# Patient Record
Sex: MALE | Race: WHITE | NOT HISPANIC OR LATINO | Employment: FULL TIME | URBAN - METROPOLITAN AREA
[De-identification: names, ages, dates, MRNs, and addresses within clinical notes are randomized per-mention and may not be internally consistent; named-entity substitution may affect disease eponyms.]

---

## 2022-01-04 ENCOUNTER — APPOINTMENT (EMERGENCY)
Dept: CT IMAGING | Facility: HOSPITAL | Age: 62
End: 2022-01-04

## 2022-01-04 ENCOUNTER — HOSPITAL ENCOUNTER (EMERGENCY)
Facility: HOSPITAL | Age: 62
Discharge: HOME | End: 2022-01-04
Attending: INTERNAL MEDICINE | Admitting: INTERNAL MEDICINE

## 2022-01-04 ENCOUNTER — APPOINTMENT (EMERGENCY)
Dept: RADIOLOGY | Facility: HOSPITAL | Age: 62
End: 2022-01-04

## 2022-01-04 VITALS
OXYGEN SATURATION: 99 % | DIASTOLIC BLOOD PRESSURE: 85 MMHG | TEMPERATURE: 97.5 F | RESPIRATION RATE: 16 BRPM | HEART RATE: 97 BPM | SYSTOLIC BLOOD PRESSURE: 146 MMHG

## 2022-01-04 DIAGNOSIS — R41.82 ALTERED MENTAL STATUS, UNSPECIFIED ALTERED MENTAL STATUS TYPE: Primary | ICD-10-CM

## 2022-01-04 LAB
ALBUMIN SERPL BCP-MCNC: 4.2 G/DL (ref 3.4–4.8)
ALP SERPL-CCNC: 118.8 U/L (ref 10–129)
ALT SERPL W P-5'-P-CCNC: 33 U/L (ref 5–63)
ANION GAP SERPL CALCULATED.3IONS-SCNC: 13 MMOL/L (ref 4–13)
AST SERPL W P-5'-P-CCNC: 34 U/L (ref 15–41)
BASOPHILS # BLD AUTO: 0.1 THOUSANDS/ΜL (ref 0–0.1)
BASOPHILS NFR BLD AUTO: 1 % (ref 0–1)
BILIRUB SERPL-MCNC: 0.62 MG/DL (ref 0.3–1.2)
BUN SERPL-MCNC: 7 MG/DL (ref 6–20)
CALCIUM SERPL-MCNC: 9.4 MG/DL (ref 8.4–10.2)
CARDIAC TROPONIN I PNL SERPL HS: 8 NG/L (ref 8–18)
CHLORIDE SERPL-SCNC: 82 MMOL/L (ref 96–108)
CO2 SERPL-SCNC: 23 MMOL/L (ref 22–33)
CREAT SERPL-MCNC: 0.53 MG/DL (ref 0.5–1.2)
EOSINOPHIL # BLD AUTO: 0.01 THOUSAND/ΜL (ref 0–0.61)
EOSINOPHIL NFR BLD AUTO: 0 % (ref 0–6)
ERYTHROCYTE [DISTWIDTH] IN BLOOD BY AUTOMATED COUNT: 13.2 % (ref 11.6–15.1)
GFR SERPL CREATININE-BSD FRML MDRD: 115 ML/MIN/1.73SQ M
GLUCOSE SERPL-MCNC: 112 MG/DL (ref 65–140)
GLUCOSE SERPL-MCNC: 121 MG/DL (ref 65–140)
HCT VFR BLD AUTO: 43.3 % (ref 36.5–49.3)
HGB BLD-MCNC: 15.4 G/DL (ref 12–17)
IMM GRANULOCYTES # BLD AUTO: 0.05 THOUSAND/UL (ref 0–0.2)
IMM GRANULOCYTES NFR BLD AUTO: 1 % (ref 0–2)
LYMPHOCYTES # BLD AUTO: 0.54 THOUSANDS/ΜL (ref 0.6–4.47)
LYMPHOCYTES NFR BLD AUTO: 5 % (ref 14–44)
MCH RBC QN AUTO: 30.4 PG (ref 26.8–34.3)
MCHC RBC AUTO-ENTMCNC: 35.6 G/DL (ref 31.4–37.4)
MCV RBC AUTO: 86 FL (ref 82–98)
MONOCYTES # BLD AUTO: 1.15 THOUSAND/ΜL (ref 0.17–1.22)
MONOCYTES NFR BLD AUTO: 12 % (ref 4–12)
NEUTROPHILS # BLD AUTO: 8.08 THOUSANDS/ΜL (ref 1.85–7.62)
NEUTS SEG NFR BLD AUTO: 81 % (ref 43–75)
NRBC BLD AUTO-RTO: 0 /100 WBCS
PLATELET # BLD AUTO: 250 THOUSANDS/UL (ref 149–390)
PMV BLD AUTO: 8.4 FL (ref 8.9–12.7)
POTASSIUM SERPL-SCNC: 3.8 MMOL/L (ref 3.5–5)
PROT SERPL-MCNC: 7.9 G/DL (ref 6.4–8.3)
RBC # BLD AUTO: 5.06 MILLION/UL (ref 3.88–5.62)
SODIUM SERPL-SCNC: 118 MMOL/L (ref 133–145)
WBC # BLD AUTO: 9.93 THOUSAND/UL (ref 4.31–10.16)

## 2022-01-04 PROCEDURE — 71045 X-RAY EXAM CHEST 1 VIEW: CPT

## 2022-01-04 PROCEDURE — 93005 ELECTROCARDIOGRAM TRACING: CPT

## 2022-01-04 PROCEDURE — 85025 COMPLETE CBC W/AUTO DIFF WBC: CPT | Performed by: INTERNAL MEDICINE

## 2022-01-04 PROCEDURE — 99285 EMERGENCY DEPT VISIT HI MDM: CPT | Performed by: INTERNAL MEDICINE

## 2022-01-04 PROCEDURE — 84484 ASSAY OF TROPONIN QUANT: CPT | Performed by: INTERNAL MEDICINE

## 2022-01-04 PROCEDURE — G1004 CDSM NDSC: HCPCS

## 2022-01-04 PROCEDURE — 70450 CT HEAD/BRAIN W/O DYE: CPT

## 2022-01-04 PROCEDURE — 36415 COLL VENOUS BLD VENIPUNCTURE: CPT | Performed by: INTERNAL MEDICINE

## 2022-01-04 PROCEDURE — 99285 EMERGENCY DEPT VISIT HI MDM: CPT

## 2022-01-04 PROCEDURE — 82948 REAGENT STRIP/BLOOD GLUCOSE: CPT

## 2022-01-04 PROCEDURE — 80053 COMPREHEN METABOLIC PANEL: CPT | Performed by: INTERNAL MEDICINE

## 2022-01-04 NOTE — ED PROVIDER NOTES
History  Chief Complaint   Patient presents with    Altered Mental Status     Per EMS, pt was at work when he was seen by coworkers standing with a blank stare  Pt was somewhat unresponsive during episode  Sat in chair and felt okay after a few minutes  Pt reports no medical hx or medications   in field  A 64Y old came from work as pt felt weak, and for 2 minutes he was Tralhaji Tang, so staff at work called 911  As per EMS when they arrived read patient was awake alert oriented and has no complain  Brought patient to the ER patient is awake alert oriented x3 respond told questions  Patient stated that he does know why he is here and has no symptoms  Patient denies history of seizure disorder, chest pain, SOB, abdominal pain, nausea vomiting diarrhea  Patient takes no medication and has no medical history  Patient denies history of stroke  Patient is a smoker  None       History reviewed  No pertinent past medical history  History reviewed  No pertinent surgical history  History reviewed  No pertinent family history  I have reviewed and agree with the history as documented  E-Cigarette/Vaping    E-Cigarette Use Never User      E-Cigarette/Vaping Substances     Social History     Tobacco Use    Smoking status: Current Every Day Smoker     Packs/day: 1 00     Types: Cigarettes    Smokeless tobacco: Never Used   Vaping Use    Vaping Use: Never used   Substance Use Topics    Alcohol use: Yes     Comment: occasionally    Drug use: Never       Review of Systems   Constitutional: Negative for diaphoresis, fatigue and fever  HENT: Negative for facial swelling, sinus pain, sneezing and trouble swallowing  Respiratory: Negative for cough, chest tightness and shortness of breath  Cardiovascular: Negative for chest pain, palpitations and leg swelling  Gastrointestinal: Negative for abdominal pain, diarrhea, nausea and vomiting     Endocrine: Negative for polydipsia, polyphagia and polyuria  Genitourinary: Negative for difficulty urinating, dysuria, flank pain and hematuria  Musculoskeletal: Negative for arthralgias, back pain, gait problem, neck pain and neck stiffness  Skin: Negative for color change, pallor and rash  Neurological: Negative for dizziness, seizures, syncope, facial asymmetry, speech difficulty, light-headedness, numbness and headaches  Hematological: Negative for adenopathy  Does not bruise/bleed easily  Psychiatric/Behavioral: Negative for agitation and behavioral problems  Physical Exam  Physical Exam  Vitals and nursing note reviewed  Constitutional:       General: He is not in acute distress  Appearance: He is well-developed  He is not ill-appearing, toxic-appearing or diaphoretic  HENT:      Head: Normocephalic and atraumatic  Mouth/Throat:      Mouth: Mucous membranes are moist       Pharynx: No oropharyngeal exudate  Eyes:      General: No visual field deficit or scleral icterus  Extraocular Movements: Extraocular movements intact  Right eye: Normal extraocular motion and no nystagmus  Pupils: Pupils are equal, round, and reactive to light  Pupils are equal    Neck:      Meningeal: Brudzinski's sign and Kernig's sign absent  Cardiovascular:      Rate and Rhythm: Normal rate and regular rhythm  Heart sounds: Normal heart sounds  No murmur heard  No friction rub  Pulmonary:      Effort: Pulmonary effort is normal  No respiratory distress  Breath sounds: Normal breath sounds  No wheezing  Chest:      Chest wall: No tenderness  Abdominal:      General: Bowel sounds are normal  There is no distension  Palpations: Abdomen is soft  There is no mass  Tenderness: There is no abdominal tenderness  There is no guarding  Musculoskeletal:         General: No tenderness or deformity  Normal range of motion  Cervical back: Normal range of motion and neck supple  No rigidity     Lymphadenopathy: Cervical: No cervical adenopathy  Skin:     General: Skin is warm and dry  Capillary Refill: Capillary refill takes less than 2 seconds  Neurological:      Mental Status: He is alert and oriented to person, place, and time  GCS: GCS eye subscore is 4  GCS verbal subscore is 5  GCS motor subscore is 6  Cranial Nerves: No cranial nerve deficit, dysarthria or facial asymmetry     Psychiatric:         Mood and Affect: Mood normal          Behavior: Behavior normal          Vital Signs  ED Triage Vitals [01/04/22 0956]   Temperature Pulse Respirations Blood Pressure SpO2   97 5 °F (36 4 °C) 97 16 146/85 99 %      Temp Source Heart Rate Source Patient Position - Orthostatic VS BP Location FiO2 (%)   Oral Monitor Sitting Left arm --      Pain Score       --           Vitals:    01/04/22 0956   BP: 146/85   Pulse: 97   Patient Position - Orthostatic VS: Sitting         Visual Acuity  Visual Acuity      Most Recent Value   L Pupil Size (mm) 3   R Pupil Size (mm) 3          ED Medications  Medications - No data to display    Diagnostic Studies  Results Reviewed     Procedure Component Value Units Date/Time    High Sensitivity Troponin I Random [[id number]]  (Normal) Collected: 01/04/22 1029    Lab Status: Final result Specimen: Blood from Arm, Left Updated: 01/04/22 1056     HS TnI random 8 ng/L     Comprehensive metabolic panel [[id number]]  (Abnormal) Collected: 01/04/22 1029    Lab Status: Final result Specimen: Blood from Arm, Left Updated: 01/04/22 1051     Sodium 118 mmol/L      Potassium 3 8 mmol/L      Chloride 82 mmol/L      CO2 23 mmol/L      ANION GAP 13 mmol/L      BUN 7 mg/dL      Creatinine 0 53 mg/dL      Glucose 112 mg/dL      Calcium 9 4 mg/dL      AST 34 U/L      ALT 33 U/L      Alkaline Phosphatase 118 8 U/L      Total Protein 7 9 g/dL      Albumin 4 2 g/dL      Total Bilirubin 0 62 mg/dL      eGFR 115 ml/min/1 73sq m     Narrative:      [place] guidelines for Chronic Kidney Disease (CKD):     Stage 1 with normal or high GFR (GFR > 90 mL/min/1 73 square meters)    Stage 2 Mild CKD (GFR = 60-89 mL/min/1 73 square meters)    Stage 3A Moderate CKD (GFR = 45-59 mL/min/1 73 square meters)    Stage 3B Moderate CKD (GFR = 30-44 mL/min/1 73 square meters)    Stage 4 Severe CKD (GFR = 15-29 mL/min/1 73 square meters)    Stage 5 End Stage CKD (GFR <15 mL/min/1 73 square meters)  Note: GFR calculation is accurate only with a steady state creatinine    CBC and differential [[id number]]  (Abnormal) Collected: 01/04/22 1029    Lab Status: Final result Specimen: Blood from Arm, Left Updated: 01/04/22 1033     WBC 9 93 Thousand/uL      RBC 5 06 Million/uL      Hemoglobin 15 4 g/dL      Hematocrit 43 3 %      MCV 86 fL      MCH 30 4 pg      MCHC 35 6 g/dL      RDW 13 2 %      MPV 8 4 fL      Platelets 478 Thousands/uL      nRBC 0 /100 WBCs      Neutrophils Relative 81 %      Immat GRANS % 1 %      Lymphocytes Relative 5 %      Monocytes Relative 12 %      Eosinophils Relative 0 %      Basophils Relative 1 %      Neutrophils Absolute 8 08 Thousands/µL      Immature Grans Absolute 0 05 Thousand/uL      Lymphocytes Absolute 0 54 Thousands/µL      Monocytes Absolute 1 15 Thousand/µL      Eosinophils Absolute 0 01 Thousand/µL      Basophils Absolute 0 10 Thousands/µL     Fingerstick Glucose (POCT) [[id number]]  (Normal) Collected: 01/04/22 0957    Lab Status: Final result Updated: 01/04/22 0957     POC Glucose 121 mg/dl                  CT head wo contrast   Final Result by Franco Oswald MD (01/04 1124)      No acute intracranial abnormality  Workstation performed: [id number]         XR chest portable   Final Result by Miri Nguyen MD (01/04 1410)      No acute cardiopulmonary disease                    Workstation performed: [id number]                    Procedures  Procedures         ED Course  ED Course as of 01/05/22 0832   Tue Jan 04, 2022   1130 EKG; SINUS RHYTHM RATE 97/min, incomplete RBBB, , Qtc 522   1135 CXR; No infiltrate, no cardiomegely                                             MDM  Number of Diagnoses or Management Options  Altered mental status, unspecified altered mental status type  Diagnosis management comments: This is a 63years old came from old as patient was unresponsive for 2 minutes according to the staff at his work  Patient was packing boxes and all sudden he became unresponsive  Patient has no history of seizure  Patient takes no medication and no medical history  On the arrival of EMS patient was awake alert oriented x3 responding to all questions  Patient arrived to the ER and he is not sure why he is here  Patient has no symptoms  His blood sugar was normal   At the ER blood test was done all came back normal   CXR normal   CT head normal   Patient has history of smoking  At this point patient is going to be discharged home and he stated that he took the [employer] vaccination  And follow-up with his sent to [facility]  Amount and/or Complexity of Data Reviewed  Clinical lab tests: ordered and reviewed  Tests in the radiology section of CPT®: ordered and reviewed    Risk of Complications, Morbidity, and/or Mortality  Presenting problems: moderate  Diagnostic procedures: moderate  Management options: low        Disposition  Final diagnoses: Altered mental status, unspecified altered mental status type     Time reflects when diagnosis was documented in both MDM as applicable and the Disposition within this note     Time User Action Codes Description Comment    1/4/2022 11:36 AM Osmin Schulz Add [R41 82] Altered mental status, unspecified altered mental status type       ED Disposition     ED Disposition Condition Date/Time Comment    Discharge Stable Tue Jan 4, 2022 11:36 AM Tamir Licea III discharge to home/self care              Follow-up Information     Follow up With Specialties Details Why Contact Info Additional Information    [facility]'s [address] [facility] In 1 week  [address] [place] [address]  [facility] [address] [place], [place], [address], [phone]          There are no discharge medications for this patient  No discharge procedures on file      PDMP Review     None          ED Provider  Electronically Signed by           Rebecca Lewis MD  01/05/22 4832

## 2022-01-04 NOTE — DISCHARGE INSTRUCTIONS
Follow up with [facility]  Labs Reviewed   CBC AND DIFFERENTIAL - Abnormal       Result Value Ref Range Status    WBC 9 93  4 31 - 10 16 Thousand/uL Final    RBC 5 06  3 88 - 5 62 Million/uL Final    Hemoglobin 15 4  12 0 - 17 0 g/dL Final    Hematocrit 43 3  36 5 - 49 3 % Final    MCV 86  82 - 98 fL Final    MCH 30 4  26 8 - 34 3 pg Final    MCHC 35 6  31 4 - 37 4 g/dL Final    RDW 13 2  11 6 - 15 1 % Final    MPV 8 4 (*) 8 9 - 12 7 fL Final    Platelets 307  070 - 390 Thousands/uL Final    nRBC 0  /100 WBCs Final    Neutrophils Relative 81 (*) 43 - 75 % Final    Immat GRANS % 1  0 - 2 % Final    Lymphocytes Relative 5 (*) 14 - 44 % Final    Monocytes Relative 12  4 - 12 % Final    Eosinophils Relative 0  0 - 6 % Final    Basophils Relative 1  0 - 1 % Final    Neutrophils Absolute 8 08 (*) 1 85 - 7 62 Thousands/µL Final    Immature Grans Absolute 0 05  0 00 - 0 20 Thousand/uL Final    Lymphocytes Absolute 0 54 (*) 0 60 - 4 47 Thousands/µL Final    Monocytes Absolute 1 15  0 17 - 1 22 Thousand/µL Final    Eosinophils Absolute 0 01  0 00 - 0 61 Thousand/µL Final    Basophils Absolute 0 10  0 00 - 0 10 Thousands/µL Final   COMPREHENSIVE METABOLIC PANEL - Abnormal    Sodium 118 (*) 133 - 145 mmol/L Final    Potassium 3 8  3 5 - 5 0 mmol/L Final    Comment: R-Specimen slightly hemolyzed  Interpret results with caution  Chloride 82 (*) 96 - 108 mmol/L Final    CO2 23  22 - 33 mmol/L Final    ANION GAP 13  4 - 13 mmol/L Final    BUN 7  6 - 20 mg/dL Final    Creatinine 0 53  0 50 - 1 20 mg/dL Final    Comment: Standardized to IDMS reference method    Glucose 112  65 - 140 mg/dL Final    Comment: If the patient is fasting, the ADA then defines impaired fasting glucose as > 100 mg/dL and diabetes as > or equal to 123 mg/dL  Specimen collection should occur prior to Sulfasalazine administration due to the potential for falsely depressed results   Specimen collection should occur prior to Sulfapyridine administration due to the potential for falsely elevated results  Calcium 9 4  8 4 - 10 2 mg/dL Final    AST 34  15 - 41 U/L Final    Comment: Specimen collection should occur prior to Sulfasalazine administration due to the potential for falsely depressed results  ALT 33  5 - 63 U/L Final    Comment: Specimen collection should occur prior to Sulfasalazine administration due to the potential for falsely depressed results  Alkaline Phosphatase 118 8  10 - 129 U/L Final    Total Protein 7 9  6 4 - 8 3 g/dL Final    Albumin 4 2  3 4 - 4 8 g/dL Final    Total Bilirubin 0 62  0 30 - 1 20 mg/dL Final    eGFR 115  ml/min/1 73sq m Final    Narrative:     [place] guidelines for Chronic Kidney Disease (CKD):     Stage 1 with normal or high GFR (GFR > 90 mL/min/1 73 square meters)    Stage 2 Mild CKD (GFR = 60-89 mL/min/1 73 square meters)    Stage 3A Moderate CKD (GFR = 45-59 mL/min/1 73 square meters)    Stage 3B Moderate CKD (GFR = 30-44 mL/min/1 73 square meters)    Stage 4 Severe CKD (GFR = 15-29 mL/min/1 73 square meters)    Stage 5 End Stage CKD (GFR <15 mL/min/1 73 square meters)  Note: GFR calculation is accurate only with a steady state creatinine   HIGH SENSITIVITY TROPONIN I RANDOM - Normal    HS TnI random 8  8 - 18 ng/L Final    Comment:                                              Initial (time 0) result  If >=50 ng/L, Myocardial injury suggested ;  Type of myocardial injury and treatment strategy  to be determined  If 5-49 ng/L, a delta result at 2 hours and or 4 hours will be needed to further evaluate  If <4 ng/L, and chest pain has been >3 hours since onset, patient may qualify for discharge based on the HEART score in the ED  If <5 ng/L and <3hours since onset of chest pain, a delta result at 2 hours will be needed to further evaluate      Second Troponin (time 2 hours)  If calculated delta >= 20 ng/L,  Myocardial injury suggested ; Type of myocardial injury and treatment strategy to be determined  If 5-49 ng/L and the calculated delta is 5-19 ng/L, consult medical service for evaluation  Continue evaluation for ischemia on ecg and other possible etiology and repeat hs troponin at 4 hours  If delta is <5 ng/L at 2 hours, consider discharge based on risk stratification via the HEART score (if in ED), or PETER risk score in IP/Observation  POCT GLUCOSE - Normal    POC Glucose 121  65 - 140 mg/dl Final     CT head wo contrast   Final Result      No acute intracranial abnormality                    Workstation performed: [id number]         XR chest portable    (Results Pending)

## 2022-01-06 LAB
ATRIAL RATE: 97 BPM
P AXIS: 71 DEGREES
PR INTERVAL: 170 MS
QRS AXIS: 65 DEGREES
QRSD INTERVAL: 112 MS
QT INTERVAL: 411 MS
QTC INTERVAL: 522 MS
T WAVE AXIS: 78 DEGREES
VENTRICULAR RATE: 97 BPM

## 2022-01-06 PROCEDURE — 93010 ELECTROCARDIOGRAM REPORT: CPT | Performed by: INTERNAL MEDICINE

## 2022-10-09 ENCOUNTER — APPOINTMENT (OUTPATIENT)
Dept: CT IMAGING | Facility: HOSPITAL | Age: 62
DRG: 871 | End: 2022-10-09
Payer: COMMERCIAL

## 2022-10-09 ENCOUNTER — APPOINTMENT (OUTPATIENT)
Dept: MRI IMAGING | Facility: HOSPITAL | Age: 62
DRG: 871 | End: 2022-10-09
Payer: COMMERCIAL

## 2022-10-09 ENCOUNTER — APPOINTMENT (OUTPATIENT)
Dept: RADIOLOGY | Facility: HOSPITAL | Age: 62
DRG: 871 | End: 2022-10-09
Payer: COMMERCIAL

## 2022-10-09 ENCOUNTER — HOSPITAL ENCOUNTER (INPATIENT)
Facility: HOSPITAL | Age: 62
LOS: 12 days | Discharge: NURSING, REHAB OR LONG-TERM CARE | DRG: 871 | End: 2022-10-21
Attending: EMERGENCY MEDICINE | Admitting: INTERNAL MEDICINE
Payer: COMMERCIAL

## 2022-10-09 DIAGNOSIS — R13.12 OROPHARYNGEAL DYSPHAGIA: ICD-10-CM

## 2022-10-09 DIAGNOSIS — R53.1 ACUTE LEFT-SIDED WEAKNESS: ICD-10-CM

## 2022-10-09 DIAGNOSIS — Z93.1 GASTROSTOMY TUBE IN PLACE (HCC): ICD-10-CM

## 2022-10-09 DIAGNOSIS — J44.9 COPD (CHRONIC OBSTRUCTIVE PULMONARY DISEASE) (HCC): ICD-10-CM

## 2022-10-09 DIAGNOSIS — N17.9 AKI (ACUTE KIDNEY INJURY) (HCC): ICD-10-CM

## 2022-10-09 DIAGNOSIS — E44.0 MODERATE PROTEIN-CALORIE MALNUTRITION (HCC): ICD-10-CM

## 2022-10-09 DIAGNOSIS — F17.200 SMOKING: ICD-10-CM

## 2022-10-09 DIAGNOSIS — T17.908A ASPIRATION INTO RESPIRATORY TRACT: ICD-10-CM

## 2022-10-09 DIAGNOSIS — E87.1 HYPONATREMIA: ICD-10-CM

## 2022-10-09 DIAGNOSIS — I63.9 ACUTE CVA (CEREBROVASCULAR ACCIDENT) (HCC): Primary | ICD-10-CM

## 2022-10-09 DIAGNOSIS — I63.9 CVA (CEREBRAL VASCULAR ACCIDENT) (HCC): ICD-10-CM

## 2022-10-09 DIAGNOSIS — I10 HYPERTENSION: ICD-10-CM

## 2022-10-09 DIAGNOSIS — E44.1 MILD PROTEIN-CALORIE MALNUTRITION (HCC): ICD-10-CM

## 2022-10-09 PROBLEM — I63.232 STENOSIS OF LEFT INTERNAL CAROTID ARTERY WITH CEREBRAL INFARCTION (HCC): Status: ACTIVE | Noted: 2022-10-09

## 2022-10-09 PROBLEM — A41.9 SEVERE SEPSIS (HCC): Status: RESOLVED | Noted: 2022-10-09 | Resolved: 2022-10-09

## 2022-10-09 PROBLEM — R65.10 SIRS (SYSTEMIC INFLAMMATORY RESPONSE SYNDROME) (HCC): Status: ACTIVE | Noted: 2022-10-09

## 2022-10-09 PROBLEM — R65.10 SIRS (SYSTEMIC INFLAMMATORY RESPONSE SYNDROME) (HCC): Status: RESOLVED | Noted: 2022-10-09 | Resolved: 2022-10-09

## 2022-10-09 PROBLEM — R79.89 ELEVATED SERUM CREATININE: Status: ACTIVE | Noted: 2022-10-09

## 2022-10-09 PROBLEM — A41.9 SEVERE SEPSIS (HCC): Status: ACTIVE | Noted: 2022-10-09

## 2022-10-09 PROBLEM — R65.20 SEVERE SEPSIS (HCC): Status: RESOLVED | Noted: 2022-10-09 | Resolved: 2022-10-09

## 2022-10-09 PROBLEM — I65.22 MORE THAN 50 PERCENT STENOSIS OF LEFT INTERNAL CAROTID ARTERY: Status: ACTIVE | Noted: 2022-10-09

## 2022-10-09 PROBLEM — R65.20 SEVERE SEPSIS (HCC): Status: ACTIVE | Noted: 2022-10-09

## 2022-10-09 LAB
2HR DELTA HS TROPONIN: 1 NG/L
2HR DELTA HS TROPONIN: 1 NG/L
4HR DELTA HS TROPONIN: 2 NG/L
4HR DELTA HS TROPONIN: 2 NG/L
ANION GAP SERPL CALCULATED.3IONS-SCNC: 12 MMOL/L (ref 4–13)
ANION GAP SERPL CALCULATED.3IONS-SCNC: 12 MMOL/L (ref 4–13)
ANION GAP SERPL CALCULATED.3IONS-SCNC: 14 MMOL/L (ref 4–13)
ANION GAP SERPL CALCULATED.3IONS-SCNC: 14 MMOL/L (ref 4–13)
APTT PPP: 27 SECONDS (ref 23–37)
APTT PPP: 27 SECONDS (ref 23–37)
BACTERIA UR QL AUTO: NORMAL /HPF
BACTERIA UR QL AUTO: NORMAL /HPF
BILIRUB UR QL STRIP: NEGATIVE
BILIRUB UR QL STRIP: NEGATIVE
BUN SERPL-MCNC: 20 MG/DL (ref 5–25)
BUN SERPL-MCNC: 20 MG/DL (ref 5–25)
BUN SERPL-MCNC: 23 MG/DL (ref 5–25)
BUN SERPL-MCNC: 23 MG/DL (ref 5–25)
CALCIUM SERPL-MCNC: 8.3 MG/DL (ref 8.4–10.2)
CALCIUM SERPL-MCNC: 8.3 MG/DL (ref 8.4–10.2)
CALCIUM SERPL-MCNC: 8.4 MG/DL (ref 8.4–10.2)
CALCIUM SERPL-MCNC: 8.4 MG/DL (ref 8.4–10.2)
CARDIAC TROPONIN I PNL SERPL HS: 10 NG/L
CARDIAC TROPONIN I PNL SERPL HS: 10 NG/L
CARDIAC TROPONIN I PNL SERPL HS: 11 NG/L
CARDIAC TROPONIN I PNL SERPL HS: 11 NG/L
CARDIAC TROPONIN I PNL SERPL HS: 12 NG/L
CARDIAC TROPONIN I PNL SERPL HS: 12 NG/L
CHLORIDE SERPL-SCNC: 86 MMOL/L (ref 96–108)
CHLORIDE SERPL-SCNC: 86 MMOL/L (ref 96–108)
CHLORIDE SERPL-SCNC: 87 MMOL/L (ref 96–108)
CHLORIDE SERPL-SCNC: 87 MMOL/L (ref 96–108)
CHLORIDE UR-SCNC: <15 MMOL/L
CHLORIDE UR-SCNC: <15 MMOL/L
CLARITY UR: CLEAR
CLARITY UR: CLEAR
CO2 SERPL-SCNC: 19 MMOL/L (ref 21–32)
CO2 SERPL-SCNC: 19 MMOL/L (ref 21–32)
CO2 SERPL-SCNC: 21 MMOL/L (ref 21–32)
CO2 SERPL-SCNC: 21 MMOL/L (ref 21–32)
COLOR UR: YELLOW
COLOR UR: YELLOW
CREAT SERPL-MCNC: 1.9 MG/DL (ref 0.6–1.3)
CREAT SERPL-MCNC: 1.9 MG/DL (ref 0.6–1.3)
CREAT SERPL-MCNC: 1.93 MG/DL (ref 0.6–1.3)
CREAT SERPL-MCNC: 1.93 MG/DL (ref 0.6–1.3)
ERYTHROCYTE [DISTWIDTH] IN BLOOD BY AUTOMATED COUNT: 14.1 % (ref 11.6–15.1)
ERYTHROCYTE [DISTWIDTH] IN BLOOD BY AUTOMATED COUNT: 14.1 % (ref 11.6–15.1)
GFR SERPL CREATININE-BSD FRML MDRD: 37 ML/MIN/1.73SQ M
GLUCOSE SERPL-MCNC: 104 MG/DL (ref 65–140)
GLUCOSE SERPL-MCNC: 104 MG/DL (ref 65–140)
GLUCOSE SERPL-MCNC: 122 MG/DL (ref 65–140)
GLUCOSE SERPL-MCNC: 122 MG/DL (ref 65–140)
GLUCOSE UR STRIP-MCNC: NEGATIVE MG/DL
GLUCOSE UR STRIP-MCNC: NEGATIVE MG/DL
HCT VFR BLD AUTO: 35.1 % (ref 36.5–49.3)
HCT VFR BLD AUTO: 35.1 % (ref 36.5–49.3)
HGB BLD-MCNC: 12.1 G/DL (ref 12–17)
HGB BLD-MCNC: 12.1 G/DL (ref 12–17)
HGB UR QL STRIP.AUTO: NEGATIVE
HGB UR QL STRIP.AUTO: NEGATIVE
INR PPP: 1.08 (ref 0.84–1.19)
INR PPP: 1.08 (ref 0.84–1.19)
KETONES UR STRIP-MCNC: NEGATIVE MG/DL
KETONES UR STRIP-MCNC: NEGATIVE MG/DL
LACTATE SERPL-SCNC: 1.8 MMOL/L (ref 0.5–2)
LACTATE SERPL-SCNC: 1.8 MMOL/L (ref 0.5–2)
LACTATE SERPL-SCNC: 2.8 MMOL/L (ref 0.5–2)
LACTATE SERPL-SCNC: 2.8 MMOL/L (ref 0.5–2)
LEUKOCYTE ESTERASE UR QL STRIP: NEGATIVE
LEUKOCYTE ESTERASE UR QL STRIP: NEGATIVE
MCH RBC QN AUTO: 29.8 PG (ref 26.8–34.3)
MCH RBC QN AUTO: 29.8 PG (ref 26.8–34.3)
MCHC RBC AUTO-ENTMCNC: 34.5 G/DL (ref 31.4–37.4)
MCHC RBC AUTO-ENTMCNC: 34.5 G/DL (ref 31.4–37.4)
MCV RBC AUTO: 87 FL (ref 82–98)
MCV RBC AUTO: 87 FL (ref 82–98)
NITRITE UR QL STRIP: NEGATIVE
NITRITE UR QL STRIP: NEGATIVE
NON-SQ EPI CELLS URNS QL MICRO: NORMAL /HPF
NON-SQ EPI CELLS URNS QL MICRO: NORMAL /HPF
PH UR STRIP.AUTO: 5.5 [PH]
PH UR STRIP.AUTO: 5.5 [PH]
PLATELET # BLD AUTO: 114 THOUSANDS/UL (ref 149–390)
PLATELET # BLD AUTO: 114 THOUSANDS/UL (ref 149–390)
PMV BLD AUTO: 9.3 FL (ref 8.9–12.7)
PMV BLD AUTO: 9.3 FL (ref 8.9–12.7)
POTASSIUM SERPL-SCNC: 4.3 MMOL/L (ref 3.5–5.3)
POTASSIUM SERPL-SCNC: 4.3 MMOL/L (ref 3.5–5.3)
POTASSIUM SERPL-SCNC: 4.5 MMOL/L (ref 3.5–5.3)
POTASSIUM SERPL-SCNC: 4.5 MMOL/L (ref 3.5–5.3)
PROCALCITONIN SERPL-MCNC: 0.88 NG/ML
PROCALCITONIN SERPL-MCNC: 0.88 NG/ML
PROT UR STRIP-MCNC: ABNORMAL MG/DL
PROT UR STRIP-MCNC: ABNORMAL MG/DL
PROTHROMBIN TIME: 14.2 SECONDS (ref 11.6–14.5)
PROTHROMBIN TIME: 14.2 SECONDS (ref 11.6–14.5)
RBC # BLD AUTO: 4.06 MILLION/UL (ref 3.88–5.62)
RBC # BLD AUTO: 4.06 MILLION/UL (ref 3.88–5.62)
RBC #/AREA URNS AUTO: NORMAL /HPF
RBC #/AREA URNS AUTO: NORMAL /HPF
SODIUM 24H UR-SCNC: <10 MOL/L
SODIUM 24H UR-SCNC: <10 MOL/L
SODIUM SERPL-SCNC: 119 MMOL/L (ref 135–147)
SODIUM SERPL-SCNC: 119 MMOL/L (ref 135–147)
SODIUM SERPL-SCNC: 120 MMOL/L (ref 135–147)
SODIUM SERPL-SCNC: 120 MMOL/L (ref 135–147)
SP GR UR STRIP.AUTO: 1.03 (ref 1–1.03)
SP GR UR STRIP.AUTO: 1.03 (ref 1–1.03)
URATE SERPL-MCNC: 8 MG/DL (ref 3.5–8.5)
URATE SERPL-MCNC: 8 MG/DL (ref 3.5–8.5)
UROBILINOGEN UR STRIP-ACNC: <2 MG/DL
UROBILINOGEN UR STRIP-ACNC: <2 MG/DL
WBC # BLD AUTO: 18.2 THOUSAND/UL (ref 4.31–10.16)
WBC # BLD AUTO: 18.2 THOUSAND/UL (ref 4.31–10.16)
WBC #/AREA URNS AUTO: NORMAL /HPF
WBC #/AREA URNS AUTO: NORMAL /HPF

## 2022-10-09 PROCEDURE — G1004 CDSM NDSC: HCPCS

## 2022-10-09 PROCEDURE — 85610 PROTHROMBIN TIME: CPT | Performed by: EMERGENCY MEDICINE

## 2022-10-09 PROCEDURE — 93005 ELECTROCARDIOGRAM TRACING: CPT

## 2022-10-09 PROCEDURE — 83605 ASSAY OF LACTIC ACID: CPT

## 2022-10-09 PROCEDURE — 70496 CT ANGIOGRAPHY HEAD: CPT

## 2022-10-09 PROCEDURE — 70551 MRI BRAIN STEM W/O DYE: CPT

## 2022-10-09 PROCEDURE — 85027 COMPLETE CBC AUTOMATED: CPT | Performed by: EMERGENCY MEDICINE

## 2022-10-09 PROCEDURE — 71045 X-RAY EXAM CHEST 1 VIEW: CPT

## 2022-10-09 PROCEDURE — 83935 ASSAY OF URINE OSMOLALITY: CPT | Performed by: INTERNAL MEDICINE

## 2022-10-09 PROCEDURE — 82436 ASSAY OF URINE CHLORIDE: CPT | Performed by: INTERNAL MEDICINE

## 2022-10-09 PROCEDURE — 84484 ASSAY OF TROPONIN QUANT: CPT | Performed by: EMERGENCY MEDICINE

## 2022-10-09 PROCEDURE — 99291 CRITICAL CARE FIRST HOUR: CPT | Performed by: PHYSICIAN ASSISTANT

## 2022-10-09 PROCEDURE — 84300 ASSAY OF URINE SODIUM: CPT | Performed by: INTERNAL MEDICINE

## 2022-10-09 PROCEDURE — 99223 1ST HOSP IP/OBS HIGH 75: CPT | Performed by: HOSPITALIST

## 2022-10-09 PROCEDURE — 84550 ASSAY OF BLOOD/URIC ACID: CPT

## 2022-10-09 PROCEDURE — 81001 URINALYSIS AUTO W/SCOPE: CPT

## 2022-10-09 PROCEDURE — 85730 THROMBOPLASTIN TIME PARTIAL: CPT | Performed by: EMERGENCY MEDICINE

## 2022-10-09 PROCEDURE — 99285 EMERGENCY DEPT VISIT HI MDM: CPT | Performed by: EMERGENCY MEDICINE

## 2022-10-09 PROCEDURE — 83735 ASSAY OF MAGNESIUM: CPT

## 2022-10-09 PROCEDURE — 36415 COLL VENOUS BLD VENIPUNCTURE: CPT | Performed by: EMERGENCY MEDICINE

## 2022-10-09 PROCEDURE — 84100 ASSAY OF PHOSPHORUS: CPT

## 2022-10-09 PROCEDURE — 84145 PROCALCITONIN (PCT): CPT

## 2022-10-09 PROCEDURE — 70498 CT ANGIOGRAPHY NECK: CPT

## 2022-10-09 PROCEDURE — 80048 BASIC METABOLIC PNL TOTAL CA: CPT

## 2022-10-09 PROCEDURE — 80048 BASIC METABOLIC PNL TOTAL CA: CPT | Performed by: EMERGENCY MEDICINE

## 2022-10-09 PROCEDURE — 99285 EMERGENCY DEPT VISIT HI MDM: CPT

## 2022-10-09 PROCEDURE — 87040 BLOOD CULTURE FOR BACTERIA: CPT

## 2022-10-09 RX ORDER — ATORVASTATIN CALCIUM 40 MG/1
40 TABLET, FILM COATED ORAL EVERY EVENING
Status: CANCELLED | OUTPATIENT
Start: 2022-10-09

## 2022-10-09 RX ORDER — ASPIRIN 325 MG
325 TABLET ORAL ONCE
Status: DISCONTINUED | OUTPATIENT
Start: 2022-10-09 | End: 2022-10-12

## 2022-10-09 RX ORDER — CEFEPIME HYDROCHLORIDE 1 G/50ML
1000 INJECTION, SOLUTION INTRAVENOUS ONCE
Status: COMPLETED | OUTPATIENT
Start: 2022-10-09 | End: 2022-10-09

## 2022-10-09 RX ORDER — CLOPIDOGREL BISULFATE 75 MG/1
300 TABLET ORAL ONCE
Status: DISCONTINUED | OUTPATIENT
Start: 2022-10-09 | End: 2022-10-12

## 2022-10-09 RX ORDER — ASPIRIN 81 MG/1
81 TABLET ORAL DAILY
Status: DISCONTINUED | OUTPATIENT
Start: 2022-10-10 | End: 2022-10-14

## 2022-10-09 RX ORDER — ATORVASTATIN CALCIUM 40 MG/1
80 TABLET, FILM COATED ORAL
Status: DISCONTINUED | OUTPATIENT
Start: 2022-10-09 | End: 2022-10-10

## 2022-10-09 RX ORDER — CEFTRIAXONE 1 G/50ML
1000 INJECTION, SOLUTION INTRAVENOUS EVERY 24 HOURS
Status: DISCONTINUED | OUTPATIENT
Start: 2022-10-10 | End: 2022-10-20

## 2022-10-09 RX ORDER — CLOPIDOGREL BISULFATE 75 MG/1
200 TABLET ORAL ONCE
Status: DISCONTINUED | OUTPATIENT
Start: 2022-10-09 | End: 2022-10-09

## 2022-10-09 RX ORDER — CLOPIDOGREL BISULFATE 75 MG/1
75 TABLET ORAL DAILY
Status: DISCONTINUED | OUTPATIENT
Start: 2022-10-10 | End: 2022-10-13

## 2022-10-09 RX ORDER — HEPARIN SODIUM 5000 [USP'U]/ML
5000 INJECTION, SOLUTION INTRAVENOUS; SUBCUTANEOUS EVERY 8 HOURS SCHEDULED
Status: DISCONTINUED | OUTPATIENT
Start: 2022-10-09 | End: 2022-10-14

## 2022-10-09 RX ADMIN — HEPARIN SODIUM 5000 UNITS: 5000 INJECTION INTRAVENOUS; SUBCUTANEOUS at 23:39

## 2022-10-09 RX ADMIN — SODIUM CHLORIDE 1000 ML: 0.9 INJECTION, SOLUTION INTRAVENOUS at 19:00

## 2022-10-09 RX ADMIN — IOHEXOL 85 ML: 350 INJECTION, SOLUTION INTRAVENOUS at 16:16

## 2022-10-09 RX ADMIN — CEFEPIME HYDROCHLORIDE 1000 MG: 1 INJECTION, SOLUTION INTRAVENOUS at 19:00

## 2022-10-09 NOTE — ASSESSMENT & PLAN NOTE
Assessment:  Pt had an elevated creatinine of 1 9 on admission  Recent Labs     10/09/22  1637   CREATININE 1 90*   EGFR 37     Estimated Creatinine Clearance: 34 2 mL/min (A) (by C-G formula based on SCr of 1 9 mg/dL (H))  Unsure of pt's baseline as prior medical records could not be obtained      Plan:  • IVF  • Consult Nephrology  • Obtain am BMP

## 2022-10-09 NOTE — ASSESSMENT & PLAN NOTE
Recent Labs     10/09/22  1637   SODIUM 119*     No results found for: DANO Arredondo    Plan:  · Obtain STAT BMP  · Consult Nephrology  · Obtain urine sodium, urine chloride, urine osmolarity, and serum uric acid  · Obtain am cortisol  · Obtain BMP q8h according to hyponatremia protocol, until Na+ normalizes  · Continue IVF 0 9 NaCL gentle hydration to raise Na+ by 6-8 mEq per 24 hr  · Avoid over-correction to prevent osmotic demyelination syndrome  · Obtain renal US

## 2022-10-09 NOTE — QUICK NOTE
[facility] Attending Physician Attestation Note - Admission    I have seen and examined Shawanda Sow III personally and have reviewed the medical record independently  I have reviewed the case with the resident physician including all assessments and the plan of care for each  I agree with the resident physician and offer the following addendum to the below statements by the resident physician:     Date Evaluated: 10/9/2022    Subjective / HPI:   53-year-old male with no known significant past medical history who presented with left upper left lower extremity weakness and left-sided facial droop  Patient reportedly in his normal state of health last night when he went to bed  No other current complaints  Patient came to the ED when symptoms did not resolve  He denies shortness of breath or cough  Denies any recent poor oral intake  Does drink alcohol but not on a daily basis  Exam:   Vitals:    10/09/22 1900   BP: (!) 184/109   Pulse: 97   Resp: 22   Temp:    SpO2: 97%   General Appearance: Alert, no acute distress, chronically ill-appearing  HEENT: MMM, sclera anicteric  Skin: Pink, warm, dry and intact  No rashes/lesions noted  Lungs: clear to ascultation, no wheezes rales or rhonchi appreciated  Cardiac: normal S1/S2, regular rate and rhythm  No murmur/rubs/gallops  Abdomen: soft, nontender, nondistended, active bowel sounds  Extremities:   No edema  No cyanosis  Neuro: A&O x3  +dysarthria; left upper extremity deficit 0/5 and left lower 2/5 when compared to right  Assessment and Plan:  · 53-year-old male with no known past medical history presented with acute left upper left lower extremity weakness left-sided facial droop concerning for acute CVA confirmed on CT imaging  Patient's presentation also notable for severe hyponatremia of unclear origin as well as severe sepsis likely due to an aspiration pneumonia also seen on imaging  · CVA:  Stroke pathway ordered    Continue dual anti-platelet therapy, permissive hypertension, MRI brain and echocardiogram pending  Appreciate Neurology input  · Carotid atherosclerosis:  Vascular surgery consult  · Severe hyponatremia:  Patient received 1 L NS for severe sepsis  Get stat BMP now  Further fluid orders to be determined based on current sodium level  Check urine studies  Nephrology consult  · Elevated creatinine:  Unclear baseline  May be chronic  Check UA check renal ultrasound  Follow BMP daily  · Severe sepsis due to aspiration pneumonia:  Patient tachycardic with leukocytosis and elevated lactate on admission  CTA showed left upper lobe aspiration  Patient's procalcitonin also elevated supporting diagnosis  Would continue ceftriaxone, trend procalcitonin  Supportive care    Education and Discussions with Family / Patient: patient     Time Spent for Care: 70 minutes  More than 50% of total time spent on counseling and coordination of care as described above  Current Patient Status: Inpatient   Anticipated Length of Stay:  Patient will be admitted on an Inpatient basis with an anticipated length of stay of  > 2 midnights  Justification for Hospital Stay:  Acute CVA; severe sepsis    Epic / Care Everywhere Records Reviewed Independently: Yes     For detailed history, assessment, and plan of care, please review the statements below by Dr Fahad Keller

## 2022-10-09 NOTE — ED PROVIDER NOTES
History  Chief Complaint   Patient presents with   • STROKE Alert     Left sided weakness of upper and lower extremities with left sided facial paralysis  Last known well 180       53-year-old male with unremarkable PMHx (pt is not regularly evaluated by a provider), presents for evaluation via EMS for acute L UE and LE weakness with slurred speech since waking this AM  Pt reportedly "fell after slipping down while sitting in a chair" which prompted call to EMS  Pt is unable to report what time he woke in the morning, however he does believe he did not experience any focal weakness last night before sleeping  He denies any HA, vision changes, numbness/tingling, N/V/D, CP, abdominal pain, recent fever/chills, cough/cold Sx, or any other Sx  He denies any other recent falls, trauma or injuries  Not on AC or antiplatelets  No other concerns  None       History reviewed  No pertinent past medical history  History reviewed  No pertinent surgical history  History reviewed  No pertinent family history  I have reviewed and agree with the history as documented  E-Cigarette/Vaping     E-Cigarette/Vaping Substances     Social History     Tobacco Use   • Smoking status: Current Every Day Smoker     Types: Cigarettes   • Smokeless tobacco: Never Used   Substance Use Topics   • Alcohol use: Not Currently        Review of Systems   Constitutional: Negative for chills and fever  HENT: Negative for ear pain and sore throat  Eyes: Negative for pain and visual disturbance  Respiratory: Negative for cough and shortness of breath  Cardiovascular: Negative for chest pain and palpitations  Gastrointestinal: Negative for abdominal pain, diarrhea and vomiting  Genitourinary: Negative for dysuria and hematuria  Musculoskeletal: Negative for arthralgias and back pain  Skin: Negative for color change and rash  Neurological: Positive for speech difficulty and weakness   Negative for dizziness, seizures, syncope, light-headedness and numbness  All other systems reviewed and are negative  Physical Exam  ED Triage Vitals   Temperature Pulse Respirations Blood Pressure SpO2   10/09/22 1644 10/09/22 1600 10/09/22 1600 10/09/22 1600 10/09/22 1614   98 °F (36 7 °C) 63 20 (!) 161/109 98 %      Temp Source Heart Rate Source Patient Position - Orthostatic VS BP Location FiO2 (%)   10/09/22 1644 10/09/22 1600 10/09/22 1600 10/09/22 1630 --   Axillary Monitor Lying Right arm       Pain Score       --                    Orthostatic Vital Signs  Vitals:    10/09/22 1730 10/09/22 1800 10/09/22 1830 10/09/22 1900   BP: 169/74 154/94 (!) 179/107 (!) 184/109   Pulse: 104 97 100 97   Patient Position - Orthostatic VS: Lying Lying Lying Lying       Physical Exam  Vitals and nursing note reviewed  Constitutional:       Appearance: Normal appearance  He is well-developed  Comments: Disheveled, poor hygiene, smells of urine   HENT:      Head: Normocephalic and atraumatic  Right Ear: External ear normal       Left Ear: External ear normal       Nose: Nose normal       Mouth/Throat:      Mouth: Mucous membranes are moist    Eyes:      Extraocular Movements: Extraocular movements intact  Conjunctiva/sclera: Conjunctivae normal    Cardiovascular:      Rate and Rhythm: Normal rate and regular rhythm  Pulses: Normal pulses  Heart sounds: Normal heart sounds  No murmur heard  Pulmonary:      Effort: Pulmonary effort is normal  No respiratory distress  Breath sounds: Normal breath sounds  Abdominal:      General: Abdomen is flat  Palpations: Abdomen is soft  Tenderness: There is no abdominal tenderness  There is no guarding or rebound  Musculoskeletal:      Cervical back: Normal range of motion and neck supple  Right lower leg: No edema  Left lower leg: No edema  Skin:     General: Skin is warm and dry  Capillary Refill: Capillary refill takes less than 2 seconds  Neurological:      Mental Status: He is alert and oriented to person, place, and time  Cranial Nerves: Cranial nerve deficit present  Sensory: No sensory deficit  Motor: Weakness present  Coordination: Coordination abnormal       Comments: Motor strength 1/5 on L UE and L LE, L lower facial droop  Sensations to light touch intact throughout      GCS 15   Psychiatric:         Mood and Affect: Mood normal          Behavior: Behavior normal          ED Medications  Medications   aspirin tablet 325 mg (325 mg Oral Not Given 10/9/22 1655)   clopidogrel (PLAVIX) tablet 300 mg (300 mg Oral Not Given 10/9/22 1655)   aspirin (ECOTRIN LOW STRENGTH) EC tablet 81 mg (has no administration in time range)   clopidogrel (PLAVIX) tablet 75 mg (has no administration in time range)   atorvastatin (LIPITOR) tablet 80 mg (80 mg Oral Not Given 10/9/22 1706)   cefTRIAXone (ROCEPHIN) IVPB (premix in dextrose) 1,000 mg 50 mL (has no administration in time range)   heparin (porcine) subcutaneous injection 5,000 Units (has no administration in time range)   iohexol (OMNIPAQUE) 350 MG/ML injection (SINGLE-DOSE) 85 mL (85 mL Intravenous Given 10/9/22 1616)   cefepime (MAXIPIME) IVPB (premix in dextrose) 1,000 mg 50 mL (0 mg Intravenous Stopped 10/9/22 1928)       Diagnostic Studies  Results Reviewed     Procedure Component Value Units Date/Time    Lactic acid, plasma [[id number]]     Lab Status: No result Specimen: Blood     Lactic acid, plasma [[id number]]     Lab Status: No result Specimen: Blood     BMP Q8 hours X 3 (Hyponatremia monitoring) [[id number]]     Lab Status: No result Specimen: Blood     Sodium, urine, random [[id number]]     Lab Status: No result Specimen: Urine     Osmolality, urine [[id number]]     Lab Status: No result Specimen: Urine     Chloride, urine, random [[id number]]     Lab Status: No result Specimen: Urine     Uric acid [[id number]]     Lab Status: No result Specimen: Blood     HS Troponin I 2hr [[id number]]  (Normal) Collected: 10/09/22 1858    Lab Status: Final result Specimen: Blood from Arm, Left Updated: 10/09/22 1935     hs TnI 2hr 11 ng/L      Delta 2hr hsTnI 1 ng/L     Basic metabolic panel [[id number]]     Lab Status: No result Specimen: Blood     HS Troponin I 4hr [[id number]]     Lab Status: No result Specimen: Blood     Lactic acid [[id number]]  (Abnormal) Collected: 10/09/22 1740    Lab Status: Final result Specimen: Blood from Arm, Left Updated: 10/09/22 1820     LACTIC ACID 2 8 mmol/L     Narrative:      Result may be elevated if tourniquet was used during collection  Lactic acid 2 Hours [[id number]]     Lab Status: No result Specimen: Blood     Procalcitonin [[id number]]  (Abnormal) Collected: 10/09/22 1637    Lab Status: Final result Specimen: Blood from Arm, Left Updated: 10/09/22 1812     Procalcitonin 0 88 ng/ml     Blood culture #1 [[id number]] Collected: 10/09/22 1753    Lab Status: In process Specimen: Blood from Line, Venous Updated: 10/09/22 1757    Blood culture #2 [[id number]] Collected: 10/09/22 1752    Lab Status:  In process Specimen: Blood from Arm, Left Updated: 10/09/22 1757    Sputum culture and Gram stain [[id number]]     Lab Status: No result Specimen: Expectorated Sputum     UA w Reflex to Microscopic w Reflex to Culture [[id number]]     Lab Status: No result Specimen: Urine     HS Troponin 0hr (reflex protocol) [[id number]]  (Normal) Collected: 10/09/22 1637    Lab Status: Final result Specimen: Blood from Arm, Left Updated: 10/09/22 1708     hs TnI 0hr 10 ng/L     Basic metabolic panel [[id number]]  (Abnormal) Collected: 10/09/22 1637    Lab Status: Final result Specimen: Blood from Arm, Left Updated: 10/09/22 1659     Sodium 119 mmol/L      Potassium 4 5 mmol/L      Chloride 86 mmol/L      CO2 21 mmol/L      ANION GAP 12 mmol/L      BUN 20 mg/dL      Creatinine 1 90 mg/dL      Glucose 122 mg/dL      Calcium 8 3 mg/dL      eGFR 37 ml/min/1 73sq m     Narrative:      Consolidated [employer] guidelines for Chronic Kidney Disease (CKD):   •  Stage 1 with normal or high GFR (GFR > 90 mL/min/1 73 square meters)  •  Stage 2 Mild CKD (GFR = 60-89 mL/min/1 73 square meters)  •  Stage 3A Moderate CKD (GFR = 45-59 mL/min/1 73 square meters)  •  Stage 3B Moderate CKD (GFR = 30-44 mL/min/1 73 square meters)  •  Stage 4 Severe CKD (GFR = 15-29 mL/min/1 73 square meters)  •  Stage 5 End Stage CKD (GFR <15 mL/min/1 73 square meters)  Note: GFR calculation is accurate only with a steady state creatinine    Protime-INR [[id number]]  (Normal) Collected: 10/09/22 1637    Lab Status: Final result Specimen: Blood from Arm, Left Updated: 10/09/22 1656     Protime 14 2 seconds      INR 1 08    APTT [[id number]]  (Normal) Collected: 10/09/22 1637    Lab Status: Final result Specimen: Blood from Arm, Left Updated: 10/09/22 1656     PTT 27 seconds     CBC and Platelet [[id number]]  (Abnormal) Collected: 10/09/22 1637    Lab Status: Final result Specimen: Blood from Arm, Left Updated: 10/09/22 1643     WBC 18 20 Thousand/uL      RBC 4 06 Million/uL      Hemoglobin 12 1 g/dL      Hematocrit 35 1 %      MCV 87 fL      MCH 29 8 pg      MCHC 34 5 g/dL      RDW 14 1 %      Platelets 517 Thousands/uL      MPV 9 3 fL                  MRI brain wo contrast   Final Result by Julius Braxton MD (10/09 1936)         1  Stable acute to early subacute infarct in the right periventricular white matter, corona radiata and lentiform nuclei  No hemorrhagic conversion or mass effect  2   Acute lacunar infarct in the left thalamus  Workstation performed: [id number]         XR chest 1 view portable   ED Interpretation by Brittni Whitt MD (10/09 2046)   Possible opacities in upper lobes BILAT, otherwise nonacute      CTA stroke alert (head/neck)   Final Result by Julius Braxton MD (10/09 1644)      1  Lung segment critical (greater than 90%) stenosis in the proximal left cervical ICA     2   Atretic right posterior cerebral artery  No evidence of acute thrombus or large vessel occlusion of the major vessels of the [place]  3   Evidence of aspiration or pneumonia in the partially visualized upper lungs, most prominent in the left upper lobe  Findings were directly discussed with Eddi Stacy at 4:39 PM                         Workstation performed: [id number]         CT stroke alert brain   Final Result by Bharat Casas MD (10/09 1644)      Acute subcortical infarct in the right periventricular white matter, corona radiata and lentiform nuclei  No hemorrhagic conversion or mass effect        Findings were directly discussed with Eddi Stacy at 4:39 PM       Workstation performed: [facility] kidney and bladder with pvr    (Results Pending)         Procedures  ECG 12 Lead Documentation Only    Date/Time: 10/9/2022 4:48 PM  Performed by: Jamel Capone MD  Authorized by: Jamel Capone MD     Indications / Diagnosis:  Concern for CVA  ECG reviewed by me, the ED Provider: yes    Patient location:  ED  Previous ECG:     Previous ECG:  Unavailable    Comparison to cardiac monitor: Yes    Interpretation:     Interpretation: non-specific    Quality:     Tracing quality:  Limited by artifact  Rate:     ECG rate:  107    ECG rate assessment: tachycardic    Rhythm:     Rhythm: sinus rhythm    Ectopy:     Ectopy: none    QRS:     QRS axis:  Normal    QRS intervals:  Normal  Conduction:     Conduction: normal    ST segments:     ST segments:  Normal  T waves:     T waves: normal            ED Course                  Stroke Assessment     Row Name 10/09/22 1625             NIH Stroke Scale    Interval Baseline      Level of Consciousness (1a ) 0      LOC Questions (1b ) 0      LOC Commands (1c ) 0      Best Gaze (2 ) 0      Visual (3 ) 0      Facial Palsy (4 ) 2      Motor Arm, Left (5a ) 4      Motor Arm, Right (5b ) 0      Motor Leg, Left (6a ) 3      Motor Leg, Right (6b ) 0      Limb Ataxia (7 ) 1      Sensory (8 ) 0      Best Language (9 ) 0      Dysarthria (10 ) 2      Extinction and Inattention (11 ) (Formerly Neglect) 0      Total 12              Flowsheet Row Most Recent Value   Thrombolytic Decision Options    Thrombolytic Decision Patient not a candidate  Patient is not a candidate options Unclear time of onset outside appropriate time window  Initial Sepsis Screening     Row Name 10/09/22 2587                Is the patient's history suggestive of a new or worsening infection? Yes (Proceed)  -MS        Suspected source of infection suspect infection, source unknown  -MS        Are two or more of the following signs & symptoms of infection both present and new to the patient? Yes (Proceed)  -MS        Indicate SIRS criteria Tachycardia > 90 bpm;Leukocytosis (WBC > 20629 IJL)  -MS        If the answer is yes to both questions, suspicion of sepsis is present --        If severe sepsis is present AND tissue hypoperfusion perists in the hour after fluid resuscitation or lactate > 4, the patient meets criteria for SEPTIC SHOCK --        Are any of the following organ dysfunction criteria present within 6 hours of suspected infection and SIRS criteria that are NOT considered to be chronic conditions? Yes  -MS        Organ dysfunction --        Date of presentation of severe sepsis --        Time of presentation of severe sepsis --        Tissue hypoperfusion persists in the hour after crystalloid fluid administration, evidenced, by either: --        Was hypotension present within one hour of the conclusion of crystalloid fluid administration?  No  -MS        Date of presentation of septic shock --        Time of presentation of septic shock --              User Key  (r) = Recorded By, (t) = Taken By, (c) = Cosigned By    [address] Name Provider Type    MS Erika Stone MD Resident                SBIRT 20yo+    Flowsheet Row Most Recent Value   SBIRT (25 yo +)    In order to provide better care to our patients, we are screening all of our patients for alcohol and drug use  Would it be okay to ask you these screening questions? Yes Filed at: 10/09/2022 1606   Initial Alcohol Screen: US AUDIT-C     1  How often do you have a drink containing alcohol? 0 Filed at: 10/09/2022 1606   2  How many drinks containing alcohol do you have on a typical day you are drinking? 0 Filed at: 10/09/2022 1606   3a  Male UNDER 65: How often do you have five or more drinks on one occasion? 0 Filed at: 10/09/2022 1606   3b  FEMALE Any Age, or MALE 65+: How often do you have 4 or more drinks on one occassion? 0 Filed at: 10/09/2022 1606   Audit-C Score 0 Filed at: 10/09/2022 1606   JOAQUÍN: How many times in the past year have you    Used an illegal drug or used a prescription medication for non-medical reasons? Never Filed at: 10/09/2022 1606                MDM  Number of Diagnoses or Management Options  Acute left-sided weakness  CVA (cerebral vascular accident) ([facility] [address] [address] )  Hyponatremia  Diagnosis management comments: Patient remained stable throughout ED course  Found to have NIH stroke scale of 12 due to significant weakness in the left upper extremity, lower extremity, left lower face facial droop with moderate dysarthria  Patient was sent to CT scan immediately upon arrival per pre-hospital stroke alert pathway  On CT head, found to have "an acute subcortical infarct in the right periventricular white matter, corona radiata and lentiform nuclei  without hemorrhagic conversion or mass effect  With >90% stenosis in the proximal L cervical ICA with an atretic R posterior cerebral artery- no other large vessel occlusion in the [place] " Pt was found to be hyponatremic at 119, this is most probably chronic in nature, however he was given an initial 1 L NS IVF for slow correction as well as for volume resuscitation as pt was found to be tachycardic in the 90s with dry mucous membranes  Lactate 2 4, procalcitonin 0 88  Pt was also noted to have BILAT consolidations on CT head/neck, possible aspiration pneumonia  CXR largely nonacute  EKG showing sinus tachycardia, otherwise difficult to assess 2/2 artifact  Pt was given Cefepime and Vancomycin for empiric treatment  Pt was loaded with 300 mg Plavix and 325 ASA for acute CVA  Admitted to [facility] for further management  Pt understands and agreed with plan  All questions answered  No other concerns           Amount and/or Complexity of Data Reviewed  Clinical lab tests: reviewed and ordered  Tests in the radiology section of CPT®: ordered and reviewed  Tests in the medicine section of CPT®: ordered and reviewed  Discuss the patient with other providers: yes    Risk of Complications, Morbidity, and/or Mortality  Presenting problems: high  Diagnostic procedures: high  Management options: high    Patient Progress  Patient progress: stable      Disposition  Final diagnoses:   Acute left-sided weakness   CVA (cerebral vascular accident) ([facility] )   Hyponatremia     Time reflects when diagnosis was documented in both MDM as applicable and the Disposition within this note     Time User Action Codes Description Comment    10/9/2022  3:50 PM Romeo MICHAUD Add [R53 1] Acute left-sided weakness     10/9/2022  5:32 PM Juan Carlos-Cierra Yanely Add [I63 9] CVA (cerebral vascular accident) ([facility] )     10/9/2022  5:32 PM Son-Cierra, Yanely Add [E87 1] Hyponatremia     10/9/2022  7:51 PM JobAnnalee caicedo Rickie A Add [N17 9] LUISANA (acute kidney injury) ([facility] )     10/9/2022  7:53 PM Deepti Vaughn Add [I63 9] Acute CVA (cerebrovascular accident) ([place] [address] )     10/9/2022  8:25 PM JobAnnalee caicedo Rickie A Modify [R53 1] Acute left-sided weakness     10/9/2022  8:25 PM JobLucas caicedondtaylor Rickie A Modify [I63 9] CVA (cerebral vascular accident) ([facility] )     10/9/2022  8:25 PM JobiLucasndtaylor Rickie A Modify [E87 1] Hyponatremia     10/9/2022  8:25 PM JobiLucasndtaylor Rickie A Modify [N17 9] LUISANA (acute kidney injury) ([facility] )     10/9/2022  8:25 PM Annalee Mccollum Modify [I63 9] Acute CVA (cerebrovascular accident) [facility])       ED Disposition     ED Disposition   Admit    Condition   Stable    Date/Time   Sun Oct 9, 2022  5:32 PM    Comment   Case was discussed with Dr Alyssa Gonsalez and the patient's admission status was agreed to be Admission Status: inpatient status to the service of Dr Alyssa Gonsalez   Follow-up Information    None         Patient's Medications    No medications on file     No discharge procedures on file  PDMP Review     None           ED Provider  Attending physically available and evaluated [facility]  I managed the patient along with the ED Attending      Electronically Signed by         Monty Berman MD  10/09/22 2048

## 2022-10-09 NOTE — CONSULTS
Consultation - Stroke   Quynh Rolon III 63 y o  male MRN: [id number]  Unit/Bed#: ED-42 Encounter: [id number]      Assessment/Plan   63year old male with history of tobacco abuse, presenting as stroke alert on 10/09 with L sided weakness, L facial droop, and dysarthria; NIHSS of 8  Initial neuroimaging with early signs of subcortical/basal ganglia R hemisphere ischemia; Not a candidate for TNK/thrombolytics; no LVO on CTA   To undergo cerebrovascular workup into stroke etiology (likely small vessel but will await other workup)  Thrombolytic Decision: Patient not a candidate  Unclear time of onset outside appropriate time window  Plan:  -initiate stroke pathway:   -CT head during alert with early R hemisphere infarct signs  -CTA head/neck with critical L ICA stenosis (>90%), otherwise no significant critical stenosis, no LVO   -Obtain vascular input in regards to L ICA stenosis (asymptomatic at this time)  -obtain MRI brain  -obtain 2D echocardiogram  -permissive hypertension treat SBP >200  -load with DAPT; continue DAPT starting tomorrow  -initiate high-dose Lipitor  -check hemoglobin A1C, lipid panel, TSH, B12  -neuro checks  -telemetry monitoring  -provide stroke education  -therapy evaluations (PT/OT/Speech)     Discussed plan of care with attending neurologist, present during stroke alert  Recommendations for outpatient neurological follow up have yet to be determined  History of Present Illness     Reason for Consult / Principal Problem: Stroke alert, L sided weakness/L facial droop/dysarthria  Patient last known well: 8:00 AM  Stroke alert called: 3:51 PM  Neurology time of arrival: 3:53 PM    HPI: Galo Lopez III is a 63 y o  male who neurology is asked to evaluate as stroke alert this afternoon for the above  Per discussion with ED team; patient was reportedly at a rooming house earlier today, he was found by an occupant later in the day on the floor of his room    Patient stated he had a fall out of bed  And was experiencing significant left-sided weakness and slurred speech  Given this EMS was called and patient was brought to the ED where stroke alert was activated pre-hospital      Was notably hypertensive on presentation initially, NIHSS of 8 as mentioned below, neuro imaging as mentioned above  He was not a candidate for acute intervention ( thrombolytics nor endovascular intervention )  Patient notes smoking history, pack per day, denies any other history of traditional vascular risk factors, no daily medications per patient  Per patient, he works in [employer], lives in [place]  Consult to Neurology  Consult performed by: Janina Fonseca PA-C  Consult ordered by: Homer Mccoy DO          Review of Systems   Unable to perform ROS: Acuity of condition       Historical Information   History reviewed  No pertinent past medical history  History reviewed  No pertinent surgical history  Social History   Social History     Substance and Sexual Activity   Alcohol Use Not Currently     Social History     Substance and Sexual Activity   Drug Use Not on file     E-Cigarette/Vaping     E-Cigarette/Vaping Substances     Social History     Tobacco Use   Smoking Status Current Every Day Smoker   • Types: Cigarettes   Smokeless Tobacco Never Used     Family History: History reviewed  No pertinent family history  Review of previous medical records was completed  Meds/Allergies   current meds:   No current facility-administered medications for this encounter  and PTA meds:   None       No Known Allergies    Objective   Vitals:Blood pressure (!) 170/108, pulse 66, resp  rate 18, SpO2 98 %  ,There is no height or weight on file to calculate BMI  No intake or output data in the 24 hours ending 10/09/22 1618    Invasive Devices:    Invasive Devices  Report    Peripheral Intravenous Line  Duration           Peripheral IV 10/09/22 Left Antecubital <1 day                Physical Exam  Constitutional:       Comments: Frail/cachectic,  Disheveled appearance  HENT:      Head: Normocephalic and atraumatic  Eyes:      Extraocular Movements: EOM normal       Pupils: Pupils are equal, round, and reactive to light  Cardiovascular:      Rate and Rhythm: Normal rate  Pulmonary:      Effort: Pulmonary effort is normal    Abdominal:      General: There is no distension  Skin:     Comments: Dusky/ashy skin   Neurological:      Coordination: Heel-to-shin test: R ataxia  Deep Tendon Reflexes:      Reflex Scores:       Bicep reflexes are 1+ on the right side and 1+ on the left side  Brachioradialis reflexes are 1+ on the right side and 1+ on the left side  Patellar reflexes are 3+ on the right side and 3+ on the left side  Neurologic Exam     Mental Status   Awake, alert, oriented, mild to moderate dysarthria, no aphasia with conversation, following commands  Cranial Nerves     CN II   Visual fields full to confrontation  CN III, IV, VI   Pupils are equal, round, and reactive to light  Extraocular motions are normal      CN V   Facial sensation intact  CN VII   Left facial weakness: L NL fold decrease/mild L facial asymmetry  CN VIII   CN VIII normal      CN IX, X   CN IX normal    CN X normal      CN XI   CN XI normal      CN XII   CN XII normal      Motor Exam   Muscle bulk: normal  Overall muscle tone: normal  Right arm pronator drift: absent  Left arm pronator drift: present  Full appearing strength throughout R UE and LE with exception of 4+ R hip flexion and quad; L UE near plegic with no observed proximal nor distal extremity movements (cannot hold up arm/immediately drops to bed, no finger wiggling)    With L LE; can minimally raise antigravity, but not sustain (2/5?) 3/5 L LE strength distally  Sensory Exam   Light touch normal      No obvious zoila-neglect/extinction with testing during NIHSS        Gait, Coordination, and Reflexes Coordination   Heel-to-shin test: R ataxia  Tremor   Resting tremor: absent  Intention tremor: absent    Reflexes   Right brachioradialis: 1+  Left brachioradialis: 1+  Right biceps: 1+  Left biceps: 1+  Right patellar: 3+  Left patellar: 3+  Right plantar: normal  Left plantar: upgoing  Right Hernandez: absent  Left Hernandez: absent  Gait deferred given severe L LE weakness  NIHSS:  1a Level of Consciousness: 0 = Alert   1b  LOC Questions: 0 = Answers both correctly   1c  LOC Commands: 0 = Obeys both correctly   2  Best Gaze: 0 = Normal   3  Visual: 0 = No visual field loss   4  Facial Palsy: 1=Minor paralysis (flattened nasolabial fold, asymmetric on smiling)   5a  Motor Right Arm: 0=No drift, limb holds 90 (or 45) degrees for full 10 seconds   5b  Motor Left Arm: 3=No effort against gravity, limb falls   6a  Motor Right Le=No drift, limb holds 90 (or 45) degrees for full 10 seconds   6b  Motor Left Leg: 3=No effort against gravity, limb falls   7  Limb Ataxia:  0=Absent   8  Sensory: 0=Normal; no sensory loss   9  Best Language:  0=No aphasia, normal   10  Dysarthria: 1=Mild to moderate, patient slurs at least some words and at worst, can be understood with some difficulty   11  Extinction and Inattention (formerly Neglect): 0=No abnormality   Total Score: 8     Time NIHSS was completed: 4:20 PM    Modified Navarro Score:  Unable to determine currently, will gather additional data    Lab Results: CBC:   , BMP/CMP:       Invalid input(s): ALBUMIN, Vitamin B12:   , HgBA1C:   , TSH:   , Coagulation:   , Lipid Profile:     Imaging Studies: I have personally reviewed pertinent films in PACS   CTA stroke alert (head/neck)   Final Result by Bharat Casas MD (10/09 9157)      1  Lung segment critical (greater than 90%) stenosis in the proximal left cervical ICA  2   Atretic right posterior cerebral artery     No evidence of acute thrombus or large vessel occlusion of the major vessels of the [place]  3   Evidence of aspiration or pneumonia in the partially visualized upper lungs, most prominent in the left upper lobe  Findings were directly discussed with Shoshana Loredo at 4:39 PM                         Workstation performed: [id number]         CT stroke alert brain   Final Result by Natalia Liang MD (10/09 1644)      Acute subcortical infarct in the right periventricular white matter, corona radiata and lentiform nuclei  No hemorrhagic conversion or mass effect  Findings were directly discussed with Shoshana Loredo at 4:39 PM       Workstation performed: [id number]         XR chest 1 view portable    (Results Pending)       EKG, Pathology, and Other Studies: I have personally reviewed pertinent reports  VTE Prophylaxis: None, in ED    Code Status: No Order    Total Critical Care time spent 45 minutes   Discussed plan of care with patient and ED team: reviewed initial neuroimaging, concern for R hemisphere stroke, stroke pathway, neuro checks, therapies,

## 2022-10-09 NOTE — ASSESSMENT & PLAN NOTE
Pt met severe sepsis criteria on admission w/ acute infection and HR of 108, RR of 22 and WBC: 18 20  Suspected source: currently undetermined    Recent Labs     10/09/22  1637   WBC 18 20*     Recent Labs     10/09/22  1740   LACTICACID 2 8*       BP (!) 184/109   Pulse 97   Temp (!) 97 1 °F (36 2 °C) (Rectal)   Resp 22   Ht 5' 11" (1 803 m)   Wt 57 7 kg (127 lb 3 3 oz)   SpO2 97%   BMI 17 74 kg/m²     CT stroke alert brain    Result Date: 10/9/2022  Impression: Acute subcortical infarct in the right periventricular white matter, corona radiata and lentiform nuclei  No hemorrhagic conversion or mass effect  Findings were directly discussed with Charlycayden Rosenthal at 4:39 PM  Workstation performed: [id number]     CTA stroke alert (head/neck)    Result Date: 10/9/2022  Impression: 1  Lung segment critical (greater than 90%) stenosis in the proximal left cervical ICA  2   Atretic right posterior cerebral artery  No evidence of acute thrombus or large vessel occlusion of the major vessels of the [place]  3   Evidence of aspiration or pneumonia in the partially visualized upper lungs, most prominent in the left upper lobe  Findings were directly discussed with Charly Rosenthal at 4:39 PM  Workstation performed: [id number]     No Chest XR results available for this patient       Procalcitonin <=0 25 ng/ml 0 88 High         Initially given a dose of Cefepime 1 g IV, for possible bacteremia and Vancomycin 1,250 mg IV, for possible bactermia    Blood cultures: pending    Plan:  -  Ceftriaxone 1 g q24h, starting 10/10  -  IVF: NaCL at 50 mL/hour  - Trend lactic acid q2h, until it normalizes

## 2022-10-09 NOTE — SEPSIS NOTE
Sepsis Note   Fariha Rolon III 63 y o  male MRN: [id number]  Unit/Bed#: ED-42 Encounter: [id number]       qSOFA     Row Name 10/09/22 17:20:23 10/09/22 1700 10/09/22 1645 10/09/22 1630 10/09/22 1615    Altered mental status GCS < 15 -- 0 0 0 0    Respiratory Rate > / =22 -- 1 0 0 0    Systolic BP < / =187 -- 0 -- 0 0    Q Sofa Score -- 1 0 0 0    Row Name 10/09/22 16:14:27 10/09/22 1600             Altered mental status GCS < 15 -- 0       Respiratory Rate > / =38 0 0       Systolic BP < / =430 0 0       Q Sofa Score 0 0                  Initial Sepsis Screening     Row Name 10/09/22 8786                Is the patient's history suggestive of a new or worsening infection? Yes (Proceed)  -MS        Suspected source of infection suspect infection, source unknown  -MS        Are two or more of the following signs & symptoms of infection both present and new to the patient? Yes (Proceed)  -MS        Indicate SIRS criteria Tachycardia > 90 bpm;Leukocytosis (WBC > 47848 IJL)  -MS        If the answer is yes to both questions, suspicion of sepsis is present --        If severe sepsis is present AND tissue hypoperfusion perists in the hour after fluid resuscitation or lactate > 4, the patient meets criteria for SEPTIC SHOCK --        Are any of the following organ dysfunction criteria present within 6 hours of suspected infection and SIRS criteria that are NOT considered to be chronic conditions? Yes  -MS        Organ dysfunction --        Date of presentation of severe sepsis --        Time of presentation of severe sepsis --        Tissue hypoperfusion persists in the hour after crystalloid fluid administration, evidenced, by either: --        Was hypotension present within one hour of the conclusion of crystalloid fluid administration?  No  -MS        Date of presentation of septic shock --        Time of presentation of septic shock --              User Key  (r) = Recorded By, (t) = Taken By, (c) = Cosigned By [address]  Name Provider Type    MS aPdmini Loyola MD Resident

## 2022-10-09 NOTE — ASSESSMENT & PLAN NOTE
Assessment:  -->Episode of acute left-sided upper extremity and lower extremity weakness along with left-sided lower facial droop and dysarthria with onset this a m  (10/9)  Patient says he does not remember having these symptoms prior to falling asleep last night  In the ED, /109, now Blood Pressure: (!) 170/104   EKG on presentation to the ER showed: Sinus tachycardia  Right atrial enlargement  Nonspecific ST abnormality  CT stroke alert brain    Result Date: 10/9/2022  Impression: Acute subcortical infarct in the right periventricular white matter, corona radiata and lentiform nuclei  No hemorrhagic conversion or mass effect  Findings were directly discussed with Eliecer Tracy at 4:39 PM  Workstation performed: [id number]     CTA stroke alert (head/neck)    Result Date: 10/9/2022  Impression: 1  Lung segment critical (greater than 90%) stenosis in the proximal left cervical ICA  2   Atretic right posterior cerebral artery  No evidence of acute thrombus or large vessel occlusion of the major vessels of the [place]  3  Evidence of aspiration or pneumonia in the partially visualized upper lungs, most prominent in the left upper lobe  Findings were directly discussed with Eliecer Tracy at 4:39 PM  Workstation performed: [id number]     No Chest XR results available for this patient  · NIHSS score: 8  Neurology consulted: says pt not a candidate for tPA as pt is outside of time window      Plan - Stroke pathway:  - obtain MRI brain & 2D echocardiogram  - Allow for permissive hypertension with -200 for 48 hours, tx only if SBP > 200  - load with DAPT; continue DAPT starting tomorrow  - initiate high-dose Lipitor  - check hemoglobin A1C, lipid panel, TSH, B12  - neuro checks  - telemetry monitoring  - provide stroke education  - therapy evaluations (PT/OT/Speech)   - Obtain vascular input in regards to L ICA stenosis (asymptomatic at this time)

## 2022-10-09 NOTE — ED ATTENDING ATTESTATION
10/9/2022  IMaxime DO, saw and evaluated the patient  I have discussed the patient with the resident/non-physician practitioner and agree with the resident's/non-physician practitioner's findings, Plan of Care, and MDM as documented in the resident's/non-physician practitioner's note, except where noted  All available labs and Radiology studies were reviewed  I was present for key portions of any procedure(s) performed by the resident/non-physician practitioner and I was immediately available to provide assistance  At this point I agree with the current assessment done in the Emergency Department  I have conducted an independent evaluation of this patient a history and physical is as follows:    53-year-old male, presented with left-sided weakness, woke up with symptoms this morning  Did not have him last night prior to going to sleep  , symptoms include left-sided weakness left-sided facial droop  Found by friends today who called [phone] and patient was brought here  Patient has not seen a doctor in many years  Stroke alert was called pre-hospital , patient found to have left-sided weakness  , noncontrast CT scan showing evidence of right-sided recent stroke  I have reviewed the patients presentation and based on my evaluation of the patient; TPA is not indicated due to lack of acute stroke symptoms or the timing of symptom onset            Admitted to Internal Medicine Service                      ED Course  ED Course as of 10/09/22 1620   Sun Oct 09, 2022   1619 Delaying CT scan/CTA secondary to patient requiring ultrasound-guided IV placement due to very poor peripheral IV access         Critical Care Time  Procedures

## 2022-10-10 ENCOUNTER — APPOINTMENT (OUTPATIENT)
Dept: ULTRASOUND IMAGING | Facility: HOSPITAL | Age: 62
DRG: 871 | End: 2022-10-10
Payer: COMMERCIAL

## 2022-10-10 ENCOUNTER — APPOINTMENT (INPATIENT)
Dept: NON INVASIVE DIAGNOSTICS | Facility: HOSPITAL | Age: 62
DRG: 871 | End: 2022-10-10
Payer: COMMERCIAL

## 2022-10-10 ENCOUNTER — HOSPITAL ENCOUNTER (INPATIENT)
Dept: VASCULAR ULTRASOUND | Facility: HOSPITAL | Age: 62
Discharge: HOME | DRG: 871 | End: 2022-10-10
Payer: COMMERCIAL

## 2022-10-10 PROBLEM — F10.10 ALCOHOL USE DISORDER, MILD, ABUSE: Status: ACTIVE | Noted: 2022-10-10

## 2022-10-10 PROBLEM — T17.908A ASPIRATION INTO RESPIRATORY TRACT: Status: ACTIVE | Noted: 2022-10-10

## 2022-10-10 LAB
ANION GAP SERPL CALCULATED.3IONS-SCNC: 12 MMOL/L (ref 4–13)
ANION GAP SERPL CALCULATED.3IONS-SCNC: 12 MMOL/L (ref 4–13)
ANION GAP SERPL CALCULATED.3IONS-SCNC: 13 MMOL/L (ref 4–13)
ANION GAP SERPL CALCULATED.3IONS-SCNC: 14 MMOL/L (ref 4–13)
ANION GAP SERPL CALCULATED.3IONS-SCNC: 14 MMOL/L (ref 4–13)
AORTIC ROOT: 3.3 CM
AORTIC ROOT: 3.3 CM
ATRIAL RATE: 107 BPM
ATRIAL RATE: 107 BPM
AV LVOT PEAK GRADIENT: 2 MMHG
AV LVOT PEAK GRADIENT: 2 MMHG
AV PEAK GRADIENT: 4 MMHG
AV PEAK GRADIENT: 4 MMHG
BUN SERPL-MCNC: 23 MG/DL (ref 5–25)
BUN SERPL-MCNC: 23 MG/DL (ref 5–25)
BUN SERPL-MCNC: 25 MG/DL (ref 5–25)
BUN SERPL-MCNC: 25 MG/DL (ref 5–25)
BUN SERPL-MCNC: 26 MG/DL (ref 5–25)
BUN SERPL-MCNC: 26 MG/DL (ref 5–25)
BUN SERPL-MCNC: 28 MG/DL (ref 5–25)
CALCIUM SERPL-MCNC: 8.2 MG/DL (ref 8.4–10.2)
CALCIUM SERPL-MCNC: 8.2 MG/DL (ref 8.4–10.2)
CALCIUM SERPL-MCNC: 8.3 MG/DL (ref 8.4–10.2)
CALCIUM SERPL-MCNC: 8.3 MG/DL (ref 8.4–10.2)
CALCIUM SERPL-MCNC: 8.4 MG/DL (ref 8.4–10.2)
CALCIUM SERPL-MCNC: 8.5 MG/DL (ref 8.4–10.2)
CALCIUM SERPL-MCNC: 8.5 MG/DL (ref 8.4–10.2)
CHLORIDE SERPL-SCNC: 87 MMOL/L (ref 96–108)
CHLORIDE SERPL-SCNC: 87 MMOL/L (ref 96–108)
CHLORIDE SERPL-SCNC: 90 MMOL/L (ref 96–108)
CHLORIDE SERPL-SCNC: 90 MMOL/L (ref 96–108)
CHLORIDE SERPL-SCNC: 91 MMOL/L (ref 96–108)
CHLORIDE SERPL-SCNC: 92 MMOL/L (ref 96–108)
CHLORIDE SERPL-SCNC: 92 MMOL/L (ref 96–108)
CHOLEST SERPL-MCNC: 97 MG/DL
CHOLEST SERPL-MCNC: 97 MG/DL
CO2 SERPL-SCNC: 18 MMOL/L (ref 21–32)
CO2 SERPL-SCNC: 18 MMOL/L (ref 21–32)
CO2 SERPL-SCNC: 19 MMOL/L (ref 21–32)
CO2 SERPL-SCNC: 19 MMOL/L (ref 21–32)
CO2 SERPL-SCNC: 20 MMOL/L (ref 21–32)
CO2 SERPL-SCNC: 20 MMOL/L (ref 21–32)
CO2 SERPL-SCNC: 21 MMOL/L (ref 21–32)
CO2 SERPL-SCNC: 21 MMOL/L (ref 21–32)
CO2 SERPL-SCNC: 22 MMOL/L (ref 21–32)
CO2 SERPL-SCNC: 22 MMOL/L (ref 21–32)
CORTIS AM PEAK SERPL-MCNC: 41 UG/DL (ref 4.2–22.4)
CORTIS AM PEAK SERPL-MCNC: 41 UG/DL (ref 4.2–22.4)
CREAT SERPL-MCNC: 1.6 MG/DL (ref 0.6–1.3)
CREAT SERPL-MCNC: 1.6 MG/DL (ref 0.6–1.3)
CREAT SERPL-MCNC: 1.75 MG/DL (ref 0.6–1.3)
CREAT SERPL-MCNC: 1.75 MG/DL (ref 0.6–1.3)
CREAT SERPL-MCNC: 1.81 MG/DL (ref 0.6–1.3)
CREAT SERPL-MCNC: 1.81 MG/DL (ref 0.6–1.3)
CREAT SERPL-MCNC: 1.86 MG/DL (ref 0.6–1.3)
CREAT SERPL-MCNC: 1.86 MG/DL (ref 0.6–1.3)
CREAT SERPL-MCNC: 1.95 MG/DL (ref 0.6–1.3)
CREAT SERPL-MCNC: 1.95 MG/DL (ref 0.6–1.3)
E WAVE DECELERATION TIME: 157 MS
E WAVE DECELERATION TIME: 157 MS
ERYTHROCYTE [DISTWIDTH] IN BLOOD BY AUTOMATED COUNT: 14.6 % (ref 11.6–15.1)
ERYTHROCYTE [DISTWIDTH] IN BLOOD BY AUTOMATED COUNT: 14.6 % (ref 11.6–15.1)
EST. AVERAGE GLUCOSE BLD GHB EST-MCNC: 97 MG/DL
EST. AVERAGE GLUCOSE BLD GHB EST-MCNC: 97 MG/DL
ETHANOL SERPL-MCNC: <10 MG/DL
ETHANOL SERPL-MCNC: <10 MG/DL
FLUAV RNA RESP QL NAA+PROBE: NEGATIVE
FLUAV RNA RESP QL NAA+PROBE: NEGATIVE
FLUBV RNA RESP QL NAA+PROBE: NEGATIVE
FLUBV RNA RESP QL NAA+PROBE: NEGATIVE
FRACTIONAL SHORTENING: 32 % (ref 28–44)
FRACTIONAL SHORTENING: 32 % (ref 28–44)
GFR SERPL CREATININE-BSD FRML MDRD: 36 ML/MIN/1.73SQ M
GFR SERPL CREATININE-BSD FRML MDRD: 36 ML/MIN/1.73SQ M
GFR SERPL CREATININE-BSD FRML MDRD: 38 ML/MIN/1.73SQ M
GFR SERPL CREATININE-BSD FRML MDRD: 38 ML/MIN/1.73SQ M
GFR SERPL CREATININE-BSD FRML MDRD: 40 ML/MIN/1.73SQ M
GFR SERPL CREATININE-BSD FRML MDRD: 40 ML/MIN/1.73SQ M
GFR SERPL CREATININE-BSD FRML MDRD: 41 ML/MIN/1.73SQ M
GFR SERPL CREATININE-BSD FRML MDRD: 41 ML/MIN/1.73SQ M
GFR SERPL CREATININE-BSD FRML MDRD: 46 ML/MIN/1.73SQ M
GFR SERPL CREATININE-BSD FRML MDRD: 46 ML/MIN/1.73SQ M
GLUCOSE SERPL-MCNC: 102 MG/DL (ref 65–140)
GLUCOSE SERPL-MCNC: 102 MG/DL (ref 65–140)
GLUCOSE SERPL-MCNC: 116 MG/DL (ref 65–140)
GLUCOSE SERPL-MCNC: 116 MG/DL (ref 65–140)
GLUCOSE SERPL-MCNC: 84 MG/DL (ref 65–140)
GLUCOSE SERPL-MCNC: 84 MG/DL (ref 65–140)
GLUCOSE SERPL-MCNC: 91 MG/DL (ref 65–140)
GLUCOSE SERPL-MCNC: 91 MG/DL (ref 65–140)
GLUCOSE SERPL-MCNC: 96 MG/DL (ref 65–140)
GLUCOSE SERPL-MCNC: 96 MG/DL (ref 65–140)
HBA1C MFR BLD: 5 %
HBA1C MFR BLD: 5 %
HCT VFR BLD AUTO: 36.3 % (ref 36.5–49.3)
HCT VFR BLD AUTO: 36.3 % (ref 36.5–49.3)
HDLC SERPL-MCNC: 38 MG/DL
HDLC SERPL-MCNC: 38 MG/DL
HGB BLD-MCNC: 12.6 G/DL (ref 12–17)
HGB BLD-MCNC: 12.6 G/DL (ref 12–17)
INTERVENTRICULAR SEPTUM IN DIASTOLE (PARASTERNAL SHORT AXIS VIEW): 1.4 CM
INTERVENTRICULAR SEPTUM IN DIASTOLE (PARASTERNAL SHORT AXIS VIEW): 1.4 CM
INTERVENTRICULAR SEPTUM: 1.4 CM (ref 0.6–1.1)
INTERVENTRICULAR SEPTUM: 1.4 CM (ref 0.6–1.1)
LACTATE SERPL-SCNC: 1.8 MMOL/L (ref 0.5–2)
LACTATE SERPL-SCNC: 1.8 MMOL/L (ref 0.5–2)
LDLC SERPL CALC-MCNC: 40 MG/DL (ref 0–100)
LDLC SERPL CALC-MCNC: 40 MG/DL (ref 0–100)
LEFT ATRIUM SIZE: 2.5 CM
LEFT ATRIUM SIZE: 2.5 CM
LEFT INTERNAL DIMENSION IN SYSTOLE: 2.5 CM (ref 2.1–4)
LEFT INTERNAL DIMENSION IN SYSTOLE: 2.5 CM (ref 2.1–4)
LEFT VENTRICULAR INTERNAL DIMENSION IN DIASTOLE: 3.7 CM (ref 3.5–6)
LEFT VENTRICULAR INTERNAL DIMENSION IN DIASTOLE: 3.7 CM (ref 3.5–6)
LEFT VENTRICULAR POSTERIOR WALL IN END DIASTOLE: 1.4 CM
LEFT VENTRICULAR POSTERIOR WALL IN END DIASTOLE: 1.4 CM
LEFT VENTRICULAR STROKE VOLUME: 35 ML
LEFT VENTRICULAR STROKE VOLUME: 35 ML
LVSV (TEICH): 35 ML
LVSV (TEICH): 35 ML
MAGNESIUM SERPL-MCNC: 1.4 MG/DL (ref 1.9–2.7)
MAGNESIUM SERPL-MCNC: 1.4 MG/DL (ref 1.9–2.7)
MCH RBC QN AUTO: 30.1 PG (ref 26.8–34.3)
MCH RBC QN AUTO: 30.1 PG (ref 26.8–34.3)
MCHC RBC AUTO-ENTMCNC: 34.7 G/DL (ref 31.4–37.4)
MCHC RBC AUTO-ENTMCNC: 34.7 G/DL (ref 31.4–37.4)
MCV RBC AUTO: 87 FL (ref 82–98)
MCV RBC AUTO: 87 FL (ref 82–98)
MV PEAK A VEL: 0.46 M/S
MV PEAK A VEL: 0.46 M/S
MV PEAK E VEL: 42 CM/S
MV PEAK E VEL: 42 CM/S
MV STENOSIS PRESSURE HALF TIME: 46 MS
MV STENOSIS PRESSURE HALF TIME: 46 MS
MV VALVE AREA P 1/2 METHOD: 4.78 CM2
MV VALVE AREA P 1/2 METHOD: 4.78 CM2
OSMOLALITY UR/SERPL-RTO: 265 MMOL/KG (ref 282–298)
OSMOLALITY UR/SERPL-RTO: 265 MMOL/KG (ref 282–298)
OSMOLALITY UR: 369 MMOL/KG
OSMOLALITY UR: 369 MMOL/KG
P AXIS: 83 DEGREES
P AXIS: 83 DEGREES
PA SYSTOLIC PRESSURE: 37 MMHG
PA SYSTOLIC PRESSURE: 37 MMHG
PHOSPHATE SERPL-MCNC: 5.1 MG/DL (ref 2.7–4.5)
PHOSPHATE SERPL-MCNC: 5.1 MG/DL (ref 2.7–4.5)
PLATELET # BLD AUTO: 122 THOUSANDS/UL (ref 149–390)
PLATELET # BLD AUTO: 122 THOUSANDS/UL (ref 149–390)
PMV BLD AUTO: 9.5 FL (ref 8.9–12.7)
PMV BLD AUTO: 9.5 FL (ref 8.9–12.7)
POTASSIUM SERPL-SCNC: 3.9 MMOL/L (ref 3.5–5.3)
POTASSIUM SERPL-SCNC: 4 MMOL/L (ref 3.5–5.3)
POTASSIUM SERPL-SCNC: 4 MMOL/L (ref 3.5–5.3)
POTASSIUM SERPL-SCNC: 4.1 MMOL/L (ref 3.5–5.3)
POTASSIUM SERPL-SCNC: 4.1 MMOL/L (ref 3.5–5.3)
POTASSIUM SERPL-SCNC: 4.2 MMOL/L (ref 3.5–5.3)
POTASSIUM SERPL-SCNC: 4.2 MMOL/L (ref 3.5–5.3)
PR INTERVAL: 122 MS
PR INTERVAL: 122 MS
QRS AXIS: 78 DEGREES
QRS AXIS: 78 DEGREES
QRSD INTERVAL: 94 MS
QRSD INTERVAL: 94 MS
QT INTERVAL: 356 MS
QT INTERVAL: 356 MS
QTC INTERVAL: 475 MS
QTC INTERVAL: 475 MS
RA PRESSURE ESTIMATED: 3 MMHG
RA PRESSURE ESTIMATED: 3 MMHG
RBC # BLD AUTO: 4.18 MILLION/UL (ref 3.88–5.62)
RBC # BLD AUTO: 4.18 MILLION/UL (ref 3.88–5.62)
RSV RNA RESP QL NAA+PROBE: NEGATIVE
RSV RNA RESP QL NAA+PROBE: NEGATIVE
RV PSP: 40 MMHG
RV PSP: 40 MMHG
SARS-COV-2 RNA RESP QL NAA+PROBE: NEGATIVE
SARS-COV-2 RNA RESP QL NAA+PROBE: NEGATIVE
SL CV LV EF: 55
SL CV LV EF: 55
SL CV PED ECHO LEFT VENTRICLE DIASTOLIC VOLUME (MOD BIPLANE) 2D: 59 ML
SL CV PED ECHO LEFT VENTRICLE DIASTOLIC VOLUME (MOD BIPLANE) 2D: 59 ML
SL CV PED ECHO LEFT VENTRICLE SYSTOLIC VOLUME (MOD BIPLANE) 2D: 23 ML
SL CV PED ECHO LEFT VENTRICLE SYSTOLIC VOLUME (MOD BIPLANE) 2D: 23 ML
SODIUM SERPL-SCNC: 121 MMOL/L (ref 135–147)
SODIUM SERPL-SCNC: 121 MMOL/L (ref 135–147)
SODIUM SERPL-SCNC: 122 MMOL/L (ref 135–147)
SODIUM SERPL-SCNC: 122 MMOL/L (ref 135–147)
SODIUM SERPL-SCNC: 123 MMOL/L (ref 135–147)
SODIUM SERPL-SCNC: 123 MMOL/L (ref 135–147)
SODIUM SERPL-SCNC: 124 MMOL/L (ref 135–147)
SODIUM SERPL-SCNC: 124 MMOL/L (ref 135–147)
SODIUM SERPL-SCNC: 126 MMOL/L (ref 135–147)
SODIUM SERPL-SCNC: 126 MMOL/L (ref 135–147)
T WAVE AXIS: 71 DEGREES
T WAVE AXIS: 71 DEGREES
TR MAX PG: 37 MMHG
TR MAX PG: 37 MMHG
TR PEAK VELOCITY: 3 M/S
TR PEAK VELOCITY: 3 M/S
TRICUSPID VALVE PEAK REGURGITATION VELOCITY: 3.03 M/S
TRICUSPID VALVE PEAK REGURGITATION VELOCITY: 3.03 M/S
TRIGL SERPL-MCNC: 96 MG/DL
TRIGL SERPL-MCNC: 96 MG/DL
TSH SERPL DL<=0.05 MIU/L-ACNC: 4.36 UIU/ML (ref 0.45–4.5)
TSH SERPL DL<=0.05 MIU/L-ACNC: 4.36 UIU/ML (ref 0.45–4.5)
VENTRICULAR RATE: 107 BPM
VENTRICULAR RATE: 107 BPM
WBC # BLD AUTO: 12.71 THOUSAND/UL (ref 4.31–10.16)
WBC # BLD AUTO: 12.71 THOUSAND/UL (ref 4.31–10.16)

## 2022-10-10 PROCEDURE — 76775 US EXAM ABDO BACK WALL LIM: CPT

## 2022-10-10 PROCEDURE — 93880 EXTRACRANIAL BILAT STUDY: CPT

## 2022-10-10 PROCEDURE — 84443 ASSAY THYROID STIM HORMONE: CPT | Performed by: NURSE PRACTITIONER

## 2022-10-10 PROCEDURE — 94640 AIRWAY INHALATION TREATMENT: CPT

## 2022-10-10 PROCEDURE — 92610 EVALUATE SWALLOWING FUNCTION: CPT

## 2022-10-10 PROCEDURE — 99254 IP/OBS CNSLTJ NEW/EST MOD 60: CPT | Performed by: PHYSICIAN ASSISTANT

## 2022-10-10 PROCEDURE — 80048 BASIC METABOLIC PNL TOTAL CA: CPT | Performed by: INTERNAL MEDICINE

## 2022-10-10 PROCEDURE — 83930 ASSAY OF BLOOD OSMOLALITY: CPT | Performed by: NURSE PRACTITIONER

## 2022-10-10 PROCEDURE — 93880 EXTRACRANIAL BILAT STUDY: CPT | Performed by: SURGERY

## 2022-10-10 PROCEDURE — 80061 LIPID PANEL: CPT

## 2022-10-10 PROCEDURE — 93306 TTE W/DOPPLER COMPLETE: CPT | Performed by: INTERNAL MEDICINE

## 2022-10-10 PROCEDURE — 99232 SBSQ HOSP IP/OBS MODERATE 35: CPT | Performed by: INTERNAL MEDICINE

## 2022-10-10 PROCEDURE — 0241U HB NFCT DS VIR RESP RNA 4 TRGT: CPT | Performed by: PSYCHIATRY & NEUROLOGY

## 2022-10-10 PROCEDURE — 80048 BASIC METABOLIC PNL TOTAL CA: CPT | Performed by: NURSE PRACTITIONER

## 2022-10-10 PROCEDURE — 99232 SBSQ HOSP IP/OBS MODERATE 35: CPT | Performed by: STUDENT IN AN ORGANIZED HEALTH CARE EDUCATION/TRAINING PROGRAM

## 2022-10-10 PROCEDURE — 80048 BASIC METABOLIC PNL TOTAL CA: CPT

## 2022-10-10 PROCEDURE — 99223 1ST HOSP IP/OBS HIGH 75: CPT | Performed by: INTERNAL MEDICINE

## 2022-10-10 PROCEDURE — 94760 N-INVAS EAR/PLS OXIMETRY 1: CPT

## 2022-10-10 PROCEDURE — 82533 TOTAL CORTISOL: CPT

## 2022-10-10 PROCEDURE — 83036 HEMOGLOBIN GLYCOSYLATED A1C: CPT

## 2022-10-10 PROCEDURE — 94762 N-INVAS EAR/PLS OXIMTRY CONT: CPT

## 2022-10-10 PROCEDURE — 85027 COMPLETE CBC AUTOMATED: CPT

## 2022-10-10 PROCEDURE — 93306 TTE W/DOPPLER COMPLETE: CPT

## 2022-10-10 PROCEDURE — 93010 ELECTROCARDIOGRAM REPORT: CPT | Performed by: INTERNAL MEDICINE

## 2022-10-10 PROCEDURE — 82077 ASSAY SPEC XCP UR&BREATH IA: CPT

## 2022-10-10 RX ORDER — SODIUM CHLORIDE 9 MG/ML
50 INJECTION, SOLUTION INTRAVENOUS CONTINUOUS
Status: DISPENSED | OUTPATIENT
Start: 2022-10-10 | End: 2022-10-10

## 2022-10-10 RX ORDER — SODIUM CHLORIDE 9 MG/ML
50 INJECTION, SOLUTION INTRAVENOUS CONTINUOUS
Status: DISCONTINUED | OUTPATIENT
Start: 2022-10-10 | End: 2022-10-10

## 2022-10-10 RX ORDER — SODIUM CHLORIDE 9 MG/ML
50 INJECTION, SOLUTION INTRAVENOUS CONTINUOUS
Status: DISPENSED | OUTPATIENT
Start: 2022-10-10 | End: 2022-10-11

## 2022-10-10 RX ORDER — LEVALBUTEROL 1.25 MG/.5ML
1.25 SOLUTION, CONCENTRATE RESPIRATORY (INHALATION)
Status: DISCONTINUED | OUTPATIENT
Start: 2022-10-10 | End: 2022-10-11

## 2022-10-10 RX ORDER — ATORVASTATIN CALCIUM 40 MG/1
40 TABLET, FILM COATED ORAL
Status: DISCONTINUED | OUTPATIENT
Start: 2022-10-10 | End: 2022-10-21 | Stop reason: HOSPADM

## 2022-10-10 RX ORDER — IPRATROPIUM BROMIDE AND ALBUTEROL SULFATE 2.5; .5 MG/3ML; MG/3ML
3 SOLUTION RESPIRATORY (INHALATION)
Status: DISCONTINUED | OUTPATIENT
Start: 2022-10-10 | End: 2022-10-10

## 2022-10-10 RX ADMIN — IPRATROPIUM BROMIDE 0.5 MG: 0.5 SOLUTION RESPIRATORY (INHALATION) at 19:58

## 2022-10-10 RX ADMIN — CLOPIDOGREL BISULFATE 75 MG: 75 TABLET ORAL at 11:55

## 2022-10-10 RX ADMIN — LEVALBUTEROL HYDROCHLORIDE 1.25 MG: 1.25 SOLUTION, CONCENTRATE RESPIRATORY (INHALATION) at 19:58

## 2022-10-10 RX ADMIN — HEPARIN SODIUM 5000 UNITS: 5000 INJECTION INTRAVENOUS; SUBCUTANEOUS at 05:02

## 2022-10-10 RX ADMIN — HEPARIN SODIUM 5000 UNITS: 5000 INJECTION INTRAVENOUS; SUBCUTANEOUS at 22:45

## 2022-10-10 RX ADMIN — SODIUM CHLORIDE 50 ML/HR: 0.9 INJECTION, SOLUTION INTRAVENOUS at 20:28

## 2022-10-10 RX ADMIN — HEPARIN SODIUM 5000 UNITS: 5000 INJECTION INTRAVENOUS; SUBCUTANEOUS at 15:00

## 2022-10-10 RX ADMIN — DESMOPRESSIN ACETATE 40 MG: 0.2 TABLET ORAL at 18:51

## 2022-10-10 RX ADMIN — SODIUM CHLORIDE 50 ML/HR: 0.9 INJECTION, SOLUTION INTRAVENOUS at 01:17

## 2022-10-10 RX ADMIN — CEFTRIAXONE 1000 MG: 1 INJECTION, SOLUTION INTRAVENOUS at 01:16

## 2022-10-10 RX ADMIN — ASPIRIN 81 MG: 81 TABLET, COATED ORAL at 11:55

## 2022-10-10 NOTE — CASE MANAGEMENT
Case Management Assessment & Discharge Planning Note    Patient name Ashley OSEGUERA /S -92 MRN [id number]  : 1960 Date 10/10/2022       Current Admission Date: 10/9/2022  Current Admission Diagnosis:Acute CVA (cerebrovascular accident) [facility])   Patient Active Problem List    Diagnosis Date Noted   • Hyponatremia 10/09/2022   • Elevated serum creatinine 10/09/2022   • Acute CVA (cerebrovascular accident) ([address] ) 10/09/2022   • More than 50 percent stenosis of left internal carotid artery 10/09/2022      LOS (days): 1  Geometric Mean LOS (GMLOS) (days):   Days to GMLOS:     OBJECTIVE:    Risk of Unplanned Readmission Score: 9 04         Current admission status: Inpatient  Referral Reason: Stroke    Preferred Pharmacy: No Pharmacies Listed  Primary Care Provider: No primary care provider on file  Primary Insurance: BLUE CROSS  Secondary Insurance:     ASSESSMENT:  Active Health Care Proxies    There are no active Health Care Proxies on file  Readmission Root Cause  30 Day Readmission: No    Patient Information  Admitted from[de-identified] Home  Mental Status: Alert  During Assessment patient was accompanied by: Not accompanied during assessment  Assessment information provided by[de-identified] Patient  Primary Caregiver: Self  Support Systems: [address] [facility] of Residence: [address] [facility] [address] [facility] do you live in?: [facility] access options   Select all that apply : Stairs  Type of Current Residence: Other (Comment) (rents a room in a home)  In the last 12 months, was there a time when you were not able to pay the mortgage or rent on time?: No  In the last 12 months, how many places have you lived?: 1  In the last 12 months, was there a time when you did not have a steady place to sleep or slept in a shelter (including now)?: No  Homeless/housing insecurity resource given?: N/A  Living Arrangements: Other (Comment) (lives with roomates)    Activities of Daily Living Prior to Admission  Functional Status: Independent  Completes ADLs independently?: Yes  Ambulates independently?: Yes  Does patient use assisted devices?: No  Does patient currently own DME?: No  Does patient have a history of Outpatient Therapy (PT/OT)?: No  Does the patient have a history of Short-Term Rehab?: No  Does patient have a history of HHC?: No  Does patient currently have [address]?: No         Patient Information Continued  Within the past 12 months, you worried that your food would run out before you got the money to buy more : Never true  Within the past 12 months, the food you bought just didn't last and you didn't have money to get more : Never true  Food insecurity resource given?: N/A         Means of Transportation  Means of Transport to [facility][de-identified] [employer]  In the past 12 months, has lack of transportation kept you from medical appointments or from getting medications?: No  In the past 12 months, has lack of transportation kept you from meetings, work, or from getting things needed for daily living?: No  Was application for public transport provided?: N/A        DISCHARGE DETAILS:    Discharge planning discussed with[de-identified] patient at bedside     DME Referral Provided  Referral made for DME?: No         Would you like to participate in our [facility] [address] service program?  : No - Declined        Additional Comments: CM met with patient at bedside to complete assessment  Patient lives in a home with roommates  Patient is indecent with ADLs and ambulation  Patient owns and uses no DME  Patient uses public transportation  No history of SNF, IRF or HHC  PT/OT eval pending- CM to follow up

## 2022-10-10 NOTE — ASSESSMENT & PLAN NOTE
Pt met severe sepsis criteria on admission w/ acute infection and HR of 108, RR of 22 and WBC: 18 20  Suspected source: currently undetermined    Recent Labs     10/09/22  1637   WBC 18 20*     Recent Labs     10/09/22  1740   LACTICACID 2 8*       BP (!) 184/109   Pulse 97   Temp (!) 97 1 °F (36 2 °C) (Rectal)   Resp 22   Ht 5' 11" (1 803 m)   Wt 57 7 kg (127 lb 3 3 oz)   SpO2 97%   BMI 17 74 kg/m²     CT stroke alert brain    Result Date: 10/9/2022  Impression: Acute subcortical infarct in the right periventricular white matter, corona radiata and lentiform nuclei  No hemorrhagic conversion or mass effect  Findings were directly discussed with Jerry Espinosa at 4:39 PM  Workstation performed: [id number]     CTA stroke alert (head/neck)    Result Date: 10/9/2022  Impression: 1  Lung segment critical (greater than 90%) stenosis in the proximal left cervical ICA  2   Atretic right posterior cerebral artery  No evidence of acute thrombus or large vessel occlusion of the major vessels of the [place] of [place]  3   Evidence of aspiration or pneumonia in the partially visualized upper lungs, most prominent in the left upper lobe  Findings were directly discussed with Jerry Espinosa at 4:39 PM  Workstation performed: [id number]     No Chest XR results available for this patient       Procalcitonin <=0 25 ng/ml 0 88 High         Initially given a dose of Cefepime 1 g IV, for possible bacteremia and Vancomycin 1,250 mg IV, for possible bactermia    Blood cultures: pending    Plan:  -  Ceftriaxone 1 g q24h, starting 10/10  -  IVF: NaCL at 50 mL/hour  -  Trend lactic acid q2h until it normalizes  -  Obtain am procalcitonin  -  Obtain sputum culture

## 2022-10-10 NOTE — ASSESSMENT & PLAN NOTE
Assessment:    CTA of head/neck    Result Date: 10/9/2022  Impression: 1  Lung segment critical (greater than 90%) stenosis in the proximal left cervical ICA  2   Atretic right posterior cerebral artery  No evidence of acute thrombus or large vessel occlusion of the major vessels of the [place]  3  Evidence of aspiration or pneumonia in the partially visualized upper lungs, most prominent in the left upper lobe       Plan    - Obtain vascular input in regards to L ICA stenosis (asymptomatic at this time)

## 2022-10-10 NOTE — ASSESSMENT & PLAN NOTE
Recent Labs     10/09/22  1637   SODIUM 119*     No results found for: OSMOUA, NAUR, OSMOLALITSER    AM cortisol increased at 41 0     122 his latest sodium level is at noon on 10/10    Plan:  · BMP q6  · Urine sodium low indicating volume depletion  ? Continue isotonic saline 50 mL/hour and monitor closely  ? Avoid over-correction, recommend 4-6 point increase over 24 hours  ?  Currently NPO  · Avoid over-correction to prevent osmotic demyelination syndrome  · F/u on renal US

## 2022-10-10 NOTE — ASSESSMENT & PLAN NOTE
Episode of acute left-sided upper extremity and lower extremity weakness along with left-sided lower facial droop and dysarthria with onset this a m  (10/9)  Patient says he does not remember having these symptoms prior to falling asleep last night  BP on presentation 161/109, max HR and RR in  and 24 respectively  EKG showed Sinus tachycardia  Right atrial enlargement  Nonspecific ST abnormality  Patient did not get tPa since he was outside the window  CT stroke alert brain    Result Date: 10/9/2022  Impression: Acute subcortical infarct in the right periventricular white matter, corona radiata and lentiform nuclei  No hemorrhagic conversion or mass effect  Findings were directly discussed with Joy Russell at 4:39 PM  Workstation performed: [id number]     CTA stroke alert (head/neck)    Result Date: 10/9/2022  Impression: 1  Lung segment critical (greater than 90%) stenosis in the proximal left cervical ICA  2   Atretic right posterior cerebral artery  No evidence of acute thrombus or large vessel occlusion of the major vessels of the [place]  3  Evidence of aspiration or pneumonia in the partially visualized upper lungs, most prominent in the left upper lobe  Findings were directly discussed with Joy Russell at 4:39 PM  Workstation performed: [id number]     MRI brain:   1  Stable acute to early subacute infarct in the right periventricular white matter, corona radiata and lentiform nuclei  No hemorrhagic conversion or mass effect  2   Acute lacunar infarct in the left thalamus        Chest XR  · Ill-defined opacity in the left upper lung which corresponds with bronchiectasis and tree-in-bud nodularity on the CTA neck, which could be due to aspiration versus bronchiolitis      · NIHSS score: 8    Plan   - obtain 2D echocardiogram  - Allow for permissive hypertension with -200 for 48 hours (until 10/11 at 16:00), tx only if SBP > 200  - continue DAPT (aspirin and clopidogrel)   - continue atorvastatin 40mg  - telemetry monitoring   - f/u on carotid duplex per vascular surgery    - neuro checks  - provide stroke education  - therapy evaluations (PT/OT/Speech)

## 2022-10-10 NOTE — PROGRESS NOTES
[facility]  Progress Note - 2003 [facility] 1960, 63 y o  male MRN: [id number]  Unit/Bed#: S -01 Encounter: [id number]  Primary Care Provider: No primary care provider on file  Date and time admitted to hospital: 10/9/2022  3:59 PM    * Acute CVA (cerebrovascular accident) [facility])  Assessment & Plan  Episode of acute left-sided upper extremity and lower extremity weakness along with left-sided lower facial droop and dysarthria with onset this a m  (10/9)  Patient says he does not remember having these symptoms prior to falling asleep last night  BP on presentation 161/109, max HR and RR in  and 24 respectively  EKG showed Sinus tachycardia  Right atrial enlargement  Nonspecific ST abnormality  Patient did not get tPa since he was outside the window  CT stroke alert brain    Result Date: 10/9/2022  Impression: Acute subcortical infarct in the right periventricular white matter, corona radiata and lentiform nuclei  No hemorrhagic conversion or mass effect  Findings were directly discussed with Elke Nino at 4:39 PM  Workstation performed: [id number]     CTA stroke alert (head/neck)    Result Date: 10/9/2022  Impression: 1  Lung segment critical (greater than 90%) stenosis in the proximal left cervical ICA  2   Atretic right posterior cerebral artery  No evidence of acute thrombus or large vessel occlusion of the major vessels of the [place] of [place]  3  Evidence of aspiration or pneumonia in the partially visualized upper lungs, most prominent in the left upper lobe  Findings were directly discussed with Elke Nino at 4:39 PM  Workstation performed: [id number]     MRI brain:   1  Stable acute to early subacute infarct in the right periventricular white matter, corona radiata and lentiform nuclei  No hemorrhagic conversion or mass effect    2   Acute lacunar infarct in the left thalamus        Chest XR  · Ill-defined opacity in the left upper lung which corresponds with bronchiectasis and tree-in-bud nodularity on the CTA neck, which could be due to aspiration versus bronchiolitis  · NIHSS score: 8    Plan   - obtain 2D echocardiogram  - Allow for permissive hypertension with -200 for 48 hours (until 10/11 at 16:00), tx only if SBP > 200  - continue DAPT (aspirin and clopidogrel)   - continue atorvastatin 40mg  - telemetry monitoring   - f/u on carotid duplex per vascular surgery    - neuro checks  - provide stroke education  - therapy evaluations (PT/OT/Speech)      Aspiration into respiratory tract  Assessment & Plan  CTA showed aspiration pneumonia in the upper lung fields L more than R  Leukocytosis to 18 20 and procalcitonin elevated to 0 88 but afebrile since admission and no subjective fever      -continue ceftriaxone 1g q24   - nebulizer treatments   - PFTs outpatient for suspected chronic lung disease     Hyponatremia  Assessment & Plan  Recent Labs     10/09/22  1637   SODIUM 119*     No results found for: OSMOUA, NAUR, OSMOLALITSER    AM cortisol increased at 41 0     122 his latest sodium level is at noon on 10/10    Plan:  · BMP q6  · Urine sodium low indicating volume depletion  ? Continue isotonic saline 50 mL/hour and monitor closely  ? Avoid over-correction, recommend 4-6 point increase over 24 hours  ? Currently NPO  · Avoid over-correction to prevent osmotic demyelination syndrome  · F/u on renal US    More than 50 percent stenosis of left internal carotid artery  Assessment & Plan  CTA of head/neck    Result Date: 10/9/2022  Impression: 1  Lung segment critical (greater than 90%) stenosis in the proximal left cervical ICA  2   Atretic right posterior cerebral artery  No evidence of acute thrombus or large vessel occlusion of the major vessels of the [place] of [place]      Plan   - F/u on carotid duplex per neurology   - outpatient f/u for vascular symptoms     Alcohol use disorder, mild, abuse  Assessment & Plan  Patient endorses 4-6 drinks per day consisting of 5% alcohol  -[facility] protocol     Elevated serum creatinine  Assessment & Plan  Assessment:  Pt had an elevated creatinine of 1 9 on admission  Recent Labs     10/09/22  1637   CREATININE 1 90*   EGFR 37     Estimated Creatinine Clearance: 34 2 mL/min (A) (by C-G formula based on SCr of 1 9 mg/dL (H))  Unsure of pt's baseline as prior medical records could not be obtained  Plan:  • IVF  • Consult Nephrology  • Obtain am BMP        VTE Pharmacologic Prophylaxis: VTE Score: 7 High Risk (Score >/= 5) - Pharmacological DVT Prophylaxis Ordered: heparin  Sequential Compression Devices Ordered  Patient Centered Rounds: I performed bedside rounds with nursing staff today  Discussions with Specialists or Other Care Team Provider: nephrology, neurology, vascular and speech    Education and Discussions with Family / Patient: Talked with patient at bedside however denied any   Current Length of Stay: 1 day(s)  Current Patient Status: Inpatient   Discharge Plan: Anticipate discharge in 48-72 hrs to rehab facility  Code Status: Level 1 - Full Code    Subjective:   Patient reports he slept well overnight  Patient states the rash on his forearm improved  Pt denies N/V, diarrhea, fever and chills  Patient states he does not have a primary doctor and has not had one for a long time  Upon questioning pt states that he remembers coming into the ED in January but does not remember what the doctors told him at that time  Patient admits to drinking a 6 pack of twisted teas a day    Objective:     Vitals:   Temp (24hrs), Av 8 °F (36 6 °C), Min:97 1 °F (36 2 °C), Max:98 3 °F (36 8 °C)    Temp:  [97 1 °F (36 2 °C)-98 3 °F (36 8 °C)] 97 9 °F (36 6 °C)  HR:  [] 100  Resp:  [16-26] 18  BP: (120-184)/() 148/92  SpO2:  [91 %-100 %] 98 %  Body mass index is 17 71 kg/m²  Input and Output Summary (last 24 hours):      Intake/Output Summary (Last 24 hours) at 10/10/2022 1446  Last data filed at 10/9/2022 1928  Gross per 24 hour   Intake 125 ml   Output --   Net 125 ml       Physical Exam:   Physical Exam  Constitutional:       General: He is awake  Appearance: He is underweight  Comments: Awake and alert but disheveled appearance  Minimal elaboration on questioning and had difficulty following commands  Eyes:      General: Lids are normal  Gaze aligned appropriately  Cardiovascular:      Rate and Rhythm: Normal rate and regular rhythm  Heart sounds: S1 normal and S2 normal  Heart sounds are distant (difficult to appreciate heart sounds due to significant rhonchi)  Pulmonary:      Breath sounds: Examination of the right-upper field reveals rhonchi  Examination of the left-upper field reveals rhonchi  Examination of the right-middle field reveals rhonchi  Examination of the left-middle field reveals rhonchi  Examination of the right-lower field reveals rhonchi  Examination of the left-lower field reveals rhonchi  Rhonchi present  Comments: Belly breathing was noted  When asked if he has any difficulty breathing he said no though it visibly took effort to breathe  Abdominal:      General: Abdomen is flat  Bowel sounds are normal  There is no distension  Palpations: Abdomen is soft  Tenderness: There is no abdominal tenderness  Musculoskeletal:      Right lower leg: No edema  Left lower leg: No edema  Feet:      Right foot:      Skin integrity: Dry skin present  Toenail Condition: Right toenails are abnormally thick  Fungal disease present  Left foot:      Skin integrity: Dry skin present  Toenail Condition: Left toenails are abnormally thick  Fungal disease present  Comments: Onychomycosis of bilateral toenails    Skin:     Comments: Ecchymosis bilateral forearms     Neurological:      Mental Status: He is alert  Cranial Nerves: Dysarthria and facial asymmetry (R lower face paralysis ) present  Motor: Weakness (0/5 L sided upper and lower extremity strength  R sided strength 5/5 upper and lower extremity  ) present  Comments: Could not move L sided fingers or toes  Unable to follow some commands  Left-sided facial droop  Left upper extremity drift  left lower extremity drift       Psychiatric:      Comments: Does not appear bothered by the L sided motor deficit  Appears mildly confused  Can remember events from 5 months ago but cannot elaborate on them             Additional Data:     Labs:  Results from last 7 days   Lab Units 10/10/22  0810   WBC Thousand/uL 12 71*   HEMOGLOBIN g/dL 12 6   HEMATOCRIT % 36 3*   PLATELETS Thousands/uL 122*     Results from last 7 days   Lab Units 10/10/22  1247   SODIUM mmol/L 122*   POTASSIUM mmol/L 3 9   CHLORIDE mmol/L 91*   CO2 mmol/L 18*   BUN mg/dL 26*   CREATININE mg/dL 1 81*   ANION GAP mmol/L 13   CALCIUM mg/dL 8 2*   GLUCOSE RANDOM mg/dL 116     Results from last 7 days   Lab Units 10/09/22  1637   INR  1 08         Results from last 7 days   Lab Units 10/10/22  0810   HEMOGLOBIN A1C % 5 0     Results from last 7 days   Lab Units 10/09/22  2358 10/09/22  2251 10/09/22  1740 10/09/22  1637   LACTIC ACID mmol/L 1 8 1 8 2 8*  --    PROCALCITONIN ng/ml  --   --   --  0 88*       Lines/Drains:  Invasive Devices  Report    Peripheral Intravenous Line  Duration           Peripheral IV 10/09/22 Left Antecubital <1 day    Peripheral IV 10/09/22 Right Arm <1 day                  Telemetry:  Telemetry Orders (From admission, onward)             48 Hour Telemetry Monitoring  Continuous x 48 hours        References:    Telemetry Guidelines   Question:  Reason for 48 Hour Telemetry  Answer:  Acute CVA (<24 hrs old, hemispheric strokes, selected brainstem strokes, cardiac arrhythmias)                 Telemetry Reviewed: Sinus Tachycardia  107  Indication for Continued Telemetry Use: Acute CVA             Imaging: Reviewed radiology reports from this admission including: chest xray, CT head, MRI brain and CTA     Recent Cultures (last 7 days):   Results from last 7 days   Lab Units 10/09/22  1753 10/09/22  1752   BLOOD CULTURE  Received in Microbiology Lab  Culture in Progress  Received in Microbiology Lab  Culture in Progress  Last 24 Hours Medication List:   Current Facility-Administered Medications   Medication Dose Route Frequency Provider Last Rate   • aspirin  81 mg Oral Daily Alejandro Davis PA-C     • aspirin  325 mg Oral Once Selene Norman MD     • atorvastatin  40 mg Oral Daily With Spenser Walker DO     • cefTRIAXone  1,000 mg Intravenous Q24H Wojciech Barillas MD 1,000 mg (10/10/22 0116)   • clopidogrel  300 mg Oral Once Alejandro Davis PA-C     • clopidogrel  75 mg Oral Daily Alejandro Davis PA-C     • heparin (porcine)  5,000 Units Subcutaneous Q8H [address] Wojciech Barillas MD     • ipratropium-albuterol  3 mL Nebulization Q6H Cody Mahmood DO          Today, Patient Was Seen By: Vale Chandra    **Please Note: This note may have been constructed using a voice recognition system  **

## 2022-10-10 NOTE — ASSESSMENT & PLAN NOTE
Assessment:  -->Episode of acute left-sided upper extremity and lower extremity weakness along with left-sided lower facial droop and dysarthria with onset this a m  (10/9)  Patient says he does not remember having these symptoms prior to falling asleep last night  In the ED, /109, now Blood Pressure: (!) 170/104   EKG on presentation to the ER showed: Sinus tachycardia  Right atrial enlargement  Nonspecific ST abnormality  CT stroke alert brain    Result Date: 10/9/2022  Impression: Acute subcortical infarct in the right periventricular white matter, corona radiata and lentiform nuclei  No hemorrhagic conversion or mass effect  Findings were directly discussed with Winsome Cooley at 4:39 PM  Workstation performed: [id number]     CTA stroke alert (head/neck)    Result Date: 10/9/2022  Impression: 1  Lung segment critical (greater than 90%) stenosis in the proximal left cervical ICA  2   Atretic right posterior cerebral artery  No evidence of acute thrombus or large vessel occlusion of the major vessels of the [place] of [place]  3  Evidence of aspiration or pneumonia in the partially visualized upper lungs, most prominent in the left upper lobe  Findings were directly discussed with Winsome Cooley at 4:39 PM  Workstation performed: [id number]     No Chest XR results available for this patient  · NIHSS score: 8  Neurology consulted: says pt not a candidate for tPA as pt is outside of time window      Plan - Stroke pathway:  - obtain MRI brain & 2D echocardiogram  - Allow for permissive hypertension with -200 for 48 hours, tx only if SBP > 200  - load with DAPT; continue DAPT starting tomorrow  - initiate high-dose Lipitor  - check hemoglobin A1C, lipid panel, TSH, B12  - neuro checks  - telemetry monitoring  - provide stroke education  - therapy evaluations (PT/OT/Speech)   - Obtain vascular input in regards to L ICA stenosis (asymptomatic at this time)

## 2022-10-10 NOTE — H&P
Suha Kirk 47 III 1960, 63 y o  male MRN: [id number]  Unit/Bed#: ED-42 Encounter: [id number]  Primary Care Provider: No primary care provider on file  Date and time admitted to hospital: 10/9/2022  3:59 PM    * Acute CVA (cerebrovascular accident) [facility])  Assessment & Plan  Assessment:  -->Episode of acute left-sided upper extremity and lower extremity weakness along with left-sided lower facial droop and dysarthria with onset this a m  (10/9)  Patient says he does not remember having these symptoms prior to falling asleep last night  In the ED, /109, now Blood Pressure: (!) 170/104   EKG on presentation to the ER showed: Sinus tachycardia  Right atrial enlargement  Nonspecific ST abnormality  CT stroke alert brain    Result Date: 10/9/2022  Impression: Acute subcortical infarct in the right periventricular white matter, corona radiata and lentiform nuclei  No hemorrhagic conversion or mass effect  Findings were directly discussed with Rohit Piper at 4:39 PM  Workstation performed: [id number]     CTA stroke alert (head/neck)    Result Date: 10/9/2022  Impression: 1  Lung segment critical (greater than 90%) stenosis in the proximal left cervical ICA  2   Atretic right posterior cerebral artery  No evidence of acute thrombus or large vessel occlusion of the major vessels of the [place]  3  Evidence of aspiration or pneumonia in the partially visualized upper lungs, most prominent in the left upper lobe  Findings were directly discussed with Rohit Piper at 4:39 PM  Workstation performed: [id number]     No Chest XR results available for this patient  · NIHSS score: 8  Neurology consulted: says pt not a candidate for tPA as pt is outside of time window      Plan - Stroke pathway:  - obtain MRI brain & 2D echocardiogram  - Allow for permissive hypertension with -200 for 48 hours, tx only if SBP > 200  - load with DAPT; continue DAPT starting tomorrow  - initiate high-dose Lipitor  - check hemoglobin A1C, lipid panel, TSH, B12  - neuro checks  - telemetry monitoring  - provide stroke education  - therapy evaluations (PT/OT/Speech)   - Obtain vascular input in regards to L ICA stenosis (asymptomatic at this time)      Severe sepsis (HCC)-resolved as of 10/9/2022  Assessment & Plan  Pt met severe sepsis criteria on admission w/ acute infection and HR of 108, RR of 22 and WBC: 18 20  Suspected source: currently undetermined    Recent Labs     10/09/22  1637   WBC 18 20*     Recent Labs     10/09/22  1740   LACTICACID 2 8*       BP (!) 184/109   Pulse 97   Temp (!) 97 1 °F (36 2 °C) (Rectal)   Resp 22   Ht 5' 11" (1 803 m)   Wt 57 7 kg (127 lb 3 3 oz)   SpO2 97%   BMI 17 74 kg/m²     CT stroke alert brain    Result Date: 10/9/2022  Impression: Acute subcortical infarct in the right periventricular white matter, corona radiata and lentiform nuclei  No hemorrhagic conversion or mass effect  Findings were directly discussed with Elke Nino at 4:39 PM  Workstation performed: [id number]     CTA stroke alert (head/neck)    Result Date: 10/9/2022  Impression: 1  Lung segment critical (greater than 90%) stenosis in the proximal left cervical ICA  2   Atretic right posterior cerebral artery  No evidence of acute thrombus or large vessel occlusion of the major vessels of the [place]  3   Evidence of aspiration or pneumonia in the partially visualized upper lungs, most prominent in the left upper lobe  Findings were directly discussed with Elke Nino at 4:39 PM  Workstation performed: [id number]     No Chest XR results available for this patient       Procalcitonin <=0 25 ng/ml 0 88 High         Initially given a dose of Cefepime 1 g IV, for possible bacteremia and Vancomycin 1,250 mg IV, for possible bactermia    Blood cultures: pending    Plan:  -  Ceftriaxone 1 g q24h, starting 10/10  -  IVF: NaCL at 50 mL/hour  -  Trend lactic acid q2h until it normalizes  -  Obtain am procalcitonin  -  Obtain sputum culture    Hyponatremia  Assessment & Plan  Recent Labs     10/09/22  1637   SODIUM 119*     No results found for: Stephan Face, OSMOLALITSER    Plan:  · Obtain STAT BMP  · Consult Nephrology  · Obtain urine sodium, urine chloride, urine osmolarity, and serum uric acid  · Obtain am cortisol  · Obtain BMP q8h according to hyponatremia protocol, until Na+ normalizes  · Continue IVF 0 9 NaCL gentle hydration to raise Na+ by 6-8 mEq per 24 hr  · Avoid over-correction to prevent osmotic demyelination syndrome  · Obtain renal US    Elevated serum creatinine  Assessment & Plan  Assessment:  Pt had an elevated creatinine of 1 9 on admission  Recent Labs     10/09/22  1637   CREATININE 1 90*   EGFR 37     Estimated Creatinine Clearance: 34 2 mL/min (A) (by C-G formula based on SCr of 1 9 mg/dL (H))  Unsure of pt's baseline as prior medical records could not be obtained  Plan:  • IVF  • Consult Nephrology  • Obtain am BMP      More than 50 percent stenosis of left internal carotid artery  Assessment & Plan  Assessment:    CTA of head/neck    Result Date: 10/9/2022  Impression: 1  Lung segment critical (greater than 90%) stenosis in the proximal left cervical ICA  2   Atretic right posterior cerebral artery  No evidence of acute thrombus or large vessel occlusion of the major vessels of the [place]  3  Evidence of aspiration or pneumonia in the partially visualized upper lungs, most prominent in the left upper lobe  Plan    - Obtain vascular input in regards to L ICA stenosis (asymptomatic at this time)    VTE Pharmacologic Prophylaxis: VTE Score: 7 High Risk (Score >/= 5) - Pharmacological DVT Prophylaxis Ordered: heparin  Sequential Compression Devices Ordered    Code Status: Level 1 - Full Code   Discussion with family: Patient declined call to        Anticipated Length of Stay: Patient will be admitted on an inpatient basis with an anticipated length of stay of greater than 2 midnights secondary to Acute CVA and hyponatremia      Chief Complaint: Acute CVA     History of Present Illness:  Elvia Escobar III is a 63 y o  male with no known PMH who presents with acute CVA  Patient reported left-sided upper and lower extremity weakness along with left lower facial drooping and dysarthria that began this morning (10/9)  Patient denied experiencing any of these symptoms prior to going to sleep last night  Patient's friends called 911 this morning, stroke pathway was initiated and patient was brought to the ED      Patient denies any recent chest pain, shortness of breath, headache, vision changes, numbness/tingling, nausea, vomiting, abdominal pain, lightheadedness, dizziness, fatigue, fever or chills  Patient does report darkened, erythematous macules on bilateral forearms which she says began 1 month ago and is unsure what caused them      Once in the ED, pt received a CT brain which showed acute subcortical infarct w/o hemorrhage in the right periventricular white matter  CT of the Head & Neck showed >90% stenosis of the proximal left cervical ICA and evidence of aspiration or pneumonia  Pt's procalcitonin was elevated and pt was determined to have severe sepsis based on infection w/ elevated WBC, HR and RR  Blood cultures were ordered and pt was initially given Cefepime 1 g IV and Vancomycin 1250 mg for possible bacteremia before being transitioned to Ceftriaxone 1 g IV q24h       Pt was also determined to be hyponatremic w/ Na+ of 119 on admission along with an elevated creatinine of 1 9 (no baseline Cr could be determined as pt has very scant medical hx based on records)   Nephrology was consulted concerning the hyponatremia and elevated creatinine and STAT BMP along w/ urine osmolarity, urine sodium, urine chloride and serum uric acid were obtained         Review of Systems:  Review of Systems   Constitutional: Negative for chills, fatigue and fever  HENT: Negative for ear pain and sore throat  Eyes: Negative for pain and visual disturbance  Respiratory: Negative for cough and shortness of breath  Cardiovascular: Negative for chest pain and palpitations  Gastrointestinal: Negative for abdominal pain and vomiting  Genitourinary: Negative for dysuria and hematuria  Musculoskeletal: Negative for arthralgias and back pain  Skin: Positive for rash  Negative for color change  Neurological: Positive for facial asymmetry, speech difficulty and weakness  Negative for dizziness, seizures, syncope, light-headedness and headaches  All other systems reviewed and are negative         Past Medical and Surgical History:   Medical History   History reviewed  No pertinent past medical history         Surgical History   History reviewed  No pertinent surgical history         Meds/Allergies:  Prior to Admission medications    Not on File      I have reviewed home medications with patient personally      Allergies: No Known Allergies     Social History:  Marital Status:  Unknown     Occupation:  Unknown  Patient Pre-hospital Living Situation: Home  Patient Pre-hospital Level of Mobility: walks  Patient Pre-hospital Diet Restrictions:  Unknown     Substance Use History:   Social History          Substance and Sexual Activity   Alcohol Use Not Currently      Social History           Tobacco Use   Smoking Status Current Every Day Smoker   • Types: Cigarettes   Smokeless Tobacco Never Used      Social History          Substance and Sexual Activity   Drug Use Not on file         Family History:  Family History   History reviewed   No pertinent family history         Physical Exam:      Vitals:   Blood Pressure: (!) 184/109 (10/09/22 1900)  Pulse: 97 (10/09/22 1900)  Temperature: (!) 97 1 °F (36 2 °C) (10/09/22 1656)  Temp Source: Rectal (10/09/22 1656)  Respirations: 22 (10/09/22 1900)  Height: 5' 11" (180 3 cm) (10/09/22 1644)  Weight - Scale: 57 7 kg (127 lb 3 3 oz) (10/09/22 1600)  SpO2: 97 % (10/09/22 1900)     Physical Exam  Vitals and nursing note reviewed  Constitutional:       Appearance: He is well-developed and normal weight  HENT:      Head: Normocephalic and atraumatic  Eyes:      Conjunctiva/sclera: Conjunctivae normal    Cardiovascular:      Rate and Rhythm: Normal rate and regular rhythm  Heart sounds: Normal heart sounds, S1 normal and S2 normal  No murmur heard  Pulmonary:      Effort: Pulmonary effort is normal  No respiratory distress  Breath sounds: Normal breath sounds and air entry  Abdominal:      Palpations: Abdomen is soft  Tenderness: There is no abdominal tenderness  Musculoskeletal:      Cervical back: Neck supple  Skin:     General: Skin is dry  Findings: Erythema and rash present  Rash is papular  Pustular rash: Drak  Comments: Dark, erythematous patches on bilateral forearms  Neurological:      Mental Status: He is alert  Motor: Weakness present        Comments: 1/5 muscle strength in L upper and lower extremity  5/5 muscle strength in R upper extremity  3/5 muscle strength in R lower extremity  L lower facial drooping  Symmetrical raise of upper 1/3 of face bilaterally            Additional Data:      Lab Results:       Results from last 7 days   Lab Units 10/09/22  1637   WBC Thousand/uL 18 20*   HEMOGLOBIN g/dL 12 1   HEMATOCRIT % 35 1*   PLATELETS Thousands/uL 114*           Results from last 7 days   Lab Units 10/09/22  1637   SODIUM mmol/L 119*   POTASSIUM mmol/L 4 5   CHLORIDE mmol/L 86*   CO2 mmol/L 21   BUN mg/dL 20   CREATININE mg/dL 1 90*   ANION GAP mmol/L 12   CALCIUM mg/dL 8 3*   GLUCOSE RANDOM mg/dL 122           Results from last 7 days   Lab Units 10/09/22  1637   INR   1 08              Results from last 7 days   Lab Units 10/09/22  1740 10/09/22  1637   LACTIC ACID mmol/L 2 8*  --    PROCALCITONIN ng/ml  --  0 88*         Imaging: Reviewed radiology reports from this admission including: abdominal/pelvic CT and MRI brain  MRI brain wo contrast   Final Result by Joseluis Vega MD (10/09 1936)           1  Stable acute to early subacute infarct in the right periventricular white matter, corona radiata and lentiform nuclei  No hemorrhagic conversion or mass effect  2   Acute lacunar infarct in the left thalamus        Workstation performed: [id number]           XR chest 1 view portable   ED Interpretation by Padmini Loyola MD (10/09 2046)   Possible opacities in upper lobes BILAT, otherwise nonacute       CTA stroke alert (head/neck)   Final Result by Joseluis Vega MD (10/09 1644)       1   Lung segment critical (greater than 90%) stenosis in the proximal left cervical ICA  2   Atretic right posterior cerebral artery  No evidence of acute thrombus or large vessel occlusion of the major vessels of the [place] of [place]  3   Evidence of aspiration or pneumonia in the partially visualized upper lungs, most prominent in the left upper lobe              Findings were directly discussed with Lucia Smith at 4:39 PM                                Workstation performed: [id number]           CT stroke alert brain   Final Result by Joseluis Vega MD (10/09 1644)       Acute subcortical infarct in the right periventricular white matter, corona radiata and lentiform nuclei  No hemorrhagic conversion or mass effect        Findings were directly discussed with Lucia Smith at 4:39 PM        Workstation performed: [id number]           US kidney and bladder with pvr    (Results Pending)         EKG and Other Studies Reviewed on Admission:   · EKG: Sinus Tachycardia        ** Please Note: This note has been constructed using a voice recognition system

## 2022-10-10 NOTE — UTILIZATION REVIEW
Initial Clinical Review    Admission: Date/Time/Statement:   Admission Orders (From admission, onward)     Ordered        10/09/22 [address] [place] [address]  Once                      Orders Placed This Encounter   Procedures   • INPATIENT ADMISSION     Standing Status:   Standing     Number of Occurrences:   1     Order Specific Question:   Level of Care     Answer:   Med Surg [16]     Order Specific Question:   Estimated length of stay     Answer:   Not Applicable     ED Arrival Information     Expected   -    Arrival   10/9/2022 15:59    Acuity   Immediate            Means of arrival   Ambulance    Escorted by   [facility]    Service   Hospitalist    Admission type   Emergency            Arrival complaint   -           Chief Complaint   Patient presents with   • STROKE Alert     Left sided weakness of upper and lower extremities with left sided facial paralysis  Last known well 0800  Initial Presentation: 63 y o  male with no known Lucinda Fong presents with acute CVA  Patient reported left-sided upper and lower extremity weakness along with left lower facial drooping and dysarthria that began this morning (10/9)   Patient denied experiencing any of these symptoms prior to going to sleep last night  Patient's friends called 911 this morning  Plan: Inpatient admission for evaluation and treatment of acute CVA, sepsis, hyponatremia, elevated creatinine: stroke pathway, MRI brain, Echo, allow permissive HTN, load with DAPT, initiate Lipitor, neuro checks, telemetry, follow blood culture, IV ceftriaxone, IV fluids, obtain sputum culture, consult Nephrology, obtain urine sodium, urine chloride, urine osmolarity and serum uric acid, am cortisol, BMP q 8 hrs, renal ultrasound, consult Vascular  Neurology consult: stroke pathway, MRI brain, Echo, load with DAPT, Lipitor, neuro checks, telemetry       Date: 10/10   Day 2:     Nephrology consult: continue IV fluids, BMP q 6 hrs, NPO, renal ultrasound, speech eval  Vascular surgery consult: L ICA stenosis likely not cause of current CVA  Will further assess carotid stenosis with carotid duplex  Continue ASA/statin/Plavix  Internal medicine: Echo, allow permissive HTN for 48 hrs, continue DAPT, telemetry, follow up carotid duplex, neuro checks, PT/OT/speech evals, continue IV ceftriaxone  Na level at 1200 was 122  Continue BMP q 6 hrs, IV fluids, follow up renal ultrasound  Neurology: continue stroke pathway, DAPT, atorvastatin, follow TTE, telemetry, neuro checks, PT/OT/speech       ED Triage Vitals   Temperature Pulse Respirations Blood Pressure SpO2   10/09/22 1644 10/09/22 1600 10/09/22 1600 10/09/22 1600 10/09/22 1614   98 °F (36 7 °C) 63 20 (!) 161/109 98 %      Temp Source Heart Rate Source Patient Position - Orthostatic VS BP Location FiO2 (%)   10/09/22 1644 10/09/22 1600 10/09/22 1600 10/09/22 1630 --   Axillary Monitor Lying Right arm       Pain Score       10/09/22 2341       No Pain          Wt Readings from Last 1 Encounters:   10/09/22 57 7 kg (127 lb 3 3 oz)     Additional Vital Signs:     Date/Time Temp Pulse Resp BP MAP (mmHg) SpO2 Calculated FIO2 (%) - Nasal Cannula Nasal Cannula O2 Flow Rate (L/min) O2 Device   10/10/22 1000 -- 100 -- 148/92 111 98 % -- -- --   10/10/22 0800 97 9 °F (36 6 °C) 97 18 120/87 98 95 % -- -- None (Room air)   10/10/22 05:01:07 -- 99 -- 143/94 110 99 % -- -- --   10/10/22 04:08:38 -- 90 16 141/96 111 99 % -- -- --   10/10/22 02:17:16 -- 101 -- 150/85 107 97 % -- -- --   10/10/22 00:40:36 -- 87 -- 147/92 110 100 % -- -- --   10/10/22 0000 98 3 °F (36 8 °C) 87 20 155/93 -- 100 % -- -- None (Room air)   10/09/22 23:42:28 -- 102 -- 155/93 114 99 % -- -- --   10/09/22 2300 -- 94 24 Abnormal  161/89 -- 98 % -- -- None (Room air)   10/09/22 2200 -- 88 26 Abnormal  174/97 Abnormal  -- 98 % -- -- Nasal cannula   10/09/22 2100 -- 94 26 Abnormal  170/99 -- 97 % -- -- Nasal cannula   10/09/22 2000 -- 91 24 Abnormal  166/104 Abnormal  -- 99 % -- -- Nasal cannula   10/09/22 1900 -- 97 22 184/109 Abnormal  -- 97 % 28 2 L/min Nasal cannula   10/09/22 1830 -- 100 24 Abnormal  179/107 Abnormal  -- 100 % 28 2 L/min Nasal cannula   10/09/22 1800 -- 97 24 Abnormal  154/94 -- 97 % 28 2 L/min Nasal cannula   10/09/22 1730 -- 104 24 Abnormal  169/74 -- 95 % 28 2 L/min Nasal cannula   10/09/22 1700 -- 106 Abnormal  22 170/104 Abnormal  -- 98 % 32 3 L/min Nasal cannula   10/09/22 1656 97 1 °F (36 2 °C) Abnormal  -- -- -- -- -- -- -- --   10/09/22 1645 -- 108 Abnormal  18 -- -- 94 % -- -- Nasal cannula   10/09/22 1644 98 °F (36 7 °C) -- -- -- -- -- -- -- --   10/09/22 1630 -- 108 Abnormal  18 158/90 118 91 % 32 3 L/min Nasal cannula   10/09/22 1615 -- 83 18 183/95 Abnormal  -- 98 % -- -- Nasal cannula   10/09/22 16:14:27 -- 66 18 170/108 Abnormal  -- 98 % -- -- --       Pertinent Labs/Diagnostic Test Results:   MRI brain wo contrast   Final Result by Korey Clifton MD (10/09 1936)         1  Stable acute to early subacute infarct in the right periventricular white matter, corona radiata and lentiform nuclei  No hemorrhagic conversion or mass effect  2   Acute lacunar infarct in the left thalamus  Workstation performed: [id number]         XR chest 1 view portable   ED Interpretation by Cassandra Guardado MD (10/09 2046)   Possible opacities in upper lobes BILAT, otherwise nonacute      Final Result by Kait Lee MD (10/10 8277)      Ill-defined opacity in the left upper lung which corresponds with bronchiectasis and tree-in-bud nodularity on the CTA neck, which could be due to aspiration versus bronchiolitis  Workstation performed: [id number]         CTA stroke alert (head/neck)   Final Result by Korey Clifton MD (10/09 1644)      1  Lung segment critical (greater than 90%) stenosis in the proximal left cervical ICA  2   Atretic right posterior cerebral artery     No evidence of acute thrombus or large vessel occlusion of the major vessels of the [place]  3   Evidence of aspiration or pneumonia in the partially visualized upper lungs, most prominent in the left upper lobe  Findings were directly discussed with Kevin Cheek at 4:39 PM                         Workstation performed: [id number]         CT stroke alert brain   Final Result by Lynnette Herrera MD (10/09 1644)      Acute subcortical infarct in the right periventricular white matter, corona radiata and lentiform nuclei  No hemorrhagic conversion or mass effect  Findings were directly discussed with Kevin Cheek at 4:39 PM       Workstation performed: [id number]         VAS carotid complete study    (Results Pending)   US kidney and bladder    (Results Pending)   FL barium swallow video w speech    (Results Pending)     10/10 Echo: Interpretation Summary       •  Left Ventricle: Left ventricular cavity size is normal  Wall thickness is moderately increased  There is moderate concentric hypertrophy  The left ventricular ejection fraction is 55%  Systolic function is normal  Wall motion is normal  Diastolic function is mildly abnormal, consistent with grade I (abnormal) relaxation  •  Right Ventricle: Right ventricular cavity size is normal  Systolic function is normal   •  Tricuspid Valve: There is mild regurgitation  •  Pericardium: There is a trivial pericardial effusion  The fluid exhibits a fibrinous appearance  •  Pulmonary Artery: The estimated pulmonary artery systolic pressure is 35 0 mmHg  The pulmonary artery systolic pressure is mildly increased      Results from last 7 days   Lab Units 10/10/22  1158   SARS-COV-2  Negative     Results from last 7 days   Lab Units 10/10/22  0810 10/09/22  1637   WBC Thousand/uL 12 71* 18 20*   HEMOGLOBIN g/dL 12 6 12 1   HEMATOCRIT % 36 3* 35 1*   PLATELETS Thousands/uL 122* 114*         Results from last 7 days   Lab Units 10/10/22  1247 10/10/22  0810 10/09/22  1708 10/09/22  2233 10/09/22  1637   SODIUM mmol/L 122* 123* 121* 120* 119*   POTASSIUM mmol/L 3 9 4 0 4 2 4 3 4 5   CHLORIDE mmol/L 91* 90* 87* 87* 86*   CO2 mmol/L 18* 19* 22 19* 21   ANION GAP mmol/L 13 14* 12 14* 12   BUN mg/dL 26* 25 23 23 20   CREATININE mg/dL 1 81* 1 86* 1 95* 1 93* 1 90*   EGFR ml/min/1 73sq m 40 38 36 37 37   CALCIUM mg/dL 8 2* 8 3* 8 5 8 4 8 3*   MAGNESIUM mg/dL  --   --  1 4*  --   --    PHOSPHORUS mg/dL  --   --  5 1*  --   --              Results from last 7 days   Lab Units 10/10/22  1247 10/10/22  0810 10/09/22  2358 10/09/22  2233 10/09/22  1637   GLUCOSE RANDOM mg/dL 116 84 96 104 122         Results from last 7 days   Lab Units 10/10/22  0810   HEMOGLOBIN A1C % 5 0   EAG mg/dl 97           Results from last 7 days   Lab Units 10/09/22  2251 10/09/22  1858 10/09/22  1637   HS TNI 0HR ng/L  --   --  10   HS TNI 2HR ng/L  --  11  --    HSTNI D2 ng/L  --  1  --    HS TNI 4HR ng/L 12  --   --    HSTNI D4 ng/L 2  --   --          Results from last 7 days   Lab Units 10/09/22  1637   PROTIME seconds 14 2   INR  1 08   PTT seconds 27     Results from last 7 days   Lab Units 10/10/22  0817   TSH 3RD GENERATON uIU/mL 4 360     Results from last 7 days   Lab Units 10/09/22  1637   PROCALCITONIN ng/ml 0 88*     Results from last 7 days   Lab Units 10/09/22  2358 10/09/22  2251 10/09/22  1740   LACTIC ACID mmol/L 1 8 1 8 2 8*           Results from last 7 days   Lab Units 10/09/22  2136   OSMO UR mmol/     Results from last 7 days   Lab Units 10/09/22  2136   CLARITY UA  Clear   COLOR UA  Yellow   SPEC GRAV UA  1 028   PH UA  5 5   GLUCOSE UA mg/dl Negative   KETONES UA mg/dl Negative   BLOOD UA  Negative   PROTEIN UA mg/dl Trace*   NITRITE UA  Negative   BILIRUBIN UA  Negative   UROBILINOGEN UA (BE) mg/dl <2 0   LEUKOCYTES UA  Negative   WBC UA /hpf 1-2   RBC UA /hpf 1-2   BACTERIA UA /hpf None Seen   EPITHELIAL CELLS WET PREP /hpf None Seen   SODIUM UR  <10     Results from last 7 days   Lab Units 10/10/22  1158   INFLUENZA A PCR  Negative   INFLUENZA B PCR  Negative   RSV PCR  Negative             Results from last 7 days   Lab Units 10/10/22  1247   ETHANOL LVL mg/dL <10                 Results from last 7 days   Lab Units 10/09/22  1753 10/09/22  1752   BLOOD CULTURE  Received in Microbiology Lab  Culture in Progress  Received in Microbiology Lab  Culture in Progress  ED Treatment:   Medication Administration from 10/09/2022 1549 to 10/09/2022 2329       Date/Time Order Dose Route Action     10/09/2022 1616 iohexol (OMNIPAQUE) 350 MG/ML injection (SINGLE-DOSE) 85 mL 85 mL Intravenous Given     10/09/2022 1900 sodium chloride 0 9 % bolus 1,000 mL 1,000 mL Intravenous New Bag     10/09/2022 1900 cefepime (MAXIPIME) IVPB (premix in dextrose) 1,000 mg 50 mL 1,000 mg Intravenous New Bag        History reviewed  No pertinent past medical history    Present on Admission:  • Hyponatremia  • Elevated serum creatinine  • Acute CVA (cerebrovascular accident) ([facility] [address] )  • More than 50 percent stenosis of left internal carotid artery      Admitting Diagnosis: Hyponatremia [E87 1]  CVA (cerebral vascular accident) ([facility] [address] ) [I63 9]  Acute left-sided weakness [R53 1]  LUISANA (acute kidney injury) ([facility] [address] ) [N17 9]  Stroke-like symptom [R29 90]  [facility] [I63 9]  Age/Sex: 63 y o  male  Admission Orders:  Scheduled Medications:  aspirin, 81 mg, Oral, Daily  aspirin, 325 mg, Oral, Once  atorvastatin, 80 mg, Oral, Daily With Dinner  cefTRIAXone, 1,000 mg, Intravenous, Q24H  clopidogrel, 300 mg, Oral, Once  clopidogrel, 75 mg, Oral, Daily  heparin (porcine), 5,000 Units, Subcutaneous, Q8H [address]  ipratropium-albuterol, 3 mL, Nebulization, Q6H      Continuous IV Infusions:     PRN Meds:       IP CONSULT TO NEUROLOGY  IP CONSULT TO NEPHROLOGY  IP CONSULT TO VASCULAR SURGERY  IP CONSULT TO CASE MANAGEMENT  IP CONSULT TO NUTRITION SERVICES    Network Utilization Review Department  ATTENTION: Please call with any questions or concerns to [phone] and carefully listen to the prompts so that you are directed to the right person  All voicemails are confidential   Arnaldo Mcconnell all requests for admission clinical reviews, approved or denied determinations and any other requests to dedicated fax number below belonging to the campus where the patient is receiving treatment   List of dedicated fax numbers for the Facilities:  [address] DENIALS (Administrative/Medical Necessity) [phone]   [address] (Maternity/NICU/Pediatrics) [phone]   916 Lizzy Alicia [phone]   [facility] [phone]   1306 [facility] [address] [address] Usha Joseph 28 [phone]   1554 [facility] [place] [address] [facility] 134 815 [facility] [phone]

## 2022-10-10 NOTE — PROGRESS NOTES
NEUROLOGY RESIDENCY PROGRESS NOTE     Name: Salvador Frankel III   Age & Sex: 63 y o  male   MRN: [id number]  Unit/Bed#: S -01   Encounter: [id number]    ASSESSMENT & PLAN     * Acute CVA (cerebrovascular accident) [facility])  [address] III is a 63 y o  male w/ PMH tobacco abuse, L ICA stenosis who p/w L-sided weakness, facial droop and dysarthria concerning for acute ischemic stroke  Found to have evolving early signs of R BG/subctx ischemia on CTH  Most likely small vessel stroke but would continue workup as below  • Presenting sx:  L-sided weakness, L facial droop and dysarthria ; NIHSS: 8; TNK not given 2/2 signs of evolving stroke t f  outside temporal window  • Workup:  o CTH/CTA: negative for acute blood, signs of ischemia, LVO or critical stenosis; atretic R PCA, aspiration or other PNA  o Labs: Hgb A1c 5 0, LDL 40  o MRI: acute left thalamic stroke and stable acute to early subacute right periventricular stroke including corona radiata and lentiform nucleus  o Echo: pending    Plan:  • Continue stroke pathway  • AP/AC and high intensity statin  o DAPT w/ A81/P75 (s/p load A325/P300)  o Atorvastatin 40 mg   • Permissive HTN x 24 hrs (goal BP < 200/110), labetalol prn  • F/u TTE  • Continue monitoring on telemetry, frequent neuro checks, stat CTH for acute change  • Replete lytes, goal euglycemia (gluc < 180) and normothermia  • PT/OT/PMR/ST w/ swallow eval  • Stroke education  •   Radha Rolon III will need f/u in about 6 weeks with neurovascular attending/resident/AP  He will not need outpatient testing  Pending for d/c: MRI, echo, workup    SUBJECTIVE     Patient was seen and examined  No critical events overnight  Denies N/V/F/C, abdominal pain, dizziness, HA, visual changes, new weakness or sensory changes, bowel or bladder dysfunction  ROS negative unless otherwise noted    OBJECTIVE     Patient ID: Sharmin Hoffman is a 63 y o  male      Vitals: 10/10/22 0040 10/10/22 0217 10/10/22 0408 10/10/22 0501   BP: 147/92 150/85 141/96 143/94   BP Location:       Pulse: 87 101 90 99   Resp:   16    Temp:       TempSrc:       SpO2: 100% 97% 99% 99%   Weight:       Height:          Temperature: Temp (24hrs), Av 8 °F (36 6 °C), Min:97 1 °F (36 2 °C), Max:98 3 °F (36 8 °C)   Temperature: 98 3 °F (36 8 °C)    Physical Exam Vitals reviewed  Constitutional:    Not in acute distress  Frail and cachectic  Not ill-appearing, toxic-appearing or diaphoretic  HENT:   Normocephalic and atraumatic  External ear normal b/l  Nose normal  No congestion or rhinorrhea  Mucous membranes are moist   Oropharynx is clear  No oropharyngeal exudate or posterior oropharyngeal erythema  Eyes:    No scleral icterus  No discharge b/l  Conjunctivae normal    Pulmonary:  Pulmonary effort is normal  No respiratory distress  GI: abdomen not distended  Musculoskeletal: no gross deformities  Skin:   Skin is not pale  Dusky/ashy   Psychiatric:       Mood normal  Affect congruent    Neurologic Exam   Mental Status   Alert and oriented to person, but not place, and time  Attention is normal  Speech is fluent w/ moderate dysarthria     Cranial Nerves   CN II Visual fields full to confrontation  CN III, IV, VI PERRL, brisk reactivity, intact consensual response b/l, EOMI, no CN III palsy, no CN VI palsy  no nystagmus   CN V   Facial sensation symmetric     CN VII  Facial expression weak left w/ flattened NL fold and lower weakness, right facial weakness also observed  CN VIII Hearing roughly symmetric  CN IX, X Palate symmetric  CN XI trapezius strength symmetric  CN XII moderate dysarthria    Motor Exam   Muscle bulk and tone grossly normal; Pronator drift absent b/l  RUE 5/5, RLE 4+/5 IP; LUE/LLE 0/5    Sensory Exam   Light touch intact and symmetric BUE/BLE    Gait, Coordination, and Reflexes   FTN normal b/l; no significant tremor observed  Reflexes: Brachioradialis: 1+ b/l    Biceps: 1+ b/l    Patellar: 3+ b/l     Plantar response downgoing/up-going     Negative Hernandez's     LABORATORY DATA     Labs: I have personally reviewed pertinent reports  Results from last 7 days   Lab Units 10/09/22  1637   WBC Thousand/uL 18 20*   HEMOGLOBIN g/dL 12 1   HEMATOCRIT % 35 1*   PLATELETS Thousands/uL 114*      Results from last 7 days   Lab Units 10/09/22  2358 10/09/22  2233 10/09/22  1637   POTASSIUM mmol/L 4 2 4 3 4 5   CHLORIDE mmol/L 87* 87* 86*   CO2 mmol/L 22 19* 21   BUN mg/dL 23 23 20   CREATININE mg/dL 1 95* 1 93* 1 90*   CALCIUM mg/dL 8 5 8 4 8 3*              Results from last 7 days   Lab Units 10/09/22  1637   INR  1 08   PTT seconds 27     No results found for: HGBA1C, LDLCALC, LDLDIRECT  Results from last 7 days   Lab Units 10/09/22  2358   LACTIC ACID mmol/L 1 8     IMAGING & DIAGNOSTIC TESTING     Radiology Results: I have personally reviewed pertinent reports  and I have personally reviewed pertinent films in PACS  MRI brain wo contrast   Final Result by Salma Garcia MD (10/09 1936)         1  Stable acute to early subacute infarct in the right periventricular white matter, corona radiata and lentiform nuclei  No hemorrhagic conversion or mass effect  2   Acute lacunar infarct in the left thalamus  Workstation performed: [id number]         XR chest 1 view portable   ED Interpretation by Belinda Stoner MD (10/09 2046)   Possible opacities in upper lobes BILAT, otherwise nonacute      CTA stroke alert (head/neck)   Final Result by Salma Garcia MD (10/09 1644)      1  Lung segment critical (greater than 90%) stenosis in the proximal left cervical ICA  2   Atretic right posterior cerebral artery  No evidence of acute thrombus or large vessel occlusion of the major vessels of the [place]  3   Evidence of aspiration or pneumonia in the partially visualized upper lungs, most prominent in the left upper lobe              Findings were directly discussed with Dino Livingston at 4:39 PM                         Workstation performed: [id number]         CT stroke alert brain   Final Result by Deborah Soares MD (10/09 1644)      Acute subcortical infarct in the right periventricular white matter, corona radiata and lentiform nuclei  No hemorrhagic conversion or mass effect  Findings were directly discussed with Dino Livingston at 4:39 PM       Workstation performed: [id number] kidney and bladder with pvr    (Results Pending)       Other Diagnostic Testing: I have personally reviewed pertinent reports        ACTIVE MEDICATIONS     Current Facility-Administered Medications   Medication Dose Route Frequency   • aspirin (ECOTRIN LOW STRENGTH) EC tablet 81 mg  81 mg Oral Daily   • aspirin tablet 325 mg  325 mg Oral Once   • atorvastatin (LIPITOR) tablet 80 mg  80 mg Oral Daily With Dinner   • cefTRIAXone (ROCEPHIN) IVPB (premix in dextrose) 1,000 mg 50 mL  1,000 mg Intravenous Q24H   • clopidogrel (PLAVIX) tablet 300 mg  300 mg Oral Once   • clopidogrel (PLAVIX) tablet 75 mg  75 mg Oral Daily   • heparin (porcine) subcutaneous injection 5,000 Units  5,000 Units Subcutaneous Q8H [address]   • sodium chloride 0 9 % infusion  50 mL/hr Intravenous Continuous     VTE Pharmacologic Prophylaxis: Heparin  VTE Mechanical Prophylaxis: sequential compression device    ==  Macie Guy MD  Resident, Neurology, [address] [place] [address]

## 2022-10-10 NOTE — CONSULTS
Tomas Moulton III 63 y o  male MRN: [id number]  Unit/Bed#: S -01 Encounter: [id number]    ASSESSMENT and PLAN:  Hyponatremia:  · Sodium 119 on admission 10/9 at approx 1600  · Baseline Sodium:  118 when seen in the emergency room on 01/04/2022  No follow-up  · Given 1 L of saline followed by isotonic saline at 50 mL/hour   · Sodium level appropriately improving, up 4 points in 16 hours  · Current sodium level 123 10/10 at 0800  · Patient reports drinking a 6 pack of hard tea, "twisted tea" daily  Smoking approximately 30 years  · Workup:  · Urine osmolality 369  Serum osmolality pending  · Urine sodium less than 10, urine chloride less than 15  · TSH pending  · Cortisol pending  · Uric acid normal, 8 0  · Chest x-ray bronchiectasis versus aspiration  · No evidence of malignancy on current/available imaging  · Etiology:  · Low urine sodium indicating volume depletion  History of alcohol abuse, cachectic suspect poor solute intake  · Plan:  · Continue isotonic saline 50 mL/hour and monitor closely  · Avoid over-correction, recommend 4-6 point increase over 24 hours  · Increase BMP to every 6 hours  · Await results of workup  · Currently NPO    Acute kidney injury:  · 10/10: Creatinine 1 9 on admission  · Baseline:  1 prior creatinine 0 53 on 1/4/2022  · Urinalysis:  Trace protein, 1-2 RBCs, 1-2 WBCs, no bacteria, negative blood  · Etiology:    · Likely pre renal azotemia, improving with volume administration  · Contrast 10/9 which may delay renal recovery  · Plan:  · Continue gentle IV fluids, NPO pending swallow eval  · Renal ultrasound pending  · Avoid nephrotoxic agents, hypotension    Acid-base disturbance:  · Bicarb 19 on admission with elevated lactic acid 2 8  · Improved with volume, bicarbonate up to 22 now back down to 19  · Repeat lactic acid normal  · Await results of repeat BMP  May need to start sodium bicarbonate tablets      Acute CVA:  · Stroke alert on admission  Left-sided weakness, left facial droop  · CT:  Acute subcortical infarct right  No hemorrhage or mass effect noted  · Carotid studies pending  · Swallow eval pending  Concern for aspiration  Leukocytosis noted  Antibiotics per primary service  · CT head and neck:   1  Lung segment critical (greater than 90%) stenosis in the proximal left cervical ICA  2   Atretic right posterior cerebral artery  No evidence of acute thrombus or large vessel occlusion of the major vessels of the [place]  3   Evidence of aspiration or pneumonia in the partially visualized upper lungs, most prominent in the left upper lobe  Hyperphosphatemia:  · Phosphorus 5 1, likely related to decreased clearance  Monitor  Alcohol abuse:  · Ethanol level pending  · Management per primary service    Tobacco dependence:  · Management per primary service      HISTORY OF PRESENT ILLNESS:  Requesting Physician: Alessandra Dai MD  Reason for Consult: LUISANA, hyponatremia    Romeo Alejandro is a 63 y o  male who was admitted to [facility] after presenting with left-sided weakness, left-sided facial droop  He was found down  Stroke alert on admission  Imaging positive for acute subcortical right infarct  Acute kidney injury, hyponatremia on admission prompting nephrology consult  Patient currently lives in a boarding house  He was last seen in the hospital January 2022  Sodium level 118 at that time without follow-up  Creatinine 0 5  On current admission creatinine up to 1 9, sodium 119  Patient reports daily use of alcohol, tobacco dependence  Reports that he eats normally although he appears cachectic, dishevelled  He has no edema  Chest congested, concern for aspiration,     PAST MEDICAL HISTORY:  History reviewed  No pertinent past medical history  Reviewed with patient    PAST SURGICAL HISTORY:  History reviewed  No pertinent surgical history    Reviewed with patient    ALLERGIES:  No Known Allergies    SOCIAL HISTORY:  Social History     Substance and Sexual Activity   Alcohol Use Not Currently     Social History     Substance and Sexual Activity   Drug Use Not on file     Social History     Tobacco Use   Smoking Status Current Every Day Smoker   • Types: Cigarettes   Smokeless Tobacco Never Used       FAMILY HISTORY:  History reviewed  No pertinent family history  Reviewed with patient    MEDICATIONS:    Current Facility-Administered Medications:   •  aspirin (ECOTRIN LOW STRENGTH) EC tablet 81 mg, 81 mg, Oral, Daily, Billie Davis PA-C  •  aspirin tablet 325 mg, 325 mg, Oral, Once, Ethel Mistry MD  •  atorvastatin (LIPITOR) tablet 80 mg, 80 mg, Oral, Daily With Dinner, Billie Davis PA-C  •  cefTRIAXone (ROCEPHIN) IVPB (premix in dextrose) 1,000 mg 50 mL, 1,000 mg, Intravenous, Q24H, Leonie Vail MD, Last Rate: 100 mL/hr at 10/10/22 0116, 1,000 mg at 10/10/22 0116  •  clopidogrel (PLAVIX) tablet 300 mg, 300 mg, Oral, Once, Billie Davis PA-C  •  clopidogrel (PLAVIX) tablet 75 mg, 75 mg, Oral, Daily, Billie Davis PA-C  •  heparin (porcine) subcutaneous injection 5,000 Units, 5,000 Units, Subcutaneous, Q8H [address], Leonie Vail MD, 5,000 Units at 10/10/22 0502  •  sodium chloride 0 9 % infusion, 50 mL/hr, Intravenous, Continuous, Logan Sanford MD, Last Rate: 50 mL/hr at 10/10/22 0117, 50 mL/hr at 10/10/22 0117    REVIEW OF SYSTEMS:  Constitutional:  Denies fatigue  Denies fevers or chills  HENT: Negative for postnasal drip  Eyes: Negative for visual disturbance  Respiratory:  Positive for cough, wheezing  Cardiovascular: Negative for chest pain, palpitations and leg swelling  Gastrointestinal: Negative for abdominal pain, constipation, diarrhea, nausea and vomiting  Genitourinary:  Denies dysuria, hematuria  Musculoskeletal:  Negative for unusual arthralgia or myalgia  Skin: Negative for rash     Neurological:  Left-sided weakness, facial droop, appears to have a visual neglect  Hematological:  No bleeding  Psychiatric/Behavioral:  Denies confusion  All the systems were reviewed and were negative except as documented on the HPI  PHYSICAL EXAM:  Current Weight: Weight - Scale: 57 7 kg (127 lb 3 3 oz)  First Weight: Weight - Scale: 57 7 kg (127 lb 3 3 oz)  Vitals:    10/10/22 0040 10/10/22 0217 10/10/22 0408 10/10/22 0501   BP: 147/92 150/85 141/96 143/94   BP Location:       Pulse: 87 101 90 99   Resp:   16    Temp:       TempSrc:       SpO2: 100% 97% 99% 99%   Weight:       Height:           Intake/Output Summary (Last 24 hours) at 10/10/2022 0801  Last data filed at 10/9/2022 1928  Gross per 24 hour   Intake 125 ml   Output --   Net 125 ml     Physical Exam  Constitutional:       Appearance: He is well-developed  He is cachectic  He is ill-appearing  HENT:      Head: Normocephalic and atraumatic  Nose: Nose normal  No congestion  Mouth/Throat:      Mouth: Mucous membranes are moist    Eyes:      General: No scleral icterus  Right eye: No discharge  Left eye: No discharge  Conjunctiva/sclera: Conjunctivae normal       Comments: Appears to have a visual neglect   Neck:      Thyroid: No thyromegaly  Vascular: No carotid bruit or JVD  Trachea: No tracheal deviation  Cardiovascular:      Rate and Rhythm: Regular rhythm  Tachycardia present  Heart sounds: No murmur heard  No friction rub  No gallop  Comments: Difficulty assessing heart tones due to adventitious lung sounds  Pulmonary:      Effort: Pulmonary effort is normal  Tachypnea present  Breath sounds: Wheezing and rhonchi present  Abdominal:      General: Bowel sounds are normal  There is no distension  Palpations: Abdomen is soft  There is no mass  Tenderness: There is no abdominal tenderness  There is no guarding or rebound  Musculoskeletal:         General: No tenderness or deformity  Normal range of motion        Cervical back: Neck supple  No rigidity or tenderness  Right lower leg: No edema  Left lower leg: No edema  Skin:     General: Skin is warm and dry  Coloration: Skin is not jaundiced or pale  Findings: Bruising present  No erythema or rash  Neurological:      Mental Status: He is alert  Motor: Weakness present  Comments: Left-sided weakness, speech is slurred  At time seems to have difficulty expressing himself  Concern for aspiration  Psychiatric:         Mood and Affect: Mood normal          Behavior: Behavior is cooperative             Invasive Devices:      Lab Results:   Results from last 7 days   Lab Units 10/09/22  2358 10/09/22  2233 10/09/22  1637   WBC Thousand/uL  --   --  18 20*   HEMOGLOBIN g/dL  --   --  12 1   HEMATOCRIT %  --   --  35 1*   PLATELETS Thousands/uL  --   --  114*   POTASSIUM mmol/L 4 2 4 3 4 5   CHLORIDE mmol/L 87* 87* 86*   CO2 mmol/L 22 19* 21   BUN mg/dL 23 23 20   CREATININE mg/dL 1 95* 1 93* 1 90*   CALCIUM mg/dL 8 5 8 4 8 3*     Other Studies:

## 2022-10-10 NOTE — ASSESSMENT & PLAN NOTE
CTA of head/neck    Result Date: 10/9/2022  Impression: 1  Lung segment critical (greater than 90%) stenosis in the proximal left cervical ICA  2   Atretic right posterior cerebral artery  No evidence of acute thrombus or large vessel occlusion of the major vessels of the [place] of [place]      Plan   - F/u on carotid duplex per neurology   - outpatient f/u for vascular symptoms

## 2022-10-10 NOTE — ASSESSMENT & PLAN NOTE
Assessment:  -->Episode of acute left-sided upper extremity and lower extremity weakness along with left-sided lower facial droop and dysarthria with onset this a m  (10/9)  Patient says he does not remember having these symptoms prior to falling asleep last night  In the ED, /109, now Blood Pressure: (!) 170/104   EKG on presentation to the ER showed: Sinus tachycardia  Right atrial enlargement  Nonspecific ST abnormality  CT stroke alert brain    Result Date: 10/9/2022  Impression: Acute subcortical infarct in the right periventricular white matter, corona radiata and lentiform nuclei  No hemorrhagic conversion or mass effect  Findings were directly discussed with Arya Sutton at 4:39 PM  Workstation performed: [id number]     CTA stroke alert (head/neck)    Result Date: 10/9/2022  Impression: 1  Lung segment critical (greater than 90%) stenosis in the proximal left cervical ICA  2   Atretic right posterior cerebral artery  No evidence of acute thrombus or large vessel occlusion of the major vessels of the [place]  3  Evidence of aspiration or pneumonia in the partially visualized upper lungs, most prominent in the left upper lobe  Findings were directly discussed with Arya Sutton at 4:39 PM  Workstation performed: [id number]     No Chest XR results available for this patient  · NIHSS score: 8  Neurology consulted: says pt not a candidate for tPA as pt is outside of time window      Plan - Stroke pathway:  - obtain MRI brain & 2D echocardiogram  - Allow for permissive hypertension with -200 for 48 hours, tx only if SBP > 200  - load with DAPT; continue DAPT starting tomorrow  - initiate high-dose Lipitor  - check hemoglobin A1C, lipid panel, TSH, B12  - neuro checks  - telemetry monitoring  - provide stroke education  - therapy evaluations (PT/OT/Speech)   - Obtain vascular input in regards to L ICA stenosis (asymptomatic at this time)

## 2022-10-10 NOTE — CONSULTS
Consultation -Vascular Surgery  Mitchel Eddie Shannonannika III 63 y o  male MRN: [id number]  Unit/Bed#: S -01 Encounter: [id number]        ASSESSMENT:  62 yo M with acute left sided weakness, L facial droop  L ICA stenosis, likely asx     MRI brain shows R sided ischemia, L thalamic ischemia  CTA reveals >90% L ICA stenosis      Plan:  - stroke w/u and treatment per primary/Neuro  - L ICA stenosis likely not cause of current CVA  Will further assess carotid stenosis with carotid duplex  - no indication for urgent vascular intervention at this time  - can f/u as outpt with vascular sx  - asa/statin/plavix  - endorses 4 drinks/day  Would rec CIWA protocol    Rest of care per primary  Please f/u attestation for final vascular surgery recs      Reason for Consult / Principal Problem:    HPI: Devante Peguero is a 63y o  year old male with no documented PMH who presents with Acute CVA (cerebrovascular accident) ([address] )  Pt was found yesterday afternoon on the floor of his room with left sided weakness and slurred speech  Pt states he has never had a stroke in the past  He states he believes his weakness is improving  Denies any personal or family hx of anything  Smokes 3/4 PPD x>30 years  Unable to tell me exact length of smoking hx  Also endorses ETOH use of 4 twisted teas/day  Denies any daily medication use  Denies any visual sx- including, blurred vision, diplopia  Review of Systems   Constitutional: Positive for activity change  Negative for chills and fever  Eyes: Negative for visual disturbance  Respiratory: Negative for shortness of breath  Cardiovascular: Negative for chest pain  Musculoskeletal: Positive for gait problem  Skin: Negative  Neurological: Positive for facial asymmetry, speech difficulty and weakness  Negative for numbness and headaches  All other systems reviewed and are negative  Historical Information   History reviewed  No pertinent past medical history    History reviewed  No pertinent surgical history  Social History   Social History     Substance and Sexual Activity   Alcohol Use Not Currently     Social History     Substance and Sexual Activity   Drug Use Not on file     Social History     Tobacco Use   Smoking Status Current Every Day Smoker   • Types: Cigarettes   Smokeless Tobacco Never Used     History reviewed  No pertinent family history  Meds/Allergies     No medications prior to admission  Current Facility-Administered Medications   Medication Dose Route Frequency   • aspirin (ECOTRIN LOW STRENGTH) EC tablet 81 mg  81 mg Oral Daily   • aspirin tablet 325 mg  325 mg Oral Once   • atorvastatin (LIPITOR) tablet 80 mg  80 mg Oral Daily With Dinner   • cefTRIAXone (ROCEPHIN) IVPB (premix in dextrose) 1,000 mg 50 mL  1,000 mg Intravenous Q24H   • clopidogrel (PLAVIX) tablet 300 mg  300 mg Oral Once   • clopidogrel (PLAVIX) tablet 75 mg  75 mg Oral Daily   • heparin (porcine) subcutaneous injection 5,000 Units  5,000 Units Subcutaneous Q8H [facility]   • sodium chloride 0 9 % infusion  50 mL/hr Intravenous Continuous       No Known Allergies    Objective     Blood pressure 120/87, pulse 97, temperature 97 9 °F (36 6 °C), temperature source Oral, resp  rate 18, height 5' 11" (1 803 m), weight 57 7 kg (127 lb 3 3 oz), SpO2 95 %  Intake/Output Summary (Last 24 hours) at 10/10/2022 0906  Last data filed at 10/9/2022 1928  Gross per 24 hour   Intake 125 ml   Output --   Net 125 ml       PHYSICAL EXAM  Physical Exam  Vitals and nursing note reviewed  Constitutional:       General: He is not in acute distress  Appearance: Normal appearance  He is normal weight  He is not ill-appearing, toxic-appearing or diaphoretic  Comments: Unkempt appearing   HENT:      Head: Normocephalic and atraumatic  Eyes:      Extraocular Movements: Extraocular movements intact  Cardiovascular:      Rate and Rhythm: Normal rate     Pulmonary:      Effort: Pulmonary effort is normal  Comments: Audible wheezing appreciated  Musculoskeletal:         General: No swelling or tenderness  Skin:     General: Skin is warm  Capillary Refill: Capillary refill takes less than 2 seconds  Neurological:      Mental Status: He is alert and oriented to person, place, and time  Comments: Unable to move entire left side, both left upper and lower extremities  Sensation intact b/l  Facial asymmetry appreciated  Psychiatric:         Mood and Affect: Mood normal          Behavior: Behavior normal            Lab Results:   I have personally reviewed pertinent lab results  , CBC:   Lab Results   Component Value Date    WBC 12 71 (H) 10/10/2022    HGB 12 6 10/10/2022    HCT 36 3 (L) 10/10/2022    MCV 87 10/10/2022     (L) 10/10/2022    MCH 30 1 10/10/2022    MCHC 34 7 10/10/2022    RDW 14 6 10/10/2022    MPV 9 5 10/10/2022   , CMP:   Lab Results   Component Value Date    SODIUM 123 (L) 10/10/2022    K 4 0 10/10/2022    CL 90 (L) 10/10/2022    CO2 19 (L) 10/10/2022    BUN 25 10/10/2022    CREATININE 1 86 (H) 10/10/2022    CALCIUM 8 3 (L) 10/10/2022    EGFR 38 10/10/2022   , Coagulation:   Lab Results   Component Value Date    INR 1 08 10/09/2022   , Urinalysis:   Lab Results   Component Value Date    COLORU Yellow 10/09/2022    CLARITYU Clear 10/09/2022    SPECGRAV 1 028 10/09/2022    PHUR 5 5 10/09/2022    LEUKOCYTESUR Negative 10/09/2022    NITRITE Negative 10/09/2022    GLUCOSEU Negative 10/09/2022    KETONESU Negative 10/09/2022    BILIRUBINUR Negative 10/09/2022    BLOODU Negative 10/09/2022     Imaging: I have personally reviewed pertinent reports  EKG, Pathology, and Other Studies: I have personally reviewed pertinent reports  Counseling / Coordination of Care  Total time spent today  30 minutes  Greater than 50% of total time was spent with the patient and / or family counseling and / or coordination of care           Calderon Vizcarra  10/10/2022 9:06 AM

## 2022-10-10 NOTE — ASSESSMENT & PLAN NOTE
CTA showed aspiration pneumonia in the upper lung fields L more than R  Leukocytosis to 18 20 and procalcitonin elevated to 0 88 but afebrile since admission and no subjective fever      -continue ceftriaxone 1g q24   - nebulizer treatments   - PFTs outpatient for suspected chronic lung disease

## 2022-10-10 NOTE — SPEECH THERAPY NOTE
Speech-Language Pathology Bedside Swallow Evaluation        Patient Name: Tonya Colon Date: 10/10/2022     Problem List  Principal Problem:    Acute CVA (cerebrovascular accident) [facility])  Active Problems:    Hyponatremia    Elevated serum creatinine    More than 50 percent stenosis of left internal carotid artery         Past Medical History  History reviewed  No pertinent past medical history  Past Surgical History  History reviewed  No pertinent surgical history  Summary    Pt presents with moderate-severe oropharyngeal dysphagia, pt appears at risk for aspiration with honey thick and nectar thick liquids by cup, decreased risk w/ HTL by tsp  Recommendations:   Diet: puree/level 1 diet and honey thick liquids by tsp  Meds: crushed with puree   Frequent Oral care: 4x/day  Aspiration precautions   Other Recommendations/ considerations: rec VBS to assess aspiration risk,   Will cont to follow for diet tolerance and modify diet as needed  Current Medical Status  Pt is a 63 y o  male who presented to [address] [place] [address]  with acute CVA  Pt presented with left upper left lower extremity weakness and left-sided facial droop   Patient reportedly in his normal state of health last night when he went to bed   No other current complaints   Patient came to the ED when symptoms did not resolve   He denies shortness of breath or cough   Denies any recent poor oral intake   Does drink alcohol but not on a daily basis  Past medical history:   Please see H&P for details    Special Studies:  CT-head: 10/9/22  Acute subcortical infarct in the right periventricular white matter, corona radiata and lentiform nuclei  No hemorrhagic conversion or mass effect  MRI: 10/9/22 1  Stable acute to early subacute infarct in the right periventricular white matter, corona radiata and lentiform nuclei  No hemorrhagic conversion or mass effect  2   Acute lacunar infarct in the left thalamus    CTA head/neck: 10/9/22    Evidence of aspiration or pneumonia in the partially visualized upper lungs, most prominent in the left upper lobe  Social/Education/Vocational Hx:  Pt lives roommate     Swallow Information   Current Risks for Dysphagia & Aspiration: CVA  Current Symptoms/Concerns: CVA  Current Diet: NPO   Baseline Diet: regular diet and thin liquids  Takes pills- whole w/ water     Baseline Assessment   Behavior/Cognition: alert  Speech/Language Status: able to participate in conversation and able to follow commands; moderately dysarthric  Patient Positioning: upright in bed     Swallow Mechanism Exam   Facial: symmetrical  Labial: decreased ROM left side-mildly  Lingual: WFL  Velum: unable to visualize  Mandible: adequate ROM  Dentition: "6 teeth"  Vocal quality:clear/adequate   Volitional Cough: weak; wheezy   Respiratory: RA    Consistencies Assessed and Performance   Consistencies Administered: nectar thick, honey thick and puree    Oral Stage: pt took large sips by cup, able to suck NTL by straw, adequate retrieval from spoon  Pt slow bolus transfer w/ limited consistencies  Pharyngeal Stage: delayed swallow initiation; laryngeal excursion was fair  Delayed cough noted w/ HTL by cup and more immed w/ NTL by straw  Suspect aspiration risk         Esophageal Concerns: none reported      Results Reviewed with: patient, RN and MD   Dysphagia Goals: pt will tolerate puree with honey thick liquids by tsp  without s/s of aspiration x48 hours  and pt will participate in VBS      April Waleska Garcia, [address] [place] [address] [facility]SLP  Speech Pathologist  [place] license # [id number]  [place] license # [id number]  Available via [employer]

## 2022-10-10 NOTE — ED NOTES
RN and additional staff attempted blood draws  Attempts unsuccessful  Provider made aware        Veronica Madrid RN  10/09/22 1159

## 2022-10-10 NOTE — ASSESSMENT & PLAN NOTE
Nita Rehman is a 63 y o  male w/ PMH tobacco abuse, L ICA stenosis who p/w L-sided weakness, facial droop and dysarthria concerning for acute ischemic stroke  Found to have evolving early signs of R BG/subctx ischemia on CTH   Most likely small vessel stroke but would continue workup as below  • Presenting sx:  L-sided weakness, L facial droop and dysarthria ; NIHSS: 8; TNK not given 2/2 signs of evolving stroke t f  outside temporal window  • Workup:  o CTH/CTA: negative for acute blood, signs of ischemia, LVO or critical stenosis   o Labs: Hgb A1c pending, LDL pending  o MRI: pending  o Echo: pending    Plan:  • Continue stroke pathway  • AP/AC and high intensity statin  o DAPT w/ A81/P75 (s/p load A325/P300)  o Atorvastatin 40 mg   • Permissive HTN x 24 hrs (goal BP < 200/110), labetalol prn  • F/u labs: A1c, FLP, CMP, CBCD  • F/u MRI, TTE  • Continue monitoring on telemetry, frequent neuro checks, stat CTH for acute change  • + BLE VAS and carotid duplex, + thrombophilia workup  • Replete lytes, goal euglycemia (gluc < 180) and normothermia  • PT/OT/PMR/ST w/ swallow eval  • Stroke education  • + loop recorder, + YAW pending workup

## 2022-10-11 ENCOUNTER — APPOINTMENT (OUTPATIENT)
Dept: RADIOLOGY | Facility: HOSPITAL | Age: 62
DRG: 871 | End: 2022-10-11
Payer: COMMERCIAL

## 2022-10-11 ENCOUNTER — PREP FOR PROCEDURE (OUTPATIENT)
Dept: OTHER | Facility: HOSPITAL | Age: 62
End: 2022-10-11

## 2022-10-11 DIAGNOSIS — I63.9 STROKE (HCC): Primary | ICD-10-CM

## 2022-10-11 LAB
ALBUMIN SERPL BCP-MCNC: 2.7 G/DL (ref 3.5–5)
ALBUMIN SERPL BCP-MCNC: 2.7 G/DL (ref 3.5–5)
ALP SERPL-CCNC: 91 U/L (ref 34–104)
ALP SERPL-CCNC: 91 U/L (ref 34–104)
ALT SERPL W P-5'-P-CCNC: 4 U/L (ref 7–52)
ALT SERPL W P-5'-P-CCNC: 4 U/L (ref 7–52)
ANION GAP SERPL CALCULATED.3IONS-SCNC: 11 MMOL/L (ref 4–13)
ANION GAP SERPL CALCULATED.3IONS-SCNC: 11 MMOL/L (ref 4–13)
ANION GAP SERPL CALCULATED.3IONS-SCNC: 12 MMOL/L (ref 4–13)
ANION GAP SERPL CALCULATED.3IONS-SCNC: 12 MMOL/L (ref 4–13)
ANION GAP SERPL CALCULATED.3IONS-SCNC: 13 MMOL/L (ref 4–13)
ANION GAP SERPL CALCULATED.3IONS-SCNC: 13 MMOL/L (ref 4–13)
AST SERPL W P-5'-P-CCNC: 12 U/L (ref 13–39)
AST SERPL W P-5'-P-CCNC: 12 U/L (ref 13–39)
BASOPHILS # BLD AUTO: 0.02 THOUSANDS/ΜL (ref 0–0.1)
BASOPHILS # BLD AUTO: 0.02 THOUSANDS/ΜL (ref 0–0.1)
BASOPHILS NFR BLD AUTO: 0 % (ref 0–1)
BASOPHILS NFR BLD AUTO: 0 % (ref 0–1)
BILIRUB DIRECT SERPL-MCNC: 0.1 MG/DL (ref 0–0.2)
BILIRUB DIRECT SERPL-MCNC: 0.1 MG/DL (ref 0–0.2)
BILIRUB SERPL-MCNC: 0.58 MG/DL (ref 0.2–1)
BILIRUB SERPL-MCNC: 0.58 MG/DL (ref 0.2–1)
BUN SERPL-MCNC: 22 MG/DL (ref 5–25)
BUN SERPL-MCNC: 22 MG/DL (ref 5–25)
BUN SERPL-MCNC: 24 MG/DL (ref 5–25)
BUN SERPL-MCNC: 24 MG/DL (ref 5–25)
BUN SERPL-MCNC: 25 MG/DL (ref 5–25)
BUN SERPL-MCNC: 25 MG/DL (ref 5–25)
CALCIUM SERPL-MCNC: 8.2 MG/DL (ref 8.4–10.2)
CALCIUM SERPL-MCNC: 8.3 MG/DL (ref 8.4–10.2)
CALCIUM SERPL-MCNC: 8.3 MG/DL (ref 8.4–10.2)
CHLORIDE SERPL-SCNC: 94 MMOL/L (ref 96–108)
CHLORIDE SERPL-SCNC: 94 MMOL/L (ref 96–108)
CHLORIDE SERPL-SCNC: 96 MMOL/L (ref 96–108)
CO2 SERPL-SCNC: 18 MMOL/L (ref 21–32)
CO2 SERPL-SCNC: 18 MMOL/L (ref 21–32)
CO2 SERPL-SCNC: 20 MMOL/L (ref 21–32)
CREAT SERPL-MCNC: 1.02 MG/DL (ref 0.6–1.3)
CREAT SERPL-MCNC: 1.02 MG/DL (ref 0.6–1.3)
CREAT SERPL-MCNC: 1.22 MG/DL (ref 0.6–1.3)
CREAT SERPL-MCNC: 1.22 MG/DL (ref 0.6–1.3)
CREAT SERPL-MCNC: 1.26 MG/DL (ref 0.6–1.3)
CREAT SERPL-MCNC: 1.26 MG/DL (ref 0.6–1.3)
EOSINOPHIL # BLD AUTO: 0 THOUSAND/ΜL (ref 0–0.61)
EOSINOPHIL # BLD AUTO: 0 THOUSAND/ΜL (ref 0–0.61)
EOSINOPHIL NFR BLD AUTO: 0 % (ref 0–6)
EOSINOPHIL NFR BLD AUTO: 0 % (ref 0–6)
ERYTHROCYTE [DISTWIDTH] IN BLOOD BY AUTOMATED COUNT: 14.6 % (ref 11.6–15.1)
ERYTHROCYTE [DISTWIDTH] IN BLOOD BY AUTOMATED COUNT: 14.6 % (ref 11.6–15.1)
GFR SERPL CREATININE-BSD FRML MDRD: 62 ML/MIN/1.73SQ M
GFR SERPL CREATININE-BSD FRML MDRD: 62 ML/MIN/1.73SQ M
GFR SERPL CREATININE-BSD FRML MDRD: 64 ML/MIN/1.73SQ M
GFR SERPL CREATININE-BSD FRML MDRD: 64 ML/MIN/1.73SQ M
GFR SERPL CREATININE-BSD FRML MDRD: 80 ML/MIN/1.73SQ M
GFR SERPL CREATININE-BSD FRML MDRD: 80 ML/MIN/1.73SQ M
GLUCOSE SERPL-MCNC: 81 MG/DL (ref 65–140)
GLUCOSE SERPL-MCNC: 81 MG/DL (ref 65–140)
GLUCOSE SERPL-MCNC: 87 MG/DL (ref 65–140)
GLUCOSE SERPL-MCNC: 87 MG/DL (ref 65–140)
GLUCOSE SERPL-MCNC: 93 MG/DL (ref 65–140)
GLUCOSE SERPL-MCNC: 93 MG/DL (ref 65–140)
GLUCOSE SERPL-MCNC: 98 MG/DL (ref 65–140)
GLUCOSE SERPL-MCNC: 98 MG/DL (ref 65–140)
HCT VFR BLD AUTO: 35.8 % (ref 36.5–49.3)
HCT VFR BLD AUTO: 35.8 % (ref 36.5–49.3)
HGB BLD-MCNC: 12.3 G/DL (ref 12–17)
HGB BLD-MCNC: 12.3 G/DL (ref 12–17)
IMM GRANULOCYTES # BLD AUTO: 0.13 THOUSAND/UL (ref 0–0.2)
IMM GRANULOCYTES # BLD AUTO: 0.13 THOUSAND/UL (ref 0–0.2)
IMM GRANULOCYTES NFR BLD AUTO: 1 % (ref 0–2)
IMM GRANULOCYTES NFR BLD AUTO: 1 % (ref 0–2)
LYMPHOCYTES # BLD AUTO: 0.79 THOUSANDS/ΜL (ref 0.6–4.47)
LYMPHOCYTES # BLD AUTO: 0.79 THOUSANDS/ΜL (ref 0.6–4.47)
LYMPHOCYTES NFR BLD AUTO: 8 % (ref 14–44)
LYMPHOCYTES NFR BLD AUTO: 8 % (ref 14–44)
MAGNESIUM SERPL-MCNC: 1.7 MG/DL (ref 1.9–2.7)
MAGNESIUM SERPL-MCNC: 1.7 MG/DL (ref 1.9–2.7)
MCH RBC QN AUTO: 30.9 PG (ref 26.8–34.3)
MCH RBC QN AUTO: 30.9 PG (ref 26.8–34.3)
MCHC RBC AUTO-ENTMCNC: 34.4 G/DL (ref 31.4–37.4)
MCHC RBC AUTO-ENTMCNC: 34.4 G/DL (ref 31.4–37.4)
MCV RBC AUTO: 90 FL (ref 82–98)
MCV RBC AUTO: 90 FL (ref 82–98)
MONOCYTES # BLD AUTO: 0.67 THOUSAND/ΜL (ref 0.17–1.22)
MONOCYTES # BLD AUTO: 0.67 THOUSAND/ΜL (ref 0.17–1.22)
MONOCYTES NFR BLD AUTO: 7 % (ref 4–12)
MONOCYTES NFR BLD AUTO: 7 % (ref 4–12)
NEUTROPHILS # BLD AUTO: 8.03 THOUSANDS/ΜL (ref 1.85–7.62)
NEUTROPHILS # BLD AUTO: 8.03 THOUSANDS/ΜL (ref 1.85–7.62)
NEUTS SEG NFR BLD AUTO: 84 % (ref 43–75)
NEUTS SEG NFR BLD AUTO: 84 % (ref 43–75)
NRBC BLD AUTO-RTO: 0 /100 WBCS
NRBC BLD AUTO-RTO: 0 /100 WBCS
PHOSPHATE SERPL-MCNC: 3.8 MG/DL (ref 2.7–4.5)
PHOSPHATE SERPL-MCNC: 3.8 MG/DL (ref 2.7–4.5)
PLATELET # BLD AUTO: 106 THOUSANDS/UL (ref 149–390)
PLATELET # BLD AUTO: 106 THOUSANDS/UL (ref 149–390)
PMV BLD AUTO: 9.6 FL (ref 8.9–12.7)
PMV BLD AUTO: 9.6 FL (ref 8.9–12.7)
POTASSIUM SERPL-SCNC: 3.6 MMOL/L (ref 3.5–5.3)
POTASSIUM SERPL-SCNC: 3.6 MMOL/L (ref 3.5–5.3)
POTASSIUM SERPL-SCNC: 3.9 MMOL/L (ref 3.5–5.3)
POTASSIUM SERPL-SCNC: 3.9 MMOL/L (ref 3.5–5.3)
POTASSIUM SERPL-SCNC: 4 MMOL/L (ref 3.5–5.3)
POTASSIUM SERPL-SCNC: 4 MMOL/L (ref 3.5–5.3)
PROT SERPL-MCNC: 5.5 G/DL (ref 6.4–8.4)
PROT SERPL-MCNC: 5.5 G/DL (ref 6.4–8.4)
RBC # BLD AUTO: 3.98 MILLION/UL (ref 3.88–5.62)
RBC # BLD AUTO: 3.98 MILLION/UL (ref 3.88–5.62)
SODIUM SERPL-SCNC: 125 MMOL/L (ref 135–147)
SODIUM SERPL-SCNC: 125 MMOL/L (ref 135–147)
SODIUM SERPL-SCNC: 127 MMOL/L (ref 135–147)
SODIUM SERPL-SCNC: 127 MMOL/L (ref 135–147)
SODIUM SERPL-SCNC: 128 MMOL/L (ref 135–147)
SODIUM SERPL-SCNC: 128 MMOL/L (ref 135–147)
WBC # BLD AUTO: 9.64 THOUSAND/UL (ref 4.31–10.16)
WBC # BLD AUTO: 9.64 THOUSAND/UL (ref 4.31–10.16)

## 2022-10-11 PROCEDURE — 97167 OT EVAL HIGH COMPLEX 60 MIN: CPT

## 2022-10-11 PROCEDURE — 94640 AIRWAY INHALATION TREATMENT: CPT

## 2022-10-11 PROCEDURE — 97530 THERAPEUTIC ACTIVITIES: CPT

## 2022-10-11 PROCEDURE — 94760 N-INVAS EAR/PLS OXIMETRY 1: CPT

## 2022-10-11 PROCEDURE — 74230 X-RAY XM SWLNG FUNCJ C+: CPT

## 2022-10-11 PROCEDURE — 80048 BASIC METABOLIC PNL TOTAL CA: CPT | Performed by: INTERNAL MEDICINE

## 2022-10-11 PROCEDURE — 84100 ASSAY OF PHOSPHORUS: CPT

## 2022-10-11 PROCEDURE — 99232 SBSQ HOSP IP/OBS MODERATE 35: CPT | Performed by: INTERNAL MEDICINE

## 2022-10-11 PROCEDURE — 83735 ASSAY OF MAGNESIUM: CPT

## 2022-10-11 PROCEDURE — 92611 MOTION FLUOROSCOPY/SWALLOW: CPT

## 2022-10-11 PROCEDURE — 80076 HEPATIC FUNCTION PANEL: CPT | Performed by: INTERNAL MEDICINE

## 2022-10-11 PROCEDURE — 99232 SBSQ HOSP IP/OBS MODERATE 35: CPT | Performed by: STUDENT IN AN ORGANIZED HEALTH CARE EDUCATION/TRAINING PROGRAM

## 2022-10-11 PROCEDURE — 99233 SBSQ HOSP IP/OBS HIGH 50: CPT | Performed by: INTERNAL MEDICINE

## 2022-10-11 PROCEDURE — 85025 COMPLETE CBC W/AUTO DIFF WBC: CPT

## 2022-10-11 PROCEDURE — 97163 PT EVAL HIGH COMPLEX 45 MIN: CPT

## 2022-10-11 PROCEDURE — 82948 REAGENT STRIP/BLOOD GLUCOSE: CPT

## 2022-10-11 RX ORDER — MAGNESIUM SULFATE HEPTAHYDRATE 40 MG/ML
2 INJECTION, SOLUTION INTRAVENOUS ONCE
Status: COMPLETED | OUTPATIENT
Start: 2022-10-11 | End: 2022-10-11

## 2022-10-11 RX ORDER — SODIUM CHLORIDE 9 MG/ML
50 INJECTION, SOLUTION INTRAVENOUS CONTINUOUS
Status: DISPENSED | OUTPATIENT
Start: 2022-10-11 | End: 2022-10-12

## 2022-10-11 RX ORDER — LEVALBUTEROL 1.25 MG/.5ML
1.25 SOLUTION, CONCENTRATE RESPIRATORY (INHALATION) EVERY 6 HOURS PRN
Status: DISCONTINUED | OUTPATIENT
Start: 2022-10-11 | End: 2022-10-20

## 2022-10-11 RX ADMIN — CLOPIDOGREL BISULFATE 75 MG: 75 TABLET ORAL at 09:09

## 2022-10-11 RX ADMIN — DESMOPRESSIN ACETATE 40 MG: 0.2 TABLET ORAL at 18:26

## 2022-10-11 RX ADMIN — SODIUM CHLORIDE 50 ML/HR: 0.9 INJECTION, SOLUTION INTRAVENOUS at 15:35

## 2022-10-11 RX ADMIN — ASPIRIN 81 MG: 81 TABLET, COATED ORAL at 09:10

## 2022-10-11 RX ADMIN — HEPARIN SODIUM 5000 UNITS: 5000 INJECTION INTRAVENOUS; SUBCUTANEOUS at 21:40

## 2022-10-11 RX ADMIN — LEVALBUTEROL HYDROCHLORIDE 1.25 MG: 1.25 SOLUTION, CONCENTRATE RESPIRATORY (INHALATION) at 19:50

## 2022-10-11 RX ADMIN — LEVALBUTEROL HYDROCHLORIDE 1.25 MG: 1.25 SOLUTION, CONCENTRATE RESPIRATORY (INHALATION) at 15:17

## 2022-10-11 RX ADMIN — IPRATROPIUM BROMIDE 0.5 MG: 0.5 SOLUTION RESPIRATORY (INHALATION) at 19:50

## 2022-10-11 RX ADMIN — IPRATROPIUM BROMIDE 0.5 MG: 0.5 SOLUTION RESPIRATORY (INHALATION) at 15:17

## 2022-10-11 RX ADMIN — IPRATROPIUM BROMIDE 0.5 MG: 0.5 SOLUTION RESPIRATORY (INHALATION) at 07:45

## 2022-10-11 RX ADMIN — HEPARIN SODIUM 5000 UNITS: 5000 INJECTION INTRAVENOUS; SUBCUTANEOUS at 06:05

## 2022-10-11 RX ADMIN — HEPARIN SODIUM 5000 UNITS: 5000 INJECTION INTRAVENOUS; SUBCUTANEOUS at 15:34

## 2022-10-11 RX ADMIN — LEVALBUTEROL HYDROCHLORIDE 1.25 MG: 1.25 SOLUTION, CONCENTRATE RESPIRATORY (INHALATION) at 07:45

## 2022-10-11 RX ADMIN — CEFTRIAXONE 1000 MG: 1 INJECTION, SOLUTION INTRAVENOUS at 01:17

## 2022-10-11 RX ADMIN — MAGNESIUM SULFATE HEPTAHYDRATE 2 G: 40 INJECTION, SOLUTION INTRAVENOUS at 15:35

## 2022-10-11 NOTE — MALNUTRITION/BMI
This medical record reflects one or more clinical indicators suggestive of malnutrition and/or morbid obesity  Malnutrition Findings:   Adult Malnutrition type: Social/enviromental  Adult Degree of Malnutrition: Malnutrition of mild degree  Malnutrition Characteristics: Muscle loss, Other (comment), Inadequate energy (BMI < 18 5)              360 Statement: Mild protein calorie malnutrition r/t inadequate intake as evidenced by inability to eat sufficient energy/protein to maintain a healthy weight, muscle loss present to clavicle, and BMI <18 5; currently being treated with nutritional supplements  BMI Findings:  Adult BMI Classifications: Underweight < 18 5        Body mass index is 17 71 kg/m²  See Nutrition note dated 10/11/2022 for additional details  Completed nutrition assessment is viewable in the nutrition documentation

## 2022-10-11 NOTE — ASSESSMENT & PLAN NOTE
Recent Labs     10/09/22  1637   SODIUM 119*     No results found for: VICKIA, CRESCENCIO, OSMOLALITSER    AM cortisol increased at 41 0     124 his latest sodium level is at noon on 10/11    Plan:  · BMP q6  · Urine sodium low indicating volume depletion  ? Continue isotonic saline 50 mL/hour and monitor closely  ? Avoid over-correction, recommend 4-6 point increase over 24 hours  ?  Don't increase sodium to >131 per nephrology  · Avoid over-correction to prevent osmotic demyelination syndrome  · F/u on renal US

## 2022-10-11 NOTE — UTILIZATION REVIEW
Continued Stay Review    Date:10/11                          Current Patient Class:  IP   Current Level of Care: MS     HPI:61 y o  male initially admitted on 10/09  For management of  Acute CVA (suspecte small vessel)  w/left sided weakness AND  Hyponatremia + LUISANA  Suspected 2/2 volume depletion  (repleted w/IV NS)         10/10  NEPHROLOGY:  Consulted for hyponatremia and LUISANA in setting of alcohol use  (drinks 6 pack daily )      10/10   VASCULAR SURGERY:   significant neuro deficits on left side due to right hemispheric stroke  He would be a candidate for non urgent left carotid intervention once his medical issues improve  Follow up as outpatient  10/11    Assessment/Plan: Continue DAPT w/ ASA,  Statin, telemetry, neuro cks  Cont electrolytes repletion, goal euglycemia (gluc < 180), normotension and normothermia  Patient will need outpatient repeat carotid dopplers per report  Renal function stable - crt at baseline     Nephrology managing hyponatremia:  Continue NS at 50 cc/hr,  Cont BMP every 6 hours  (Goal Na by 6pm tonight is not more than 125)       Vital Signs:   10/11/22 08:31:06 -- -- 20 127/89 102 map  95 % --   10/11/22 0745 -- -- -- -- -- 99 % None (Room air)   10/11/22 0256 -- 104 18 131/91 104 98 % None (Room air)   10/10/22 2341 -- 106 Abnormal  18 140/98 112 96 % None (Room air)   10/10/22 2100 -- -- -- -- -- 100 % None (Room air)   10/10/22 1958 -- -- -- -- -- 97 % None (Room air)   10/10/22 1800 -- 105 -- -- -- -- --   10/10/22 15:22:20 97 7 °F (36 5 °C) 102 20 149/95 113 95 % --         Pertinent Labs/Diagnostic Results:   CTH/CTA: negative for acute blood, signs of ischemia, LVO or critical stenosis; atretic R PCA, aspiration or other PNA    Carotid U/S: R ICA < 50% stenosis by heterogeneous and irregular plaque; L ICA > 70% stenosis by heterogeneous and irregular plaque    MRI: acute left thalamic stroke and stable acute to early subacute right periventricular stroke including corona radiata and lentiform nucleus      Echo: LVEF 56%, normal systolic fx, A3VQ, "trivial" pericardial effusion with fibrinous fluid appearance; mildly elevated PASP 37 mmHg      Results from last 7 days   Lab Units 10/10/22  1158   SARS-COV-2  Negative     Results from last 7 days   Lab Units 10/11/22  0610 10/10/22  0810 10/09/22  1637   WBC Thousand/uL 9 64 12 71* 18 20*   HEMOGLOBIN g/dL 12 3 12 6 12 1   HEMATOCRIT % 35 8* 36 3* 35 1*   PLATELETS Thousands/uL 106* 122* 114*   NEUTROS ABS Thousands/µL 8 03*  --   --          Results from last 7 days   Lab Units 10/11/22  0602 10/10/22  2259 10/10/22  1730 10/10/22  1247 10/10/22  0810 10/09/22  2358   SODIUM mmol/L 125* 126* 124* 122* 123* 121*   POTASSIUM mmol/L 3 9 3 9 4 1 3 9 4 0 4 2   CHLORIDE mmol/L 94* 92* 91* 91* 90* 87*   CO2 mmol/L 20* 21 20* 18* 19* 22   ANION GAP mmol/L 11 13 13 13 14* 12   BUN mg/dL 25 28* 28* 26* 25 23   CREATININE mg/dL 1 26 1 60* 1 75* 1 81* 1 86* 1 95*   EGFR ml/min/1 73sq m 62 46 41 40 38 36   CALCIUM mg/dL 8 2* 8 4 8 4 8 2* 8 3* 8 5   MAGNESIUM mg/dL 1 7*  --   --   --   --  1 4*   PHOSPHORUS mg/dL 3 8  --   --   --   --  5 1*         Results from last 7 days   Lab Units 10/11/22  0637   POC GLUCOSE mg/dl 93     Results from last 7 days   Lab Units 10/11/22  0602 10/10/22  2259 10/10/22  1730 10/10/22  1247 10/10/22  0810 10/09/22  2358 10/09/22  2233 10/09/22  1637   GLUCOSE RANDOM mg/dL 87 91 102 116 84 96 104 122     Results from last 7 days   Lab Units 10/10/22  0810   OSMOLALITY, SERUM mmol/*     Results from last 7 days   Lab Units 10/10/22  0810   HEMOGLOBIN A1C % 5 0   EAG mg/dl 97       Results from last 7 days   Lab Units 10/10/22  0817   TSH 3RD GENERATON uIU/mL 4 360     Results from last 7 days   Lab Units 10/09/22  1637   PROCALCITONIN ng/ml 0 88*       Results from last 7 days   Lab Units 10/10/22  0810 10/09/22  2136   OSMOLALITY, SERUM mmol/*  --    OSMO UR mmol/KG  --  369     Results from last 7 days Lab Units 10/09/22  2136   CLARITY UA  Clear   COLOR UA  Yellow   SPEC GRAV UA  1 028   PH UA  5 5   GLUCOSE UA mg/dl Negative   KETONES UA mg/dl Negative   BLOOD UA  Negative   PROTEIN UA mg/dl Trace*   NITRITE UA  Negative   BILIRUBIN UA  Negative   UROBILINOGEN UA (BE) mg/dl <2 0   LEUKOCYTES UA  Negative   WBC UA /hpf 1-2   RBC UA /hpf 1-2   BACTERIA UA /hpf None Seen   EPITHELIAL CELLS WET PREP /hpf None Seen   SODIUM UR  <10     Results from last 7 days   Lab Units 10/10/22  1158   INFLUENZA A PCR  Negative   INFLUENZA B PCR  Negative   RSV PCR  Negative     Results from last 7 days   Lab Units 10/10/22  1247   ETHANOL LVL mg/dL <10     Results from last 7 days   Lab Units 10/09/22  1753 10/09/22  1752   BLOOD CULTURE  No Growth at 24 hrs  No Growth at 24 hrs  Medications:   Scheduled Medications:  aspirin, 81 mg, Oral, Daily  aspirin, 325 mg, Oral, Once  atorvastatin, 40 mg, Oral, Daily With Dinner  cefTRIAXone, 1,000 mg, Intravenous, Q24H  clopidogrel, 300 mg, Oral, Once  clopidogrel, 75 mg, Oral, Daily  heparin (porcine), 5,000 Units, Subcutaneous, Q8H PRINCE  ipratropium, 0 5 mg, Nebulization, TID  levalbuterol, 1 25 mg, Nebulization, TID      Continuous IV Infusions:     PRN Meds:       Discharge Plan: tbd    Network Utilization Review Department  ATTENTION: Please call with any questions or concerns to [phone] and carefully listen to the prompts so that you are directed to the right person  All voicemails are confidential   [facility] [place] all requests for admission clinical reviews, approved or denied determinations and any other requests to dedicated fax number below belonging to the campus where the patient is receiving treatment   List of dedicated fax numbers for the Facilities:  FACILITY NAME UR FAX NUMBER   ADMISSION DENIALS (Administrative/Medical Necessity) [phone]   [address] (Maternity/NICU/Pediatrics) Suha Reynoso 172 [phone]   [facility] 77 [phone]   [address] [facility] [place] [address] [address] Usha Joseph 28 [phone]   [address] [facility] [place] [address] [phone]

## 2022-10-11 NOTE — PLAN OF CARE
Problem: OCCUPATIONAL THERAPY ADULT  Goal: Performs self-care activities at highest level of function for planned discharge setting  See evaluation for individualized goals  Description: Treatment Interventions: ADL retraining, Functional transfer training, Endurance training, Cognitive reorientation, Patient/family training, UE strengthening/ROM, Equipment evaluation/education, Compensatory technique education, Neuromuscular reeducation, Energy conservation, Activityengagement, Fine motor coordination activities          See flowsheet documentation for full assessment, interventions and recommendations  Note: Limitation: Decreased ADL status, Decreased Safe judgement during ADL, Decreased UE strength, Decreased cognition, Decreased endurance, Decreased high-level ADLs, Decreased self-care trans, Non-func L UE, Decreased sensation, Decreased fine motor control, Decreased UE ROM  Prognosis: Fair, Good  Assessment: Pt is a 63 y o  male seen for OT evaluation s/p admit to [facility] on 10/9/2022 w/ Acute CVA (cerebrovascular accident) ([facility] [address] ), left UE and left LE weakness and facial droop  Comorbidities affecting pt's functional performance at time of assessment include: sepsis, hyponatremia, stenosis left internal carotid artery, h/o alcohol abuse, dysphagia, aspiration pneumonia  MRI brain: Stable acute to early subacute infarct in the right periventricular white matter, corona radiata and lentiform nuclei  No hemorrhagic conversion or mass effect  Personal factors affecting pt at time of IE include:steps to enter environment, limited home support, difficulty performing ADLS, difficulty performing IADLS , limited insight into deficits, flat affect and decreased initiation and engagement   Prior to admission, pt was per pt independent w/ ADLs, independent w/ functional transfers and mobility w/ no AD, independent w/ IADLs, working   Upon evaluation: Pt requires MAX assist x1 supine>sit bed mobility w/ increased time to complete, MOD assist x2 sit<>stand w/ knees blocked left side lean, MAX assist x2 SPT w/ hand held assist cues for sequencing and L lean noted, MAX assist LB ADLs, MOD assist UB ADLs, MIN assist grooming 2* the following deficits impacting occupational performance: flat affect, decreased strength and endurance, impaired balance w/ left lean noted while seated and instance w/ cues for upright posture, L UE and L LE hemiplegia, impaired visual tracking, dysarthria noted, increased processing time,fall risk, impaired safety awareness, impaired coordination L UE, impaired cognition, cues to attend to left side  Educated on self-assisted ROM for UEs and positioning of L UE on pillow w/ hand extended, pt receptive  Pt to benefit from continued skilled OT tx while in the hospital to address deficits as defined above and maximize level of functional independence w ADL's and functional mobility  Occupational Performance areas to address include: grooming, bathing/shower, toilet hygiene, dressing, health maintenance, functional mobility, clothing management, cleaning and meal prep  From OT standpoint, recommendation at time of d/c would be short term rehab  The patient's raw score on the -PAC Daily Activity inpatient short form is 13, standardized score is 32 03, less than 39 4  Patients at this level are likely to benefit from discharge to post-acute rehabilitation services  Please refer to the recommendation of the Occupational Therapist for safe discharge planning    Recommendation: Physiatry Consult  OT Discharge Recommendation: Post acute rehabilitation services

## 2022-10-11 NOTE — PLAN OF CARE
Problem: Potential for Falls  Goal: Patient will remain free of falls  Description: INTERVENTIONS:  - Educate patient/family on patient safety including physical limitations  - Instruct patient to call for assistance with activity   - Consult OT/PT to assist with strengthening/mobility   - Keep Call bell within reach  - Keep bed low and locked with side rails adjusted as appropriate  - Keep care items and personal belongings within reach  - Initiate and maintain comfort rounds  - Make Fall Risk Sign visible to staff  - Offer Toileting every 2 Hours, in advance of need  - Initiate/Maintain bed alarm  - Apply yellow socks and bracelet for high fall risk patients  - Consider moving patient to room near nurses station  Outcome: Progressing     Problem: MOBILITY - ADULT  Goal: Maintain or return to baseline ADL function  Description: INTERVENTIONS:  -  Assess patient's ability to carry out ADLs; assess patient's baseline for ADL function and identify physical deficits which impact ability to perform ADLs (bathing, care of mouth/teeth, toileting, grooming, dressing, etc )  - Assess/evaluate cause of self-care deficits   - Assess range of motion  - Assess patient's mobility; develop plan if impaired  - Assess patient's need for assistive devices and provide as appropriate  - Encourage maximum independence but intervene and supervise when necessary  - Involve family in performance of ADLs  - Assess for home care needs following discharge   - Consider OT consult to assist with ADL evaluation and planning for discharge  - Provide patient education as appropriate  Outcome: Progressing  Goal: Maintains/Returns to pre admission functional level  Description: INTERVENTIONS:  - Perform BMAT or MOVE assessment daily    - Set and communicate daily mobility goal to care team and patient/family/caregiver  - Collaborate with rehabilitation services on mobility goals if consulted  - Perform Range of Motion 3 times a day    - Reposition patient every 2 hours  - Dangle patient 3 times a day  - Stand patient 3 times a day  - Ambulate patient 3 times a day  - Out of bed to chair 3 times a day   - Out of bed for meals 3 times a day  - Out of bed for toileting  - Record patient progress and toleration of activity level   Outcome: Progressing     Problem: Prexisting or High Potential for Compromised Skin Integrity  Goal: Skin integrity is maintained or improved  Description: INTERVENTIONS:  - Identify patients at risk for skin breakdown  - Assess and monitor skin integrity  - Assess and monitor nutrition and hydration status  - Monitor labs   - Assess for incontinence   - Turn and reposition patient  - Assist with mobility/ambulation  - Relieve pressure over bony prominences  - Avoid friction and shearing  - Provide appropriate hygiene as needed including keeping skin clean and dry  - Evaluate need for skin moisturizer/barrier cream  - Collaborate with interdisciplinary team   - Patient/family teaching  - Consider wound care consult   Outcome: Progressing     Problem: Nutrition/Hydration-ADULT  Goal: Nutrient/Hydration intake appropriate for improving, restoring or maintaining nutritional needs  Description: Monitor and assess patient's nutrition/hydration status for malnutrition  Collaborate with interdisciplinary team and initiate plan and interventions as ordered  Monitor patient's weight and dietary intake as ordered or per policy  Utilize nutrition screening tool and intervene as necessary  Determine patient's food preferences and provide high-protein, high-caloric foods as appropriate       INTERVENTIONS:  - Monitor oral intake, urinary output, labs, and treatment plans  - Assess nutrition and hydration status and recommend course of action  - Evaluate amount of meals eaten  - Assist patient with eating if necessary   - Allow adequate time for meals  - Recommend/ encourage appropriate diets, oral nutritional supplements, and vitamin/mineral supplements  - Order, calculate, and assess calorie counts as needed  - Recommend, monitor, and adjust tube feedings and TPN/PPN based on assessed needs  - Assess need for intravenous fluids  - Provide specific nutrition/hydration education as appropriate  - Include patient/family/caregiver in decisions related to nutrition  Outcome: Progressing

## 2022-10-11 NOTE — PROGRESS NOTES
NEUROLOGY RESIDENCY PROGRESS NOTE     Name: Daysi Abdul III   Age & Sex: 63 y o  male   MRN: [id number]  Unit/Bed#: S -01   Encounter: [id number]    ASSESSMENT & PLAN     * Acute CVA (cerebrovascular accident) [facility])  [address] III is a 63 y o  male w/ PMH tobacco abuse, L ICA stenosis who p/w L-sided weakness, facial droop and dysarthria concerning for acute ischemic stroke  Found to have evolving early signs of R BG/subctx ischemia on CTH  Though statistically small vessel by location, bilateral thalamic involvement not noted so lower Artery of [place] suspicion  Patient also has b/l L > R carotid disease so atheroembolic possible but bilateral would be atypical t f  cannot rule out central embolic without prolonged cardiac monitoring  • Presenting sx:  L-sided weakness, L facial droop and dysarthria ; NIHSS: 8; TNK not given 2/2 signs of evolving stroke t f  outside temporal window  • Workup:  o CTH/CTA: negative for acute blood, signs of ischemia, LVO or critical stenosis; atretic R PCA, aspiration or other PNA  o Carotid U/S: R ICA < 50% stenosis by heterogeneous and irregular plaque; L ICA > 70% stenosis by heterogeneous and irregular plaque  o Labs: Hgb A1c 5 0, LDL 40  o MRI: acute left thalamic stroke and stable acute to early subacute right periventricular stroke including corona radiata and lentiform nucleus     o Echo: LVEF 49%, normal systolic fx, U5AC, "trivial" pericardial effusion with fibrinous fluid appearance; mildly elevated PASP 37 mmHg    Plan:  • Continue DAPT w/ A81/P75 for 21 days and then can discontinue plavix and continue on aspirin alone indefinitely  • Atorvastatin 40 mg  • Continue monitoring on telemetry, frequent neuro checks, stat CTH for acute change  • Replete lytes, goal euglycemia (gluc < 180), normotension and normothermia  • PT/OT/PMR/ST w/ swallow eval  • Stroke education  • Nephrology managing hyponatremia, appreciate recs  • Given the fibrinous appearance of pericardial effusion, reasonable to touch base with cardiology to request input on degree of concern  • Patient will need outpatient repeat carotid dopplers per report  • Placed orders for outpatient loops recorder today  • Recommend continued work-up for possible nutritional deficiencies based on physical exam    Karel Zhou III will need f/u in about 6 weeks with neurovascular attending/resident/AP  He will not need outpatient testing  Pending for d/c: No further inpatient neurology recommendations but please don't hesitate to call/TT for questions     SUBJECTIVE     Patient was seen and examined  No critical events overnight  Denies N/V/F/C, abdominal pain, dizziness, HA, visual changes, new weakness or sensory changes, bowel or bladder dysfunction  ROS negative unless otherwise noted    OBJECTIVE     Patient ID: Tracie Zaman is a 63 y o  male  Vitals:    10/10/22 2100 10/10/22 2341 10/11/22 0256 10/11/22 0745   BP:  140/98 131/91    BP Location:  Right arm Right arm    Pulse:  (!) 106 104    Resp:  18 18    Temp:       TempSrc:       SpO2: 100% 96% 98% 99%   Weight:       Height:          Temperature: Temp (24hrs), Av 8 °F (36 6 °C), Min:97 7 °F (36 5 °C), Max:97 9 °F (36 6 °C)   Temperature: 97 7 °F (36 5 °C)    Physical Exam Vitals reviewed  Constitutional:    Not in acute distress  Frail and cachectic  Not ill-appearing, toxic-appearing or diaphoretic  HENT:   Normocephalic and atraumatic  External ear normal b/l  Nose normal  No congestion or rhinorrhea  Mucous membranes are moist   Oropharynx is clear  No oropharyngeal exudate or posterior oropharyngeal erythema  Eyes:    No scleral icterus  No discharge b/l  Conjunctivae normal    Pulmonary:  Pulmonary effort is normal  No respiratory distress  GI: abdomen not distended  Musculoskeletal: no gross deformities but loss of muscle bulk BUE/BLE (stable from admission)  Skin:   Skin is not pale  Dusky/ashy   Psychiatric:       Mood normal  Affect congruent    Neurologic Exam   Mental Status   Alert and oriented to person, but not place, and time  Attention is normal  Speech is fluent w/ moderate dysarthria     Cranial Nerves   CN II Visual fields full to confrontation  CN III, IV, VI PERRL, brisk reactivity, intact consensual response b/l, EOMI, no CN III palsy, no CN VI palsy  no nystagmus   CN V   Facial sensation symmetric  CN VII  Facial expression weak left w/ flattened NL fold and lower weakness, right facial weakness also observed but much more subtle  CN VIII Hearing roughly symmetric  CN IX, X Palate symmetric  CN XI trapezius strength symmetric  CN XII moderate dysarthria    Motor Exam   Muscle tone grossly normal; Pronator drift absent b/l  RUE 5/5, RLE 4+/5 IP; LUE/LLE 0/5    Sensory Exam   Light touch intact and symmetric BUE/BLE    Gait, Coordination, and Reflexes   FTN normal b/l; no significant tremor observed  Reflexes: Brachioradialis: 1+ b/l    Biceps: 1+ b/l    Patellar: 3+ b/l     Plantar response downgoing/up-going     Negative Hernandez's     LABORATORY DATA     Labs: I have personally reviewed pertinent reports        Results from last 7 days   Lab Units 10/11/22  0610 10/10/22  0810 10/09/22  1637   WBC Thousand/uL 9 64 12 71* 18 20*   HEMOGLOBIN g/dL 12 3 12 6 12 1   HEMATOCRIT % 35 8* 36 3* 35 1*   PLATELETS Thousands/uL 106* 122* 114*   NEUTROS PCT % 84*  --   --    MONOS PCT % 7  --   --       Results from last 7 days   Lab Units 10/11/22  0602 10/10/22  2259 10/10/22  1730   POTASSIUM mmol/L 3 9 3 9 4 1   CHLORIDE mmol/L 94* 92* 91*   CO2 mmol/L 20* 21 20*   BUN mg/dL 25 28* 28*   CREATININE mg/dL 1 26 1 60* 1 75*   CALCIUM mg/dL 8 2* 8 4 8 4     Results from last 7 days   Lab Units 10/09/22  2358   MAGNESIUM mg/dL 1 4*     Results from last 7 days   Lab Units 10/09/22  2358   PHOSPHORUS mg/dL 5 1*      Results from last 7 days   Lab Units 10/09/22  1637   INR  1 08   PTT seconds 27     Lab Results   Component Value Date    HGBA1C 5 0 10/10/2022    LDLCALC 40 10/10/2022     Results from last 7 days   Lab Units 10/09/22  2358   LACTIC ACID mmol/L 1 8     IMAGING & DIAGNOSTIC TESTING     Radiology Results: I have personally reviewed pertinent reports  and I have personally reviewed pertinent films in PACS  VAS carotid complete study   Final Result by Simon Tony MD (10/10 2145)      MRI brain wo contrast   Final Result by Bella Romero MD (10/09 1936)         1  Stable acute to early subacute infarct in the right periventricular white matter, corona radiata and lentiform nuclei  No hemorrhagic conversion or mass effect  2   Acute lacunar infarct in the left thalamus  Workstation performed: [id number]         XR chest 1 view portable   ED Interpretation by Brent Swanson MD (10/09 2046)   Possible opacities in upper lobes BILAT, otherwise nonacute      Final Result by Yandy Blackmon MD (10/10 6828)      Ill-defined opacity in the left upper lung which corresponds with bronchiectasis and tree-in-bud nodularity on the CTA neck, which could be due to aspiration versus bronchiolitis  Workstation performed: [id number]         CTA stroke alert (head/neck)   Final Result by Bella Romero MD (10/09 1644)      1  Lung segment critical (greater than 90%) stenosis in the proximal left cervical ICA  2   Atretic right posterior cerebral artery  No evidence of acute thrombus or large vessel occlusion of the major vessels of the [place]  3   Evidence of aspiration or pneumonia in the partially visualized upper lungs, most prominent in the left upper lobe              Findings were directly discussed with Kandace Portillo at 4:39 PM                         Workstation performed: [id number]         CT stroke alert brain   Final Result by Bella Romero MD (10/09 1644)      Acute subcortical infarct in the right periventricular white matter, corona radiata and lentiform nuclei  No hemorrhagic conversion or mass effect  Findings were directly discussed with Jerry Espinosa at 4:39 PM       Workstation performed: [id number]         US kidney and bladder    (Results Pending)   FL barium swallow video w speech    (Results Pending)       Other Diagnostic Testing: I have personally reviewed pertinent reports        ACTIVE MEDICATIONS     Current Facility-Administered Medications   Medication Dose Route Frequency   • aspirin (ECOTRIN LOW STRENGTH) EC tablet 81 mg  81 mg Oral Daily   • aspirin tablet 325 mg  325 mg Oral Once   • atorvastatin (LIPITOR) tablet 40 mg  40 mg Oral Daily With Dinner   • cefTRIAXone (ROCEPHIN) IVPB (premix in dextrose) 1,000 mg 50 mL  1,000 mg Intravenous Q24H   • clopidogrel (PLAVIX) tablet 300 mg  300 mg Oral Once   • clopidogrel (PLAVIX) tablet 75 mg  75 mg Oral Daily   • heparin (porcine) subcutaneous injection 5,000 Units  5,000 Units Subcutaneous Q8H [address]   • ipratropium (ATROVENT) 0 02 % inhalation solution 0 5 mg  0 5 mg Nebulization TID   • levalbuterol (XOPENEX) inhalation solution 1 25 mg  1 25 mg Nebulization TID   • sodium chloride 0 9 % infusion  50 mL/hr Intravenous Continuous     VTE Pharmacologic Prophylaxis: Heparin  VTE Mechanical Prophylaxis: sequential compression device    ==  Dina Hernandez MD  Resident, Neurology, [address] [place] [address]

## 2022-10-11 NOTE — ASSESSMENT & PLAN NOTE
CTA showed aspiration pneumonia in the upper lung fields L more than R  Leukocytosis to 18 20 and procalcitonin elevated to 0 88 but afebrile since admission and no subjective fever   Rhonchi have improved over the course of admission      -continue ceftriaxone 1g q24   - nebulizer treatments   - PFTs outpatient for suspected chronic lung disease

## 2022-10-11 NOTE — ASSESSMENT & PLAN NOTE
Episode of acute left-sided upper extremity and lower extremity weakness along with left-sided lower facial droop and dysarthria with onset this a m  (10/9)  Patient says he does not remember having these symptoms prior to falling asleep last night  BP on presentation 161/109, max HR and RR in  and 24 respectively  EKG showed Sinus tachycardia  Right atrial enlargement  Nonspecific ST abnormality  Patient did not get tPa since he was outside the window  CT stroke alert brain    Result Date: 10/9/2022  Impression: Acute subcortical infarct in the right periventricular white matter, corona radiata and lentiform nuclei  No hemorrhagic conversion or mass effect  Findings were directly discussed with Levi Stephens at 4:39 PM  Workstation performed: [id number]     CTA stroke alert (head/neck)    Result Date: 10/9/2022  Impression: 1  Lung segment critical (greater than 90%) stenosis in the proximal left cervical ICA  2   Atretic right posterior cerebral artery  No evidence of acute thrombus or large vessel occlusion of the major vessels of the [place]  3  Evidence of aspiration or pneumonia in the partially visualized upper lungs, most prominent in the left upper lobe  Findings were directly discussed with Levi Stephens at 4:39 PM  Workstation performed: [id number]     MRI brain:   1  Stable acute to early subacute infarct in the right periventricular white matter, corona radiata and lentiform nuclei  No hemorrhagic conversion or mass effect  2   Acute lacunar infarct in the left thalamus        Chest XR  · Ill-defined opacity in the left upper lung which corresponds with bronchiectasis and tree-in-bud nodularity on the CTA neck, which could be due to aspiration versus bronchiolitis  ECHO   · Moderate concentric hypertrophy  EF 55%  Mild tricuspid regurgitation  Trivial fibrinous pericardial effusion  Pulmonary artery pressure mildly elevated to 37 0 mmHg       · NIHSS score: 8    Plan   - Allow for permissive hypertension with -200 for 48 hours (until 10/11 at 16:00), tx only if SBP > 200  - dysphagia diet per speech  - continue DAPT (aspirin and clopidogrel) for 21 days then just aspirin  - continue atorvastatin 40mg   - f/u on carotid duplex outpatient per vascular surgery    - neuro checks  - provide stroke education  - Will follow up with neurovascular in 4-6 weeks  - acute rehab treatment after discharge

## 2022-10-11 NOTE — PHYSICAL THERAPY NOTE
PHYSICAL THERAPY EVALUATION NOTE    Patient Name: Allie Foster Date: 10/11/2022    AGE:   63 y o   Mrn:   [id number]  ADMIT DX:  Hyponatremia [E87 1]  CVA (cerebral vascular accident) ([facility] ) [I63 9]  Acute left-sided weakness [R53 1]  LUISANA (acute kidney injury) ([facility] ) [N17 9]  Stroke-like symptom [R29 90]  [facility] [I63 9]    History reviewed  No pertinent past medical history  Length Of Stay: 2  PHYSICAL THERAPY EVALUATION :   Patient's identity confirmed via 2 patient identifiers (full name and ) at start of session       10/11/22 0916   PT Last Visit   PT Visit Date 10/11/22   Note Type   Note type Evaluation   Pain Assessment   Pain Assessment Tool 0-10   Pain Score No Pain   Restrictions/Precautions   Weight Bearing Precautions Per Order No   Other Precautions Cognitive; Chair Alarm; Bed Alarm; Fall Risk;Telemetry  (CIWA protocol)   Home Living   Type of [address] [place] [address] Two level; Able to live on main level with bedroom/bathroom;Stairs to enter with rails;Bed/bath upstairs  ([address])   Bathroom Shower/Tub Walk-in shower   Additional Comments Pt reports renting a room, was I prior, worked full time   Prior Function   Level of [place] Independent with ADLs   Lives With   (roommates)   Receives Help From [place])   IADLs   (- drives, gets a ride to work)   Falls in the last 6 months 0   Vocational Full time employment   General   Additional Pertinent History MRI of brain: 1  Stable acute to early subacute infarct in the right periventricular white matter, corona radiata and lentiform nuclei  No hemorrhagic conversion or mass effect  2   Acute lacunar infarct in the left thalamus     Family/Caregiver Present No   Cognition   Overall Cognitive Status Impaired   Arousal/Participation Alert   Attention Attends with cues to redirect   Orientation Level Oriented to person;Oriented to place;Oriented to time;Disoriented to situation   Memory Decreased recall of precautions;Decreased recall of recent events;Decreased short term memory   Following Commands Follows one step commands with increased time or repetition   Comments Pt w/ flat affect, notable dysarthria  Able to communicate wants/needs w/ increased time   LUE Assessment   LUE Assessment X   LUE Overall AROM   L Shoulder Flexion 0   LUE Overall PROM   L Shoulder Flexion WFL   L Mass Grasp pt unable to grasp   LUE Strength   LUE Overall Strength Deficits   L Shoulder Flexion 0/5   L Shoulder ABduction 0/5   L Elbow Flexion 1/5   L Elbow Extension 1/5   LUE Tone   LUE Tone Hypotonic  (flaccid)   RLE Assessment   RLE Assessment WFL   Strength RLE   RLE Overall Strength 4-/5   LLE Assessment   LLE Assessment X   Strength LLE   LLE Overall Strength 0/5   Tone LLE   LLE Tone Flaccid   Vision-Basic Assessment   Current Vision No visual deficits   Bed Mobility   Supine to Sit 2  Maximal assistance   Additional items Assist x 1; Increased time required;Verbal cues   Additional Comments Pt initially w/ L sided lean and mild pushing upon sitting EOB  With extensive cues and corrections, pt is able to achieve midline w/ min A x 1  Pt is able to sit EOB x 10   Transfers   Sit to Stand 3  Moderate assistance   Additional items Assist x 2; Increased time required;Verbal cues   Stand to Sit 3  Moderate assistance   Additional items Assist x 2; Increased time required;Verbal cues   Additional Comments Pt is able to perform STS w/ mod A x 2  Initially w/ mild L sided lean, but then improves   Pt is able to accept weight throught LLE with only 1 episode of mild to moderate buckling   Balance   Static Sitting Poor +   Static Standing Zero  (Ax2)   Activity Tolerance   Activity Tolerance Patient limited by fatigue   Medical Staff Kathy coordination w/ OT Ting   Nurse Made Aware Spoke to RN [facility]   Assessment   Problem List Decreased strength;Decreased range of motion; Impaired balance;Decreased endurance;Decreased mobility; Decreased cognition;Decreased safety awareness   Assessment Tiffany Rolon III is a 63 y o  Male who presents to [address] [place] [address] on 10/9/2022 from home w/ c/o L sided weakness and L facial droop and diagnosis of acute CVA, aspiration pneumonia  Orders for PT eval and treat received  Pt presents w/ comorbidities of ETOH abuse  At baseline, pt mobilizes independently w/ no AD, and reports 0 falls in the last 6 months  Upon evaluation, pt presents w/ the following deficits: weakness, decreased ROM, impaired coordination, impaired balance and decreased endurance  Upon eval, pt requires max A x 1 for bed mobility, mod A x 2 for transfers  Pt's clinical presentation is unstable/unpredictable due to need for assist w/ all phases of mobility when usually mobilizing independently, need for input for task focus and mobility technique and recent drastic decline in mobility compared to baseline  Patient is at an increased risk of falls due to physical and cognitive deficits  Given the above findings, discharge recommendation is for Post-acute inpatient rehabilitation  During this admission, pt would benefit from continued skilled inpatient PT in the acute care setting in order to address the abovementioned deficits to maximize function and mobility before DC from acute care  Goals   Patient Goals to get better   [facility] Expiration Date 10/21/22   Short Term Goal #1 Patient will:  Increase bilateral LE strength 1/2 grade to facilitate independent mobility, Perform all bed mobility tasks w/ min A x1 to decrease fall risk factors, Perform all transfers w/ mod A x1 to improve independence, Ambulate at least 20 ft  RW vs unilateral AD w/ mod A x1 w/o LOB, Increase all balance 1/2 grade to decrease risk for falls, Complete exercise program independently, Tolerate 3 hr OOB to faciliate upright tolerance and Tolerate seated at EOB at least 30 minutes w/ supervision while maintaining midline to facilitate functional task performance   Plan   Treatment/Interventions Functional transfer training;LE strengthening/ROM; Therapeutic exercise; Endurance training;Cognitive reorientation;Patient/family training;Equipment eval/education; Bed mobility;Gait training   PT Frequency 4-6x/wk   Recommendation   PT Discharge Recommendation Post acute rehabilitation services   Equipment Recommended   (will continue to assess)   AM-PAC Basic Mobility Inpatient   Turning in Bed Without Bedrails 2   Lying on Back to Sitting on Edge of Flat Bed 2   Moving Bed to Chair 1   Standing Up From Chair 1   Walk in Room 1   Climb 3-5 Stairs 1   Basic Mobility Inpatient Raw Score 8   Turning Head Towards Sound 3   Follow Simple Instructions 3   Low Function Basic Mobility Raw Score 14   Low Function Basic Mobility Standardized Score 22 01   Highest Level Of Mobility   -HL Goal 3: Sit at edge of bed   -HL Achieved 4: Move to chair/commode   Additional Treatment Session   Start Time 0927   End Time 0935   Treatment Assessment Pt seated EOB w/ min A x 1  Pt is agreeable to participate in PT intervention  Pt is mod A x 2 to perform STS from EOB again  Pt is able to stand w/ mod A x 2  Pt is then able to perform SPT to recliner chair (to pt's R) w/ max A x 2  Once OOB in recliner chair, provided education to pt re: having sling on when transferring to decrease risk for subuxation due to flaccidity   Additional Treatment Day 1   End of Consult   Patient Position at End of Consult Bedside chair;Bed/Chair alarm activated; All needs within reach     The patient's AM-PAC Basic Mobility Inpatient Short Form Raw Score is 8, Standardized Score is    A standardized score less than 38 32 (raw score of 16) suggests the patient may benefit from discharge to post-acute rehabilitation services which may not coincide with above PT recommendations   However please refer to therapist recommendation for discharge planning given other factors that may influence destination      Pt would benefit from skilled inpatient PT during this admission in order to facilitate progress towards goals to maximize functional independence    Estefanía Ordaz, PT, DPT

## 2022-10-11 NOTE — ASSESSMENT & PLAN NOTE
Assessment:  Pt had an elevated creatinine of 1 9 on admission  Recent Labs     10/09/22  1637   CREATININE 1 90*   EGFR 37     Estimated Creatinine Clearance: 34 2 mL/min (A) (by C-G formula based on SCr of 1 9 mg/dL (H))      Unsure of pt's baseline but creatinine upon presentation to ED in January 2022 was 0 53      Plan:  • IVF  • Follow on BMP  • F/u on Nephrology recommendations

## 2022-10-11 NOTE — ASSESSMENT & PLAN NOTE
CTA of head/neck    Result Date: 10/9/2022  Impression: 1  Lung segment critical (greater than 90%) stenosis in the proximal left cervical ICA  2   Atretic right posterior cerebral artery  No evidence of acute thrombus or large vessel occlusion of the major vessels of the [place]  Carotid Complex Study  Impression: R ICA <50% heterogeneous and irregular occlusion  L ICA >70% heterogeneous and irregular occlusion       Plan   - F/u outpatient on carotid duplex findings

## 2022-10-11 NOTE — PLAN OF CARE
Problem: PHYSICAL THERAPY ADULT  Goal: Performs mobility at highest level of function for planned discharge setting  See evaluation for individualized goals  Description: Treatment/Interventions: Functional transfer training, LE strengthening/ROM, Therapeutic exercise, Endurance training, Cognitive reorientation, Patient/family training, Equipment eval/education, Bed mobility, Gait training  Equipment Recommended:  (will continue to assess)       See flowsheet documentation for full assessment, interventions and recommendations  10/11/2022 1534 by Thelma Mchugh PT  Note:    Problem List: Decreased strength, Decreased range of motion, Impaired balance, Decreased endurance, Decreased mobility, Decreased cognition, Decreased safety awareness  Assessment: Eusebio Spain III is a 63 y o  Male who [facility] on 10/9/2022 from home w/ c/o L sided weakness and L facial droop and diagnosis of acute CVA, aspiration pneumonia  Orders for PT eval and treat received  Pt presents w/ comorbidities of ETOH abuse  At baseline, pt mobilizes independently w/ no AD, and reports 0 falls in the last 6 months  Upon evaluation, pt presents w/ the following deficits: weakness, decreased ROM, impaired coordination, impaired balance and decreased endurance  Upon eval, pt requires max A x 1 for bed mobility, mod A x 2 for transfers  Pt's clinical presentation is unstable/unpredictable due to need for assist w/ all phases of mobility when usually mobilizing independently, need for input for task focus and mobility technique and recent drastic decline in mobility compared to baseline  Patient is at an increased risk of falls due to physical and cognitive deficits  Given the above findings, discharge recommendation is for Post-acute inpatient rehabilitation   During this admission, pt would benefit from continued skilled inpatient PT in the acute care setting in order to address the abovementioned deficits to maximize function and mobility before DC from acute care  PT Discharge Recommendation: Post acute rehabilitation services    See flowsheet documentation for full assessment

## 2022-10-11 NOTE — OCCUPATIONAL THERAPY NOTE
Occupational Therapy Evaluation     Patient Name: Eusebio Spain III  Today's Date: 10/11/2022  Problem List  Principal Problem:    Acute CVA (cerebrovascular accident) [facility])  Active Problems:    Hyponatremia    Elevated serum creatinine    More than 50 percent stenosis of left internal carotid artery    Alcohol use disorder, mild, abuse    Aspiration into respiratory tract    Past Medical History  History reviewed  No pertinent past medical history  Past Surgical History  History reviewed  No pertinent surgical history  10/11/22 0933   OT Last Visit   OT Visit Date 10/11/22   Note Type   Note type Evaluation   Pain Assessment   Pain Assessment Tool 0-10   Pain Score No Pain   Restrictions/Precautions   Weight Bearing Precautions Per Order No   Other Precautions Cognitive; Chair Alarm; Bed Alarm; Fall Risk;Multiple lines;Telemetry  (left hemiplegia)   Home Living   Type of Home House  (rents room and bathroom)   Home Layout Two level;Performs ADLs on one level; Able to live on main level with bedroom/bathroom  ([address], flight to bed/bath)   Bathroom Shower/Tub Walk-in shower   Bathroom Toilet Standard   Bathroom Equipment   (no DME)   [address]   (no DME)   Additional Comments pt rents a room and has roommates, independent prior   Prior Function   Level of [place] Independent with ADLs; Independent with functional mobility; Independent with IADLS   Lives With Other (Comment)  (roommates)   Receives Help From Friend(s); Family   IADLs Independent with meal prep  (friend provides transport to work)   Falls in the last 6 months 0   Vocational Full time employment   Comments pt reports friends provide transport to work and Schedulicity, independent w/ no AD   Lifestyle   Autonomy per pt independent w/ ADLs, independent w/ functional transfers and mobility w/ no AD, independent w/ IADLs, working   Reciprocal Relationships friends and family   Service to Others works in plastics   Intrinsic Gratification watching tv   General   Additional Pertinent History pt is R hand dominant   Subjective   Subjective "I am okay"   ADL   Where Assessed Chair   Eating Assistance 4  Minimal Assistance   Grooming Assistance 4  Minimal Assistance   UB Bathing Assistance 3  Moderate Assistance   LB Bathing Assistance 2  Maximal Assistance   UB Dressing Assistance 3  Moderate Assistance   LB Dressing Assistance 2  Maximal [address]  2  Maximal Assistance   Functional Assistance 2  Maximal Assistance   Additional Comments cues to attend and assist L UE tasks   Bed Mobility   Supine to Sit 2  Maximal assistance   Additional items Assist x 1; Increased time required;Verbal cues;LE management;HOB elevated; Bedrails   Additional Comments increased time to complete, pt w/ lean to left side while seated initially and required min-mod assist to maintain and cues for upright posture and R UE support on bedrail   Transfers   Sit to Stand 3  Moderate assistance   Additional items Assist x 2; Increased time required;Verbal cues;Armrests; Bedrails   Stand to Sit 3  Moderate assistance   Additional items Assist x 2; Increased time required;Verbal cues;Armrests   Stand pivot 2  Maximal assistance   Additional items Assist x 2; Increased time required;Verbal cues;Armrests; Bedrails   Additional Comments cues for positioning and safety, knee buckling noted an significant Left side lean noted; provided w/ shoulder sling for transfers L UE and RN aware   Functional Mobility   Functional Mobility 2  Maximal assistance   Additional Comments assist x2 w/ hand held assist w/ left weakness and lean   Additional items Hand hold assistance   Balance   Static Sitting Poor +   Dynamic Sitting Poor   Static Standing Poor -   Activity Tolerance   Activity Tolerance Patient limited by fatigue;Treatment limited secondary to medical complications (Comment)   Medical Staff Made Aware PTSabra: Pt seen for co-session with skilled Physical therapist 2* clinically unstable presentation, medical complexity, new precautions, performance deficits/functional limitations, impaired cognition/safety awareness, limited activity tolerance and present impairments which are a regression from patient patient's baseline and impacting overall occupational performance   Nurse Made Aware Appropriate to see per RN, Chelo Lawton   RUE Assessment   RUE Assessment WFL  (4-/5)   LUE Assessment   LUE Assessment X  (no shoulder shrug; risk for subluxation, provided sling for transfers)   LUE Overall AROM   L Shoulder Flexion 0   L Mass Grasp 0   LUE Overall PROM   L Shoulder Flexion WFL   L Shoulder ABduction WFL   L Shoulder ADduction WFL   L Elbow Flexion WFL   L Elbow Extension WFL   L Elbow Supination WFL   L Elbow Pronation WFL   L Wrist Flexion WFL   LUE Strength   LUE Overall Strength Deficits   L Shoulder Flexion 0/5   L Shoulder ABduction 0/5   L Elbow Flexion 1/5   L Elbow Extension 1/5   LUE Tone   LUE Tone Hypotonic  (flaccid)   Hand Function   Gross Motor Coordination   (impaired L UE, R UE WFL)   Fine Motor Coordination   (impaired L UE)   Hand Function Comments positioned hand extended on pillow end of session   Sensation   Additional Comments able to detect light touch sensation L UE   Proprioception   Proprioception Partial deficits in the LUE   Vision-Basic Assessment   Current Vision No visual deficits   Vision - Complex Assessment   Ocular Range of Motion Intact   Tracking   (head movements w/ tracking and decreased ability to concentrate)   Acuity Able to read clock/calendar on wall without difficulty   Perception   Inattention/Neglect Cues to attend to left side of body   Cognition   Overall Cognitive Status Impaired   Arousal/Participation Responsive; Cooperative   Attention Attends with cues to redirect   Orientation Level Oriented to person;Oriented to place;Oriented to time  (not specifc date, initially stated nov for month then oct quickly after)   Memory Decreased recall of precautions;Decreased recall of recent events;Decreased short term memory   Following Commands Follows one step commands with increased time or repetition   Comments pt pleasant and cooperative, decreased safety awareness, flat affect, dysarthria noted   Assessment   Limitation Decreased ADL status; Decreased Safe judgement during ADL;Decreased UE strength;Decreased cognition;Decreased endurance;Decreased high-level ADLs; Decreased self-care trans; Non-func L UE;Decreased sensation;Decreased fine motor control;Decreased UE ROM   Prognosis Fair;Good   Assessment Pt is a 63 y o  male seen for OT evaluation s/p admit to [facility] on 10/9/2022 w/ Acute CVA (cerebrovascular accident) ([facility] [address] ), left UE and left LE weakness and facial droop  Comorbidities affecting pt's functional performance at time of assessment include: sepsis, hyponatremia, stenosis left internal carotid artery, h/o alcohol abuse, dysphagia, aspiration pneumonia  MRI brain: Stable acute to early subacute infarct in the right periventricular white matter, corona radiata and lentiform nuclei  No hemorrhagic conversion or mass effect  Personal factors affecting pt at time of IE include:steps to enter environment, limited home support, difficulty performing ADLS, difficulty performing IADLS , limited insight into deficits, flat affect and decreased initiation and engagement   Prior to admission, pt was per pt independent w/ ADLs, independent w/ functional transfers and mobility w/ no AD, independent w/ IADLs, working   Upon evaluation: Pt requires MAX assist x1 supine>sit bed mobility w/ increased time to complete, MOD assist x2 sit<>stand w/ knees blocked left side lean, MAX assist x2 SPT w/ hand held assist cues for sequencing and L lean noted, MAX assist LB ADLs, MOD assist UB ADLs, MIN assist grooming 2* the following deficits impacting occupational performance: flat affect, decreased strength and endurance, impaired balance w/ left lean noted while seated and instance w/ cues for upright posture, L UE and L LE hemiplegia, impaired visual tracking, dysarthria noted, increased processing time,fall risk, impaired safety awareness, impaired coordination L UE, impaired cognition, cues to attend to left side  Educated on self-assisted ROM for UEs and positioning of L UE on pillow w/ hand extended, pt receptive  Pt to benefit from continued skilled OT tx while in the hospital to address deficits as defined above and maximize level of functional independence w ADL's and functional mobility  Occupational Performance areas to address include: grooming, bathing/shower, toilet hygiene, dressing, health maintenance, functional mobility, clothing management, cleaning and meal prep  From OT standpoint, recommendation at time of d/c would be short term rehab  The patient's raw score on the AM-PAC Daily Activity inpatient short form is 13, standardized score is 32 03, less than 39 4  Patients at this level are likely to benefit from discharge to post-acute rehabilitation services  Please refer to the recommendation of the Occupational Therapist for safe discharge planning  Goals   Patient Goals "to get better"   LT Time Frame 10-14   Long Term Goal please see below goals   Plan   Treatment Interventions ADL retraining;Functional transfer training; Endurance training;Cognitive reorientation;Patient/family training;UE strengthening/ROM; Equipment evaluation/education; Compensatory technique education; Neuromuscular reeducation; Energy conservation; Activityengagement; Fine motor coordination activities   Goal Expiration Date 10/25/22   OT Frequency 3-5x/wk   Recommendation   Recommendation Physiatry Consult   OT Discharge Recommendation Post acute rehabilitation services   AM-[facility] Daily Activity Inpatient   Lower Body Dressing 2   Bathing 2   Toileting 1   Upper Body Dressing 2   Grooming 3   Eating 3   Daily Activity Raw Score 13   Daily Activity Standardized Score (Calc for Raw Score >=11) 32 03   AM-PAC Applied Cognition Inpatient   Following a Speech/Presentation 3   Understanding Ordinary Conversation 4   Taking Medications 3   Remembering Where Things Are Placed or Put Away 3   Remembering List of 4-5 Errands 3   Taking Care of Complicated Tasks 3   Applied Cognition Raw Score 19   Applied Cognition Standardized Score 39 77   Modified [place] Scale   Modified [place] Scale 4   End of Consult   Patient Position at End of Consult Bed/Chair alarm activated; Bedside chair   Nurse Communication Nurse aware of consult  (aware of sling L UE for transfers)     Occupational Therapy Goals to be met in 10-14 days:  1) Pt will improve activity tolerance to G for 30 min txment sessions to enhance ADLs  2) Pt will complete ADLs/self care w/ supervision w/ one handed dressing techniques  3) Pt will complete toileting w/ min  w/ G hygiene/thoroughness using DME PRN  4) Pt will improve functional transfers on/off all surfaces using DME PRN w/ G balance/safety including toileting w/ min assist  5) Pt will improve fx'l mobility during I/ADl/leisure tasks using DME PRN w/ g balance/safety w/ min assist  6) Pt will engage in ongoing cognitive assessment w/ G participation to A w/ safe d/c planning/recommendations  7) Pt will demonstrate G carryover of pt/caregiver education and training as appropriate w/ mod I  w/ G tolerance  8) Pt will engage in depression screen/leisure interest checklist w/ G participation to monitor s/s depression and ID 3 positive coping strategies to A w/ emotional regulation and management  9) Pt will demonstrate 100% carryover of E C  techniques w/ mod I t/o fx'l I/ADL/leisure tasks w/o cues s/p skilled education  10) Pt will tolerate bed mobility and EOB seated tasks w/ min A for 30 mins to engage in fx'l I/ADL/leisure tasks w/ min A w/ min cues  11) Pt will engage in activity configuration activity w/ G participation and mod I to increase time management skills and improve participation in a structured routine to improve overall quality of life  12) Pt will demonstrate improved standing tolerance to 3-5 minutes during functional tasks w/ Fair balance to enhance ADL performance  13) Pt will demonstrate improved L UE strength by 1 MMT grade to enhance ADLS and functional transfers  14) Pt will engage in ongoing  assessments, screens, and activities t/o functional tasks w/ good participation to assist w/ adaptation and accommodations or rule out visual perceptual impairments    Documentation completed by: Marie Sapp, MS, OTR/L

## 2022-10-11 NOTE — PROGRESS NOTES
[facility]  Progress Note - 2005 [facility] 1960, 63 y o  male MRN: [id number]  Unit/Bed#: S -01 Encounter: [id number]  Primary Care Provider: No primary care provider on file  Date and time admitted to hospital: 10/9/2022  3:59 PM    * Acute CVA (cerebrovascular accident) [facility])  Assessment & Plan  Episode of acute left-sided upper extremity and lower extremity weakness along with left-sided lower facial droop and dysarthria with onset this a m  (10/9)  Patient says he does not remember having these symptoms prior to falling asleep last night  BP on presentation 161/109, max HR and RR in  and 24 respectively  EKG showed Sinus tachycardia  Right atrial enlargement  Nonspecific ST abnormality  Patient did not get tPa since he was outside the window  CT stroke alert brain    Result Date: 10/9/2022  Impression: Acute subcortical infarct in the right periventricular white matter, corona radiata and lentiform nuclei  No hemorrhagic conversion or mass effect  Findings were directly discussed with Marquez Betts at 4:39 PM  Workstation performed: [id number]     CTA stroke alert (head/neck)    Result Date: 10/9/2022  Impression: 1  Lung segment critical (greater than 90%) stenosis in the proximal left cervical ICA  2   Atretic right posterior cerebral artery  No evidence of acute thrombus or large vessel occlusion of the major vessels of the [place]  3  Evidence of aspiration or pneumonia in the partially visualized upper lungs, most prominent in the left upper lobe  Findings were directly discussed with Marquez Betts at 4:39 PM  Workstation performed: [id number]     MRI brain:   1  Stable acute to early subacute infarct in the right periventricular white matter, corona radiata and lentiform nuclei  No hemorrhagic conversion or mass effect    2   Acute lacunar infarct in the left thalamus        Chest XR  · Ill-defined opacity in the left upper lung which corresponds with bronchiectasis and tree-in-bud nodularity on the CTA neck, which could be due to aspiration versus bronchiolitis  ECHO   · Moderate concentric hypertrophy  EF 55%  Mild tricuspid regurgitation  Trivial fibrinous pericardial effusion  Pulmonary artery pressure mildly elevated to 37 0 mmHg  · NIHSS score: 8    Plan   - Allow for permissive hypertension with -200 for 48 hours (until 10/11 at 16:00), tx only if SBP > 200  - dysphagia diet per speech  - continue DAPT (aspirin and clopidogrel) for 21 days then just aspirin  - continue atorvastatin 40mg   - f/u on carotid duplex outpatient per vascular surgery    - neuro checks  - provide stroke education  - Will follow up with neurovascular in 4-6 weeks  - acute rehab treatment after discharge       Aspiration into respiratory tract  Assessment & Plan  CTA showed aspiration pneumonia in the upper lung fields L more than R  Leukocytosis to 18 20 and procalcitonin elevated to 0 88 but afebrile since admission and no subjective fever  Rhonchi have improved over the course of admission      -continue ceftriaxone 1g q24   - nebulizer treatments   - PFTs outpatient for suspected chronic lung disease     Hyponatremia  Assessment & Plan  Recent Labs     10/09/22  1637   SODIUM 119*     No results found for: OSMOUA, NAUR, OSMOLALITSER    AM cortisol increased at 41 0     124 his latest sodium level is at noon on 10/11    Plan:  · BMP q6  · Urine sodium low indicating volume depletion  ? Continue isotonic saline 50 mL/hour and monitor closely  ? Avoid over-correction, recommend 4-6 point increase over 24 hours  ? Don't increase sodium to >131 per nephrology  · Avoid over-correction to prevent osmotic demyelination syndrome  · F/u on renal US    More than 50 percent stenosis of left internal carotid artery  Assessment & Plan  CTA of head/neck    Result Date: 10/9/2022  Impression: 1    Lung segment critical (greater than 90%) stenosis in the proximal left cervical ICA  2   Atretic right posterior cerebral artery  No evidence of acute thrombus or large vessel occlusion of the major vessels of the [place]  Carotid Complex Study  Impression: R ICA <50% heterogeneous and irregular occlusion  L ICA >70% heterogeneous and irregular occlusion  Plan   - F/u outpatient on carotid duplex findings    Alcohol use disorder, mild, abuse  Assessment & Plan  Patient endorses 4-6 drinks per day consisting of 5% alcohol  -CIWA protocol     Elevated serum creatinine  Assessment & Plan  Assessment:  Pt had an elevated creatinine of 1 9 on admission  Recent Labs     10/09/22  1637   CREATININE 1 90*   EGFR 37     Estimated Creatinine Clearance: 34 2 mL/min (A) (by C-G formula based on SCr of 1 9 mg/dL (H))  Unsure of pt's baseline but creatinine upon presentation to ED in January 2022 was 0 53      Plan:  • IVF  • Follow on BMP  • F/u on Nephrology recommendations        VTE Pharmacologic Prophylaxis: VTE Score: 7 High Risk (Score >/= 5) - Pharmacological DVT Prophylaxis Ordered: heparin  Sequential Compression Devices Ordered  Patient Centered Rounds: I performed bedside rounds with nursing staff today  Discussions with Specialists or Other Care Team Provider: Occupational Therapy, Case management, neurology, vascular    Education and Discussions with Family / Patient: Patient declined call to   Current Length of Stay: 2 day(s)  Current Patient Status: Inpatient   Discharge Plan: Anticipate discharge in 48-72 hrs to rehab facility  Code Status: Level 1 - Full Code    Subjective:   Patient says he slept well overnight without complaints  Per nursing he got agitated in the evening and wanted to leave but then calmed down  Patient denies fever, SOB, chest pain, diarrhea, constipation, N/V  When asked who he would trust to make his medical conditions, he replied his friend Ed  He does not have children or speak to his family   His neighbor was visiting before rounding  Objective:     Vitals:   Temp (24hrs), Av 7 °F (36 5 °C), Min:97 7 °F (36 5 °C), Max:97 7 °F (36 5 °C)    Temp:  [97 7 °F (36 5 °C)] 97 7 °F (36 5 °C)  HR:  [102-106] 104  Resp:  [18-20] 20  BP: (127-149)/(89-98) 127/89  SpO2:  [95 %-100 %] 95 %  Body mass index is 17 71 kg/m²  Input and Output Summary (last 24 hours): Intake/Output Summary (Last 24 hours) at 10/11/2022 1420  Last data filed at 10/11/2022 1100  Gross per 24 hour   Intake 180 ml   Output 330 ml   Net -150 ml       Physical Exam:   Physical Exam  Constitutional:       General: He is awake  He is not in acute distress  Comments: disheveled    HENT:      Mouth/Throat:      Lips: Pink  No lesions  Mouth: Mucous membranes are dry  Dentition: Abnormal dentition  Eyes:      General: Lids are normal  Gaze aligned appropriately  Cardiovascular:      Rate and Rhythm: Normal rate and regular rhythm  Pulses:           Radial pulses are 2+ on the right side and 2+ on the left side  Dorsalis pedis pulses are 2+ on the right side and 2+ on the left side  Heart sounds: S1 normal and S2 normal  Heart sounds are distant  Comments: Heart sounds difficult to appreciate due to rhonchi and wheezing   Pulmonary:      Breath sounds: Examination of the right-upper field reveals wheezing and rhonchi  Examination of the left-upper field reveals wheezing and rhonchi  Examination of the right-middle field reveals wheezing and rhonchi  Examination of the left-middle field reveals wheezing and rhonchi  Examination of the right-lower field reveals wheezing and rhonchi  Examination of the left-lower field reveals wheezing and rhonchi  Wheezing and rhonchi present  Comments: Belly breathing was observed, patient strained especially on expiration  Rhonchi improved from yesterday, abnormal breath sounds now heard primarily on expiration    Abdominal:      General: Abdomen is flat  Bowel sounds are normal       Palpations: Abdomen is soft  Tenderness: There is no abdominal tenderness  Musculoskeletal:      Cervical back: Full passive range of motion without pain and normal range of motion  Right lower leg: No edema  Left lower leg: No edema  Feet:      Right foot:      Skin integrity: Dry skin present  Toenail Condition: Fungal disease present  Left foot:      Skin integrity: Dry skin present  Toenail Condition: Fungal disease present  Skin:            Comments: Bilateral forearm ecchymoses    Neurological:      Mental Status: He is alert  Cranial Nerves: Cranial nerve deficit, dysarthria and facial asymmetry present  Sensory: Sensation is intact  Motor: Weakness present  Comments: 0/5 strength on the left side   5/5 strength on the right side   L sided facial droop at the level of the mouth   Upper face movement normal bilaterally  More oriented than yesterday but still only speaks in short sentences with dysarthric speech  Ability to follow commands improved from yesterday    Psychiatric:         Behavior: Behavior is cooperative            Additional Data:     Labs:  Results from last 7 days   Lab Units 10/11/22  0610   WBC Thousand/uL 9 64   HEMOGLOBIN g/dL 12 3   HEMATOCRIT % 35 8*   PLATELETS Thousands/uL 106*   NEUTROS PCT % 84*   LYMPHS PCT % 8*   MONOS PCT % 7   EOS PCT % 0     Results from last 7 days   Lab Units 10/11/22  0602   SODIUM mmol/L 125*   POTASSIUM mmol/L 3 9   CHLORIDE mmol/L 94*   CO2 mmol/L 20*   BUN mg/dL 25   CREATININE mg/dL 1 26   ANION GAP mmol/L 11   CALCIUM mg/dL 8 2*   ALBUMIN g/dL 2 7*   TOTAL BILIRUBIN mg/dL 0 58   ALK PHOS U/L 91   ALT U/L 4*   AST U/L 12*   GLUCOSE RANDOM mg/dL 87     Results from last 7 days   Lab Units 10/09/22  1637   INR  1 08     Results from last 7 days   Lab Units 10/11/22  0637   POC GLUCOSE mg/dl 93     Results from last 7 days   Lab Units 10/10/22  0810   HEMOGLOBIN A1C % 5 0 Results from last 7 days   Lab Units 10/09/22  2358 10/09/22  2251 10/09/22  1740 10/09/22  1637   LACTIC ACID mmol/L 1 8 1 8 2 8*  --    PROCALCITONIN ng/ml  --   --   --  0 88*       Lines/Drains:  Invasive Devices  Report    Peripheral Intravenous Line  Duration           Peripheral IV 10/09/22 Right Arm 1 day                  Telemetry:  Telemetry Orders (From admission, onward)             48 Hour Telemetry Monitoring  Continuous x 48 hours        Expiring   References:    Telemetry Guidelines   Question:  Reason for 48 Hour Telemetry  Answer:  Acute CVA (<24 hrs old, hemispheric strokes, selected brainstem strokes, cardiac arrhythmias)                 Telemetry Reviewed: Normal Sinus Rhythm  Indication for Continued Telemetry Use: Acute CVA             Imaging: Reviewed radiology reports from this admission including: chest xray, MRI brain, ECHO and CTA    Recent Cultures (last 7 days):   Results from last 7 days   Lab Units 10/09/22  1753 10/09/22  1752   BLOOD CULTURE  No Growth at 24 hrs  No Growth at 24 hrs         Last 24 Hours Medication List:   Current Facility-Administered Medications   Medication Dose Route Frequency Provider Last Rate   • aspirin  81 mg Oral Daily Fallon Davis PA-C     • aspirin  325 mg Oral Once Chico Howard MD     • atorvastatin  40 mg Oral Daily With [facility] Gurinder Walker DO     • cefTRIAXone  1,000 mg Intravenous Q24H Jodee Salas MD 1,000 mg (10/11/22 0117)   • clopidogrel  300 mg Oral Once Fallon Davis PA-C     • clopidogrel  75 mg Oral Daily Fallon Davis PA-C     • heparin (porcine)  5,000 Units Subcutaneous Q8H [facility] Jodee Salas MD     • ipratropium  0 5 mg Nebulization TID Khushboo Zuñiga MD     • levalbuterol  1 25 mg Nebulization TID Khushboo Zuñiga MD     • magnesium sulfate  2 g Intravenous Once David Gonzalez DO     • sodium chloride  50 mL/hr Intravenous Continuous David Gonzalez DO          Today, Patient Was Seen By: Zach Guerra, Dr Pedro Malik, Dr Edith Whitehead    **Please Note: This note may have been constructed using a voice recognition system  **

## 2022-10-11 NOTE — PROGRESS NOTES
[address] 63 y o  male MRN: [id number]  Unit/Bed#: S -01 Encounter: [id number]  Reason for Consult: Hyponatremia, LUISANA    ASSESSMENT and PLAN:  1  Hyponatremia  · Admission Na of 119 on 10/9/22  4:30 pm    · Work up: SOsm 265, UOsm 369, Nikolas <10, uric acid 8 0, cortisol 41 0, TSH 4 36    · Etiology is suspected to be volume depletion  · Treated with NS bolus on admission x 1 L and placed on NS at 50 cc/hr  · Na has risen slowly and appropriately - up to 126 last night and 125 this AM    · Continue NS at 50 cc/hr  · Continue BMP q6H - due 12 noon today  · Goal for Na tomorrow at 6 am is not > 131       2  Acute kidney injury (POA)  · Baseline creatinine is normal    · SCr on admission 1 90 on 10/9/22  · Etiology is felt to be volume depletion - however, at risk for contrast nephropathy due to dye exposure  · Fortunately, renal function better  · SCr down to 1 26    · UA bland  Renal US pending  3  Low bicarbonate:  · Stable CO2  · Monitor with IVF for now  4  Acute CVA: per SLIM  5  Alcohol use  6  Carotid artery stenosis    DISPOSITION:  · Continue NS at 50 cc/hr  · Continue BMP q6H    · Goal Na by 6 am tomorrow is not >131  SUBJECTIVE / 24H INTERVAL HISTORY:  Denies any CP or SOB  Na levels overnight were within goal    Currently on NS at 50 cc/hr   cc       OBJECTIVE:  Current Weight: Weight - Scale: 57 6 kg (127 lb)  Vitals:    10/10/22 2341 10/11/22 0256 10/11/22 0745 10/11/22 0831   BP: 140/98 131/91  127/89   BP Location: Right arm Right arm     Pulse: (!) 106 104     Resp: 18 18  20   Temp:       TempSrc:       SpO2: 96% 98% 99% 95%   Weight:       Height:           Intake/Output Summary (Last 24 hours) at 10/11/2022 1147  Last data filed at 10/10/2022 1501  Gross per 24 hour   Intake --   Output 330 ml   Net -330 ml     General: conscious, cooperative, no distress  Skin: dry  Eyes: pink conjunctivae  ENT: dry mucous membranes  Respiratory: equal chest expansion, bilateral ronchi  Cardiovascular: distinct heart sounds, normal rate, regular rhythm, no rub  Abdomen: soft, non tender, non distended, normal bowel sounds  Extremities: no edema  Genitourinary: no sanchez catheter  Neuro: awake, alert     Psych: flat    Medications:    Current Facility-Administered Medications:   •  aspirin (ECOTRIN LOW STRENGTH) EC tablet 81 mg, 81 mg, Oral, Daily, Ke Davis PA-C, 81 mg at 10/11/22 6278  •  aspirin tablet 325 mg, 325 mg, Oral, Once, Holger Serrano MD  •  atorvastatin (LIPITOR) tablet 40 mg, 40 mg, Oral, Daily With Dinner, TXU Nelly Coalson, DO, 40 mg at 10/10/22 1851  •  cefTRIAXone (ROCEPHIN) IVPB (premix in dextrose) 1,000 mg 50 mL, 1,000 mg, Intravenous, Q24H, Renea Gould MD, Last Rate: 100 mL/hr at 10/11/22 0117, 1,000 mg at 10/11/22 0117  •  clopidogrel (PLAVIX) tablet 300 mg, 300 mg, Oral, Once, Ke Davis PA-C  •  clopidogrel (PLAVIX) tablet 75 mg, 75 mg, Oral, Daily, Ke Davis PA-C, 75 mg at 10/11/22 0909  •  heparin (porcine) subcutaneous injection 5,000 Units, 5,000 Units, Subcutaneous, Q8H [facility], Reena Gould MD, 5,000 Units at 10/11/22 0605  •  ipratropium (ATROVENT) 0 02 % inhalation solution 0 5 mg, 0 5 mg, Nebulization, TID, Selam Henao MD, 0 5 mg at 10/11/22 0745  •  levalbuterol (XOPENEX) inhalation solution 1 25 mg, 1 25 mg, Nebulization, TID, Selam Henao MD, 1 25 mg at 10/11/22 0745    Laboratory Results:  Results from last 7 days   Lab Units 10/11/22  0610 10/11/22  0602 10/10/22  2259 10/10/22  1730 10/10/22  1247 10/10/22  0810 10/09/22  2358 10/09/22  2233 10/09/22  1637   WBC Thousand/uL 9 64  --   --   --   --  12 71*  --   --  18 20*   HEMOGLOBIN g/dL 12 3  --   --   --   --  12 6  --   --  12 1   HEMATOCRIT % 35 8*  --   --   --   --  36 3*  --   --  35 1*   PLATELETS Thousands/uL 106*  --   --   --   --  122*  --   --  114*   POTASSIUM mmol/L  --  3 9 3 9 4 1 3 9 4 0 4 2 4 3 4 5   CHLORIDE mmol/L  --  94* 92* 91* 91* 90* 87* 87* 86*   CO2 mmol/L  --  20* 21 20* 18* 19* 22 19* 21   BUN mg/dL  --  25 28* 28* 26* 25 23 23 20   CREATININE mg/dL  --  1 26 1 60* 1 75* 1 81* 1 86* 1 95* 1 93* 1 90*   CALCIUM mg/dL  --  8 2* 8 4 8 4 8 2* 8 3* 8 5 8 4 8 3*   MAGNESIUM mg/dL  --  1 7*  --   --   --   --  1 4*  --   --    PHOSPHORUS mg/dL  --  3 8  --   --   --   --  5 1*  --   --

## 2022-10-11 NOTE — PROCEDURES
Video Swallow Study      Patient Name: Peggy Timmons  RNIID'K Date: 10/11/2022        Past Medical History  History reviewed  No pertinent past medical history  Past Surgical History  History reviewed  No pertinent surgical history  General Information:    64 yo gentleman referred to [facility] [place]-  for a VBS by Dr Mahmood for dysphagia w/  c/o acute CVA  See bedside swallow eval completed 10/10/22 for further info  Cognition:  Alert, follows commands, dysarthric speech  Speech/Swallow Mech: [facility] ; Dentition was  Few natural teeth; Cough was strong  Respiratory Status: RA;   Current diet: puree diet w/ honey thick liquids by tsp  Prior VBS none  Pt was seen in radiology for a Video Barium Swallow Study, seated in the upright position and viewed laterally with the following consistencies: puree, honey thick liquids  Results are as follows:     **Images are available for review on PACS          Oral Stage:   pt w/ adequate bolus retrieval via spoon  Slow bolus manipulation and transfer with puree and honey thick liquids  Oral residue noted  Pharyngeal Stage:   swallow initiation was timely w/ complete laryngeal excursion, epiglottic inversion and pharyngeal constriction  However, puree was aspirated  Before and during the swallow inconsistently  Head turn left strategy appeared to eliminate the aspiration for 5/5 trials of puree  Penetration to VC noted w/ HTL, aspiration noted w/ HTL by tsp w/ head turn to L x 2/2  Esophageal Stage:   briefly assessed, suspect dysmotility due to slow bolus passage through the esophagus  Assessment Summary:   pt presents with  Mild oral/moderate-severe pharyngeal dysphagia with limited consistencies  Aspiration noted inconsistently w/ puree, eliminated with head turn to Left    Penetration to VC noted with honey thick liquids by tsp and aspiration w/  head turn to Left                 Recommendations:   Dysphagia 1 puree w/ pudding thick liquids  Head turn to Left when swallowing  meds crushed in puree  Aspiration precautions  Speech to cont to follow    April Pavel Ring MA CCC-SLP  Speech Pathologist  [place] license # [id number] [facility] [id number]  [place] license # [id number]  Available via [employer]

## 2022-10-12 PROBLEM — E44.1 MILD PROTEIN-CALORIE MALNUTRITION (HCC): Status: ACTIVE | Noted: 2022-10-12

## 2022-10-12 LAB
ANION GAP SERPL CALCULATED.3IONS-SCNC: 6 MMOL/L (ref 4–13)
BASE EX.OXY STD BLDV CALC-SCNC: 82.3 % (ref 60–80)
BASE EX.OXY STD BLDV CALC-SCNC: 82.3 % (ref 60–80)
BASE EXCESS BLDV CALC-SCNC: 1.4 MMOL/L
BASE EXCESS BLDV CALC-SCNC: 1.4 MMOL/L
BODY TEMPERATURE: 98.4 DEGREES FEHRENHEIT
BODY TEMPERATURE: 98.4 DEGREES FEHRENHEIT
BUN SERPL-MCNC: 20 MG/DL (ref 5–25)
BUN SERPL-MCNC: 20 MG/DL (ref 5–25)
BUN SERPL-MCNC: 22 MG/DL (ref 5–25)
BUN SERPL-MCNC: 22 MG/DL (ref 5–25)
CALCIUM SERPL-MCNC: 8 MG/DL (ref 8.4–10.2)
CALCIUM SERPL-MCNC: 8 MG/DL (ref 8.4–10.2)
CALCIUM SERPL-MCNC: 8.2 MG/DL (ref 8.4–10.2)
CALCIUM SERPL-MCNC: 8.2 MG/DL (ref 8.4–10.2)
CHLORIDE SERPL-SCNC: 100 MMOL/L (ref 96–108)
CHLORIDE SERPL-SCNC: 100 MMOL/L (ref 96–108)
CHLORIDE SERPL-SCNC: 99 MMOL/L (ref 96–108)
CHLORIDE SERPL-SCNC: 99 MMOL/L (ref 96–108)
CO2 SERPL-SCNC: 23 MMOL/L (ref 21–32)
CO2 SERPL-SCNC: 23 MMOL/L (ref 21–32)
CO2 SERPL-SCNC: 27 MMOL/L (ref 21–32)
CO2 SERPL-SCNC: 27 MMOL/L (ref 21–32)
CREAT SERPL-MCNC: 0.9 MG/DL (ref 0.6–1.3)
CREAT SERPL-MCNC: 0.9 MG/DL (ref 0.6–1.3)
CREAT SERPL-MCNC: 1 MG/DL (ref 0.6–1.3)
CREAT SERPL-MCNC: 1 MG/DL (ref 0.6–1.3)
ERYTHROCYTE [DISTWIDTH] IN BLOOD BY AUTOMATED COUNT: 14.8 % (ref 11.6–15.1)
ERYTHROCYTE [DISTWIDTH] IN BLOOD BY AUTOMATED COUNT: 14.8 % (ref 11.6–15.1)
GFR SERPL CREATININE-BSD FRML MDRD: 82 ML/MIN/1.73SQ M
GFR SERPL CREATININE-BSD FRML MDRD: 82 ML/MIN/1.73SQ M
GFR SERPL CREATININE-BSD FRML MDRD: 93 ML/MIN/1.73SQ M
GFR SERPL CREATININE-BSD FRML MDRD: 93 ML/MIN/1.73SQ M
GLUCOSE SERPL-MCNC: 136 MG/DL (ref 65–140)
GLUCOSE SERPL-MCNC: 136 MG/DL (ref 65–140)
GLUCOSE SERPL-MCNC: 138 MG/DL (ref 65–140)
GLUCOSE SERPL-MCNC: 138 MG/DL (ref 65–140)
HCO3 BLDV-SCNC: 25.7 MMOL/L (ref 24–30)
HCO3 BLDV-SCNC: 25.7 MMOL/L (ref 24–30)
HCT VFR BLD AUTO: 33 % (ref 36.5–49.3)
HCT VFR BLD AUTO: 33 % (ref 36.5–49.3)
HGB BLD-MCNC: 10.6 G/DL (ref 12–17)
HGB BLD-MCNC: 10.6 G/DL (ref 12–17)
MAGNESIUM SERPL-MCNC: 1.9 MG/DL (ref 1.9–2.7)
MAGNESIUM SERPL-MCNC: 1.9 MG/DL (ref 1.9–2.7)
MCH RBC QN AUTO: 29.7 PG (ref 26.8–34.3)
MCH RBC QN AUTO: 29.7 PG (ref 26.8–34.3)
MCHC RBC AUTO-ENTMCNC: 32.1 G/DL (ref 31.4–37.4)
MCHC RBC AUTO-ENTMCNC: 32.1 G/DL (ref 31.4–37.4)
MCV RBC AUTO: 92 FL (ref 82–98)
MCV RBC AUTO: 92 FL (ref 82–98)
NON VENT ROOM AIR: ABNORMAL %
NON VENT ROOM AIR: ABNORMAL %
O2 CT BLDV-SCNC: 12.5 ML/DL
O2 CT BLDV-SCNC: 12.5 ML/DL
PCO2 BLDV: 38.9 MM HG (ref 42–50)
PCO2 BLDV: 38.9 MM HG (ref 42–50)
PH BLDV: 7.44 [PH] (ref 7.3–7.4)
PH BLDV: 7.44 [PH] (ref 7.3–7.4)
PLATELET # BLD AUTO: 90 THOUSANDS/UL (ref 149–390)
PLATELET # BLD AUTO: 90 THOUSANDS/UL (ref 149–390)
PMV BLD AUTO: 9.3 FL (ref 8.9–12.7)
PMV BLD AUTO: 9.3 FL (ref 8.9–12.7)
PO2 BLDV: 50.9 MM HG (ref 35–45)
PO2 BLDV: 50.9 MM HG (ref 35–45)
POTASSIUM SERPL-SCNC: 3.6 MMOL/L (ref 3.5–5.3)
POTASSIUM SERPL-SCNC: 3.6 MMOL/L (ref 3.5–5.3)
POTASSIUM SERPL-SCNC: 3.8 MMOL/L (ref 3.5–5.3)
POTASSIUM SERPL-SCNC: 3.8 MMOL/L (ref 3.5–5.3)
RBC # BLD AUTO: 3.57 MILLION/UL (ref 3.88–5.62)
RBC # BLD AUTO: 3.57 MILLION/UL (ref 3.88–5.62)
SODIUM SERPL-SCNC: 128 MMOL/L (ref 135–147)
SODIUM SERPL-SCNC: 128 MMOL/L (ref 135–147)
SODIUM SERPL-SCNC: 133 MMOL/L (ref 135–147)
SODIUM SERPL-SCNC: 133 MMOL/L (ref 135–147)
WBC # BLD AUTO: 8.07 THOUSAND/UL (ref 4.31–10.16)
WBC # BLD AUTO: 8.07 THOUSAND/UL (ref 4.31–10.16)

## 2022-10-12 PROCEDURE — 99233 SBSQ HOSP IP/OBS HIGH 50: CPT | Performed by: INTERNAL MEDICINE

## 2022-10-12 PROCEDURE — 99232 SBSQ HOSP IP/OBS MODERATE 35: CPT | Performed by: INTERNAL MEDICINE

## 2022-10-12 PROCEDURE — 99253 IP/OBS CNSLTJ NEW/EST LOW 45: CPT | Performed by: NURSE PRACTITIONER

## 2022-10-12 PROCEDURE — 92526 ORAL FUNCTION THERAPY: CPT

## 2022-10-12 PROCEDURE — 80048 BASIC METABOLIC PNL TOTAL CA: CPT | Performed by: INTERNAL MEDICINE

## 2022-10-12 PROCEDURE — 82805 BLOOD GASES W/O2 SATURATION: CPT | Performed by: INTERNAL MEDICINE

## 2022-10-12 PROCEDURE — 83735 ASSAY OF MAGNESIUM: CPT | Performed by: INTERNAL MEDICINE

## 2022-10-12 PROCEDURE — 94760 N-INVAS EAR/PLS OXIMETRY 1: CPT

## 2022-10-12 PROCEDURE — 85027 COMPLETE CBC AUTOMATED: CPT | Performed by: INTERNAL MEDICINE

## 2022-10-12 RX ORDER — AMLODIPINE BESYLATE 2.5 MG/1
2.5 TABLET ORAL DAILY
Status: DISCONTINUED | OUTPATIENT
Start: 2022-10-12 | End: 2022-10-14

## 2022-10-12 RX ORDER — SODIUM CHLORIDE 9 MG/ML
50 INJECTION, SOLUTION INTRAVENOUS CONTINUOUS
Status: DISCONTINUED | OUTPATIENT
Start: 2022-10-12 | End: 2022-10-12

## 2022-10-12 RX ADMIN — HEPARIN SODIUM 5000 UNITS: 5000 INJECTION INTRAVENOUS; SUBCUTANEOUS at 15:00

## 2022-10-12 RX ADMIN — CEFTRIAXONE 1000 MG: 1 INJECTION, SOLUTION INTRAVENOUS at 00:10

## 2022-10-12 RX ADMIN — CLOPIDOGREL BISULFATE 75 MG: 75 TABLET ORAL at 09:22

## 2022-10-12 RX ADMIN — SODIUM CHLORIDE 50 ML/HR: 0.9 INJECTION, SOLUTION INTRAVENOUS at 09:41

## 2022-10-12 RX ADMIN — DESMOPRESSIN ACETATE 40 MG: 0.2 TABLET ORAL at 17:16

## 2022-10-12 RX ADMIN — HEPARIN SODIUM 5000 UNITS: 5000 INJECTION INTRAVENOUS; SUBCUTANEOUS at 21:03

## 2022-10-12 RX ADMIN — AMLODIPINE BESYLATE 2.5 MG: 2.5 TABLET ORAL at 11:57

## 2022-10-12 RX ADMIN — CEFTRIAXONE 1000 MG: 1 INJECTION, SOLUTION INTRAVENOUS at 23:35

## 2022-10-12 RX ADMIN — ASPIRIN 81 MG: 81 TABLET, COATED ORAL at 09:21

## 2022-10-12 RX ADMIN — HEPARIN SODIUM 5000 UNITS: 5000 INJECTION INTRAVENOUS; SUBCUTANEOUS at 05:28

## 2022-10-12 NOTE — ASSESSMENT & PLAN NOTE
Malnutrition Findings:   Adult Malnutrition type: Social/enviromental  Adult Degree of Malnutrition: Malnutrition of mild degree  Malnutrition Characteristics: Muscle loss, Other (comment), Inadequate energy (BMI < 18 5)               360 Statement: Mild protein calorie malnutrition r/t inadequate intake as evidenced by inability to eat sufficient energy/protein to maintain a healthy weight, muscle loss present to clavicle, and BMI <18 5; currently being treated with nutritional supplements  BMI Findings:  Adult BMI Classifications: Underweight < 18 5        Body mass index is 17 71 kg/m²       · Nutritional supplement pudding ordered for each meal, feed with assistance and turn head to the left when swallowing  · To feeds via Keofed until PEG tube can be placed  · IR consult for PEG tube placement

## 2022-10-12 NOTE — PROGRESS NOTES
[facility]  Progress Note - 2005 [facility] 1960, 63 y o  male MRN: [id number]  Unit/Bed#: S -01 Encounter: [id number]  Primary Care Provider: No primary care provider on file  Date and time admitted to hospital: 10/9/2022  3:59 PM    * Acute CVA (cerebrovascular accident) [facility])  Assessment & Plan  Episode of acute left-sided upper extremity and lower extremity weakness along with left-sided lower facial droop and dysarthria with onset this a m  (10/9)  Patient says he does not remember having these symptoms prior to falling asleep last night  BP on presentation 161/109, max HR and RR in  and 24 respectively  EKG showed Sinus tachycardia  Right atrial enlargement  Nonspecific ST abnormality  Patient did not get tPa since he was outside the window  CT stroke alert brain  Impression: Acute subcortical infarct in the right periventricular white matter, corona radiata and lentiform nuclei  No hemorrhagic conversion or mass effect  Findings were directly discussed with Leigh Mejia at 4:39 PM     CTA stroke alert (head/neck)  Impression: 1  Lung segment critical (greater than 90%) stenosis in the proximal left cervical ICA  2   Atretic right posterior cerebral artery  No evidence of acute thrombus or large vessel occlusion of the major vessels of the [place]  3  Evidence of aspiration or pneumonia in the partially visualized upper lungs, most prominent in the left upper lobe  Findings were directly discussed with Leigh Mejia at 4:39 PM  Workstation performed:    MRI brain:   1  Stable acute to early subacute infarct in the right periventricular white matter, corona radiata and lentiform nuclei  No hemorrhagic conversion or mass effect  2   Acute lacunar infarct in the left thalamus  ECHO   · Moderate concentric hypertrophy  EF 55%  Mild tricuspid regurgitation  Trivial fibrinous pericardial effusion   Pulmonary artery pressure mildly elevated to 37 0 mmHg  · NIHSS score: 8    Plan   - amlodipine 2 5mg   - dysphagia diet per speech  -PMR consulted  - continue DAPT (aspirin and clopidogrel) for 21 days then just aspirin  - continue atorvastatin 40mg    - neuro checks  - provide stroke education  - Will follow up with neurovascular regarding carotid duplex findings in 4-6 weeks  - acute rehab treatment after discharge       More than 50 percent stenosis of left internal carotid artery  Assessment & Plan  CTA of head/neck  Impression: 1  Lung segment critical (greater than 90%) stenosis in the proximal left cervical ICA  2   Atretic right posterior cerebral artery  No evidence of acute thrombus or large vessel occlusion of the major vessels of the [place]  Carotid Complex Study  Impression: R ICA <50% heterogeneous and irregular occlusion  L ICA >70% heterogeneous and irregular occlusion  Plan   - F/u with neurovascular in 4-6 weeks on carotid duplex findings    Aspiration into respiratory tract  Assessment & Plan  CTA showed aspiration pneumonia in the upper lung fields L more than R  Chest XR showed Ill-defined opacity in the left upper lung which corresponds with bronchiectasis and tree-in-bud nodularity on the CTA neck, which could be due to aspiration versus bronchiolitis  Leukocytosis to 18 20 and procalcitonin elevated to 0 88 but afebrile since admission and no subjective fever  Rhonchi have improved over the course of admission      -continue ceftriaxone day 4/7   - dysphagia diet   - nebulizer treatments   - PFTs outpatient for suspected chronic lung disease     Hyponatremia  Assessment & Plan  Recent Labs     10/09/22  1637   SODIUM 119*   Recent sodium 128    AM cortisol increased at 41 0     Renal US normal    128 his latest sodium level     Plan:  · BMP q6  · Urine sodium low indicating volume depletion  ? Continue isotonic saline 50 mL/hour and monitor closely  ?  Avoid over-correction, recommend 4-6 point increase over 24 hours  ? Don't increase sodium to >131 per nephrology  · Avoid over-correction to prevent osmotic demyelination syndrome    Mild protein-calorie malnutrition (HCC)  Assessment & Plan  Malnutrition Findings:   Adult Malnutrition type: Social/enviromental  Adult Degree of Malnutrition: Malnutrition of mild degree  Malnutrition Characteristics: Muscle loss, Other (comment), Inadequate energy (BMI < 18 5)               360 Statement: Mild protein calorie malnutrition r/t inadequate intake as evidenced by inability to eat sufficient energy/protein to maintain a healthy weight, muscle loss present to clavicle, and BMI <18 5; currently being treated with nutritional supplements  BMI Findings:  Adult BMI Classifications: Underweight < 18 5        Body mass index is 17 71 kg/m²  · Magnesium sulfate supplementation  · Nutritional supplement pudding ordered for each meal, feed with assistance and turn head to the left when swallowing  · Dysphagia diet: purees and nectar     Alcohol use disorder, mild, abuse  Assessment & Plan  Patient endorses 4-6 drinks per day consisting of 5% alcohol  -CIWA protocol     Elevated serum creatinine  Assessment & Plan  Assessment:  Pt had an elevated creatinine of 1 9 on admission  Recent Labs     10/09/22  1637   CREATININE 1 90*   EGFR 37     Renal US unremarkable     Estimated Creatinine Clearance: 34 2 mL/min (A) (by C-G formula based on SCr of 1 9 mg/dL (H))  Unsure of pt's baseline but creatinine upon presentation to ED in January 2022 was 0 53  Creatinine 10/12 is 1 0       Plan:  • IVF  • Follow on BMP        VTE Pharmacologic Prophylaxis: VTE Score: 7 High Risk (Score >/= 5) - Pharmacological DVT Prophylaxis Ordered: heparin  Sequential Compression Devices Ordered  Patient Centered Rounds: I performed bedside rounds with nursing staff today    Discussions with Specialists or Other Care Team Provider: Occupational Therapy, Case management, neurology, vascular    Education and Discussions with Family / Patient: Updated  (sister) via phone  Current Length of Stay: 3 day(s)  Current Patient Status: Inpatient   Discharge Plan: Anticipate discharge in 48-72 hrs to rehab facility  Code Status: Level 1 - Full Code    Subjective:   Patient slept well overnight  Patient denies fever, SOB, chest pain, diarrhea, constipation, N/V  Notes some pain in L arm, which he was laying on and dangling over the bed prior to the interview  Objective:     Vitals:   Temp (24hrs), Av 7 °F (36 5 °C), Min:97 5 °F (36 4 °C), Max:97 8 °F (36 6 °C)    Temp:  [97 5 °F (36 4 °C)-97 8 °F (36 6 °C)] 97 8 °F (36 6 °C)  HR:  [85-90] 90  Resp:  [20] 20  BP: (131-153)/(73-95) 148/81  SpO2:  [94 %-98 %] 94 %  Body mass index is 17 71 kg/m²  Input and Output Summary (last 24 hours): Intake/Output Summary (Last 24 hours) at 10/12/2022 1008  Last data filed at 10/12/2022 0400  Gross per 24 hour   Intake 180 ml   Output 250 ml   Net -70 ml       Physical Exam:   Physical Exam  Constitutional:       General: He is awake  He is not in acute distress  Comments: disheveled    HENT:      Mouth/Throat:      Lips: Pink  No lesions  Mouth: Mucous membranes are dry  Dentition: Abnormal dentition  Eyes:      General: Lids are normal  Gaze aligned appropriately  Cardiovascular:      Rate and Rhythm: Normal rate and regular rhythm  Pulses:           Radial pulses are 2+ on the right side and 2+ on the left side  Dorsalis pedis pulses are 2+ on the right side and 2+ on the left side  Heart sounds: S1 normal and S2 normal  Heart sounds are distant  Pulmonary:      Breath sounds: Examination of the right-upper field reveals rhonchi  Examination of the left-upper field reveals rhonchi  Examination of the right-middle field reveals rhonchi  Examination of the left-middle field reveals rhonchi  Examination of the right-lower field reveals rhonchi  Examination of the left-lower field reveals rhonchi  Rhonchi present  Comments: Breath sounds markedly improved from yesterday  Abdominal:      General: Abdomen is flat  Bowel sounds are normal       Palpations: Abdomen is soft  Tenderness: There is no abdominal tenderness  Musculoskeletal:      Cervical back: Full passive range of motion without pain and normal range of motion  Right lower leg: No edema  Left lower leg: No edema  Feet:      Right foot:      Skin integrity: Dry skin present  Toenail Condition: Fungal disease present  Left foot:      Skin integrity: Dry skin present  Toenail Condition: Fungal disease present  Skin:            Comments: Bilateral forearm ecchymoses    Neurological:      Mental Status: He is alert  Cranial Nerves: Cranial nerve deficit, dysarthria and facial asymmetry present  Sensory: Sensation is intact  Motor: Weakness present  Comments: 0/5 strength on the left side   5/5 strength on the right side   L sided facial droop at the level of the mouth   Upper face movement normal bilaterally  More oriented than yesterday but still only speaks in short sentences with dysarthric speech  Ability to follow commands improved from yesterday    Psychiatric:         Behavior: Behavior is cooperative            Additional Data:     Labs:  Results from last 7 days   Lab Units 10/12/22  0400 10/11/22  0610   WBC Thousand/uL 8 07 9 64   HEMOGLOBIN g/dL 10 6* 12 3   HEMATOCRIT % 33 0* 35 8*   PLATELETS Thousands/uL 90* 106*   NEUTROS PCT %  --  84*   LYMPHS PCT %  --  8*   MONOS PCT %  --  7   EOS PCT %  --  0     Results from last 7 days   Lab Units 10/12/22  0400 10/11/22  1340 10/11/22  0602   SODIUM mmol/L 128*   < > 125*   POTASSIUM mmol/L 3 6   < > 3 9   CHLORIDE mmol/L 99   < > 94*   CO2 mmol/L 23   < > 20*   BUN mg/dL 22   < > 25   CREATININE mg/dL 1 00   < > 1 26   ANION GAP mmol/L 6   < > 11   CALCIUM mg/dL 8 0*   < > 8 2*   ALBUMIN g/dL  --   --  2 7*   TOTAL BILIRUBIN mg/dL  --   --  0 58   ALK PHOS U/L  --   --  91   ALT U/L  --   --  4*   AST U/L  --   --  12*   GLUCOSE RANDOM mg/dL 138   < > 87    < > = values in this interval not displayed  Results from last 7 days   Lab Units 10/09/22  1637   INR  1 08     Results from last 7 days   Lab Units 10/11/22  0637   POC GLUCOSE mg/dl 93     Results from last 7 days   Lab Units 10/10/22  0810   HEMOGLOBIN A1C % 5 0     Results from last 7 days   Lab Units 10/09/22  2358 10/09/22  2251 10/09/22  1740 10/09/22  1637   LACTIC ACID mmol/L 1 8 1 8 2 8*  --    PROCALCITONIN ng/ml  --   --   --  0 88*       Lines/Drains:  Invasive Devices  Report    Peripheral Intravenous Line  Duration           Peripheral IV 10/09/22 Right Arm 2 days                  Telemetry:  Telemetry Orders (From admission, onward)             48 Hour Telemetry Monitoring  Continuous x 48 hours        Expiring   References:    Telemetry Guidelines   Question:  Reason for 48 Hour Telemetry  Answer:  Acute CVA (<24 hrs old, hemispheric strokes, selected brainstem strokes, cardiac arrhythmias)                 Telemetry Reviewed: Normal Sinus Rhythm  Indication for Continued Telemetry Use: Acute CVA             Imaging: Reviewed radiology reports from this admission including: chest xray, MRI brain, ECHO and CTA    Recent Cultures (last 7 days):   Results from last 7 days   Lab Units 10/09/22  1753 10/09/22  1752   BLOOD CULTURE  No Growth at 48 hrs  No Growth at 48 hrs         Last 24 Hours Medication List:   Current Facility-Administered Medications   Medication Dose Route Frequency Provider Last Rate   • amLODIPine  2 5 mg Oral Daily Cody Mahmood, DO     • aspirin  81 mg Oral Daily Fallon Davis PA-C     • atorvastatin  40 mg Oral Daily [facility]     • cefTRIAXone  1,000 mg Intravenous Q24H Jodee Salas MD 1,000 mg (10/12/22 0010)   • clopidogrel  75 mg Oral Daily Cesilia Lopez PA-C • heparin (porcine)  5,000 Units Subcutaneous [facility] Ghassan Welch MD     • ipratropium  0 5 mg Nebulization Q6H PRN Cr Dong MD     • levalbuterol  1 25 mg Nebulization Q6H PRN Cr Dong MD     • sodium chloride  50 mL/hr Intravenous Continuous Ke Lala Mahmood DO 50 mL/hr (10/12/22 0941)        Today, Patient Was Seen By: Dr Samuel Mims, Dr Opal Feliz    **Please Note: This note may have been constructed using a voice recognition system  **

## 2022-10-12 NOTE — PLAN OF CARE
Cont puree diet w/ pudding thick liquids by tsp  Meds crushed   Head turn to L when swallowing   Aspiration precautions

## 2022-10-12 NOTE — SPEECH THERAPY NOTE
Speech Language/Pathology    Speech/Language Pathology Progress Note    Patient Name: Shyla Be III  Today's Date: 10/12/2022         Subjective:  Pt seen for dysphagia tx at breakfast; pt on dysphagia puree w/ pudding thick liquids  Awake, alert  Cooperative  Objective:  Pt fed tsps of puree and pudding and needed reminders for head turn to L, also instructed to keep head turned to L while swallowing  Swallows were complete  Coughing noted x5 during meal for 3 servings of puree/pudding thick  MD in to see pt and dysphagia was discussed       Assessment:  Pt cont w/ aspiration risk w/ puree and pudding thick liquids     Plan/Recommendations:  Cont puree diet w/ pudding thick liquids by tsp  Head turn to Left w/ swallowing   Aspiration precautions  meds crushed  Full feed  Will cont to follow      Radha Gibbs CCC-SLP  Speech Pathologist  Available via  tiger text

## 2022-10-12 NOTE — ASSESSMENT & PLAN NOTE
Episode of acute left-sided upper extremity and lower extremity weakness along with left-sided lower facial droop and dysarthria with onset morning of 10/9  Patient says he does not remember having these symptoms prior to falling asleep last night  BP on presentation 161/109, max HR and RR in  and 24 respectively  Patient did not get tPa since he was outside the window  EKG showed Sinus tachycardia  Right atrial enlargement  Nonspecific ST abnormality  CT stroke alert brain  Impression: Acute subcortical infarct in the right periventricular white matter, corona radiata and lentiform nuclei  No hemorrhagic conversion or mass effect  Findings were directly discussed with Jerry Espinosa at 4:39 PM     CTA stroke alert (head/neck)  Impression: Lung segment critical (greater than 90%) stenosis in the proximal left cervical ICA  Atretic right posterior cerebral artery  No evidence of acute thrombus or large vessel occlusion of the major vessels of the [place]  MRI brain:   Stable acute to early subacute infarct in the right periventricular white matter, corona radiata and lentiform nuclei  No hemorrhagic conversion or mass effect  Acute lacunar infarct in the left thalamus  ECHO   · Moderate concentric hypertrophy  EF 55%  Mild tricuspid regurgitation  Trivial fibrinous pericardial effusion  Pulmonary artery pressure mildly elevated to 37 0 mmHg       · NIHSS score: 8    Plan   - Amlodipine 5mg   - NG tube placed yesterday however patient pulled it out due to discomfort  - Keofed tube placed today  - plan for IR PEG tube placement on Tuesday, will contact IR on Monday to confirm  - PMR suggests rehab  - holding aspirin and Plavix for PEG tube placement in 5 days  - continue atorvastatin 40mg    - neuro checks  - provide stroke education  - Will follow up with neurovascular regarding carotid duplex findings in 4-6 weeks  - acute rehab treatment after discharge

## 2022-10-12 NOTE — PLAN OF CARE
Problem: Potential for Falls  Goal: Patient will remain free of falls  Description: INTERVENTIONS:  - Educate patient/family on patient safety including physical limitations  - Instruct patient to call for assistance with activity   - Consult OT/PT to assist with strengthening/mobility   - Keep Call bell within reach  - Keep bed low and locked with side rails adjusted as appropriate  - Keep care items and personal belongings within reach  - Initiate and maintain comfort rounds  - Make Fall Risk Sign visible to staff  - Offer Toileting every  Hours, in advance of need  - Initiate/Maintain alarm  - Obtain necessary fall risk management equipment:   - Apply yellow socks and bracelet for high fall risk patients  - Consider moving patient to room near nurses station  Outcome: Progressing     Problem: MOBILITY - ADULT  Goal: Maintain or return to baseline ADL function  Description: INTERVENTIONS:  -  Assess patient's ability to carry out ADLs; assess patient's baseline for ADL function and identify physical deficits which impact ability to perform ADLs (bathing, care of mouth/teeth, toileting, grooming, dressing, etc )  - Assess/evaluate cause of self-care deficits   - Assess range of motion  - Assess patient's mobility; develop plan if impaired  - Assess patient's need for assistive devices and provide as appropriate  - Encourage maximum independence but intervene and supervise when necessary  - Involve family in performance of ADLs  - Assess for home care needs following discharge   - Consider OT consult to assist with ADL evaluation and planning for discharge  - Provide patient education as appropriate  Outcome: Progressing     Problem: Prexisting or High Potential for Compromised Skin Integrity  Goal: Skin integrity is maintained or improved  Description: INTERVENTIONS:  - Identify patients at risk for skin breakdown  - Assess and monitor skin integrity  - Assess and monitor nutrition and hydration status  - Monitor labs   - Assess for incontinence   - Turn and reposition patient  - Assist with mobility/ambulation  - Relieve pressure over bony prominences  - Avoid friction and shearing  - Provide appropriate hygiene as needed including keeping skin clean and dry  - Evaluate need for skin moisturizer/barrier cream  - Collaborate with interdisciplinary team   - Patient/family teaching  - Consider wound care consult   Outcome: Progressing     Problem: Nutrition/Hydration-ADULT  Goal: Nutrient/Hydration intake appropriate for improving, restoring or maintaining nutritional needs  Description: Monitor and assess patient's nutrition/hydration status for malnutrition  Collaborate with interdisciplinary team and initiate plan and interventions as ordered  Monitor patient's weight and dietary intake as ordered or per policy  Utilize nutrition screening tool and intervene as necessary  Determine patient's food preferences and provide high-protein, high-caloric foods as appropriate       INTERVENTIONS:  - Monitor oral intake, urinary output, labs, and treatment plans  - Assess nutrition and hydration status and recommend course of action  - Evaluate amount of meals eaten  - Assist patient with eating if necessary   - Allow adequate time for meals  - Recommend/ encourage appropriate diets, oral nutritional supplements, and vitamin/mineral supplements  - Order, calculate, and assess calorie counts as needed  - Recommend, monitor, and adjust tube feedings and TPN/PPN based on assessed needs  - Assess need for intravenous fluids  - Provide specific nutrition/hydration education as appropriate  - Include patient/family/caregiver in decisions related to nutrition  Outcome: Progressing

## 2022-10-12 NOTE — ASSESSMENT & PLAN NOTE
CTA showed aspiration pneumonia in the upper lung fields L more than R  Chest XR showed Ill-defined opacity in the left upper lung which corresponds with bronchiectasis and tree-in-bud nodularity on the CTA neck, which could be due to aspiration versus bronchiolitis  At admission Leukocytosis to 18 20 and procalcitonin elevated to 0 88 but afebrile over hospital stay  Rhonchi have improved over the course of admission       -ceftriaxone day 7/7   - Alayna Bey in place until PEG tube placement  - nebulizer treatments   - PFTs outpatient for suspected chronic lung disease

## 2022-10-12 NOTE — CASE MANAGEMENT
Case Management Discharge Planning Note    Patient name Odessa OSEGUERA /S -34 MRN [id number]  : 1960 Date 10/12/2022       Current Admission Date: 10/9/2022  Current Admission Diagnosis:Acute CVA (cerebrovascular accident) [facility])   Patient Active Problem List    Diagnosis Date Noted   • Mild protein-calorie malnutrition ([facility] [address] ) 10/12/2022   • Alcohol use disorder, mild, abuse 10/10/2022   • Aspiration into respiratory tract 10/10/2022   • Hyponatremia 10/09/2022   • Elevated serum creatinine 10/09/2022   • Acute CVA (cerebrovascular accident) ([facility] [address] ) 10/09/2022   • More than 50 percent stenosis of left internal carotid artery 10/09/2022      LOS (days): 3  Geometric Mean LOS (GMLOS) (days):   Days to GMLOS:     OBJECTIVE:  Risk of Unplanned Readmission Score: 12 42         Current admission status: Inpatient   Preferred Pharmacy: No Pharmacies Listed  Primary Care Provider: No primary care provider on file  Primary Insurance: BLUE CROSS  Secondary Insurance:     DISCHARGE DETAILS:    Discharge planning discussed with[de-identified] Patient  Freedom of Choice: Yes  Comments - Freedom of Choice: FOC discussed regarding therapy recs for IP rehab  Pt would like referrals sent to both Acute and STR, no current preference in facility at this time     Were Treatment Team discharge recommendations reviewed with patient/caregiver?: Yes  Did patient/caregiver verbalize understanding of patient care needs?: N/A- going to facility  Were patient/caregiver advised of the risks associated with not following Treatment Team discharge recommendations?: Yes    Contacts  Patient Contacts: Patient  Contact Method:  In Person  Reason/Outcome: Continuity of Care, Referral, Discharge Planning    Other Referral/Resources/Interventions Provided:  Interventions: Acute Rehab, Short Term Rehab  Referral Comments: CM placed blanket referrals to both Acute and STR, pt will need Lorenzo Zamudio    Treatment Team Recommendation: Acute Rehab, Short Term Rehab

## 2022-10-12 NOTE — ASSESSMENT & PLAN NOTE
Recent Labs     10/09/22  1637   CREATININE 1 90*   EGFR 37     Renal US unremarkable     POA Cr 1 9  Baseline creatinine 0 6  Resolved

## 2022-10-12 NOTE — PROGRESS NOTES
[address] III 63 y o  male MRN: [id number]  Unit/Bed#: S -01 Encounter: [id number]  Reason for Consult: Hyponatremia, LUISANA    ASSESSMENT and PLAN:  1  Hyponatremia  · Admission Na of 119 on 10/9/22  · Work up: SOsm 265, UOsm 369, Nikolas <10, uric acid 8 0, cortisol 41 0, TSH 4 36    · Etiology is suspected to be volume depletion  · Treated with NS bolus on admission x 1 L and placed on NS at 50 cc/hr  · Rise in Na has been on target  · Na up to 128 today  · BMP q6H has been stopped  · Recheck BMP at 2 PM    · Continue NS at 50 cc/hr  · Goal Na for tomorrow can be up to 136       2  Acute kidney injury (POA)  · Baseline creatinine is normal    · SCr on admission 1 90 on 10/9/22  · Etiology is felt to be volume depletion  · LUISANA is resolved  SCr 1 00 today  · UA bland  Renal US no hydronephrosis  3  Low bicarbonate:  · Resolved  4  HTN:  · Amlodipine 2 5 mg daily started  5  Acute CVA: per SLIM  6  Alcohol use  7  Carotid artery stenosis    DISPOSITION:  · Continue NS at 50 cc/hr  · Check BMP at 2 pm    · Goal Na for tomorrow can be up to 136  SUBJECTIVE / 24H INTERVAL HISTORY:  Denies any CP or SOB  No acute complaints  OBJECTIVE:  Current Weight: Weight - Scale: 57 6 kg (127 lb)  Vitals:    10/11/22 1951 10/11/22 2101 10/12/22 0743 10/12/22 1157   BP:  131/73 148/81 142/87   BP Location:  Left arm     Pulse:  90     Resp:  20 20    Temp:  97 7 °F (36 5 °C) 97 8 °F (36 6 °C)    TempSrc:  Oral     SpO2: 97% 98% 94%    Weight:       Height:           Intake/Output Summary (Last 24 hours) at 10/12/2022 1251  Last data filed at 10/12/2022 0400  Gross per 24 hour   Intake 0 ml   Output 250 ml   Net -250 ml     General: conscious, cooperative, no distress  Skin: dry  Eyes: pink conjunctivae  ENT: dry mucous membranes  Respiratory: equal chest expansion, bilateral wheezing     Cardiovascular: distinct heart sounds, normal rate, regular rhythm, no rub  Abdomen: soft, non tender, non distended, normal bowel sounds  Extremities: no edema  Genitourinary: no sanchez catheter  Neuro: awake, alert     Psych: flat    Medications:    Current Facility-Administered Medications:   •  amLODIPine (NORVASC) tablet 2 5 mg, 2 5 mg, Oral, Daily, Justina Maclachlan, DO, 2 5 mg at 10/12/22 1157  •  aspirin (ECOTRIN LOW STRENGTH) EC tablet 81 mg, 81 mg, Oral, Daily, [facility] PA[facility], 81 mg at 10/12/22 8115  •  atorvastatin (LIPITOR) tablet 40 mg, 40 mg, Oral, Daily With Dinner, TXU Nelly Coalson, DO, 40 mg at 10/11/22 1826  •  cefTRIAXone (ROCEPHIN) IVPB (premix in dextrose) 1,000 mg 50 mL, 1,000 mg, Intravenous, Q24H, Leonie Vail MD, Last Rate: 100 mL/hr at 10/12/22 0010, 1,000 mg at 10/12/22 0010  •  clopidogrel (PLAVIX) tablet 75 mg, 75 mg, Oral, Daily, [facility] Deputy Davis PA-C, 75 mg at 10/12/22 0937  •  heparin (porcine) subcutaneous injection 5,000 Units, 5,000 Units, Subcutaneous, Q8H [address], Leonie Vail MD, 5,000 Units at 10/12/22 0528  •  ipratropium (ATROVENT) 0 02 % inhalation solution 0 5 mg, 0 5 mg, Nebulization, Q6H PRN, Lu Sargent MD  •  levalbuterol (Jamilah Bold) inhalation solution 1 25 mg, 1 25 mg, Nebulization, Q6H PRN, Lu Sargent MD  •  sodium chloride 0 9 % infusion, 50 mL/hr, Intravenous, Continuous, Cody Mahmood DO, Last Rate: 50 mL/hr at 10/12/22 0941, 50 mL/hr at 10/12/22 0941    Laboratory Results:  Results from last 7 days   Lab Units 10/12/22  0400 10/11/22  1826 10/11/22  1340 10/11/22  0610 10/11/22  0602 10/10/22  2259 10/10/22  1730 10/10/22  1247 10/10/22  0810 10/09/22  2358 10/09/22  2233 10/09/22  1637   WBC Thousand/uL 8 07  --   --  9 64  --   --   --   --  12 71*  --   --  18 20*   HEMOGLOBIN g/dL 10 6*  --   --  12 3  --   --   --   --  12 6  --   --  12 1   HEMATOCRIT % 33 0*  --   --  35 8*  --   --   --   --  36 3*  --   --  35 1*   PLATELETS Thousands/uL 90*  --   --  106*  --   --   --   -- 122*  --   --  114*   POTASSIUM mmol/L 3 6 3 6 4 0  --  3 9 3 9 4 1 3 9 4 0 4 2   < > 4 5   CHLORIDE mmol/L 99 96 96  --  94* 92* 91* 91* 90* 87*   < > 86*   CO2 mmol/L 23 20* 18*  --  20* 21 20* 18* 19* 22   < > 21   BUN mg/dL 22 22 24  --  25 28* 28* 26* 25 23   < > 20   CREATININE mg/dL 1 00 1 02 1 22  --  1 26 1 60* 1 75* 1 81* 1 86* 1 95*   < > 1 90*   CALCIUM mg/dL 8 0* 8 2* 8 3*  --  8 2* 8 4 8 4 8 2* 8 3* 8 5   < > 8 3*   MAGNESIUM mg/dL 1 9  --   --   --  1 7*  --   --   --   --  1 4*  --   --    PHOSPHORUS mg/dL  --   --   --   --  3 8  --   --   --   --  5 1*  --   --     < > = values in this interval not displayed

## 2022-10-12 NOTE — ASSESSMENT & PLAN NOTE
Recent Labs     10/09/22  1637   SODIUM 119*     AM cortisol increased at 41 0   Renal US normal    Plan:  · Appreciate nephrology recommendations: Stop IVF, Stop free water flushes in the TF    · Daily BMPs to monitor

## 2022-10-12 NOTE — CONSULTS
PHYSICAL MEDICINE AND REHABILITATION CONSULT NOTE  Elvia Escobar III 63 y o  male MRN: [id number]  Unit/Bed#: S -01 Encounter: [id number]    Requested by (Physician/Service): Katya Finney MD  Reason for Consultation:  Assessment of rehabilitation needs    Assessment:  Rehabilitation Diagnosis:   • Right periventricular white matter subcortical infarct  • Left hemiplegia  • Left sided neglect   • Dysphagia   • Dysarthria   • Impaired mobility and self care  • Impaired cognition     Recommendations:  Rehabilitation Plan:  • Continue PT/OT (SLP) while on acute care  • At this time would recommend sub-acute inpatient rehabilitation as he will likely need a longer course of rehabilitation  • Covid-19 Testing: [facility] inpatient rehabilitation units require testing within 48 hours of all potential admissions at this time  *Re-testing is NOT required for patients recovering from COVID-19 infection if isolation has been discontinued per CDC criteria  Medical Co-morbidities Plan:  · Aspiration pneumonia   · Left ICA stenosis   · Hyponatremia   · ETOH use  · LUISANA  · DVT ppx: heparin and SCD       Thank you for this consultation  Do not hesitate to contact service with further questions  Kenneth Martinez  PM&R    History of Present Illness:  Elvia Escobar III is a 63 y o  male with a PMH of ETOH abuse who presented with acute onset of left upper and lower extremity weakness, left facial droop and dysarthria  He also reported darkened, erythematous macules on bilateral forearms that began about one month ago  CT of the head showed acute subcortical infarct w/o hemorrhage in the right periventricular white matter  CTA showed < 90% stenosis of the proximal left cervical ICA and evidence of aspiration or pneumonia  He was found to have elevated WBC, significantly hyponatremic with NA of 119, tachycardic and tachypnic  He given cefepime and vancomycin after blood cultures were drawn   Nephrology was consulted for hyponatremia and LUISANA which was felt to likely be due to volume depletion  He was given NS bolus and placed on continuous NS with BMP q 6 hours  MRI of the brain on 10/9/22 showed stable acute to early subacute infarct in the right periventricular white matter, corona radiata and lentiform nuclei  No hemorrhagic conversion or mass effect  Acute lacunar infarction in the left thalamus  He was placed on DAPT for stroke prevention for 21 days then transition to just ASA  He continues on ceftriaxone for aspiration pneumonia  He is to follow up with vascular for carotid stenosis as an outpatient  PM&R are consulted for rehabilitation recommendations  The patient was seen in his room  He states he has no movement in his left arm/leg  He denies numbness on the left  He does not appear to like the pudding thick liquids  He did have a VBS today and has a moist sounding cough  Review of Systems: 10 point ROS negative except for what is noted in HPI    Function:  Prior level of function and living situation: The patient lives in a two level home with 18 KATHLEEN  He lives in a rented room with roommates  He was independent and friend provide transport  He reports a neighbor may be able to help him at times  Current level of function:  Physical Therapy: Maximal assist for bed mobility, moderate assist x 2 for transfers   Occupational Therapy: Minimal assist for eating, grooming, moderate assist for UB bathing/dressing, maximal assist for LB bathing/dressing and toileting   Speech Therapy: Puree with pudding thick liquids       Physical Exam:  /81   Pulse 90   Temp 97 8 °F (36 6 °C)   Resp 20   Ht 5' 11" (1 803 m)   Wt 57 6 kg (127 lb)   SpO2 94%   BMI 17 71 kg/m²        Intake/Output Summary (Last 24 hours) at 10/12/2022 1033  Last data filed at 10/12/2022 0400  Gross per 24 hour   Intake 180 ml   Output 250 ml   Net -70 ml       Body mass index is 17 71 kg/m²        Physical Exam  Constitutional:       General: He is not in acute distress  Appearance: He is not toxic-appearing  HENT:      Head: Atraumatic  Comments: Left facial droop      Right Ear: External ear normal       Left Ear: External ear normal       Nose: Nose normal    Pulmonary:      Effort: No respiratory distress  Comments: Moist sounding cough   Abdominal:      General: There is no distension  Musculoskeletal:      Comments: LUE: 0/5 throughout   LLE: 0/5 throughout   RUE/RLE: 5/5 throughout    Skin:     Findings: Bruising present  Comments: Ecchymosis bilateral arms    Neurological:      Mental Status: He is alert  Comments: Oriented to person/place, left hemiplegia, dysphagia, significant dysarthria and left sided neglect           Social History:    Social History     Socioeconomic History   • Marital status: Single     Spouse name: None   • Number of children: None   • Years of education: None   • Highest education level: None   Occupational History   • None   Tobacco Use   • Smoking status: Current Every Day Smoker     Types: Cigarettes   • Smokeless tobacco: Never Used   Substance and Sexual Activity   • Alcohol use: Not Currently   • Drug use: None   • Sexual activity: None   Other Topics Concern   • None   Social History Narrative   • None     Social Determinants of Health     Financial Resource Strain: Not on file   Food Insecurity: No Food Insecurity   • Worried About Running Out of Food in the Last Year: Never true   • Ran Out of Food in the Last Year: Never true   Transportation Needs: No Transportation Needs   • Lack of Transportation (Medical): No   • Lack of Transportation (Non-Medical):  No   Physical Activity: Not on file   Stress: Not on file   Social Connections: Not on file   Intimate Partner Violence: Not on file   Housing Stability: Low Risk    • Unable to Pay for Housing in the Last Year: No   • Number of Places Lived in the Last Year: 1   • Unstable Housing in the Last Year: No        Family History:    History reviewed  No pertinent family history  Medications:     Current Facility-Administered Medications:   •  amLODIPine (NORVASC) tablet 2 5 mg, 2 5 mg, Oral, Daily, Sandeep Romano DO  •  aspirin (ECOTRIN LOW STRENGTH) EC tablet 81 mg, 81 mg, Oral, Daily, Lorna Davis PA-C, 81 mg at 10/12/22 3242  •  atorvastatin (LIPITOR) tablet 40 mg, 40 mg, Oral, Daily With Dinner, TAYLER Walker DO, 40 mg at 10/11/22 1826  •  cefTRIAXone (ROCEPHIN) IVPB (premix in dextrose) 1,000 mg 50 mL, 1,000 mg, Intravenous, Q24H, Brain Cruz MD, Last Rate: 100 mL/hr at 10/12/22 0010, 1,000 mg at 10/12/22 0010  •  clopidogrel (PLAVIX) tablet 75 mg, 75 mg, Oral, Daily, Lorna Davis PA-C, 75 mg at 10/12/22 8211  •  heparin (porcine) subcutaneous injection 5,000 Units, 5,000 Units, Subcutaneous, Q8H [facility], Brain Cruz MD, 5,000 Units at 10/12/22 0528  •  ipratropium (ATROVENT) 0 02 % inhalation solution 0 5 mg, 0 5 mg, Nebulization, Q6H PRN, Amina Pierre MD  •  levalbuterol (Yaritza Velma) inhalation solution 1 25 mg, 1 25 mg, Nebulization, Q6H PRN, Amina Pierre MD  •  sodium chloride 0 9 % infusion, 50 mL/hr, Intravenous, Continuous, Cody Walker DO, Last Rate: 50 mL/hr at 10/12/22 0941, 50 mL/hr at 10/12/22 0941    Past Medical History:     History reviewed  No pertinent past medical history  Past Surgical History:     History reviewed  No pertinent surgical history        Allergies:     No Known Allergies        LABORATORY RESULTS:      Lab Results   Component Value Date    HGB 10 6 (L) 10/12/2022    HCT 33 0 (L) 10/12/2022    WBC 8 07 10/12/2022     Lab Results   Component Value Date    BUN 22 10/12/2022    K 3 6 10/12/2022    CL 99 10/12/2022    CREATININE 1 00 10/12/2022     Lab Results   Component Value Date    PROTIME 14 2 10/09/2022    INR 1 08 10/09/2022        DIAGNOSTIC STUDIES: Reviewed  US kidney and bladder    Result Date: 10/11/2022  Impression: Normal  Workstation performed: [id number]

## 2022-10-12 NOTE — ASSESSMENT & PLAN NOTE
CTA of head/neck  Impression: 1  Lung segment critical (greater than 90%) stenosis in the proximal left cervical ICA  2   Atretic right posterior cerebral artery  No evidence of acute thrombus or large vessel occlusion of the major vessels of the [place]  Carotid Complex Study  Impression: R ICA <50% heterogeneous and irregular occlusion  L ICA >70% heterogeneous and irregular occlusion       Plan   - F/u with neurovascular in 4-6 weeks on carotid duplex findings

## 2022-10-13 LAB
ANION GAP SERPL CALCULATED.3IONS-SCNC: 5 MMOL/L (ref 4–13)
BUN SERPL-MCNC: 15 MG/DL (ref 5–25)
BUN SERPL-MCNC: 15 MG/DL (ref 5–25)
BUN SERPL-MCNC: 16 MG/DL (ref 5–25)
BUN SERPL-MCNC: 16 MG/DL (ref 5–25)
CALCIUM SERPL-MCNC: 8.1 MG/DL (ref 8.4–10.2)
CALCIUM SERPL-MCNC: 8.1 MG/DL (ref 8.4–10.2)
CALCIUM SERPL-MCNC: 8.2 MG/DL (ref 8.4–10.2)
CALCIUM SERPL-MCNC: 8.2 MG/DL (ref 8.4–10.2)
CHLORIDE SERPL-SCNC: 101 MMOL/L (ref 96–108)
CHLORIDE SERPL-SCNC: 101 MMOL/L (ref 96–108)
CHLORIDE SERPL-SCNC: 102 MMOL/L (ref 96–108)
CHLORIDE SERPL-SCNC: 102 MMOL/L (ref 96–108)
CO2 SERPL-SCNC: 26 MMOL/L (ref 21–32)
CO2 SERPL-SCNC: 26 MMOL/L (ref 21–32)
CO2 SERPL-SCNC: 27 MMOL/L (ref 21–32)
CO2 SERPL-SCNC: 27 MMOL/L (ref 21–32)
CREAT SERPL-MCNC: 0.68 MG/DL (ref 0.6–1.3)
CREAT SERPL-MCNC: 0.68 MG/DL (ref 0.6–1.3)
CREAT SERPL-MCNC: 0.71 MG/DL (ref 0.6–1.3)
CREAT SERPL-MCNC: 0.71 MG/DL (ref 0.6–1.3)
GFR SERPL CREATININE-BSD FRML MDRD: 102 ML/MIN/1.73SQ M
GFR SERPL CREATININE-BSD FRML MDRD: 102 ML/MIN/1.73SQ M
GFR SERPL CREATININE-BSD FRML MDRD: 104 ML/MIN/1.73SQ M
GFR SERPL CREATININE-BSD FRML MDRD: 104 ML/MIN/1.73SQ M
GLUCOSE SERPL-MCNC: 93 MG/DL (ref 65–140)
GLUCOSE SERPL-MCNC: 93 MG/DL (ref 65–140)
GLUCOSE SERPL-MCNC: 96 MG/DL (ref 65–140)
GLUCOSE SERPL-MCNC: 96 MG/DL (ref 65–140)
POTASSIUM SERPL-SCNC: 3.5 MMOL/L (ref 3.5–5.3)
POTASSIUM SERPL-SCNC: 3.5 MMOL/L (ref 3.5–5.3)
POTASSIUM SERPL-SCNC: 3.6 MMOL/L (ref 3.5–5.3)
POTASSIUM SERPL-SCNC: 3.6 MMOL/L (ref 3.5–5.3)
SODIUM SERPL-SCNC: 133 MMOL/L (ref 135–147)

## 2022-10-13 PROCEDURE — 97112 NEUROMUSCULAR REEDUCATION: CPT

## 2022-10-13 PROCEDURE — 97535 SELF CARE MNGMENT TRAINING: CPT

## 2022-10-13 PROCEDURE — 99232 SBSQ HOSP IP/OBS MODERATE 35: CPT | Performed by: INTERNAL MEDICINE

## 2022-10-13 PROCEDURE — 92526 ORAL FUNCTION THERAPY: CPT

## 2022-10-13 PROCEDURE — 80048 BASIC METABOLIC PNL TOTAL CA: CPT

## 2022-10-13 PROCEDURE — 80048 BASIC METABOLIC PNL TOTAL CA: CPT | Performed by: INTERNAL MEDICINE

## 2022-10-13 PROCEDURE — 99233 SBSQ HOSP IP/OBS HIGH 50: CPT | Performed by: INTERNAL MEDICINE

## 2022-10-13 RX ORDER — SODIUM CHLORIDE 9 MG/ML
75 INJECTION, SOLUTION INTRAVENOUS CONTINUOUS
Status: DISCONTINUED | OUTPATIENT
Start: 2022-10-13 | End: 2022-10-14

## 2022-10-13 RX ADMIN — SODIUM CHLORIDE 50 ML/HR: 0.9 INJECTION, SOLUTION INTRAVENOUS at 09:40

## 2022-10-13 RX ADMIN — CLOPIDOGREL BISULFATE 75 MG: 75 TABLET ORAL at 09:29

## 2022-10-13 RX ADMIN — HEPARIN SODIUM 5000 UNITS: 5000 INJECTION INTRAVENOUS; SUBCUTANEOUS at 20:45

## 2022-10-13 RX ADMIN — AMLODIPINE BESYLATE 2.5 MG: 2.5 TABLET ORAL at 09:29

## 2022-10-13 RX ADMIN — HEPARIN SODIUM 5000 UNITS: 5000 INJECTION INTRAVENOUS; SUBCUTANEOUS at 13:10

## 2022-10-13 RX ADMIN — CEFTRIAXONE 1000 MG: 1 INJECTION, SOLUTION INTRAVENOUS at 23:46

## 2022-10-13 RX ADMIN — SODIUM CHLORIDE 75 ML/HR: 0.9 INJECTION, SOLUTION INTRAVENOUS at 23:46

## 2022-10-13 RX ADMIN — HEPARIN SODIUM 5000 UNITS: 5000 INJECTION INTRAVENOUS; SUBCUTANEOUS at 05:11

## 2022-10-13 RX ADMIN — ASPIRIN 81 MG: 81 TABLET, COATED ORAL at 09:29

## 2022-10-13 NOTE — CASE MANAGEMENT
Case Management Discharge Planning Note    Patient name Jocelynn Verdugo  Location S /S -52 MRN [id number]  : 1960 Date 10/13/2022       Current Admission Date: 10/9/2022  Current Admission Diagnosis:Acute CVA (cerebrovascular accident) [facility])   Patient Active Problem List    Diagnosis Date Noted   • Mild protein-calorie malnutrition ([facility] [address] ) 10/12/2022   • Alcohol use disorder, mild, abuse 10/10/2022   • Aspiration into respiratory tract 10/10/2022   • Hyponatremia 10/09/2022   • Elevated serum creatinine 10/09/2022   • Acute CVA (cerebrovascular accident) ([facility] [address] ) 10/09/2022   • More than 50 percent stenosis of left internal carotid artery 10/09/2022      LOS (days): 4  Geometric Mean LOS (GMLOS) (days):   Days to GMLOS:     OBJECTIVE:  Risk of Unplanned Readmission Score: 12 44         Current admission status: Inpatient   Preferred Pharmacy: No Pharmacies Listed  Primary Care Provider: No primary care provider on file  Primary Insurance: BLUE CROSS  Secondary Insurance:     DISCHARGE DETAILS:    [facility] denied pt, recommending slower paced therapies at this time  CM received a call from [facility] (p: [phone]) accepting pt, able to offer a bed today pending auth and Covid swab  [address] reviewing patient per Remy Young  Contacts  Patient Contacts: Jim Viveros [facility])  Relationship to Patient[de-identified] Treatment Provider  Contact Method: Phone  Phone Number: [phone]  Reason/Outcome: Referral, Discharge Planning, Continuity of Care    Other Referral/Resources/Interventions Provided:  Interventions: Acute Rehab  Referral Comments: CM left  for Jim Viveros w/[facility] regarding referral placed    PT-RJ aware of request for updated notes, CM requested updated note sfrom OT-Gretta via TT     CM reached out to [facility] for an updated note today per request of Lisa Rogel via Remy Young

## 2022-10-13 NOTE — QUICK NOTE
[facility]  Neurology Quick Note - Shaggy Hernandez III 1960, 63 y o  male   MRN: [id number] Unit/Bed#: S -01 Encounter: [id number]    Neurology is asked to comment today on this 81-year-old gentleman who has recently had a right greater than left basal ganglia infarct seen earlier in the week by our service, who now has to have a PEG tube inserted as he clearly has dysphagia  Review of his MRI from October 9th demonstrates in small-vessel disease at baseline on hi flares  His diffusion images show bilateral basal ganglia infarcts the left is small and appears as subacute  His right is larger and clearly appears acute  I saw no hemorrhagic conversion me in on those films from the 9th  The patient since then has received 4 days of dual anti-platelet therapy  However because interventional radiology notes an increased risk of bleeding if his to be done tomorrow will hold his placement of his tube until Tuesday the 18th  We agree with Radiology to hold his Plavix the next several days, the 14th through the 18th  His cbc indicates that he has a somewhat low platelet count at 90 today compared with 106 yesterday  We will continue his low-dose baby aspirin, 81 mg daily unless they also feel that the aspirin should empirically be held  He is due for a platelet count tomorrow

## 2022-10-13 NOTE — PHYSICAL THERAPY NOTE
PHYSICAL THERAPY TREATMENT NOTE    Patient Name: Daniel Engel Date: 10/13/2022     10/13/22 1022   PT Last Visit   PT Visit Date 10/13/22   Pain Assessment   Pain Assessment Tool 0-10   Pain Score No Pain   Restrictions/Precautions   Braces or Orthoses Sling  (was applied to L UE in seated position on edge of bed)   Other Precautions Cognitive; Chair Alarm; Bed Alarm;Multiple lines; Fall Risk;Aspiration  (continuous pulse ox)   General   Chart Reviewed Yes   Additional Pertinent History room air resting pulse ox 98% and 73 BPM, active 94% and 81 BPM    Family/Caregiver Present No   Cognition   Arousal/Participation Cooperative   Attention Attends with cues to redirect   Orientation Level Oriented to person; Other (Comment)  (pt was identified w/ full name, birth date)   Following Commands Follows one step commands inconsistently   Subjective   Subjective pt seen supine in bed  agreed to participate in PT session after motiviation was provided  pt's speech was garbled and occasionally difficult to understand  input was needed for task focus and safety  Bed Mobility   Supine to Sit 2  Maximal assistance  (w/ 2nd person for safety)   Additional items Assist x 1;HOB elevated; Increased time required;Verbal cues;LE management  (for bedrail use, trunk/LE positioning, awareness of left side)   Additional Comments level of assist varied sitting edge of bed between supervision and modx1  pt was noted to have losses of balance to right  on commode pt needed consistent mod assist to maintain seated position as pt was leaning to left  Transfers   Sit to Stand 3  Moderate assistance   Additional items Assist x 2; Increased time required;Verbal cues  (for LE positioning, hand placement)   Stand to Sit 3  Moderate assistance   Additional items Increased time required  (for body positioning)   Stand pivot 3  Moderate assistance  (for edge of bed to bedside commode bilateral forward assist  bedside commode to chair completed front/back assistance  all  transfers completed to left side )   Additional items Assist x 2; Increased time required;Verbal cues  (for LE positioning, safety)   Ambulation/Elevation   Gait pattern Not appropriate  (pt was unable to advance LEs in standing position)   Assistive Device Other (Comment)  (hand hold assist  pt was unable to appropriately utilize assistive device )   Balance   Static Sitting Fair -  (to poor)   Static Standing Zero  (assist x2)   Ambulatory Zero   Activity Tolerance   Activity Tolerance Patient limited by fatigue   Nurse Made Aware spoke to Kaylen Barone OTRosa    Assessment   Problem List Decreased strength;Decreased range of motion; Impaired balance;Decreased endurance;Decreased mobility; Decreased cognition;Decreased safety awareness   Assessment pt shows improvement in mobility status w/ increased activity tolerance and decreased level of assistance needed to complete stand pivot transfer  pt continues to require assist x 1 to 2 for all phases of mobility and frequent cuing for mobility technique/safety (w/ poor carryover of education received)  continued inpatient PT is needed to improve strength and endurance  discharge recommendation is for inpatient rehab to maximize level of functional independence  Goals   Patient Goals go fishing  get better  [facility] Expiration Date 10/21/22   Short Term Goal #1 Patient will:  Increase bilateral LE strength 1/2 grade to facilitate independent mobility, Perform all bed mobility tasks w/ min A x1 to decrease fall risk factors, Perform all transfers w/ mod A x1 to improve independence, Ambulate at least 20 ft  RW vs unilateral AD w/ mod A x1 w/o LOB, Increase all balance 1/2 grade to decrease risk for falls, Complete exercise program independently, Tolerate 3 hr OOB to faciliate upright tolerance and Tolerate seated at EOB at least 30 minutes w/ supervision while maintaining midline to facilitate functional task performance   PT Treatment Day 2   Plan   Treatment/Interventions Functional transfer training;LE strengthening/ROM; Therapeutic exercise; Endurance training;Cognitive reorientation;Patient/family training;Equipment eval/education; Bed mobility;Gait training   Progress Progressing toward goals   PT Frequency 4-6x/wk   Recommendation   PT Discharge Recommendation Post acute rehabilitation services   AM-PAC Basic Mobility Inpatient   Turning in Bed Without Bedrails 2   Lying on Back to Sitting on Edge of Flat Bed 2   Moving Bed to Chair 1   Standing Up From Chair 1   Walk in Room 1   Climb 3-5 Stairs 1   Basic Mobility Inpatient Raw Score 8   Turning Head Towards Sound 3   Follow Simple Instructions 3   Low Function Basic Mobility Raw Score 14   Low Function Basic Mobility Standardized Score 22 01   Highest Level Of Mobility   -[facility] Goal 3: Sit at edge of bed   -[facility] Achieved 4: Move to chair/commode   End of Consult   Patient Position at End of Consult Bedside chair;Bed/Chair alarm activated; All needs within reach  (pillow placed under L UE to prevent shoulder subluxation)     The patient's AM-PAC Basic Mobility Inpatient Short Form Raw Score is 8  A Raw score of less than or equal to 16 suggests the patient may benefit from discharge to post-acute rehabilitation services  Please also refer to the recommendation of the Physical Therapist for safe discharge planning  Skilled inpatient PT recommended while in hospital to progress pt toward treatment goals  Pt requires PT/OT co-treat due to signficant assistance with mobility and cognitive-behavioral impairments      Althia Bernheim PT

## 2022-10-13 NOTE — PROGRESS NOTES
[address] 63 y o  male MRN: [id number]  Unit/Bed#: S -01 Encounter: [id number]  Reason for Consult: Hyponatremia, LUISANA    ASSESSMENT and PLAN:  1  Hyponatremia (hypoosmolar)  · Admission Na of 119 on 10/9/22  · Work up: SOsm 265, UOsm 369, Nikolas <10, uric acid 8 0, cortisol 41 0, TSH 4 36    · Etiology is suspected to be volume depletion  · Treated with NS bolus on admission x 1 L and placed on NS at 50 cc/hr - Na improved at an appropriate rate with NS at 50 cc/hr  · Na up to 133 yesterday afternoon and still at 133 this AM    · Will increase NS to 75 cc/hr       2  Acute kidney injury (POA)  · Baseline creatinine is normal    · SCr on admission 1 90 on 10/9/22  · Etiology is felt to be volume depletion  · LUISANA is resolved  SCr 0 68 today  · UA bland  Renal US no hydronephrosis  3  Hypertension  · Continue Amlodipine 2 5 mg daily  · BP is acceptable  4  Acute CVA: per SLIM  5  Alcohol use  6  Carotid artery stenosis    DISPOSITION:  · Increase NS to 75 cc/hr  · Check BMP in AM    · May be discharged from renal standpoint in the next 24 hours if Na continues to improve  SUBJECTIVE / 24H INTERVAL HISTORY:  No new acute issues  No CP or SOB  OBJECTIVE:  Current Weight: Weight - Scale: 57 6 kg (127 lb)  Vitals:    10/12/22 1527 10/12/22 2057 10/12/22 2146 10/13/22 0733   BP: 131/79  122/79 165/99   BP Location: Right arm      Pulse: 81  60    Resp: 17   18   Temp: 98 °F (36 7 °C)  98 5 °F (36 9 °C) 97 5 °F (36 4 °C)   TempSrc: Oral      SpO2: 98% 99% 100%    Weight:       Height:           Intake/Output Summary (Last 24 hours) at 10/13/2022 1239  Last data filed at 10/13/2022 0501  Gross per 24 hour   Intake 118 ml   Output 800 ml   Net -682 ml     General: conscious, cooperative, no distress  Skin: dry  Eyes: pink conjunctivae  ENT: moist mucous membranes  Respiratory: equal chest expansion, clear breath sounds    Cardiovascular: distinct heart sounds, normal rate, regular rhythm, no rub  Abdomen: soft, non tender, non distended, normal bowel sounds  Extremities: no edema  Genitourinary: no sanchez catheter  Neuro: awake, alert     Psych: flat    Medications:    Current Facility-Administered Medications:   •  amLODIPine (NORVASC) tablet 2 5 mg, 2 5 mg, Oral, Daily, David Gonzalez DO, 2 5 mg at 10/13/22 8030  •  aspirin (ECOTRIN LOW STRENGTH) EC tablet 81 mg, 81 mg, Oral, Daily, Marita Davis PA-C, 81 mg at 10/13/22 5040  •  atorvastatin (LIPITOR) tablet 40 mg, 40 mg, Oral, Daily With Dinner, TAYLER Walker DO, 40 mg at 10/12/22 1716  •  cefTRIAXone (ROCEPHIN) IVPB (premix in dextrose) 1,000 mg 50 mL, 1,000 mg, Intravenous, Q24H, Hiwot Marquez MD, Last Rate: 100 mL/hr at 10/12/22 2335, 1,000 mg at 10/12/22 2335  •  clopidogrel (PLAVIX) tablet 75 mg, 75 mg, Oral, Daily, Marita Davis PA-C, 75 mg at 10/13/22 9645  •  heparin (porcine) subcutaneous injection 5,000 Units, 5,000 Units, Subcutaneous, Q8H [address], Hiwot Marquez MD, 5,000 Units at 10/13/22 0511  •  ipratropium (ATROVENT) 0 02 % inhalation solution 0 5 mg, 0 5 mg, Nebulization, Q6H PRN, Jasmeet Magaña MD  •  levalbuterol (Dondra ) inhalation solution 1 25 mg, 1 25 mg, Nebulization, Q6H PRN, Jasmeet Magaña MD  •  sodium chloride 0 9 % infusion, 50 mL/hr, Intravenous, Continuous, Cody Walker DO, Last Rate: 50 mL/hr at 10/13/22 0940, 50 mL/hr at 10/13/22 0940    Laboratory Results:  Results from last 7 days   Lab Units 10/13/22  0445 10/12/22  1517 10/12/22  0400 10/11/22  1826 10/11/22  1340 10/11/22  0610 10/11/22  0602 10/10/22  2259 10/10/22  1247 10/10/22  0810 10/09/22  2358 10/09/22  2233 10/09/22  1637   WBC Thousand/uL  --   --  8 07  --   --  9 64  --   --   --  12 71*  --   --  18 20*   HEMOGLOBIN g/dL  --   --  10 6*  --   --  12 3  --   --   --  12 6  --   --  12 1   HEMATOCRIT %  --   --  33 0*  --   --  35 8*  --   --   --  36 3*  --   --  35 1* PLATELETS Thousands/uL  --   --  90*  --   --  106*  --   --   --  122*  --   --  114*   POTASSIUM mmol/L 3 6 3 8 3 6 3 6 4 0  --  3 9 3 9   < > 4 0 4 2   < > 4 5   CHLORIDE mmol/L 101 100 99 96 96  --  94* 92*   < > 90* 87*   < > 86*   CO2 mmol/L 27 27 23 20* 18*  --  20* 21   < > 19* 22   < > 21   BUN mg/dL 15 20 22 22 24  --  25 28*   < > 25 23   < > 20   CREATININE mg/dL 0 68 0 90 1 00 1 02 1 22  --  1 26 1 60*   < > 1 86* 1 95*   < > 1 90*   CALCIUM mg/dL 8 2* 8 2* 8 0* 8 2* 8 3*  --  8 2* 8 4   < > 8 3* 8 5   < > 8 3*   MAGNESIUM mg/dL  --   --  1 9  --   --   --  1 7*  --   --   --  1 4*  --   --    PHOSPHORUS mg/dL  --   --   --   --   --   --  3 8  --   --   --  5 1*  --   --     < > = values in this interval not displayed

## 2022-10-13 NOTE — SPEECH THERAPY NOTE
Speech Language/Pathology    Speech/Language Pathology Progress Note    Patient Name: Elijah Chan Date: 10/13/2022       Subjective:  Pt seen for dysphagia tx at lunch  Pt sitting in chair, awake  Alert  Per RN, pt coughed & expectorated mod amt of ronnie pudding this am when given meds    Objective:  Pt able to self feed bites of puree foods and pudding thick liquids  Pt w/ min cues needed to turn head to left when swallowing  Slow manipulation and transfer  Mild pocketing noted, increased w/ subsequent boluses  Swallows appeared timely  After about 1 serving of puree, pt began coughing, w/ expectoration/labial leakage of puree residue on L  Oral/pharyngeal cavity suctioned w/ yankaur- at least mod amt  Coughing last several minutes  Pt then did not want to take anymore po, but agreeable to a few more bites  Pt independent w/ head turn, but had delayed cough x1 of 2 tsps  Assessment:  Pt does not appear to be tolerating puree diet w/ pudding thick liquids, at high risk for aspiration  Plan/Recommendations:  rec NPO, consider PEG tube for primary means of nutrition     Will cont to follow    Radha Gibbs CCC-SLP  Speech Pathologist  Available via  tiger text

## 2022-10-13 NOTE — PLAN OF CARE
Problem: Potential for Falls  Goal: Patient will remain free of falls  Description: INTERVENTIONS:  - Educate patient/family on patient safety including physical limitations  - Instruct patient to call for assistance with activity   - Consult OT/PT to assist with strengthening/mobility   - Keep Call bell within reach  - Keep bed low and locked with side rails adjusted as appropriate  - Keep care items and personal belongings within reach  - Initiate and maintain comfort rounds  - Make Fall Risk Sign visible to staff  - Offer Toileting every 2 Hours, in advance of need  - Initiate/Maintain bed alarm  - Obtain necessary fall risk management equipment: bed alarm  - Apply yellow socks and bracelet for high fall risk patients  - Consider moving patient to room near nurses station  Outcome: Progressing     Problem: MOBILITY - ADULT  Goal: Maintain or return to baseline ADL function  Description: INTERVENTIONS:  -  Assess patient's ability to carry out ADLs; assess patient's baseline for ADL function and identify physical deficits which impact ability to perform ADLs (bathing, care of mouth/teeth, toileting, grooming, dressing, etc )  - Assess/evaluate cause of self-care deficits   - Assess range of motion  - Assess patient's mobility; develop plan if impaired  - Assess patient's need for assistive devices and provide as appropriate  - Encourage maximum independence but intervene and supervise when necessary  - Involve family in performance of ADLs  - Assess for home care needs following discharge   - Consider OT consult to assist with ADL evaluation and planning for discharge  - Provide patient education as appropriate  Outcome: Progressing  Goal: Maintains/Returns to pre admission functional level  Description: INTERVENTIONS:  - Perform BMAT or MOVE assessment daily    - Set and communicate daily mobility goal to care team and patient/family/caregiver     - Collaborate with rehabilitation services on mobility goals if consulted  - Perform Range of Motion 2 times a day  - Reposition patient every 2 hours  - Dangle patient 2 times a day  - Stand patient 2 times a day  - Ambulate patient 3 times a day  - Out of bed to chair 3 times a day   - Out of bed for meals 3 times a day  - Out of bed for toileting  - Record patient progress and toleration of activity level   Outcome: Progressing     Problem: Prexisting or High Potential for Compromised Skin Integrity  Goal: Skin integrity is maintained or improved  Description: INTERVENTIONS:  - Identify patients at risk for skin breakdown  - Assess and monitor skin integrity  - Assess and monitor nutrition and hydration status  - Monitor labs   - Assess for incontinence   - Turn and reposition patient  - Assist with mobility/ambulation  - Relieve pressure over bony prominences  - Avoid friction and shearing  - Provide appropriate hygiene as needed including keeping skin clean and dry  - Evaluate need for skin moisturizer/barrier cream  - Collaborate with interdisciplinary team   - Patient/family teaching  - Consider wound care consult   Outcome: Progressing     Problem: Nutrition/Hydration-ADULT  Goal: Nutrient/Hydration intake appropriate for improving, restoring or maintaining nutritional needs  Description: Monitor and assess patient's nutrition/hydration status for malnutrition  Collaborate with interdisciplinary team and initiate plan and interventions as ordered  Monitor patient's weight and dietary intake as ordered or per policy  Utilize nutrition screening tool and intervene as necessary  Determine patient's food preferences and provide high-protein, high-caloric foods as appropriate       INTERVENTIONS:  - Monitor oral intake, urinary output, labs, and treatment plans  - Assess nutrition and hydration status and recommend course of action  - Evaluate amount of meals eaten  - Assist patient with eating if necessary   - Allow adequate time for meals  - Recommend/ encourage appropriate diets, oral nutritional supplements, and vitamin/mineral supplements  - Order, calculate, and assess calorie counts as needed  - Recommend, monitor, and adjust tube feedings and TPN/PPN based on assessed needs  - Assess need for intravenous fluids  - Provide specific nutrition/hydration education as appropriate  - Include patient/family/caregiver in decisions related to nutrition  Outcome: Progressing

## 2022-10-13 NOTE — CONSULTS
Interventional Radiology  Consultation 10/13/2022     Consults  Mitchel Rolon III   1960   [id number]      Assessment/Plan:  62 yo male s/p acute right lentiform/corpus striatum/corona radiata and left thalamus infarcts 10/9/2022 now with dysphagia and significant aspiration when taking po  Percutaneous G-tube for nutrition requested  Pt received Plavix today and is on 81 mg ASA, as well as SQ heparin  Patient will need to be off of his ASA and Plavix for 5 days before we can place a percutaneous G-tube  Assuming these can so tolerate, please stop these meds, and we can place it next Tuesday or Wednesday  Medical Problems             Problem List     * (Principal) Acute CVA (cerebrovascular accident) ([facility] )    Hyponatremia    Overview Deleted 10/9/2022  6:42 PM by Martinez Schaefer MD            Elevated serum creatinine    Overview Deleted 10/9/2022  9:07 PM by Martinez Schaefer MD            More than 50 percent stenosis of left internal carotid artery    Overview Deleted 10/9/2022  9:20 PM by Martinez Schaefer MD            Alcohol use disorder, mild, abuse    Aspiration into respiratory tract    Mild protein-calorie malnutrition ([facility] )    Malnutrition Findings:   Adult Malnutrition type: Social/enviromental  Adult Degree of Malnutrition: Malnutrition of mild degree  Malnutrition Characteristics: Muscle loss, Other (comment), Inadequate energy (BMI < 18 5)                  360 Statement: Mild protein calorie malnutrition r/t inadequate intake as evidenced by inability to eat sufficient energy/protein to maintain a healthy weight, muscle loss present to clavicle, and BMI <18 5; currently being treated with nutritional supplements  BMI Findings:  Adult BMI Classifications: Underweight < 18 5        Body mass index is 17 71 kg/m²  Subjective:     Patient ID: Devante Peguero is a 63 y o  male      History of Present Illness  62 yo male s/p acute right lentiform/corpus striatum/corona radiata and left thalamus infarcts 10/9/2022 now with dysphagia and significant aspiration when taking po  Percutaneous G-tube for nutrition requested  Review of Systems      History reviewed  No pertinent past medical history  History reviewed  No pertinent surgical history       Social History     Tobacco Use   Smoking Status Current Every Day Smoker   • Types: Cigarettes   Smokeless Tobacco Never Used        Social History     Substance and Sexual Activity   Alcohol Use Not Currently        Social History     Substance and Sexual Activity   Drug Use Not on file        No Known Allergies    Current Facility-Administered Medications   Medication Dose Route Frequency Provider Last Rate Last Admin   • amLODIPine (NORVASC) tablet 2 5 mg  2 5 mg Oral Daily Ryley Walker, DO   2 5 mg at 10/13/22 5630   • aspirin (ECOTRIN LOW STRENGTH) EC tablet 81 mg  81 mg Oral Daily Hulan Counter Mergel, PA-C   81 mg at 10/13/22 8497   • atorvastatin (LIPITOR) tablet 40 mg  40 mg Oral Daily With [employer], DO   40 mg at 10/12/22 1716   • cefTRIAXone (ROCEPHIN) IVPB (premix in dextrose) 1,000 mg 50 mL  1,000 mg Intravenous Q24H Nicolas De La Torre  mL/hr at 10/12/22 2335 1,000 mg at 10/12/22 2335   • heparin (porcine) subcutaneous injection 5,000 Units  5,000 Units Subcutaneous [facility] Nicolas De La Torre MD   5,000 Units at 10/13/22 1310   • ipratropium (ATROVENT) 0 02 % inhalation solution 0 5 mg  0 5 mg Nebulization Q6H PRN Yandel Carrillo MD       • levalbuterol (XOPENEX) inhalation solution 1 25 mg  1 25 mg Nebulization Q6H PRN Yandel Carrillo MD       • sodium chloride 0 9 % infusion  75 mL/hr Intravenous Continuous [facility]oc [place] MD 75 mL/hr at 10/13/22 1308 75 mL/hr at 10/13/22 1308          Objective:    Vitals:    10/12/22 2057 10/12/22 2146 10/13/22 0733 10/13/22 1500   BP:  122/79 165/99 (!) 176/93   BP Location:    Right arm   Pulse:  60  79   Resp:   18 18   Temp:  98 5 °F (36 9 °C) 97 5 °F (36 4 °C) 98 7 °F (37 1 °C)   TempSrc:    Oral   SpO2: 99% 100%     Weight:       Height:            Physical Exam      No results found for: BNP   Lab Results   Component Value Date    WBC 8 07 10/12/2022    HGB 10 6 (L) 10/12/2022    HCT 33 0 (L) 10/12/2022    MCV 92 10/12/2022    PLT 90 (L) 10/12/2022     Lab Results   Component Value Date    INR 1 08 10/09/2022    PROTIME 14 2 10/09/2022     Lab Results   Component Value Date    PTT 27 10/09/2022         I have personally reviewed pertinent imaging and laboratory results  Code Status: Level 1 - Full Code  Advance Directive and Living Will:      Power of :    POLST:      IR has been consulted to evaluate the patient, determine the appropriate procedure, and whether or not a procedure can and should be performed  Thank you for allowing me to participate in the care of [address] [place] [address]  Please don't hesitate to call, text, email, or TigerText with any questions  This text is generated with voice recognition software  There may be translation, syntax,  or grammatical errors  If you have any questions, please contact the dictating provider

## 2022-10-13 NOTE — PLAN OF CARE
Problem: PHYSICAL THERAPY ADULT  Goal: Performs mobility at highest level of function for planned discharge setting  See evaluation for individualized goals  Description: Treatment/Interventions: Functional transfer training, LE strengthening/ROM, Therapeutic exercise, Endurance training, Cognitive reorientation, Patient/family training, Equipment eval/education, Bed mobility, Gait training  Equipment Recommended:  (will continue to assess)       See flowsheet documentation for full assessment, interventions and recommendations  Outcome: Progressing  Note:    Problem List: Decreased strength, Decreased range of motion, Impaired balance, Decreased endurance, Decreased mobility, Decreased cognition, Decreased safety awareness  Assessment: pt shows improvement in mobility status w/ increased activity tolerance and decreased level of assistance needed to complete stand pivot transfer  pt continues to require assist x 1 to 2 for all phases of mobility and frequent cuing for mobility technique/safety (w/ poor carryover of education received)  continued inpatient PT is needed to improve strength and endurance  discharge recommendation is for inpatient rehab to maximize level of functional independence  PT Discharge Recommendation: Post acute rehabilitation services    See flowsheet documentation for full assessment

## 2022-10-13 NOTE — OCCUPATIONAL THERAPY NOTE
Occupational Therapy Progress Note     Patient Name: Dael Lynn III  Today's Date: 10/13/2022  Problem List  Principal Problem:    Acute CVA (cerebrovascular accident) [facility])  Active Problems:    Hyponatremia    Elevated serum creatinine    More than 50 percent stenosis of left internal carotid artery    Alcohol use disorder, mild, abuse    Aspiration into respiratory tract    Mild protein-calorie malnutrition ([address] )       10/13/22 1023   OT Last Visit   OT Visit Date 10/13/22  (Thursday)   Note Type   Note Type Treatment   Pain Assessment   Pain Assessment Tool 0-10   Pain Score No Pain   Restrictions/Precautions   Weight Bearing Precautions Per Order No   Braces or Orthoses   (L UE sling for support/stabilization during functional transfers  Total A to don)   Other Precautions Cognitive; Chair Alarm; Bed Alarm;Multiple lines;Telemetry; Fall Risk;Pain;Aspiration  (continuous pulse ox, Eric, dysphagia 1 (puree) w/ pudding thick liquids)   Lifestyle   Reciprocal Relationships Supportive sister  Pt added that she is going to cut his hair   Service to Others Pt reports working in e[employer] Pt reports enjoying [employer] and added that he is a [employer] fan   ADL   Where Assessed Edge of bed  (vs commode)   Eating Assistance 2  Maximal Assistance   Eating Deficit Setup;Verbal cueing;Supervision/safety;Scoop assist;Pureed diet; Thickened liquids; Other (Comment); Increased time to complete;Bringing food to mouth assist  (pudding thick liquids  Per ST, head turn to L)   Eating Comments able to grasp cup in R hand  Required + time and cues to turn head to L   Grooming Assistance 2  Maximal Assistance   Grooming Deficit Setup; Wash/dry face;Brushing hair   Grooming Comments seated at EOB; Able to wash mouth / face using washcloth in R UE w/ min <> mod A to maintain sitting balance  Able to comb front portion of hair w/ A to maintain balance   Max A to manage tangles / knots in back   [address] Unable to assess   UB Bathing Comments RN reports A pt w/ bathing prior to therapist arrival   [address] to assess   UB Dressing Assistance 2  Maximal Assistance   UB Dressing Deficit Thread LUE;Pull around back; Fasteners;Setup;Steadying; Increased time to complete;Supervision/safety;Verbal cueing   UB Dressing Comments seated at EOB to doff soiled gown and don gown  A and cues to thread L UE   LB Dressing Assistance 1  Total Assistance   LB Dressing Deficit Don/doff R sock; Don/doff L sock; Setup   Toileting Assistance  2  Maximal Assistance   Toileting Deficit Bedside commode;Setup   Toileting Comments attempted to void seated on commode  Required max <> mod A to maintain balance   Bed Mobility   Supine to Sit 2  Maximal assistance   Additional items Assist x 1; Increased time required;Verbal cues;LE management;HOB elevated; Bedrails  (to pt's R)   Sit to Supine Unable to assess   Additional Comments Pt seated OOB In chair at end of session w/ needs met, call bell in reach and chair alarm activated  Care coordinationw / [employer] due to decreased activity tolerance, physical assitance required and cues /prompts/ encouragement to actively participate   Transfers   Sit to Stand 3  Moderate assistance   Additional items Assist x 2; Increased time required;Verbal cues  (mod R HHA)   Stand to Sit 3  Moderate assistance   Additional items Assist x 2; Increased time required;Verbal cues  (mod R HHA, L knee block)   Stand pivot 3  Moderate assistance   Additional items Assist x 2; Increased time required;Verbal cues  (mod R HHA and L knee block)   Toilet transfer 3  Moderate assistance   Additional items Assist x 2; Increased time required;Commode;Verbal cues  (mod R HHA)   Additional Comments Pt performed sit <> stand 2X w/ mod R HHAX2 and benefits from L knee block  Tolerated sitting at EOB approx 10 minutes w/ min <> mod A to maintain balance  + LOB to R     Functional Mobility   Additional Comments NT   Toilet Transfers   Toilet Transfer From [employer] Transfer Type To and from   Toilet Transfer to Standard bedside commode   Toilet Transfer Technique Stand pivot   Toilet Transfers Moderate assistance;Verbal cues  (mod HHA x2)   Therapeutic Exercise - ROM   UE-ROM Yes   ROM - Left Upper Extremities    L Shoulder PROM; Flexion   L Elbow Elbow flexion;Elbow extension;PROM   L Wrist PROM; Wrist flexion;Wrist extension   L Hand Thumb; Index finger; Long finger;Ring finger;Little finger;PROM   L Position Seated;Against gravity   L Weight/Reps/Sets engaged in L UE PROM / AAROM  Pt required + time and cues to use R UE during UBD   LUE ROM Comment seated at EOB AG   Exercise Tools   Exercise Tools   (seated OOB In chair at end of session w/ L UE supported on pillow)   Neuromuscular Education   Weight Bearing Technique Yes   LUE Weight Bearing Forearm seated  (on tray table to assist w/ balance / positioning during toileting)   Response to Weight Bearing Technique tolerated, improved posture / balance on commode   Subjective   Subjective "I want to stay right here" (pointing to the bed)  Agreeable to OOB in chair w/ max encouragement   Cognition   Overall Cognitive Status Impaired   Arousal/Participation Responsive; Cooperative   Attention Attends with cues to redirect   Orientation Level Oriented to person;Oriented to place   Memory Decreased recall of recent events   Following Commands Follows one step commands with increased time or repetition   Comments Identified pt by full name and birthdate  Alert and agreeable to participate w/ encouragement  Flat affect and withdrawn  Garbled speech, soft spoken  Able to follow directions and communicate wants / needs w/ + time   Recommend ongoing eval of functional cognitive skills and Neuro QQL to assess anxiety/ depression to assist in DC Planning and improve engagement   Activity Tolerance   Activity Tolerance Patient limited by fatigue Medical Staff Made Aware per Gonzalez ENRIQUE appropriate to see pt  Sp[facility] and care coordination w/ PT, RJ   Assessment   Assessment Pt seen for skilled OT tx session day 1 from [id number] focusing on activity engagement and continued evaluation  Pt agreeable to participate in session w/ encouragement  Pt required max A to complete bed mobility  Pt tolerated sitting at EOB approx 10 minutes w/ min <> mod A to maintain balance  Pt required mod A x2 to complete sit <> stand and SPT to commode, recliner chair  Pt required total A to complete LBD and max A to complete UBD  Pt reports R hand dominance  Pt engaged in L UE PROM  Continue to recommend post acute rehab when medically stable for discharge from acute care  Will continue to follow   Plan   Treatment Interventions ADL retraining;Functional transfer training;UE strengthening/ROM; Endurance training;Patient/family training;Equipment evaluation/education; Compensatory technique education;Continued evaluation; Energy conservation; Activityengagement; Neuromuscular reeducation; Fine motor coordination activities   Goal Expiration Date 10/25/22   OT Treatment Day 1  (Thursday)   OT Frequency 3-5x/wk   Recommendation   OT Discharge Recommendation Post acute rehabilitation services   AM-PAC Daily Activity Inpatient   Lower Body Dressing 2   Bathing 2   Toileting 2   Upper Body Dressing 2   Grooming 2   Eating 2   Daily Activity Raw Score 12   Daily Activity Standardized Score (Calc for Raw Score >=11) 30 6   AM-PAC Applied Cognition Inpatient   Following a Speech/Presentation 3   Understanding Ordinary Conversation 3   Taking Medications 2   Remembering Where Things Are Placed or Put Away 3   Remembering List of 4-5 Errands 3   Taking Care of Complicated Tasks 3   Applied Cognition Raw Score 17   Applied Cognition Standardized Score 36 52   Barthel Index   Feeding 5   Bathing 0   Grooming Score 0   Dressing Score 5   Bladder Score 5   Bowels Score 5   Toilet Use Score 5   Transfers (Bed/Chair) Score 5   Mobility (Level Surface) Score 0   Stairs Score 0   Barthel Index Score 30   Modified Luis Miguel Scale   Modified Luis Miguel Scale 4   End of Consult   Patient Position at End of Consult Bed/Chair alarm activated   Nurse Communication Nurse aware of consult  (Teresita ENRIQUE aware)        The patient's raw score on the AM-PAC Daily Activity inpatient short form is 12, standardized score is 30 6, less than 39 4  Patients at this level are likely to benefit from discharge to post-acute rehabilitation services  Please refer to the recommendation of the Occupational Therapist for safe discharge planning      Marj Lanier, MATEUS/L

## 2022-10-13 NOTE — PROGRESS NOTES
[facility]  Progress Note - 2003 [facility] 1960, 63 y o  male MRN: [id number]  Unit/Bed#: S -01 Encounter: [id number]  Primary Care Provider: No primary care provider on file  Date and time admitted to hospital: 10/9/2022  3:59 PM    * Acute CVA (cerebrovascular accident) [facility])  Assessment & Plan  Episode of acute left-sided upper extremity and lower extremity weakness along with left-sided lower facial droop and dysarthria with onset morning of 10/9  Patient says he does not remember having these symptoms prior to falling asleep last night  BP on presentation 161/109, max HR and RR in  and 24 respectively  Patient did not get tPa since he was outside the window  EKG showed Sinus tachycardia  Right atrial enlargement  Nonspecific ST abnormality  CT stroke alert brain  Impression: Acute subcortical infarct in the right periventricular white matter, corona radiata and lentiform nuclei  No hemorrhagic conversion or mass effect  Findings were directly discussed with Sue Anthony at 4:39 PM     CTA stroke alert (head/neck)  Impression: Lung segment critical (greater than 90%) stenosis in the proximal left cervical ICA  Atretic right posterior cerebral artery  No evidence of acute thrombus or large vessel occlusion of the major vessels of the [place]  MRI brain:   Stable acute to early subacute infarct in the right periventricular white matter, corona radiata and lentiform nuclei  No hemorrhagic conversion or mass effect  Acute lacunar infarct in the left thalamus  ECHO   · Moderate concentric hypertrophy  EF 55%  Mild tricuspid regurgitation  Trivial fibrinous pericardial effusion  Pulmonary artery pressure mildly elevated to 37 0 mmHg       · NIHSS score: 8    Plan   - amlodipine 2 5mg   - dysphagia diet per speech  -PMR consulted  - continue Asprin until Sunday  - hold Plavix for PEG tube placement in 5 days  - continue atorvastatin 40mg    - neuro checks  - provide stroke education  - Will follow up with neurovascular regarding carotid duplex findings in 4-6 weeks  - acute rehab treatment after discharge       Aspiration into respiratory tract  Assessment & Plan  CTA showed aspiration pneumonia in the upper lung fields L more than R  Chest XR showed Ill-defined opacity in the left upper lung which corresponds with bronchiectasis and tree-in-bud nodularity on the CTA neck, which could be due to aspiration versus bronchiolitis  At admission Leukocytosis to 18 20 and procalcitonin elevated to 0 88 but afebrile over hospital stay  Rhonchi have improved over the course of admission      -continue ceftriaxone day 5/7   - dysphagia diet   - nebulizer treatments   - PFTs outpatient for suspected chronic lung disease     Hyponatremia  Assessment & Plan  Recent Labs     10/09/22  1637   SODIUM 119*   Recent sodium 133    AM cortisol increased at 41 0     Renal US normal    128 his latest sodium level     Plan:  · Daily BMPs  · Urine sodium low indicating volume depletion  ? Continue isotonic saline 75 mL/hour and monitor   ? Avoid over-correction, recommend 4-6 point increase over 24 hours      More than 50 percent stenosis of left internal carotid artery  Assessment & Plan  CTA of head/neck  Impression: 1  Lung segment critical (greater than 90%) stenosis in the proximal left cervical ICA  2   Atretic right posterior cerebral artery  No evidence of acute thrombus or large vessel occlusion of the major vessels of the [place]  Carotid Complex Study  Impression: R ICA <50% heterogeneous and irregular occlusion  L ICA >70% heterogeneous and irregular occlusion  Plan   - F/u with neurovascular in 4-6 weeks on carotid duplex findings    Alcohol use disorder, mild, abuse  Assessment & Plan  Patient endorses 4-6 drinks per day consisting of 5% alcohol        -CIWA protocol     Elevated serum creatinine  Assessment & Plan  Assessment:  Pt had an elevated creatinine of 1 9 on admission  Recent Labs     10/09/22  1637   CREATININE 1 90*   EGFR 37     Renal US unremarkable     Estimated Creatinine Clearance: 34 2 mL/min (A) (by C-G formula based on SCr of 1 9 mg/dL (H))  Unsure of pt's baseline but creatinine upon presentation to ED in January 2022 was 0 53  Patient's creatinine is now around his baseline (0 68 on 10/13)      Plan:  • IVF 75 mL/hr NS   • Follow on BMP      Mild protein-calorie malnutrition ([address] )  Assessment & Plan  Malnutrition Findings:   Adult Malnutrition type: Social/enviromental  Adult Degree of Malnutrition: Malnutrition of mild degree  Malnutrition Characteristics: Muscle loss, Other (comment), Inadequate energy (BMI < 18 5)               360 Statement: Mild protein calorie malnutrition r/t inadequate intake as evidenced by inability to eat sufficient energy/protein to maintain a healthy weight, muscle loss present to clavicle, and BMI <18 5; currently being treated with nutritional supplements  BMI Findings:  Adult BMI Classifications: Underweight < 18 5        Body mass index is 17 71 kg/m²  · Magnesium sulfate supplementation  · Nutritional supplement pudding ordered for each meal, feed with assistance and turn head to the left when swallowing  · Dysphagia diet: purees and nectar   · IR consult for PEG tube placement in 5 days      VTE Pharmacologic Prophylaxis: VTE Score: 7 High Risk (Score >/= 5) - Pharmacological DVT Prophylaxis Ordered: heparin  Sequential Compression Devices Ordered  Patient Centered Rounds: I performed bedside rounds with nursing staff today  Discussions with Specialists or Other Care Team Provider: Occupational Therapy, Case management, neurology, vascular    Education and Discussions with Family / Patient: Attempted to update  (sister) via phone  Left voicemail       Current Length of Stay: 4 day(s)  Current Patient Status: Inpatient   Discharge Plan: Anticipate discharge in >72 hrs to rehab facility  Code Status: Level 1 - Full Code    Subjective:   Patient slept well overnight  Patient denies fever, SOB, chest pain, diarrhea, constipation, N/V  His friend Ed was visiting him prior to the interview  Objective:     Vitals:   Temp (24hrs), Av 2 °F (36 8 °C), Min:97 5 °F (36 4 °C), Max:98 7 °F (37 1 °C)    Temp:  [97 5 °F (36 4 °C)-98 7 °F (37 1 °C)] 98 7 °F (37 1 °C)  HR:  [60-79] 79  Resp:  [18] 18  BP: (122-176)/(79-99) 176/93  SpO2:  [99 %-100 %] 100 %  Body mass index is 17 71 kg/m²  Input and Output Summary (last 24 hours): Intake/Output Summary (Last 24 hours) at 10/13/2022 1553  Last data filed at 10/13/2022 0501  Gross per 24 hour   Intake --   Output 800 ml   Net -800 ml       Physical Exam:   Physical Exam  Constitutional:       General: He is awake  He is not in acute distress  Comments: disheveled    HENT:      Mouth/Throat:      Lips: Pink  No lesions  Mouth: Mucous membranes are dry  Dentition: Abnormal dentition  Eyes:      General: Lids are normal  Gaze aligned appropriately  Cardiovascular:      Rate and Rhythm: Normal rate and regular rhythm  Heart sounds: S1 normal and S2 normal    Pulmonary:      Breath sounds: Examination of the right-upper field reveals rhonchi  Examination of the left-upper field reveals rhonchi  Examination of the right-middle field reveals rhonchi  Examination of the left-middle field reveals rhonchi  Examination of the right-lower field reveals rhonchi  Examination of the left-lower field reveals rhonchi  Rhonchi present  Abdominal:      General: Abdomen is flat  Palpations: Abdomen is soft  Tenderness: There is no abdominal tenderness  Musculoskeletal:      Cervical back: Full passive range of motion without pain and normal range of motion  Right lower leg: No edema  Left lower leg: No edema  Feet:      Right foot:      Skin integrity: Dry skin present        Toenail Condition: Fungal disease present  Left foot:      Skin integrity: Dry skin present  Toenail Condition: Fungal disease present  Skin:            Comments: Bilateral forearm ecchymoses    Neurological:      Mental Status: He is alert  Cranial Nerves: Cranial nerve deficit, dysarthria and facial asymmetry present  Sensory: Sensation is intact  Motor: Weakness present  Comments: 0/5 strength on the left side, could move his toes today  5/5 strength on the right side   L sided facial droop at the level of the mouth   Upper face movement normal bilaterally  More oriented than yesterday but still only speaks in short sentences with dysarthric speech  Ability to follow commands improved from yesterday    Psychiatric:         Behavior: Behavior is cooperative  Additional Data:     Labs:  Results from last 7 days   Lab Units 10/12/22  0400 10/11/22  0610   WBC Thousand/uL 8 07 9 64   HEMOGLOBIN g/dL 10 6* 12 3   HEMATOCRIT % 33 0* 35 8*   PLATELETS Thousands/uL 90* 106*   NEUTROS PCT %  --  84*   LYMPHS PCT %  --  8*   MONOS PCT %  --  7   EOS PCT %  --  0     Results from last 7 days   Lab Units 10/13/22  0445 10/11/22  1340 10/11/22  0602   SODIUM mmol/L 133*   < > 125*   POTASSIUM mmol/L 3 6   < > 3 9   CHLORIDE mmol/L 101   < > 94*   CO2 mmol/L 27   < > 20*   BUN mg/dL 15   < > 25   CREATININE mg/dL 0 68   < > 1 26   ANION GAP mmol/L 5   < > 11   CALCIUM mg/dL 8 2*   < > 8 2*   ALBUMIN g/dL  --   --  2 7*   TOTAL BILIRUBIN mg/dL  --   --  0 58   ALK PHOS U/L  --   --  91   ALT U/L  --   --  4*   AST U/L  --   --  12*   GLUCOSE RANDOM mg/dL 96   < > 87    < > = values in this interval not displayed       Results from last 7 days   Lab Units 10/09/22  1637   INR  1 08     Results from last 7 days   Lab Units 10/11/22  0637   POC GLUCOSE mg/dl 93     Results from last 7 days   Lab Units 10/10/22  0810   HEMOGLOBIN A1C % 5 0     Results from last 7 days   Lab Units 10/09/22  2218 10/09/22  5401 10/09/22  1740 10/09/22  1637   LACTIC ACID mmol/L 1 8 1 8 2 8*  --    PROCALCITONIN ng/ml  --   --   --  0 88*       Lines/Drains:  Invasive Devices  Report    Peripheral Intravenous Line  Duration           Peripheral IV 10/09/22 Right Arm 3 days                  Telemetry:  Telemetry Orders (From admission, onward)             48 Hour Telemetry Monitoring  Continuous x 48 hours        Expiring   References:    Telemetry Guidelines   Question:  Reason for 48 Hour Telemetry  Answer:  Acute CVA (<24 hrs old, hemispheric strokes, selected brainstem strokes, cardiac arrhythmias)                 Telemetry Reviewed: Normal Sinus Rhythm  Indication for Continued Telemetry Use: Acute CVA             Imaging: Reviewed radiology reports from this admission including: chest xray, MRI brain, ECHO and CTA    Recent Cultures (last 7 days):   Results from last 7 days   Lab Units 10/09/22  1753 10/09/22  1752   BLOOD CULTURE  No Growth at 72 hrs  No Growth at 72 hrs  Last 24 Hours Medication List:   Current Facility-Administered Medications   Medication Dose Route Frequency Provider Last Rate   • amLODIPine  2 5 mg Oral Daily Cody Mahmood DO     • aspirin  81 mg Oral Daily Donna Davis PA-C     • atorvastatin  40 mg Oral Daily With [facility],      • cefTRIAXone  1,000 mg Intravenous Q24H Troy Shelby MD 1,000 mg (10/12/22 5675)   • heparin (porcine)  5,000 Units Subcutaneous Q8H [address] Troy Shelby MD     • ipratropium  0 5 mg Nebulization Q6H PRN Jermaine Bello MD     • levalbuterol  1 25 mg Nebulization Q6H PRN Jermaine Bello MD     • sodium chloride  75 mL/hr Intravenous Continuous Rissa Bagley MD 75 mL/hr (10/13/22 1308)        Today, Patient Was Seen By: Dr Lottie Stark, Dr Darin Gardner    **Please Note: This note may have been constructed using a voice recognition system  **

## 2022-10-13 NOTE — PLAN OF CARE
Problem: OCCUPATIONAL THERAPY ADULT  Goal: Performs self-care activities at highest level of function for planned discharge setting  See evaluation for individualized goals  Description: Treatment Interventions: ADL retraining, Functional transfer training, Endurance training, Cognitive reorientation, Patient/family training, UE strengthening/ROM, Equipment evaluation/education, Compensatory technique education, Neuromuscular reeducation, Energy conservation, Activityengagement, Fine motor coordination activities          See flowsheet documentation for full assessment, interventions and recommendations  Outcome: Progressing  Note: Limitation: Decreased ADL status, Decreased Safe judgement during ADL, Decreased UE strength, Decreased cognition, Decreased endurance, Decreased high-level ADLs, Decreased self-care trans, Non-func L UE, Decreased sensation, Decreased fine motor control, Decreased UE ROM  Prognosis: Fair, Good  Assessment: Pt seen for skilled OT tx session day 1 from [id number] focusing on activity engagement and continued evaluation  Pt agreeable to participate in session w/ encouragement  Pt required max A to complete bed mobility  Pt tolerated sitting at EOB approx 10 minutes w/ min <> mod A to maintain balance  Pt required mod A x2 to complete sit <> stand and SPT to commode, recliner chair  Pt required total A to complete LBD and max A to complete UBD  Pt reports R hand dominance  Pt engaged in L UE PROM  Continue to recommend post acute rehab when medically stable for discharge from acute care   Will continue to follow  Recommendation: Physiatry Consult  OT Discharge Recommendation: Post acute rehabilitation services

## 2022-10-14 PROBLEM — E44.0 MODERATE PROTEIN-CALORIE MALNUTRITION (HCC): Status: ACTIVE | Noted: 2022-10-14

## 2022-10-14 LAB
ANION GAP SERPL CALCULATED.3IONS-SCNC: 10 MMOL/L (ref 4–13)
ANION GAP SERPL CALCULATED.3IONS-SCNC: 10 MMOL/L (ref 4–13)
ANION GAP SERPL CALCULATED.3IONS-SCNC: 6 MMOL/L (ref 4–13)
ANION GAP SERPL CALCULATED.3IONS-SCNC: 6 MMOL/L (ref 4–13)
BACTERIA BLD CULT: NORMAL
BASOPHILS # BLD AUTO: 0.02 THOUSANDS/ΜL (ref 0–0.1)
BASOPHILS # BLD AUTO: 0.02 THOUSANDS/ΜL (ref 0–0.1)
BASOPHILS NFR BLD AUTO: 0 % (ref 0–1)
BASOPHILS NFR BLD AUTO: 0 % (ref 0–1)
BUN SERPL-MCNC: 12 MG/DL (ref 5–25)
BUN SERPL-MCNC: 12 MG/DL (ref 5–25)
BUN SERPL-MCNC: 9 MG/DL (ref 5–25)
BUN SERPL-MCNC: 9 MG/DL (ref 5–25)
CALCIUM SERPL-MCNC: 8.1 MG/DL (ref 8.4–10.2)
CALCIUM SERPL-MCNC: 8.1 MG/DL (ref 8.4–10.2)
CALCIUM SERPL-MCNC: 8.2 MG/DL (ref 8.4–10.2)
CALCIUM SERPL-MCNC: 8.2 MG/DL (ref 8.4–10.2)
CHLORIDE SERPL-SCNC: 100 MMOL/L (ref 96–108)
CHLORIDE SERPL-SCNC: 100 MMOL/L (ref 96–108)
CHLORIDE SERPL-SCNC: 97 MMOL/L (ref 96–108)
CHLORIDE SERPL-SCNC: 97 MMOL/L (ref 96–108)
CO2 SERPL-SCNC: 22 MMOL/L (ref 21–32)
CO2 SERPL-SCNC: 22 MMOL/L (ref 21–32)
CO2 SERPL-SCNC: 27 MMOL/L (ref 21–32)
CO2 SERPL-SCNC: 27 MMOL/L (ref 21–32)
CREAT SERPL-MCNC: 0.58 MG/DL (ref 0.6–1.3)
CREAT SERPL-MCNC: 0.58 MG/DL (ref 0.6–1.3)
CREAT SERPL-MCNC: 0.64 MG/DL (ref 0.6–1.3)
CREAT SERPL-MCNC: 0.64 MG/DL (ref 0.6–1.3)
EOSINOPHIL # BLD AUTO: 0.13 THOUSAND/ΜL (ref 0–0.61)
EOSINOPHIL # BLD AUTO: 0.13 THOUSAND/ΜL (ref 0–0.61)
EOSINOPHIL NFR BLD AUTO: 2 % (ref 0–6)
EOSINOPHIL NFR BLD AUTO: 2 % (ref 0–6)
ERYTHROCYTE [DISTWIDTH] IN BLOOD BY AUTOMATED COUNT: 14.4 % (ref 11.6–15.1)
ERYTHROCYTE [DISTWIDTH] IN BLOOD BY AUTOMATED COUNT: 14.4 % (ref 11.6–15.1)
GFR SERPL CREATININE-BSD FRML MDRD: 107 ML/MIN/1.73SQ M
GFR SERPL CREATININE-BSD FRML MDRD: 107 ML/MIN/1.73SQ M
GFR SERPL CREATININE-BSD FRML MDRD: 111 ML/MIN/1.73SQ M
GFR SERPL CREATININE-BSD FRML MDRD: 111 ML/MIN/1.73SQ M
GLUCOSE SERPL-MCNC: 110 MG/DL (ref 65–140)
GLUCOSE SERPL-MCNC: 110 MG/DL (ref 65–140)
GLUCOSE SERPL-MCNC: 72 MG/DL (ref 65–140)
GLUCOSE SERPL-MCNC: 72 MG/DL (ref 65–140)
HCT VFR BLD AUTO: 37.3 % (ref 36.5–49.3)
HCT VFR BLD AUTO: 37.3 % (ref 36.5–49.3)
HGB BLD-MCNC: 11.6 G/DL (ref 12–17)
HGB BLD-MCNC: 11.6 G/DL (ref 12–17)
IMM GRANULOCYTES # BLD AUTO: 0.1 THOUSAND/UL (ref 0–0.2)
IMM GRANULOCYTES # BLD AUTO: 0.1 THOUSAND/UL (ref 0–0.2)
IMM GRANULOCYTES NFR BLD AUTO: 2 % (ref 0–2)
IMM GRANULOCYTES NFR BLD AUTO: 2 % (ref 0–2)
LYMPHOCYTES # BLD AUTO: 1.06 THOUSANDS/ΜL (ref 0.6–4.47)
LYMPHOCYTES # BLD AUTO: 1.06 THOUSANDS/ΜL (ref 0.6–4.47)
LYMPHOCYTES NFR BLD AUTO: 17 % (ref 14–44)
LYMPHOCYTES NFR BLD AUTO: 17 % (ref 14–44)
MCH RBC QN AUTO: 29.3 PG (ref 26.8–34.3)
MCH RBC QN AUTO: 29.3 PG (ref 26.8–34.3)
MCHC RBC AUTO-ENTMCNC: 31.1 G/DL (ref 31.4–37.4)
MCHC RBC AUTO-ENTMCNC: 31.1 G/DL (ref 31.4–37.4)
MCV RBC AUTO: 94 FL (ref 82–98)
MCV RBC AUTO: 94 FL (ref 82–98)
MONOCYTES # BLD AUTO: 0.71 THOUSAND/ΜL (ref 0.17–1.22)
MONOCYTES # BLD AUTO: 0.71 THOUSAND/ΜL (ref 0.17–1.22)
MONOCYTES NFR BLD AUTO: 12 % (ref 4–12)
MONOCYTES NFR BLD AUTO: 12 % (ref 4–12)
NEUTROPHILS # BLD AUTO: 4.12 THOUSANDS/ΜL (ref 1.85–7.62)
NEUTROPHILS # BLD AUTO: 4.12 THOUSANDS/ΜL (ref 1.85–7.62)
NEUTS SEG NFR BLD AUTO: 67 % (ref 43–75)
NEUTS SEG NFR BLD AUTO: 67 % (ref 43–75)
NRBC BLD AUTO-RTO: 0 /100 WBCS
NRBC BLD AUTO-RTO: 0 /100 WBCS
PLATELET # BLD AUTO: 97 THOUSANDS/UL (ref 149–390)
PLATELET # BLD AUTO: 97 THOUSANDS/UL (ref 149–390)
PMV BLD AUTO: 9.3 FL (ref 8.9–12.7)
PMV BLD AUTO: 9.3 FL (ref 8.9–12.7)
POTASSIUM SERPL-SCNC: 3.1 MMOL/L (ref 3.5–5.3)
POTASSIUM SERPL-SCNC: 3.1 MMOL/L (ref 3.5–5.3)
POTASSIUM SERPL-SCNC: 3.6 MMOL/L (ref 3.5–5.3)
POTASSIUM SERPL-SCNC: 3.6 MMOL/L (ref 3.5–5.3)
RBC # BLD AUTO: 3.96 MILLION/UL (ref 3.88–5.62)
RBC # BLD AUTO: 3.96 MILLION/UL (ref 3.88–5.62)
SODIUM SERPL-SCNC: 130 MMOL/L (ref 135–147)
SODIUM SERPL-SCNC: 130 MMOL/L (ref 135–147)
SODIUM SERPL-SCNC: 132 MMOL/L (ref 135–147)
SODIUM SERPL-SCNC: 132 MMOL/L (ref 135–147)
WBC # BLD AUTO: 6.14 THOUSAND/UL (ref 4.31–10.16)
WBC # BLD AUTO: 6.14 THOUSAND/UL (ref 4.31–10.16)

## 2022-10-14 PROCEDURE — 80048 BASIC METABOLIC PNL TOTAL CA: CPT

## 2022-10-14 PROCEDURE — 85025 COMPLETE CBC W/AUTO DIFF WBC: CPT | Performed by: NURSE PRACTITIONER

## 2022-10-14 PROCEDURE — 92526 ORAL FUNCTION THERAPY: CPT

## 2022-10-14 PROCEDURE — 99233 SBSQ HOSP IP/OBS HIGH 50: CPT | Performed by: INTERNAL MEDICINE

## 2022-10-14 PROCEDURE — 99232 SBSQ HOSP IP/OBS MODERATE 35: CPT | Performed by: INTERNAL MEDICINE

## 2022-10-14 RX ORDER — DEXTROSE AND SODIUM CHLORIDE 5; .9 G/100ML; G/100ML
75 INJECTION, SOLUTION INTRAVENOUS CONTINUOUS
Status: DISCONTINUED | OUTPATIENT
Start: 2022-10-14 | End: 2022-10-15

## 2022-10-14 RX ORDER — ENOXAPARIN SODIUM 100 MG/ML
40 INJECTION SUBCUTANEOUS DAILY
Status: DISCONTINUED | OUTPATIENT
Start: 2022-10-14 | End: 2022-10-18

## 2022-10-14 RX ORDER — AMLODIPINE BESYLATE 5 MG/1
5 TABLET ORAL DAILY
Status: DISCONTINUED | OUTPATIENT
Start: 2022-10-14 | End: 2022-10-20

## 2022-10-14 RX ADMIN — ENOXAPARIN SODIUM 40 MG: 40 INJECTION SUBCUTANEOUS at 15:47

## 2022-10-14 RX ADMIN — HEPARIN SODIUM 5000 UNITS: 5000 INJECTION INTRAVENOUS; SUBCUTANEOUS at 04:48

## 2022-10-14 NOTE — NUTRITION
10/14/22 1317   Recommendations/Interventions   Recommendations to Provider Recommend NGT placement as able to provide nutrition until G tube can be placed  Recommend Jevity 1 2 @ 10 ml/hr X initial 24 hrs  Water flushes of 30 ml q 4 hrs  Advance by 5 ml q 12 hrs to eventual initial goal rate of Jevity 1 2 @ 40 ml/hr  Pt at increased risk of refeeding syndrome, monitor K, phos and Mg and replete as needed  Consider Thiamine supplementation

## 2022-10-14 NOTE — PLAN OF CARE
Problem: Potential for Falls  Goal: Patient will remain free of falls  Description: INTERVENTIONS:  - Educate patient/family on patient safety including physical limitations  - Instruct patient to call for assistance with activity   - Consult OT/PT to assist with strengthening/mobility   - Keep Call bell within reach  - Keep bed low and locked with side rails adjusted as appropriate  - Keep care items and personal belongings within reach  - Initiate and maintain comfort rounds  - Make Fall Risk Sign visible to staff  - Offer Toileting every  Hours, in advance of need  - Initiate/Maintain alarm  - Obtain necessary fall risk management equipment:   - Apply yellow socks and bracelet for high fall risk patients  - Consider moving patient to room near nurses station  Outcome: Progressing     Problem: Prexisting or High Potential for Compromised Skin Integrity  Goal: Skin integrity is maintained or improved  Description: INTERVENTIONS:  - Identify patients at risk for skin breakdown  - Assess and monitor skin integrity  - Assess and monitor nutrition and hydration status  - Monitor labs   - Assess for incontinence   - Turn and reposition patient  - Assist with mobility/ambulation  - Relieve pressure over bony prominences  - Avoid friction and shearing  - Provide appropriate hygiene as needed including keeping skin clean and dry  - Evaluate need for skin moisturizer/barrier cream  - Collaborate with interdisciplinary team   - Patient/family teaching  - Consider wound care consult   Outcome: Progressing     Problem: Neurological Deficit  Goal: Neurological status is stable or improving  Description: Interventions:  - Monitor and assess patient's level of consciousness, motor function, sensory function, and level of assistance needed for ADLs  - Monitor and report changes from baseline  Collaborate with interdisciplinary team to initiate plan and implement interventions as ordered     - Provide and maintain a safe environment  - Consider seizure precautions  - Consider fall precautions  - Consider aspiration precautions  - Consider bleeding precautions  Outcome: Progressing     Problem: Activity Intolerance/Impaired Mobility  Goal: Mobility/activity is maintained at optimum level for patient  Description: Interventions:  - Assess and monitor patient  barriers to mobility and need for assistive/adaptive devices  - Assess patient's emotional response to limitations  - Collaborate with interdisciplinary team and initiate plans and interventions as ordered  - Encourage independent activity per ability   - Maintain proper body alignment  - Perform active/passive rom as tolerated/ordered  - Plan activities to conserve energy   - Turn patient as appropriate  Outcome: Progressing     Problem: Communication Impairment  Goal: Ability to express needs and understand communication  Description: Assess patient's communication skills and ability to understand information  Patient will demonstrate use of effective communication techniques, alternative methods of communication and understanding even if not able to speak  - Encourage communication and provide alternate methods of communication as needed  - Collaborate with case management/ for discharge needs  - Include patient/family/caregiver in decisions related to communication  Outcome: Progressing     Problem: Potential for Aspiration  Goal: Non-ventilated patient's risk of aspiration is minimized  Description: Assess and monitor vital signs, respiratory status, and labs (WBC)  Monitor for signs of aspiration (tachypnea, cough, rales, wheezing, cyanosis, fever)  - Assess and monitor patient's ability to swallow  - Place patient up in chair to eat if possible  - HOB up at 90 degrees to eat if unable to get patient up into chair   - Supervise patient during oral intake  - Instruct patient/ family to take small bites    - Instruct patient/ family to take small single sips when taking liquids  - Follow patient-specific strategies generated by speech pathologist   Outcome: Progressing  Goal: Ventilated patient's risk of aspiration is minimized  Description: Assess and monitor vital signs, respiratory status, airway cuff pressure, and labs (WBC)  Monitor for signs of aspiration (tachypnea, cough, rales, wheezing, cyanosis, fever)  - Elevate head of bed 30 degrees if patient has tube feeding   - Monitor tube feeding  Outcome: Progressing     Problem: MOBILITY - ADULT  Goal: Maintain or return to baseline ADL function  Description: INTERVENTIONS:  -  Assess patient's ability to carry out ADLs; assess patient's baseline for ADL function and identify physical deficits which impact ability to perform ADLs (bathing, care of mouth/teeth, toileting, grooming, dressing, etc )  - Assess/evaluate cause of self-care deficits   - Assess range of motion  - Assess patient's mobility; develop plan if impaired  - Assess patient's need for assistive devices and provide as appropriate  - Encourage maximum independence but intervene and supervise when necessary  - Involve family in performance of ADLs  - Assess for home care needs following discharge   - Consider OT consult to assist with ADL evaluation and planning for discharge  - Provide patient education as appropriate  Outcome: Not Progressing  Goal: Maintains/Returns to pre admission functional level  Description: INTERVENTIONS:  - Perform BMAT or MOVE assessment daily    - Set and communicate daily mobility goal to care team and patient/family/caregiver  - Collaborate with rehabilitation services on mobility goals if consulted  - Perform Range of Motion  times a day  - Reposition patient every  hours    - Dangle patient  times a day  - Stand patient  times a day  - Ambulate patient  times a day  - Out of bed to chair  times a day   - Out of bed for meals times a day  - Out of bed for toileting  - Record patient progress and toleration of activity level   Outcome: Not Progressing     Problem: Nutrition/Hydration-ADULT  Goal: Nutrient/Hydration intake appropriate for improving, restoring or maintaining nutritional needs  Description: Monitor and assess patient's nutrition/hydration status for malnutrition  Collaborate with interdisciplinary team and initiate plan and interventions as ordered  Monitor patient's weight and dietary intake as ordered or per policy  Utilize nutrition screening tool and intervene as necessary  Determine patient's food preferences and provide high-protein, high-caloric foods as appropriate  INTERVENTIONS:  - Monitor oral intake, urinary output, labs, and treatment plans  - Assess nutrition and hydration status and recommend course of action  - Evaluate amount of meals eaten  - Assist patient with eating if necessary   - Allow adequate time for meals  - Recommend/ encourage appropriate diets, oral nutritional supplements, and vitamin/mineral supplements  - Order, calculate, and assess calorie counts as needed  - Recommend, monitor, and adjust tube feedings and TPN/PPN based on assessed needs  - Assess need for intravenous fluids  - Provide specific nutrition/hydration education as appropriate  - Include patient/family/caregiver in decisions related to nutrition  Outcome: Not Progressing     Problem: Nutrition  Goal: Nutrition/Hydration status is improving  Description: Monitor and assess patient's nutrition/hydration status for malnutrition (ex- brittle hair, bruises, dry skin, pale skin and conjunctiva, muscle wasting, smooth red tongue, and disorientation)  Collaborate with interdisciplinary team and initiate plan and interventions as ordered  Monitor patient's weight and dietary intake as ordered or per policy  Utilize nutrition screening tool and intervene per policy  Determine patient's food preferences and provide high-protein, high-caloric foods as appropriate       - Assist patient with eating   - Allow adequate time for meals   - Encourage patient to take dietary supplement as ordered  - Collaborate with clinical nutritionist   - Include patient/family/caregiver in decisions related to nutrition    Outcome: Not Progressing

## 2022-10-14 NOTE — PROGRESS NOTES
[facility]  Progress Note - 2003 [facility] 1960, 63 y o  male MRN: [id number]  Unit/Bed#: S -01 Encounter: [id number]  Primary Care Provider: No primary care provider on file  Date and time admitted to hospital: 10/9/2022  3:59 PM    * Acute CVA (cerebrovascular accident) [facility])  Assessment & Plan  Episode of acute left-sided upper extremity and lower extremity weakness along with left-sided lower facial droop and dysarthria with onset morning of 10/9  Patient says he does not remember having these symptoms prior to falling asleep last night  BP on presentation 161/109, max HR and RR in  and 24 respectively  Patient did not get tPa since he was outside the window  EKG showed Sinus tachycardia  Right atrial enlargement  Nonspecific ST abnormality  CT stroke alert brain  Impression: Acute subcortical infarct in the right periventricular white matter, corona radiata and lentiform nuclei  No hemorrhagic conversion or mass effect  Findings were directly discussed with Winsome Cooley at 4:39 PM     CTA stroke alert (head/neck)  Impression: Lung segment critical (greater than 90%) stenosis in the proximal left cervical ICA  Atretic right posterior cerebral artery  No evidence of acute thrombus or large vessel occlusion of the major vessels of the [employer] [place]  MRI brain:   Stable acute to early subacute infarct in the right periventricular white matter, corona radiata and lentiform nuclei  No hemorrhagic conversion or mass effect  Acute lacunar infarct in the left thalamus  ECHO   · Moderate concentric hypertrophy  EF 55%  Mild tricuspid regurgitation  Trivial fibrinous pericardial effusion  Pulmonary artery pressure mildly elevated to 37 0 mmHg       · NIHSS score: 8    Plan   - amlodipine 2 5mg   - NG tube until PEG tube placement per speech  - PEG tube placement Tuesday or Wednesday  -PMR suggests rehab  - hold aspirin and Plavix for PEG tube placement in 5 days  - continue atorvastatin 40mg    - neuro checks  - provide stroke education  - Will follow up with neurovascular regarding carotid duplex findings in 4-6 weeks  - acute rehab treatment after discharge       Aspiration into respiratory tract  Assessment & Plan  CTA showed aspiration pneumonia in the upper lung fields L more than R  Chest XR showed Ill-defined opacity in the left upper lung which corresponds with bronchiectasis and tree-in-bud nodularity on the CTA neck, which could be due to aspiration versus bronchiolitis  At admission Leukocytosis to 18 20 and procalcitonin elevated to 0 88 but afebrile over hospital stay  Rhonchi have improved over the course of admission      -continue ceftriaxone day 6/7   - will get NG tube until PEG tube placement  - nebulizer treatments   - PFTs outpatient for suspected chronic lung disease     Hyponatremia  Assessment & Plan  Recent Labs     10/09/22  1637   SODIUM 119*   Recent sodium 133    AM cortisol increased at 41 0     Renal US normal    133 his latest sodium level     Plan:  · Daily BMPs  · Urine sodium low indicating volume depletion  ? Continue isotonic saline 75 mL/hour and monitor   ? Avoid over-correction, recommend 4-6 point increase over 24 hours      More than 50 percent stenosis of left internal carotid artery  Assessment & Plan  CTA of head/neck  Impression: 1  Lung segment critical (greater than 90%) stenosis in the proximal left cervical ICA  2   Atretic right posterior cerebral artery  No evidence of acute thrombus or large vessel occlusion of the major vessels of the [place]  Carotid Complex Study  Impression: R ICA <50% heterogeneous and irregular occlusion  L ICA >70% heterogeneous and irregular occlusion  Plan   - F/u with neurovascular in 4-6 weeks on carotid duplex findings    Alcohol use disorder, mild, abuse  Assessment & Plan  Patient endorses 4-6 drinks per day consisting of 5% alcohol        -[facility] protocol Elevated serum creatinine  Assessment & Plan  Assessment:  Pt had an elevated creatinine of 1 9 on admission  Recent Labs     10/09/22  1637   CREATININE 1 90*   EGFR 37     Renal US unremarkable     Estimated Creatinine Clearance: 34 2 mL/min (A) (by C-G formula based on SCr of 1 9 mg/dL (H))  Unsure of pt's baseline but creatinine upon presentation to ED in January 2022 was 0 53  Patient's creatinine is now around his baseline (0 68 on 10/13)      Plan:  • IVF 75 mL/hr NS   • Follow on BMP      Mild protein-calorie malnutrition ([address] )  Assessment & Plan  Malnutrition Findings:   Adult Malnutrition type: Social/enviromental  Adult Degree of Malnutrition: Malnutrition of mild degree  Malnutrition Characteristics: Muscle loss, Other (comment), Inadequate energy (BMI < 18 5)               360 Statement: Mild protein calorie malnutrition r/t inadequate intake as evidenced by inability to eat sufficient energy/protein to maintain a healthy weight, muscle loss present to clavicle, and BMI <18 5; currently being treated with nutritional supplements  BMI Findings:  Adult BMI Classifications: Underweight < 18 5        Body mass index is 17 71 kg/m²  · Magnesium sulfate supplementation  · Nutritional supplement pudding ordered for each meal, feed with assistance and turn head to the left when swallowing  · NG tube placement until PEG tube insertion   · IR consult for PEG tube placement in 5 days      VTE Pharmacologic Prophylaxis: VTE Score: 7 High Risk (Score >/= 5) - Pharmacological DVT Prophylaxis Ordered: heparin  Sequential Compression Devices Ordered  Patient Centered Rounds: I performed bedside rounds with nursing staff today  Discussions with Specialists or Other Care Team Provider: Occupational Therapy, Case management, neurology, vascular    Education and Discussions with Family / Patient: Updated  (sister) via phone      Current Length of Stay: 5 day(s)  Current Patient Status: Inpatient   Discharge Plan: Anticipate discharge in >72 hrs to rehab facility  Code Status: Level 1 - Full Code    Subjective:   Patient was seen at bedside  He was sleeping when I entered the room and he said he slept well overnight  No acute overnight events  Patient denies fever, SOB, chest pain, diarrhea, constipation, N/V  Objective:     Vitals:   Temp (24hrs), Av °F (36 7 °C), Min:97 7 °F (36 5 °C), Max:98 7 °F (37 1 °C)    Temp:  [97 7 °F (36 5 °C)-98 7 °F (37 1 °C)] 97 7 °F (36 5 °C)  HR:  [63-79] 63  Resp:  [18-20] 20  BP: (158-176)/(83-93) 163/83  SpO2:  [95 %-100 %] 100 %  Body mass index is 17 71 kg/m²  Input and Output Summary (last 24 hours): Intake/Output Summary (Last 24 hours) at 10/14/2022 1419  Last data filed at 10/14/2022 1300  Gross per 24 hour   Intake 0 ml   Output 500 ml   Net -500 ml       Physical Exam:   Physical Exam  Constitutional:       General: He is awake  He is not in acute distress  Comments: disheveled    HENT:      Mouth/Throat:      Lips: Pink  No lesions  Mouth: Mucous membranes are dry  Dentition: Abnormal dentition  Eyes:      General: Lids are normal  Gaze aligned appropriately  Cardiovascular:      Rate and Rhythm: Normal rate and regular rhythm  Heart sounds: S1 normal and S2 normal    Pulmonary:      Breath sounds: Examination of the right-upper field reveals rhonchi  Examination of the left-upper field reveals rhonchi  Examination of the right-middle field reveals rhonchi  Examination of the left-middle field reveals rhonchi  Examination of the right-lower field reveals rhonchi  Examination of the left-lower field reveals rhonchi  Rhonchi present  Abdominal:      General: Abdomen is flat  Palpations: Abdomen is soft  Tenderness: There is no abdominal tenderness  Musculoskeletal:      Cervical back: Full passive range of motion without pain and normal range of motion  Right lower leg: No edema        Left lower leg: No edema  Feet:      Right foot:      Skin integrity: Dry skin present  Toenail Condition: Fungal disease present  Left foot:      Skin integrity: Dry skin present  Toenail Condition: Fungal disease present  Skin:            Comments: Bilateral forearm ecchymoses    Neurological:      Mental Status: He is alert  Cranial Nerves: Cranial nerve deficit, dysarthria and facial asymmetry present  Sensory: Sensation is intact  Motor: Weakness present  Comments: 0/5 strength on the left side, could move his toes today  5/5 strength on the right side   L sided facial droop at the level of the mouth   Upper face movement normal bilaterally  More oriented than yesterday but still only speaks in short sentences with dysarthric speech  Ability to follow commands improved from yesterday    Psychiatric:         Behavior: Behavior is cooperative  Additional Data:     Labs:  Results from last 7 days   Lab Units 10/14/22  0500   WBC Thousand/uL 6 14   HEMOGLOBIN g/dL 11 6*   HEMATOCRIT % 37 3   PLATELETS Thousands/uL 97*   NEUTROS PCT % 67   LYMPHS PCT % 17   MONOS PCT % 12   EOS PCT % 2     Results from last 7 days   Lab Units 10/14/22  0500 10/11/22  1340 10/11/22  0602   SODIUM mmol/L 132*   < > 125*   POTASSIUM mmol/L 3 6   < > 3 9   CHLORIDE mmol/L 100   < > 94*   CO2 mmol/L 22   < > 20*   BUN mg/dL 12   < > 25   CREATININE mg/dL 0 64   < > 1 26   ANION GAP mmol/L 10   < > 11   CALCIUM mg/dL 8 2*   < > 8 2*   ALBUMIN g/dL  --   --  2 7*   TOTAL BILIRUBIN mg/dL  --   --  0 58   ALK PHOS U/L  --   --  91   ALT U/L  --   --  4*   AST U/L  --   --  12*   GLUCOSE RANDOM mg/dL 72   < > 87    < > = values in this interval not displayed       Results from last 7 days   Lab Units 10/09/22  1637   INR  1 08     Results from last 7 days   Lab Units 10/11/22  0637   POC GLUCOSE mg/dl 93     Results from last 7 days   Lab Units 10/10/22  0810   HEMOGLOBIN A1C % 5 0     Results from last 7 days   Lab Units 10/09/22  2358 10/09/22  2251 10/09/22  1740 10/09/22  1637   LACTIC ACID mmol/L 1 8 1 8 2 8*  --    PROCALCITONIN ng/ml  --   --   --  0 88*       Lines/Drains:  Invasive Devices  Report    Peripheral Intravenous Line  Duration           Peripheral IV 10/09/22 Right Arm 4 days                  Telemetry:  Telemetry Orders (From admission, onward)             48 Hour Telemetry Monitoring  Continuous x 48 hours        Expiring   References:    Telemetry Guidelines   Question:  Reason for 48 Hour Telemetry  Answer:  Acute CVA (<24 hrs old, hemispheric strokes, selected brainstem strokes, cardiac arrhythmias)                 Telemetry Reviewed: Normal Sinus Rhythm  Indication for Continued Telemetry Use: Acute CVA             Imaging: Reviewed radiology reports from this admission including: chest xray, MRI brain, ECHO and CTA    Recent Cultures (last 7 days):   Results from last 7 days   Lab Units 10/09/22  1753 10/09/22  1752   BLOOD CULTURE  No Growth After 4 Days  No Growth After 4 Days  Last 24 Hours Medication List:   Current Facility-Administered Medications   Medication Dose Route Frequency Provider Last Rate   • amLODIPine  5 mg Oral Daily Steven Pedersen MD     • atorvastatin  40 mg Oral Daily With [facility] Gurinder Walker DO     • cefTRIAXone  1,000 mg Intravenous Q24H Renea Gould MD 1,000 mg (10/13/22 4561)   • enoxaparin  40 mg Subcutaneous Daily Dionicio Palacios MD     • ipratropium  0 5 mg Nebulization Q6H PRN Dionicio Palacios MD     • levalbuterol  1 25 mg Nebulization Q6H PRN Dionicio Palacios MD          Today, Patient Was Seen By: Dr Lion Stevens,  Dr Singh Grayson    **Please Note: This note may have been constructed using a voice recognition system  **

## 2022-10-14 NOTE — SPEECH THERAPY NOTE
Speech Language/Pathology    Speech/Language Pathology Progress Note    Patient Name: Codie Restrepo Date: 10/14/2022    Subjective:  Re-eval requested as patient's PEG placement is on hold until next week  Objective:  Patient was able to recall L head turn upon my arrival independently  He self fed pudding via tsp presentations  After x2 presentations- despite L head turn he was noted to have overt coughing with expectoration and subsequent anterior spill  Coughing continued for several minutes  Oropharyngeal suction provided with sang  Patient refused additional po  We discussed recommendations for NPO due to aspiration risk as well as pending NG/PEG and recommendations for ongoing dysphagia therapy  Reciprocal comprehension was verbally expressed  All questions answered  Assessment:  Patient continues to present with significant risk for aspiration across po      Plan/Recommendations:  NPO with alternative means of nutrition    DINO Lopez , CCC-SLP  Speech Language Pathologist   Available via [address] [place] [address] #[id number]  [place] #[id number]

## 2022-10-14 NOTE — PROGRESS NOTES
NG tube inserted, patient tolerated well  Checked placement with auscultation  After 15 mins answered call bell, patient had self removed the NG tube  Saw it sitting on the bedside table  Stated he "could not take it"  [facility] notified they will reassess tomorrow

## 2022-10-14 NOTE — MALNUTRITION/BMI
This medical record reflects one or more clinical indicators suggestive of malnutrition and/or morbid obesity  Malnutrition Findings:   Adult Malnutrition type: Acute illness  Adult Degree of Malnutrition: Malnutrition of moderate degree  Malnutrition Characteristics: Muscle loss, Inadequate energy              360 Statement: Moderate Protein calorie malnutrition r/t inadequate intake as evidenced by < 75% energy intake compared to energy needs > 7 days, and muscle depletion to clavicle and quadriceps; plan for G-tube placement    BMI Findings:  Adult BMI Classifications: Underweight < 18 5        Body mass index is 17 71 kg/m²  See Nutrition note dated 10/14/2022 for additional details  Completed nutrition assessment is viewable in the nutrition documentation

## 2022-10-15 ENCOUNTER — APPOINTMENT (INPATIENT)
Dept: RADIOLOGY | Facility: HOSPITAL | Age: 62
DRG: 871 | End: 2022-10-15
Payer: COMMERCIAL

## 2022-10-15 PROBLEM — E87.6 HYPOKALEMIA: Status: ACTIVE | Noted: 2022-10-15

## 2022-10-15 PROBLEM — R79.89 ELEVATED SERUM CREATININE: Status: RESOLVED | Noted: 2022-10-09 | Resolved: 2022-10-15

## 2022-10-15 LAB
ANION GAP SERPL CALCULATED.3IONS-SCNC: 6 MMOL/L (ref 4–13)
ANION GAP SERPL CALCULATED.3IONS-SCNC: 6 MMOL/L (ref 4–13)
ANION GAP SERPL CALCULATED.3IONS-SCNC: 7 MMOL/L (ref 4–13)
ANION GAP SERPL CALCULATED.3IONS-SCNC: 7 MMOL/L (ref 4–13)
BUN SERPL-MCNC: 7 MG/DL (ref 5–25)
BUN SERPL-MCNC: 7 MG/DL (ref 5–25)
BUN SERPL-MCNC: 8 MG/DL (ref 5–25)
BUN SERPL-MCNC: 8 MG/DL (ref 5–25)
CALCIUM SERPL-MCNC: 7.1 MG/DL (ref 8.4–10.2)
CALCIUM SERPL-MCNC: 7.1 MG/DL (ref 8.4–10.2)
CALCIUM SERPL-MCNC: 8.3 MG/DL (ref 8.4–10.2)
CALCIUM SERPL-MCNC: 8.3 MG/DL (ref 8.4–10.2)
CHLORIDE SERPL-SCNC: 101 MMOL/L (ref 96–108)
CHLORIDE SERPL-SCNC: 101 MMOL/L (ref 96–108)
CHLORIDE SERPL-SCNC: 98 MMOL/L (ref 96–108)
CHLORIDE SERPL-SCNC: 98 MMOL/L (ref 96–108)
CO2 SERPL-SCNC: 22 MMOL/L (ref 21–32)
CO2 SERPL-SCNC: 22 MMOL/L (ref 21–32)
CO2 SERPL-SCNC: 24 MMOL/L (ref 21–32)
CO2 SERPL-SCNC: 24 MMOL/L (ref 21–32)
CREAT SERPL-MCNC: 0.55 MG/DL (ref 0.6–1.3)
CREAT SERPL-MCNC: 0.55 MG/DL (ref 0.6–1.3)
CREAT SERPL-MCNC: 0.6 MG/DL (ref 0.6–1.3)
CREAT SERPL-MCNC: 0.6 MG/DL (ref 0.6–1.3)
ERYTHROCYTE [DISTWIDTH] IN BLOOD BY AUTOMATED COUNT: 14.3 % (ref 11.6–15.1)
ERYTHROCYTE [DISTWIDTH] IN BLOOD BY AUTOMATED COUNT: 14.3 % (ref 11.6–15.1)
GFR SERPL CREATININE-BSD FRML MDRD: 110 ML/MIN/1.73SQ M
GFR SERPL CREATININE-BSD FRML MDRD: 110 ML/MIN/1.73SQ M
GFR SERPL CREATININE-BSD FRML MDRD: 114 ML/MIN/1.73SQ M
GFR SERPL CREATININE-BSD FRML MDRD: 114 ML/MIN/1.73SQ M
GLUCOSE SERPL-MCNC: 405 MG/DL (ref 65–140)
GLUCOSE SERPL-MCNC: 405 MG/DL (ref 65–140)
GLUCOSE SERPL-MCNC: 90 MG/DL (ref 65–140)
GLUCOSE SERPL-MCNC: 90 MG/DL (ref 65–140)
GLUCOSE SERPL-MCNC: 93 MG/DL (ref 65–140)
GLUCOSE SERPL-MCNC: 93 MG/DL (ref 65–140)
HCT VFR BLD AUTO: 29.9 % (ref 36.5–49.3)
HCT VFR BLD AUTO: 29.9 % (ref 36.5–49.3)
HGB BLD-MCNC: 9.8 G/DL (ref 12–17)
HGB BLD-MCNC: 9.8 G/DL (ref 12–17)
MCH RBC QN AUTO: 29.9 PG (ref 26.8–34.3)
MCH RBC QN AUTO: 29.9 PG (ref 26.8–34.3)
MCHC RBC AUTO-ENTMCNC: 32.8 G/DL (ref 31.4–37.4)
MCHC RBC AUTO-ENTMCNC: 32.8 G/DL (ref 31.4–37.4)
MCV RBC AUTO: 91 FL (ref 82–98)
MCV RBC AUTO: 91 FL (ref 82–98)
PHOSPHATE SERPL-MCNC: 1.8 MG/DL (ref 2.7–4.5)
PHOSPHATE SERPL-MCNC: 1.8 MG/DL (ref 2.7–4.5)
PLATELET # BLD AUTO: 100 THOUSANDS/UL (ref 149–390)
PLATELET # BLD AUTO: 100 THOUSANDS/UL (ref 149–390)
PMV BLD AUTO: 9.3 FL (ref 8.9–12.7)
PMV BLD AUTO: 9.3 FL (ref 8.9–12.7)
POTASSIUM SERPL-SCNC: 2.6 MMOL/L (ref 3.5–5.3)
POTASSIUM SERPL-SCNC: 2.6 MMOL/L (ref 3.5–5.3)
POTASSIUM SERPL-SCNC: 4 MMOL/L (ref 3.5–5.3)
POTASSIUM SERPL-SCNC: 4 MMOL/L (ref 3.5–5.3)
RBC # BLD AUTO: 3.28 MILLION/UL (ref 3.88–5.62)
RBC # BLD AUTO: 3.28 MILLION/UL (ref 3.88–5.62)
SODIUM SERPL-SCNC: 128 MMOL/L (ref 135–147)
SODIUM SERPL-SCNC: 128 MMOL/L (ref 135–147)
SODIUM SERPL-SCNC: 130 MMOL/L (ref 135–147)
SODIUM SERPL-SCNC: 130 MMOL/L (ref 135–147)
WBC # BLD AUTO: 4.73 THOUSAND/UL (ref 4.31–10.16)
WBC # BLD AUTO: 4.73 THOUSAND/UL (ref 4.31–10.16)

## 2022-10-15 PROCEDURE — 99232 SBSQ HOSP IP/OBS MODERATE 35: CPT | Performed by: INTERNAL MEDICINE

## 2022-10-15 PROCEDURE — 82948 REAGENT STRIP/BLOOD GLUCOSE: CPT

## 2022-10-15 PROCEDURE — 84100 ASSAY OF PHOSPHORUS: CPT | Performed by: INTERNAL MEDICINE

## 2022-10-15 PROCEDURE — 80048 BASIC METABOLIC PNL TOTAL CA: CPT

## 2022-10-15 PROCEDURE — 71045 X-RAY EXAM CHEST 1 VIEW: CPT

## 2022-10-15 PROCEDURE — 80048 BASIC METABOLIC PNL TOTAL CA: CPT | Performed by: INTERNAL MEDICINE

## 2022-10-15 PROCEDURE — 85027 COMPLETE CBC AUTOMATED: CPT

## 2022-10-15 RX ORDER — POTASSIUM CHLORIDE 14.9 MG/ML
20 INJECTION INTRAVENOUS
Status: COMPLETED | OUTPATIENT
Start: 2022-10-15 | End: 2022-10-15

## 2022-10-15 RX ORDER — LABETALOL HYDROCHLORIDE 5 MG/ML
10 INJECTION, SOLUTION INTRAVENOUS EVERY 6 HOURS PRN
Status: DISCONTINUED | OUTPATIENT
Start: 2022-10-15 | End: 2022-10-18

## 2022-10-15 RX ORDER — POTASSIUM CHLORIDE 14.9 MG/ML
20 INJECTION INTRAVENOUS
Status: DISCONTINUED | OUTPATIENT
Start: 2022-10-15 | End: 2022-10-15

## 2022-10-15 RX ADMIN — ENOXAPARIN SODIUM 40 MG: 40 INJECTION SUBCUTANEOUS at 07:54

## 2022-10-15 RX ADMIN — CEFTRIAXONE 1000 MG: 1 INJECTION, SOLUTION INTRAVENOUS at 00:00

## 2022-10-15 RX ADMIN — POTASSIUM CHLORIDE 20 MEQ: 14.9 INJECTION, SOLUTION INTRAVENOUS at 09:44

## 2022-10-15 RX ADMIN — CEFTRIAXONE 1000 MG: 1 INJECTION, SOLUTION INTRAVENOUS at 23:51

## 2022-10-15 RX ADMIN — DEXTROSE AND SODIUM CHLORIDE 75 ML/HR: 5; .9 INJECTION, SOLUTION INTRAVENOUS at 00:03

## 2022-10-15 RX ADMIN — POTASSIUM CHLORIDE 20 MEQ: 14.9 INJECTION, SOLUTION INTRAVENOUS at 14:00

## 2022-10-15 RX ADMIN — POTASSIUM CHLORIDE 20 MEQ: 14.9 INJECTION, SOLUTION INTRAVENOUS at 11:56

## 2022-10-15 RX ADMIN — LABETALOL HYDROCHLORIDE 10 MG: 5 INJECTION, SOLUTION INTRAVENOUS at 18:36

## 2022-10-15 RX ADMIN — POTASSIUM CHLORIDE 20 MEQ: 14.9 INJECTION, SOLUTION INTRAVENOUS at 07:55

## 2022-10-15 NOTE — PLAN OF CARE
Problem: Potential for Falls  Goal: Patient will remain free of falls  Description: INTERVENTIONS:  - Educate patient/family on patient safety including physical limitations  - Instruct patient to call for assistance with activity   - Consult OT/PT to assist with strengthening/mobility   - Keep Call bell within reach  - Keep bed low and locked with side rails adjusted as appropriate  - Keep care items and personal belongings within reach  - Initiate and maintain comfort rounds  - Make Fall Risk Sign visible to staff  - Offer Toileting every 2 Hours, in advance of need  - Initiate/Maintain alarm  - Obtain necessary fall risk management equipment:   - Apply yellow socks and bracelet for high fall risk patients  - Consider moving patient to room near nurses station  Outcome: Progressing     Problem: MOBILITY - ADULT  Goal: Maintain or return to baseline ADL function  Description: INTERVENTIONS:  -  Assess patient's ability to carry out ADLs; assess patient's baseline for ADL function and identify physical deficits which impact ability to perform ADLs (bathing, care of mouth/teeth, toileting, grooming, dressing, etc )  - Assess/evaluate cause of self-care deficits   - Assess range of motion  - Assess patient's mobility; develop plan if impaired  - Assess patient's need for assistive devices and provide as appropriate  - Encourage maximum independence but intervene and supervise when necessary  - Involve family in performance of ADLs  - Assess for home care needs following discharge   - Consider OT consult to assist with ADL evaluation and planning for discharge  - Provide patient education as appropriate  Outcome: Progressing  Goal: Maintains/Returns to pre admission functional level  Description: INTERVENTIONS:  - Perform BMAT or MOVE assessment daily    - Set and communicate daily mobility goal to care team and patient/family/caregiver     - Collaborate with rehabilitation services on mobility goals if consulted  - Perform Range of Motion   - Reposition patient   - Dangle patient    - Stand patient   - Ambulate patient    - Out of bed to chair   - Out of bed for meals   - Out of bed for toileting  - Record patient progress and toleration of activity level   Outcome: Progressing     Problem: Prexisting or High Potential for Compromised Skin Integrity  Goal: Skin integrity is maintained or improved  Description: INTERVENTIONS:  - Identify patients at risk for skin breakdown  - Assess and monitor skin integrity  - Assess and monitor nutrition and hydration status  - Monitor labs   - Assess for incontinence   - Turn and reposition patient  - Assist with mobility/ambulation  - Relieve pressure over bony prominences  - Avoid friction and shearing  - Provide appropriate hygiene as needed including keeping skin clean and dry  - Evaluate need for skin moisturizer/barrier cream  - Collaborate with interdisciplinary team   - Patient/family teaching  - Consider wound care consult   Outcome: Progressing     Problem: Nutrition/Hydration-ADULT  Goal: Nutrient/Hydration intake appropriate for improving, restoring or maintaining nutritional needs  Description: Monitor and assess patient's nutrition/hydration status for malnutrition  Collaborate with interdisciplinary team and initiate plan and interventions as ordered  Monitor patient's weight and dietary intake as ordered or per policy  Utilize nutrition screening tool and intervene as necessary  Determine patient's food preferences and provide high-protein, high-caloric foods as appropriate       INTERVENTIONS:  - Monitor oral intake, urinary output, labs, and treatment plans  - Assess nutrition and hydration status and recommend course of action  - Evaluate amount of meals eaten  - Assist patient with eating if necessary   - Allow adequate time for meals  - Recommend/ encourage appropriate diets, oral nutritional supplements, and vitamin/mineral supplements  - Order, calculate, and assess calorie counts as needed  - Recommend, monitor, and adjust tube feedings and TPN/PPN based on assessed needs  - Assess need for intravenous fluids  - Provide specific nutrition/hydration education as appropriate  - Include patient/family/caregiver in decisions related to nutrition  Outcome: Progressing     Problem: Neurological Deficit  Goal: Neurological status is stable or improving  Description: Interventions:  - Monitor and assess patient's level of consciousness, motor function, sensory function, and level of assistance needed for ADLs  - Monitor and report changes from baseline  Collaborate with interdisciplinary team to initiate plan and implement interventions as ordered  - Provide and maintain a safe environment  - Consider seizure precautions  - Consider fall precautions  - Consider aspiration precautions  - Consider bleeding precautions  Outcome: Progressing     Problem: Activity Intolerance/Impaired Mobility  Goal: Mobility/activity is maintained at optimum level for patient  Description: Interventions:  - Assess and monitor patient  barriers to mobility and need for assistive/adaptive devices  - Assess patient's emotional response to limitations  - Collaborate with interdisciplinary team and initiate plans and interventions as ordered  - Encourage independent activity per ability   - Maintain proper body alignment  - Perform active/passive rom as tolerated/ordered  - Plan activities to conserve energy   - Turn patient as appropriate  Outcome: Progressing     Problem: Communication Impairment  Goal: Ability to express needs and understand communication  Description: Assess patient's communication skills and ability to understand information  Patient will demonstrate use of effective communication techniques, alternative methods of communication and understanding even if not able to speak  - Encourage communication and provide alternate methods of communication as needed    - Collaborate with case management/ for discharge needs  - Include patient/family/caregiver in decisions related to communication  Outcome: Progressing     Problem: Potential for Aspiration  Goal: Non-ventilated patient's risk of aspiration is minimized  Description: Assess and monitor vital signs, respiratory status, and labs (WBC)  Monitor for signs of aspiration (tachypnea, cough, rales, wheezing, cyanosis, fever)  - Assess and monitor patient's ability to swallow  - Place patient up in chair to eat if possible  - HOB up at 90 degrees to eat if unable to get patient up into chair   - Supervise patient during oral intake  - Instruct patient/ family to take small bites  - Instruct patient/ family to take small single sips when taking liquids  - Follow patient-specific strategies generated by speech pathologist   Outcome: Progressing  Goal: Ventilated patient's risk of aspiration is minimized  Description: Assess and monitor vital signs, respiratory status, airway cuff pressure, and labs (WBC)  Monitor for signs of aspiration (tachypnea, cough, rales, wheezing, cyanosis, fever)  - Elevate head of bed 30 degrees if patient has tube feeding   - Monitor tube feeding  Outcome: Progressing     Problem: Nutrition  Goal: Nutrition/Hydration status is improving  Description: Monitor and assess patient's nutrition/hydration status for malnutrition (ex- brittle hair, bruises, dry skin, pale skin and conjunctiva, muscle wasting, smooth red tongue, and disorientation)  Collaborate with interdisciplinary team and initiate plan and interventions as ordered  Monitor patient's weight and dietary intake as ordered or per policy  Utilize nutrition screening tool and intervene per policy  Determine patient's food preferences and provide high-protein, high-caloric foods as appropriate  - Assist patient with eating   - Allow adequate time for meals   - Encourage patient to take dietary supplement as ordered    - Collaborate with clinical nutritionist   - Include patient/family/caregiver in decisions related to nutrition    Outcome: Progressing

## 2022-10-15 NOTE — PLAN OF CARE
Problem: Potential for Falls  Goal: Patient will remain free of falls  Description: INTERVENTIONS:  - Educate patient/family on patient safety including physical limitations  - Instruct patient to call for assistance with activity   - Consult OT/PT to assist with strengthening/mobility   - Keep Call bell within reach  - Keep bed low and locked with side rails adjusted as appropriate  - Keep care items and personal belongings within reach  - Initiate and maintain comfort rounds  - Make Fall Risk Sign visible to staff  - Offer Toileting every  Hours, in advance of need  - Initiate/Maintain alarm  - Obtain necessary fall risk management equipment:   - Apply yellow socks and bracelet for high fall risk patients  - Consider moving patient to room near nurses station  Outcome: Progressing     Problem: MOBILITY - ADULT  Goal: Maintain or return to baseline ADL function  Description: INTERVENTIONS:  -  Assess patient's ability to carry out ADLs; assess patient's baseline for ADL function and identify physical deficits which impact ability to perform ADLs (bathing, care of mouth/teeth, toileting, grooming, dressing, etc )  - Assess/evaluate cause of self-care deficits   - Assess range of motion  - Assess patient's mobility; develop plan if impaired  - Assess patient's need for assistive devices and provide as appropriate  - Encourage maximum independence but intervene and supervise when necessary  - Involve family in performance of ADLs  - Assess for home care needs following discharge   - Consider OT consult to assist with ADL evaluation and planning for discharge  - Provide patient education as appropriate  Outcome: Progressing  Goal: Maintains/Returns to pre admission functional level  Description: INTERVENTIONS:  - Perform BMAT or MOVE assessment daily    - Set and communicate daily mobility goal to care team and patient/family/caregiver     - Collaborate with rehabilitation services on mobility goals if consulted  - Perform Range of Motion  times a day  - Reposition patient every  hours  - Dangle patient  times a day  - Stand patient  times a day  - Ambulate patient  times a day  - Out of bed to chair  times a day   - Out of bed for meals times a day  - Out of bed for toileting  - Record patient progress and toleration of activity level   Outcome: Progressing     Problem: Prexisting or High Potential for Compromised Skin Integrity  Goal: Skin integrity is maintained or improved  Description: INTERVENTIONS:  - Identify patients at risk for skin breakdown  - Assess and monitor skin integrity  - Assess and monitor nutrition and hydration status  - Monitor labs   - Assess for incontinence   - Turn and reposition patient  - Assist with mobility/ambulation  - Relieve pressure over bony prominences  - Avoid friction and shearing  - Provide appropriate hygiene as needed including keeping skin clean and dry  - Evaluate need for skin moisturizer/barrier cream  - Collaborate with interdisciplinary team   - Patient/family teaching  - Consider wound care consult   Outcome: Progressing     Problem: Nutrition/Hydration-ADULT  Goal: Nutrient/Hydration intake appropriate for improving, restoring or maintaining nutritional needs  Description: Monitor and assess patient's nutrition/hydration status for malnutrition  Collaborate with interdisciplinary team and initiate plan and interventions as ordered  Monitor patient's weight and dietary intake as ordered or per policy  Utilize nutrition screening tool and intervene as necessary  Determine patient's food preferences and provide high-protein, high-caloric foods as appropriate       INTERVENTIONS:  - Monitor oral intake, urinary output, labs, and treatment plans  - Assess nutrition and hydration status and recommend course of action  - Evaluate amount of meals eaten  - Assist patient with eating if necessary   - Allow adequate time for meals  - Recommend/ encourage appropriate diets, oral nutritional supplements, and vitamin/mineral supplements  - Order, calculate, and assess calorie counts as needed  - Recommend, monitor, and adjust tube feedings and TPN/PPN based on assessed needs  - Assess need for intravenous fluids  - Provide specific nutrition/hydration education as appropriate  - Include patient/family/caregiver in decisions related to nutrition  Outcome: Progressing     Problem: Neurological Deficit  Goal: Neurological status is stable or improving  Description: Interventions:  - Monitor and assess patient's level of consciousness, motor function, sensory function, and level of assistance needed for ADLs  - Monitor and report changes from baseline  Collaborate with interdisciplinary team to initiate plan and implement interventions as ordered  - Provide and maintain a safe environment  - Consider seizure precautions  - Consider fall precautions  - Consider aspiration precautions  - Consider bleeding precautions  Outcome: Progressing     Problem: Activity Intolerance/Impaired Mobility  Goal: Mobility/activity is maintained at optimum level for patient  Description: Interventions:  - Assess and monitor patient  barriers to mobility and need for assistive/adaptive devices  - Assess patient's emotional response to limitations  - Collaborate with interdisciplinary team and initiate plans and interventions as ordered  - Encourage independent activity per ability   - Maintain proper body alignment  - Perform active/passive rom as tolerated/ordered  - Plan activities to conserve energy   - Turn patient as appropriate  Outcome: Progressing     Problem: Communication Impairment  Goal: Ability to express needs and understand communication  Description: Assess patient's communication skills and ability to understand information  Patient will demonstrate use of effective communication techniques, alternative methods of communication and understanding even if not able to speak       - Encourage communication and provide alternate methods of communication as needed  - Collaborate with case management/ for discharge needs  - Include patient/family/caregiver in decisions related to communication  Outcome: Progressing     Problem: Potential for Aspiration  Goal: Non-ventilated patient's risk of aspiration is minimized  Description: Assess and monitor vital signs, respiratory status, and labs (WBC)  Monitor for signs of aspiration (tachypnea, cough, rales, wheezing, cyanosis, fever)  - Assess and monitor patient's ability to swallow  - Place patient up in chair to eat if possible  - HOB up at 90 degrees to eat if unable to get patient up into chair   - Supervise patient during oral intake  - Instruct patient/ family to take small bites  - Instruct patient/ family to take small single sips when taking liquids  - Follow patient-specific strategies generated by speech pathologist   Outcome: Progressing  Goal: Ventilated patient's risk of aspiration is minimized  Description: Assess and monitor vital signs, respiratory status, airway cuff pressure, and labs (WBC)  Monitor for signs of aspiration (tachypnea, cough, rales, wheezing, cyanosis, fever)  - Elevate head of bed 30 degrees if patient has tube feeding   - Monitor tube feeding  Outcome: Progressing     Problem: Nutrition  Goal: Nutrition/Hydration status is improving  Description: Monitor and assess patient's nutrition/hydration status for malnutrition (ex- brittle hair, bruises, dry skin, pale skin and conjunctiva, muscle wasting, smooth red tongue, and disorientation)  Collaborate with interdisciplinary team and initiate plan and interventions as ordered  Monitor patient's weight and dietary intake as ordered or per policy  Utilize nutrition screening tool and intervene per policy  Determine patient's food preferences and provide high-protein, high-caloric foods as appropriate       - Assist patient with eating   - Allow adequate time for meals   - Encourage patient to take dietary supplement as ordered  - Collaborate with clinical nutritionist   - Include patient/family/caregiver in decisions related to nutrition    Outcome: Progressing

## 2022-10-15 NOTE — PROGRESS NOTES
[facility]  Progress Note - 2005 [facility] 1960, 63 y o  male MRN: [id number]  Unit/Bed#: S -01 Encounter: [id number]  Primary Care Provider: No primary care provider on file  Date and time admitted to hospital: 10/9/2022  3:59 PM    * Acute CVA (cerebrovascular accident) [facility])  Assessment & Plan  Episode of acute left-sided upper extremity and lower extremity weakness along with left-sided lower facial droop and dysarthria with onset morning of 10/9  Patient says he does not remember having these symptoms prior to falling asleep last night  BP on presentation 161/109, max HR and RR in  and 24 respectively  Patient did not get tPa since he was outside the window  EKG showed Sinus tachycardia  Right atrial enlargement  Nonspecific ST abnormality  CT stroke alert brain  Impression: Acute subcortical infarct in the right periventricular white matter, corona radiata and lentiform nuclei  No hemorrhagic conversion or mass effect  Findings were directly discussed with Marli Strange at 4:39 PM     CTA stroke alert (head/neck)  Impression: Lung segment critical (greater than 90%) stenosis in the proximal left cervical ICA  Atretic right posterior cerebral artery  No evidence of acute thrombus or large vessel occlusion of the major vessels of the [place]  MRI brain:   Stable acute to early subacute infarct in the right periventricular white matter, corona radiata and lentiform nuclei  No hemorrhagic conversion or mass effect  Acute lacunar infarct in the left thalamus  ECHO   · Moderate concentric hypertrophy  EF 55%  Mild tricuspid regurgitation  Trivial fibrinous pericardial effusion  Pulmonary artery pressure mildly elevated to 37 0 mmHg       · NIHSS score: 8    Plan   - Amlodipine 5mg   - NG tube placed yesterday however patient pulled it out due to discomfort  - Keofed tube placed today  - plan for IR PEG tube placement on Tuesday, will contact IR on Monday to confirm  - PMR suggests rehab  - holding aspirin and Plavix for PEG tube placement in 5 days  - continue atorvastatin 40mg    - neuro checks  - provide stroke education  - Will follow up with neurovascular regarding carotid duplex findings in 4-6 weeks  - acute rehab treatment after discharge       Aspiration into respiratory tract  Assessment & Plan  CTA showed aspiration pneumonia in the upper lung fields L more than R  Chest XR showed Ill-defined opacity in the left upper lung which corresponds with bronchiectasis and tree-in-bud nodularity on the CTA neck, which could be due to aspiration versus bronchiolitis  At admission Leukocytosis to 18 20 and procalcitonin elevated to 0 88 but afebrile over hospital stay  Rhonchi have improved over the course of admission  -ceftriaxone day 7/7   - Braulio Trinity in place until PEG tube placement  - nebulizer treatments   - PFTs outpatient for suspected chronic lung disease     Hypokalemia  Assessment & Plan  · Potassium of 2 6  · Repleted with IV potassium  · Recheck potassium this evening    Mild protein-calorie malnutrition (HCC)  Assessment & Plan  Malnutrition Findings:   Adult Malnutrition type: Social/enviromental  Adult Degree of Malnutrition: Malnutrition of mild degree  Malnutrition Characteristics: Muscle loss, Other (comment), Inadequate energy (BMI < 18 5)               360 Statement: Mild protein calorie malnutrition r/t inadequate intake as evidenced by inability to eat sufficient energy/protein to maintain a healthy weight, muscle loss present to clavicle, and BMI <18 5; currently being treated with nutritional supplements  BMI Findings:  Adult BMI Classifications: Underweight < 18 5        Body mass index is 17 71 kg/m²       · Nutritional supplement pudding ordered for each meal, feed with assistance and turn head to the left when swallowing  · To feeds via Keofed until PEG tube can be placed  · IR consult for PEG tube placement    Alcohol use disorder, mild, abuse  Assessment & Plan  Patient endorses 4-6 drinks per day consisting of 5% alcohol  -CIWA protocol     More than 50 percent stenosis of left internal carotid artery  Assessment & Plan  CTA of head/neck  Impression: 1  Lung segment critical (greater than 90%) stenosis in the proximal left cervical ICA  2   Atretic right posterior cerebral artery  No evidence of acute thrombus or large vessel occlusion of the major vessels of the [place]  Carotid Complex Study  Impression: R ICA <50% heterogeneous and irregular occlusion  L ICA >70% heterogeneous and irregular occlusion  Plan   - F/u with neurovascular in 4-6 weeks on carotid duplex findings    Hyponatremia  Assessment & Plan  Recent Labs     10/09/22  1637   SODIUM 119*     AM cortisol increased at 41 0   Renal US normal    Plan:  · Appreciate nephrology recommendations: Stop IVF, Stop free water flushes in the TF  · Daily BMPs to monitor      Elevated serum creatinine-resolved as of 10/15/2022  Assessment & Plan    Recent Labs     10/09/22  1637   CREATININE 1 90*   EGFR 37     Renal US unremarkable     POA Cr 1 9  Baseline creatinine 0 6  Resolved        VTE Pharmacologic Prophylaxis: VTE Score: 7 High Risk (Score >/= 5) - Pharmacological DVT Prophylaxis Ordered: enoxaparin (Lovenox)  Sequential Compression Devices Ordered  Patient Centered Rounds: I performed bedside rounds with nursing staff today  Discussions with Specialists or Other Care Team Provider:      Education and Discussions with Family / Patient: Updated  (sister) via phone  Time Spent for Care: 30 minutes  More than 50% of total time spent on counseling and coordination of care as described above      Current Length of Stay: 6 day(s)  Current Patient Status: Inpatient   Certification Statement: The patient will continue to require additional inpatient hospital stay due to Acute rehab placement, peg tube placement  Discharge Plan: TBD    Code Status: Level 1 - Full Code    Subjective:   Seen this morning in no acute distress  Reports that he is hungry  Denies shortness of breath, chest pain, nausea, fever or chills  Objective:     Vitals:   Temp (24hrs), Av 9 °F (36 6 °C), Min:97 9 °F (36 6 °C), Max:97 9 °F (36 6 °C)    Temp:  [97 9 °F (36 6 °C)] 97 9 °F (36 6 °C)  HR:  [73] 73  Resp:  [18] 18  BP: (170-178)/(85-89) 170/85  SpO2:  [97 %] 97 %  Body mass index is 17 71 kg/m²  Input and Output Summary (last 24 hours): Intake/Output Summary (Last 24 hours) at 10/15/2022 1500  Last data filed at 10/15/2022 1412  Gross per 24 hour   Intake 76 ml   Output 850 ml   Net -774 ml       Physical Exam:   Physical Exam  Vitals reviewed  Constitutional:       General: He is not in acute distress  Appearance: He is ill-appearing  HENT:      Head: Normocephalic and atraumatic  Mouth/Throat:      Mouth: Mucous membranes are dry  Eyes:      Extraocular Movements: Extraocular movements intact  Conjunctiva/sclera: Conjunctivae normal       Pupils: Pupils are equal, round, and reactive to light  Cardiovascular:      Rate and Rhythm: Normal rate and regular rhythm  Pulses: Normal pulses  Pulmonary:      Effort: No respiratory distress  Breath sounds: Rhonchi present  Abdominal:      General: There is no distension  Palpations: Abdomen is soft  Tenderness: There is no abdominal tenderness  Musculoskeletal:         General: No swelling or tenderness  Skin:     General: Skin is dry  Neurological:      Mental Status: He is alert and oriented to person, place, and time        Comments: Inability to move left arm and left leg, left facial droop noted          Additional Data:     Labs:  Results from last 7 days   Lab Units 10/15/22  0552 10/14/22  0500   WBC Thousand/uL 4 73 6 14   HEMOGLOBIN g/dL 9 8* 11 6*   HEMATOCRIT % 29 9* 37 3   PLATELETS Thousands/uL 100* 97* NEUTROS PCT %  --  67   LYMPHS PCT %  --  17   MONOS PCT %  --  12   EOS PCT %  --  2     Results from last 7 days   Lab Units 10/15/22  0552 10/11/22  1340 10/11/22  0602   SODIUM mmol/L 130*   < > 125*   POTASSIUM mmol/L 2 6*   < > 3 9   CHLORIDE mmol/L 101   < > 94*   CO2 mmol/L 22   < > 20*   BUN mg/dL 7   < > 25   CREATININE mg/dL 0 55*   < > 1 26   ANION GAP mmol/L 7   < > 11   CALCIUM mg/dL 7 1*   < > 8 2*   ALBUMIN g/dL  --   --  2 7*   TOTAL BILIRUBIN mg/dL  --   --  0 58   ALK PHOS U/L  --   --  91   ALT U/L  --   --  4*   AST U/L  --   --  12*   GLUCOSE RANDOM mg/dL 405*   < > 87    < > = values in this interval not displayed       Results from last 7 days   Lab Units 10/09/22  1637   INR  1 08     Results from last 7 days   Lab Units 10/15/22  1148 10/11/22  0637   POC GLUCOSE mg/dl 93 93     Results from last 7 days   Lab Units 10/10/22  0810   HEMOGLOBIN A1C % 5 0     Results from last 7 days   Lab Units 10/09/22  2358 10/09/22  2251 10/09/22  1740 10/09/22  1637   LACTIC ACID mmol/L 1 8 1 8 2 8*  --    PROCALCITONIN ng/ml  --   --   --  0 88*       Lines/Drains:  Invasive Devices  Report    Peripheral Intravenous Line  Duration           Peripheral IV 10/09/22 Right Arm 5 days    Long-Dwell Peripheral IV (Midline) 99/69/15 Right Basilic 1 day          Drain  Duration           NG/OG/Enteral Tube Enteral Feeding Tube Left nare <1 day                  Telemetry:  Telemetry Orders (From admission, onward)             24 Hour Telemetry Monitoring  Continuous x 24 Hours (Telem)        References:    Telemetry Guidelines   Question:  Reason for 24 Hour Telemetry  Answer:  Metabolic/Electrolyte Disturbance with High Probability of Dysrhythmia (K level <3 or >6, or KCL infusion >=10mEq/hr)                 Telemetry Reviewed: Normal Sinus Rhythm  Indication for Continued Telemetry Use: Metabolic/electrolyte disturbance with high probability of dysrhythmia             Imaging: Reviewed radiology reports from this admission including: chest xray    Recent Cultures (last 7 days):   Results from last 7 days   Lab Units 10/09/22  1753 10/09/22  1752   BLOOD CULTURE  No Growth After 5 Days  No Growth After 5 Days  Last 24 Hours Medication List:   Current Facility-Administered Medications   Medication Dose Route Frequency Provider Last Rate   • amLODIPine  5 mg Oral Daily Rachele Gamez MD     • atorvastatin  40 mg Oral Daily With Newmoneamon Walker DO     • cefTRIAXone  1,000 mg Intravenous Q24H Brian Sanchez MD 1,000 mg (10/15/22 0000)   • enoxaparin  40 mg Subcutaneous Daily Milton Lagunas MD     • ipratropium  0 5 mg Nebulization Q6H PRN Milton Lagunas MD     • labetalol  10 mg Intravenous Q6H PRN Juliann Barbosa MD     • levalbuterol  1 25 mg Nebulization Q6H PRN Milton Lagunas MD     • potassium chloride  20 mEq Intravenous Q2H Rachele Gamez MD 20 mEq (10/15/22 1400)        Today, Patient Was Seen By: Max Son    **Please Note: This note may have been constructed using a voice recognition system  **

## 2022-10-15 NOTE — PROGRESS NOTES
[address] III 63 y o  male MRN: [id number]  Unit/Bed#: S -01 Encounter: [id number]  Reason for Consult: Hyponatremia, LUISANA    ASSESSMENT and PLAN:  1  Hyponatremia (hypoosmolar)  · Admission Na of 119 on 10/9/22  · Work up: SOsm 265, UOsm 369, Nikolas <10, uric acid 8 0, cortisol 41 0, TSH 4 36    · Etiology is suspected to be volume depletion  · Treated with NS bolus on admission x 1 L and placed on NS at 50 cc/hr - Na improved at an appropriate rate with NS at 50 cc/hr  · Na was up to 133 on 10/12 and 10/13  · Na back down to 130 the past 2 days  · Repeat urine studies  · Now with TF and free water flushes - will stop free water flushes  · Stop IVF       2  Acute kidney injury (POA)  · Baseline creatinine is normal    · SCr on admission 1 90 on 10/9/22  · Etiology is felt to be volume depletion  · LUISANA is resolved  SCr 0 55  · UA bland  Renal US no hydronephrosis  3  Hypertension  · BP is on the high side  · Continue Amlodipine 5 mg OD  4  Hypokalemia:  · K 2 6 this AM    · Give KCL 20 meq IV x 4 doses for now  · Check Mg  5  Acute CVA:   · Per SLIM and neurology  6  Dysphagia:  · Failed speech eval - will need PEG  · Now with NGT  7  Alcohol use  8  Carotid artery stenosis    DISPOSITION:  · Stop IVF  · Stop free water flushes in the TF    · KCL 20 meq IV x 4  SUBJECTIVE / 24H INTERVAL HISTORY:  Now with NGT and getting TF  Currently on IVF  K low this AM and getting IV K replacement       OBJECTIVE:  Current Weight: Weight - Scale: 57 6 kg (127 lb)  Vitals:    10/14/22 0738 10/14/22 1500 10/15/22 0127 10/15/22 0743   BP: 163/83 166/99 (!) 178/89 170/85   BP Location: Right arm   Right arm   Pulse: 63 68  73   Resp: 20 18  18   Temp: 97 7 °F (36 5 °C) 97 7 °F (36 5 °C) 97 9 °F (36 6 °C) 97 9 °F (36 6 °C)   TempSrc: Oral Oral  Oral   SpO2: 100% 98%  97%   Weight:       Height:           Intake/Output Summary (Last 24 hours) at 10/15/2022 1134  Last data filed at 10/15/2022 0957  Gross per 24 hour   Intake 30 ml   Output 850 ml   Net -820 ml     General: conscious, cooperative, no distress  Skin: dry  Eyes: pink conjunctivae  ENT: moist mucous membranes  NGT in place  Respiratory: equal chest expansion, clear breath sounds  Cardiovascular: distinct heart sounds, normal rate, regular rhythm, no rub  Abdomen: soft, non tender, non distended, normal bowel sounds  Extremities: no edema  Genitourinary: no sanchez catheter  Neuro: awake, alert     Psych: flat    Medications:    Current Facility-Administered Medications:   •  amLODIPine (NORVASC) tablet 5 mg, 5 mg, Oral, Daily, Diane Hernandez MD  •  atorvastatin (LIPITOR) tablet 40 mg, 40 mg, Oral, Daily With Dinner, TAYLER Walker DO, 40 mg at 10/12/22 1716  •  cefTRIAXone (ROCEPHIN) IVPB (premix in dextrose) 1,000 mg 50 mL, 1,000 mg, Intravenous, Q24H, Jae Wolfe MD, Last Rate: 100 mL/hr at 10/15/22 0000, 1,000 mg at 10/15/22 0000  •  dextrose 5 % and sodium chloride 0 9 % infusion, 75 mL/hr, Intravenous, Continuous, Cody Walker DO, Last Rate: 75 mL/hr at 10/15/22 0003, 75 mL/hr at 10/15/22 0003  •  enoxaparin (LOVENOX) subcutaneous injection 40 mg, 40 mg, Subcutaneous, Daily, Ankit Muse MD, 40 mg at 10/15/22 0754  •  ipratropium (ATROVENT) 0 02 % inhalation solution 0 5 mg, 0 5 mg, Nebulization, Q6H PRN, Ankit Muse MD  •  labetalol (NORMODYNE) injection 10 mg, 10 mg, Intravenous, Q6H PRN, Gordy Yung MD  •  levalbuterol [facility]) inhalation solution 1 25 mg, 1 25 mg, Nebulization, Q6H PRN, Ankit Muse MD  •  potassium chloride 20 mEq IVPB (premix), 20 mEq, Intravenous, Q2H, Gordy Yung MD, Last Rate: 50 mL/hr at 10/15/22 0944, 20 mEq at 10/15/22 0944    Laboratory Results:  Results from last 7 days   Lab Units 10/15/22  0552 10/14/22  2034 10/14/22  0500 10/13/22  1827 10/13/22  0445 10/12/22  1517 10/12/22  0400 10/11/22  1340 10/11/22  0610 10/11/22  0602 10/10/22  1247 10/10/22  0810 10/09/22  2358 10/09/22  2233 10/09/22  1637   WBC Thousand/uL 4 73  --  6 14  --   --   --  8 07  --  9 64  --   --  12 71*  --   --  18 20*   HEMOGLOBIN g/dL 9 8*  --  11 6*  --   --   --  10 6*  --  12 3  --   --  12 6  --   --  12 1   HEMATOCRIT % 29 9*  --  37 3  --   --   --  33 0*  --  35 8*  --   --  36 3*  --   --  35 1*   PLATELETS Thousands/uL 100*  --  97*  --   --   --  90*  --  106*  --   --  122*  --   --  114*   POTASSIUM mmol/L 2 6* 3 1* 3 6 3 5 3 6 3 8 3 6   < >  --  3 9   < > 4 0 4 2   < > 4 5   CHLORIDE mmol/L 101 97 100 102 101 100 99   < >  --  94*   < > 90* 87*   < > 86*   CO2 mmol/L 22 27 22 26 27 27 23   < >  --  20*   < > 19* 22   < > 21   BUN mg/dL 7 9 12 16 15 20 22   < >  --  25   < > 25 23   < > 20   CREATININE mg/dL 0 55* 0 58* 0 64 0 71 0 68 0 90 1 00   < >  --  1 26   < > 1 86* 1 95*   < > 1 90*   CALCIUM mg/dL 7 1* 8 1* 8 2* 8 1* 8 2* 8 2* 8 0*   < >  --  8 2*   < > 8 3* 8 5   < > 8 3*   MAGNESIUM mg/dL  --   --   --   --   --   --  1 9  --   --  1 7*  --   --  1 4*  --   --    PHOSPHORUS mg/dL  --   --   --   --   --   --   --   --   --  3 8  --   --  5 1*  --   --     < > = values in this interval not displayed

## 2022-10-16 LAB
ANION GAP SERPL CALCULATED.3IONS-SCNC: 8 MMOL/L (ref 4–13)
ANION GAP SERPL CALCULATED.3IONS-SCNC: 8 MMOL/L (ref 4–13)
BUN SERPL-MCNC: 8 MG/DL (ref 5–25)
BUN SERPL-MCNC: 8 MG/DL (ref 5–25)
CALCIUM SERPL-MCNC: 8.3 MG/DL (ref 8.4–10.2)
CALCIUM SERPL-MCNC: 8.3 MG/DL (ref 8.4–10.2)
CHLORIDE SERPL-SCNC: 98 MMOL/L (ref 96–108)
CHLORIDE SERPL-SCNC: 98 MMOL/L (ref 96–108)
CO2 SERPL-SCNC: 23 MMOL/L (ref 21–32)
CO2 SERPL-SCNC: 23 MMOL/L (ref 21–32)
CREAT SERPL-MCNC: 0.57 MG/DL (ref 0.6–1.3)
CREAT SERPL-MCNC: 0.57 MG/DL (ref 0.6–1.3)
ERYTHROCYTE [DISTWIDTH] IN BLOOD BY AUTOMATED COUNT: 14.8 % (ref 11.6–15.1)
ERYTHROCYTE [DISTWIDTH] IN BLOOD BY AUTOMATED COUNT: 14.8 % (ref 11.6–15.1)
GFR SERPL CREATININE-BSD FRML MDRD: 112 ML/MIN/1.73SQ M
GFR SERPL CREATININE-BSD FRML MDRD: 112 ML/MIN/1.73SQ M
GLUCOSE SERPL-MCNC: 82 MG/DL (ref 65–140)
GLUCOSE SERPL-MCNC: 82 MG/DL (ref 65–140)
HCT VFR BLD AUTO: 31.3 % (ref 36.5–49.3)
HCT VFR BLD AUTO: 31.3 % (ref 36.5–49.3)
HGB BLD-MCNC: 10.2 G/DL (ref 12–17)
HGB BLD-MCNC: 10.2 G/DL (ref 12–17)
MAGNESIUM SERPL-MCNC: 1.5 MG/DL (ref 1.9–2.7)
MAGNESIUM SERPL-MCNC: 1.5 MG/DL (ref 1.9–2.7)
MCH RBC QN AUTO: 30.1 PG (ref 26.8–34.3)
MCH RBC QN AUTO: 30.1 PG (ref 26.8–34.3)
MCHC RBC AUTO-ENTMCNC: 32.6 G/DL (ref 31.4–37.4)
MCHC RBC AUTO-ENTMCNC: 32.6 G/DL (ref 31.4–37.4)
MCV RBC AUTO: 92 FL (ref 82–98)
MCV RBC AUTO: 92 FL (ref 82–98)
OSMOLALITY UR: 572 MMOL/KG
OSMOLALITY UR: 572 MMOL/KG
PLATELET # BLD AUTO: 104 THOUSANDS/UL (ref 149–390)
PLATELET # BLD AUTO: 104 THOUSANDS/UL (ref 149–390)
PMV BLD AUTO: 8.5 FL (ref 8.9–12.7)
PMV BLD AUTO: 8.5 FL (ref 8.9–12.7)
POTASSIUM SERPL-SCNC: 3.8 MMOL/L (ref 3.5–5.3)
POTASSIUM SERPL-SCNC: 3.8 MMOL/L (ref 3.5–5.3)
RBC # BLD AUTO: 3.39 MILLION/UL (ref 3.88–5.62)
RBC # BLD AUTO: 3.39 MILLION/UL (ref 3.88–5.62)
SODIUM 24H UR-SCNC: 195 MOL/L
SODIUM 24H UR-SCNC: 195 MOL/L
SODIUM SERPL-SCNC: 129 MMOL/L (ref 135–147)
SODIUM SERPL-SCNC: 129 MMOL/L (ref 135–147)
WBC # BLD AUTO: 4.52 THOUSAND/UL (ref 4.31–10.16)
WBC # BLD AUTO: 4.52 THOUSAND/UL (ref 4.31–10.16)

## 2022-10-16 PROCEDURE — 85027 COMPLETE CBC AUTOMATED: CPT | Performed by: INTERNAL MEDICINE

## 2022-10-16 PROCEDURE — 83735 ASSAY OF MAGNESIUM: CPT | Performed by: INTERNAL MEDICINE

## 2022-10-16 PROCEDURE — 84300 ASSAY OF URINE SODIUM: CPT | Performed by: INTERNAL MEDICINE

## 2022-10-16 PROCEDURE — 80048 BASIC METABOLIC PNL TOTAL CA: CPT | Performed by: INTERNAL MEDICINE

## 2022-10-16 PROCEDURE — 99232 SBSQ HOSP IP/OBS MODERATE 35: CPT | Performed by: INTERNAL MEDICINE

## 2022-10-16 PROCEDURE — 83935 ASSAY OF URINE OSMOLALITY: CPT | Performed by: INTERNAL MEDICINE

## 2022-10-16 RX ORDER — DEXTROSE, SODIUM CHLORIDE, AND POTASSIUM CHLORIDE 5; .9; .15 G/100ML; G/100ML; G/100ML
75 INJECTION INTRAVENOUS CONTINUOUS
Status: DISCONTINUED | OUTPATIENT
Start: 2022-10-16 | End: 2022-10-19

## 2022-10-16 RX ORDER — DEXTROSE AND SODIUM CHLORIDE 5; .9 G/100ML; G/100ML
75 INJECTION, SOLUTION INTRAVENOUS CONTINUOUS
Status: DISCONTINUED | OUTPATIENT
Start: 2022-10-16 | End: 2022-10-16

## 2022-10-16 RX ORDER — MAGNESIUM SULFATE HEPTAHYDRATE 40 MG/ML
2 INJECTION, SOLUTION INTRAVENOUS ONCE
Status: COMPLETED | OUTPATIENT
Start: 2022-10-16 | End: 2022-10-16

## 2022-10-16 RX ADMIN — ENOXAPARIN SODIUM 40 MG: 40 INJECTION SUBCUTANEOUS at 08:23

## 2022-10-16 RX ADMIN — DEXTROSE, SODIUM CHLORIDE, AND POTASSIUM CHLORIDE 75 ML/HR: 5; .9; .15 INJECTION INTRAVENOUS at 13:42

## 2022-10-16 RX ADMIN — MAGNESIUM SULFATE HEPTAHYDRATE 2 G: 40 INJECTION, SOLUTION INTRAVENOUS at 09:49

## 2022-10-16 RX ADMIN — MAGNESIUM SULFATE HEPTAHYDRATE 2 G: 40 INJECTION, SOLUTION INTRAVENOUS at 20:06

## 2022-10-16 NOTE — PLAN OF CARE
Problem: Potential for Falls  Goal: Patient will remain free of falls  Description: INTERVENTIONS:  - Educate patient/family on patient safety including physical limitations  - Instruct patient to call for assistance with activity   - Consult OT/PT to assist with strengthening/mobility   - Keep Call bell within reach  - Keep bed low and locked with side rails adjusted as appropriate  - Keep care items and personal belongings within reach  - Initiate and maintain comfort rounds  - Make Fall Risk Sign visible to staff  - Offer Toileting every 2 Hours, in advance of need  - Initiate/Maintain bed alarm  - Obtain necessary fall risk management equipment: bed alarm  - Apply yellow socks and bracelet for high fall risk patients  - Consider moving patient to room near nurses station  Outcome: Progressing     Problem: MOBILITY - ADULT  Goal: Maintain or return to baseline ADL function  Description: INTERVENTIONS:  -  Assess patient's ability to carry out ADLs; assess patient's baseline for ADL function and identify physical deficits which impact ability to perform ADLs (bathing, care of mouth/teeth, toileting, grooming, dressing, etc )  - Assess/evaluate cause of self-care deficits   - Assess range of motion  - Assess patient's mobility; develop plan if impaired  - Assess patient's need for assistive devices and provide as appropriate  - Encourage maximum independence but intervene and supervise when necessary  - Involve family in performance of ADLs  - Assess for home care needs following discharge   - Consider OT consult to assist with ADL evaluation and planning for discharge  - Provide patient education as appropriate  Outcome: Progressing  Goal: Maintains/Returns to pre admission functional level  Description: INTERVENTIONS:  - Perform BMAT or MOVE assessment daily    - Set and communicate daily mobility goal to care team and patient/family/caregiver     - Collaborate with rehabilitation services on mobility goals if consulted  - Perform Range of Motion 2 times a day  - Reposition patient every 2 hours  - Dangle patient   - Stand patient   - Ambulate patient   - Out of bed to chair   - Out of bed for meals   - Out of bed for toileting  - Record patient progress and toleration of activity level   Outcome: Progressing     Problem: Prexisting or High Potential for Compromised Skin Integrity  Goal: Skin integrity is maintained or improved  Description: INTERVENTIONS:  - Identify patients at risk for skin breakdown  - Assess and monitor skin integrity  - Assess and monitor nutrition and hydration status  - Monitor labs   - Assess for incontinence   - Turn and reposition patient  - Assist with mobility/ambulation  - Relieve pressure over bony prominences  - Avoid friction and shearing  - Provide appropriate hygiene as needed including keeping skin clean and dry  - Evaluate need for skin moisturizer/barrier cream  - Collaborate with interdisciplinary team   - Patient/family teaching  - Consider wound care consult   Outcome: Progressing     Problem: Nutrition/Hydration-ADULT  Goal: Nutrient/Hydration intake appropriate for improving, restoring or maintaining nutritional needs  Description: Monitor and assess patient's nutrition/hydration status for malnutrition  Collaborate with interdisciplinary team and initiate plan and interventions as ordered  Monitor patient's weight and dietary intake as ordered or per policy  Utilize nutrition screening tool and intervene as necessary  Determine patient's food preferences and provide high-protein, high-caloric foods as appropriate       INTERVENTIONS:  - Monitor oral intake, urinary output, labs, and treatment plans  - Assess nutrition and hydration status and recommend course of action  - Evaluate amount of meals eaten  - Assist patient with eating if necessary   - Allow adequate time for meals  - Recommend/ encourage appropriate diets, oral nutritional supplements, and vitamin/mineral supplements  - Order, calculate, and assess calorie counts as needed  - Recommend, monitor, and adjust tube feedings and TPN/PPN based on assessed needs  - Assess need for intravenous fluids  - Provide specific nutrition/hydration education as appropriate  - Include patient/family/caregiver in decisions related to nutrition  Outcome: Progressing     Problem: Neurological Deficit  Goal: Neurological status is stable or improving  Description: Interventions:  - Monitor and assess patient's level of consciousness, motor function, sensory function, and level of assistance needed for ADLs  - Monitor and report changes from baseline  Collaborate with interdisciplinary team to initiate plan and implement interventions as ordered  - Provide and maintain a safe environment  - Consider seizure precautions  - Consider fall precautions  - Consider aspiration precautions  - Consider bleeding precautions  Outcome: Progressing     Problem: Activity Intolerance/Impaired Mobility  Goal: Mobility/activity is maintained at optimum level for patient  Description: Interventions:  - Assess and monitor patient  barriers to mobility and need for assistive/adaptive devices  - Assess patient's emotional response to limitations  - Collaborate with interdisciplinary team and initiate plans and interventions as ordered  - Encourage independent activity per ability   - Maintain proper body alignment  - Perform active/passive rom as tolerated/ordered  - Plan activities to conserve energy   - Turn patient as appropriate  Outcome: Progressing     Problem: Communication Impairment  Goal: Ability to express needs and understand communication  Description: Assess patient's communication skills and ability to understand information  Patient will demonstrate use of effective communication techniques, alternative methods of communication and understanding even if not able to speak       - Encourage communication and provide alternate methods of communication as needed  - Collaborate with case management/ for discharge needs  - Include patient/family/caregiver in decisions related to communication  Outcome: Progressing     Problem: Potential for Aspiration  Goal: Non-ventilated patient's risk of aspiration is minimized  Description: Assess and monitor vital signs, respiratory status, and labs (WBC)  Monitor for signs of aspiration (tachypnea, cough, rales, wheezing, cyanosis, fever)  - Assess and monitor patient's ability to swallow  - Place patient up in chair to eat if possible  - HOB up at 90 degrees to eat if unable to get patient up into chair   - Supervise patient during oral intake  - Instruct patient/ family to take small bites  - Instruct patient/ family to take small single sips when taking liquids  - Follow patient-specific strategies generated by speech pathologist   Outcome: Progressing  Goal: Ventilated patient's risk of aspiration is minimized  Description: Assess and monitor vital signs, respiratory status, airway cuff pressure, and labs (WBC)  Monitor for signs of aspiration (tachypnea, cough, rales, wheezing, cyanosis, fever)  - Elevate head of bed 30 degrees if patient has tube feeding   - Monitor tube feeding  Outcome: Progressing     Problem: Nutrition  Goal: Nutrition/Hydration status is improving  Description: Monitor and assess patient's nutrition/hydration status for malnutrition (ex- brittle hair, bruises, dry skin, pale skin and conjunctiva, muscle wasting, smooth red tongue, and disorientation)  Collaborate with interdisciplinary team and initiate plan and interventions as ordered  Monitor patient's weight and dietary intake as ordered or per policy  Utilize nutrition screening tool and intervene per policy  Determine patient's food preferences and provide high-protein, high-caloric foods as appropriate       - Assist patient with eating   - Allow adequate time for meals   - Encourage patient to take dietary supplement as ordered  - Collaborate with clinical nutritionist   - Include patient/family/caregiver in decisions related to nutrition    Outcome: Progressing

## 2022-10-16 NOTE — ASSESSMENT & PLAN NOTE
Malnutrition Findings:   Adult Malnutrition type: Acute illness  Adult Degree of Malnutrition: Malnutrition of moderate degree  Malnutrition Characteristics: Muscle loss, Inadequate energy                  360 Statement: Moderate Protein calorie malnutrition r/t inadequate intake as evidenced by < 75% energy intake compared to energy needs > 7 days, and muscle depletion to clavicle and quadriceps; plan for G-tube placement    BMI Findings:  Adult BMI Classifications: Underweight < 18 5        Body mass index is 17 71 kg/m²  NG tube placed however patient pulled it out due to discomfort, keofed tube was subsequently placed which was also pulled out due to discomfort  G tube placed by surgery 10/18      10/17 CT abdomen: Colon interposed between the abdominal wall and the stomach  IR couldn't put in PEG tube do to the anatomy of where the colon and stomach are  Surgery placed G tube 10/18  Plan:   -Nutrition consult: Jevity 1 5 @ 10 ml/hr X initial 12 hrs  NS flushes 50mL q 6 hrs (per nephrology)  Advance by 10 ml q 8 hrs as tolerated to eventual goal rate of Jevity 1 5 @ 50 ml/h  At goal tube feed provides 1800 kcals, 77 g protein, and 1512 ml total fluid from formula + flushes    -F/u with speech therapy

## 2022-10-16 NOTE — PLAN OF CARE
Problem: Potential for Falls  Goal: Patient will remain free of falls  Description: INTERVENTIONS:  - Educate patient/family on patient safety including physical limitations  - Instruct patient to call for assistance with activity   - Consult OT/PT to assist with strengthening/mobility   - Keep Call bell within reach  - Keep bed low and locked with side rails adjusted as appropriate  - Keep care items and personal belongings within reach  - Initiate and maintain comfort rounds  - Make Fall Risk Sign visible to staff  - Offer Toileting every 2 Hours, in advance of need  - Initiate/Maintain bed alarm  - Obtain necessary fall risk management equipment  - Apply yellow socks and bracelet for high fall risk patients  - Consider moving patient to room near nurses station  Outcome: Progressing     Problem: MOBILITY - ADULT  Goal: Maintain or return to baseline ADL function  Description: INTERVENTIONS:  -  Assess patient's ability to carry out ADLs; assess patient's baseline for ADL function and identify physical deficits which impact ability to perform ADLs (bathing, care of mouth/teeth, toileting, grooming, dressing, etc )  - Assess/evaluate cause of self-care deficits   - Assess range of motion  - Assess patient's mobility; develop plan if impaired  - Assess patient's need for assistive devices and provide as appropriate  - Encourage maximum independence but intervene and supervise when necessary  - Involve family in performance of ADLs  - Assess for home care needs following discharge   - Consider OT consult to assist with ADL evaluation and planning for discharge  - Provide patient education as appropriate  Outcome: Progressing  Goal: Maintains/Returns to pre admission functional level  Description: INTERVENTIONS:  - Perform BMAT or MOVE assessment daily    - Set and communicate daily mobility goal to care team and patient/family/caregiver     - Collaborate with rehabilitation services on mobility goals if consulted  - Perform Range of Motion   - Reposition patient every 2 hours  - Record patient progress and toleration of activity level   Outcome: Progressing     Problem: Prexisting or High Potential for Compromised Skin Integrity  Goal: Skin integrity is maintained or improved  Description: INTERVENTIONS:  - Identify patients at risk for skin breakdown  - Assess and monitor skin integrity  - Assess and monitor nutrition and hydration status  - Monitor labs   - Assess for incontinence   - Turn and reposition patient  - Assist with mobility/ambulation  - Relieve pressure over bony prominences  - Avoid friction and shearing  - Provide appropriate hygiene as needed including keeping skin clean and dry  - Evaluate need for skin moisturizer/barrier cream  - Collaborate with interdisciplinary team   - Patient/family teaching  - Consider wound care consult   Outcome: Progressing     Problem: Nutrition/Hydration-ADULT  Goal: Nutrient/Hydration intake appropriate for improving, restoring or maintaining nutritional needs  Description: Monitor and assess patient's nutrition/hydration status for malnutrition  Collaborate with interdisciplinary team and initiate plan and interventions as ordered  Monitor patient's weight and dietary intake as ordered or per policy  Utilize nutrition screening tool and intervene as necessary  Determine patient's food preferences and provide high-protein, high-caloric foods as appropriate       INTERVENTIONS:  - Monitor oral intake, urinary output, labs, and treatment plans  - Assess nutrition and hydration status and recommend course of action  - Evaluate amount of meals eaten  - Assist patient with eating if necessary   - Allow adequate time for meals  - Recommend/ encourage appropriate diets, oral nutritional supplements, and vitamin/mineral supplements  - Order, calculate, and assess calorie counts as needed  - Recommend, monitor, and adjust tube feedings and TPN/PPN based on assessed needs  - Assess need for intravenous fluids  - Provide specific nutrition/hydration education as appropriate  - Include patient/family/caregiver in decisions related to nutrition  Outcome: Progressing     Problem: Neurological Deficit  Goal: Neurological status is stable or improving  Description: Interventions:  - Monitor and assess patient's level of consciousness, motor function, sensory function, and level of assistance needed for ADLs  - Monitor and report changes from baseline  Collaborate with interdisciplinary team to initiate plan and implement interventions as ordered  - Provide and maintain a safe environment  - Consider seizure precautions  - Consider fall precautions  - Consider aspiration precautions  - Consider bleeding precautions  Outcome: Progressing     Problem: Activity Intolerance/Impaired Mobility  Goal: Mobility/activity is maintained at optimum level for patient  Description: Interventions:  - Assess and monitor patient  barriers to mobility and need for assistive/adaptive devices  - Assess patient's emotional response to limitations  - Collaborate with interdisciplinary team and initiate plans and interventions as ordered  - Encourage independent activity per ability   - Maintain proper body alignment  - Perform active/passive rom as tolerated/ordered  - Plan activities to conserve energy   - Turn patient as appropriate  Outcome: Progressing     Problem: Communication Impairment  Goal: Ability to express needs and understand communication  Description: Assess patient's communication skills and ability to understand information  Patient will demonstrate use of effective communication techniques, alternative methods of communication and understanding even if not able to speak  - Encourage communication and provide alternate methods of communication as needed  - Collaborate with case management/ for discharge needs    - Include patient/family/caregiver in decisions related to communication  Outcome: Progressing     Problem: Potential for Aspiration  Goal: Non-ventilated patient's risk of aspiration is minimized  Description: Assess and monitor vital signs, respiratory status, and labs (WBC)  Monitor for signs of aspiration (tachypnea, cough, rales, wheezing, cyanosis, fever)  - Assess and monitor patient's ability to swallow  - Place patient up in chair to eat if possible  - HOB up at 90 degrees to eat if unable to get patient up into chair   - Supervise patient during oral intake  - Instruct patient/ family to take small bites  - Instruct patient/ family to take small single sips when taking liquids  - Follow patient-specific strategies generated by speech pathologist   Outcome: Progressing  Goal: Ventilated patient's risk of aspiration is minimized  Description: Assess and monitor vital signs, respiratory status, airway cuff pressure, and labs (WBC)  Monitor for signs of aspiration (tachypnea, cough, rales, wheezing, cyanosis, fever)  - Elevate head of bed 30 degrees if patient has tube feeding   - Monitor tube feeding  Outcome: Progressing     Problem: Nutrition  Goal: Nutrition/Hydration status is improving  Description: Monitor and assess patient's nutrition/hydration status for malnutrition (ex- brittle hair, bruises, dry skin, pale skin and conjunctiva, muscle wasting, smooth red tongue, and disorientation)  Collaborate with interdisciplinary team and initiate plan and interventions as ordered  Monitor patient's weight and dietary intake as ordered or per policy  Utilize nutrition screening tool and intervene per policy  Determine patient's food preferences and provide high-protein, high-caloric foods as appropriate  - Assist patient with eating   - Allow adequate time for meals   - Encourage patient to take dietary supplement as ordered  - Collaborate with clinical nutritionist   - Include patient/family/caregiver in decisions related to nutrition    Outcome: Progressing

## 2022-10-16 NOTE — PROGRESS NOTES
[facility]  Progress Note - 2003 [facility] 1960, 63 y o  male MRN: [id number]  Unit/Bed#: S -[facility] Encounter: [id number]  Primary Care Provider: No primary care provider on file  Date and time admitted to hospital: 10/9/2022  3:59 PM    * Acute CVA (cerebrovascular accident) [facility])  Assessment & Plan  Episode of acute left-sided upper extremity and lower extremity weakness along with left-sided lower facial droop and dysarthria with onset morning of 10/9  Patient says he does not remember having these symptoms prior to falling asleep last night  BP on presentation 161/109, max HR and RR in  and 24 respectively  Patient did not get tPa since he was outside the window  EKG showed Sinus tachycardia  Right atrial enlargement  Nonspecific ST abnormality  CT stroke alert brain  Impression: Acute subcortical infarct in the right periventricular white matter, corona radiata and lentiform nuclei  No hemorrhagic conversion or mass effect  Findings were directly discussed with Arya Sutton at 4:39 PM     CTA stroke alert (head/neck)  Impression: Lung segment critical (greater than 90%) stenosis in the proximal left cervical ICA  Atretic right posterior cerebral artery  No evidence of acute thrombus or large vessel occlusion of the major vessels of the [place]  MRI brain:   Stable acute to early subacute infarct in the right periventricular white matter, corona radiata and lentiform nuclei  No hemorrhagic conversion or mass effect  Acute lacunar infarct in the left thalamus  ECHO   · Moderate concentric hypertrophy  EF 55%  Mild tricuspid regurgitation  Trivial fibrinous pericardial effusion  Pulmonary artery pressure mildly elevated to 37 0 mmHg       · NIHSS score: 8    Plan   - Amlodipine 5mg   - NG tube placed yesterday however patient pulled it out due to discomfort  - Keofed tube placed yesterday, patient pulled out overnight  - Placed back on D5 normal saline  - plan for IR PEG tube placement on Tuesday, will contact IR on Monday to confirm  - PMR suggests rehab  - holding aspirin and Plavix for PEG tube placement in 5 days  - continue atorvastatin 40mg    - neuro checks  - provide stroke education  - Will follow up with neurovascular regarding carotid duplex findings in 4-6 weeks  - acute rehab treatment after discharge       Aspiration into respiratory tract  Assessment & Plan  CTA showed aspiration pneumonia in the upper lung fields L more than R  Chest XR showed Ill-defined opacity in the left upper lung which corresponds with bronchiectasis and tree-in-bud nodularity on the CTA neck, which could be due to aspiration versus bronchiolitis  At admission Leukocytosis to 18 20 and procalcitonin elevated to 0 88 but afebrile over hospital stay  Rhonchi have improved over the course of admission  -ceftriaxone day 7/7   - Donney Rushing in place until PEG tube placement  - nebulizer treatments   - PFTs outpatient for suspected chronic lung disease     Hyponatremia  Assessment & Plan  Recent Labs     10/09/22  1637   SODIUM 119*     AM cortisol increased at 41 0   Renal US normal    Plan:  · Appreciate nephrology recommendations: Stop IVF, Stop free water flushes in the TF  · Daily BMPs to monitor      More than 50 percent stenosis of left internal carotid artery  Assessment & Plan  CTA of head/neck  Impression: 1  Lung segment critical (greater than 90%) stenosis in the proximal left cervical ICA  2   Atretic right posterior cerebral artery  No evidence of acute thrombus or large vessel occlusion of the major vessels of the [place]  Carotid Complex Study  Impression: R ICA <50% heterogeneous and irregular occlusion  L ICA >70% heterogeneous and irregular occlusion       Plan   - F/u with neurovascular in 4-6 weeks on carotid duplex findings    Alcohol use disorder, mild, abuse  Assessment & Plan  Patient endorses 4-6 drinks per day consisting of 5% alcohol  -CIWA protocol     Hypokalemia  Assessment & Plan  · Potassium of 2 6  · Repleted with IV potassium  · Recheck potassium this evening    Moderate protein-calorie malnutrition (HCC)  Assessment & Plan  Malnutrition Findings:   Adult Malnutrition type: Acute illness  Adult Degree of Malnutrition: Malnutrition of moderate degree  Malnutrition Characteristics: Muscle loss, Inadequate energy                  360 Statement: Moderate Protein calorie malnutrition r/t inadequate intake as evidenced by < 75% energy intake compared to energy needs > 7 days, and muscle depletion to clavicle and quadriceps; plan for G-tube placement    BMI Findings:  Adult BMI Classifications: Underweight < 18 5        Body mass index is 17 71 kg/m²  Mild protein-calorie malnutrition ([address] )  Assessment & Plan  Malnutrition Findings:   Adult Malnutrition type: Acute illness  Adult Degree of Malnutrition: Malnutrition of moderate degree  Malnutrition Characteristics: Muscle loss, Inadequate energy               360 Statement: Moderate Protein calorie malnutrition r/t inadequate intake as evidenced by < 75% energy intake compared to energy needs > 7 days, and muscle depletion to clavicle and quadriceps; plan for G-tube placement    BMI Findings:  Adult BMI Classifications: Underweight < 18 5        Body mass index is 17 71 kg/m²  · Nutritional supplement pudding ordered for each meal, feed with assistance and turn head to the left when swallowing  · To feeds via Keofed until PEG tube can be placed  · IR consult for PEG tube placement      VTE Pharmacologic Prophylaxis: VTE Score: 7 High Risk (Score >/= 5) - Pharmacological DVT Prophylaxis Ordered: enoxaparin (Lovenox)  Sequential Compression Devices Ordered  Patient Centered Rounds: I performed bedside rounds with nursing staff today    Discussions with Specialists or Other Care Team Provider:      Education and Discussions with Family / Patient: Updated contact person (sister) via phone  Time Spent for Care: 30 minutes  More than 50% of total time spent on counseling and coordination of care as described above  Current Length of Stay: 7 day(s)  Current Patient Status: Inpatient   Certification Statement: The patient will continue to require additional inpatient hospital stay due to Acute rehab placement, peg tube placement  Discharge Plan: TBD    Code Status: Level 1 - Full Code    Subjective:   Patient was seen at bedside this morning  He looked to be resting comfortably in the bed  Patient pulled out his Keofed around 1:00 a m  [place] Patient stated that it was uncomfortable and that was "killing him”  Patient denies any chest pain, shortness of breath, nausea/vomiting  Objective:     Vitals:   Temp (24hrs), Av 1 °F (36 7 °C), Min:97 7 °F (36 5 °C), Max:98 3 °F (36 8 °C)    Temp:  [97 7 °F (36 5 °C)-98 3 °F (36 8 °C)] 97 7 °F (36 5 °C)  HR:  [66] 66  Resp:  [16-18] 16  BP: (159-184)/() 166/96  SpO2:  [97 %] 97 %  Body mass index is 17 71 kg/m²  Input and Output Summary (last 24 hours): Intake/Output Summary (Last 24 hours) at 10/16/2022 1127  Last data filed at 10/16/2022 0801  Gross per 24 hour   Intake 89 ml   Output 275 ml   Net -186 ml       Physical Exam:   Physical Exam  Vitals reviewed  Constitutional:       General: He is not in acute distress  Appearance: He is ill-appearing  HENT:      Head: Normocephalic and atraumatic  Mouth/Throat:      Mouth: Mucous membranes are dry  Eyes:      Extraocular Movements: Extraocular movements intact  Conjunctiva/sclera: Conjunctivae normal       Pupils: Pupils are equal, round, and reactive to light  Cardiovascular:      Rate and Rhythm: Normal rate and regular rhythm  Pulses: Normal pulses  Pulmonary:      Effort: No respiratory distress  Breath sounds: Rhonchi present  Abdominal:      General: There is no distension  Palpations: Abdomen is soft  Tenderness: There is no abdominal tenderness  Musculoskeletal:         General: No swelling or tenderness  Skin:     General: Skin is dry  Neurological:      Mental Status: He is alert and oriented to person, place, and time  Comments: Inability to move left arm and left leg, left facial droop noted          Additional Data:     Labs:  Results from last 7 days   Lab Units 10/16/22  0449 10/15/22  0552 10/14/22  0500   WBC Thousand/uL 4 52   < > 6 14   HEMOGLOBIN g/dL 10 2*   < > 11 6*   HEMATOCRIT % 31 3*   < > 37 3   PLATELETS Thousands/uL 104*   < > 97*   NEUTROS PCT %  --   --  67   LYMPHS PCT %  --   --  17   MONOS PCT %  --   --  12   EOS PCT %  --   --  2    < > = values in this interval not displayed  Results from last 7 days   Lab Units 10/16/22  0840 10/11/22  1340 10/11/22  0602   SODIUM mmol/L 129*   < > 125*   POTASSIUM mmol/L 3 8   < > 3 9   CHLORIDE mmol/L 98   < > 94*   CO2 mmol/L 23   < > 20*   BUN mg/dL 8   < > 25   CREATININE mg/dL 0 57*   < > 1 26   ANION GAP mmol/L 8   < > 11   CALCIUM mg/dL 8 3*   < > 8 2*   ALBUMIN g/dL  --   --  2 7*   TOTAL BILIRUBIN mg/dL  --   --  0 58   ALK PHOS U/L  --   --  91   ALT U/L  --   --  4*   AST U/L  --   --  12*   GLUCOSE RANDOM mg/dL 82   < > 87    < > = values in this interval not displayed       Results from last 7 days   Lab Units 10/09/22  1637   INR  1 08     Results from last 7 days   Lab Units 10/15/22  1148 10/11/22  0637   POC GLUCOSE mg/dl 93 93     Results from last 7 days   Lab Units 10/10/22  0810   HEMOGLOBIN A1C % 5 0     Results from last 7 days   Lab Units 10/09/22  2358 10/09/22  2251 10/09/22  1740 10/09/22  1637   LACTIC ACID mmol/L 1 8 1 8 2 8*  --    PROCALCITONIN ng/ml  --   --   --  0 88*       Lines/Drains:  Invasive Devices  Report    Peripheral Intravenous Line  Duration           Long-Dwell Peripheral IV (Midline) 52/29/41 Right Basilic 1 day          Drain  Duration           NG/OG/Enteral Tube Enteral Feeding Tube Left nare 1 day                  Telemetry:  Telemetry Orders (From admission, onward)             24 Hour Telemetry Monitoring  Continuous x 24 Hours (Telem)        References:    Telemetry Guidelines   Question:  Reason for 24 Hour Telemetry  Answer:  Metabolic/Electrolyte Disturbance with High Probability of Dysrhythmia (K level <3 or >6, or KCL infusion >=10mEq/hr)                 Telemetry Reviewed: Normal Sinus Rhythm  Indication for Continued Telemetry Use: Metabolic/electrolyte disturbance with high probability of dysrhythmia             Imaging: Reviewed radiology reports from this admission including: chest xray    Recent Cultures (last 7 days):   Results from last 7 days   Lab Units 10/09/22  1753 10/09/22  1752   BLOOD CULTURE  No Growth After 5 Days  No Growth After 5 Days  Last 24 Hours Medication List:   Current Facility-Administered Medications   Medication Dose Route Frequency Provider Last Rate   • amLODIPine  5 mg Oral Daily Jeff Bailey MD     • atorvastatin  40 mg Oral Daily With [place] Walker DO     • cefTRIAXone  1,000 mg Intravenous Q24H [employer] Jansen MD 1,000 mg (10/15/22 0268)   • dextrose 5 % and sodium chloride 0 9 %  75 mL/hr Intravenous Continuous Cody Walker DO Stopped (10/16/22 1003)   • enoxaparin  40 mg Subcutaneous Daily Suman Kovacs MD     • ipratropium  0 5 mg Nebulization Q6H PRN Suman Kovacs MD     • labetalol  10 mg Intravenous Q6H PRN Linda Russell MD     • levalbuterol  1 25 mg Nebulization Q6H PRN Suman Kovacs MD     • magnesium sulfate  2 g Intravenous Once David Gonzalez DO 2 g (10/16/22 1388)        Today, Patient Was Seen By: David Gonzalez    **Please Note: This note may have been constructed using a voice recognition system  **

## 2022-10-16 NOTE — PROGRESS NOTES
[address] 63 y o  male MRN: [id number]  Unit/Bed#: S -01 Encounter: [id number]  Reason for Consult: Hyponatremia, LUISANA    ASSESSMENT and PLAN:  1  Hyponatremia (hypoosmolar)  · Admission Na of 119 on 10/9/22  · Work up: SOsm 265, UOsm 369, Nikolas <10, uric acid 8 0, cortisol 41 0, TSH 4 36    · Etiology is suspected to be volume depletion  · Treated with NS bolus on admission x 1 L and placed on NS at 50 cc/hr - Na improved at an appropriate rate with NS at 50 cc/hr  · Na was up to 133 on 10/12 and 10/13  Since then, Na has been around 128 to 132  · He has no tube feeds now and started on D5NS at 75 cc/hr  · Will change IVF to D5NS + 20 KCL at 75 cc/hr  · Need to repeat urine studies - may have SIADH now given recent CVA       2  Acute kidney injury (POA)  · Baseline creatinine is normal    · SCr on admission 1 90 on 10/9/22  · Etiology is felt to be volume depletion  · LUISANA is resolved  SCr 0 57  · UA bland  Renal US no hydronephrosis  3  Hypertension  · BP is on the high side  · Continue Amlodipine 5 mg OD  4  Hypokalemia:  · Resolved  · Add KCL to IVF  · Magnesium is low  Re-dose magnesium sulfate 2 g IV today  5  Acute CVA:   · Per SLIM and neurology  6  Dysphagia:  · Failed speech eval - will need PEG  7  Alcohol use  8  Carotid artery stenosis    DISPOSITION:  · Change IVF to D5NS + 20 meq KCL at 75 cc/hr  · Redose Mg sulfate 2 gm IV x 1    · Recheck urine studies  SUBJECTIVE / 24H INTERVAL HISTORY:  He pulled out his Keofeed overnight  No CP or SOB  Started on IVF by primary team as he no longer has any feeds       OBJECTIVE:  Current Weight: Weight - Scale: 57 6 kg (127 lb)  Vitals:    10/15/22 1922 10/15/22 1933 10/15/22 2052 10/16/22 0700   BP: 165/99 (!) 171/94 159/98 166/96   BP Location:  Right arm     Pulse:  66  66   Resp:  16 18 16   Temp:  98 2 °F (36 8 °C) 98 3 °F (36 8 °C) 97 7 °F (36 5 °C)   TempSrc: Oral  Oral   SpO2:    97%   Weight:       Height:           Intake/Output Summary (Last 24 hours) at 10/16/2022 1146  Last data filed at 10/16/2022 0801  Gross per 24 hour   Intake 89 ml   Output 275 ml   Net -186 ml     General: conscious, cooperative, no distress  Skin: dry  Eyes: pink conjunctivae  ENT: dry mucous membranes  Respiratory: equal chest expansion, ronchi bilaterally  Cardiovascular: distinct heart sounds, normal rate, regular rhythm, no rub  Abdomen: soft, non tender, non distended, normal bowel sounds  Extremities: no edema  Genitourinary: no sanchez catheter  Neuro: awake, alert     Psych: flat     Medications:    Current Facility-Administered Medications:   •  amLODIPine (NORVASC) tablet 5 mg, 5 mg, Oral, Daily, Sushila Acevedo MD  •  atorvastatin (LIPITOR) tablet 40 mg, 40 mg, Oral, Daily With Dinner, TXU [facility]elly [facility], 40 mg at 10/12/22 1716  •  cefTRIAXone (ROCEPHIN) IVPB (premix in dextrose) 1,000 mg 50 mL, 1,000 mg, Intravenous, Q24H, Haleigh Mora MD, Last Rate: 100 mL/hr at 10/15/22 2351, 1,000 mg at 10/15/22 2351  •  dextrose 5 % and sodium chloride 0 9 % infusion, 75 mL/hr, Intravenous, Continuous, Cody DoverwaterDO, Held at 10/16/22 1003  •  enoxaparin (LOVENOX) subcutaneous injection 40 mg, 40 mg, Subcutaneous, Daily, Amina Morales MD, 40 mg at 10/16/22 8586  •  ipratropium (ATROVENT) 0 02 % inhalation solution 0 5 mg, 0 5 mg, Nebulization, Q6H PRN, Amina Morales MD  •  labetalol (NORMODYNE) injection 10 mg, 10 mg, Intravenous, Q6H PRN, Manuel Salazar MD, 10 mg at 10/15/22 1836  •  levalbuterol (Darlean Reins) inhalation solution 1 25 mg, 1 25 mg, Nebulization, Q6H PRN, Amina Morales MD  •  magnesium sulfate 2 g/50 mL IVPB (premix) 2 g, 2 g, Intravenous, Once, David Gonzalez DO, Last Rate: 25 mL/hr at 10/16/22 0949, 2 g at 10/16/22 0949    Laboratory Results:  Results from last 7 days   Lab Units 10/16/22  0840 10/16/22  0447 10/15/22  1757 10/15/22  0552 10/14/22  2034 10/14/22  0500 10/13/22  1827 10/13/22  0445 10/12/22  1517 10/12/22  0400 10/11/22  1340 10/11/22  0610 10/11/22  0602 10/10/22  1247 10/10/22  0810 10/09/22  2358 10/09/22  2233 10/09/22  1637   WBC Thousand/uL  --  4 52  --  4 73  --  6 14  --   --   --  8 07  --  9 64  --   --  12 71*  --   --  18 20*   HEMOGLOBIN g/dL  --  10 2*  --  9 8*  --  11 6*  --   --   --  10 6*  --  12 3  --   --  12 6  --   --  12 1   HEMATOCRIT %  --  31 3*  --  29 9*  --  37 3  --   --   --  33 0*  --  35 8*  --   --  36 3*  --   --  35 1*   PLATELETS Thousands/uL  --  104*  --  100*  --  97*  --   --   --  90*  --  106*  --   --  122*  --   --  114*   POTASSIUM mmol/L 3 8  --  4 0 2 6* 3 1* 3 6 3 5 3 6   < > 3 6   < >  --  3 9   < > 4 0 4 2   < > 4 5   CHLORIDE mmol/L 98  --  98 101 97 100 102 101   < > 99   < >  --  94*   < > 90* 87*   < > 86*   CO2 mmol/L 23  --  24 22 27 22 26 27   < > 23   < >  --  20*   < > 19* 22   < > 21   BUN mg/dL 8  --  8 7 9 12 16 15   < > 22   < >  --  25   < > 25 23   < > 20   CREATININE mg/dL 0 57*  --  0 60 0 55* 0 58* 0 64 0 71 0 68   < > 1 00   < >  --  1 26   < > 1 86* 1 95*   < > 1 90*   CALCIUM mg/dL 8 3*  --  8 3* 7 1* 8 1* 8 2* 8 1* 8 2*   < > 8 0*   < >  --  8 2*   < > 8 3* 8 5   < > 8 3*   MAGNESIUM mg/dL  --  1 5*  --   --   --   --   --   --   --  1 9  --   --  1 7*  --   --  1 4*  --   --    PHOSPHORUS mg/dL  --   --   --  1 8*  --   --   --   --   --   --   --   --  3 8  --   --  5 1*  --   --     < > = values in this interval not displayed

## 2022-10-16 NOTE — ASSESSMENT & PLAN NOTE
Malnutrition Findings:   Adult Malnutrition type: Acute illness  Adult Degree of Malnutrition: Malnutrition of moderate degree  Malnutrition Characteristics: Muscle loss, Inadequate energy               360 Statement: Moderate Protein calorie malnutrition r/t inadequate intake as evidenced by < 75% energy intake compared to energy needs > 7 days, and muscle depletion to clavicle and quadriceps; plan for G-tube placement    BMI Findings:  Adult BMI Classifications: Underweight < 18 5        Body mass index is 17 71 kg/m²  NG tube placed however patient pulled it out due to discomfort, keofed tube was subsequently placed which was also pulled out due to discomfort       · NPO until PEG tube can be placed today by surgery

## 2022-10-16 NOTE — ASSESSMENT & PLAN NOTE
Potassium of 2 6 on 10/15  Now at 3 6 after D5 with potassium was stopped 10/19   Magnesium is within normal limits at 2 3      · Routine BMP to monitor

## 2022-10-16 NOTE — ASSESSMENT & PLAN NOTE
CTA of head/neck  Impression: 1  Lung segment critical (greater than 90%) stenosis in the proximal left cervical ICA  2   Atretic right posterior cerebral artery  No evidence of acute thrombus or large vessel occlusion of the major vessels of the [place]  Carotid Complex Study  Impression: R ICA <50% heterogeneous and irregular occlusion  L ICA >70% heterogeneous and irregular occlusion       Plan   - F/u with neurovascular in 4-6 weeks on carotid duplex findings

## 2022-10-16 NOTE — NURSING NOTE
0100 AM Patient rang call bell  Nurse entered room to assess patient need  Sangita Orts NG tube found on floor next to patient bed  Patient stated he pulled out Sangita Orts NG tube due to it "being uncomfortable and did not want it in anymore "    Patient refusing NG tube re-placement  SLIM made aware

## 2022-10-16 NOTE — ASSESSMENT & PLAN NOTE
Recent Labs     10/09/22  1637   SODIUM 119*     AM cortisol increased at 41 0   Renal US normal  Latest sodium level 138  Per nephrology note, 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   Received one dose of lasix 10/17    Plan:  · Appreciate nephrology recommendations: Stop G tube free water flushes, start 50 mL NS flushes q6  · Monitor with routine BMPs

## 2022-10-16 NOTE — ASSESSMENT & PLAN NOTE
Episode of acute left-sided upper extremity and lower extremity weakness along with left-sided lower facial droop and dysarthria with onset morning of 10/9  BP on presentation 161/109, max HR and RR in  and 24 respectively  Patient did not get tPa since he was outside the window  EKG showed Sinus tachycardia  Right atrial enlargement  Nonspecific ST abnormality  CT stroke alert brain  Impression: Acute subcortical infarct in the right periventricular white matter, corona radiata and lentiform nuclei  No hemorrhagic conversion or mass effect  Findings were directly discussed with Joy Russell at 4:39 PM     CTA stroke alert (head/neck)  Impression: Lung segment critical (greater than 90%) stenosis in the proximal left cervical ICA  Atretic right posterior cerebral artery  No evidence of acute thrombus or large vessel occlusion of the major vessels of the [place]  MRI brain:   Stable acute to early subacute infarct in the right periventricular white matter, corona radiata and lentiform nuclei  No hemorrhagic conversion or mass effect  Acute lacunar infarct in the left thalamus  ECHO   · Moderate concentric hypertrophy  EF 55%  Mild tricuspid regurgitation  Trivial fibrinous pericardial effusion  Pulmonary artery pressure mildly elevated to 37 0 mmHg  · NIHSS score: 8    10/17 CT abdomen: Colon interposed between the abdominal wall and the stomach  IR couldn't put in PEG tube do to the anatomy of where the colon and stomach are  Surgery placed G tube 10/18  Plan   - amlodipine 5 mg in G tube nutrition  -Metoprolol 25 mg b i d   Per G-tube  - continue aspirin and Plavix for 21 days, then just aspirin  - continue atorvastatin 40mg    - neuro checks  - stroke education  - needs PT/OT/speech therapy care after discharge   - Will follow up with neurovascular regarding carotid duplex findings in 4-6 weeks  - placement at [facility] this morning

## 2022-10-16 NOTE — ASSESSMENT & PLAN NOTE
CTA showed aspiration pneumonia in the upper lung fields L more than R  Chest XR showed Ill-defined opacity in the left upper lung which corresponds with bronchiectasis and tree-in-bud nodularity on the CTA neck  Likely aspiration pneumonia  At admission Leukocytosis to 18 20 and procalcitonin elevated to 0 88 but afebrile over hospital stay  Rhonchi have improved over the course of admission  Completed 7 days of ceftriaxone  NG tube placed however patient pulled it out due to discomfort, keofed tube was subsequently placed which was also pulled out due to discomfort  G tube placed 10/18      - G tube feedings (see below)   - monitor for nausea, vomiting, keep patient NPO until cleared by speech therapy   - nebulizer treatments as needed  - PFTs outpatient for suspected chronic lung disease

## 2022-10-17 ENCOUNTER — APPOINTMENT (INPATIENT)
Dept: CT IMAGING | Facility: HOSPITAL | Age: 62
DRG: 871 | End: 2022-10-17
Payer: COMMERCIAL

## 2022-10-17 LAB
ANION GAP SERPL CALCULATED.3IONS-SCNC: 6 MMOL/L (ref 4–13)
ANION GAP SERPL CALCULATED.3IONS-SCNC: 6 MMOL/L (ref 4–13)
BUN SERPL-MCNC: 6 MG/DL (ref 5–25)
BUN SERPL-MCNC: 6 MG/DL (ref 5–25)
CALCIUM SERPL-MCNC: 8.1 MG/DL (ref 8.4–10.2)
CALCIUM SERPL-MCNC: 8.1 MG/DL (ref 8.4–10.2)
CHLORIDE SERPL-SCNC: 101 MMOL/L (ref 96–108)
CHLORIDE SERPL-SCNC: 101 MMOL/L (ref 96–108)
CO2 SERPL-SCNC: 22 MMOL/L (ref 21–32)
CO2 SERPL-SCNC: 22 MMOL/L (ref 21–32)
CREAT SERPL-MCNC: 0.52 MG/DL (ref 0.6–1.3)
CREAT SERPL-MCNC: 0.52 MG/DL (ref 0.6–1.3)
ERYTHROCYTE [DISTWIDTH] IN BLOOD BY AUTOMATED COUNT: 15.2 % (ref 11.6–15.1)
ERYTHROCYTE [DISTWIDTH] IN BLOOD BY AUTOMATED COUNT: 15.2 % (ref 11.6–15.1)
GFR SERPL CREATININE-BSD FRML MDRD: 116 ML/MIN/1.73SQ M
GFR SERPL CREATININE-BSD FRML MDRD: 116 ML/MIN/1.73SQ M
GLUCOSE SERPL-MCNC: 101 MG/DL (ref 65–140)
GLUCOSE SERPL-MCNC: 101 MG/DL (ref 65–140)
HCT VFR BLD AUTO: 29.4 % (ref 36.5–49.3)
HCT VFR BLD AUTO: 29.4 % (ref 36.5–49.3)
HGB BLD-MCNC: 9.7 G/DL (ref 12–17)
HGB BLD-MCNC: 9.7 G/DL (ref 12–17)
MAGNESIUM SERPL-MCNC: 2 MG/DL (ref 1.9–2.7)
MAGNESIUM SERPL-MCNC: 2 MG/DL (ref 1.9–2.7)
MCH RBC QN AUTO: 29.4 PG (ref 26.8–34.3)
MCH RBC QN AUTO: 29.4 PG (ref 26.8–34.3)
MCHC RBC AUTO-ENTMCNC: 33 G/DL (ref 31.4–37.4)
MCHC RBC AUTO-ENTMCNC: 33 G/DL (ref 31.4–37.4)
MCV RBC AUTO: 89 FL (ref 82–98)
MCV RBC AUTO: 89 FL (ref 82–98)
PLATELET # BLD AUTO: 97 THOUSANDS/UL (ref 149–390)
PLATELET # BLD AUTO: 97 THOUSANDS/UL (ref 149–390)
PMV BLD AUTO: 8.7 FL (ref 8.9–12.7)
PMV BLD AUTO: 8.7 FL (ref 8.9–12.7)
POTASSIUM SERPL-SCNC: 3.9 MMOL/L (ref 3.5–5.3)
POTASSIUM SERPL-SCNC: 3.9 MMOL/L (ref 3.5–5.3)
RBC # BLD AUTO: 3.3 MILLION/UL (ref 3.88–5.62)
RBC # BLD AUTO: 3.3 MILLION/UL (ref 3.88–5.62)
SODIUM SERPL-SCNC: 129 MMOL/L (ref 135–147)
SODIUM SERPL-SCNC: 129 MMOL/L (ref 135–147)
WBC # BLD AUTO: 5.28 THOUSAND/UL (ref 4.31–10.16)
WBC # BLD AUTO: 5.28 THOUSAND/UL (ref 4.31–10.16)

## 2022-10-17 PROCEDURE — 85027 COMPLETE CBC AUTOMATED: CPT | Performed by: INTERNAL MEDICINE

## 2022-10-17 PROCEDURE — G1004 CDSM NDSC: HCPCS

## 2022-10-17 PROCEDURE — 80048 BASIC METABOLIC PNL TOTAL CA: CPT | Performed by: INTERNAL MEDICINE

## 2022-10-17 PROCEDURE — 74176 CT ABD & PELVIS W/O CONTRAST: CPT

## 2022-10-17 PROCEDURE — 99232 SBSQ HOSP IP/OBS MODERATE 35: CPT | Performed by: INTERNAL MEDICINE

## 2022-10-17 PROCEDURE — 99233 SBSQ HOSP IP/OBS HIGH 50: CPT | Performed by: INTERNAL MEDICINE

## 2022-10-17 PROCEDURE — 83735 ASSAY OF MAGNESIUM: CPT | Performed by: INTERNAL MEDICINE

## 2022-10-17 RX ORDER — HYDRALAZINE HYDROCHLORIDE 20 MG/ML
10 INJECTION INTRAMUSCULAR; INTRAVENOUS EVERY 6 HOURS PRN
Status: DISCONTINUED | OUTPATIENT
Start: 2022-10-17 | End: 2022-10-21 | Stop reason: HOSPADM

## 2022-10-17 RX ORDER — FUROSEMIDE 10 MG/ML
20 INJECTION INTRAMUSCULAR; INTRAVENOUS ONCE
Status: COMPLETED | OUTPATIENT
Start: 2022-10-17 | End: 2022-10-17

## 2022-10-17 RX ADMIN — FUROSEMIDE 20 MG: 10 INJECTION, SOLUTION INTRAMUSCULAR; INTRAVENOUS at 15:09

## 2022-10-17 RX ADMIN — DEXTROSE, SODIUM CHLORIDE, AND POTASSIUM CHLORIDE 75 ML/HR: 5; .9; .15 INJECTION INTRAVENOUS at 21:30

## 2022-10-17 RX ADMIN — DEXTROSE, SODIUM CHLORIDE, AND POTASSIUM CHLORIDE 75 ML/HR: 5; .9; .15 INJECTION INTRAVENOUS at 02:10

## 2022-10-17 RX ADMIN — HYDRALAZINE HYDROCHLORIDE 10 MG: 20 INJECTION, SOLUTION INTRAMUSCULAR; INTRAVENOUS at 12:48

## 2022-10-17 RX ADMIN — LABETALOL HYDROCHLORIDE 10 MG: 5 INJECTION, SOLUTION INTRAVENOUS at 10:51

## 2022-10-17 RX ADMIN — CEFTRIAXONE 1000 MG: 1 INJECTION, SOLUTION INTRAVENOUS at 23:49

## 2022-10-17 RX ADMIN — CEFTRIAXONE 1000 MG: 1 INJECTION, SOLUTION INTRAVENOUS at 01:04

## 2022-10-17 RX ADMIN — ENOXAPARIN SODIUM 40 MG: 40 INJECTION SUBCUTANEOUS at 10:42

## 2022-10-17 NOTE — PROGRESS NOTES
[facility]  Progress Note - 2003 [facility] 1960, 63 y o  male MRN: [id number]  Unit/Bed#: S -01 Encounter: [id number]  Primary Care Provider: No primary care provider on file  Date and time admitted to hospital: 10/9/2022  3:59 PM    * Acute CVA (cerebrovascular accident) [facility])  Assessment & Plan  Episode of acute left-sided upper extremity and lower extremity weakness along with left-sided lower facial droop and dysarthria with onset morning of 10/9  BP on presentation 161/109, max HR and RR in  and 24 respectively  Patient did not get tPa since he was outside the window  EKG showed Sinus tachycardia  Right atrial enlargement  Nonspecific ST abnormality  CT stroke alert brain  Impression: Acute subcortical infarct in the right periventricular white matter, corona radiata and lentiform nuclei  No hemorrhagic conversion or mass effect  Findings were directly discussed with Lou Galarza at 4:39 PM     CTA stroke alert (head/neck)  Impression: Lung segment critical (greater than 90%) stenosis in the proximal left cervical ICA  Atretic right posterior cerebral artery  No evidence of acute thrombus or large vessel occlusion of the major vessels of the [place]  MRI brain:   Stable acute to early subacute infarct in the right periventricular white matter, corona radiata and lentiform nuclei  No hemorrhagic conversion or mass effect  Acute lacunar infarct in the left thalamus  ECHO   · Moderate concentric hypertrophy  EF 55%  Mild tricuspid regurgitation  Trivial fibrinous pericardial effusion  Pulmonary artery pressure mildly elevated to 37 0 mmHg       · NIHSS score: 8    Plan   - hydralazine 10mg q6 PRN for systolic BP over 368  - NG tube placed yesterday however patient pulled it out due to discomfort  - Keofed tube placed yesterday, patient pulled out overnight  - Placed back on D5 normal saline  - plan for IR PEG tube placement on Tuesday, will contact IR on Monday to confirm  - PMR suggests rehab  - holding aspirin and Plavix for PEG tube placement in 5 days  - continue atorvastatin 40mg    - neuro checks  - provide stroke education  - Will follow up with neurovascular regarding carotid duplex findings in 4-6 weeks  - acute rehab treatment after discharge       More than 50 percent stenosis of left internal carotid artery  Assessment & Plan  CTA of head/neck  Impression: 1  Lung segment critical (greater than 90%) stenosis in the proximal left cervical ICA  2   Atretic right posterior cerebral artery  No evidence of acute thrombus or large vessel occlusion of the major vessels of the [place]  Carotid Complex Study  Impression: R ICA <50% heterogeneous and irregular occlusion  L ICA >70% heterogeneous and irregular occlusion  Plan   - F/u with neurovascular in 4-6 weeks on carotid duplex findings    Aspiration into respiratory tract  Assessment & Plan  CTA showed aspiration pneumonia in the upper lung fields L more than R  Chest XR showed Ill-defined opacity in the left upper lung which corresponds with bronchiectasis and tree-in-bud nodularity on the CTA neck, which could be due to aspiration versus bronchiolitis  At admission Leukocytosis to 18 20 and procalcitonin elevated to 0 88 but afebrile over hospital stay  Rhonchi have improved over the course of admission  Finished a 7 day course of ceftriaxone      - Donney Rushing in place until PEG tube placement  - nebulizer treatments   - PFTs outpatient for suspected chronic lung disease     Hyponatremia  Assessment & Plan  Recent Labs     10/09/22  1637   SODIUM 119*     AM cortisol increased at 41 0   Renal US normal  Sodium is stable at 129    Plan:  · Appreciate nephrology recommendations: Stop IVF, Stop free water flushes in the TF    · Daily BMPs to monitor      Mild protein-calorie malnutrition [facility])  Assessment & Plan  Malnutrition Findings:   Adult Malnutrition type: Acute illness  Adult Degree of Malnutrition: Malnutrition of moderate degree  Malnutrition Characteristics: Muscle loss, Inadequate energy               360 Statement: Moderate Protein calorie malnutrition r/t inadequate intake as evidenced by < 75% energy intake compared to energy needs > 7 days, and muscle depletion to clavicle and quadriceps; plan for G-tube placement    BMI Findings:  Adult BMI Classifications: Underweight < 18 5        Body mass index is 17 71 kg/m²  · Nutritional supplement pudding ordered for each meal, feed with assistance and turn head to the left when swallowing  · To feeds via Keofed until PEG tube can be placed  · IR consult for PEG tube placement    Hypokalemia  Assessment & Plan  Potassium of 2 6 on 10/15  Now up to 3 9 after repletion  Patient currently on D5 NS with potassium  · Continue D5 NS with potassium to replete potassium to 4 0   · Daily BMP        Moderate protein-calorie malnutrition (HCC)  Assessment & Plan  Malnutrition Findings:   Adult Malnutrition type: Acute illness  Adult Degree of Malnutrition: Malnutrition of moderate degree  Malnutrition Characteristics: Muscle loss, Inadequate energy                  360 Statement: Moderate Protein calorie malnutrition r/t inadequate intake as evidenced by < 75% energy intake compared to energy needs > 7 days, and muscle depletion to clavicle and quadriceps; plan for G-tube placement    BMI Findings:  Adult BMI Classifications: Underweight < 18 5        Body mass index is 17 71 kg/m²  Alcohol use disorder, mild, abuse  Assessment & Plan  Patient endorses 4-6 drinks per day consisting of 5% alcohol  -WA protocol       VTE Pharmacologic Prophylaxis: VTE Score: 7 High Risk (Score >/= 5) - Pharmacological DVT Prophylaxis Ordered: enoxaparin (Lovenox)  Sequential Compression Devices Ordered  Patient Centered Rounds: I performed bedside rounds with nursing staff today    Discussions with Specialists or Other Care Team Provider:      Education and Discussions with Family / Patient: Tam Thomas was at bedside for rounds today         Time Spent for Care: 30 minutes  More than 50% of total time spent on counseling and coordination of care as described above  Current Length of Stay: 8 day(s)  Current Patient Status: Inpatient   Certification Statement: The patient will continue to require additional inpatient hospital stay due to Acute rehab placement, peg tube placement  Discharge Plan: TBD    Code Status: Level 1 - Full Code    Subjective:   Patient was seen at bedside this morning  No acute overnight events  He looked to be resting comfortably in the bed  Patient denies any chest pain, shortness of breath, nausea/vomiting  Objective:     Vitals:   Temp (24hrs), Av 1 °F (36 7 °C), Min:97 9 °F (36 6 °C), Max:98 3 °F (36 8 °C)    Temp:  [97 9 °F (36 6 °C)-98 3 °F (36 8 °C)] 98 3 °F (36 8 °C)  HR:  [70] 70  Resp:  [18] 18  BP: (166-195)/() 166/98  Body mass index is 17 71 kg/m²  Input and Output Summary (last 24 hours): Intake/Output Summary (Last 24 hours) at 10/17/2022 0718  Last data filed at 10/17/2022 0210  Gross per 24 hour   Intake 1000 ml   Output 250 ml   Net 750 ml       Physical Exam:   Physical Exam  Vitals reviewed  Constitutional:       General: He is not in acute distress  Appearance: He is ill-appearing  HENT:      Head: Normocephalic and atraumatic  Mouth/Throat:      Mouth: Mucous membranes are dry  Eyes:      Extraocular Movements: Extraocular movements intact  Conjunctiva/sclera: Conjunctivae normal       Pupils: Pupils are equal, round, and reactive to light  Cardiovascular:      Rate and Rhythm: Normal rate and regular rhythm  Pulses: Normal pulses  Pulmonary:      Effort: No respiratory distress  Breath sounds: Rhonchi present  Abdominal:      General: There is no distension  Palpations: Abdomen is soft  Tenderness:  There is no abdominal tenderness  Musculoskeletal:         General: No swelling or tenderness  Skin:     General: Skin is dry  Neurological:      Mental Status: He is alert and oriented to person, place, and time  Comments: Inability to move left arm and left leg, left facial droop noted          Additional Data:     Labs:  Results from last 7 days   Lab Units 10/17/22  0228 10/15/22  0552 10/14/22  0500   WBC Thousand/uL 5 28   < > 6 14   HEMOGLOBIN g/dL 9 7*   < > 11 6*   HEMATOCRIT % 29 4*   < > 37 3   PLATELETS Thousands/uL 97*   < > 97*   NEUTROS PCT %  --   --  67   LYMPHS PCT %  --   --  17   MONOS PCT %  --   --  12   EOS PCT %  --   --  2    < > = values in this interval not displayed  Results from last 7 days   Lab Units 10/17/22  0227 10/11/22  1340 10/11/22  0602   SODIUM mmol/L 129*   < > 125*   POTASSIUM mmol/L 3 9   < > 3 9   CHLORIDE mmol/L 101   < > 94*   CO2 mmol/L 22   < > 20*   BUN mg/dL 6   < > 25   CREATININE mg/dL 0 52*   < > 1 26   ANION GAP mmol/L 6   < > 11   CALCIUM mg/dL 8 1*   < > 8 2*   ALBUMIN g/dL  --   --  2 7*   TOTAL BILIRUBIN mg/dL  --   --  0 58   ALK PHOS U/L  --   --  91   ALT U/L  --   --  4*   AST U/L  --   --  12*   GLUCOSE RANDOM mg/dL 101   < > 87    < > = values in this interval not displayed           Results from last 7 days   Lab Units 10/15/22  1148 10/11/22  0637   POC GLUCOSE mg/dl 93 93     Results from last 7 days   Lab Units 10/10/22  0810   HEMOGLOBIN A1C % 5 0           Lines/Drains:  Invasive Devices  Report    Peripheral Intravenous Line  Duration           Long-Dwell Peripheral IV (Midline) 28/20/04 Right Basilic 2 days    Peripheral IV 10/16/22 Left;Ventral (anterior) Forearm <1 day                  Telemetry:  Telemetry Orders (From admission, onward)             24 Hour Telemetry Monitoring  Continuous x 24 Hours (Telem)        References:    Telemetry Guidelines   Question:  Reason for 24 Hour Telemetry  Answer:  Metabolic/Electrolyte Disturbance with High Probability of Dysrhythmia (K level <3 or >6, or KCL infusion >=10mEq/hr)                 Telemetry Reviewed: Normal Sinus Rhythm  Indication for Continued Telemetry Use: Metabolic/electrolyte disturbance with high probability of dysrhythmia             Imaging: Reviewed radiology reports from this admission including: chest xray    Recent Cultures (last 7 days):         Last 24 Hours Medication List:   Current Facility-Administered Medications   Medication Dose Route Frequency Provider Last Rate   • amLODIPine  5 mg Oral Daily Divya Wild MD     • atorvastatin  40 mg Oral Daily With Spenser Walker DO     • cefTRIAXone  1,000 mg Intravenous Q24H Martinez Schaefer MD 1,000 mg (10/17/22 0104)   • dextrose 5 % and sodium chloride 0 9 % with KCl 20 mEq/L  75 mL/hr Intravenous Continuous Divya Wild MD 75 mL/hr (10/17/22 0210)   • enoxaparin  40 mg Subcutaneous Daily Thom Tran MD     • ipratropium  0 5 mg Nebulization Q6H PRN Thom Tran MD     • labetalol  10 mg Intravenous Q6H PRN Ivanna Paz MD     • levalbuterol  1 25 mg Nebulization Q6H PRN Thom Tran MD          Today, Patient Was Seen By: Dr Reina Aguilera    **Please Note: This note may have been constructed using a voice recognition system  **

## 2022-10-17 NOTE — PROGRESS NOTES
NEPHROLOGY PROGRESS NOTE   Dale Lynn III 63 y o  male MRN: [id number]  Unit/Bed#: S -01 Encounter: [id number]  Reason for Consult:  Hyponatremia and LUISANA, POA    80-year-old male with hx hypernatremia, EtOH use presenting with left-sided weakness and admitted with acute CVA  Nephrology consulted for management of hyponatremia and LUISANA  ASSESSMENT/PLAN:  Hyponatremia, hypoosmolar:  Suspect initially due to volume depletion, now component of SIADH in the setting of CVA  -admission sodium 119 on 10/9/2022  sNA initially improved to 133 with NSS, but has been 128-132 since 10/14  sNa remains unchanged at 129 today  -Baseline sodium unclear  Serum sodium 118 when seen in the ER on 1/4/2022 but no follow-up  -initial workup: serum osmolality 265, urine osmolality 369, urine sodium <10  uric acid 8, cortisol 41, TSH 4 36   -repeat urine osmolality 572, urine sodium 195 on 10/16/2022 consistent with SIADH  -renal function stable at baseline  -No acute indication for Hypertonic saline (HTS) at this time   -npo due to dysphagia  TF currently off  On IVF D5NSS + 20 KCL @ 75 ml/hr    -Fluid restriction of 1 5 L/day  -Strict I/O  -Check BMP in am  -Avoid overcorrection >8 meq    -continue to monitor closely  -will d/w Dr Beatrice Campos    LUISANA (POA):  Suspect prerenal azotemia due to volume depletion  -resolved  -Admission creatinine 1 90 on 10/9/2022  Today's creatinine 0 52, at baseline  -Peak creatinine 1 90  -baseline creatinine normal, 0 5   -workup: UA bland  -Imaging:  Renal ultrasound with no hydronephrosis  -Strict I/Os, daily weights  -Avoid nephrotoxins, NSAIDs, IV contrast if possible  -Avoid hypotension  Maintain MAP >65  -Trend BMP    Hypertension/Volume status:  -BP hypertensive  Permissive hypertension in the setting of acute CVA but SBP now >180  -volume status euvolemic  -Home medications: None  -Current medications:  Amlodipine 5 mg daily however now npo  -hydralazine p r n   Per primary team  -Optimize hemodynamic status to avoid delay in renal recovery  -recommend hold parameters on antihypertensive's for SBP <130 mmHg  -Avoid hypotension or fluctuations in blood pressure  Maintain MAP >65  -low sodium (2 gm) diet  -continue to trend    Hypokalemia:  -improved  -K+ 3 9  Mag stable  -KCl in IVF  -continue to monitor    Anemia:  -Hgb 9 7  Goal >10  -continue to trend  -Transfuse for Hgb <7 0  -management per primary team    Acute CVA with left hemiparesis and dysphagia:  -left-sided deficits persist  -carotid duplex and CTA with severe contralateral L carotid stenosis but no significant right extracranial disease   -failed speech eval   Will require PEG, scheduled for 10/18/22  -PT/OT/speech therapy  -management per Neurology and primary team    Alcohol abuse:  -CIWA protocol  -management per primary team      SUBJECTIVE:  Patient awake, alert without complaints  Renal function stable   Serum sodium remains 129  Urine osmolality and urine sodium and elevated at 572 and 195  TFs currently off due to patient removing dobhoff  IVF: D5NSS w/20 KCl  A complete review of systems was performed  Pertinent positives and negatives noted in the HPI  Otherwise the review of systems was negative  OBJECTIVE:  Current Weight: Weight - Scale: 57 6 kg (127 lb)  Vitals:    10/16/22 1548 10/16/22 2030 10/17/22 0719 10/17/22 1050   BP: 170/90 166/98  (!) 208/108   BP Location:  Right arm  Left arm   Pulse:  70  55   Resp:  18  18   Temp:  98 1 °F (36 7 °C) 98 3 °F (36 8 °C)    TempSrc:  Oral     SpO2:    97%   Weight:       Height:           Intake/Output Summary (Last 24 hours) at 10/17/2022 1111  Last data filed at 10/17/2022 1057  Gross per 24 hour   Intake 1000 ml   Output 400 ml   Net 600 ml     General:  Awake, alert, appears comfortable and in no acute distress  Nontoxic    Skin:  No rash, warm, good skin turgor   Eyes:  PERRL, EOMI, sclerae nonicteric   no periorbital edema   ENT:  Moist mucous membranes  Neck:  Trachea midline, symmetric  No JVD  Chest:  Clear to auscultation bilaterally without wheezes, crackles or rhonchi  CVS:  Regular rate and rhythm without murmur, gallop or rub  S1 and S2 identified and normal   No S3, S4    Abdomen:  Soft, nontender, nondistended without masses  Normal bowel sounds x 4 quadrants  No bruit  Extremities:  Warm, pink, motor and sensory intact and well perfused  No cyanosis, pallor  No BLE edema  Neuro:  Awake, alert, oriented x3  Flaccid LUE/LLE  + facial asymmetry  Psych:  Appropriate affect    Mentating appropriately but withdrawn        Medications:    Current Facility-Administered Medications:   •  amLODIPine (NORVASC) tablet 5 mg, 5 mg, Oral, Daily, Jareth Garcia MD  •  atorvastatin (LIPITOR) tablet 40 mg, 40 mg, Oral, Daily With Dinner, Bridgette Walker DO, 40 mg at 10/12/22 1716  •  cefTRIAXone (ROCEPHIN) IVPB (premix in dextrose) 1,000 mg 50 mL, 1,000 mg, Intravenous, Q24H, Marni Pack MD, Last Rate: 100 mL/hr at 10/17/22 0104, 1,000 mg at 10/17/22 0104  •  dextrose 5 % and sodium chloride 0 9 % with KCl 20 mEq/L infusion (premix), 75 mL/hr, Intravenous, Continuous, Jareth Garcia MD, Last Rate: 75 mL/hr at 10/17/22 1047, 75 mL/hr at 10/17/22 1047  •  enoxaparin (LOVENOX) subcutaneous injection 40 mg, 40 mg, Subcutaneous, Daily, Giselle Evans MD, 40 mg at 10/17/22 1042  •  ipratropium (ATROVENT) 0 02 % inhalation solution 0 5 mg, 0 5 mg, Nebulization, Q6H PRN, Giselle Evans MD  •  labetalol (NORMODYNE) injection 10 mg, 10 mg, Intravenous, Q6H PRN, Xuan Bellamy MD, 10 mg at 10/17/22 1051  •  levalbuterol (XOPENEX) inhalation solution 1 25 mg, 1 25 mg, Nebulization, Q6H PRN, Giselle Evans MD    Laboratory Results:  Results from last 7 days   Lab Units 10/17/22  0228 10/17/22  0227 10/16/22  0840 10/16/22  0449 10/15/22  1757 10/15/22  0552 10/14/22  2034 10/14/22  0500 10/13/22  1827 10/12/22  1517 10/12/22  0400 10/11/22  1340 10/11/22  0610 10/11/22  0602   WBC Thousand/uL 5 28  --   --  4 52  --  4 73  --  6 14  --   --  8 07  --  9 64  --    HEMOGLOBIN g/dL 9 7*  --   --  10 2*  --  9 8*  --  11 6*  --   --  10 6*  --  12 3  --    HEMATOCRIT % 29 4*  --   --  31 3*  --  29 9*  --  37 3  --   --  33 0*  --  35 8*  --    PLATELETS Thousands/uL 97*  --   --  104*  --  100*  --  97*  --   --  90*  --  106*  --    SODIUM mmol/L  --  129* 129*  --  128* 130* 130* 132* 133*   < > 128*   < >  --  125*   POTASSIUM mmol/L  --  3 9 3 8  --  4 0 2 6* 3 1* 3 6 3 5   < > 3 6   < >  --  3 9   CHLORIDE mmol/L  --  101 98  --  98 101 97 100 102   < > 99   < >  --  94*   CO2 mmol/L  --  22 23  --  24 22 27 22 26   < > 23   < >  --  20*   BUN mg/dL  --  6 8  --  8 7 9 12 16   < > 22   < >  --  25   CREATININE mg/dL  --  0 52* 0 57*  --  0 60 0 55* 0 58* 0 64 0 71   < > 1 00   < >  --  1 26   CALCIUM mg/dL  --  8 1* 8 3*  --  8 3* 7 1* 8 1* 8 2* 8 1*   < > 8 0*   < >  --  8 2*   MAGNESIUM mg/dL  --  2 0  --  1 5*  --   --   --   --   --   --  1 9  --   --  1 7*   PHOSPHORUS mg/dL  --   --   --   --   --  1 8*  --   --   --   --   --   --   --  3 8    < > = values in this interval not displayed  I have personally reviewed the blood work as stated above and in my note  I have personally reviewed internal Medicine, co-consultants and previous nephrology notes

## 2022-10-17 NOTE — CASE MANAGEMENT
Case Management Discharge Planning Note    Patient name Tracie Lemons S /S -14 MRN [id number]  : 1960 Date 10/17/2022       Current Admission Date: 10/9/2022  Current Admission Diagnosis:Acute CVA (cerebrovascular accident) [facility])   Patient Active Problem List    Diagnosis Date Noted   • Hypokalemia 10/15/2022   • Moderate protein-calorie malnutrition ([facility] [address] ) 10/14/2022   • Mild protein-calorie malnutrition ([facility] [address] ) 10/12/2022   • Alcohol use disorder, mild, abuse 10/10/2022   • Aspiration into respiratory tract 10/10/2022   • Hyponatremia 10/09/2022   • Acute CVA (cerebrovascular accident) ([facility] [address] ) 10/09/2022   • More than 50 percent stenosis of left internal carotid artery 10/09/2022      LOS (days): 8  Geometric Mean LOS (GMLOS) (days): 5 00  Days to GMLOS:-3     OBJECTIVE:  Risk of Unplanned Readmission Score: 13 03         Current admission status: Inpatient   Preferred Pharmacy: No Pharmacies Listed  Primary Care Provider: No primary care provider on file  Primary Insurance: BLUE CROSS  Secondary Insurance:     DISCHARGE DETAILS:  Per SLIM, patient planned for peg tube placement tomorrow 10/18  CM sent updated referrals to [facility] [address]  Waiting for response if able to accept for inpatient rehab at discharge

## 2022-10-18 ENCOUNTER — ANESTHESIA EVENT (INPATIENT)
Dept: PERIOP | Facility: HOSPITAL | Age: 62
DRG: 871 | End: 2022-10-18
Payer: COMMERCIAL

## 2022-10-18 ENCOUNTER — ANESTHESIA (INPATIENT)
Dept: PERIOP | Facility: HOSPITAL | Age: 62
DRG: 871 | End: 2022-10-18
Payer: COMMERCIAL

## 2022-10-18 PROBLEM — F17.200 SMOKING: Status: ACTIVE | Noted: 2022-10-18

## 2022-10-18 PROBLEM — IMO0001 SMOKING: Status: ACTIVE | Noted: 2022-10-18

## 2022-10-18 PROBLEM — E44.1 MILD PROTEIN-CALORIE MALNUTRITION (HCC): Status: RESOLVED | Noted: 2022-10-12 | Resolved: 2022-10-18

## 2022-10-18 LAB
ANION GAP SERPL CALCULATED.3IONS-SCNC: 6 MMOL/L (ref 4–13)
ANION GAP SERPL CALCULATED.3IONS-SCNC: 6 MMOL/L (ref 4–13)
BASOPHILS # BLD AUTO: 0.05 THOUSANDS/ΜL (ref 0–0.1)
BASOPHILS # BLD AUTO: 0.05 THOUSANDS/ΜL (ref 0–0.1)
BASOPHILS NFR BLD AUTO: 1 % (ref 0–1)
BASOPHILS NFR BLD AUTO: 1 % (ref 0–1)
BUN SERPL-MCNC: 6 MG/DL (ref 5–25)
BUN SERPL-MCNC: 6 MG/DL (ref 5–25)
CALCIUM SERPL-MCNC: 8.4 MG/DL (ref 8.4–10.2)
CALCIUM SERPL-MCNC: 8.4 MG/DL (ref 8.4–10.2)
CHLORIDE SERPL-SCNC: 100 MMOL/L (ref 96–108)
CHLORIDE SERPL-SCNC: 100 MMOL/L (ref 96–108)
CO2 SERPL-SCNC: 24 MMOL/L (ref 21–32)
CO2 SERPL-SCNC: 24 MMOL/L (ref 21–32)
CREAT SERPL-MCNC: 0.55 MG/DL (ref 0.6–1.3)
CREAT SERPL-MCNC: 0.55 MG/DL (ref 0.6–1.3)
EOSINOPHIL # BLD AUTO: 0.13 THOUSAND/ΜL (ref 0–0.61)
EOSINOPHIL # BLD AUTO: 0.13 THOUSAND/ΜL (ref 0–0.61)
EOSINOPHIL NFR BLD AUTO: 3 % (ref 0–6)
EOSINOPHIL NFR BLD AUTO: 3 % (ref 0–6)
ERYTHROCYTE [DISTWIDTH] IN BLOOD BY AUTOMATED COUNT: 15.8 % (ref 11.6–15.1)
ERYTHROCYTE [DISTWIDTH] IN BLOOD BY AUTOMATED COUNT: 15.8 % (ref 11.6–15.1)
GFR SERPL CREATININE-BSD FRML MDRD: 114 ML/MIN/1.73SQ M
GFR SERPL CREATININE-BSD FRML MDRD: 114 ML/MIN/1.73SQ M
GLUCOSE SERPL-MCNC: 104 MG/DL (ref 65–140)
GLUCOSE SERPL-MCNC: 104 MG/DL (ref 65–140)
GLUCOSE SERPL-MCNC: 126 MG/DL (ref 65–140)
GLUCOSE SERPL-MCNC: 126 MG/DL (ref 65–140)
HCT VFR BLD AUTO: 31.6 % (ref 36.5–49.3)
HCT VFR BLD AUTO: 31.6 % (ref 36.5–49.3)
HGB BLD-MCNC: 10.3 G/DL (ref 12–17)
HGB BLD-MCNC: 10.3 G/DL (ref 12–17)
IMM GRANULOCYTES # BLD AUTO: 0.04 THOUSAND/UL (ref 0–0.2)
IMM GRANULOCYTES # BLD AUTO: 0.04 THOUSAND/UL (ref 0–0.2)
IMM GRANULOCYTES NFR BLD AUTO: 1 % (ref 0–2)
IMM GRANULOCYTES NFR BLD AUTO: 1 % (ref 0–2)
LYMPHOCYTES # BLD AUTO: 0.57 THOUSANDS/ΜL (ref 0.6–4.47)
LYMPHOCYTES # BLD AUTO: 0.57 THOUSANDS/ΜL (ref 0.6–4.47)
LYMPHOCYTES NFR BLD AUTO: 13 % (ref 14–44)
LYMPHOCYTES NFR BLD AUTO: 13 % (ref 14–44)
MCH RBC QN AUTO: 29.7 PG (ref 26.8–34.3)
MCH RBC QN AUTO: 29.7 PG (ref 26.8–34.3)
MCHC RBC AUTO-ENTMCNC: 32.6 G/DL (ref 31.4–37.4)
MCHC RBC AUTO-ENTMCNC: 32.6 G/DL (ref 31.4–37.4)
MCV RBC AUTO: 91 FL (ref 82–98)
MCV RBC AUTO: 91 FL (ref 82–98)
MONOCYTES # BLD AUTO: 0.43 THOUSAND/ΜL (ref 0.17–1.22)
MONOCYTES # BLD AUTO: 0.43 THOUSAND/ΜL (ref 0.17–1.22)
MONOCYTES NFR BLD AUTO: 10 % (ref 4–12)
MONOCYTES NFR BLD AUTO: 10 % (ref 4–12)
NEUTROPHILS # BLD AUTO: 3.27 THOUSANDS/ΜL (ref 1.85–7.62)
NEUTROPHILS # BLD AUTO: 3.27 THOUSANDS/ΜL (ref 1.85–7.62)
NEUTS SEG NFR BLD AUTO: 72 % (ref 43–75)
NEUTS SEG NFR BLD AUTO: 72 % (ref 43–75)
NRBC BLD AUTO-RTO: 0 /100 WBCS
NRBC BLD AUTO-RTO: 0 /100 WBCS
PLATELET # BLD AUTO: 103 THOUSANDS/UL (ref 149–390)
PLATELET # BLD AUTO: 103 THOUSANDS/UL (ref 149–390)
PMV BLD AUTO: 8.9 FL (ref 8.9–12.7)
PMV BLD AUTO: 8.9 FL (ref 8.9–12.7)
POTASSIUM SERPL-SCNC: 3.8 MMOL/L (ref 3.5–5.3)
POTASSIUM SERPL-SCNC: 3.8 MMOL/L (ref 3.5–5.3)
RBC # BLD AUTO: 3.47 MILLION/UL (ref 3.88–5.62)
RBC # BLD AUTO: 3.47 MILLION/UL (ref 3.88–5.62)
SODIUM SERPL-SCNC: 130 MMOL/L (ref 135–147)
SODIUM SERPL-SCNC: 130 MMOL/L (ref 135–147)
WBC # BLD AUTO: 4.49 THOUSAND/UL (ref 4.31–10.16)
WBC # BLD AUTO: 4.49 THOUSAND/UL (ref 4.31–10.16)

## 2022-10-18 PROCEDURE — 99232 SBSQ HOSP IP/OBS MODERATE 35: CPT | Performed by: INTERNAL MEDICINE

## 2022-10-18 PROCEDURE — 97110 THERAPEUTIC EXERCISES: CPT

## 2022-10-18 PROCEDURE — 43830 GSTRST OPEN WO CONSTJ TUBE: CPT | Performed by: SURGERY

## 2022-10-18 PROCEDURE — 43830 GSTRST OPEN WO CONSTJ TUBE: CPT | Performed by: PHYSICIAN ASSISTANT

## 2022-10-18 PROCEDURE — 94760 N-INVAS EAR/PLS OXIMETRY 1: CPT

## 2022-10-18 PROCEDURE — 97535 SELF CARE MNGMENT TRAINING: CPT

## 2022-10-18 PROCEDURE — 80048 BASIC METABOLIC PNL TOTAL CA: CPT | Performed by: PHYSICIAN ASSISTANT

## 2022-10-18 PROCEDURE — 85025 COMPLETE CBC W/AUTO DIFF WBC: CPT

## 2022-10-18 PROCEDURE — 82948 REAGENT STRIP/BLOOD GLUCOSE: CPT

## 2022-10-18 PROCEDURE — 0DH60UZ INSERTION OF FEEDING DEVICE INTO STOMACH, OPEN APPROACH: ICD-10-PCS | Performed by: SURGERY

## 2022-10-18 PROCEDURE — 99222 1ST HOSP IP/OBS MODERATE 55: CPT | Performed by: SURGERY

## 2022-10-18 PROCEDURE — 97530 THERAPEUTIC ACTIVITIES: CPT

## 2022-10-18 DEVICE — 18FR BALLOON G-TUBE (BOXED 1)
Type: IMPLANTABLE DEVICE | Site: ABDOMEN | Status: FUNCTIONAL
Brand: AMT BALLOON G-TUBE

## 2022-10-18 RX ORDER — SODIUM CHLORIDE, SODIUM LACTATE, POTASSIUM CHLORIDE, CALCIUM CHLORIDE 600; 310; 30; 20 MG/100ML; MG/100ML; MG/100ML; MG/100ML
INJECTION, SOLUTION INTRAVENOUS CONTINUOUS PRN
Status: DISCONTINUED | OUTPATIENT
Start: 2022-10-18 | End: 2022-10-18

## 2022-10-18 RX ORDER — OXYCODONE HYDROCHLORIDE 10 MG/1
10 TABLET ORAL EVERY 4 HOURS PRN
Status: DISCONTINUED | OUTPATIENT
Start: 2022-10-18 | End: 2022-10-21 | Stop reason: HOSPADM

## 2022-10-18 RX ORDER — ONDANSETRON 2 MG/ML
4 INJECTION INTRAMUSCULAR; INTRAVENOUS ONCE AS NEEDED
Status: DISCONTINUED | OUTPATIENT
Start: 2022-10-18 | End: 2022-10-18 | Stop reason: HOSPADM

## 2022-10-18 RX ORDER — FENTANYL CITRATE 50 UG/ML
INJECTION, SOLUTION INTRAMUSCULAR; INTRAVENOUS AS NEEDED
Status: DISCONTINUED | OUTPATIENT
Start: 2022-10-18 | End: 2022-10-18

## 2022-10-18 RX ORDER — METOPROLOL TARTRATE 5 MG/5ML
5 INJECTION INTRAVENOUS EVERY 6 HOURS
Status: DISCONTINUED | OUTPATIENT
Start: 2022-10-18 | End: 2022-10-20

## 2022-10-18 RX ORDER — ONDANSETRON 2 MG/ML
INJECTION INTRAMUSCULAR; INTRAVENOUS AS NEEDED
Status: DISCONTINUED | OUTPATIENT
Start: 2022-10-18 | End: 2022-10-18

## 2022-10-18 RX ORDER — BUPIVACAINE HYDROCHLORIDE AND EPINEPHRINE 2.5; 5 MG/ML; UG/ML
INJECTION, SOLUTION INFILTRATION; PERINEURAL AS NEEDED
Status: DISCONTINUED | OUTPATIENT
Start: 2022-10-18 | End: 2022-10-18 | Stop reason: HOSPADM

## 2022-10-18 RX ORDER — HYDRALAZINE HYDROCHLORIDE 20 MG/ML
INJECTION INTRAMUSCULAR; INTRAVENOUS AS NEEDED
Status: DISCONTINUED | OUTPATIENT
Start: 2022-10-18 | End: 2022-10-18

## 2022-10-18 RX ORDER — ENOXAPARIN SODIUM 100 MG/ML
40 INJECTION SUBCUTANEOUS DAILY
Status: DISCONTINUED | OUTPATIENT
Start: 2022-10-19 | End: 2022-10-21 | Stop reason: HOSPADM

## 2022-10-18 RX ORDER — HYDROMORPHONE HCL/PF 1 MG/ML
0.2 SYRINGE (ML) INJECTION
Status: DISCONTINUED | OUTPATIENT
Start: 2022-10-18 | End: 2022-10-21 | Stop reason: HOSPADM

## 2022-10-18 RX ORDER — ROCURONIUM BROMIDE 10 MG/ML
INJECTION, SOLUTION INTRAVENOUS AS NEEDED
Status: DISCONTINUED | OUTPATIENT
Start: 2022-10-18 | End: 2022-10-18

## 2022-10-18 RX ORDER — SODIUM CHLORIDE, SODIUM LACTATE, POTASSIUM CHLORIDE, CALCIUM CHLORIDE 600; 310; 30; 20 MG/100ML; MG/100ML; MG/100ML; MG/100ML
50 INJECTION, SOLUTION INTRAVENOUS CONTINUOUS
Status: DISCONTINUED | OUTPATIENT
Start: 2022-10-18 | End: 2022-10-21

## 2022-10-18 RX ORDER — PROPOFOL 10 MG/ML
INJECTION, EMULSION INTRAVENOUS AS NEEDED
Status: DISCONTINUED | OUTPATIENT
Start: 2022-10-18 | End: 2022-10-18

## 2022-10-18 RX ORDER — FENTANYL CITRATE/PF 50 MCG/ML
50 SYRINGE (ML) INJECTION
Status: DISCONTINUED | OUTPATIENT
Start: 2022-10-18 | End: 2022-10-18 | Stop reason: HOSPADM

## 2022-10-18 RX ORDER — OXYCODONE HYDROCHLORIDE 5 MG/1
5 TABLET ORAL EVERY 4 HOURS PRN
Status: DISCONTINUED | OUTPATIENT
Start: 2022-10-18 | End: 2022-10-21 | Stop reason: HOSPADM

## 2022-10-18 RX ORDER — CEFAZOLIN SODIUM 1 G/50ML
SOLUTION INTRAVENOUS AS NEEDED
Status: DISCONTINUED | OUTPATIENT
Start: 2022-10-18 | End: 2022-10-18

## 2022-10-18 RX ORDER — ALBUTEROL SULFATE 2.5 MG/3ML
2.5 SOLUTION RESPIRATORY (INHALATION) ONCE AS NEEDED
Status: DISCONTINUED | OUTPATIENT
Start: 2022-10-18 | End: 2022-10-18 | Stop reason: HOSPADM

## 2022-10-18 RX ORDER — ACETAMINOPHEN 325 MG/1
975 TABLET ORAL EVERY 8 HOURS SCHEDULED
Status: DISCONTINUED | OUTPATIENT
Start: 2022-10-18 | End: 2022-10-19

## 2022-10-18 RX ORDER — LIDOCAINE HYDROCHLORIDE 10 MG/ML
INJECTION, SOLUTION EPIDURAL; INFILTRATION; INTRACAUDAL; PERINEURAL AS NEEDED
Status: DISCONTINUED | OUTPATIENT
Start: 2022-10-18 | End: 2022-10-18

## 2022-10-18 RX ORDER — CEFAZOLIN SODIUM 2 G/50ML
2000 SOLUTION INTRAVENOUS
Status: COMPLETED | OUTPATIENT
Start: 2022-10-18 | End: 2022-10-18

## 2022-10-18 RX ORDER — DEXAMETHASONE SODIUM PHOSPHATE 10 MG/ML
INJECTION, SOLUTION INTRAMUSCULAR; INTRAVENOUS AS NEEDED
Status: DISCONTINUED | OUTPATIENT
Start: 2022-10-18 | End: 2022-10-18

## 2022-10-18 RX ORDER — ALBUMIN, HUMAN INJ 5% 5 %
SOLUTION INTRAVENOUS CONTINUOUS PRN
Status: DISCONTINUED | OUTPATIENT
Start: 2022-10-18 | End: 2022-10-18

## 2022-10-18 RX ADMIN — DEXAMETHASONE SODIUM PHOSPHATE 10 MG: 10 INJECTION, SOLUTION INTRAMUSCULAR; INTRAVENOUS at 15:32

## 2022-10-18 RX ADMIN — FENTANYL CITRATE 25 MCG: 50 INJECTION INTRAMUSCULAR; INTRAVENOUS at 16:11

## 2022-10-18 RX ADMIN — SODIUM CHLORIDE, SODIUM LACTATE, POTASSIUM CHLORIDE, AND CALCIUM CHLORIDE: .6; .31; .03; .02 INJECTION, SOLUTION INTRAVENOUS at 15:25

## 2022-10-18 RX ADMIN — SODIUM CHLORIDE, POTASSIUM CHLORIDE, SODIUM LACTATE AND CALCIUM CHLORIDE 50 ML/HR: 600; 310; 30; 20 INJECTION, SOLUTION INTRAVENOUS at 21:43

## 2022-10-18 RX ADMIN — SODIUM CHLORIDE, POTASSIUM CHLORIDE, SODIUM LACTATE AND CALCIUM CHLORIDE 50 ML/HR: 600; 310; 30; 20 INJECTION, SOLUTION INTRAVENOUS at 17:53

## 2022-10-18 RX ADMIN — FENTANYL CITRATE 25 MCG: 50 INJECTION INTRAMUSCULAR; INTRAVENOUS at 15:31

## 2022-10-18 RX ADMIN — ROCURONIUM BROMIDE 25 MG: 10 INJECTION, SOLUTION INTRAVENOUS at 15:32

## 2022-10-18 RX ADMIN — ONDANSETRON 4 MG: 2 INJECTION INTRAMUSCULAR; INTRAVENOUS at 15:32

## 2022-10-18 RX ADMIN — DEXTROSE, SODIUM CHLORIDE, AND POTASSIUM CHLORIDE 75 ML/HR: 5; .9; .15 INJECTION INTRAVENOUS at 11:19

## 2022-10-18 RX ADMIN — HYDRALAZINE HYDROCHLORIDE 10 MG: 20 INJECTION, SOLUTION INTRAMUSCULAR; INTRAVENOUS at 16:02

## 2022-10-18 RX ADMIN — CEFAZOLIN SODIUM 1000 MG: 1 SOLUTION INTRAVENOUS at 15:35

## 2022-10-18 RX ADMIN — FENTANYL CITRATE 25 MCG: 50 INJECTION INTRAMUSCULAR; INTRAVENOUS at 15:46

## 2022-10-18 RX ADMIN — METOROPROLOL TARTRATE 5 MG: 5 INJECTION, SOLUTION INTRAVENOUS at 14:00

## 2022-10-18 RX ADMIN — ALBUMIN (HUMAN): 12.5 INJECTION, SOLUTION INTRAVENOUS at 15:41

## 2022-10-18 RX ADMIN — LIDOCAINE HYDROCHLORIDE 50 MG: 10 INJECTION, SOLUTION EPIDURAL; INFILTRATION; INTRACAUDAL; PERINEURAL at 15:31

## 2022-10-18 RX ADMIN — CEFAZOLIN SODIUM 2000 MG: 2 SOLUTION INTRAVENOUS at 12:39

## 2022-10-18 RX ADMIN — SUGAMMADEX 120 MG: 100 INJECTION, SOLUTION INTRAVENOUS at 16:26

## 2022-10-18 RX ADMIN — PROPOFOL 80 MG: 10 INJECTION, EMULSION INTRAVENOUS at 15:32

## 2022-10-18 RX ADMIN — ROCURONIUM BROMIDE 15 MG: 10 INJECTION, SOLUTION INTRAVENOUS at 15:46

## 2022-10-18 NOTE — CASE MANAGEMENT
Case Management Progress Note    Patient name Nita Lemons S /S -28 MRN [id number]  : 1960 Date 10/18/2022       LOS (days): 9  Geometric Mean LOS (GMLOS) (days): 5 00  Days to GMLOS:-3 8        OBJECTIVE:        Current admission status: Inpatient  Preferred Pharmacy: No Pharmacies Listed  Primary Care Provider: No primary care provider on file  Primary Insurance: BLUE CROSS  Secondary Insurance:     PROGRESS NOTE:    CM informed CT revealed pt's bowel wrapped around his stomach, IR unable to pursue with PEG today  Surgery consulted for PEG

## 2022-10-18 NOTE — PLAN OF CARE
Problem: PHYSICAL THERAPY ADULT  Goal: Performs mobility at highest level of function for planned discharge setting  See evaluation for individualized goals  Description: Treatment/Interventions: Functional transfer training, LE strengthening/ROM, Therapeutic exercise, Endurance training, Cognitive reorientation, Patient/family training, Equipment eval/education, Bed mobility, Gait training  Equipment Recommended:  (will continue to assess)       See flowsheet documentation for full assessment, interventions and recommendations  Outcome: Not Progressing  Note:    Problem List: Decreased strength, Decreased range of motion, Decreased endurance, Impaired balance, Decreased mobility, Decreased safety awareness, Decreased cognition  Assessment: pt began tx session lying supine in the bed and was agreeable to participate in PT intervention  Care coordination with Laura from OT due to anticipated level of assistance required for all funcitonal transfers  pt cotninues to remain consistant with max Ax1 for all bed mobility as pt required LE and trunk maangement in order to complete a supine<>sit EOB transfer  While seated EOB pt required min to mod Ax1 in order to assit with maintaining sitting balance  pt required mod Ax1 while performimg weight shifts while seated EOB as pt was able to complete 15  All functionl transfers continue to require max Ax2 as pt requires RUE support and LLE knee blocking in order to STS and SPT from EOB to recliner  On LLE pt required PROM for all TE activities  Continue to recommend post acute rehab services at the time of D/C in order to maximize pt functional independence and safety with all OOB mobility when appropriate  post tx pt in recliner with call bell and chair alarm activated        PT Discharge Recommendation: Post acute rehabilitation services    See flowsheet documentation for full assessment

## 2022-10-18 NOTE — OCCUPATIONAL THERAPY NOTE
OT TREATMENT    The patient's raw score on the AM-PAC Daily Activity inpatient short form is 12, standardized score is 30 6, less than 39 4  Patients at this level are likely to benefit from DC to post-acute rehabilitation services  Please refer to the recommendation of the Occupational Therapist for safe DC planning  10/18/22 0900   OT Last Visit   OT Visit Date 10/18/22   Note Type   Note Type Treatment   Pain Assessment   Pain Assessment Tool 0-10   Pain Score No Pain   Restrictions/Precautions   Weight Bearing Precautions Per Order No   Braces or Orthoses Other (Comment)  (LUE sling utilized for functional transfers)   Other Precautions Cognitive; Chair Alarm; Bed Alarm;Multiple lines; Fall Risk;Telemetry;Aspiration  (LUE flaccidity)   Lifestyle   Autonomy per pt independent w/ ADLs, independent w/ functional transfers and mobility w/ no AD, independent w/ IADLs, working   Reciprocal Relationships Supportive sister  Pt added that she is going to cut his hair   Service to Others Pt reports working in [employer] Pt reports enjoying [employer] and added that he is a [employer] fan   ADL   Where Assessed Edge of bed   Grooming Assistance 3  Moderate Assistance   Grooming Deficit Setup;Steadying;Verbal cueing;Supervision/safety; Increased time to complete;Wash/dry hands   Grooming Comments Sitting EOB, mod A x1 to maintain sitting balance with varying RUE support on bedrail  Pt noted to drool our of L side of mouth in sitting, VC to notice and attend to L sided facial drooling   UB Bathing Assistance 3  Moderate Assistance   UB Bathing Deficit Setup;Steadying;Verbal cueing;Supervision/safety; Increased time to complete; Chest;Right arm;Left arm; Abdomen   UB Bathing Comments sitting EOB, mod A x 1 to maintain sitting balance  Able to wash LUE with prompting, A for RUE     UB Dressing Assistance 2  Maximal Assistance   UB Dressing Deficit Setup;Steadying;Verbal cueing;Supervision/safety; Increased time to complete; Thread RUE; Thread LUE;Pull around back   UB Dressing Comments sitting EOB, mod A x 1 to maintain sitting balance  VC for orientation and initiation to don clean gown   LB Dressing Assistance 2  Maximal Assistance   LB Dressing Deficit Setup;Steadying;Verbal cueing;Supervision/safety; Increased time to complete; Thread RLE into underwear; Thread LLE into underwear;Pull up over hips   LB Dressing Comments sitting EOB, Mod a x 1 to maintain sitting balance  Able to thread underwear over RLE with A for trunk management  A to pull up to hips in stance with MAX a x 2 to maintain stance   Functional Standing Tolerance   Time 30 seconds   Activity donning underwear up to hips   Comments MAX A x 2 to maintain stance, LLE knee block   Bed Mobility   Supine to Sit 2  Maximal assistance   Additional items Assist x 1;HOB elevated; Increased time required;Verbal cues;LE management  (LLE management to EOB)   Sit to Supine Unable to assess   Additional Comments Pt OOB to recliner at end of session  Able to sit EOB for approx 15 minutes with mod A x 1  Transfers   Sit to Stand 2  Maximal assistance   Additional items Assist x 2; Increased time required;Verbal cues  (R HHA and LLE knee block)   Stand to Sit 2  Maximal assistance   Additional items Assist x 2; Increased time required;Verbal cues  (R HHA and LLE knee block)   Stand pivot 2  Maximal assistance   Additional items Assist x 2; Increased time required;Verbal cues  (R HHA and LLE knee block)   Additional Comments To perform functional trasnfers, RUE HHA and LLE knee block utilized  Therapeutic Exercise - ROM   UE-ROM Yes   ROM - Left Upper Extremities    L Shoulder PROM; Flexion   L Elbow PROM;Elbow flexion;Elbow extension   L Wrist PROM; Wrist flexion;Wrist extension   L Position Seated;Against gravity   Neuromuscular Education   Weight Bearing Technique Yes   LUE Weight Bearing Extended arm seated  (sitting EOB)   Response to Weight Bearing Technique Able to weight shift with VC for trunk control   Subjective   Subjective "I just want my underwear"   Cognition   Overall Cognitive Status Impaired   Arousal/Participation Alert; Cooperative   Attention Attends with cues to redirect   Orientation Level Oriented to place;Oriented to person;Disoriented to time;Disoriented to situation   Memory Decreased recall of precautions;Decreased recall of recent events   Following Commands Follows one step commands with increased time or repetition   Comments Able to attend to instructions  Flat affect and continued limited communication with garbled speech  Earl to express wants/needs appropriately  Will continue to assess   Activity Tolerance   Activity Tolerance Patient limited by fatigue   Medical Staff Made Aware Care coordinated wt PT Ignacio   Assessment   Assessment Patient participated in Skilled OT session this date with interventions consisting of ADL re training with the use of correct body mechnaics, Energy Conservation techniques, safety awareness and fall prevention techniques, one handed dressing technique, therapeutic exercise to: increase functional use of BUEs, increase BUE muscle strength , increase standing tolerance time with unilateral UE support to complete sink level ADLs, neuromuscular re education to: facilitate volitional use of limbs, facilitate proximal cocontraction of limbs and core, elicit righting and equilibrium reactions for improved postural control and alignment during transitional movements and increase dynamic sit/ stand balance during functional activity    Patient agreeable to OT treatment session, upon arrival patient was found supine in bed  In comparison to previous session, patient with improvements in participation in ADLs this session  Pt continues to require MAX A for ADL completion including LB dressing and bathing  Pt able to complete UD bathing with mod A x 1   Pt continues to require mod A x 1 to maintain sitting EOB  Patient continues to be functioning below baseline level, occupational performance remains limited secondary to factors listed above and increased risk for falls and injury  From OT standpoint, recommendation at time of d/c would be Post acute rehabilitation services  Patient to benefit from continued Occupational Therapy treatment while in the hospital to address deficits as defined above and maximize level of functional independence with ADLs and functional mobility  Plan   Treatment Interventions ADL retraining;Functional transfer training;UE strengthening/ROM; Endurance training;Cognitive reorientation;Patient/family training;Equipment evaluation/education; Neuromuscular reeducation; Compensatory technique education;Continued evaluation; Energy conservation; Activityengagement   Goal Expiration Date 10/25/22   OT Treatment Day 2  (Tuesday)   OT Frequency 3-5x/wk   Recommendation   OT Discharge Recommendation Post acute rehabilitation services   Additional Comments  This session, pt required and most appropriately benefited from skilled OT/PT co-treat due to extensive physical assistance of SKILLED therapists, significant regression from functional baseline and decreased activity tolerance  OT and PT goals were addressed separately as seen in documentation     AM-PAC Daily Activity Inpatient   Lower Body Dressing 2   Bathing 2   Toileting 2   Upper Body Dressing 2   Grooming 2   Eating 2   Daily Activity Raw Score 12   Daily Activity Standardized Score (Calc for Raw Score >=11) 30 6   AM-PAC Applied Cognition Inpatient   Following a Speech/Presentation 3   Understanding Ordinary Conversation 3   Taking Medications 2   Remembering Where Things Are Placed or Put Away 3   Remembering List of 4-5 Errands 3   Taking Care of Complicated Tasks 3   Applied Cognition Raw Score 17   Applied Cognition Standardized Score 36 52     [facility]

## 2022-10-18 NOTE — ANESTHESIA PREPROCEDURE EVALUATION
Procedure:  INSERTION GASTROSTOMY TUBE OPEN (N/A Abdomen)    Relevant Problems   CARDIO   (+) More than 50 percent stenosis of left internal carotid artery      NEURO/PSYCH   (+) Acute CVA (cerebrovascular accident) ([address] )      PULMONARY   (+) Smoking      Other   (+) Alcohol use disorder, mild, abuse   (+) Aspiration into respiratory tract   (+) Moderate protein-calorie malnutrition (HCC)        Physical Exam    Airway    Mallampati score: III  TM Distance: >3 FB  Neck ROM: full     Dental   Comment: Very poor, multiple missing,     Cardiovascular      Pulmonary      Other Findings        Anesthesia Plan  ASA Score- 3     Anesthesia Type- general with ASA Monitors  Additional Monitors:   Airway Plan: ETT  Plan Factors-    Chart reviewed  EKG reviewed  Existing labs reviewed  Patient summary reviewed  Induction- intravenous  Postoperative Plan- Plan for postoperative opioid use  Informed Consent- Anesthetic plan and risks discussed with patient  I personally reviewed this patient with the CRNA  Discussed and agreed on the Anesthesia Plan with the CRNA  Eva Mata

## 2022-10-18 NOTE — PHYSICAL THERAPY NOTE
PHYSICAL THERAPY NOTE          Patient Name: Ebony Wolfe  NJDAG'G Date: 10/18/2022           10/18/22 0901   PT Last Visit   PT Visit Date 10/18/22   Note Type   Note Type Treatment   Pain Assessment   Pain Assessment Tool 0-10   Pain Score No Pain   Patient's Stated Pain Goal No pain   Hospital Pain Intervention(s) Repositioned; Ambulation/increased activity; Emotional support; Rest   Multiple Pain Sites No   Restrictions/Precautions   Weight Bearing Precautions Per Order No   Braces or Orthoses   (L UE sling)   Other Precautions Cognitive; Chair Alarm; Bed Alarm;Multiple lines;Telemetry; Fall Risk;Aspiration   General   Chart Reviewed Yes   Response to Previous Treatment Patient with no complaints from previous session  Family/Caregiver Present No   Cognition   Overall Cognitive Status Impaired   Arousal/Participation Alert; Responsive; Cooperative   Attention Attends with cues to redirect   Orientation Level Oriented to person;Oriented to place; Disoriented to time;Disoriented to situation   Memory Decreased recall of precautions;Decreased recall of recent events   Following Commands Follows one step commands with increased time or repetition   Comments pt was pleasant and cooperative throughout tx session pt was able to follow instructions   Subjective   Subjective pt was agreeable to participate in PT intervention   Bed Mobility   Supine to Sit 2  Maximal assistance   Additional items Assist x 1;HOB elevated; Bedrails; Increased time required;Verbal cues;LE management; Other  (trunk management)   Sit to Supine Unable to assess   Additional Comments pt seated OOB in the recliner post tx session   Transfers   Sit to Stand 2  Maximal assistance   Additional items Assist x 2; Increased time required;Verbal cues  (LLE knee blocked while completing a STS from EOB with max Ax2 HHA)   Stand to Sit 2  Maximal assistance   Additional items Assist x 2;Armrests; Increased time required;Verbal cues; Other  (LLE knee blocked with HHA)   Stand pivot 2  Maximal assistance   Additional items Assist x 2;Armrests; Increased time required;Verbal cues   Toilet transfer Unable to assess   Additional Comments pt continues to require LLE knee block and RUE assistance to complete functional transfesr from EOB to recliner with VC's for foot placement   Ambulation/Elevation   Gait pattern Not appropriate   Balance   Static Sitting Fair -  (pt required min to mod Ax1 in order to assist with sitting balance and tolerance)   Dynamic Sitting Poor   Static Standing Zero   Ambulatory Zero   Activity Tolerance   Activity Tolerance Patient limited by fatigue   Nurse Made Aware Spoke to RN   Exercises   Heelslides Sitting;20 reps;PROM; Left  (long sit position)   Hip Flexion Sitting;20 reps;PROM; Left  (hip flexion SLR)   Hip Abduction Sitting;20 reps;PROM; Left   Hip Adduction Sitting;20 reps;PROM; Left   Knee AROM Short Arc Quad Sitting;20 reps;PROM; Left   Ankle Pumps Sitting;20 reps;PROM; Left   Assessment   Problem List Decreased strength;Decreased range of motion;Decreased endurance; Impaired balance;Decreased mobility; Decreased safety awareness;Decreased cognition   Assessment pt began tx session lying supine in the bed and was agreeable to participate in PT intervention  Care coordination with Laura from OT due to anticipated level of assistance required for all funcitonal transfers  pt cotninues to remain consistant with max Ax1 for all bed mobility as pt required LE and trunk maangement in order to complete a supine<>sit EOB transfer  While seated EOB pt required min to mod Ax1 in order to assit with maintaining sitting balance  pt required mod Ax1 while performimg weight shifts while seated EOB as pt was able to complete 15  All functionl transfers continue to require max Ax2 as pt requires RUE support and LLE knee blocking in order to STS and SPT from EOB to recliner   On LLE pt required PROM for all TE activities  Continue to recommend post acute rehab services at the time of D/C in order to maximize pt functional independence and safety with all OOB mobility when appropriate  post tx pt in recliner with call bell and chair alarm activated   Goals   Patient Goals no goals  stated this tx session   STG Expiration Date 10/21/22   PT Treatment Day 3   Plan   Treatment/Interventions Functional transfer training;LE strengthening/ROM; Therapeutic exercise; Endurance training;Cognitive reorientation;Patient/family training;Equipment eval/education;Gait training;Bed mobility;Spoke to nursing   Progress Slow progress, decreased activity tolerance   PT Frequency 4-6x/wk   Recommendation   PT Discharge Recommendation Post acute rehabilitation services   AM-PAC Basic Mobility Inpatient   Turning in Bed Without Bedrails 2   Lying on Back to Sitting on Edge of Flat Bed 2   Moving Bed to Chair 1   Standing Up From Chair 1   Walk in Room 1   Climb 3-5 Stairs 1   Basic Mobility Inpatient Raw Score 8   Turning Head Towards Sound 3   Follow Simple Instructions 3   Low Function Basic Mobility Raw Score 14   Low Function Basic Mobility Standardized Score 22 01   Highest Level Of Mobility   [facility] Goal 3: Sit at edge of bed   Education   Education Provided   (bed mobility and functional transfers)   Patient Reinforcement needed   End of Consult   Patient Position at End of Consult Bedside chair;Bed/Chair alarm activated; All needs within reach   The patient's AM-PAC Basic Mobility Inpatient Short Form Raw Score is 8  A Raw score of less than or equal to 16 suggests the patient may benefit from discharge to post-acute rehabilitation services  Please also refer to the recommendation of the Physical Therapist for safe discharge planning       Beatriz Zepeda

## 2022-10-18 NOTE — CONSULTS
Consultation -General Surgery  2003 [facility] 63 y o  male MRN: [id number]  Unit/Bed#: S -01 Encounter: [id number]    ASSESSMENT:  62 yo male who presenting after a CVA, now with residual left sided weakness and difficulty swallowing  Surgery has been consulted for surgical G-tube placement  AVSS    Plan:  -NPO today for OR   -Continue IVF  -DVT ppx  -IV ancef prior to surgery   Rest of care per primary team      Reason for Consult / Principal Problem:    HPI: Clif Blum is a 63y o  year old male who presents with Acute CVA (cerebrovascular accident) ([facility] [address] )  Now with residual left side weakness and difficulty swallowing requiring access for enteral feeding  Per chart review, percutaneous placement of G tube is not recommended due to location of colon  Surgery was discussed with the patient and he is agreeable  No past abdominal surgeries  Review of Systems   All other systems reviewed and are negative  Historical Information   History reviewed  No pertinent past medical history  History reviewed  No pertinent surgical history  Social History   Social History     Substance and Sexual Activity   Alcohol Use Not Currently     Social History     Substance and Sexual Activity   Drug Use Not on file     Social History     Tobacco Use   Smoking Status Current Every Day Smoker   • Types: Cigarettes   Smokeless Tobacco Never Used     History reviewed  No pertinent family history  Meds/Allergies     No medications prior to admission       Current Facility-Administered Medications   Medication Dose Route Frequency   • amLODIPine (NORVASC) tablet 5 mg  5 mg Oral Daily   • atorvastatin (LIPITOR) tablet 40 mg  40 mg Oral Daily With Dinner   • cefTRIAXone (ROCEPHIN) IVPB (premix in dextrose) 1,000 mg 50 mL  1,000 mg Intravenous Q24H   • dextrose 5 % and sodium chloride 0 9 % with KCl 20 mEq/L infusion (premix)  75 mL/hr Intravenous Continuous   • [START ON 10/19/2022] enoxaparin (LOVENOX) subcutaneous injection 40 mg  40 mg Subcutaneous Daily   • hydrALAZINE (APRESOLINE) injection 10 mg  10 mg Intravenous Q6H PRN   • ipratropium (ATROVENT) 0 02 % inhalation solution 0 5 mg  0 5 mg Nebulization Q6H PRN   • labetalol (NORMODYNE) injection 10 mg  10 mg Intravenous Q6H PRN   • levalbuterol (XOPENEX) inhalation solution 1 25 mg  1 25 mg Nebulization Q6H PRN       No Known Allergies    Objective     Blood pressure 164/76, pulse 60, temperature 97 9 °F (36 6 °C), temperature source Oral, resp  rate 18, height 5' 11" (1 803 m), weight 57 6 kg (127 lb), SpO2 99 %  Intake/Output Summary (Last 24 hours) at 10/18/2022 1134  Last data filed at 10/18/2022 0901  Gross per 24 hour   Intake 0 ml   Output 1350 ml   Net -1350 ml       PHYSICAL EXAM  Physical Exam  Vitals reviewed  Constitutional:       General: He is not in acute distress  Appearance: He is not ill-appearing or toxic-appearing  Cardiovascular:      Rate and Rhythm: Normal rate  Pulmonary:      Effort: Pulmonary effort is normal    Abdominal:      General: There is no distension  Palpations: Abdomen is soft  Tenderness: There is no abdominal tenderness  Skin:     General: Skin is warm and dry  Neurological:      Mental Status: He is alert        Comments: Left sided weakness            Lab Results:   CBC:   Lab Results   Component Value Date    WBC 4 49 10/18/2022    HGB 10 3 (L) 10/18/2022    HCT 31 6 (L) 10/18/2022    MCV 91 10/18/2022     (L) 10/18/2022    MCH 29 7 10/18/2022    MCHC 32 6 10/18/2022    RDW 15 8 (H) 10/18/2022    MPV 8 9 10/18/2022    NRBC 0 10/18/2022   , CMP:   Lab Results   Component Value Date    SODIUM 130 (L) 10/18/2022    K 3 8 10/18/2022     10/18/2022    CO2 24 10/18/2022    BUN 6 10/18/2022    CREATININE 0 55 (L) 10/18/2022    CALCIUM 8 4 10/18/2022    EGFR 114 10/18/2022   , Coagulation: No results found for: PT, INR, APTT, Urinalysis: No results found for: Ashely Ink, SPECGRAV, Lova Minium, NITRITE, PROTEINUA, GLUCOSEU, KETONESU, BILIRUBINUR, BLOODU, Amylase: No results found for: AMYLASE, Lipase: No results found for: LIPASE  Imaging: I have personally reviewed pertinent reports  EKG, Pathology, and Other Studies: I have personally reviewed pertinent reports  Counseling / Coordination of Care  Total time spent today  20 minutes  Greater than 50% of total time was spent with the patient and / or family counseling and / or coordination of care           Britt Lew  10/18/2022 11:34 AM

## 2022-10-18 NOTE — ANESTHESIA POSTPROCEDURE EVALUATION
Post-Op Assessment Note    CV Status:  Stable  Pain Score: 0    Pain management: adequate     Mental Status:  Sleepy and arousable   Hydration Status:  Euvolemic   PONV Controlled:  Controlled   Airway Patency:  Patent      Post Op Vitals Reviewed: Yes      Staff: CRNA         No complications documented      /78 (10/18/22 1636)    Temp 97 5 °F (36 4 °C) (10/18/22 1636)    Pulse 74 (10/18/22 1636)   Resp 20 (10/18/22 1636)    SpO2 100 % (10/18/22 1636)

## 2022-10-18 NOTE — PROGRESS NOTES
Was asked by Dr Yaa MUNGUIA to review patient's imaging to determine if a percutaneous endoscopic gastrostomy tube could be inserted safely  Discussed with my attending Dr Charleen Bear as well  CT scan noted to have large amount of colon containing gas overlying the stomach with no obvious window to access the stomach  PEG tube placement is done without image guidance and would be very high risk for colon perforation due to the technique used  Would not recommend attempting endoscopic placement of feeding tube for this patient due to the risk

## 2022-10-18 NOTE — QUICK NOTE
Consult requested for image guided gastrostomy tube placement  CT evaluated, there is not a safe window for percutaneous placement due to overlying colon  Discussed the case with GI who are also not recommending percutaneous placement due to location of colon  Recommend surgical consultation for G-tube placement

## 2022-10-18 NOTE — UTILIZATION REVIEW
Continued Stay Review    Date: 10/18/22                       Current Patient Class: Inpatient  Current Level of Care: Med Surg    HPI:61 y o  male initially admitted on 10/9/22      10/18/22 Nephrology: Hyponatremia, hypo osmolar:  Suspect initially due to volume depletion, now component of SIADH in the setting of CVA   sNa slightly improved to 130 today, s/p lasix 20 mg IV x 1 on 10/17  Will consider low dose loop diuretic daily in the setting of SIADH  NPO due to dysphagia   TF off since patient pull out Dobhoff  For PEG today  On IVF D5NSS + 20 KCL @ 75 ml/hr -Fluid restriction of 1 5 L/day  Strict I/O, check BMP in am, avoid overcorrection >8 meq/24 hrs  PE: AOX3  Decreased breath sounds L base  LUE/LLE deficits unchanged, + facial asymmetry   Physical Therapy recommending post acute rehab at discharge  Interventional Radiology:  Consult requested for image guided gastrostomy tube placement  CT evaluated, there is not a safe window for percutaneous placement due to overlying colon  Discussed the case with GI who are also not recommending percutaneous placement due to location of colon  Recommend surgical consultation for G-tube placement  General Surgery consult:  for surgical G-tube placement  NPO today for OR, continue IVF  IV ancef prior to surgery  Internal Medicine:  Acute CVA, left-sided hemiplegia, dysphagia  Currently patient is NPO, for surgical PEG tube  Will give IV metoprolol q 6 hours for blood pressure control  Aspiration pneumonia, status post treatment with IV antibiotics  Hyponatremia sodium levels improving            Vital Signs:       Date/Time Temp Pulse Resp BP MAP (mmHg) SpO2 O2 Device   10/18/22 0828 -- -- -- -- -- 99 % None (Room air)   10/18/22 07:20:56 97 9 °F (36 6 °C) 60 18 164/76 -- 99 % --   10/17/22 2140 98 5 °F (36 9 °C) 84 18 168/96 121 98 % --   10/17/22 1630 -- -- -- 156/82 -- -- --   10/17/22 1538 98 2 °F (36 8 °C) 65 -- 184/92 Abnormal   -- -- --   10/17/22 1509 -- 65 -- 184/94 Abnormal  -- -- --   10/17/22 1200 -- -- -- 184/90 Abnormal  -- -- --   10/17/22 1050 -- 55 18 208/108 Abnormal  -- 97 % None (Room air)   10/17/22 0719 98 3 °F (36 8 °C) -- -- -- -- -- --   10/16/22 2030 98 1 °F (36 7 °C) 70 18 166/98 -- -- --   10/16/22 1548 -- -- -- 170/90 -- -- --   10/16/22 15:37:34 97 9 °F (36 6 °C) -- 18 195/107 Abnormal  136 -- --       Pertinent Labs/Diagnostic Results:     10/17 CT AP: Colon interposed between the abdominal wall and the stomach  No ascites  Bilateral lung base opacities most compatible with infection/inflammation  Correlate clinically for aspiration  Small pericardial effusion    No acute abdominopelvic findings  10/15 repeat CXR: Feeding tube in stomach  Persistent left base opacity, partially shown on CTA neck, due to bronchiectasis and bronchiolitis  10/15 CXR: Feeding tube in midesophagus, subsequently advanced into the stomach  Left base opacity partially imaged on neck CTA and due to bronchiectasis and bronchiolitis          Results from last 7 days   Lab Units 10/18/22  0501 10/17/22  0228 10/16/22  0449 10/15/22  0552 10/14/22  0500   WBC Thousand/uL 4 49 5 28 4 52 4 73 6 14   HEMOGLOBIN g/dL 10 3* 9 7* 10 2* 9 8* 11 6*   HEMATOCRIT % 31 6* 29 4* 31 3* 29 9* 37 3   PLATELETS Thousands/uL 103* 97* 104* 100* 97*   NEUTROS ABS Thousands/µL 3 27  --   --   --  4 12         Results from last 7 days   Lab Units 10/18/22  0501 10/17/22  0227 10/16/22  0840 10/16/22  0449 10/15/22  1757 10/15/22  0552 10/12/22  1517 10/12/22  0400   SODIUM mmol/L 130* 129* 129*  --  128* 130*   < > 128*   POTASSIUM mmol/L 3 8 3 9 3 8  --  4 0 2 6*   < > 3 6   CHLORIDE mmol/L 100 101 98  --  98 101   < > 99   CO2 mmol/L 24 22 23  --  24 22   < > 23   ANION GAP mmol/L 6 6 8  --  6 7   < > 6   BUN mg/dL 6 6 8  --  8 7   < > 22   CREATININE mg/dL 0 55* 0 52* 0 57*  --  0 60 0 55*   < > 1 00   EGFR ml/min/1 73sq m 114 116 112  --  110 114   < > 82   CALCIUM mg/dL 8 4 8 1* 8 3* --  8 3* 7 1*   < > 8 0*   MAGNESIUM mg/dL  --  2 0  --  1 5*  --   --   --  1 9   PHOSPHORUS mg/dL  --   --   --   --   --  1 8*  --   --     < > = values in this interval not displayed  Results from last 7 days   Lab Units 10/15/22  1148   POC GLUCOSE mg/dl 93     Results from last 7 days   Lab Units 10/18/22  0501 10/17/22  0227 10/16/22  0840 10/15/22  1757 10/15/22  0552 10/14/22  2034 10/14/22  0500 10/13/22  1827 10/13/22  0445 10/12/22  1517 10/12/22  0400 10/11/22  1826   GLUCOSE RANDOM mg/dL 104 101 82 90 405* 110 72 93 96 136 138 98       Results from last 7 days   Lab Units 10/12/22  0418   PH VIRA  7 437*   PCO2 VIRA mm Hg 38 9*   PO2 VIRA mm Hg 50 9*   HCO3 VIRA mmol/L 25 7   BASE EXC VIRA mmol/L 1 4   O2 CONTENT VIRA ml/dL 12 5   O2 HGB, VENOUS % 82 3*               Results from last 7 days   Lab Units 10/16/22  1258   OSMO UR mmol/     Results from last 7 days   Lab Units 10/16/22  1258   SODIUM UR  195         Medications:   Scheduled Medications:  [MAR Hold] amLODIPine, 5 mg, Oral, Daily  [MAR Hold] atorvastatin, 40 mg, Oral, Daily With Dinner  [facility] Hold] cefTRIAXone, 1,000 mg, Intravenous, Q24H  [MAR Hold] enoxaparin, 40 mg, Subcutaneous, Daily  [MAR Hold] metoprolol, 5 mg, Intravenous, Q6H      Continuous IV Infusions:  dextrose 5 % and sodium chloride 0 9 % with KCl 20 mEq/L, 75 mL/hr, Intravenous, Continuous      PRN Meds:  bupivacaine-epinephrine, , , PRN  [MAR Hold] hydrALAZINE, 10 mg, Intravenous, Q6H PRN  [MAR Hold] ipratropium, 0 5 mg, Nebulization, Q6H PRN  [MAR Hold] levalbuterol, 1 25 mg, Nebulization, Q6H PRN        Discharge Plan: D        Network Utilization Review Department  ATTENTION: Please call with any questions or concerns to [phone] and carefully listen to the prompts so that you are directed to the right person   All voicemails are confidential   Dai Bee all requests for admission clinical reviews, approved or denied determinations and any other requests to dedicated fax number below belonging to the campus where the patient is receiving treatment   List of dedicated fax numbers for the Facilities:  [address] [place] [address] DENIALS (Administrative/Medical Necessity) [phone]   [address]  (Maternity/NICU/Pediatrics) [phone]   [address] [phone]   [address] [phone]   [address] [address] [address] [place] [address] [phone]   [facility] [address] [facility] [phone]

## 2022-10-18 NOTE — OP NOTE
OPERATIVE REPORT  PATIENT NAME: Elvia Escobar III    :  1960  MRN: [id number]  Pt Location: AN OR ROOM 04    SURGERY DATE: 10/18/2022    Surgeon(s) and Role:     * Nicholas Olivarez, DO - Primary     * Jeanie Neil PA-C - Assisting  No qualified resident was available  Her assistance was required for exposure retraction throughout the case    Preop Diagnosis:  Acute CVA (cerebrovascular accident) ([address] ) [I63 9]    Post-Op Diagnosis Codes:     * Acute CVA (cerebrovascular accident) ([address] ) [I63 9]    Procedure(s) (LRB):  INSERTION GASTROSTOMY TUBE OPEN (N/A)  18  Fr CLARIBEL G-tube    Specimen(s):  * No specimens in log *    Estimated Blood Loss:   Minimal    Drains:  Gastrostomy/Enterostomy 18 Fr  LUQ (Active)   Number of days: 0       Anesthesia Type:   General    Operative Indications:  Acute CVA (cerebrovascular accident) ([address] ) [I63 9]  Dysphagia    Operative Findings:  Colon was partially obscuring the stomach  This was able to be packed away to allow placement of an operative G-tube  ASA 3  Wound class 2  Height 71 in weight 57 kg/127 lb  BMI 18    Complications:   None    Procedure and Technique:  Patient was brought the operative suite and identified by visualization, conversation, by armband  Sequential compression pumps were placed  He was given Ancef perioperatively  Once under general anesthesia abdomen is then prepped and draped in a sterile fashion  Time-out was performed was assured that the prep was dry  Local was instilled in the epigastrium  Vertical skin incision was made the subcutaneous tissue was divided hot cautery down to the fascia  Fascia was lifted up and divided the midline  Was able to get through part of the falciform ligament just to the left and gained access into the abdominal cavity  Colon was obscuring the stomach  This was packed inferiorly with a sponge  Stomach was in view    Appropriate spot on the anterior gastric wall was chosen for the G-tube  This was brought into the wound  Pursestring of 2-0 silk sutures was placed x2  [de-identified] [place] carole G-tube was inserted through the left upper quadrant stab incision after instilling local   Gastrotomy was made and the tube was placed into the stomach  8 cc of water was placed  Pursestring sutures were then tied down  Each of the sutures were then attached the anterior abdominal wall securing the stomach up to the anterior abdominal wall  Third 2-0 silk was also utilized to completely keep the gastrotomy against the abdominal wall  Balloon was cinched up and the flange on the G-tube was brought down  Was that proximally 4  Irrigation was carried out  Sponge was removed  Fascia was closed using 1  PDS in a running continuous fashion  More local was instilled  Three 0 Vicryl was used to reapproximate subcutaneous tissues  Four Monocryl was used to close skin in a subcuticular fashion  Stoma wafer was placed under the G-tube flange  3-0 nylon was used to anchor this to the skin in 3 places  Wound was washed and dried  Sterile skin glue was applied  He was awakened in the operating returned to the recovery area in stable condition having tolerated the procedure well     I was present for the entire procedure    Patient Disposition:  PACU         SIGNATURE: Virgen Chan DO  DATE: October 18, 2022  TIME: 4:12 PM

## 2022-10-18 NOTE — QUICK NOTE
Postop Note - general Surgery  Annette Rolon III 63 y o  male MRN: [id number]  Unit/Bed#: S -01 Encounter: [id number]    Assessment:  63 y o  male who presented following a CVA, is now s/p Procedure(s) (LRB):  INSERTION GASTROSTOMY TUBE OPEN (N/A)    Plan:  - Diet NPO; Sips with meds  - Pain and Nausea control PRN  - okay to start trickle tube feeds 10/19 if doing well  - leave G-tube to gravity overnight    Subjective/Objective     Subjective:  Denies abdominal pain  Resting comfortably    Objective:   Vitals: Blood pressure 161/93, pulse 70, temperature 97 5 °F (36 4 °C), resp  rate 18, height 5' 11" (1 803 m), weight 57 6 kg (127 lb), SpO2 96 %  ,Body mass index is 17 71 kg/m²  I/O       10/17 0701  10/18 0700 10/18 0701  10/19 0700    P  O  0 0    I V  (mL/kg)  300 (5 2)    IV Piggyback  250    Total Intake(mL/kg) 0 (0) 550 (9 5)    Urine (mL/kg/hr) 1650 (1 2)     Total Output 1650     Net -1650 +550                Physical Exam:  Gen: NAD, Aox3, Comfortable in bed  Chest: Normal work of breathing, no respiratory distress  Abd: Soft, ND, NT   G-tube in place 4 cm at the bumper 3 cm at the skin  No surrounding erythema or drainage    G tube is capped  Ext: No Edema  Skin: Warm, Dry, Intact      Fawad Edge  10/18/2022  7:50 PM

## 2022-10-18 NOTE — PROGRESS NOTES
[facility]  Progress Note - 2005 [facility] 1960, 63 y o  male MRN: [id number]  Unit/Bed#: OR [place] Encounter: [id number]  Primary Care Provider: No primary care provider on file  Date and time admitted to hospital: 10/9/2022  3:59 PM    * Acute CVA (cerebrovascular accident) [facility])  Assessment & Plan  Episode of acute left-sided upper extremity and lower extremity weakness along with left-sided lower facial droop and dysarthria with onset morning of 10/9  BP on presentation 161/109, max HR and RR in  and 24 respectively  Patient did not get tPa since he was outside the window  EKG showed Sinus tachycardia  Right atrial enlargement  Nonspecific ST abnormality  CT stroke alert brain  Impression: Acute subcortical infarct in the right periventricular white matter, corona radiata and lentiform nuclei  No hemorrhagic conversion or mass effect  Findings were directly discussed with Marli Strange at 4:39 PM     CTA stroke alert (head/neck)  Impression: Lung segment critical (greater than 90%) stenosis in the proximal left cervical ICA  Atretic right posterior cerebral artery  No evidence of acute thrombus or large vessel occlusion of the major vessels of the [place]  MRI brain:   Stable acute to early subacute infarct in the right periventricular white matter, corona radiata and lentiform nuclei  No hemorrhagic conversion or mass effect  Acute lacunar infarct in the left thalamus  ECHO   · Moderate concentric hypertrophy  EF 55%  Mild tricuspid regurgitation  Trivial fibrinous pericardial effusion  Pulmonary artery pressure mildly elevated to 37 0 mmHg  · NIHSS score: 8    10/17 CT abdomen: Colon interposed between the abdominal wall and the stomach  IR couldn't put in PEG tube do to the anatomy of where the colon and stomach are   Surgery was consulted and was able to get the patient in for G tube placement today    Plan   - hydralazine and metoprolol q6 for HTN  - Placed back on D5 normal saline with potassium  - G tube placement by surgery today  - IV ancef dose prior to surgery  - holding aspirin and Plavix for G tube placement  - continue atorvastatin 40mg    - neuro checks  - provide stroke education  - Will follow up with neurovascular regarding carotid duplex findings in 4-6 weeks  - acute rehab treatment after discharge (recommendation of PMR)       Aspiration into respiratory tract  Assessment & Plan  CTA showed aspiration pneumonia in the upper lung fields L more than R  Chest XR showed Ill-defined opacity in the left upper lung which corresponds with bronchiectasis and tree-in-bud nodularity on the CTA neck  Likely aspiration pneumonia  At admission Leukocytosis to 18 20 and procalcitonin elevated to 0 88 but afebrile over hospital stay  Rhonchi have improved over the course of admission  Day 7 of ceftriaxone  NG tube placed however patient pulled it out due to discomfort, keofed tube was subsequently placed which was also pulled out due to discomfort  -NPO until G tube  placement today  - nebulizer treatments   - PFTs outpatient for suspected chronic lung disease     Hyponatremia  Assessment & Plan  Recent Labs     10/09/22  1637   SODIUM 119*     AM cortisol increased at 41 0   Renal US normal  Sodium is stable at 129  Per nephrology note, 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     Plan:  · Appreciate nephrology recommendations: Stop free water flushes in the TF  · Daily BMPs to monitor      More than 50 percent stenosis of left internal carotid artery  Assessment & Plan  CTA of head/neck  Impression: 1  Lung segment critical (greater than 90%) stenosis in the proximal left cervical ICA  2   Atretic right posterior cerebral artery  No evidence of acute thrombus or large vessel occlusion of the major vessels of the [place]  Carotid Complex Study  Impression: R ICA <50% heterogeneous and irregular occlusion   L ICA >70% heterogeneous and irregular occlusion  Plan   - F/u with neurovascular in 4-6 weeks on carotid duplex findings    Alcohol use disorder, mild, abuse  Assessment & Plan  Patient endorses 4-6 drinks per day consisting of 5% alcohol  -CIWA protocol     Hypokalemia  Assessment & Plan  Potassium of 2 6 on 10/15  Now up to 3 9 after repletion  Patient currently on D5 NS with potassium  · Continue D5 NS with potassium to replete potassium to 4 0   · Daily BMP        Moderate protein-calorie malnutrition (HCC)  Assessment & Plan  Malnutrition Findings:   Adult Malnutrition type: Acute illness  Adult Degree of Malnutrition: Malnutrition of moderate degree  Malnutrition Characteristics: Muscle loss, Inadequate energy                  360 Statement: Moderate Protein calorie malnutrition r/t inadequate intake as evidenced by < 75% energy intake compared to energy needs > 7 days, and muscle depletion to clavicle and quadriceps; plan for G-tube placement    BMI Findings:  Adult BMI Classifications: Underweight < 18 5        Body mass index is 17 71 kg/m²  NG tube placed however patient pulled it out due to discomfort, keofed tube was subsequently placed which was also pulled out due to discomfort  10/17 CT abdomen: Colon interposed between the abdominal wall and the stomach  IR couldn't put in PEG tube do to the anatomy of where the colon and stomach are   Surgery was consulted and was able to get the patient in for G tube placement today  -NPO until G tube placement by surgery today    Mild protein-calorie malnutrition (HCC)-resolved as of 10/18/2022  Assessment & Plan  Malnutrition Findings:   Adult Malnutrition type: Acute illness  Adult Degree of Malnutrition: Malnutrition of moderate degree  Malnutrition Characteristics: Muscle loss, Inadequate energy               360 Statement: Moderate Protein calorie malnutrition r/t inadequate intake as evidenced by < 75% energy intake compared to energy needs > 7 days, and muscle depletion to clavicle and quadriceps; plan for G-tube placement    BMI Findings:  Adult BMI Classifications: Underweight < 18 5        Body mass index is 17 71 kg/m²  NG tube placed however patient pulled it out due to discomfort, keofed tube was subsequently placed which was also pulled out due to discomfort  · NPO until PEG tube can be placed today by surgery        VTE Pharmacologic Prophylaxis: VTE Score: 7 High Risk (Score >/= 5) - Pharmacological DVT Prophylaxis Ordered: enoxaparin (Lovenox)  Sequential Compression Devices Ordered  Patient Centered Rounds: I performed bedside rounds with nursing staff today  Discussions with Specialists or Other Care Team Provider:  , IR, surgery     Education and Discussions with Family / Patient: Updated  (sister) via phone  Time Spent for Care: 30 minutes  More than 50% of total time spent on counseling and coordination of care as described above  Current Length of Stay: 9 day(s)  Current Patient Status: Inpatient   Certification Statement: The patient will continue to require additional inpatient hospital stay due to Acute rehab placement, g tube placement  Discharge Plan: TBD    Code Status: Level 1 - Full Code    Subjective:   Patient was seen at bedside this morning  No acute overnight events  He looked to be resting comfortably in the bed  Patient denies any chest pain, shortness of breath, nausea/vomiting  Objective:     Vitals:   Temp (24hrs), Av 9 °F (36 6 °C), Min:96 9 °F (36 1 °C), Max:98 5 °F (36 9 °C)    Temp:  [96 9 °F (36 1 °C)-98 5 °F (36 9 °C)] 96 9 °F (36 1 °C)  HR:  [60-84] 62  Resp:  [18-20] 20  BP: (156-202)/(76-99) 202/97  SpO2:  [98 %-100 %] 100 %  Body mass index is 17 71 kg/m²  Input and Output Summary (last 24 hours):      Intake/Output Summary (Last 24 hours) at 10/18/2022 1516  Last data filed at 10/18/2022 0901  Gross per 24 hour   Intake 0 ml   Output 1350 ml   Net -1350 ml Physical Exam:   Physical Exam  Vitals reviewed  Constitutional:       General: He is not in acute distress  Appearance: He is ill-appearing  HENT:      Head: Normocephalic and atraumatic  Mouth/Throat:      Mouth: Mucous membranes are dry  Eyes:      General: Gaze aligned appropriately  Extraocular Movements: Extraocular movements intact  Conjunctiva/sclera: Conjunctivae normal       Pupils: Pupils are equal, round, and reactive to light  Cardiovascular:      Rate and Rhythm: Normal rate and regular rhythm  Pulses: Normal pulses  Heart sounds: S1 normal and S2 normal  Heart sounds are distant  Pulmonary:      Effort: No respiratory distress  Breath sounds: Wheezing and rhonchi present  Abdominal:      General: Bowel sounds are decreased  There is no distension  Palpations: Abdomen is soft  Tenderness: There is no abdominal tenderness  Musculoskeletal:         General: No swelling or tenderness  Right lower leg: No edema  Left lower leg: No edema  Skin:     General: Skin is dry  Comments: Bilateral forearm ecchymoses    Neurological:      Mental Status: He is alert and oriented to person, place, and time  Comments: Inability to move left arm and left leg, left facial droop noted   Psychiatric:      Comments:  Withdrawn          Additional Data:     Labs:  Results from last 7 days   Lab Units 10/18/22  0501   WBC Thousand/uL 4 49   HEMOGLOBIN g/dL 10 3*   HEMATOCRIT % 31 6*   PLATELETS Thousands/uL 103*   NEUTROS PCT % 72   LYMPHS PCT % 13*   MONOS PCT % 10   EOS PCT % 3     Results from last 7 days   Lab Units 10/18/22  0501   SODIUM mmol/L 130*   POTASSIUM mmol/L 3 8   CHLORIDE mmol/L 100   CO2 mmol/L 24   BUN mg/dL 6   CREATININE mg/dL 0 55*   ANION GAP mmol/L 6   CALCIUM mg/dL 8 4   GLUCOSE RANDOM mg/dL 104         Results from last 7 days   Lab Units 10/15/22  1148   POC GLUCOSE mg/dl 93               Lines/Drains:  Invasive Devices  Report    Peripheral Intravenous Line  Duration           Long-Dwell Peripheral IV (Midline) 37/35/74 Right Basilic 4 days    Peripheral IV 10/16/22 Left;Ventral (anterior) Forearm 2 days                  Telemetry:  Telemetry Orders (From admission, onward)             24 Hour Telemetry Monitoring  Continuous x 24 Hours (Telem)        References:    Telemetry Guidelines   Question:  Reason for 24 Hour Telemetry  Answer:  Metabolic/Electrolyte Disturbance with High Probability of Dysrhythmia (K level <3 or >6, or KCL infusion >=10mEq/hr)                 Telemetry Reviewed: Normal Sinus Rhythm  Indication for Continued Telemetry Use: Metabolic/electrolyte disturbance with high probability of dysrhythmia             Imaging: Reviewed radiology reports from this admission including: chest xray    Recent Cultures (last 7 days):         Last 24 Hours Medication List:   Current Facility-Administered Medications   Medication Dose Route Frequency Provider Last Rate   • [MAR Hold] amLODIPine  5 mg Oral Daily Sushila Acevedo MD     • [facility] Hold] atorvastatin  40 mg Oral Daily With Spenser Walker DO     • [facility] Hold] cefTRIAXone  1,000 mg Intravenous Q24H Haleigh Mora MD 1,000 mg (10/17/22 2349)   • dextrose 5 % and sodium chloride 0 9 % with KCl 20 mEq/L  75 mL/hr Intravenous Continuous Sushila Acevedo MD 75 mL/hr (10/18/22 1119)   • [MAR Hold] enoxaparin  40 mg Subcutaneous Daily Cody Walker DO     • [MAR Hold] hydrALAZINE  10 mg Intravenous Q6H PRN Cody Walker DO     • [MAR Hold] ipratropium  0 5 mg Nebulization Q6H PRN Amina Morales MD     • [MAR Hold] levalbuterol  1 25 mg Nebulization Q6H PRN Amina Morales MD     • [MAR Hold] metoprolol  5 mg Intravenous Q6H Cody Fong DO          Today, Patient Was Seen By: Nancy Boucher, Dr Ariel Gomez    **Please Note: This note may have been constructed using a voice recognition system  **

## 2022-10-18 NOTE — PROGRESS NOTES
NEPHROLOGY PROGRESS NOTE   Yehuda Shannonohn III 63 y o  male MRN: [id number]  Unit/Bed#: S -01 Encounter: [id number]  Reason for Consult:  Hyponatremia and LUISANA, POA     62-year-old male with hx hypernatremia, EtOH use presenting with left-sided weakness and admitted with acute CVA  Nephrology consulted for management of hyponatremia and LUISANA      ASSESSMENT/PLAN:  Hyponatremia, hypoosmolar:  Suspect initially due to volume depletion, now component of SIADH in the setting of CVA  -admission sodium 119 on 10/9/2022  sNA initially improved to 133 with NSS, but has been 128-132 since 10/14  sNa slightly improved to 130 today, s/p lasix 20 mg IV x 1 on 10/17  -Baseline sodium unclear  Serum sodium 118 when seen in the ER on 1/4/2022 but no follow-up  -initial workup: serum osmolality 265, urine osmolality 369, urine sodium <10  uric acid 8, cortisol 41, TSH 4 36   -repeat urine osmolality 572, urine sodium 195 on 10/16/2022 consistent with SIADH  -renal function stable at baseline  -No acute indication for Hypertonic saline (HTS) at this time   -npo due to dysphagia  TF off since patient pull out [place]  For PEG today  -on IVF D5NSS + 20 KCL @ 75 ml/hr   -sNa improved with lasix dose yesterday  Will consider low dose loop diuretic daily in the setting of SIADH  -Fluid restriction of 1 5 L/day  -Strict I/O  -Check BMP in am  -Avoid overcorrection >8 meq/24 hrs    -continue to monitor closely  -will d/w Dr Traci Deleon     LUISANA (POA):  Suspect prerenal azotemia due to volume depletion  -resolved  -Admission creatinine 1 90 on 10/9/2022  Today's creatinine 0 55, at baseline  -Peak creatinine 1 90  -baseline creatinine normal, 0 5   -workup: UA bland  -Imaging:  Renal ultrasound with no hydronephrosis  -cont IVF while off TFs  -Strict I/Os, daily weights  -Avoid nephrotoxins, NSAIDs, IV contrast if possible  -Avoid hypotension    Maintain MAP >65  -Trend BMP     Hypertension/Volume status:  -BP hypertensive but currently at goal of permissive hypertension in the setting of acute CVA  -volume status euvolemic  -Home medications: None  -Current medications:  Amlodipine 5 mg daily on hold given npo  -hydralazine p r n  Per primary team  -Optimize hemodynamic status to avoid delay in renal recovery  -recommend hold parameters on antihypertensive's for SBP <130 mmHg  -Avoid hypotension or fluctuations in blood pressure  Maintain MAP >65  -low sodium (2 gm) diet  -continue to trend     Hypokalemia:  -improved, stable  -K+ 3 8    -KCl in IVF  -continue to monitor     Anemia:  -Hgb 10 3, improving and stable  At goal   Goal >10  -continue to trend  -Transfuse for Hgb <7 0  -management per primary team     Acute CVA with left hemiparesis and dysphagia:  -left-sided deficits persist  -carotid duplex and CTA with severe contralateral L carotid stenosis but no significant right extracranial disease   -failed speech eval   for PEG today, 10/18/22  -PT/OT/speech therapy  -management per Neurology and primary team    Left carotid stenosis:  -asymptomatic  -carotid duplex and CTA with long segmant >90% L ICA stenosis  -continue antiplatelet and statin  -requires f/u with vascular surgery     Alcohol abuse:  -CIWA protocol  -management per primary team     SUBJECTIVE:  Patient awake, alert without complaints  For PEG tube today by IR  On IVF  Serum sodium improved to 130 today  S/p lasix 20 mg IV x 1 yesterday  VSS    A complete review of systems was performed  Pertinent positives and negatives noted in the HPI  Otherwise the review of systems was negative      OBJECTIVE:  Current Weight: Weight - Scale: 57 6 kg (127 lb)  Vitals:    10/17/22 1630 10/17/22 2140 10/18/22 0720 10/18/22 0828   BP: 156/82 168/96 164/76    BP Location: Left arm Left arm Right arm    Pulse:  84 60    Resp:  18 18    Temp:  98 5 °F (36 9 °C) 97 9 °F (36 6 °C)    TempSrc:  Oral Oral    SpO2:  98% 99% 99%   Weight:       Height:           Intake/Output Summary (Last 24 hours) at 10/18/2022 1009  Last data filed at 10/18/2022 0655  Gross per 24 hour   Intake 0 ml   Output 1650 ml   Net -1650 ml     General:  Awake, alert, appears comfortable and in no acute distress  Nontoxic  Skin:  No rash, warm, good skin turgor   Eyes:  PERRL, EOMI, sclerae nonicteric   no periorbital edema   ENT:  Moist mucous membranes  Neck:  Trachea midline, symmetric  No JVD  Chest:  Clear to auscultation bilaterally without wheezes, crackles or rhonchi  Decreased breath sounds left base  CVS:  Regular rate and rhythm without murmur, gallop or rub  S1 and S2 identified and normal   No S3, S4    Abdomen:  Soft, nontender, nondistended without masses  Normal bowel sounds x 4 quadrants  No bruit  Extremities:  Warm, pink, motor and sensory intact and well perfused  No cyanosis, pallor  No BLE edema  Neuro:  Awake, alert, oriented x3  LUE/LLE deficits unchanged +facial asymmetry  Psych:  Appropriate affect  Mentating appropriately    Normal mental status exam       Medications:    Current Facility-Administered Medications:   •  amLODIPine (NORVASC) tablet 5 mg, 5 mg, Oral, Daily, Francine Bryant MD  •  atorvastatin (LIPITOR) tablet 40 mg, 40 mg, Oral, Daily With Dinner, TXU Nelly Coalson, DO, 40 mg at 10/12/22 1716  •  cefTRIAXone (ROCEPHIN) IVPB (premix in dextrose) 1,000 mg 50 mL, 1,000 mg, Intravenous, Q24H, Hiwot Marquez MD, Last Rate: 100 mL/hr at 10/17/22 2349, 1,000 mg at 10/17/22 2349  •  dextrose 5 % and sodium chloride 0 9 % with KCl 20 mEq/L infusion (premix), 75 mL/hr, Intravenous, Continuous, Francine Bryant MD, Last Rate: 75 mL/hr at 10/17/22 2130, 75 mL/hr at 10/17/22 2130  •  [START ON 10/19/2022] enoxaparin (LOVENOX) subcutaneous injection 40 mg, 40 mg, Subcutaneous, Daily, TXU Nelly Coalson, DO  •  hydrALAZINE (APRESOLINE) injection 10 mg, 10 mg, Intravenous, Q6H PRN, TXU Nelly Coalson, DO, 10 mg at 10/17/22 1248  •  ipratropium (ATROVENT) 0 02 % inhalation solution 0 5 mg, 0 5 mg, Nebulization, Q6H PRN, Taurus Sepulveda MD  •  labetalol (NORMODYNE) injection 10 mg, 10 mg, Intravenous, Q6H PRN, Ora Valderrama MD, 10 mg at 10/17/22 1051  •  levalbuterol (Harvy Grater) inhalation solution 1 25 mg, 1 25 mg, Nebulization, Q6H PRN, Taurus Sepulveda MD    Laboratory Results:  Results from last 7 days   Lab Units 10/18/22  0501 10/17/22  0228 10/17/22  0227 10/16/22  0840 10/16/22  0449 10/15/22  1757 10/15/22  0552 10/14/22  2034 10/14/22  0500 10/12/22  1517 10/12/22  0400   WBC Thousand/uL 4 49 5 28  --   --  4 52  --  4 73  --  6 14  --  8 07   HEMOGLOBIN g/dL 10 3* 9 7*  --   --  10 2*  --  9 8*  --  11 6*  --  10 6*   HEMATOCRIT % 31 6* 29 4*  --   --  31 3*  --  29 9*  --  37 3  --  33 0*   PLATELETS Thousands/uL 103* 97*  --   --  104*  --  100*  --  97*  --  90*   SODIUM mmol/L 130*  --  129* 129*  --  128* 130* 130* 132*   < > 128*   POTASSIUM mmol/L 3 8  --  3 9 3 8  --  4 0 2 6* 3 1* 3 6   < > 3 6   CHLORIDE mmol/L 100  --  101 98  --  98 101 97 100   < > 99   CO2 mmol/L 24  --  22 23  --  24 22 27 22   < > 23   BUN mg/dL 6  --  6 8  --  8 7 9 12   < > 22   CREATININE mg/dL 0 55*  --  0 52* 0 57*  --  0 60 0 55* 0 58* 0 64   < > 1 00   CALCIUM mg/dL 8 4  --  8 1* 8 3*  --  8 3* 7 1* 8 1* 8 2*   < > 8 0*   MAGNESIUM mg/dL  --   --  2 0  --  1 5*  --   --   --   --   --  1 9   PHOSPHORUS mg/dL  --   --   --   --   --   --  1 8*  --   --   --   --     < > = values in this interval not displayed  I have personally reviewed the blood work as stated above and in my note  I have personally reviewed internal Medicine, co-consultants and previous nephrology notes

## 2022-10-18 NOTE — PLAN OF CARE
Problem: OCCUPATIONAL THERAPY ADULT  Goal: Performs self-care activities at highest level of function for planned discharge setting  See evaluation for individualized goals  Description: Treatment Interventions: ADL retraining, Functional transfer training, Endurance training, Cognitive reorientation, Patient/family training, UE strengthening/ROM, Equipment evaluation/education, Compensatory technique education, Neuromuscular reeducation, Energy conservation, Activityengagement, Fine motor coordination activities          See flowsheet documentation for full assessment, interventions and recommendations  Outcome: Progressing  Note: Limitation: Decreased ADL status, Decreased Safe judgement during ADL, Decreased UE strength, Decreased cognition, Decreased endurance, Decreased high-level ADLs, Decreased self-care trans, Non-func L UE, Decreased sensation, Decreased fine motor control, Decreased UE ROM  Prognosis: Fair, Good  Assessment: Patient participated in Skilled OT session this date with interventions consisting of ADL re training with the use of correct body mechnaics, Energy Conservation techniques, safety awareness and fall prevention techniques, one handed dressing technique, therapeutic exercise to: increase functional use of BUEs, increase BUE muscle strength , increase standing tolerance time with unilateral UE support to complete sink level ADLs, neuromuscular re education to: facilitate volitional use of limbs, facilitate proximal cocontraction of limbs and core, elicit righting and equilibrium reactions for improved postural control and alignment during transitional movements and increase dynamic sit/ stand balance during functional activity    Patient agreeable to OT treatment session, upon arrival patient was found supine in bed  In comparison to previous session, patient with improvements in participation in ADLs this session   Pt continues to require MAX A for ADL completion including LB dressing and bathing  Pt able to complete UD bathing with mod A x 1  Pt continues to require mod A x 1 to maintain sitting EOB  Patient continues to be functioning below baseline level, occupational performance remains limited secondary to factors listed above and increased risk for falls and injury  From OT standpoint, recommendation at time of d/c would be Post acute rehabilitation services  Patient to benefit from continued Occupational Therapy treatment while in the hospital to address deficits as defined above and maximize level of functional independence with ADLs and functional mobility    Recommendation: [address]  OT Discharge Recommendation: Post acute rehabilitation services     Candie Lackey

## 2022-10-19 LAB
ANION GAP SERPL CALCULATED.3IONS-SCNC: 7 MMOL/L (ref 4–13)
ANION GAP SERPL CALCULATED.3IONS-SCNC: 7 MMOL/L (ref 4–13)
BUN SERPL-MCNC: 6 MG/DL (ref 5–25)
BUN SERPL-MCNC: 6 MG/DL (ref 5–25)
CALCIUM SERPL-MCNC: 8.7 MG/DL (ref 8.4–10.2)
CALCIUM SERPL-MCNC: 8.7 MG/DL (ref 8.4–10.2)
CHLORIDE SERPL-SCNC: 102 MMOL/L (ref 96–108)
CHLORIDE SERPL-SCNC: 102 MMOL/L (ref 96–108)
CO2 SERPL-SCNC: 22 MMOL/L (ref 21–32)
CO2 SERPL-SCNC: 22 MMOL/L (ref 21–32)
CREAT SERPL-MCNC: 0.49 MG/DL (ref 0.6–1.3)
CREAT SERPL-MCNC: 0.49 MG/DL (ref 0.6–1.3)
GFR SERPL CREATININE-BSD FRML MDRD: 119 ML/MIN/1.73SQ M
GFR SERPL CREATININE-BSD FRML MDRD: 119 ML/MIN/1.73SQ M
GLUCOSE SERPL-MCNC: 128 MG/DL (ref 65–140)
GLUCOSE SERPL-MCNC: 128 MG/DL (ref 65–140)
MAGNESIUM SERPL-MCNC: 1.7 MG/DL (ref 1.9–2.7)
MAGNESIUM SERPL-MCNC: 1.7 MG/DL (ref 1.9–2.7)
PHOSPHATE SERPL-MCNC: 2.6 MG/DL (ref 2.7–4.5)
PHOSPHATE SERPL-MCNC: 2.6 MG/DL (ref 2.7–4.5)
POTASSIUM SERPL-SCNC: 4.1 MMOL/L (ref 3.5–5.3)
POTASSIUM SERPL-SCNC: 4.1 MMOL/L (ref 3.5–5.3)
SODIUM SERPL-SCNC: 131 MMOL/L (ref 135–147)
SODIUM SERPL-SCNC: 131 MMOL/L (ref 135–147)

## 2022-10-19 PROCEDURE — 83735 ASSAY OF MAGNESIUM: CPT | Performed by: PHYSICIAN ASSISTANT

## 2022-10-19 PROCEDURE — 99024 POSTOP FOLLOW-UP VISIT: CPT | Performed by: SURGERY

## 2022-10-19 PROCEDURE — 97110 THERAPEUTIC EXERCISES: CPT

## 2022-10-19 PROCEDURE — 99232 SBSQ HOSP IP/OBS MODERATE 35: CPT | Performed by: INTERNAL MEDICINE

## 2022-10-19 PROCEDURE — 80048 BASIC METABOLIC PNL TOTAL CA: CPT | Performed by: PHYSICIAN ASSISTANT

## 2022-10-19 PROCEDURE — 97530 THERAPEUTIC ACTIVITIES: CPT

## 2022-10-19 PROCEDURE — 84100 ASSAY OF PHOSPHORUS: CPT | Performed by: PHYSICIAN ASSISTANT

## 2022-10-19 RX ORDER — MAGNESIUM SULFATE HEPTAHYDRATE 40 MG/ML
4 INJECTION, SOLUTION INTRAVENOUS ONCE
Status: COMPLETED | OUTPATIENT
Start: 2022-10-19 | End: 2022-10-19

## 2022-10-19 RX ORDER — ACETAMINOPHEN 325 MG/1
975 TABLET ORAL EVERY 8 HOURS SCHEDULED
Status: DISCONTINUED | OUTPATIENT
Start: 2022-10-19 | End: 2022-10-21 | Stop reason: HOSPADM

## 2022-10-19 RX ADMIN — METOROPROLOL TARTRATE 5 MG: 5 INJECTION, SOLUTION INTRAVENOUS at 07:57

## 2022-10-19 RX ADMIN — SODIUM PHOSPHATE, MONOBASIC, MONOHYDRATE 21 MMOL: 276; 142 INJECTION, SOLUTION INTRAVENOUS at 11:15

## 2022-10-19 RX ADMIN — METOROPROLOL TARTRATE 5 MG: 5 INJECTION, SOLUTION INTRAVENOUS at 15:08

## 2022-10-19 RX ADMIN — DEXTROSE, SODIUM CHLORIDE, AND POTASSIUM CHLORIDE 75 ML/HR: 5; .9; .15 INJECTION INTRAVENOUS at 01:40

## 2022-10-19 RX ADMIN — HYDRALAZINE HYDROCHLORIDE 10 MG: 20 INJECTION, SOLUTION INTRAMUSCULAR; INTRAVENOUS at 16:05

## 2022-10-19 RX ADMIN — ENOXAPARIN SODIUM 40 MG: 40 INJECTION SUBCUTANEOUS at 08:03

## 2022-10-19 RX ADMIN — SODIUM CHLORIDE, POTASSIUM CHLORIDE, SODIUM LACTATE AND CALCIUM CHLORIDE 50 ML/HR: 600; 310; 30; 20 INJECTION, SOLUTION INTRAVENOUS at 20:58

## 2022-10-19 RX ADMIN — METOROPROLOL TARTRATE 5 MG: 5 INJECTION, SOLUTION INTRAVENOUS at 20:56

## 2022-10-19 RX ADMIN — MAGNESIUM SULFATE HEPTAHYDRATE 4 G: 40 INJECTION, SOLUTION INTRAVENOUS at 10:59

## 2022-10-19 NOTE — PLAN OF CARE
Problem: PHYSICAL THERAPY ADULT  Goal: Performs mobility at highest level of function for planned discharge setting  See evaluation for individualized goals  Description: Treatment/Interventions: Functional transfer training, LE strengthening/ROM, Therapeutic exercise, Endurance training, Cognitive reorientation, Patient/family training, Equipment eval/education, Bed mobility, Gait training  Equipment Recommended:  (will continue to assess)       See flowsheet documentation for full assessment, interventions and recommendations  Outcome: Not Progressing  Note:    Problem List: Decreased strength, Decreased range of motion, Decreased endurance, Impaired balance, Decreased mobility, Decreased cognition, Decreased safety awareness  Assessment: pt began tx session lying supine in the bed and was agreeable to participate in PT intervention  pt continues to remain consistant with max Ax1 for all bed mobility and functional transfers  pt required LE and trunk management in order to complete a supine<>sit EOB transfer with LE and trunk management  While seated EOB pt had difficulty maintaining sitting balabce and required between min to mod Ax1 in order to maintain sitting balance  pt continues to require L knee blocjk and HhA in order to complete a STS from EOB as pt cotninues to demonstrate limited standing tolerance and required a seated rest break after like 30 seconds of standing with max Ax1  While completinf a SPT from EOB to recliner pt cotninues to require max Ax1 with HHA and L knee block to complete transfer to recliner  Continue to recommend post acute rehab services at the time of D/C in order to maximize pt functional independenec and safety with all OOB mobility when appropriate        PT Discharge Recommendation: Post acute rehabilitation services    See flowsheet documentation for full assessment

## 2022-10-19 NOTE — PROGRESS NOTES
NEPHROLOGY PROGRESS NOTE   Daysi Abdul III 63 y o  male MRN: [id number]  Unit/Bed#: S -01 Encounter: [id number]  Reason for Consult:  Hyponatremia and LUISANA, POA     65-year-old male with hx hypernatremia, EtOH use presenting with left-sided weakness and admitted with acute CVA   Nephrology consulted for management of hyponatremia and LUISANA      ASSESSMENT/PLAN:  Hyponatremia, hypoosmolar:  Suspect initially due to volume depletion, now component of SIADH in the setting of CVA  -admission sodium 119 on 10/9/2022   sNA initially improved to 133 with NSS, but has been 128-132 since 10/14   sNa improved to 131 today, s/p lasix 20 mg IV x 1 on 10/17  -Baseline sodium unclear   Serum sodium 118 when seen in the ER on 1/4/2022 but no follow-up  -initial workup: serum osmolality 265, urine osmolality 369, urine sodium <10  uric acid 8, cortisol 41, TSH 4 36   -repeat urine osmolality 572, urine sodium 195 on 10/16/2022 consistent with SIADH  -renal function stable at baseline  -No acute indication for Hypertonic saline (HTS) at this time   -npo due to dysphagia   s/p open G-tube 10/18  -on IVF LR @ 50 ml/hr since OR  Trickle TF started with free water 100 ml flushes Q 4 hrs  Will d/w Dr Twin Hankins  -Fluid restriction of 1 5 L/day  -Strict I/O    -Check BMP in am  -Avoid overcorrection >8 meq/24 hrs    -continue to monitor closely  -will d/w Dr Chairez     LUISANA (POA):  Suspect prerenal azotemia due to volume depletion  -resolved  -Admission creatinine 1 90 on 10/9/2022   Today's creatinine 0 49,at baseline  -Peak creatinine 1 90  -baseline creatinine normal 0 5   -workup: UA bland  -Imaging:  Renal ultrasound with no hydronephrosis  -free water flushes Q 4 hrs   -Strict I/Os, daily weights  -Avoid nephrotoxins, NSAIDs, IV contrast if possible  -Avoid hypotension   Maintain MAP >65  -Trend BMP     Hypertension/Volume status:  -BP hypertensive but currently at goal of permissive hypertension in the setting of acute CVA  -volume status euvolemic  -Home medications: None  -Current medications:  Amlodipine 5 mg daily  -hydralazine p r n  Per primary team  -Optimize hemodynamic status to avoid delay in renal recovery  -recommend hold parameters on antihypertensive's for SBP <130 mmHg  -Avoid hypotension or fluctuations in blood pressure   Maintain MAP >65  -low sodium (2 gm) diet  -continue to trend     Hypokalemia:  -improved, stable  -K+ 4 1    -continue to monitor    Hypomagnesemia:  -magnesium 1 7  -repleted with 4 g per primary team  -continue to monitor     Anemia:  -Hgb 10 3, improving and stable  At goal   Goal >10  -continue to trend  -Transfuse for Hgb <7 0  -management per primary team     Acute CVA with left hemiparesis and dysphagia:  -left-sided deficits persist  -carotid duplex and CTA with severe contralateral L carotid stenosis but no significant right extracranial disease   -failed speech eval   for PEG today, 10/18/22  -PT/OT/speech therapy  -management per Neurology and primary team     Left carotid stenosis:  -asymptomatic  -carotid duplex and CTA with long segmant >90% L ICA stenosis  -continue antiplatelet and statin  -requires f/u with vascular surgery     Alcohol abuse:  -CIWA protocol  -management per primary team    SUBJECTIVE:  Patient wake, alert without complaints  Serum sodium continues to improve at 131 today  VSS   IVF: LR @ 50 ml/hr    A complete review of systems was performed  Pertinent positives and negatives noted in the HPI  Otherwise the review of systems was negative      OBJECTIVE:  Current Weight: Weight - Scale: 57 6 kg (127 lb)  Vitals:    10/18/22 1700 10/18/22 1756 10/18/22 2206 10/19/22 0721   BP: (!) 177/81 161/93 159/84 168/82   BP Location:  Left arm  Right arm   Pulse: 76 70 59 65   Resp: 18  15 20   Temp: 97 5 °F (36 4 °C)  97 7 °F (36 5 °C) 97 9 °F (36 6 °C)   TempSrc:    Oral   SpO2: 98% 96% 99% 98%   Weight:       Height:           Intake/Output Summary (Last 24 hours) at 10/19/2022 1228  Last data filed at 10/19/2022 1100  Gross per 24 hour   Intake 550 ml   Output 1150 ml   Net -600 ml     General:  Awake, alert, appears comfortable and in no acute distress  Nontoxic  Skin:  No rash, warm, good skin turgor   Eyes:  PERRL, EOMI, sclerae nonicteric   no periorbital edema   ENT:  Moist mucous membranes  Neck:  Trachea midline, symmetric  No JVD  Chest:  Clear to auscultation bilaterally without wheezes, crackles or rhonchi  CVS:  Regular rate and rhythm without murmur, gallop or rub  S1 and S2 identified and normal   No S3, S4    Abdomen:  Soft, nontender, nondistended without masses  Normal bowel sounds x 4 quadrants  No bruit     Abdominal incision C/D/I   PEG tube site clear  Extremities:  Warm, pink, motor and sensory intact and well perfused  No cyanosis, pallor  No BLE edema  Neuro:  Awake, alert, oriented x3  LUE/LLE deficits unchanged with facial asymmetry  Psych:  Appropriate affect  Mentating appropriately    Normal mental status exam       Medications:    Current Facility-Administered Medications:   •  acetaminophen (TYLENOL) tablet 975 mg, 975 mg, Oral, Q8H [facility], Reyes Moreland MD  •  amLODIPine (NORVASC) tablet 5 mg, 5 mg, Oral, Daily, Jeanie Neil PA-C  •  atorvastatin (LIPITOR) tablet 40 mg, 40 mg, Oral, Daily With Dinner, Jeanie Neil PA-C, 40 mg at 10/12/22 1716  •  cefTRIAXone (ROCEPHIN) IVPB (premix in dextrose) 1,000 mg 50 mL, 1,000 mg, Intravenous, Q24H, Jeanie Neil PA-C, Last Rate: 100 mL/hr at 10/17/22 2349, 1,000 mg at 10/17/22 2349  •  enoxaparin (LOVENOX) subcutaneous injection 40 mg, 40 mg, Subcutaneous, Daily, Jeanie Neil PA-C, 40 mg at 10/19/22 0803  •  hydrALAZINE (APRESOLINE) injection 10 mg, 10 mg, Intravenous, Q6H PRN, Jeanie Neil PA-C, 10 mg at 10/17/22 1248  •  HYDROmorphone (DILAUDID) injection 0 2 mg, 0 2 mg, Intravenous, Q3H PRN, Reyes Moreland MD  •  ipratropium (ATROVENT) 0 02 % inhalation solution 0 5 mg, 0 5 mg, Nebulization, Q6H PRN, Jeanie Neil PA-C  •  lactated ringers infusion, 50 mL/hr, Intravenous, Continuous, Jes Madison MD, Last Rate: 50 mL/hr at 10/18/22 2143, 50 mL/hr at 10/18/22 2143  •  levalbuterol (XOPENEX) inhalation solution 1 25 mg, 1 25 mg, Nebulization, Q6H PRN, Jeanie Neil PA-C  •  magnesium sulfate 4 g/100 mL IVPB (premix) 4 g, 4 g, Intravenous, Once, Shaheen Price PA-C, 4 g at 10/19/22 1059  •  metoprolol (LOPRESSOR) injection 5 mg, 5 mg, Intravenous, Q6H, Jeanie Neil PA-C, 5 mg at 10/19/22 0757  •  naloxone (NARCAN) 0 04 mg/mL syringe 0 04 mg, 0 04 mg, Intravenous, Q1MIN PRN, Jes Madison MD  •  oxyCODONE (ROXICODONE) immediate release tablet 10 mg, 10 mg, Oral, Q4H PRN, Jes Madison MD  •  oxyCODONE (ROXICODONE) IR tablet 5 mg, 5 mg, Oral, Q4H PRN, Jes Madison MD  •  sodium phosphate 21 mmol in dextrose 5 % 250 mL Infusion, 21 mmol, Intravenous, Once, Shaheen Price PA-C, Last Rate: 62 5 mL/hr at 10/19/22 1115, 21 mmol at 10/19/22 1115    Laboratory Results:  Results from last 7 days   Lab Units 10/19/22  0438 10/18/22  0501 10/17/22  0228 10/17/22  0227 10/16/22  0840 10/16/22  0449 10/15/22  1757 10/15/22  0552 10/14/22  2034 10/14/22  0500   WBC Thousand/uL  --  4 49 5 28  --   --  4 52  --  4 73  --  6 14   HEMOGLOBIN g/dL  --  10 3* 9 7*  --   --  10 2*  --  9 8*  --  11 6*   HEMATOCRIT %  --  31 6* 29 4*  --   --  31 3*  --  29 9*  --  37 3   PLATELETS Thousands/uL  --  103* 97*  --   --  104*  --  100*  --  97*   SODIUM mmol/L 131* 130*  --  129* 129*  --  128* 130* 130* 132*   POTASSIUM mmol/L 4 1 3 8  --  3 9 3 8  --  4 0 2 6* 3 1* 3 6   CHLORIDE mmol/L 102 100  --  101 98  --  98 101 97 100   CO2 mmol/L 22 24  --  22 23  --  24 22 27 22   BUN mg/dL 6 6  --  6 8  --  8 7 9 12   CREATININE mg/dL 0 49* 0 55*  --  0 52* 0 57*  --  0 60 0 55* 0 58* 0 64   CALCIUM mg/dL 8 7 8 4  --  8 1* 8 3*  --  8 3* 7 1* 8  1* 8 2*   MAGNESIUM mg/dL 1 7*  --   --  2 0  --  1 5*  --   --   --   --    PHOSPHORUS mg/dL 2 6*  --   --   --   --   --   --  1 8*  --   --        I have personally reviewed the blood work as stated above and in my note  I have personally reviewed internal Medicine, co-consultants and previous nephrology notes

## 2022-10-19 NOTE — PROGRESS NOTES
Progress Note - general Surgery  [facility] [address] Rolon III 63 y o  male MRN: [id number]  Unit/Bed#: S -01 Encounter: [id number]    Assessment:  63 y o  male who presented with CVA, is now 1 Day Post-Op s/p Procedure(s) (LRB):  INSERTION GASTROSTOMY TUBE OPEN (N/A)    Plan:  - Diet NPO; Sips with meds  - Pain and Nausea control PRN  - Incentive spirometry  - OOB, ambulate  - start trickle feeds today  - will follow filled patient tolerating full feeds    Subjective/Objective     Subjective:  Patient denies any abdominal pain  Objective:   Vitals: Blood pressure 168/82, pulse 65, temperature 97 9 °F (36 6 °C), temperature source Oral, resp  rate 20, height 5' 11" (1 803 m), weight 57 6 kg (127 lb), SpO2 98 %  ,Body mass index is 17 71 kg/m²  I/O       10/17 0701  10/18 0700 10/18 0701  10/19 0700 10/19 0701  10/20 0700    P  O  0 0     I V  (mL/kg)  300 (5 2)     IV Piggyback  250     Total Intake(mL/kg) 0 (0) 550 (9 5)     Urine (mL/kg/hr) 1650 (1 2) 900 (0 7)     Total Output 1650 900     Net -1650 -350                  Physical Exam:  Gen: NAD, Aox3, Comfortable in bed  Chest: Normal work of breathing, no respiratory distress  Abd: Soft, ND, minimally tender  G-tube 4 cm at the bumper, 3 cm at the skin with stoma paste under the bumper  Ext: No Edema  Skin: Warm, Dry, Intact      Lab, Imaging and other studies:   I have personally reviewed pertinent reports    , CBC with diff: No results found for: WBC, HGB, HCT, MCV, PLT, ADJUSTEDWBC, MCH, MCHC, RDW, MPV, NRBC, BMP/CMP:   Lab Results   Component Value Date    SODIUM 131 (L) 10/19/2022    K 4 1 10/19/2022     10/19/2022    CO2 22 10/19/2022    BUN 6 10/19/2022    CREATININE 0 49 (L) 10/19/2022    CALCIUM 8 7 10/19/2022    EGFR 119 10/19/2022     VTE Pharmacologic Prophylaxis: Enoxaparin (Lovenox)  VTE Mechanical Prophylaxis: sequential compression device        Reyes Moreland  10/19/2022  8:09 AM

## 2022-10-19 NOTE — PHYSICAL THERAPY NOTE
PHYSICAL THERAPY NOTE          Patient Name: Terrence Bob Date: 10/19/2022           10/19/22 1515   PT Last Visit   PT Visit Date 10/19/22   Note Type   Note Type Treatment   Pain Assessment   Pain Assessment Tool 0-10   Pain Score No Pain   Patient's Stated Pain Goal No pain   Hospital Pain Intervention(s) Repositioned; Ambulation/increased activity; Elevated; Emotional support; Rest   Multiple Pain Sites No   Restrictions/Precautions   Weight Bearing Precautions Per Order No   Braces or Orthoses   (L UE sling)   Other Precautions Cognitive; Chair Alarm; Bed Alarm;Multiple lines;Telemetry;Aspiration; Fall Risk   General   Chart Reviewed Yes   Response to Previous Treatment Patient with no complaints from previous session  Family/Caregiver Present No   Cognition   Overall Cognitive Status Impaired   Arousal/Participation Alert; Responsive; Cooperative   Attention Attends with cues to redirect   Orientation Level Oriented X4   Memory Decreased recall of recent events;Decreased recall of precautions   Following Commands Follows one step commands without difficulty   Comments pt was pleasant and cooperative throughout tx session   Subjective   Subjective pt was agreeable to participate in PT intervention  pt stated no pain prior to PT intervention   Bed Mobility   Supine to Sit 2  Maximal assistance   Additional items Assist x 1;HOB elevated; Bedrails; Increased time required;Verbal cues;LE management  (trunk management)   Sit to Supine Unable to assess   Additional Comments pt was OOB post tx session in the recliner with legs elevated, call bell in hand and all pt needs met   Transfers   Sit to Stand 2  Maximal assistance   Additional items Assist x 1; Increased time required;Verbal cues   Stand to Sit 2  Maximal assistance   Additional items Assist x 1; Increased time required   Stand pivot 2  Maximal assistance   Additional items Assist x 1; Increased time required;Verbal cues   Additional Comments pt continues to require L knee block while perrorming STS and SpT with HHA to complete all functional transfers   Ambulation/Elevation   Gait pattern Not appropriate   Balance   Static Sitting Fair -   Dynamic Sitting Poor   Static Standing Zero   Ambulatory Zero   Endurance Deficit   Endurance Deficit Yes   Endurance Deficit Description limited sitting and standing tolerance   Activity Tolerance   Activity Tolerance Patient limited by fatigue   Nurse Made Aware Spoke to RN   Exercises   Heelslides Supine;20 reps;PROM; Left   Hip Flexion Supine;20 reps;PROM; Left   Hip Abduction Supine;20 reps;PROM; Left   Hip Adduction Supine;20 reps;PROM; Left   Knee AROM Short Arc Quad Supine;20 reps;PROM; Left   Ankle Pumps Supine;20 reps;PROM; Left   Assessment   Problem List Decreased strength;Decreased range of motion;Decreased endurance; Impaired balance;Decreased mobility; Decreased cognition;Decreased safety awareness   Assessment pt began tx session lying supine in the bed and was agreeable to participate in PT intervention  pt continues to remain consistant with max Ax1 for all bed mobility and functional transfers  pt required LE and trunk management in order to complete a supine<>sit EOB transfer with LE and trunk management  While seated EOB pt had difficulty maintaining sitting balabce and required between min to mod Ax1 in order to maintain sitting balance  pt continues to require L knee blocjk and HhA in order to complete a STS from EOB as pt cotninues to demonstrate limited standing tolerance and required a seated rest break after like 30 seconds of standing with max Ax1  While completinf a SPT from EOB to recliner pt cotninues to require max Ax1 with HHA and L knee block to complete transfer to recliner  Continue to recommend post acute rehab services at the time of D/C in order to maximize pt functional independenec and safety with all OOB mobility when appropriate   Goals   Patient Goals no goals stated this tx session   STG Expiration Date 10/21/22   PT Treatment Day 4   Plan   Treatment/Interventions Functional transfer training;LE strengthening/ROM; Therapeutic exercise; Endurance training;Cognitive reorientation;Patient/family training;Equipment eval/education; Bed mobility;Gait training;Spoke to nursing   Progress Slow progress, decreased activity tolerance   PT Frequency 4-6x/wk   Recommendation   PT Discharge Recommendation Post acute rehabilitation services   AM-PAC Basic Mobility Inpatient   Turning in Bed Without Bedrails 2   Lying on Back to Sitting on Edge of Flat Bed 2   Moving Bed to Chair 1   Standing Up From Chair 1   Walk in Room 1   Climb 3-5 Stairs 1   Basic Mobility Inpatient Raw Score 8   Turning Head Towards Sound 3   Follow Simple Instructions 3   Low Function Basic Mobility Raw Score 14   Low Function Basic Mobility Standardized Score 22 01   Highest Level Of Mobility   -HL Goal 3: Sit at edge of bed   JH-HLM Achieved 4: Move to chair/commode   Education   Education Provided Other  (bed mobility and functional transfers)   Patient Reinforcement needed   End of Consult   Patient Position at End of Consult Bedside chair;Bed/Chair alarm activated; All needs within reach   The patient's AM-PAC Basic Mobility Inpatient Short Form Raw Score is 8  A Raw score of less than or equal to 16 suggests the patient may benefit from discharge to post-acute rehabilitation services  Please also refer to the recommendation of the Physical Therapist for safe discharge planning        Susan Husbands

## 2022-10-19 NOTE — CASE MANAGEMENT
Case Management Discharge Planning Note    Patient name Peggy Timmons  [facility] S /S -33 MRN [id number]  : 1960 Date 10/19/2022       Current Admission Date: 10/9/2022  Current Admission Diagnosis:Acute CVA (cerebrovascular accident) [facility])   Patient Active Problem List    Diagnosis Date Noted   • Smoking 10/18/2022   • Hypokalemia 10/15/2022   • Moderate protein-calorie malnutrition ([facility] [address] ) 10/14/2022   • Alcohol use disorder, mild, abuse 10/10/2022   • Aspiration into respiratory tract 10/10/2022   • Hyponatremia 10/09/2022   • Acute CVA (cerebrovascular accident) ([facility] [address] ) 10/09/2022   • More than 50 percent stenosis of left internal carotid artery 10/09/2022      LOS (days): 10  Geometric Mean LOS (GMLOS) (days): 5 00  Days to GMLOS:-4 9     OBJECTIVE:  Risk of Unplanned Readmission Score: 12 66         Current admission status: Inpatient   Preferred Pharmacy: No Pharmacies Listed  Primary Care Provider: No primary care provider on file  Primary Insurance: BLUE CROSS  Secondary Insurance:     DISCHARGE DETAILS:    Discharge planning discussed with[de-identified] Patient  Freedom of Choice: Yes  Comments - Freedom of Choice: FOC discussed regarding beds available  Acute rehabs currently all declining  [facility], [place], and [facility] are able to accept  Pt prefers [facility] at this time  CM reserved in [address]  CM contacted family/caregiver?: Yes (Patient confirmed CM is able to call sister for f/u regarding DC needs as requested by nataCandy)  Were Treatment Team discharge recommendations reviewed with patient/caregiver?: Yes  Did patient/caregiver verbalize understanding of patient care needs?: N/A- going to facility  Were patient/caregiver advised of the risks associated with not following Treatment Team discharge recommendations?: Yes    Contacts  Patient Contacts: Patient and Jose  Contact Method:  In Person  Phone Number: sister: [phone]  Reason/Outcome: Continuity of Care, Discharge Planning    Other Referral/Resources/Interventions Provided:  Interventions: Short Term Rehab  Referral Comments: CM reserved [address] in [employer][de-identified] Disability Application,     Treatment Team Recommendation: Short Term Rehab  Discharge Destination Plan[de-identified] Short Term Rehab  Transport at Discharge : BLS Ambulance     IMM Given (Date):: 10/09/22        Additional Comments: CM left VM for sister-Candy informing her of possible Disability benefits for patient per request of sister, as well as discussed at bedside with pt    CM added SSD phone number to AVS

## 2022-10-19 NOTE — PROGRESS NOTES
[facility]  Progress Note - 2005 [facility] 1960, 63 y o  male MRN: [id number]  Unit/Bed#: S -01 Encounter: [id number]  Primary Care Provider: No primary care provider on file  Date and time admitted to hospital: 10/9/2022  3:59 PM    * Acute CVA (cerebrovascular accident) [facility])  Assessment & Plan  Episode of acute left-sided upper extremity and lower extremity weakness along with left-sided lower facial droop and dysarthria with onset morning of 10/9  BP on presentation 161/109, max HR and RR in  and 24 respectively  Patient did not get tPa since he was outside the window  EKG showed Sinus tachycardia  Right atrial enlargement  Nonspecific ST abnormality  CT stroke alert brain  Impression: Acute subcortical infarct in the right periventricular white matter, corona radiata and lentiform nuclei  No hemorrhagic conversion or mass effect  Findings were directly discussed with Syed Salazar at 4:39 PM     CTA stroke alert (head/neck)  Impression: Lung segment critical (greater than 90%) stenosis in the proximal left cervical ICA  Atretic right posterior cerebral artery  No evidence of acute thrombus or large vessel occlusion of the major vessels of the [place] of [place]  MRI brain:   Stable acute to early subacute infarct in the right periventricular white matter, corona radiata and lentiform nuclei  No hemorrhagic conversion or mass effect  Acute lacunar infarct in the left thalamus  ECHO   · Moderate concentric hypertrophy  EF 55%  Mild tricuspid regurgitation  Trivial fibrinous pericardial effusion  Pulmonary artery pressure mildly elevated to 37 0 mmHg  · NIHSS score: 8    10/17 CT abdomen: Colon interposed between the abdominal wall and the stomach  IR couldn't put in PEG tube do to the anatomy of where the colon and stomach are  Surgery placed G tube 10/18       Plan   - hydralazine and metoprolol q6 for HTN   - transition to amlodipine 5mg and metoprolol in G tube nutrition  - D5 normal saline with potassium  - G tube was placed yesterday, trickle feeds today  - consult nutrition for content of G tube feedings  - IV ancef dose completed prior to surgery  - continue aspirin and Plavix   - continue atorvastatin 40mg    - neuro checks  - provide stroke education  - Will follow up with neurovascular regarding carotid duplex findings in 4-6 weeks  - acute rehab treatment after discharge (recommendation of PMR)       Aspiration into respiratory tract  Assessment & Plan  CTA showed aspiration pneumonia in the upper lung fields L more than R  Chest XR showed Ill-defined opacity in the left upper lung which corresponds with bronchiectasis and tree-in-bud nodularity on the CTA neck  Likely aspiration pneumonia  At admission Leukocytosis to 18 20 and procalcitonin elevated to 0 88 but afebrile over hospital stay  Rhonchi have improved over the course of admission  Day 7 of ceftriaxone  NG tube placed however patient pulled it out due to discomfort, keofed tube was subsequently placed which was also pulled out due to discomfort  G tube placed 10/18     -G tube placement, trickle feeds today per surgery  - nebulizer treatments   - PFTs outpatient for suspected chronic lung disease     Hyponatremia  Assessment & Plan  Recent Labs     10/09/22  1637   SODIUM 119*     AM cortisol increased at 41 0   Renal US normal  Sodium is stable at 130  Per nephrology note, 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     Plan:  · Appreciate nephrology recommendations: Stop free water flushes in the TF  · Daily BMPs to monitor      More than 50 percent stenosis of left internal carotid artery  Assessment & Plan  CTA of head/neck  Impression: 1  Lung segment critical (greater than 90%) stenosis in the proximal left cervical ICA  2   Atretic right posterior cerebral artery     No evidence of acute thrombus or large vessel occlusion of the major vessels of the [place] of [place]  Carotid Complex Study  Impression: R ICA <50% heterogeneous and irregular occlusion  L ICA >70% heterogeneous and irregular occlusion  Plan   - F/u with neurovascular in 4-6 weeks on carotid duplex findings    Alcohol use disorder, mild, abuse  Assessment & Plan  Patient endorses 4-6 drinks per day consisting of 5% alcohol  -CIWA protocol     Hypokalemia  Assessment & Plan  Potassium of 2 6 on 10/15  Now up to 4 1 on 10/19 after repletion  Patient currently on D5 NS with potassium  · Daily BMP to monitor         Moderate protein-calorie malnutrition (HCC)  Assessment & Plan  Malnutrition Findings:   Adult Malnutrition type: Acute illness  Adult Degree of Malnutrition: Malnutrition of moderate degree  Malnutrition Characteristics: Muscle loss, Inadequate energy                  360 Statement: Moderate Protein calorie malnutrition r/t inadequate intake as evidenced by < 75% energy intake compared to energy needs > 7 days, and muscle depletion to clavicle and quadriceps; plan for G-tube placement    BMI Findings:  Adult BMI Classifications: Underweight < 18 5        Body mass index is 17 71 kg/m²  NG tube placed however patient pulled it out due to discomfort, keofed tube was subsequently placed which was also pulled out due to discomfort  G tube placed by surgery 10/18      10/17 CT abdomen: Colon interposed between the abdominal wall and the stomach  IR couldn't put in PEG tube do to the anatomy of where the colon and stomach are  Surgery placed G tube 10/18       -start trickle feeding via G tube today   -consult nutrition for recommendations of content for G tube feeding    Mild protein-calorie malnutrition (HCC)-resolved as of 10/18/2022  Assessment & Plan  Malnutrition Findings:   Adult Malnutrition type: Acute illness  Adult Degree of Malnutrition: Malnutrition of moderate degree  Malnutrition Characteristics: Muscle loss, Inadequate energy               360 Statement: Moderate Protein calorie malnutrition r/t inadequate intake as evidenced by < 75% energy intake compared to energy needs > 7 days, and muscle depletion to clavicle and quadriceps; plan for G-tube placement    BMI Findings:  Adult BMI Classifications: Underweight < 18 5        Body mass index is 17 71 kg/m²  NG tube placed however patient pulled it out due to discomfort, keofed tube was subsequently placed which was also pulled out due to discomfort  · NPO until PEG tube can be placed today by surgery        VTE Pharmacologic Prophylaxis: VTE Score: 7 High Risk (Score >/= 5) - Pharmacological DVT Prophylaxis Ordered: enoxaparin (Lovenox)  Sequential Compression Devices Ordered  Patient Centered Rounds: I performed bedside rounds with nursing staff today  Discussions with Specialists or Other Care Team Provider:  , surgery     Education and Discussions with Family / Patient: will try to update sister today  CM going to call sister today         Time Spent for Care: 30 minutes  More than 50% of total time spent on counseling and coordination of care as described above  Current Length of Stay: 10 day(s)  Current Patient Status: Inpatient   Certification Statement: The patient will continue to require additional inpatient hospital stay due to Acute rehab placement, g tube placement  Discharge Plan: Anticipate discharge in 24-48 hrs to rehab facility  Code Status: Level 1 - Full Code    Subjective:   Patient was seen at bedside this morning  No acute overnight events  He looked to be resting comfortably in the bed  Patient denies any chest pain, pain around the G tube, fever/chills, shortness of breath, nausea/vomiting      Objective:     Vitals:   Temp (24hrs), Av 5 °F (36 4 °C), Min:96 9 °F (36 1 °C), Max:97 9 °F (36 6 °C)    Temp:  [96 9 °F (36 1 °C)-97 9 °F (36 6 °C)] 97 9 °F (36 6 °C)  HR:  [59-76] 65  Resp:  [15-21] 20  BP: (134-202)/(78-99) 168/82  SpO2:  [96 %-100 %] 98 %  Body mass index is 17 71 kg/m²  Input and Output Summary (last 24 hours): Intake/Output Summary (Last 24 hours) at 10/19/2022 1108  Last data filed at 10/19/2022 0630  Gross per 24 hour   Intake 550 ml   Output 900 ml   Net -350 ml       Physical Exam:   Physical Exam  Vitals reviewed  Constitutional:       General: He is not in acute distress  Appearance: He is ill-appearing  HENT:      Head: Normocephalic and atraumatic  Mouth/Throat:      Mouth: Mucous membranes are dry  Eyes:      General: Gaze aligned appropriately  Extraocular Movements: Extraocular movements intact  Conjunctiva/sclera: Conjunctivae normal       Pupils: Pupils are equal, round, and reactive to light  Cardiovascular:      Rate and Rhythm: Normal rate and regular rhythm  Pulses: Normal pulses  Heart sounds: S1 normal and S2 normal  Heart sounds are distant  Pulmonary:      Effort: No respiratory distress  Breath sounds: Wheezing and rhonchi present  Comments: Wheezes and rhonchi significantly improved from yesterday  Abdominal:      General: Bowel sounds are decreased  There is no distension  Palpations: Abdomen is soft  Tenderness: There is no abdominal tenderness  Comments: Non-bloody non erythematous sutures medial to the G tube insertion  Surgical site appears dry clean and intact  Musculoskeletal:         General: No swelling or tenderness  Right lower leg: No edema  Left lower leg: No edema  Skin:     General: Skin is dry  Comments: Bilateral forearm ecchymoses    Neurological:      Mental Status: He is alert and oriented to person, place, and time  Comments: Inability to move left arm and left leg, left facial droop noted   Psychiatric:      Comments:  Withdrawn          Additional Data:     Labs:  Results from last 7 days   Lab Units 10/18/22  0501   WBC Thousand/uL 4 49   HEMOGLOBIN g/dL 10 3*   HEMATOCRIT % 31 6*   PLATELETS Thousands/uL 103*   NEUTROS PCT % 72   LYMPHS PCT % 13*   MONOS PCT % 10   EOS PCT % 3     Results from last 7 days   Lab Units 10/19/22  0438   SODIUM mmol/L 131*   POTASSIUM mmol/L 4 1   CHLORIDE mmol/L 102   CO2 mmol/L 22   BUN mg/dL 6   CREATININE mg/dL 0 49*   ANION GAP mmol/L 7   CALCIUM mg/dL 8 7   GLUCOSE RANDOM mg/dL 128         Results from last 7 days   Lab Units 10/18/22  1655 10/15/22  1148   POC GLUCOSE mg/dl 126 93               Lines/Drains:  Invasive Devices  Report    Peripheral Intravenous Line  Duration           Long-Dwell Peripheral IV (Midline) 05/83/08 Right Basilic 4 days    Peripheral IV 10/16/22 Left;Ventral (anterior) Forearm 2 days          Drain  Duration           Gastrostomy/Enterostomy 18 Fr  LUQ <1 day                  Telemetry:  Telemetry Orders (From admission, onward)             24 Hour Telemetry Monitoring  Continuous x 24 Hours (Telem)        References:    Telemetry Guidelines   Question:  Reason for 24 Hour Telemetry  Answer:  Metabolic/Electrolyte Disturbance with High Probability of Dysrhythmia (K level <3 or >6, or KCL infusion >=10mEq/hr)                 Telemetry Reviewed: Normal Sinus Rhythm  Indication for Continued Telemetry Use: Metabolic/electrolyte disturbance with high probability of dysrhythmia             Imaging: Reviewed radiology reports from this admission including: chest xray and abdominal/pelvic CT    Recent Cultures (last 7 days):         Last 24 Hours Medication List:   Current Facility-Administered Medications   Medication Dose Route Frequency Provider Last Rate   • acetaminophen  975 mg Oral Q8H [facility] Shonda Martin MD     • amLODIPine  5 mg Oral Daily Jeanie Neil PA-C     • atorvastatin  40 mg Oral Daily With [employer] DevicesADELIA     • cefTRIAXone  1,000 mg Intravenous Q24H Jeanie Neil PA-C 1,000 mg (10/17/22 6967)   • dextrose 5 % and sodium chloride 0 9 % with KCl 20 mEq/L  75 mL/hr Intravenous Continuous Jeanie Neil PA-C 75 mL/hr (10/19/22 0140)   • enoxaparin  40 mg Subcutaneous Daily Jeanie Neil PA-C     • hydrALAZINE  10 mg Intravenous Q6H PRN Jeanie Neil PA-C     • HYDROmorphone  0 2 mg Intravenous Q3H PRN Reyes Moreland MD     • ipratropium  0 5 mg Nebulization Q6H PRN Jeanie Neil PA-C     • lactated ringers  50 mL/hr Intravenous Continuous Reyes Moreland MD 50 mL/hr (10/18/22 2143)   • levalbuterol  1 25 mg Nebulization Q6H PRN Jeanie Neil PA-C     • magnesium sulfate  4 g Intravenous Once Kamryn Yuan PA-C     • metoprolol  5 mg Intravenous Q6H Jeanie Neil PA-C     • naloxone  0 04 mg Intravenous Q1MIN PRN Reyes Moreland MD     • oxyCODONE  10 mg Oral Q4H PRN Reyes Moreland MD     • oxyCODONE  5 mg Oral Q4H PRN Reyes Moreland MD     • sodium phosphate  21 mmol Intravenous Once Kamryn Yuan PA-C          Today, Patient Was Seen By: Nicole Couch, Dr Ori Thorne    **Please Note: This note may have been constructed using a voice recognition system  **

## 2022-10-19 NOTE — CASE MANAGEMENT
Case Management Progress Note    Patient name Terrance Terry  Location S /S -83 MRN [id number]  : 1960 Date 10/19/2022       LOS (days): 10  Geometric Mean LOS (GMLOS) (days): 5 00  Days to GMLOS:-4 8        OBJECTIVE:        Current admission status: Inpatient  Preferred Pharmacy: No Pharmacies Listed  Primary Care Provider: No primary care provider on file  Primary Insurance: BLUE CROSS  Secondary Insurance:     PROGRESS NOTE:    CM received a call from [facility] whom states that she will come meet pt around 2:30pm this afternoon to review if still able to accept  [facility] is recommending slower paced therapy at this time, as well as [facility]  If [place] unable to accept, Karen Khan, [facility], and [place] are able to accept

## 2022-10-19 NOTE — CONSULTS
Once G tube is ok to use, recommend starting trickle feeds of Jevity 1 5 @ 10 ml/hr X initial 12 hrs  Water flushes of 100 ml q 4 hrs  Advance by 10 ml q 8 hrs as tolerated to eventual goal rate of Jevity 1 5 @ 50 ml/hr  At goal tube feed provides 1800 kcals, 77 g protein, and 1512 ml total fluid from formula + flushes  See Nutrition note dated 10/18/2022  for additional details  Completed nutrition assessment is viewable in the nutrition documentation

## 2022-10-20 LAB
ANION GAP SERPL CALCULATED.3IONS-SCNC: 6 MMOL/L (ref 4–13)
ANION GAP SERPL CALCULATED.3IONS-SCNC: 6 MMOL/L (ref 4–13)
BUN SERPL-MCNC: 6 MG/DL (ref 5–25)
BUN SERPL-MCNC: 6 MG/DL (ref 5–25)
CALCIUM SERPL-MCNC: 8.3 MG/DL (ref 8.4–10.2)
CALCIUM SERPL-MCNC: 8.3 MG/DL (ref 8.4–10.2)
CHLORIDE SERPL-SCNC: 102 MMOL/L (ref 96–108)
CHLORIDE SERPL-SCNC: 102 MMOL/L (ref 96–108)
CO2 SERPL-SCNC: 23 MMOL/L (ref 21–32)
CO2 SERPL-SCNC: 23 MMOL/L (ref 21–32)
CREAT SERPL-MCNC: 0.49 MG/DL (ref 0.6–1.3)
CREAT SERPL-MCNC: 0.49 MG/DL (ref 0.6–1.3)
ERYTHROCYTE [DISTWIDTH] IN BLOOD BY AUTOMATED COUNT: 16.1 % (ref 11.6–15.1)
ERYTHROCYTE [DISTWIDTH] IN BLOOD BY AUTOMATED COUNT: 16.1 % (ref 11.6–15.1)
GFR SERPL CREATININE-BSD FRML MDRD: 119 ML/MIN/1.73SQ M
GFR SERPL CREATININE-BSD FRML MDRD: 119 ML/MIN/1.73SQ M
GLUCOSE SERPL-MCNC: 91 MG/DL (ref 65–140)
GLUCOSE SERPL-MCNC: 91 MG/DL (ref 65–140)
HCT VFR BLD AUTO: 30.6 % (ref 36.5–49.3)
HCT VFR BLD AUTO: 30.6 % (ref 36.5–49.3)
HGB BLD-MCNC: 9.7 G/DL (ref 12–17)
HGB BLD-MCNC: 9.7 G/DL (ref 12–17)
MAGNESIUM SERPL-MCNC: 2.3 MG/DL (ref 1.9–2.7)
MAGNESIUM SERPL-MCNC: 2.3 MG/DL (ref 1.9–2.7)
MCH RBC QN AUTO: 29.1 PG (ref 26.8–34.3)
MCH RBC QN AUTO: 29.1 PG (ref 26.8–34.3)
MCHC RBC AUTO-ENTMCNC: 31.7 G/DL (ref 31.4–37.4)
MCHC RBC AUTO-ENTMCNC: 31.7 G/DL (ref 31.4–37.4)
MCV RBC AUTO: 92 FL (ref 82–98)
MCV RBC AUTO: 92 FL (ref 82–98)
PLATELET # BLD AUTO: 141 THOUSANDS/UL (ref 149–390)
PLATELET # BLD AUTO: 141 THOUSANDS/UL (ref 149–390)
PMV BLD AUTO: 9.6 FL (ref 8.9–12.7)
PMV BLD AUTO: 9.6 FL (ref 8.9–12.7)
POTASSIUM SERPL-SCNC: 3.4 MMOL/L (ref 3.5–5.3)
POTASSIUM SERPL-SCNC: 3.4 MMOL/L (ref 3.5–5.3)
RBC # BLD AUTO: 3.33 MILLION/UL (ref 3.88–5.62)
RBC # BLD AUTO: 3.33 MILLION/UL (ref 3.88–5.62)
SARS-COV-2 RNA RESP QL NAA+PROBE: NEGATIVE
SARS-COV-2 RNA RESP QL NAA+PROBE: NEGATIVE
SODIUM SERPL-SCNC: 131 MMOL/L (ref 135–147)
SODIUM SERPL-SCNC: 131 MMOL/L (ref 135–147)
WBC # BLD AUTO: 6.36 THOUSAND/UL (ref 4.31–10.16)
WBC # BLD AUTO: 6.36 THOUSAND/UL (ref 4.31–10.16)

## 2022-10-20 PROCEDURE — 80048 BASIC METABOLIC PNL TOTAL CA: CPT | Performed by: PHYSICIAN ASSISTANT

## 2022-10-20 PROCEDURE — 99232 SBSQ HOSP IP/OBS MODERATE 35: CPT | Performed by: INTERNAL MEDICINE

## 2022-10-20 PROCEDURE — 83735 ASSAY OF MAGNESIUM: CPT

## 2022-10-20 PROCEDURE — 99024 POSTOP FOLLOW-UP VISIT: CPT | Performed by: SURGERY

## 2022-10-20 PROCEDURE — U0005 INFEC AGEN DETEC AMPLI PROBE: HCPCS | Performed by: INTERNAL MEDICINE

## 2022-10-20 PROCEDURE — U0003 INFECTIOUS AGENT DETECTION BY NUCLEIC ACID (DNA OR RNA); SEVERE ACUTE RESPIRATORY SYNDROME CORONAVIRUS 2 (SARS-COV-2) (CORONAVIRUS DISEASE [COVID-19]), AMPLIFIED PROBE TECHNIQUE, MAKING USE OF HIGH THROUGHPUT TECHNOLOGIES AS DESCRIBED BY CMS-2020-01-R: HCPCS | Performed by: INTERNAL MEDICINE

## 2022-10-20 PROCEDURE — 85027 COMPLETE CBC AUTOMATED: CPT

## 2022-10-20 RX ORDER — AMLODIPINE BESYLATE 10 MG/1
10 TABLET ORAL DAILY
Status: DISCONTINUED | OUTPATIENT
Start: 2022-10-21 | End: 2022-10-21

## 2022-10-20 RX ORDER — ALBUTEROL SULFATE 2.5 MG/3ML
2.5 SOLUTION RESPIRATORY (INHALATION) EVERY 6 HOURS PRN
Status: DISCONTINUED | OUTPATIENT
Start: 2022-10-20 | End: 2022-10-21 | Stop reason: HOSPADM

## 2022-10-20 RX ADMIN — METOROPROLOL TARTRATE 5 MG: 5 INJECTION, SOLUTION INTRAVENOUS at 08:23

## 2022-10-20 RX ADMIN — DESMOPRESSIN ACETATE 40 MG: 0.2 TABLET ORAL at 16:16

## 2022-10-20 RX ADMIN — ACETAMINOPHEN 975 MG: 325 TABLET ORAL at 16:16

## 2022-10-20 RX ADMIN — HYDRALAZINE HYDROCHLORIDE 10 MG: 20 INJECTION, SOLUTION INTRAMUSCULAR; INTRAVENOUS at 16:23

## 2022-10-20 RX ADMIN — CEFTRIAXONE 1000 MG: 1 INJECTION, SOLUTION INTRAVENOUS at 00:56

## 2022-10-20 RX ADMIN — AMLODIPINE BESYLATE 5 MG: 5 TABLET ORAL at 08:23

## 2022-10-20 RX ADMIN — ACETAMINOPHEN 975 MG: 325 TABLET ORAL at 04:22

## 2022-10-20 RX ADMIN — ENOXAPARIN SODIUM 40 MG: 40 INJECTION SUBCUTANEOUS at 08:23

## 2022-10-20 RX ADMIN — METOROPROLOL TARTRATE 5 MG: 5 INJECTION, SOLUTION INTRAVENOUS at 00:56

## 2022-10-20 NOTE — PROGRESS NOTES
[facility]  Progress Note - [facility] 1960, 63 y o  male MRN: [id number]  Unit/Bed#: S -01 Encounter: [id number]  Primary Care Provider: No primary care provider on file  Date and time admitted to hospital: 10/9/2022  3:59 PM    * Acute CVA (cerebrovascular accident) [facility])  Assessment & Plan  Episode of acute left-sided upper extremity and lower extremity weakness along with left-sided lower facial droop and dysarthria with onset morning of 10/9  BP on presentation 161/109, max HR and RR in  and 24 respectively  Patient did not get tPa since he was outside the window  EKG showed Sinus tachycardia  Right atrial enlargement  Nonspecific ST abnormality  CT stroke alert brain  Impression: Acute subcortical infarct in the right periventricular white matter, corona radiata and lentiform nuclei  No hemorrhagic conversion or mass effect  Findings were directly discussed with Lucia Smith at 4:39 PM     CTA stroke alert (head/neck)  Impression: Lung segment critical (greater than 90%) stenosis in the proximal left cervical ICA  Atretic right posterior cerebral artery  No evidence of acute thrombus or large vessel occlusion of the major vessels of the [place]  MRI brain:   Stable acute to early subacute infarct in the right periventricular white matter, corona radiata and lentiform nuclei  No hemorrhagic conversion or mass effect  Acute lacunar infarct in the left thalamus  ECHO   · Moderate concentric hypertrophy  EF 55%  Mild tricuspid regurgitation  Trivial fibrinous pericardial effusion  Pulmonary artery pressure mildly elevated to 37 0 mmHg  · NIHSS score: 8    10/17 CT abdomen: Colon interposed between the abdominal wall and the stomach  IR couldn't put in PEG tube do to the anatomy of where the colon and stomach are  Surgery placed G tube 10/18       Plan   - amlodipine 10 mg in G tube nutrition   -hydralazine PRN for BP over 180 systolic   - continue lactated ringers 50 mL/hr  - G tube was placed yesterday nutrition recommendations below   - continue aspirin and Plavix   - continue atorvastatin 40mg    - neuro checks  - provide stroke education  - PT/OT/Speech consulted and following  - Will follow up with neurovascular regarding carotid duplex findings in 4-6 weeks  - placement at [place] likely tomorrow      Aspiration into respiratory tract  Assessment & Plan  CTA showed aspiration pneumonia in the upper lung fields L more than R  Chest XR showed Ill-defined opacity in the left upper lung which corresponds with bronchiectasis and tree-in-bud nodularity on the CTA neck  Likely aspiration pneumonia  At admission Leukocytosis to 18 20 and procalcitonin elevated to 0 88 but afebrile over hospital stay  Rhonchi have improved over the course of admission  Completed 7 days of ceftriaxone  NG tube placed however patient pulled it out due to discomfort, keofed tube was subsequently placed which was also pulled out due to discomfort  G tube placed 10/18     - advance G tube feeding (see below) and monitor   - nebulizer treatments as needed  - PFTs outpatient for suspected chronic lung disease   - Speech consulted     Hyponatremia  Assessment & Plan  Recent Labs     10/09/22  1637   SODIUM 119*     AM cortisol increased at 41 0   Renal US normal  Sodium is stable at 130  Per nephrology note, 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     Plan:  · Appreciate nephrology recommendations: Stop G tube free water flushes, start 50 mL NS flushes q6  · Lasix given 10/17 per nephrology   · Daily BMPs to monitor      More than 50 percent stenosis of left internal carotid artery  Assessment & Plan  CTA of head/neck  Impression: 1  Lung segment critical (greater than 90%) stenosis in the proximal left cervical ICA  2   Atretic right posterior cerebral artery     No evidence of acute thrombus or large vessel occlusion of the major vessels of the [place]  Carotid Complex Study  Impression: R ICA <50% heterogeneous and irregular occlusion  L ICA >70% heterogeneous and irregular occlusion  Plan   - F/u with neurovascular in 4-6 weeks on carotid duplex findings    Alcohol use disorder, mild, abuse  Assessment & Plan  Patient endorses 4-6 drinks per day consisting of 5% alcohol  -CIWA protocol     Hypokalemia  Assessment & Plan  Potassium of 2 6 on 10/15  Now at 3 4 after D5 with potassium was stopped yesterday  Magnesium is normal at 2 3      · Daily BMP to monitor         Moderate protein-calorie malnutrition (HCC)  Assessment & Plan  Malnutrition Findings:   Adult Malnutrition type: Acute illness  Adult Degree of Malnutrition: Malnutrition of moderate degree  Malnutrition Characteristics: Muscle loss, Inadequate energy                  360 Statement: Moderate Protein calorie malnutrition r/t inadequate intake as evidenced by < 75% energy intake compared to energy needs > 7 days, and muscle depletion to clavicle and quadriceps; plan for G-tube placement    BMI Findings:  Adult BMI Classifications: Underweight < 18 5        Body mass index is 17 71 kg/m²  NG tube placed however patient pulled it out due to discomfort, keofed tube was subsequently placed which was also pulled out due to discomfort  G tube placed by surgery 10/18      10/17 CT abdomen: Colon interposed between the abdominal wall and the stomach  IR couldn't put in PEG tube do to the anatomy of where the colon and stomach are  Surgery placed G tube 10/18  Plan:   -Nutrition consult: Jevity 1 5 @ 10 ml/hr X initial 12 hrs  NS flushes 50mL q 6 hrs (per nephrology)  Advance by 10 ml q 8 hrs as tolerated to eventual goal rate of Jevity 1 5 @ 50 ml/h  At goal tube feed provides 1800 kcals, 77 g protein, and 1512 ml total fluid from formula + flushes        Mild protein-calorie malnutrition (HCC)-resolved as of 10/18/2022  Assessment & Plan  Malnutrition Findings:   Adult Malnutrition type: Acute illness  Adult Degree of Malnutrition: Malnutrition of moderate degree  Malnutrition Characteristics: Muscle loss, Inadequate energy               360 Statement: Moderate Protein calorie malnutrition r/t inadequate intake as evidenced by < 75% energy intake compared to energy needs > 7 days, and muscle depletion to clavicle and quadriceps; plan for G-tube placement    BMI Findings:  Adult BMI Classifications: Underweight < 18 5        Body mass index is 17 71 kg/m²  NG tube placed however patient pulled it out due to discomfort, keofed tube was subsequently placed which was also pulled out due to discomfort  · NPO until PEG tube can be placed today by surgery        VTE Pharmacologic Prophylaxis: VTE Score: 7 High Risk (Score >/= 5) - Pharmacological DVT Prophylaxis Ordered: enoxaparin (Lovenox)  Sequential Compression Devices Ordered  Patient Centered Rounds: I performed bedside rounds with nursing staff today  Discussions with Specialists or Other Care Team Provider:      Education and Discussions with Family / Patient: will try to update sister today  CM going to call sister today         Time Spent for Care: 30 minutes  More than 50% of total time spent on counseling and coordination of care as described above  Current Length of Stay: 11 day(s)  Current Patient Status: Inpatient   Certification Statement: The patient will continue to require additional inpatient hospital stay due to Acute rehab placement, g tube placement  Discharge Plan: Anticipate discharge tomorrow to [facility]    Code Status: Level 1 - Full Code    Subjective:   Patient was seen at bedside this morning  No acute overnight events  He looked to be resting comfortably in the bed  Patient denies any chest pain, pain around the G tube, fever/chills, shortness of breath, nausea/vomiting      Objective:     Vitals:   Temp (24hrs), Av 6 °F (36 4 °C), Min:97 5 °F (36 4 °C), Max:97 6 °F (36 4 °C)    Temp: [97 5 °F (36 4 °C)-97 6 °F (36 4 °C)] 97 6 °F (36 4 °C)  HR:  [48-64] 58  Resp:  [18-19] 18  BP: (143-188)/(73-98) 146/82  SpO2:  [97 %-98 %] 97 %  Body mass index is 17 71 kg/m²  Input and Output Summary (last 24 hours): Intake/Output Summary (Last 24 hours) at 10/20/2022 1348  Last data filed at 10/20/2022 1201  Gross per 24 hour   Intake 50 ml   Output 50 ml   Net 0 ml       Physical Exam:   Physical Exam  Vitals reviewed  Constitutional:       General: He is not in acute distress  Appearance: He is ill-appearing  HENT:      Head: Normocephalic and atraumatic  Mouth/Throat:      Mouth: Mucous membranes are dry  Eyes:      General: Gaze aligned appropriately  Extraocular Movements: Extraocular movements intact  Conjunctiva/sclera: Conjunctivae normal       Pupils: Pupils are equal, round, and reactive to light  Cardiovascular:      Rate and Rhythm: Normal rate and regular rhythm  Pulses: Normal pulses  Heart sounds: S1 normal and S2 normal  Heart sounds are distant  Pulmonary:      Effort: Pulmonary effort is normal  No respiratory distress  Breath sounds: Wheezing and rhonchi present  Comments: Wheezes and rhonchi significantly improved from yesterday  Abdominal:      General: There is no distension  Palpations: Abdomen is soft  Tenderness: There is no abdominal tenderness  Comments: Non erythematous sutures medial to the G tube insertion  Surgical site appears clean, dry, and intact  Musculoskeletal:         General: No swelling or tenderness  Right lower leg: No edema  Left lower leg: No edema  Skin:     General: Skin is dry  Comments: Bilateral forearm ecchymoses    Neurological:      Mental Status: He is alert and oriented to person, place, and time  Cranial Nerves: Cranial nerve deficit, dysarthria and facial asymmetry present  Motor: Weakness present        Comments: Inability to move left arm and left leg, left facial droop noted   Psychiatric:      Comments:  Withdrawn          Additional Data:     Labs:  Results from last 7 days   Lab Units 10/20/22  0453 10/18/22  0501   WBC Thousand/uL 6 36 4 49   HEMOGLOBIN g/dL 9 7* 10 3*   HEMATOCRIT % 30 6* 31 6*   PLATELETS Thousands/uL 141* 103*   NEUTROS PCT %  --  72   LYMPHS PCT %  --  13*   MONOS PCT %  --  10   EOS PCT %  --  3     Results from last 7 days   Lab Units 10/20/22  0453   SODIUM mmol/L 131*   POTASSIUM mmol/L 3 4*   CHLORIDE mmol/L 102   CO2 mmol/L 23   BUN mg/dL 6   CREATININE mg/dL 0 49*   ANION GAP mmol/L 6   CALCIUM mg/dL 8 3*   GLUCOSE RANDOM mg/dL 91         Results from last 7 days   Lab Units 10/18/22  1655 10/15/22  1148   POC GLUCOSE mg/dl 126 93               Lines/Drains:  Invasive Devices  Report    Peripheral Intravenous Line  Duration           Long-Dwell Peripheral IV (Midline) 58/94/91 Right Basilic 5 days    Peripheral IV 10/16/22 Left;Ventral (anterior) Forearm 4 days          Drain  Duration           Gastrostomy/Enterostomy 18 Fr  LUQ 1 day                  Telemetry:  Telemetry Orders (From admission, onward)             24 Hour Telemetry Monitoring  Continuous x 24 Hours (Telem)        References:    Telemetry Guidelines   Question:  Reason for 24 Hour Telemetry  Answer:  Metabolic/Electrolyte Disturbance with High Probability of Dysrhythmia (K level <3 or >6, or KCL infusion >=10mEq/hr)                 Telemetry Reviewed: Normal Sinus Rhythm  Indication for Continued Telemetry Use: Metabolic/electrolyte disturbance with high probability of dysrhythmia             Imaging: Reviewed radiology reports from this admission including: chest xray and abdominal/pelvic CT    Recent Cultures (last 7 days):         Last 24 Hours Medication List:   Current Facility-Administered Medications   Medication Dose Route Frequency Provider Last Rate   • acetaminophen  975 mg Per G Tube [facility] Anita Logan MD     • [START ON 10/21/2022] amLODIPine  10 mg Per G Tube Daily David Gonzalez DO     • atorvastatin  40 mg Oral Daily With [employer], PAVelasquezC     • enoxaparin  40 mg Subcutaneous Daily Jeanie Neil PA-C     • hydrALAZINE  10 mg Intravenous Q6H PRN Jeanie Neil PA-C     • HYDROmorphone  0 2 mg Intravenous Q3H PRN Ubaldo Frankel, MD     • ipratropium  0 5 mg Nebulization Q6H PRN Jeanie Neil PA-C     • lactated ringers  50 mL/hr Intravenous Continuous Ubaldo Frankel, MD 50 mL/hr (10/19/22 1834)   • levalbuterol  1 25 mg Nebulization Q6H PRN Jeanie Neil PA-C     • naloxone  0 04 mg Intravenous Q1MIN PRN Ubaldo Frankel, MD     • oxyCODONE  10 mg Oral Q4H PRN Ubaldo Frankel, MD     • oxyCODONE  5 mg Oral Q4H PRN Ubaldo Frankel, MD          Today, Patient Was Seen By: Kilo Harrison, Dr Adan Dumont    **Please Note: This note may have been constructed using a voice recognition system  **

## 2022-10-20 NOTE — CASE MANAGEMENT
Sasha Duque 52 has received approved authorization from:   CBC  Called in by Toby Helms P#  [phone]   Authorization received for: [facility]  Facility: [facility] #: [id number]  Start of Care: 10/20  Next Review Date: 10/27  Care Coordinator: Trini Cohen P#: [phone]   Submit next review to: [phone]   Care Manager notified:  Yes

## 2022-10-20 NOTE — DISCHARGE INSTRUCTIONS
G-tube sutures (3 around bumper) should be removed 11/1/2022    Can follow up with Baldomero Wasserman as needed for if issues arise with G-Tube

## 2022-10-20 NOTE — CASE MANAGEMENT
Case Management Discharge Planning Note    Patient name Linda Lemons S /S -30 MRN [id number]  : 1960 Date 10/20/2022       Current Admission Date: 10/9/2022  Current Admission Diagnosis:Acute CVA (cerebrovascular accident) [facility])   Patient Active Problem List    Diagnosis Date Noted   • Smoking 10/18/2022   • Hypokalemia 10/15/2022   • Moderate protein-calorie malnutrition ([facility] [address] ) 10/14/2022   • Alcohol use disorder, mild, abuse 10/10/2022   • Aspiration into respiratory tract 10/10/2022   • Hyponatremia 10/09/2022   • Acute CVA (cerebrovascular accident) ([facility] [address] ) 10/09/2022   • More than 50 percent stenosis of left internal carotid artery 10/09/2022      LOS (days): 11  Geometric Mean LOS (GMLOS) (days): 5 00  Days to GMLOS:-5 9     OBJECTIVE:  Risk of Unplanned Readmission Score: 14 64         Current admission status: Inpatient   Preferred Pharmacy: No Pharmacies Listed  Primary Care Provider: No primary care provider on file  Primary Insurance: BLUE CROSS  Secondary Insurance:     DISCHARGE DETAILS:    Contacts  Patient Contacts: Patient, sister-Candy, and pt's Father at bedside  Relationship to Patient[de-identified] Family  Contact Method: In Person  Reason/Outcome: Continuity of Care, Discharge Planning    Other Referral/Resources/Interventions Provided:  Interventions: Short Term Rehab  Referral Comments: CM informed CM DC support submitted for auth to [employer][de-identified] Disability Application    Additional Comments: CM met with sister, pt, and pt's father at bedside and provided SSD application and phone number  CM confirmed plan for post STR is to move in with pt's father (3 steps to enter home)    CM informed family that pt will be DC from IP rehab once insurance benefits are up, however at present Billie Back is pending and PT documentation at present does indicate pt would need rehab at this time

## 2022-10-20 NOTE — CASE MANAGEMENT
Case Management Progress Note    Patient name Stephanie Diego  Location S /S -69 MRN [id number]  : 1960 Date 10/20/2022       LOS (days): 11  Geometric Mean LOS (GMLOS) (days): 5 00  Days to GMLOS:-5 9        OBJECTIVE:        Current admission status: Inpatient  Preferred Pharmacy: No Pharmacies Listed  Primary Care Provider: No primary care provider on file  Primary Insurance: BLUE CROSS  Secondary Insurance:     PROGRESS NOTE:    CM tentatively requested BLS transport for 10am tomorrow 10/21, RN-Chin informed  Prosper Rosales-Meagan aware, pending Covid swab    CM requested swab from Dr Mike Mack and [facility] Resident Dr Yao Staff

## 2022-10-20 NOTE — CASE MANAGEMENT
Support Center received request for authorization from Care Manager    Authorization request for: SNF  [facility]ility Name: [facility]  NPI: [id number]  Facility MD:  Dr Yosi Groves   NPI:  [id number]  Authorization initiated by contacting: [facility] Via: [employer]  Pending Reference #: [id number]   Clinicals submitted via: Angelina Herrera

## 2022-10-20 NOTE — CASE MANAGEMENT
Case Management Discharge Planning Note    Patient name Kristina Quick  Location S /S -77 MRN [id number]  : 1960 Date 10/20/2022       Current Admission Date: 10/9/2022  Current Admission Diagnosis:Acute CVA (cerebrovascular accident) [facility])   Patient Active Problem List    Diagnosis Date Noted   • Smoking 10/18/2022   • Hypokalemia 10/15/2022   • Moderate protein-calorie malnutrition ([facility] [address] ) 10/14/2022   • Alcohol use disorder, mild, abuse 10/10/2022   • Aspiration into respiratory tract 10/10/2022   • Hyponatremia 10/09/2022   • Acute CVA (cerebrovascular accident) ([facility] [address] ) 10/09/2022   • More than 50 percent stenosis of left internal carotid artery 10/09/2022      LOS (days): 11  Geometric Mean LOS (GMLOS) (days): 5 00  Days to GMLOS:-5 9     OBJECTIVE:  Risk of Unplanned Readmission Score: 14 64         Current admission status: Inpatient   Preferred Pharmacy: No Pharmacies Listed  Primary Care Provider: No primary care provider on file      Primary Insurance: BLUE CROSS  Secondary Insurance:     DISCHARGE DETAILS:                                                                                                               Facility Insurance Auth Number: [id number]

## 2022-10-20 NOTE — PLAN OF CARE
Problem: Potential for Falls  Goal: Patient will remain free of falls  Description: INTERVENTIONS:  - Educate patient/family on patient safety including physical limitations  - Instruct patient to call for assistance with activity   - Consult OT/PT to assist with strengthening/mobility   - Keep Call bell within reach  - Keep bed low and locked with side rails adjusted as appropriate  - Keep care items and personal belongings within reach  - Initiate and maintain comfort rounds  - Make Fall Risk Sign visible to staff  - Apply yellow socks and bracelet for high fall risk patients  - Consider moving patient to room near nurses station  Outcome: Progressing     Problem: MOBILITY - ADULT  Goal: Maintain or return to baseline ADL function  Description: INTERVENTIONS:  -  Assess patient's ability to carry out ADLs; assess patient's baseline for ADL function and identify physical deficits which impact ability to perform ADLs (bathing, care of mouth/teeth, toileting, grooming, dressing, etc )  - Assess/evaluate cause of self-care deficits   - Assess range of motion  - Assess patient's mobility; develop plan if impaired  - Assess patient's need for assistive devices and provide as appropriate  - Encourage maximum independence but intervene and supervise when necessary  - Involve family in performance of ADLs  - Assess for home care needs following discharge   - Consider OT consult to assist with ADL evaluation and planning for discharge  - Provide patient education as appropriate  Outcome: Progressing  Goal: Maintains/Returns to pre admission functional level  Description: INTERVENTIONS:  - Perform BMAT or MOVE assessment daily    - Set and communicate daily mobility goal to care team and patient/family/caregiver     - Collaborate with rehabilitation services on mobility goals if consulted  - Out of bed for toileting  - Record patient progress and toleration of activity level   Outcome: Progressing     Problem: Prexisting or High Potential for Compromised Skin Integrity  Goal: Skin integrity is maintained or improved  Description: INTERVENTIONS:  - Identify patients at risk for skin breakdown  - Assess and monitor skin integrity  - Assess and monitor nutrition and hydration status  - Monitor labs   - Assess for incontinence   - Turn and reposition patient  - Assist with mobility/ambulation  - Relieve pressure over bony prominences  - Avoid friction and shearing  - Provide appropriate hygiene as needed including keeping skin clean and dry  - Evaluate need for skin moisturizer/barrier cream  - Collaborate with interdisciplinary team   - Patient/family teaching  - Consider wound care consult   Outcome: Progressing     Problem: Nutrition/Hydration-ADULT  Goal: Nutrient/Hydration intake appropriate for improving, restoring or maintaining nutritional needs  Description: Monitor and assess patient's nutrition/hydration status for malnutrition  Collaborate with interdisciplinary team and initiate plan and interventions as ordered  Monitor patient's weight and dietary intake as ordered or per policy  Utilize nutrition screening tool and intervene as necessary  Determine patient's food preferences and provide high-protein, high-caloric foods as appropriate       INTERVENTIONS:  - Monitor oral intake, urinary output, labs, and treatment plans  - Assess nutrition and hydration status and recommend course of action  - Evaluate amount of meals eaten  - Assist patient with eating if necessary   - Allow adequate time for meals  - Recommend/ encourage appropriate diets, oral nutritional supplements, and vitamin/mineral supplements  - Order, calculate, and assess calorie counts as needed  - Recommend, monitor, and adjust tube feedings and TPN/PPN based on assessed needs  - Assess need for intravenous fluids  - Provide specific nutrition/hydration education as appropriate  - Include patient/family/caregiver in decisions related to nutrition  Outcome: Progressing     Problem: Neurological Deficit  Goal: Neurological status is stable or improving  Description: Interventions:  - Monitor and assess patient's level of consciousness, motor function, sensory function, and level of assistance needed for ADLs  - Monitor and report changes from baseline  Collaborate with interdisciplinary team to initiate plan and implement interventions as ordered  - Provide and maintain a safe environment  - Consider seizure precautions  - Consider fall precautions  - Consider aspiration precautions  - Consider bleeding precautions  Outcome: Progressing     Problem: Activity Intolerance/Impaired Mobility  Goal: Mobility/activity is maintained at optimum level for patient  Description: Interventions:  - Assess and monitor patient  barriers to mobility and need for assistive/adaptive devices  - Assess patient's emotional response to limitations  - Collaborate with interdisciplinary team and initiate plans and interventions as ordered  - Encourage independent activity per ability   - Maintain proper body alignment  - Perform active/passive rom as tolerated/ordered  - Plan activities to conserve energy   - Turn patient as appropriate  Outcome: Progressing     Problem: Communication Impairment  Goal: Ability to express needs and understand communication  Description: Assess patient's communication skills and ability to understand information  Patient will demonstrate use of effective communication techniques, alternative methods of communication and understanding even if not able to speak  - Encourage communication and provide alternate methods of communication as needed  - Collaborate with case management/ for discharge needs  - Include patient/family/caregiver in decisions related to communication    Outcome: Progressing     Problem: Potential for Aspiration  Goal: Non-ventilated patient's risk of aspiration is minimized  Description: Assess and monitor vital signs, respiratory status, and labs (WBC)  Monitor for signs of aspiration (tachypnea, cough, rales, wheezing, cyanosis, fever)  - Assess and monitor patient's ability to swallow  - Place patient up in chair to eat if possible  - HOB up at 90 degrees to eat if unable to get patient up into chair   - Supervise patient during oral intake  - Instruct patient/ family to take small bites  - Instruct patient/ family to take small single sips when taking liquids  - Follow patient-specific strategies generated by speech pathologist   Outcome: Progressing  Goal: Ventilated patient's risk of aspiration is minimized  Description: Assess and monitor vital signs, respiratory status, airway cuff pressure, and labs (WBC)  Monitor for signs of aspiration (tachypnea, cough, rales, wheezing, cyanosis, fever)  - Elevate head of bed 30 degrees if patient has tube feeding   - Monitor tube feeding    Outcome: Progressing

## 2022-10-20 NOTE — PROGRESS NOTES
Progress Note - Shaggy Hernandez III 63 y o  male MRN: [id number]    Unit/Bed#: S -01 Encounter: [id number]      Assessment:  Shaggy Hernandez III is a 63 y o  male who presented with CVA s/p open g-tube on 10/18    Tolerating TFs at 20    Plan:  Per Nutrition: Jevity 1 5 @ 10 ml/hr X initial 12 hrs  Water flushes of 100 ml q 4 hrs  Advance by 10 ml q 8 hrs as tolerated to eventual goal rate of Jevity 1 5 @ 50 ml/h  Currently at TF @ 20, would continue to advance  OOB and ambulate  Rest of care per primary team    Subjective:   Patient seen examined bedside  No acute complaints  Resting comfortably  Tolerating tube feeds at 20  Denies any nausea or emesis  No abdominal pain  Objective:     Vitals: Blood pressure 143/73, pulse 55, temperature 97 5 °F (36 4 °C), resp  rate 18, height 5' 11" (1 803 m), weight 57 6 kg (127 lb), SpO2 98 %  ,Body mass index is 17 71 kg/m²  Intake/Output Summary (Last 24 hours) at 10/20/2022 0644  Last data filed at 10/19/2022 1600  Gross per 24 hour   Intake 0 ml   Output 450 ml   Net -450 ml       Physical Exam:   General - no acute distress, responsive and cooperative  CV - warm, regular rate  Pulm - normal work of breathing, no respiratory distress  Abd - soft, nondistended, g tube with TFs currently running, no leak  Neuro - m/s grossly intact, cn grossly intact  Ext - moving all extremities       Invasive Devices  Report    Peripheral Intravenous Line  Duration           Long-Dwell Peripheral IV (Midline) 97/61/62 Right Basilic 5 days    Peripheral IV 10/16/22 Left;Ventral (anterior) Forearm 3 days          Drain  Duration           Gastrostomy/Enterostomy 18 Fr  LUQ 1 day                Lab, Imaging and other studies: I have personally reviewed pertinent reports      VTE Pharmacologic Prophylaxis: Enoxaparin (Lovenox)  VTE Mechanical Prophylaxis: sequential compression device

## 2022-10-20 NOTE — PROGRESS NOTES
NEPHROLOGY PROGRESS NOTE   Marissa Records III 63 y o  male MRN: [id number]  Unit/Bed#: S -01 Encounter: [id number]  Reason for Consult:  Hyponatremia and LUISANA, POA     72-year-old male with hx hypernatremia, EtOH use presenting with left-sided weakness and admitted with acute CVA   Nephrology consulted for management of hyponatremia and LUISANA      ASSESSMENT/PLAN:  Hyponatremia, hypoosmolar:  Suspect initially due to volume depletion, now component of SIADH in the setting of CVA  -admission sodium 119 on 10/9/2022   sNa initially improved to 133 with NSS, but has been 128-132 since 10/14   sNa stable at 131 today  -s/p lasix 20 mg IV x 1 on 10/17  -Baseline sodium unclear   Serum sodium 118 when seen in the ER on 1/4/2022 but no follow-up  -initial workup: serum osmolality 265, urine osmolality 369, urine sodium <10  uric acid 8, cortisol 41, TSH 4 36   -repeat urine osmolality 572, urine sodium 195 on 10/16/2022 consistent with SIADH  -renal function stable at baseline  -No acute indication for Hypertonic saline (HTS) at this time   -npo due to dysphagia   s/p open G-tube 10/18  TFs @ 20 ml/hr, advancing Q 8 hrs  -on IVF LR @ 50 ml/hr   -free water 100 ml flushes Q 4 hrs ordered but would recommend using NSS for flushes given underlying hyponatremia  -Fluid restriction of 1 5 L/day  -Strict I/O    -Check BMP in am  -Avoid overcorrection >8 meq/24 hrs    -continue to monitor closely  -will d/w Dr Chairez     LUISANA (POA):  Suspect prerenal azotemia due to volume depletion  -resolved  -Admission creatinine 1 90 on 10/9/2022   Today's creatinine 0 49,at baseline  -Peak creatinine 1 90  -baseline creatinine normal 0 5   -workup: UA bland  -Imaging:  Renal ultrasound with no hydronephrosis  -IVF until TFs at goal and NSS PEG tube flushes  -Strict I/Os, daily weights  -Avoid nephrotoxins, NSAIDs, IV contrast if possible  -Avoid hypotension   Maintain MAP >65  -Trend BMP     Hypertension/Volume status:  -BP acceptable in the setting of acute CVA  -volume status euvolemic  -Home medications: None  -Current medications:  Amlodipine 5 mg daily, metoprolol 5 mg IV q 6 hours  -hydralazine p r n  Per primary team  -Optimize hemodynamic status to avoid delay in renal recovery  -recommend hold parameters on antihypertensive's for SBP <130 mmHg  -Avoid hypotension or fluctuations in blood pressure   Maintain MAP >65  -low sodium (2 gm) diet  -continue to trend     Hypokalemia:  -K+ 3 4  -replete    -continue to monitor     Hypomagnesemia:  -magnesium 1 7  -repleted with 4 g per primary team  -continue to monitor     Anemia:  -Hgb 9 7, improving and stable   At goal   Goal >10  -continue to trend  -Transfuse for Hgb <7 0  -management per primary team     Acute CVA with left hemiparesis and dysphagia:  -left-sided deficits persist  -carotid duplex and CTA with severe contralateral L carotid stenosis but no significant right extracranial disease   -failed swallow eval   s/p G tube 10/18/22  -PT/OT/speech therapy  -management per Neurology and primary team     Left carotid stenosis:  -asymptomatic  -carotid duplex and CTA with long segmant >90% L ICA stenosis  -continue antiplatelet and statin  -requires f/u with vascular surgery     Alcohol abuse:  -CIWA protocol  -management per primary team    SUBJECTIVE:  Patient awake, alert without complaints  Serum sodium stable at 131, unchanged  Renal function stable  On TFs @ 20 ml/hr currently  IVF LF @ 50 ml/hr  A complete review of systems was performed  Pertinent positives and negatives noted in the HPI  Otherwise the review of systems was negative      OBJECTIVE:  Current Weight: Weight - Scale: 57 6 kg (127 lb)  Vitals:    10/19/22 2211 10/20/22 0100 10/20/22 0810 10/20/22 0819   BP: 161/89 143/73 146/82    BP Location:       Pulse: (!) 50 55 58    Resp: 18   18   Temp:   97 6 °F (36 4 °C)    TempSrc:       SpO2: 98%  97%    Weight:       Height:           Intake/Output Summary (Last 24 hours) at 10/20/2022 1058  Last data filed at 10/20/2022 0901  Gross per 24 hour   Intake 0 ml   Output 450 ml   Net -450 ml     General:  Awake, alert, appears comfortable and in no acute distress  Nontoxic  Skin:  No rash, warm, good skin turgor   Eyes:  PERRL, EOMI, sclerae nonicteric   no periorbital edema   ENT:  Moist mucous membranes  Neck:  Trachea midline, symmetric  No JVD  Chest:  Few scattered rhonchi bilaterally  CVS:  Regular rate and rhythm without murmur, gallop or rub  S1 and S2 identified and normal   No S3, S4    Abdomen:  Soft, nontender, nondistended without masses  Normal bowel sounds x 4 quadrants  No bruit  Abdominal incision C/D/I   G-tube site clear  Extremities:  Warm, pink, motor and sensory intact and well perfused  No cyanosis, pallor  No BLE edema  Neuro:  Awake, alert, oriented x3  LUE and LLE deficits persists  Left facial asymmetry  Psych:  Appropriate affect  Mentating appropriately    Normal mental status exam       Medications:    Current Facility-Administered Medications:   •  acetaminophen (TYLENOL) tablet 975 mg, 975 mg, Per G Tube, Q8H [address], Leobardo Cooper MD, 975 mg at 10/20/22 0422  •  amLODIPine (NORVASC) tablet 5 mg, 5 mg, Oral, Daily, Jeanie Neil PA-C, 5 mg at 10/20/22 4652  •  atorvastatin (LIPITOR) tablet 40 mg, 40 mg, Oral, Daily With Dinner, Jeanie Neil PA-C, 40 mg at 10/12/22 1716  •  enoxaparin (LOVENOX) subcutaneous injection 40 mg, 40 mg, Subcutaneous, Daily, Jeanie Neil PA-C, 40 mg at 10/20/22 9159  •  hydrALAZINE (APRESOLINE) injection 10 mg, 10 mg, Intravenous, Q6H PRN, Jeanie Neil PA-C, 10 mg at 10/19/22 1605  •  HYDROmorphone (DILAUDID) injection 0 2 mg, 0 2 mg, Intravenous, Q3H PRN, Sushila Adams MD  •  ipratropium (ATROVENT) 0 02 % inhalation solution 0 5 mg, 0 5 mg, Nebulization, Q6H PRN, Jeanie Neil PA-C  •  lactated ringers infusion, 50 mL/hr, Intravenous, Continuous, Adonis Juárez MD, Last Rate: 50 mL/hr at 10/19/22 2058, 50 mL/hr at 10/19/22 2058  •  levalbuterol (XOPENEX) inhalation solution 1 25 mg, 1 25 mg, Nebulization, Q6H PRN, Jeanie Neil PA-C  •  metoprolol (LOPRESSOR) injection 5 mg, 5 mg, Intravenous, Q6H, Jeanie Neil PA-C, 5 mg at 10/20/22 1288  •  naloxone (NARCAN) 0 04 mg/mL syringe 0 04 mg, 0 04 mg, Intravenous, Q1MIN PRN, Adonis Juárez MD  •  oxyCODONE (ROXICODONE) immediate release tablet 10 mg, 10 mg, Oral, Q4H PRN, Adonis Juárez MD  •  oxyCODONE (ROXICODONE) IR tablet 5 mg, 5 mg, Oral, Q4H PRN, Adonis Juárez MD    Laboratory Results:  Results from last 7 days   Lab Units 10/20/22  0453 10/19/22  0438 10/18/22  0501 10/17/22  0228 10/17/22  0227 10/16/22  0840 10/16/22  0449 10/15/22  1757 10/15/22  0552 10/14/22  2034 10/14/22  0500   WBC Thousand/uL 6 36  --  4 49 5 28  --   --  4 52  --  4 73  --  6 14   HEMOGLOBIN g/dL 9 7*  --  10 3* 9 7*  --   --  10 2*  --  9 8*  --  11 6*   HEMATOCRIT % 30 6*  --  31 6* 29 4*  --   --  31 3*  --  29 9*  --  37 3   PLATELETS Thousands/uL 141*  --  103* 97*  --   --  104*  --  100*  --  97*   SODIUM mmol/L 131* 131* 130*  --  129* 129*  --  128* 130*   < > 132*   POTASSIUM mmol/L 3 4* 4 1 3 8  --  3 9 3 8  --  4 0 2 6*   < > 3 6   CHLORIDE mmol/L 102 102 100  --  101 98  --  98 101   < > 100   CO2 mmol/L 23 22 24  --  22 23  --  24 22   < > 22   BUN mg/dL 6 6 6  --  6 8  --  8 7   < > 12   CREATININE mg/dL 0 49* 0 49* 0 55*  --  0 52* 0 57*  --  0 60 0 55*   < > 0 64   CALCIUM mg/dL 8 3* 8 7 8 4  --  8 1* 8 3*  --  8 3* 7 1*   < > 8 2*   MAGNESIUM mg/dL 2 3 1 7*  --   --  2 0  --  1 5*  --   --   --   --    PHOSPHORUS mg/dL  --  2 6*  --   --   --   --   --   --  1 8*  --   --     < > = values in this interval not displayed  I have personally reviewed the blood work as stated above and in my note    I have personally reviewed internal Medicine, co-consultants and previous nephrology notes

## 2022-10-20 NOTE — CASE MANAGEMENT
Case Management Progress Note    Patient name Jacqueline Hamilton  Location S /S -54 MRN [id number]  : 1960 Date 10/20/2022       LOS (days): 11  Geometric Mean LOS (GMLOS) (days): 5 00  Days to GMLOS:-5 8        OBJECTIVE:        Current admission status: Inpatient  Preferred Pharmacy: No Pharmacies Listed  Primary Care Provider: No primary care provider on file  Primary Insurance: BLUE CROSS  Secondary Insurance:     PROGRESS NOTE:    CM informed [facility] able to accept tomorrow 10/21 for STR only   CM tasked CM DC support for insurance auth

## 2022-10-21 VITALS
OXYGEN SATURATION: 96 % | RESPIRATION RATE: 18 BRPM | SYSTOLIC BLOOD PRESSURE: 182 MMHG | DIASTOLIC BLOOD PRESSURE: 80 MMHG | OXYGEN SATURATION: 96 % | BODY MASS INDEX: 17.78 KG/M2 | HEART RATE: 92 BPM | TEMPERATURE: 98 F | WEIGHT: 127 LBS | SYSTOLIC BLOOD PRESSURE: 182 MMHG | WEIGHT: 127 LBS | TEMPERATURE: 98 F | RESPIRATION RATE: 18 BRPM | DIASTOLIC BLOOD PRESSURE: 80 MMHG | HEART RATE: 92 BPM | BODY MASS INDEX: 17.78 KG/M2 | HEIGHT: 71 IN | HEIGHT: 71 IN

## 2022-10-21 LAB
ANION GAP SERPL CALCULATED.3IONS-SCNC: 7 MMOL/L (ref 4–13)
ANION GAP SERPL CALCULATED.3IONS-SCNC: 7 MMOL/L (ref 4–13)
BUN SERPL-MCNC: 9 MG/DL (ref 5–25)
BUN SERPL-MCNC: 9 MG/DL (ref 5–25)
CALCIUM SERPL-MCNC: 8.4 MG/DL (ref 8.4–10.2)
CALCIUM SERPL-MCNC: 8.4 MG/DL (ref 8.4–10.2)
CHLORIDE SERPL-SCNC: 106 MMOL/L (ref 96–108)
CHLORIDE SERPL-SCNC: 106 MMOL/L (ref 96–108)
CO2 SERPL-SCNC: 25 MMOL/L (ref 21–32)
CO2 SERPL-SCNC: 25 MMOL/L (ref 21–32)
CREAT SERPL-MCNC: 0.45 MG/DL (ref 0.6–1.3)
CREAT SERPL-MCNC: 0.45 MG/DL (ref 0.6–1.3)
ERYTHROCYTE [DISTWIDTH] IN BLOOD BY AUTOMATED COUNT: 16.3 % (ref 11.6–15.1)
ERYTHROCYTE [DISTWIDTH] IN BLOOD BY AUTOMATED COUNT: 16.3 % (ref 11.6–15.1)
GFR SERPL CREATININE-BSD FRML MDRD: 123 ML/MIN/1.73SQ M
GFR SERPL CREATININE-BSD FRML MDRD: 123 ML/MIN/1.73SQ M
GLUCOSE SERPL-MCNC: 96 MG/DL (ref 65–140)
GLUCOSE SERPL-MCNC: 96 MG/DL (ref 65–140)
HCT VFR BLD AUTO: 31.7 % (ref 36.5–49.3)
HCT VFR BLD AUTO: 31.7 % (ref 36.5–49.3)
HGB BLD-MCNC: 10.1 G/DL (ref 12–17)
HGB BLD-MCNC: 10.1 G/DL (ref 12–17)
MCH RBC QN AUTO: 30.1 PG (ref 26.8–34.3)
MCH RBC QN AUTO: 30.1 PG (ref 26.8–34.3)
MCHC RBC AUTO-ENTMCNC: 31.9 G/DL (ref 31.4–37.4)
MCHC RBC AUTO-ENTMCNC: 31.9 G/DL (ref 31.4–37.4)
MCV RBC AUTO: 95 FL (ref 82–98)
MCV RBC AUTO: 95 FL (ref 82–98)
PLATELET # BLD AUTO: 155 THOUSANDS/UL (ref 149–390)
PLATELET # BLD AUTO: 155 THOUSANDS/UL (ref 149–390)
PMV BLD AUTO: 9 FL (ref 8.9–12.7)
PMV BLD AUTO: 9 FL (ref 8.9–12.7)
POTASSIUM SERPL-SCNC: 3.6 MMOL/L (ref 3.5–5.3)
POTASSIUM SERPL-SCNC: 3.6 MMOL/L (ref 3.5–5.3)
RBC # BLD AUTO: 3.35 MILLION/UL (ref 3.88–5.62)
RBC # BLD AUTO: 3.35 MILLION/UL (ref 3.88–5.62)
SODIUM SERPL-SCNC: 138 MMOL/L (ref 135–147)
SODIUM SERPL-SCNC: 138 MMOL/L (ref 135–147)
WBC # BLD AUTO: 8.02 THOUSAND/UL (ref 4.31–10.16)
WBC # BLD AUTO: 8.02 THOUSAND/UL (ref 4.31–10.16)

## 2022-10-21 PROCEDURE — 80048 BASIC METABOLIC PNL TOTAL CA: CPT

## 2022-10-21 PROCEDURE — 99239 HOSP IP/OBS DSCHRG MGMT >30: CPT | Performed by: INTERNAL MEDICINE

## 2022-10-21 PROCEDURE — 99024 POSTOP FOLLOW-UP VISIT: CPT | Performed by: SURGERY

## 2022-10-21 PROCEDURE — 85027 COMPLETE CBC AUTOMATED: CPT

## 2022-10-21 PROCEDURE — 99232 SBSQ HOSP IP/OBS MODERATE 35: CPT | Performed by: INTERNAL MEDICINE

## 2022-10-21 RX ORDER — CLOPIDOGREL BISULFATE 75 MG/1
75 TABLET ORAL DAILY
Qty: 20 TABLET | Refills: 0 | Status: CANCELLED
Start: 2022-10-21

## 2022-10-21 RX ORDER — ALBUTEROL SULFATE 2.5 MG/3ML
2.5 SOLUTION RESPIRATORY (INHALATION) EVERY 6 HOURS PRN
Qty: 60 ML | Refills: 0 | Status: ON HOLD
Start: 2022-10-21

## 2022-10-21 RX ORDER — ASPIRIN 81 MG/1
81 TABLET ORAL DAILY
Status: DISCONTINUED | OUTPATIENT
Start: 2022-10-21 | End: 2022-10-21 | Stop reason: HOSPADM

## 2022-10-21 RX ORDER — ACETAMINOPHEN 325 MG/1
975 TABLET ORAL EVERY 8 HOURS SCHEDULED
Qty: 180 TABLET | Refills: 0 | Status: ON HOLD
Start: 2022-10-21

## 2022-10-21 RX ORDER — AMLODIPINE BESYLATE 5 MG/1
5 TABLET ORAL DAILY
Qty: 90 TABLET | Refills: 0 | Status: ON HOLD
Start: 2022-10-21

## 2022-10-21 RX ORDER — ASPIRIN 81 MG/1
81 TABLET ORAL DAILY
Qty: 90 TABLET | Refills: 0 | Status: CANCELLED
Start: 2022-10-21

## 2022-10-21 RX ORDER — AMLODIPINE BESYLATE 5 MG/1
5 TABLET ORAL DAILY
Status: DISCONTINUED | OUTPATIENT
Start: 2022-10-21 | End: 2022-10-21 | Stop reason: HOSPADM

## 2022-10-21 RX ORDER — ASPIRIN 81 MG/1
81 TABLET ORAL DAILY
Qty: 90 TABLET | Refills: 0 | Status: ON HOLD
Start: 2022-10-21

## 2022-10-21 RX ORDER — CLOPIDOGREL BISULFATE 75 MG/1
75 TABLET ORAL DAILY
Qty: 20 TABLET | Refills: 0 | Status: ON HOLD
Start: 2022-10-22

## 2022-10-21 RX ORDER — ATORVASTATIN CALCIUM 40 MG/1
40 TABLET, FILM COATED ORAL
Qty: 90 TABLET | Refills: 0 | Status: ON HOLD
Start: 2022-10-21

## 2022-10-21 RX ORDER — CLOPIDOGREL BISULFATE 75 MG/1
75 TABLET ORAL DAILY
Status: DISCONTINUED | OUTPATIENT
Start: 2022-10-21 | End: 2022-10-21 | Stop reason: HOSPADM

## 2022-10-21 RX ADMIN — ASPIRIN 81 MG: 81 TABLET, COATED ORAL at 08:51

## 2022-10-21 RX ADMIN — METOPROLOL TARTRATE 25 MG: 25 TABLET, FILM COATED ORAL at 08:50

## 2022-10-21 RX ADMIN — AMLODIPINE BESYLATE 5 MG: 5 TABLET ORAL at 08:51

## 2022-10-21 RX ADMIN — CLOPIDOGREL BISULFATE 75 MG: 75 TABLET ORAL at 08:50

## 2022-10-21 RX ADMIN — ENOXAPARIN SODIUM 40 MG: 40 INJECTION SUBCUTANEOUS at 08:50

## 2022-10-21 NOTE — NURSING NOTE
Patient has reached tube feed goal of 50ml/hr  Patient tolerating well with no complaints of nausea/ vomiting and a residual of 0ml  Next tube feed flush and residual check due at 0800

## 2022-10-21 NOTE — PROGRESS NOTES
Progress Note - Elvia Escobar III 63 y o  male MRN: [id number]    Unit/Bed#: S -01 Encounter: [id number]      Assessment:  Elvia Escobar III is a 63 y o  male who presented with CVA s/p open g-tube on 10/18    Tolerating TFs at goal 50  Residuals overnight: 0, 10cc, 15cc     Plan:  Per Nutrition: Jevity 1 5 Goal 50  Flushes of 100 ml q 4 hrs  Per Nephrology - NS flushes preferred  Continue TF at goal  OOB and ambulate  Rest of care per primary team    Subjective:   Patient seen examined bedside  No overnight events  Tolerating tube feeds  No nausea or emesis  Is passing flatus, states last BM yesterday  Objective:     Vitals: Blood pressure 156/70, pulse 68, temperature (!) 97 3 °F (36 3 °C), resp  rate 18, height 5' 11" (1 803 m), weight 57 6 kg (127 lb), SpO2 96 %  ,Body mass index is 17 71 kg/m²  Intake/Output Summary (Last 24 hours) at 10/21/2022 0612  Last data filed at 10/21/2022 0400  Gross per 24 hour   Intake 800 ml   Output 450 ml   Net 350 ml       Physical Exam:   General - no acute distress, responsive and cooperative  CV - warm, regular rate  Pulm - normal work of breathing, no respiratory distress  Abd - soft, nondistended, g tube w/TFs currently running, no leakage/drainage or sx infection  Neuro - m/s grossly intact, cn grossly intact  Ext - moving all extremities         Invasive Devices  Report    Peripheral Intravenous Line  Duration           Long-Dwell Peripheral IV (Midline) 49/50/47 Right Basilic 6 days          Drain  Duration           Gastrostomy/Enterostomy 18 Fr  LUQ 2 days                Lab, Imaging and other studies: I have personally reviewed pertinent reports      VTE Pharmacologic Prophylaxis: Enoxaparin (Lovenox)  VTE Mechanical Prophylaxis: sequential compression device

## 2022-10-21 NOTE — PROGRESS NOTES
NEPHROLOGY PROGRESS NOTE   [facility] III 63 y o  male MRN: [id number]  Unit/Bed#: S -01 Encounter: [id number]  Reason for Consult:  Hyponatremia and LUISANA, POA     56-year-old male with hx hypernatremia, EtOH use presenting with left-sided weakness and admitted with acute CVA   Nephrology consulted for management of hyponatremia and LUISANA      ASSESSMENT/PLAN:  Hyponatremia, hypoosmolar:  Suspect initially due to volume depletion, now component of SIADH in the setting of CVA  -admission sodium 119 on 10/9/2022   sNa initially improved to 133 with NSS, but has been 128-132 since 10/14   sNa improved to 138 today  -s/p lasix 20 mg IV x 1 on 10/17  -Baseline sodium unclear   Serum sodium 118 when seen in the ER on 1/4/2022 but no follow-up  -initial workup: serum osmolality 265, urine osmolality 369, urine sodium <10  uric acid 8, cortisol 41, TSH 4 36   -repeat urine osmolality 572, urine sodium 195 on 10/16/2022 consistent with SIADH  -renal function stable at baseline  -No acute indication for Hypertonic saline (HTS) at this time  -s/p open G-tube 10/18  TFs @ goal, IVF off  -saline 50 ml flushes Q 4 hrs  -Fluid restriction of 1 5 L/day  -Strict I/O   -continue to monitor closely  -Pt stable for hospital discharge from nephrology standpoint  Office f/u in 2-3 weeks with repeat labs    Check BM in 1 week after hospital discharge   -will d/w Dr Chairez     LUISANA (POA):  Suspect prerenal azotemia due to volume depletion  -resolved  -Admission creatinine 1 90 on 10/9/2022   Today's creatinine 0 45,at baseline  -Peak creatinine 1 90  -baseline creatinine normal 0 5   -workup: UA bland  -Imaging:  Renal ultrasound with no hydronephrosis  -Strict I/Os, daily weights  -Avoid nephrotoxins, NSAIDs, IV contrast if possible  -Avoid hypotension   Maintain MAP >65  -Trend BMP     Hypertension/Volume status:  -BP acceptable in the setting of acute CVA  -volume status euvolemic  -Home medications: None  -Current medications:  Amlodipine 5 mg daily, metoprolol 25 mg q 12 hours  -hydralazine p r n  Per primary team  -Optimize hemodynamic status to avoid delay in renal recovery  -recommend hold parameters on antihypertensive's for SBP <130 mmHg  -Avoid hypotension or fluctuations in blood pressure   Maintain MAP >65  -low sodium (2 gm) diet  -continue to trend     Hypokalemia:  -stable  -K+ 3 6   -continue to monitor     Hypomagnesemia:  -resolved  -continue to monitor     Anemia:  -Hgb 10 1, improving and stable   At goal   Goal >10  -continue to trend  -Transfuse for Hgb <7 0  -management per primary team     Acute CVA with left hemiparesis and dysphagia:  -left-sided deficits persist  -carotid duplex and CTA with severe contralateral L carotid stenosis but no significant right extracranial disease   -failed swallow eval   s/p G tube 10/18/22  -PT/OT/speech therapy  -management per Neurology and primary team     Left carotid stenosis:  -asymptomatic  -carotid duplex and CTA with long segmant >90% L ICA stenosis  -continue antiplatelet and statin  -requires f/u with vascular surgery     Alcohol abuse:  -CIWA protocol  -management per primary team    Disposition:  -Pt stable for hospital discharge from nephrology standpoint  Office f/u in 2-3 weeks with repeat labs  Check BM in 1 week after hospital discharge  Continue fluid restriction of 1 5 L/day  SUBJECTIVE:  Pt awake alert without complaints  sNa improved at 138  Off IVF and on goal TFs  Plan for discharge to rehab today  VSS    A complete review of systems was performed  Pertinent positives and negatives noted in the HPI  Otherwise the review of systems was negative      OBJECTIVE:  Current Weight: Weight - Scale: 57 6 kg (127 lb)  Vitals:    10/20/22 1655 10/20/22 2012 10/20/22 2017 10/21/22 0805   BP: 117/78 170/99 156/70 168/84   BP Location:    Right arm   Pulse:  68  92   Resp:    18   Temp:  (!) 97 3 °F (36 3 °C)  98 °F (36 7 °C)   TempSrc:    Oral   SpO2: 96%  96%   Weight:       Height:           Intake/Output Summary (Last 24 hours) at 10/21/2022 1203  Last data filed at 10/21/2022 1101  Gross per 24 hour   Intake 1549 17 ml   Output 700 ml   Net 849 17 ml     General:  Awake, alert, appears comfortable and in no acute distress  Nontoxic  Skin:  No rash, warm, good skin turgor   Eyes:  PERRL, EOMI, sclerae nonicteric   no periorbital edema   ENT:  Moist mucous membranes  Neck:  Trachea midline, symmetric  No JVD  Chest:  Clear to auscultation bilaterally without wheezes, crackles or rhonchi  CVS:  Regular rate and rhythm without murmur, gallop or rub  S1 and S2 identified and normal   No S3, S4    Abdomen:  Soft, nontender, nondistended without masses  Normal bowel sounds x 4 quadrants  No bruit  +G tube stable  Site clear  Incision C/D/I  Extremities:  Warm, pink, motor and sensory intact and well perfused  No cyanosis, pallor  No BLE edema  Neuro:  Awake, alert, oriented x3  L sided deficits unchanged  Facial assymetry  Psych:  Appropriate affect  Mentating appropriately    Normal mental status exam     Medications:    Current Facility-Administered Medications:   •  acetaminophen (TYLENOL) tablet 975 mg, 975 mg, Per G Tube, Q8H [facility], Koffi Hardy MD, 975 mg at 10/20/22 1616  •  albuterol inhalation solution 2 5 mg, 2 5 mg, Nebulization, Q6H PRN, Chase Butcher MD  •  amLODIPine (NORVASC) tablet 5 mg, 5 mg, Per G Tube, Daily, Alvira Alves, DO, 5 mg at 10/21/22 6392  •  aspirin (ECOTRIN LOW STRENGTH) EC tablet 81 mg, 81 mg, Oral, Daily, Alvira Alves, DO, 81 mg at 10/21/22 4810  •  atorvastatin (LIPITOR) tablet 40 mg, 40 mg, Oral, Daily With Dinner, Jeanie Neil PA-C, 40 mg at 10/20/22 1616  •  clopidogrel (PLAVIX) tablet 75 mg, 75 mg, Oral, Daily, Alvira Alves, DO, 75 mg at 10/21/22 0850  •  enoxaparin (LOVENOX) subcutaneous injection 40 mg, 40 mg, Subcutaneous, Daily, [employer], ADELIA, 40 mg at 10/21/22 0850  •  hydrALAZINE (APRESOLINE) injection 10 mg, 10 mg, Intravenous, Q6H PRN, Jeanie Neil PA-C, 10 mg at 10/20/22 1623  •  HYDROmorphone (DILAUDID) injection 0 2 mg, 0 2 mg, Intravenous, Q3H PRN, Abi Dow MD  •  metoprolol tartrate (LOPRESSOR) tablet 25 mg, 25 mg, Per G Tube, Q12H [address], Codymike Walker, DO, 25 mg at 10/21/22 0850  •  naloxone (NARCAN) 0 04 mg/mL syringe 0 04 mg, 0 04 mg, Intravenous, Q1MIN PRN, Abi Dow MD  •  oxyCODONE (ROXICODONE) immediate release tablet 10 mg, 10 mg, Oral, Q4H PRN, Abi Dow MD  •  oxyCODONE (ROXICODONE) IR tablet 5 mg, 5 mg, Oral, Q4H PRN, Abi Dow MD    Laboratory Results:  Results from last 7 days   Lab Units 10/21/22  0547 10/20/22  0453 10/19/22  0438 10/18/22  0501 10/17/22  0228 10/17/22  0227 10/16/22  0840 10/16/22  0449 10/15/22  1757 10/15/22  0552   WBC Thousand/uL 8 02 6 36  --  4 49 5 28  --   --  4 52  --  4 73   HEMOGLOBIN g/dL 10 1* 9 7*  --  10 3* 9 7*  --   --  10 2*  --  9 8*   HEMATOCRIT % 31 7* 30 6*  --  31 6* 29 4*  --   --  31 3*  --  29 9*   PLATELETS Thousands/uL 155 141*  --  103* 97*  --   --  104*  --  100*   SODIUM mmol/L 138 131* 131* 130*  --  129* 129*  --  128* 130*   POTASSIUM mmol/L 3 6 3 4* 4 1 3 8  --  3 9 3 8  --  4 0 2 6*   CHLORIDE mmol/L 106 102 102 100  --  101 98  --  98 101   CO2 mmol/L 25 23 22 24  --  22 23  --  24 22   BUN mg/dL 9 6 6 6  --  6 8  --  8 7   CREATININE mg/dL 0 45* 0 49* 0 49* 0 55*  --  0 52* 0 57*  --  0 60 0 55*   CALCIUM mg/dL 8 4 8 3* 8 7 8 4  --  8 1* 8 3*  --  8 3* 7 1*   MAGNESIUM mg/dL  --  2 3 1 7*  --   --  2 0  --  1 5*  --   --    PHOSPHORUS mg/dL  --   --  2 6*  --   --   --   --   --   --  1 8*       I have personally reviewed the blood work as stated above and in my note  I have personally reviewed internal Medicine, co-consultants and previous nephrology notes

## 2022-10-21 NOTE — DISCHARGE INSTR - AVS FIRST PAGE
Dear Tracie Zaman,     It was our pleasure to care for you here at [facility]  It is our hope that we were always able to exceed the expected standards for your care during your stay  You were hospitalized due to ***  You were cared for on the *** floor by Rachele Schilling under the service of Raman Goodson MD with the [facility] who covers for your primary care physician (PCP), No primary care provider on file  , while you were hospitalized  If you have any questions or concerns related to this hospitalization, you may contact us at [phone]  For follow up as well as any medication refills, we recommend that you follow up with your primary care physician  A registered nurse will reach out to you by phone within a few days after your discharge to answer any additional questions that you may have after going home  However, at this time we provide for you here, the most important instructions / recommendations at discharge:     Notable Medication Adjustments -   Please start atorvastatin 40 mg daily  Please take Norvasc 5 mg daily  Please take metoprolol 25 mg bid   Please take aspirin 81 mg daily  Please take Plavix 75 mg daily for 21 days  Testing Required after Discharge -   Repeat carotid ultrasound with vascular surgery  Important follow up information -   Please follow up with vascular surgery  Please call the number provided on the " follow up provider" page within 1-2 weeks of discharge to make appointment  Please follow up with Neurology in 4-6 weeks  Please call the number provided on the " follow up provider" page to make appointment  Please establish care with a primary care physician after discharge  Call the number given on the "follow up provider page" within 1-2 weeks of discharge  to make an appointment  Follow up with nephrology after discharge   Call the number given on the "follow up provider page" within 1-2 weeks of discharge  to make an appointment  Other Instructions -   Please monitor BP twice a day  Please try to quit using tobacco products and consuming alcohol  Please follow up with the care providers above for optimum care after discharge  Please review this entire after visit summary as additional general instructions including medication list, appointments, activity, diet, any pertinent wound care, and other additional recommendations from your care team that may be provided for you        Sincerely,     David Gonzalez, DO

## 2022-10-21 NOTE — PLAN OF CARE
Problem: Potential for Falls  Goal: Patient will remain free of falls  Description: INTERVENTIONS:  - Educate patient/family on patient safety including physical limitations  - Instruct patient to call for assistance with activity   - Consult OT/PT to assist with strengthening/mobility   - Keep Call bell within reach  - Keep bed low and locked with side rails adjusted as appropriate  - Keep care items and personal belongings within reach  - Initiate and maintain comfort round  - Apply yellow socks and bracelet for high fall risk patients  - Consider moving patient to room near nurses station  10/21/2022 1230 by Donavan Roberto RN  Outcome: Adequate for Discharge  10/21/2022 1034 by Donavan Roberto RN  Outcome: Progressing     Problem: MOBILITY - ADULT  Goal: Maintain or return to baseline ADL function  Description: INTERVENTIONS:  -  Assess patient's ability to carry out ADLs; assess patient's baseline for ADL function and identify physical deficits which impact ability to perform ADLs (bathing, care of mouth/teeth, toileting, grooming, dressing, etc )  - Assess/evaluate cause of self-care deficits   - Assess range of motion  - Assess patient's mobility; develop plan if impaired  - Assess patient's need for assistive devices and provide as appropriate  - Encourage maximum independence but intervene and supervise when necessary  - Involve family in performance of ADLs  - Assess for home care needs following discharge   - Consider OT consult to assist with ADL evaluation and planning for discharge  - Provide patient education as appropriate  10/21/2022 1230 by Donavan Roberto RN  Outcome: Adequate for Discharge  10/21/2022 1034 by Donavan Roberto RN  Outcome: Progressing  Goal: Maintains/Returns to pre admission functional level  Description: INTERVENTIONS:  - Perform BMAT or MOVE assessment daily    - Set and communicate daily mobility goal to care team and patient/family/caregiver     - Collaborate with rehabilitation services on mobility goals if consulted  - Out of bed for toileting  - Record patient progress and toleration of activity level   10/21/2022 1230 by Kika Laboy RN  Outcome: Adequate for Discharge  10/21/2022 1034 by Kika Laboy RN  Outcome: Progressing     Problem: Prexisting or High Potential for Compromised Skin Integrity  Goal: Skin integrity is maintained or improved  Description: INTERVENTIONS:  - Identify patients at risk for skin breakdown  - Assess and monitor skin integrity  - Assess and monitor nutrition and hydration status  - Monitor labs   - Assess for incontinence   - Turn and reposition patient  - Assist with mobility/ambulation  - Relieve pressure over bony prominences  - Avoid friction and shearing  - Provide appropriate hygiene as needed including keeping skin clean and dry  - Evaluate need for skin moisturizer/barrier cream  - Collaborate with interdisciplinary team   - Patient/family teaching  - Consider wound care consult   10/21/2022 1230 by Kika Laboy RN  Outcome: Adequate for Discharge  10/21/2022 1034 by Kika Laboy RN  Outcome: Progressing     Problem: Nutrition/Hydration-ADULT  Goal: Nutrient/Hydration intake appropriate for improving, restoring or maintaining nutritional needs  Description: Monitor and assess patient's nutrition/hydration status for malnutrition  Collaborate with interdisciplinary team and initiate plan and interventions as ordered  Monitor patient's weight and dietary intake as ordered or per policy  Utilize nutrition screening tool and intervene as necessary  Determine patient's food preferences and provide high-protein, high-caloric foods as appropriate       INTERVENTIONS:  - Monitor oral intake, urinary output, labs, and treatment plans  - Assess nutrition and hydration status and recommend course of action  - Evaluate amount of meals eaten  - Assist patient with eating if necessary   - Allow adequate time for meals  - Recommend/ encourage appropriate diets, oral nutritional supplements, and vitamin/mineral supplements  - Order, calculate, and assess calorie counts as needed  - Recommend, monitor, and adjust tube feedings and TPN/PPN based on assessed needs  - Assess need for intravenous fluids  - Provide specific nutrition/hydration education as appropriate  - Include patient/family/caregiver in decisions related to nutrition  10/21/2022 1230 by Donavan Roberto RN  Outcome: Adequate for Discharge  10/21/2022 1034 by Donavan Roberto RN  Outcome: Progressing     Problem: Neurological Deficit  Goal: Neurological status is stable or improving  Description: Interventions:  - Monitor and assess patient's level of consciousness, motor function, sensory function, and level of assistance needed for ADLs  - Monitor and report changes from baseline  Collaborate with interdisciplinary team to initiate plan and implement interventions as ordered  - Provide and maintain a safe environment  - Consider seizure precautions  - Consider fall precautions  - Consider aspiration precautions  - Consider bleeding precautions  10/21/2022 1230 by Donavan Roberto RN  Outcome: Adequate for Discharge  10/21/2022 1034 by Donavan Roberto RN  Outcome: Progressing     Problem: Activity Intolerance/Impaired Mobility  Goal: Mobility/activity is maintained at optimum level for patient  Description: Interventions:  - Assess and monitor patient  barriers to mobility and need for assistive/adaptive devices  - Assess patient's emotional response to limitations  - Collaborate with interdisciplinary team and initiate plans and interventions as ordered  - Encourage independent activity per ability   - Maintain proper body alignment  - Perform active/passive rom as tolerated/ordered    - Plan activities to conserve energy   - Turn patient as appropriate  10/21/2022 1230 by Donavan Roberto RN  Outcome: Adequate for Discharge  10/21/2022 1034 by Loco Anton RN  Outcome: Progressing     Problem: Potential for Aspiration  Goal: Non-ventilated patient's risk of aspiration is minimized  Description: Assess and monitor vital signs, respiratory status, and labs (WBC)  Monitor for signs of aspiration (tachypnea, cough, rales, wheezing, cyanosis, fever)  - Assess and monitor patient's ability to swallow  - Place patient up in chair to eat if possible  - HOB up at 90 degrees to eat if unable to get patient up into chair   - Supervise patient during oral intake  - Instruct patient/ family to take small bites  - Instruct patient/ family to take small single sips when taking liquids  - Follow patient-specific strategies generated by speech pathologist   10/21/2022 1230 by Loco Anton RN  Outcome: Adequate for Discharge  10/21/2022 1034 by Loco Anton RN  Outcome: Progressing  Goal: Ventilated patient's risk of aspiration is minimized  Description: Assess and monitor vital signs, respiratory status, airway cuff pressure, and labs (WBC)  Monitor for signs of aspiration (tachypnea, cough, rales, wheezing, cyanosis, fever)  - Elevate head of bed 30 degrees if patient has tube feeding   - Monitor tube feeding  10/21/2022 1230 by Loco Anton RN  Outcome: Adequate for Discharge  10/21/2022 1034 by Loco Anton RN  Outcome: Progressing     Problem: Communication Impairment  Goal: Ability to express needs and understand communication  Description: Assess patient's communication skills and ability to understand information  Patient will demonstrate use of effective communication techniques, alternative methods of communication and understanding even if not able to speak  - Encourage communication and provide alternate methods of communication as needed  - Collaborate with case management/ for discharge needs  - Include patient/family/caregiver in decisions related to communication    10/21/2022 1230 by Narciso Tong Rebekah Tolentino RN  Outcome: Adequate for Discharge  10/21/2022 1034 by Michael Hinds RN  Outcome: Progressing     Problem: Nutrition  Goal: Nutrition/Hydration status is improving  Description: Monitor and assess patient's nutrition/hydration status for malnutrition (ex- brittle hair, bruises, dry skin, pale skin and conjunctiva, muscle wasting, smooth red tongue, and disorientation)  Collaborate with interdisciplinary team and initiate plan and interventions as ordered  Monitor patient's weight and dietary intake as ordered or per policy  Utilize nutrition screening tool and intervene per policy  Determine patient's food preferences and provide high-protein, high-caloric foods as appropriate  - Assist patient with eating   - Allow adequate time for meals   - Encourage patient to take dietary supplement as ordered  - Collaborate with clinical nutritionist   - Include patient/family/caregiver in decisions related to nutrition    10/21/2022 1230 by Michael Hinds RN  Outcome: Adequate for Discharge  10/21/2022 1034 by Michael Hinds RN  Outcome: Progressing

## 2022-10-21 NOTE — CASE MANAGEMENT
Case Management Discharge Planning Note    Patient name Nita OSEGUERA /S -28 MRN [id number]  : 1960 Date 10/21/2022       Current Admission Date: 10/9/2022  Current Admission Diagnosis:Acute CVA (cerebrovascular accident) [facility])   Patient Active Problem List    Diagnosis Date Noted   • Smoking 10/18/2022   • Hypokalemia 10/15/2022   • Moderate protein-calorie malnutrition ([facility] [address] ) 10/14/2022   • Alcohol use disorder, mild, abuse 10/10/2022   • Aspiration into respiratory tract 10/10/2022   • Hyponatremia 10/09/2022   • Acute CVA (cerebrovascular accident) ([facility] [address] ) 10/09/2022   • More than 50 percent stenosis of left internal carotid artery 10/09/2022      LOS (days): 12  Geometric Mean LOS (GMLOS) (days): 5 00  Days to GMLOS:-6 7     OBJECTIVE:  Risk of Unplanned Readmission Score: 12 7         Current admission status: Inpatient   Preferred Pharmacy: No Pharmacies Listed  Primary Care Provider: No primary care provider on file  Primary Insurance: BLUE CROSS  Secondary Insurance:     DISCHARGE DETAILS:    Contacts  Patient Contacts: SisterJose  Relationship to Patient[de-identified] Family  Contact Method: Phone  Phone Number: [phone]  Reason/Outcome: Continuity of Care, Discharge Planning    Other Referral/Resources/Interventions Provided:  Interventions: Transportation  Referral Comments: CM confirmed auth obtained for STR at [facility] today  CM provided auth information to [facility] via [address]    CM confirmed transport for 1:30pm today and informed JoseAmol, Percy Laurent, Dr Sanches Daily, and [facility] Resident Dr Nya Villegas Team Recommendation: Short Term Rehab  Discharge Destination Plan[de-identified] Short Term Rehab  Transport at Discharge : [facility]  Dispatcher Contacted: Yes  Number/Name of Dispatcher: [employer]  Transported by [employer] and Unit #): [employer] [facility]  ETA of Transport (Date): 10/21/22  ETA of Transport (Time): [id number] Name, [address] : [place]  Receiving Facility/Agency Phone Number: [phone]  Facility/Agency Fax Number: [phone]

## 2022-10-21 NOTE — DISCHARGE SUMMARY
[facility]  Discharge- 2005 [facility] 1960, 63 y o  male MRN: [id number]  Unit/Bed#: S -01 Encounter: [id number]  Primary Care Provider: No primary care provider on file  Date and time admitted to hospital: 10/9/2022  3:59 PM    * Acute CVA (cerebrovascular accident) [facility])  Assessment & Plan  Episode of acute left-sided upper extremity and lower extremity weakness along with left-sided lower facial droop and dysarthria with onset morning of 10/9  BP on presentation 161/109, max HR and RR in  and 24 respectively  Patient did not get tPa since he was outside the window  EKG showed Sinus tachycardia  Right atrial enlargement  Nonspecific ST abnormality  CT stroke alert brain  Impression: Acute subcortical infarct in the right periventricular white matter, corona radiata and lentiform nuclei  No hemorrhagic conversion or mass effect  Findings were directly discussed with Baljit Cordoba at 4:39 PM     CTA stroke alert (head/neck)  Impression: Lung segment critical (greater than 90%) stenosis in the proximal left cervical ICA  Atretic right posterior cerebral artery  No evidence of acute thrombus or large vessel occlusion of the major vessels of the [place]  MRI brain:   Stable acute to early subacute infarct in the right periventricular white matter, corona radiata and lentiform nuclei  No hemorrhagic conversion or mass effect  Acute lacunar infarct in the left thalamus  ECHO   · Moderate concentric hypertrophy  EF 55%  Mild tricuspid regurgitation  Trivial fibrinous pericardial effusion  Pulmonary artery pressure mildly elevated to 37 0 mmHg  · NIHSS score: 8    10/17 CT abdomen: Colon interposed between the abdominal wall and the stomach  IR couldn't put in PEG tube do to the anatomy of where the colon and stomach are  Surgery placed G tube 10/18  Plan   - amlodipine 5 mg in G tube nutrition  -Metoprolol 25 mg b i d   Per G-tube  - continue aspirin and Plavix for 21 days, then just aspirin  - continue atorvastatin 40mg    - neuro checks  - stroke education  - needs PT/OT/speech therapy care after discharge   - Will follow up with neurovascular regarding carotid duplex findings in 4-6 weeks  - placement at [facility] this morning       Aspiration into respiratory tract  Assessment & Plan  CTA showed aspiration pneumonia in the upper lung fields L more than R  Chest XR showed Ill-defined opacity in the left upper lung which corresponds with bronchiectasis and tree-in-bud nodularity on the CTA neck  Likely aspiration pneumonia  At admission Leukocytosis to 18 20 and procalcitonin elevated to 0 88 but afebrile over hospital stay  Rhonchi have improved over the course of admission  Completed 7 days of ceftriaxone  NG tube placed however patient pulled it out due to discomfort, keofed tube was subsequently placed which was also pulled out due to discomfort  G tube placed 10/18  - G tube feedings (see below)   - monitor for nausea, vomiting, keep patient NPO until cleared by speech therapy   - nebulizer treatments as needed  - PFTs outpatient for suspected chronic lung disease     Hyponatremia  Assessment & Plan  Recent Labs     10/09/22  1637   SODIUM 119*     AM cortisol increased at 41 0   Renal US normal  Latest sodium level 138  Per nephrology note, 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   Received one dose of lasix 10/17    Plan:  · Appreciate nephrology recommendations: Stop G tube free water flushes, start 50 mL NS flushes q6  · Monitor with routine BMPs      More than 50 percent stenosis of left internal carotid artery  Assessment & Plan  CTA of head/neck  Impression: 1  Lung segment critical (greater than 90%) stenosis in the proximal left cervical ICA  2   Atretic right posterior cerebral artery  No evidence of acute thrombus or large vessel occlusion of the major vessels of the [place]      Carotid Complex Study  Impression: R ICA <50% heterogeneous and irregular occlusion  L ICA >70% heterogeneous and irregular occlusion  Plan   - F/u with neurovascular in 4-6 weeks on carotid duplex findings    Alcohol use disorder, mild, abuse  Assessment & Plan  Patient endorses 4-6 drinks per day consisting of 5% alcohol  -CIWA protocol     Hypokalemia  Assessment & Plan  Potassium of 2 6 on 10/15  Now at 3 6 after D5 with potassium was stopped 10/19  Magnesium is within normal limits at 2 3      · Routine BMP to monitor         Moderate protein-calorie malnutrition (HCC)  Assessment & Plan  Malnutrition Findings:   Adult Malnutrition type: Acute illness  Adult Degree of Malnutrition: Malnutrition of moderate degree  Malnutrition Characteristics: Muscle loss, Inadequate energy                  360 Statement: Moderate Protein calorie malnutrition r/t inadequate intake as evidenced by < 75% energy intake compared to energy needs > 7 days, and muscle depletion to clavicle and quadriceps; plan for G-tube placement    BMI Findings:  Adult BMI Classifications: Underweight < 18 5        Body mass index is 17 71 kg/m²  NG tube placed however patient pulled it out due to discomfort, keofed tube was subsequently placed which was also pulled out due to discomfort  G tube placed by surgery 10/18      10/17 CT abdomen: Colon interposed between the abdominal wall and the stomach  IR couldn't put in PEG tube do to the anatomy of where the colon and stomach are  Surgery placed G tube 10/18  Plan:   -Nutrition consult: Jevity 1 5 @ 10 ml/hr X initial 12 hrs  NS flushes 50mL q 6 hrs (per nephrology)  Advance by 10 ml q 8 hrs as tolerated to eventual goal rate of Jevity 1 5 @ 50 ml/h  At goal tube feed provides 1800 kcals, 77 g protein, and 1512 ml total fluid from formula + flushes    -F/u with speech therapy       Mild protein-calorie malnutrition (HCC)-resolved as of 10/18/2022  Assessment & Plan  Malnutrition Findings:   Adult Malnutrition type: Acute illness  Adult Degree of Malnutrition: Malnutrition of moderate degree  Malnutrition Characteristics: Muscle loss, Inadequate energy               360 Statement: Moderate Protein calorie malnutrition r/t inadequate intake as evidenced by < 75% energy intake compared to energy needs > 7 days, and muscle depletion to clavicle and quadriceps; plan for G-tube placement    BMI Findings:  Adult BMI Classifications: Underweight < 18 5        Body mass index is 17 71 kg/m²  NG tube placed however patient pulled it out due to discomfort, keofed tube was subsequently placed which was also pulled out due to discomfort  · NPO until PEG tube can be placed today by surgery      Medical Problems             Resolved Problems  Date Reviewed: 10/18/2022          Resolved    Elevated serum creatinine 10/15/2022     Resolved by  Ivanna Paz MD    Overview Deleted 10/9/2022  9:07 PM by Martinez Schaefer MD            Severe sepsis ([facility] [address] ) 10/9/2022     Resolved by  Martinez Schaefer MD    Mild protein-calorie malnutrition ([facility] [address] ) 10/18/2022     Resolved by  Compa Khan              Discharging Resident: [address] [place] [address]   Discharging Attending: No att  providers found  PCP: No primary care provider on file  Admission Date:   Admission Orders (From admission, onward)     Ordered        10/09/22 [address] [place] [address]  Once                      Discharge Date: 10/21/22    Consultations During Hospital Stay:  · Neurology, nephrology, nutrition, IR, Surgery, PT/OT/Speech    Procedures Performed:   · G tube placement    Significant Findings / Test Results:   · CT brain: acute subcortical infarct in the R periventricular white matter, corona radiata and lentiform nuclei     · CTA head/neck: critical stenosis in proximal L cervical ICA, upper lobe infiltrates suspicious for aspiration pneumonia  · MRI brain: stable acute to early subacute infarct in the R periventricular white matter, corona radiata and lentiform nuclei  Acute lacunar infarct in the L thalamus  · ECHO: moderate concentric hypertrophy  EF 55%  Mild tricuspid reguritation  Trivial fibrinous pericardial effusion and elevated pulmonary artery pressure to 37 0 mmHg  · Carotid complex study: R ICA less than 50% occlusion  L ICA greater than 70% heterogenous and irregular occlusion  · Chest XR: ill defined opacity in L upper lung that corresponds with bronchiectasis and tree-in-bud nodularity on CTA  · Hyponatremia on admission 119, corrected to 138 by discharge   · Creatinine 1 90 on admission, down to 0 45 on day of discharge   · Magnesium 1 4 corrected to 2 3   · Phosphorus 5 1 down to 2 6 by discharge     Incidental Findings:   · CT brain:  Periventricular and subcortical hypoattenuating foci consistent with microangiopathic disease  Mucosal thickening in the left maxillary sinus and left anterior ethmoid air cells  Small bilateral mastoid effusions  · CTA head/neck: R vertebral artery calcified plaque  Test Results Pending at Discharge (will require follow up): · None     Outpatient Tests Requested:  · Carotid ultrasound with vascular follow-up    Complications:  None    Reason for Admission:  CVA    Hospital Course:   Shawanda Sow III is a 63 y o  male patient who originally presented to the hospital on 10/9/2022 due to left-sided upper and lower extremity weakness along with left-sided facial droop and dysarthria  NIHSS: 8 Stroke alert was called  TNK was not given due to being outside of the window of symptoms  CTH/CTA is negative for acute blood, signs of ischemia, LDL or critical stenosis  Pneumonia was noted on imaging was deemed likely due to aspiration  Patient was placed on CIWA protocol due to claiming he drinks several twisted teas every day  More lab work was done which were unremarkable except for hyponatremia and an LUISANA  Nephrology was consulted and they followed the patient throughout the whole admission    We slowly titrated the sodium levels up throughout the admission with IV fluids  MRI showed acute left thalamic stroke and stable acute early subacute right periventricular stroke  Stroke pathway was initiated  Patient was started on a high-intensity statin and dual antiplatelet therapy  Permissive hypertension was granted for 24 hours  Patient got an echo which showed EF of 55% and grade 1 diastolic dysfunction  EKG normal sinus rhythm throughout the stay  The VAS carotid study was done and showed more than 70% stenosis in the left internal carotid artery  Patient was treated for his pneumonia with IV ceftriaxone  Speech followed the patient throughout this day due to continues oropharyngeal dysphagia  Multiple diets were tried and were unsuccessful  PEG tube consult by IR was placed  They needed 5 days off of aspirin and Plavix to do the procedure  CT abdomen pelvis on 10/17 showed that the colon was too close to the stomach for IR to place the PEG tube  Surgery was consulted and was taken to the OR on 10/17 for G-tube placement  Patient was started on trickle feeds on 10/18 insert taking medications through G-tube  A diet was slowly advanced and is now on 50 mL/hr of continuous tube feeds which is his goal per nutrition consult  Patient was placed on antihypertensive medications for high blood pressure throughout the admission  Patient discharged on dual antiplatelet therapy for 21 days starting on 10/21  And then will be on only aspirin after that  Patient was deemed fit for discharge on 10/21 to go to rehab to work on muscle strength, speech, and swallowing  Please see above list of diagnoses and related plan for additional information  Condition at Discharge: stable    Discharge Day Visit / Exam:   Subjective:  Patient was seen at bedside this morning  He was resting comfortably in the bed  He said he was ready to go to rehab    He states there is no pain with his G-tube is tolerating the feedings well  Patient denies shortness of breath, chest pain, nausea/vomiting, constipation/diarrhea  Vitals: Blood Pressure: (!) 182/80 (10/21/22 1300)  Pulse: 92 (10/21/22 0805)  Temperature: 98 °F (36 7 °C) (10/21/22 0805)  Temp Source: Oral (10/21/22 0805)  Respirations: 18 (10/21/22 0805)  Height: 5' 11" (180 3 cm) (10/10/22 1405)  Weight - Scale: 57 6 kg (127 lb) (10/10/22 1405)  SpO2: 96 % (10/21/22 0805)  Exam:   Physical Exam  Vitals and nursing note reviewed  Constitutional:       General: He is not in acute distress  HENT:      Head: Normocephalic and atraumatic  Nose: Nose normal    Eyes:      General: Lids are normal  Gaze aligned appropriately  Extraocular Movements: Extraocular movements intact  Pupils: Pupils are equal, round, and reactive to light  Cardiovascular:      Rate and Rhythm: Normal rate and regular rhythm  Heart sounds: S1 normal and S2 normal  Heart sounds are distant  Pulmonary:      Effort: Pulmonary effort is normal  No tachypnea, bradypnea or accessory muscle usage  Breath sounds: Examination of the right-upper field reveals rhonchi  Examination of the left-upper field reveals rhonchi  Examination of the right-middle field reveals rhonchi  Examination of the left-middle field reveals rhonchi  Examination of the right-lower field reveals rhonchi  Examination of the left-lower field reveals rhonchi  Rhonchi present  Abdominal:      General: Abdomen is flat  Tenderness: There is no abdominal tenderness  Comments: Sutures more medial than the G tube   Both clean dry and intact    Musculoskeletal:      Right lower leg: No edema  Left lower leg: No edema  Comments: 5/5 strength on the right   0/5 strength on the left   Left lower face    Neurological:      Mental Status: He is alert  Discussion with Family: Case management called and updated the patient's sister        Discharge instructions/Information to patient and family:   See after visit summary for information provided to patient and family  Provisions for Follow-Up Care:  See after visit summary for information related to follow-up care and any pertinent home health orders  Disposition:   Sabiha Beltran at [facility]    Planned Readmission:  None    Discharge Medications:  See after visit summary for reconciled discharge medications provided to patient and/or family        **Please Note: This note may have been constructed using a voice recognition system**

## 2022-10-21 NOTE — PLAN OF CARE
Problem: Potential for Falls  Goal: Patient will remain free of falls  Description: INTERVENTIONS:  - Educate patient/family on patient safety including physical limitations  - Instruct patient to call for assistance with activity   - Consult OT/PT to assist with strengthening/mobility   - Keep Call bell within reach  - Keep bed low and locked with side rails adjusted as appropriate  - Keep care items and personal belongings within reach  - Initiate and maintain comfort rounds  - Make Fall Risk Sign visible to staff  - Apply yellow socks and bracelet for high fall risk patients  - Consider moving patient to room near nurses station  Outcome: Progressing     Problem: MOBILITY - ADULT  Goal: Maintain or return to baseline ADL function  Description: INTERVENTIONS:  -  Assess patient's ability to carry out ADLs; assess patient's baseline for ADL function and identify physical deficits which impact ability to perform ADLs (bathing, care of mouth/teeth, toileting, grooming, dressing, etc )  - Assess/evaluate cause of self-care deficits   - Assess range of motion  - Assess patient's mobility; develop plan if impaired  - Assess patient's need for assistive devices and provide as appropriate  - Encourage maximum independence but intervene and supervise when necessary  - Involve family in performance of ADLs  - Assess for home care needs following discharge   - Consider OT consult to assist with ADL evaluation and planning for discharge  - Provide patient education as appropriate  Outcome: Progressing  Goal: Maintains/Returns to pre admission functional level  Description: INTERVENTIONS:  - Perform BMAT or MOVE assessment daily    - Set and communicate daily mobility goal to care team and patient/family/caregiver     - Collaborate with rehabilitation services on mobility goals if consulted  - Out of bed for toileting  - Record patient progress and toleration of activity level   Outcome: Progressing     Problem: Prexisting or High Potential for Compromised Skin Integrity  Goal: Skin integrity is maintained or improved  Description: INTERVENTIONS:  - Identify patients at risk for skin breakdown  - Assess and monitor skin integrity  - Assess and monitor nutrition and hydration status  - Monitor labs   - Assess for incontinence   - Turn and reposition patient  - Assist with mobility/ambulation  - Relieve pressure over bony prominences  - Avoid friction and shearing  - Provide appropriate hygiene as needed including keeping skin clean and dry  - Evaluate need for skin moisturizer/barrier cream  - Collaborate with interdisciplinary team   - Patient/family teaching  - Consider wound care consult   Outcome: Progressing     Problem: Nutrition/Hydration-ADULT  Goal: Nutrient/Hydration intake appropriate for improving, restoring or maintaining nutritional needs  Description: Monitor and assess patient's nutrition/hydration status for malnutrition  Collaborate with interdisciplinary team and initiate plan and interventions as ordered  Monitor patient's weight and dietary intake as ordered or per policy  Utilize nutrition screening tool and intervene as necessary  Determine patient's food preferences and provide high-protein, high-caloric foods as appropriate       INTERVENTIONS:  - Monitor oral intake, urinary output, labs, and treatment plans  - Assess nutrition and hydration status and recommend course of action  - Evaluate amount of meals eaten  - Assist patient with eating if necessary   - Allow adequate time for meals  - Recommend/ encourage appropriate diets, oral nutritional supplements, and vitamin/mineral supplements  - Order, calculate, and assess calorie counts as needed  - Recommend, monitor, and adjust tube feedings and TPN/PPN based on assessed needs  - Assess need for intravenous fluids  - Provide specific nutrition/hydration education as appropriate  - Include patient/family/caregiver in decisions related to nutrition  Outcome: Progressing     Problem: Neurological Deficit  Goal: Neurological status is stable or improving  Description: Interventions:  - Monitor and assess patient's level of consciousness, motor function, sensory function, and level of assistance needed for ADLs  - Monitor and report changes from baseline  Collaborate with interdisciplinary team to initiate plan and implement interventions as ordered  - Provide and maintain a safe environment  - Consider seizure precautions  - Consider fall precautions  - Consider aspiration precautions  - Consider bleeding precautions  Outcome: Progressing     Problem: Activity Intolerance/Impaired Mobility  Goal: Mobility/activity is maintained at optimum level for patient  Description: Interventions:  - Assess and monitor patient  barriers to mobility and need for assistive/adaptive devices  - Assess patient's emotional response to limitations  - Collaborate with interdisciplinary team and initiate plans and interventions as ordered  - Encourage independent activity per ability   - Maintain proper body alignment  - Perform active/passive rom as tolerated/ordered  - Plan activities to conserve energy   - Turn patient as appropriate  Outcome: Progressing     Problem: Communication Impairment  Goal: Ability to express needs and understand communication  Description: Assess patient's communication skills and ability to understand information  Patient will demonstrate use of effective communication techniques, alternative methods of communication and understanding even if not able to speak  - Encourage communication and provide alternate methods of communication as needed  - Collaborate with case management/ for discharge needs  - Include patient/family/caregiver in decisions related to communication    Outcome: Progressing     Problem: Potential for Aspiration  Goal: Non-ventilated patient's risk of aspiration is minimized  Description: Assess and monitor vital signs, respiratory status, and labs (WBC)  Monitor for signs of aspiration (tachypnea, cough, rales, wheezing, cyanosis, fever)  - Assess and monitor patient's ability to swallow  - Place patient up in chair to eat if possible  - HOB up at 90 degrees to eat if unable to get patient up into chair   - Supervise patient during oral intake  - Instruct patient/ family to take small bites  - Instruct patient/ family to take small single sips when taking liquids  - Follow patient-specific strategies generated by speech pathologist   Outcome: Progressing  Goal: Ventilated patient's risk of aspiration is minimized  Description: Assess and monitor vital signs, respiratory status, airway cuff pressure, and labs (WBC)  Monitor for signs of aspiration (tachypnea, cough, rales, wheezing, cyanosis, fever)  - Elevate head of bed 30 degrees if patient has tube feeding   - Monitor tube feeding  Outcome: Progressing     Problem: Nutrition  Goal: Nutrition/Hydration status is improving  Description: Monitor and assess patient's nutrition/hydration status for malnutrition (ex- brittle hair, bruises, dry skin, pale skin and conjunctiva, muscle wasting, smooth red tongue, and disorientation)  Collaborate with interdisciplinary team and initiate plan and interventions as ordered  Monitor patient's weight and dietary intake as ordered or per policy  Utilize nutrition screening tool and intervene per policy  Determine patient's food preferences and provide high-protein, high-caloric foods as appropriate  - Assist patient with eating   - Allow adequate time for meals   - Encourage patient to take dietary supplement as ordered  - Collaborate with clinical nutritionist   - Include patient/family/caregiver in decisions related to nutrition    Outcome: Progressing

## 2022-10-23 NOTE — PROGRESS NOTES
[facility]  [address] [place] [address]    Nursing Home Admission    NAME: Elena Ortega III  AGE: 63 y o  SEX: male [id number]      Patient Location     [facility]    Patient’s recent vitals, labs and updated medications were reviewed on [facility] system of [facility]  Past Medical, surgical, social, medication and allergy history and patient’s previous records reviewed  Assessment/Plan:    Acute CVA (cerebrovascular accident) [facility]  Patient presented to the hospital with acute left-sided weakness along with left facial droop and dysarthria  Stroke alert was called  Patient was outside the window of thrombolytic therapy  EKG showed sinus tachycardia with nonspecific ST abnormality  CT brain revealed acute subcortical infarct in the right periventricular white matter corona radiata and lentiform nuclei  CTA showed subsegmental, critical greater than 90% stenosis in the proximal left cervical ICA  There was no evidence of acute thrombus or large vessel occlusion of the major vessels  MRI brain showed stable acute to early subacute infarct in the right periventricular white matter corona radiata and lentiform nuclei  There was no hemorrhagic conversion or mass effect  Patient was also noted to have acute lacunar  Infarct in left thalamus  Echo revealed moderate concentric hypertrophy with EF of 55%, mild tricuspid regurgitation  Patient was treated with aspirin Plavix and statin  Underwent PEG tube placement for dysphagia related to CVA  Outpatient follow-up with vascular service was recommended regarding related artery stenosis    Aspiration into respiratory tract:  Recent CTA showed evidence of aspiration pneumonia  WBC count upon admission was 18,000 procalcitonin was elevated  Patient was treated with ceftriaxone and nebulizer treatments  Remains afebrile  SaO2 stable at 97% on room air    Currently off antibiotics    Dysphagia:  Status post recent CVA  Patient underwent PEG tube placement  Tolerating tube feeds well    Hyponatremia:  Patient was noted to have hypoosmolar hyponatremia suspected to be from SIADH from CVA during recent hospitalization  Also had complement of volume depletion  Sodium level upon admission was 119 , improved to 133 with normal saline subsequently platued between 128-132  Recent TSH and cortisone were normal   Recommendations were to avoid free water flushes, use saline flushes instead  Most recent sodium was stable at 131  Follow-up repeat BMP  If Sodium level drops further can consider starting salt tablets    Hypertension:  Blood pressure stable on amlodipine 5 mg daily and metoprolol 25 mg q 12 hours    Hypomagnesemia:  Noted during recent hospitalization subsequently resolved      Hypokalemia:   K level was noted to be low at 2 6 in the hospital potassium was supplemented  Follow-up repeat BMP    BIPIN:  Creatinine was noted to be high at 1 9 upon admission suspected to be from prerena stay due to volume depletion  Renal function improved with hydration follow-up repeat BMP avoid hypotension and nephrotoxins    Anemia:  Recent hemoglobin was stable at 10 3  Follow-up repeat CBC    Alcohol abuse:  History of alcohol abuse  Per records patient was consuming 4-6 drinks per day   regarding alcohol cessation    Mild protein calorie malnutrition:  Patient is noted to be weak and frail generalized body muscle wasting noted on exam   BMI was less than 18 5  Patient underwent PEG tube placement recently  Continue tube feeds    Left carotid artery stenosis:  Recent CTA revealed greater than 90% stenosis in the proximal left cervical ICA  Carotid duplex study revealed greater than 70% heterogeneous and irregular occlusion of left ICA and less than 50% occlusion of right ICA  Follow-up with vascular service    Continue statin and aspirin      Chief Complaint     Acute CVA    HPI       Patient is a 63 y o  male with past medical history significant for hypertension, anemia and moderate protein calorie malnutrition  Patient was hospitalized on 10/09/2022 due to left-sided upper and lower extremity weakness associated with facial droop and dysarthria  Stroke alert was called  Thrombolytics were not given due to being outside of the window of symptoms  rapy  EKG showed sinus tachycardia with nonspecific ST abnormality  CT brain revealed acute subcortical infarct in the right periventricular white matter corona radiata and lentiform nuclei  CTA showed subsegmental, critical greater than 90% stenosis in the proximal left cervical ICA  MRI showed acute left thalamic stroke and stable acute/early subacute  periventricular stroke  Patient was seen by neurology service, started on aspirin Plavix and statin  Subsequently underwent PEG tube placement    Further workup in the hospital revealed evidence of hyponatremia and  BIPIN  He was seen in consultation by Nephrology service  Hyponatremia was attributed to both SIADH and prerenal state  Renal function improved with hydration  Patient was additionally noted to have pneumonia suspected to be aspiration related  Completed antibiotic course prior to discharge  Patient was later transferred to [facility] rehab where he is being seen for post hospital admission    At the time of my evaluation patient is doing okay  PMH:  Hypertension  Anemia  Moderate calorie malnutrition    Past Surgical History:   Procedure Laterality Date   • GASTROSTOMY TUBE PLACEMENT N/A 10/18/2022    Procedure: INSERTION GASTROSTOMY TUBE OPEN;  Surgeon: June Yanez DO;  [facility]: [facility];  Service: General       Social History     Tobacco Use   Smoking Status Current Every Day Smoker   • Types: Cigarettes   Smokeless Tobacco Never Used     Family history:  Noncontributory    No Known Allergies       Current Outpatient Medications:   • acetaminophen (TYLENOL) 325 mg tablet, 3 tablets (975 mg total) by Per G Tube route every 8 (eight) hours, Disp: 180 tablet, Rfl: 0  •  albuterol (2 5 mg/3 mL) 0 083 % nebulizer solution, Take 3 mL (2 5 mg total) by nebulization every 6 (six) hours as needed for wheezing or shortness of breath, Disp: 60 mL, Rfl: 0  •  amLODIPine (NORVASC) 5 mg tablet, 1 tablet (5 mg total) by Per G Tube route daily, Disp: 90 tablet, Rfl: 0  •  aspirin (ECOTRIN LOW STRENGTH) 81 mg EC tablet, Take 1 tablet (81 mg total) by mouth daily, Disp: 90 tablet, Rfl: 0  •  atorvastatin (LIPITOR) 40 mg tablet, Take 1 tablet (40 mg total) by mouth daily with dinner, Disp: 90 tablet, Rfl: 0  •  clopidogrel (PLAVIX) 75 mg tablet, Take 1 tablet (75 mg total) by mouth daily Do not start before October 22, 2022 , Disp: 20 tablet, Rfl: 0  •  metoprolol tartrate (LOPRESSOR) 25 mg tablet, 1 tablet (25 mg total) by Per G Tube route every 12 (twelve) hours, Disp: 180 tablet, Rfl: 0    Updated list was reviewed in [facility] system of facility  Vitals:    10/24/22 1234   BP: 158/80   Pulse: 90   Resp: 18   Temp: 97 8 °F (36 6 °C)   SpO2: 97%       Vital signs were reviewed in [facility] care    Review of Systems   Constitutional: Negative for chills, fatigue and fever  HENT: Positive for trouble swallowing (Status post PEG)  Negative for nosebleeds and rhinorrhea  Eyes: Negative for discharge and redness  Respiratory: Negative for cough, shortness of breath, wheezing and stridor  Cardiovascular: Negative for chest pain and leg swelling  Gastrointestinal: Negative for abdominal distention, abdominal pain, diarrhea and vomiting  Genitourinary: Negative for hematuria  Musculoskeletal: Positive for gait problem  Negative for arthralgias and back pain  Skin: Negative for pallor  Neurological: Positive for speech difficulty and weakness (Left-sided)  Negative for tremors, seizures and syncope     Psychiatric/Behavioral: Negative for agitation and behavioral problems  Physical Exam  Constitutional:       General: He is not in acute distress  Appearance: He is ill-appearing  HENT:      Head: Normocephalic and atraumatic  Eyes:      General: No scleral icterus  Right eye: No discharge  Left eye: No discharge  Cardiovascular:      Rate and Rhythm: Normal rate and regular rhythm  Pulmonary:      Breath sounds: No wheezing, rhonchi or rales  Abdominal:      Palpations: Abdomen is soft  Tenderness: There is no abdominal tenderness  There is no guarding  Comments: PEG tube in place  Midline linear vertical abdominal incision healing well   Musculoskeletal:      Right lower leg: No edema  Left lower leg: No edema  Skin:     Coloration: Skin is not jaundiced  Findings: Bruising (Ecchymosis involving upper extremity) present  Comments: Left elbow skin tear covered with dressing   Neurological:      Cranial Nerves: No cranial nerve deficit  Motor: Weakness (Left-sided) present  Comments: Oriented in month and year  Dysarthric speech   Psychiatric:         Mood and Affect: Mood normal          Behavior: Behavior normal            Diagnostic Data       Recent labs and imaging studies were reviewed    Lab Results   Component Value Date    SODIUM 138 10/21/2022    K 3 6 10/21/2022     10/21/2022    CO2 25 10/21/2022    BUN 9 10/21/2022    CREATININE 0 45 (L) 10/21/2022    GLUC 96 10/21/2022    CALCIUM 8 4 10/21/2022      Lab Results   Component Value Date    WBC 8 02 10/21/2022    HGB 10 1 (L) 10/21/2022    HCT 31 7 (L) 10/21/2022    MCV 95 10/21/2022     10/21/2022         Code Status:      Full code               This note was electronically signed by Dr Farida Davila

## 2022-10-24 ENCOUNTER — NURSING HOME VISIT (OUTPATIENT)
Dept: GERIATRICS | Facility: OTHER | Age: 62
End: 2022-10-24
Payer: COMMERCIAL

## 2022-10-24 ENCOUNTER — TELEPHONE (OUTPATIENT)
Dept: NEPHROLOGY | Facility: CLINIC | Age: 62
End: 2022-10-24

## 2022-10-24 VITALS
RESPIRATION RATE: 18 BRPM | DIASTOLIC BLOOD PRESSURE: 80 MMHG | HEART RATE: 90 BPM | SYSTOLIC BLOOD PRESSURE: 158 MMHG | OXYGEN SATURATION: 97 % | TEMPERATURE: 97.8 F

## 2022-10-24 DIAGNOSIS — I63.9 ACUTE CVA (CEREBROVASCULAR ACCIDENT) (HCC): Primary | ICD-10-CM

## 2022-10-24 PROCEDURE — 99306 1ST NF CARE HIGH MDM 50: CPT | Performed by: INTERNAL MEDICINE

## 2022-10-24 NOTE — TELEPHONE ENCOUNTER
TYSON Francois at [facility] to schedule patient for 2-3 week hospital f/u  Patient is not established with any provider

## 2022-10-24 NOTE — ASSESSMENT & PLAN NOTE
Patient presented to the hospital with acute left-sided weakness along with left facial droop and dysarthria  Stroke alert was called  Patient was outside the window of thrombolytic therapy  EKG showed sinus tachycardia with nonspecific ST abnormality  CT brain revealed acute subcortical infarct in the right periventricular white matter corona radiata and lentiform nuclei  CTA showed subsegmental, critical greater than 90% stenosis in the proximal left cervical ICA  There was no evidence of acute thrombus or large vessel occlusion of the major vessels  MRI brain showed stable acute to early subacute infarct in the right periventricular white matter corona radiata and lentiform nuclei  There was no hemorrhagic conversion or mass effect  Patient was also noted to have acute lacunar  Infarct in left thalamus  Echo revealed moderate concentric hypertrophy with EF of 55%, mild tricuspid regurgitation  Patient was treated with aspirin Plavix and statin  Underwent PEG tube placement for dysphagia related to CVA    Outpatient follow-up with vascular service was recommended regarding related artery stenosis

## 2022-10-25 ENCOUNTER — HOSPITAL ENCOUNTER (EMERGENCY)
Facility: HOSPITAL | Age: 62
Discharge: HOME | End: 2022-10-25
Attending: EMERGENCY MEDICINE
Payer: COMMERCIAL

## 2022-10-25 ENCOUNTER — TELEPHONE (OUTPATIENT)
Dept: OTHER | Facility: OTHER | Age: 62
End: 2022-10-25

## 2022-10-25 ENCOUNTER — APPOINTMENT (EMERGENCY)
Dept: RADIOLOGY | Facility: HOSPITAL | Age: 62
End: 2022-10-25
Payer: COMMERCIAL

## 2022-10-25 VITALS
RESPIRATION RATE: 18 BRPM | DIASTOLIC BLOOD PRESSURE: 91 MMHG | HEART RATE: 117 BPM | SYSTOLIC BLOOD PRESSURE: 131 MMHG | WEIGHT: 127 LBS | TEMPERATURE: 98.2 F | OXYGEN SATURATION: 99 % | BODY MASS INDEX: 17.71 KG/M2

## 2022-10-25 DIAGNOSIS — T85.528A DISLODGED GASTROSTOMY TUBE: Primary | ICD-10-CM

## 2022-10-25 PROCEDURE — 74018 RADEX ABDOMEN 1 VIEW: CPT

## 2022-10-25 RX ADMIN — IOHEXOL 60 ML: 240 INJECTION, SOLUTION INTRATHECAL; INTRAVASCULAR; INTRAVENOUS; ORAL at 22:12

## 2022-10-26 ENCOUNTER — NURSING HOME VISIT (OUTPATIENT)
Dept: GERIATRICS | Facility: OTHER | Age: 62
End: 2022-10-26

## 2022-10-26 DIAGNOSIS — E87.1 HYPONATREMIA: ICD-10-CM

## 2022-10-26 DIAGNOSIS — E87.6 HYPOKALEMIA: ICD-10-CM

## 2022-10-26 DIAGNOSIS — E44.0 MODERATE PROTEIN-CALORIE MALNUTRITION (HCC): Primary | ICD-10-CM

## 2022-10-26 DIAGNOSIS — I63.9 ACUTE CVA (CEREBROVASCULAR ACCIDENT) (HCC): ICD-10-CM

## 2022-10-26 DIAGNOSIS — F41.9 ANXIETY: ICD-10-CM

## 2022-10-26 NOTE — ED PROVIDER NOTES
History  Chief Complaint   Patient presents with   • Medical Problem     Pt presents to the ed via ems after pulling peg tube out at nursing home     Patient is a 37-year-old male seen in the emergency department with concern for dislodged feeding tube  Per EMS, staff explained that patient pulled G-tube out at nursing facility  Patient has no other acute medical complaints in the emergency department  Patient notes no abdominal pain, nausea, vomiting, diarrhea, fever, weakness  Prior to Admission Medications   Prescriptions Last Dose Informant Patient Reported? Taking?   acetaminophen (TYLENOL) 325 mg tablet   No No   Sig: 3 tablets (975 mg total) by Per G Tube route every 8 (eight) hours   albuterol (2 5 mg/3 mL) 0 083 % nebulizer solution   No No   Sig: Take 3 mL (2 5 mg total) by nebulization every 6 (six) hours as needed for wheezing or shortness of breath   amLODIPine (NORVASC) 5 mg tablet   No No   Si tablet (5 mg total) by Per G Tube route daily   aspirin (ECOTRIN LOW STRENGTH) 81 mg EC tablet   No No   Sig: Take 1 tablet (81 mg total) by mouth daily   atorvastatin (LIPITOR) 40 mg tablet   No No   Sig: Take 1 tablet (40 mg total) by mouth daily with dinner   clopidogrel (PLAVIX) 75 mg tablet   No No   Sig: Take 1 tablet (75 mg total) by mouth daily Do not start before 2022  metoprolol tartrate (LOPRESSOR) 25 mg tablet   No No   Si tablet (25 mg total) by Per G Tube route every 12 (twelve) hours      Facility-Administered Medications: None       Past Medical History:   Diagnosis Date   • Hypertension    • Renal disorder    • Stroke [facility])        Past Surgical History:   Procedure Laterality Date   • GASTROSTOMY TUBE PLACEMENT N/A 10/18/2022    Procedure: INSERTION GASTROSTOMY TUBE OPEN;  Surgeon: June Yanez DO;  [facility];  Service: General       History reviewed  No pertinent family history    I have reviewed and agree with the history as documented  E-Cigarette/Vaping   • E-Cigarette Use Never User      E-Cigarette/Vaping Substances     Social History     Tobacco Use   • Smoking status: Current Every Day Smoker     Packs/day: 1 00     Types: Cigarettes   • Smokeless tobacco: Never Used   Vaping Use   • Vaping Use: Never used   Substance Use Topics   • Alcohol use: Not Currently     Comment: occasionally   • Drug use: Never       Review of Systems   Constitutional: Negative for chills and fever  HENT: Negative for ear pain and sore throat  Eyes: Negative for pain and visual disturbance  Respiratory: Negative for cough and shortness of breath  Cardiovascular: Negative for chest pain and palpitations  Gastrointestinal: Negative for abdominal pain and vomiting  Dislodged G-tube   Genitourinary: Negative for decreased urine volume, difficulty urinating and hematuria  Musculoskeletal: Negative for arthralgias and back pain  Skin: Negative for color change and rash  Neurological: Negative for seizures and syncope  Psychiatric/Behavioral: Negative for agitation  The patient is not nervous/anxious  All other systems reviewed and are negative  Physical Exam  Physical Exam  Vitals and nursing note reviewed  Constitutional:       General: He is not in acute distress  Appearance: He is well-developed  HENT:      Head: Normocephalic and atraumatic  Right Ear: External ear normal       Left Ear: External ear normal       Nose: Nose normal       Mouth/Throat:      Pharynx: Oropharynx is clear  Eyes:      General: No scleral icterus  Conjunctiva/sclera: Conjunctivae normal    Cardiovascular:      Rate and Rhythm: Tachycardia present  Comments: well-perfused extremities  Pulmonary:      Effort: Pulmonary effort is normal  No respiratory distress  Breath sounds: Normal breath sounds  Abdominal:      Palpations: Abdomen is soft  Tenderness: There is no abdominal tenderness        Comments: Partially dislodged G-tube noted in left upper quadrant   Musculoskeletal:         General: No swelling or deformity  Cervical back: Normal range of motion and neck supple  Skin:     General: Skin is warm and dry  Neurological:      Mental Status: He is alert  Cranial Nerves: No cranial nerve deficit  Sensory: No sensory deficit  Psychiatric:         Mood and Affect: Mood normal          Thought Content: Thought content normal          Vital Signs  ED Triage Vitals   Temperature Pulse Respirations Blood Pressure SpO2   10/25/22 2016 10/25/22 2015 10/25/22 2015 10/25/22 2015 10/25/22 2015   98 2 °F (36 8 °C) (!) 117 18 131/91 99 %      Temp Source Heart Rate Source Patient Position - Orthostatic VS BP Location FiO2 (%)   10/25/22 2016 10/25/22 2015 10/25/22 2015 10/25/22 2015 --   Oral Monitor Lying Right arm       Pain Score       --                  Vitals:    10/25/22 2015   BP: 131/91   Pulse: (!) 117   Patient Position - Orthostatic VS: Lying         Visual Acuity      ED Medications  Medications   iohexol (OMNIPAQUE) 240 MG/ML solution 50 mL (60 mL Oral Given 10/25/22 2212)       Diagnostic Studies  Results Reviewed     None                 XR abdomen 1 view kub   ED Interpretation by Lulu Beltrán MD (10/25 2206)   G-tube in place, no extravasation      Final Result by Jason Acosta MD (10/25 2218)      1  Gastrostomy tube tip in stomach   2  Left basal airspace opacities      Workstation performed: [id number]                    Procedures  Feeding Tube    Date/Time: 10/25/2022 10:26 PM  Performed by: Lulu Beltrán MD  Authorized by: Lulu Beltrán MD     Patient location:  ED  Pre-procedure details:      Old tube type:  Gastrostomy    Old tube size:  18 Fr  Indications:     Indications: tube dislodged    Procedure details:     Tube size:  18 Fr    Bulb inflation volume:  8 mL    Bulb inflation fluid:  Normal saline  Post-procedure details:     Placement/position confirmation: X-ray (gastric contents)    Patient tolerance of procedure: Tolerated well, no immediate complications             ED Course  ED Course as of 10/25/22 2237   Tue Oct 25, 2022   2232 ABDOMEN     INDICATION:   confirm G-tube placement      COMPARISON:  CT 10/17/22     VIEWS:  AP supine        FINDINGS:  Gastrostomy tube tip within stomach, with contrast pooling in the fundus  Left basal airspace opacities  Nonspecific bowel gas pattern   No acute osseous abnormality           IMPRESSION:     1   Gastrostomy tube tip in stomach  2  Left basal airspace opacities                               SBIRT 20yo+    Flowsheet Row Most Recent Value   SBIRT (25 yo +)    In order to provide better care to our patients, we are screening all of our patients for alcohol and drug use  Would it be okay to ask you these screening questions? No Filed at: 10/25/2022 2102                    MDM  Number of Diagnoses or Management Options  Dislodged gastrostomy tube  Diagnosis management comments: Patient is a 32-year-old male seen in the emergency department with concern for dislodged G-tube  Case was reviewed with surgery(Dr Jb Nguyen)  G-tube was replaced in the emergency department  X-ray abdomen showed G-tube apparently in correct position  Patient denies cough, and there is not clinical concern for pneumonia  Plan to have patient follow up with PCP/outpatient providers  Patient stable for discharge  Discharge instructions were reviewed with patient         Amount and/or Complexity of Data Reviewed  Tests in the radiology section of CPT®: ordered and reviewed        Disposition  Final diagnoses:   Dislodged gastrostomy tube     Time reflects when diagnosis was documented in both MDM as applicable and the Disposition within this note     Time User Action Codes Description Comment    10/25/2022  8:20 PM Manuel Rubi Add [[id number]] Dislodged gastrostomy tube       ED Disposition     ED Disposition   Discharge    Condition   Stable Date/Time   Tue Oct 25, 2022 10:07 PM    Comment   Edward Zhaonayan III discharge to home/self care  Follow-up Information     Follow up With Specialties Details Why Contact Info Additional Information    Your primary doctor  Call in 1 day       [place] Call  As needed [address] [id number]  [address] , [place], [place], [address] [place] [address],  General Surgery Call  As needed(surgery) [address] [place] [address]  [phone]             Patient's Medications   Discharge Prescriptions    No medications on file       No discharge procedures on file      PDMP Review     None          ED Provider  Electronically Signed by           Dee Richards MD  10/25/22 2228       Dee Richards MD  10/25/22 2236

## 2022-10-26 NOTE — DISCHARGE INSTRUCTIONS
Follow up with your primary doctor/outpatient providers, and return to the emergency department for new or worsening symptoms  ABDOMEN     INDICATION:   confirm G-tube placement  COMPARISON:  CT 10/17/22     VIEWS:  AP supine        FINDINGS:  Gastrostomy tube tip within stomach, with contrast pooling in the fundus  Left basal airspace opacities  Nonspecific bowel gas pattern   No acute osseous abnormality           IMPRESSION:     1  Gastrostomy tube tip in stomach  2    Left basal airspace opacities

## 2022-10-26 NOTE — ED NOTES
Report called to nursing home  [facility] called at this time for transport back to [facility] JOLANTA Donald  10/25/22 3962

## 2022-10-27 NOTE — PROGRESS NOTES
[address], [address] [place] [address], [address] [place] [address]  [phone]    NAME: Simran Ortiz III  AGE: 63 y o  SEX: male    Progress Note    Location: [facility]  POS: 31 (SNF)     Patient's care was coordinated with nursing facility staff  Recent vitals, labs, and updated medications were review on [facility] system in facility  Assessment/Plan:    Anxiety  Nursing reporting patient anxious with breathlessness pull PEG tube early in the morning  Sent to your for placement  And now in facility continues with trying to pull PEG tube  Start lorazepam 0 5 mg q 6 hours p r n  for anxiety and restlessness  Binder placed to prevent patient from pulling PEG to  Pleasantly confused continue delirium protocol   Patient with no history of EtOH abuse  Continue 24-7 supportive care  Monitor    Moderate protein-calorie malnutrition ([facility] [address] )  Malnutrition Findings:     abdominal nutrition types: Acute illness related to alcohol abuse  Noted to be weak and frail with generalized body muscle wasting on exam today  BMI was less than 18 5  Abdominal degree of malnutrition: Malnutrition of moderate degree   My nutrition gross wrist: Muscle loss, inadequate energy On 10/18/2022- PEG tube placed  Maintain head of bed elevated  Tolerating tube feeds  Continue with aspiration precautions  Remains NPO  Dietitian following  Continue daily multivitamins and supplements  With known history of EtOH abuse, started on folic acid and thiamine   Continue to monitor weights, I&Os  Continue supportive care     Acute CVA (cerebrovascular accident) ([address] )  During recent hospitalization found with acute left-sided weakness along with left facial droop and dysarthria  Was not a candidate for thrombolytic therapy  EKG shows sinus tachycardia with nonspecific ST abnormality  CT brain revealed acute subcortical infarct in the right periventricular white matter corona radiata and lentiform no coli    CTA show subsegmental, critical greater than 90% stenosis in the proximal left cervical ICA  Negative for acute thrombus or large vessel occlusion of the major vessel  MRI brain showed stable acute to early subacute infarct in the right periventricular white matter corona radiata and lentiform nuclei  No hemorrhagic conversion or mass effect  Noted to have acute lacunar infarct in left thalamus  Echo revealed moderate concentric hypertrophy with ejection fraction of 55%, mild tricuspid regurgitation  Patient was started aspirin, Plavix and statin  Or regional patient had NG tube however self removed x2  And on 10/18 PEG tube was placed for dysphagia secondary to CVA  Outpatient follow-up with vascular Service was recommended related to artery stenosis  Patient continue with restlessness and this morning self remove PEG tube  Was sent to ER for placement  Patient was sent back to facility  Peg tube patent and tolerating tube feeds  Continue 24-7 supportive care   Continue PT/OT   Fall and safety precaution   Patient with no history of EtOH abuse  Currently pleasantly confused  Appears anxious  Afebrile, nontoxic and vital signs stable  Continue delirium protocol  Continue to monitor    Hyponatremia  Current Na 134  Asymptomatic  Remains pleasantly confused  With known history of hypo-osmolar hyponatremia suspected to be from SIADH from CVA during recent hospitalization  Additionally had complement of volume depletion  During recent hospitalizations sodium level was 119, subsequently improved to 133 with normal saline subsequently blood 2 between 128-132  Nephrology was consulted and received 1 dose of Lasix on 10/17  Free flushes were adjusted to 50 mL q 6 hours  Renal ultrasound normal  Recent sodium 134  Dietitian following to adjust formulary of tube feeds to increase sodium in content     Continue to monitor BMP  Next BMP 11/09/2022    Ambulatory dysfunction  Multifactorial  Recently had CVA, and history of EtOH abuse  Continue PT/OT   Continue 24-7 supportive care  Continues fall and safety precaution  Recommend patient to be near nursing station for safety  [facility]  Optimize acute and chronic medical condition as outlined  PT/OT/, dietitian following   following for DC planning    Hypokalemia  Resolved, current K 4 1  Continue to monitor BMP periodically  Next BMP 11/09/2022    Anemia  Could be chronic in nature due to ETOH abuse   Recent hemoglobin 7 2, during recent hospitalization was noted to be 26 6  Added folic acid, liquid multivitamin  Consulted dietitian for evaluation and treatment, appreciate recommendations  Patient continues with tube feeds and tolerating  No N/V/D/C, NT/ND, + BS abd soft  Continue to monitor H&H  Next CBC 11/09/2022    Chief complaint / Reason for visit: STR F/U    History of Present Illness:  66-year old male seen and examined for STR f/u  Received patient lying in bed HOB elevated and resting  Pleasantly confused at baseline  Poor historian  Appears restless and anxious  Per nursing patient has been restless and self-pulled PEG tube early this morning  He was sent to the ER and came back to facility after placement  Peg tube appears patent per nursing  Area is clean, dry with no erythema or drainage around the site  Now binder placed for safety  Midline incision is clean dry and intact no obvious sign of infection  Patient with known h/o ETOH abuse  Will start him on lorazepam 0 5 mg every 6 hours PRN for anxiety/restless  Also added folic acid and thiamine  His blood work revealed anemia of chronic disease will add daily liquid MVI  Consult dietitian to evaluate and treat  Remains NPO  BLE no edema present  Afebrile, VSS and non toxic  Nursing reports no /GI discomfort  Tolerating tube feeds with no reportable problems  Per nursing no acute concerns or issues at this time       Review of Systems:  Per history of present illness, all other systems reviewed and negative  Other than those noted in HPI    HISTORY:  Medical Hx: Reviewed, unchanged  Family Hx: Reviewed, unchanged  Soc Hx: Reviewed,  unchanged    ALLERGY: Reviewed, unchanged  No Known Allergies     PHYSICAL EXAM:  Vital Signs:  Blood pressure 140/64, pulse 74, respiration 18, temperature 97 1 O2 sats 98% room air  Weight:  120 8 lb    General:  Appears with generalized weakness, frail, deconditioned and cachetic   Head: Atraumatic  Normocephalic  Eye Exam: anicteric sclera, no discharge, PERRLA, No injection  Oral Exam: moist mucous membranes, no buccaloropharyngeal erythema, palatine tonsils WNL  Neck Exam: no anterior cervical lymphadenopathy noted, neck supple  Cardiovascular: regular rate, regular rhythm, no murmurs, rubs, or gallops  Pulmonary: no wheeze, no rhonchi, no rales  No chest tenderness  Abdominal: soft, non-tender, nondistended, bowel sounds audible x 4 quadrants  Left side PEG tube patent   : Non distended bladder  B/B incontinent  Extremities and skin: no edema noted, no rashes  Midline incision C/D/I no erythema/drainage no obvious sign of infection   Neurological: Pleasantly confused, restless, anxious, combative at times, moving all 4 extremities symmetrically    Laboratory results / Imaging reviewed: Hard copy/ies in medical chart:    CBC-BMP reviewed from 10/26/2022  Hemoglobin 7 2, hematocrit 21 1, WBC 7 2, platelets 427, blood glucose 109, BUN 40, creatinine 0 55, sodium 134, potassium 4 1, calcium 8 7, EGFR 114    Current Medications: All medications reviewed and updated in Nursing Home Chart reviewed from [facility]    Please note:  Voice-recognition software may have been used in the preparation of this document  Occasional wrong word or "sound-alike" substitutions may have occurred due to the inherent limitations of voice recognition software  Interpretation should be guided by context      Karen Moulton  10/26/2022

## 2022-10-28 ENCOUNTER — NURSING HOME VISIT (OUTPATIENT)
Dept: GERIATRICS | Facility: OTHER | Age: 62
End: 2022-10-28

## 2022-10-28 DIAGNOSIS — F41.9 ANXIETY: Chronic | ICD-10-CM

## 2022-10-28 DIAGNOSIS — D64.9 ANEMIA, UNSPECIFIED TYPE: Chronic | ICD-10-CM

## 2022-10-28 DIAGNOSIS — R26.2 AMBULATORY DYSFUNCTION: Chronic | ICD-10-CM

## 2022-10-28 DIAGNOSIS — E44.0 MODERATE PROTEIN-CALORIE MALNUTRITION (HCC): Primary | Chronic | ICD-10-CM

## 2022-10-28 RX ORDER — POLYETHYLENE GLYCOL 3350 17 G/17G
17 POWDER, FOR SOLUTION ORAL 2 TIMES DAILY
COMMUNITY

## 2022-10-28 RX ORDER — FOLIC ACID 1 MG/1
1 TABLET ORAL DAILY
COMMUNITY

## 2022-10-28 RX ORDER — LANOLIN ALCOHOL/MO/W.PET/CERES
100 CREAM (GRAM) TOPICAL DAILY
COMMUNITY

## 2022-10-28 RX ORDER — CALCIUM CARB/VITAMIN D3/VIT K1 500-500-40
15 TABLET,CHEWABLE ORAL DAILY
COMMUNITY

## 2022-10-28 RX ORDER — LORAZEPAM 0.5 MG/1
0.5 TABLET ORAL EVERY 6 HOURS PRN
COMMUNITY

## 2022-10-28 NOTE — ASSESSMENT & PLAN NOTE
Malnutrition Findings:     abdominal nutrition types: Acute illness related to alcohol abuse  Noted to be weak and frail with generalized body muscle wasting on exam today  BMI was less than 18 5  Abdominal degree of malnutrition: Malnutrition of moderate degree   My nutrition gross wrist: Muscle loss, inadequate energy      On 10/18/2022- PEG tube placed  Maintain head of bed elevated  Tolerating tube feeds  Continue with aspiration precautions  Remains NPO  Dietitian following  Continue daily multivitamins and supplements  With known history of EtOH abuse, started on folic acid and thiamine   Continue to monitor weights, I&Os  Continue supportive care                         BMI Findings: There is no height or weight on file to calculate BMI

## 2022-10-28 NOTE — ASSESSMENT & PLAN NOTE
During recent hospitalization found with acute left-sided weakness along with left facial droop and dysarthria  Was not a candidate for thrombolytic therapy  EKG shows sinus tachycardia with nonspecific ST abnormality  CT brain revealed acute subcortical infarct in the right periventricular white matter corona radiata and lentiform no coli  CTA show subsegmental, critical greater than 90% stenosis in the proximal left cervical ICA  Negative for acute thrombus or large vessel occlusion of the major vessel  MRI brain showed stable acute to early subacute infarct in the right periventricular white matter corona radiata and lentiform nuclei  No hemorrhagic conversion or mass effect  Noted to have acute lacunar infarct in left thalamus  Echo revealed moderate concentric hypertrophy with ejection fraction of 55%, mild tricuspid regurgitation  Patient was started aspirin, Plavix and statin  Or regional patient had NG tube however self removed x2  And on 10/18 PEG tube was placed for dysphagia secondary to CVA  Outpatient follow-up with vascular Service was recommended related to artery stenosis  Patient continue with restlessness and this morning self remove PEG tube  Was sent to ER for placement  Patient was sent back to facility  Peg tube patent and tolerating tube feeds  Continue 24-7 supportive care   Continue PT/OT   Fall and safety precaution   Patient with no history of EtOH abuse  Currently pleasantly confused  Appears anxious  Afebrile, nontoxic and vital signs stable      Continue delirium protocol  Continue to monitor

## 2022-10-28 NOTE — ASSESSMENT & PLAN NOTE
Multifactorial  Recently had CVA, and history of EtOH abuse  Continue PT/OT   Continue 24-7 supportive care  Continues fall and safety precaution  Recommend patient to be near nursing station for safety  Nursing to [facility] hourly rounding  Optimize acute and chronic medical condition as outlined  PT/OT/, dietitian following   following for DC planning

## 2022-10-28 NOTE — ASSESSMENT & PLAN NOTE
Could be chronic in nature due to ETOH abuse   Recent hemoglobin 7 2, during recent hospitalization was noted to be 49 9  Added folic acid, liquid multivitamin  Consulted dietitian for evaluation and treatment, appreciate recommendations  Patient continues with tube feeds and tolerating  No N/V/D/C, NT/ND, + BS abd soft  Continue to monitor H&H  Next CBC 11/09/2022

## 2022-10-28 NOTE — ASSESSMENT & PLAN NOTE
Nursing reporting patient anxious with breathlessness pull PEG tube early in the morning  Sent to your for placement  And now in facility continues with trying to pull PEG tube    Start lorazepam 0 5 mg q 6 hours p r n  for anxiety and restlessness  Binder placed to prevent patient from pulling PEG to  Pleasantly confused continue delirium protocol   Patient with no history of EtOH abuse  Continue 24-7 supportive care  Monitor

## 2022-10-28 NOTE — ASSESSMENT & PLAN NOTE
Current Na 134  Asymptomatic  Remains pleasantly confused  With known history of hypo-osmolar hyponatremia suspected to be from SIADH from CVA during recent hospitalization  Additionally had complement of volume depletion  During recent hospitalizations sodium level was 119, subsequently improved to 133 with normal saline subsequently blood 2 between 128-132  Nephrology was consulted and received 1 dose of Lasix on 10/17  Free flushes were adjusted to 50 mL q 6 hours  Renal ultrasound normal  Recent sodium 134  Dietitian following to adjust formulary of tube feeds to increase sodium in content     Continue to monitor BMP  Next BMP 11/09/2022

## 2022-10-30 ENCOUNTER — APPOINTMENT (EMERGENCY)
Dept: RADIOLOGY | Facility: HOSPITAL | Age: 62
End: 2022-10-30

## 2022-10-30 ENCOUNTER — TELEPHONE (OUTPATIENT)
Dept: OTHER | Facility: OTHER | Age: 62
End: 2022-10-30

## 2022-10-30 ENCOUNTER — HOSPITAL ENCOUNTER (INPATIENT)
Facility: HOSPITAL | Age: 62
LOS: 1 days | End: 2022-10-31
Attending: EMERGENCY MEDICINE | Admitting: INTERNAL MEDICINE

## 2022-10-30 DIAGNOSIS — E87.1 HYPONATREMIA: ICD-10-CM

## 2022-10-30 DIAGNOSIS — K92.1 MELANOTIC STOOLS: ICD-10-CM

## 2022-10-30 DIAGNOSIS — T85.598A FEEDING TUBE DYSFUNCTION, INITIAL ENCOUNTER: Primary | ICD-10-CM

## 2022-10-30 DIAGNOSIS — K92.1 MELENA: ICD-10-CM

## 2022-10-30 PROBLEM — Z86.73 HISTORY OF CVA (CEREBROVASCULAR ACCIDENT): Status: ACTIVE | Noted: 2022-10-30

## 2022-10-30 PROBLEM — I10 PRIMARY HYPERTENSION: Status: ACTIVE | Noted: 2022-10-30

## 2022-10-30 PROBLEM — K94.23 GASTROSTOMY TUBE DYSFUNCTION (HCC): Status: ACTIVE | Noted: 2022-10-30

## 2022-10-30 LAB
ABO GROUP BLD: NORMAL
ABO GROUP BLD: NORMAL
ALBUMIN SERPL BCP-MCNC: 3.6 G/DL (ref 3.5–5)
ALP SERPL-CCNC: 85 U/L (ref 34–104)
ALT SERPL W P-5'-P-CCNC: 18 U/L (ref 7–52)
ANION GAP SERPL CALCULATED.3IONS-SCNC: 7 MMOL/L (ref 4–13)
APTT PPP: 28 SECONDS (ref 23–37)
AST SERPL W P-5'-P-CCNC: 16 U/L (ref 13–39)
BASOPHILS # BLD AUTO: 0.04 THOUSANDS/ÂΜL (ref 0–0.1)
BASOPHILS NFR BLD AUTO: 1 % (ref 0–1)
BILIRUB SERPL-MCNC: 0.42 MG/DL (ref 0.2–1)
BLD GP AB SCN SERPL QL: NEGATIVE
BUN SERPL-MCNC: 14 MG/DL (ref 5–25)
CALCIUM SERPL-MCNC: 9.4 MG/DL (ref 8.4–10.2)
CHLORIDE SERPL-SCNC: 93 MMOL/L (ref 96–108)
CO2 SERPL-SCNC: 27 MMOL/L (ref 21–32)
CREAT SERPL-MCNC: 0.39 MG/DL (ref 0.6–1.3)
EOSINOPHIL # BLD AUTO: 0.17 THOUSAND/ÂΜL (ref 0–0.61)
EOSINOPHIL NFR BLD AUTO: 2 % (ref 0–6)
ERYTHROCYTE [DISTWIDTH] IN BLOOD BY AUTOMATED COUNT: 17 % (ref 11.6–15.1)
GFR SERPL CREATININE-BSD FRML MDRD: 131 ML/MIN/1.73SQ M
GLUCOSE SERPL-MCNC: 87 MG/DL (ref 65–140)
HCT VFR BLD AUTO: 24.2 % (ref 36.5–49.3)
HCT VFR BLD AUTO: 25.1 % (ref 36.5–49.3)
HEMOCCULT STL QL IA: NEGATIVE
HGB BLD-MCNC: 7.9 G/DL (ref 12–17)
HGB BLD-MCNC: 8.3 G/DL (ref 12–17)
IMM GRANULOCYTES # BLD AUTO: 0.07 THOUSAND/UL (ref 0–0.2)
IMM GRANULOCYTES NFR BLD AUTO: 1 % (ref 0–2)
INR PPP: 0.92 (ref 0.84–1.19)
LYMPHOCYTES # BLD AUTO: 1.19 THOUSANDS/ÂΜL (ref 0.6–4.47)
LYMPHOCYTES NFR BLD AUTO: 15 % (ref 14–44)
MCH RBC QN AUTO: 31.1 PG (ref 26.8–34.3)
MCHC RBC AUTO-ENTMCNC: 33.1 G/DL (ref 31.4–37.4)
MCV RBC AUTO: 94 FL (ref 82–98)
MONOCYTES # BLD AUTO: 0.7 THOUSAND/ÂΜL (ref 0.17–1.22)
MONOCYTES NFR BLD AUTO: 9 % (ref 4–12)
NEUTROPHILS # BLD AUTO: 6.05 THOUSANDS/ÂΜL (ref 1.85–7.62)
NEUTS SEG NFR BLD AUTO: 72 % (ref 43–75)
NRBC BLD AUTO-RTO: 0 /100 WBCS
PLATELET # BLD AUTO: 375 THOUSANDS/UL (ref 149–390)
PMV BLD AUTO: 8.6 FL (ref 8.9–12.7)
POTASSIUM SERPL-SCNC: 4 MMOL/L (ref 3.5–5.3)
PROT SERPL-MCNC: 6.7 G/DL (ref 6.4–8.4)
PROTHROMBIN TIME: 12.7 SECONDS (ref 11.6–14.5)
RBC # BLD AUTO: 2.67 MILLION/UL (ref 3.88–5.62)
RH BLD: POSITIVE
RH BLD: POSITIVE
SODIUM SERPL-SCNC: 127 MMOL/L (ref 135–147)
SPECIMEN EXPIRATION DATE: NORMAL
URATE SERPL-MCNC: 2.6 MG/DL (ref 3.5–8.5)
WBC # BLD AUTO: 8.22 THOUSAND/UL (ref 4.31–10.16)

## 2022-10-30 RX ORDER — LORAZEPAM 2 MG/ML
0.5 INJECTION INTRAMUSCULAR EVERY 6 HOURS PRN
Status: DISCONTINUED | OUTPATIENT
Start: 2022-10-30 | End: 2022-10-31 | Stop reason: HOSPADM

## 2022-10-30 RX ORDER — PANTOPRAZOLE SODIUM 40 MG/10ML
40 INJECTION, POWDER, LYOPHILIZED, FOR SOLUTION INTRAVENOUS EVERY 12 HOURS SCHEDULED
Status: DISCONTINUED | OUTPATIENT
Start: 2022-10-30 | End: 2022-10-31

## 2022-10-30 RX ORDER — METOPROLOL TARTRATE 5 MG/5ML
5 INJECTION INTRAVENOUS EVERY 6 HOURS PRN
Status: DISCONTINUED | OUTPATIENT
Start: 2022-10-30 | End: 2022-10-31 | Stop reason: HOSPADM

## 2022-10-30 RX ORDER — ALBUTEROL SULFATE 2.5 MG/3ML
2.5 SOLUTION RESPIRATORY (INHALATION) EVERY 6 HOURS PRN
Status: DISCONTINUED | OUTPATIENT
Start: 2022-10-30 | End: 2022-10-31 | Stop reason: HOSPADM

## 2022-10-30 RX ORDER — SODIUM CHLORIDE, SODIUM LACTATE, POTASSIUM CHLORIDE, CALCIUM CHLORIDE 600; 310; 30; 20 MG/100ML; MG/100ML; MG/100ML; MG/100ML
100 INJECTION, SOLUTION INTRAVENOUS CONTINUOUS
Status: DISCONTINUED | OUTPATIENT
Start: 2022-10-30 | End: 2022-10-31 | Stop reason: HOSPADM

## 2022-10-30 RX ORDER — NICOTINE 21 MG/24HR
1 PATCH, TRANSDERMAL 24 HOURS TRANSDERMAL DAILY
Status: DISCONTINUED | OUTPATIENT
Start: 2022-10-31 | End: 2022-10-31 | Stop reason: HOSPADM

## 2022-10-30 RX ORDER — PANTOPRAZOLE SODIUM 40 MG/10ML
40 INJECTION, POWDER, LYOPHILIZED, FOR SOLUTION INTRAVENOUS ONCE
Status: COMPLETED | OUTPATIENT
Start: 2022-10-31 | End: 2022-10-31

## 2022-10-30 RX ADMIN — NOREPINEPHRINE BITARTRATE 5 MCG/MIN: 1 INJECTION INTRAVENOUS at 23:56

## 2022-10-30 RX ADMIN — PANTOPRAZOLE SODIUM 40 MG: 40 INJECTION, POWDER, FOR SOLUTION INTRAVENOUS at 23:22

## 2022-10-30 RX ADMIN — IOHEXOL 50 ML: 240 INJECTION, SOLUTION INTRATHECAL; INTRAVASCULAR; INTRAVENOUS; ORAL at 20:23

## 2022-10-30 RX ADMIN — SODIUM CHLORIDE, SODIUM LACTATE, POTASSIUM CHLORIDE, AND CALCIUM CHLORIDE 100 ML/HR: .6; .31; .03; .02 INJECTION, SOLUTION INTRAVENOUS at 23:23

## 2022-10-30 NOTE — Clinical Note
Case was discussed with JACINTO and the patient's admission status was agreed to be Admission Status: inpatient status to the service of Dr Joy Austin

## 2022-10-30 NOTE — ED PROVIDER NOTES
History  Chief Complaint   Patient presents with   • Feeding Tube Problem     Pulled out G-tube, also with bloody stool per SNF     61-year-old male presents with complaints of pulling out his G-tube, he reports that he had a stroke, his G-tube is “for feeding”, the nursing home also reports the patient has bloody stools  The patient has no complaints at this time, is unsure how his feeding tube got pulled out, including has no nausea, vomiting, chest pain, cough, shortness of breath, fever, headache, dizziness, or other complaints  Prior to Admission Medications   Prescriptions Last Dose Informant Patient Reported? Taking? LORazepam (ATIVAN) 0 5 mg tablet   Yes No   Sig: Take 0 5 mg by mouth every 6 (six) hours as needed RestlessNess anxiety   Multiple Vitamins-Minerals (Multivitamin) LIQD   Yes No   Sig: Take 15 mL by mouth in the morning   acetaminophen (TYLENOL) 325 mg tablet   No No   Sig: 3 tablets (975 mg total) by Per G Tube route every 8 (eight) hours   albuterol (2 5 mg/3 mL) 0 083 % nebulizer solution   No No   Sig: Take 3 mL (2 5 mg total) by nebulization every 6 (six) hours as needed for wheezing or shortness of breath   amLODIPine (NORVASC) 5 mg tablet   No No   Si tablet (5 mg total) by Per G Tube route daily   aspirin (ECOTRIN LOW STRENGTH) 81 mg EC tablet   No No   Sig: Take 1 tablet (81 mg total) by mouth daily   atorvastatin (LIPITOR) 40 mg tablet   No No   Sig: Take 1 tablet (40 mg total) by mouth daily with dinner   clopidogrel (PLAVIX) 75 mg tablet   No No   Sig: Take 1 tablet (75 mg total) by mouth daily Do not start before 2022     folic acid (FOLVITE) 1 mg tablet   Yes No   Sig: Take 1 mg by mouth daily Crush pill mixed Will water administered through PEG tube   metoprolol tartrate (LOPRESSOR) 25 mg tablet   No No   Si tablet (25 mg total) by Per G Tube route every 12 (twelve) hours   polyethylene glycol (MIRALAX) 17 g packet   Yes No   Sig: Take 17 g by mouth 2 (two) times a day   thiamine 100 MG tablet   Yes No   Sig: Take 100 mg by mouth daily ETOH abuse      Facility-Administered Medications: None       Past Medical History:   Diagnosis Date   • Hypertension    • Renal disorder    • Stroke [facility])        Past Surgical History:   Procedure Laterality Date   • GASTROSTOMY TUBE PLACEMENT N/A 10/18/2022    Procedure: INSERTION GASTROSTOMY TUBE OPEN;  Surgeon: Dayanna Yanez DO;  Location: [facility];  Service: General       History reviewed  No pertinent family history  I have reviewed and agree with the history as documented  E-Cigarette/Vaping   • E-Cigarette Use Never User      E-Cigarette/Vaping Substances     Social History     Tobacco Use   • Smoking status: Current Every Day Smoker     Packs/day: 1 00     Types: Cigarettes   • Smokeless tobacco: Never Used   Vaping Use   • Vaping Use: Never used   Substance Use Topics   • Alcohol use: Not Currently     Comment: occasionally   • Drug use: Never       Review of Systems   Constitutional: Negative for fever  HENT: Negative for congestion  Eyes: Negative for visual disturbance  Respiratory: Negative for cough and shortness of breath  Cardiovascular: Negative for chest pain  Gastrointestinal: Negative for abdominal pain, diarrhea and vomiting  Endocrine: Negative for polyuria  Genitourinary: Negative for dysuria and hematuria  Musculoskeletal: Negative for myalgias  Neurological: Negative for dizziness and headaches  Physical Exam  Physical Exam  Vitals and nursing note reviewed  Constitutional:       General: He is not in acute distress  Appearance: Normal appearance  HENT:      Head: Normocephalic and atraumatic  Right Ear: External ear normal       Left Ear: External ear normal       Mouth/Throat:      Mouth: Mucous membranes are moist       Pharynx: Oropharynx is clear     Eyes:      Conjunctiva/sclera: Conjunctivae normal       Pupils: Pupils are equal, round, and reactive to light  Cardiovascular:      Rate and Rhythm: Normal rate  Pulmonary:      Effort: Pulmonary effort is normal  No respiratory distress  Abdominal:      General: Abdomen is flat  There is no distension  Tenderness: There is no abdominal tenderness  Comments: G-tube is present in the stoma, and appears to be in position  Genitourinary:     Comments: Grossly melanotic stools with some bright red blood per rectum on rectal exam  Musculoskeletal:         General: No deformity  Normal range of motion  Cervical back: Normal range of motion  Skin:     General: Skin is warm and dry  Neurological:      General: No focal deficit present  Mental Status: He is alert and oriented to person, place, and time     Psychiatric:         Mood and Affect: Mood normal          Behavior: Behavior normal          Vital Signs  ED Triage Vitals [10/30/22 1928]   Temperature Pulse Respirations Blood Pressure SpO2   97 5 °F (36 4 °C) 71 20 141/87 100 %      Temp Source Heart Rate Source Patient Position - Orthostatic VS BP Location FiO2 (%)   Oral Monitor Lying Left arm --      Pain Score       --           Vitals:    10/30/22 1928 10/30/22 2120   BP: 141/87 117/75   Pulse: 71 93   Patient Position - Orthostatic VS: Lying Sitting         Visual Acuity      ED Medications  Medications   albuterol inhalation solution 2 5 mg (has no administration in time range)   LORazepam (ATIVAN) injection 0 5 mg (has no administration in time range)   pantoprazole (PROTONIX) injection 40 mg (has no administration in time range)   metoprolol (LOPRESSOR) injection 5 mg (has no administration in time range)   iohexol (OMNIPAQUE) 240 MG/ML solution 50 mL (50 mL Oral Given 10/30/22 2023)       Diagnostic Studies  Results Reviewed     Procedure Component Value Units Date/Time    Occult Bloood,Fecal Immunochemical [[id number]]  (Normal) Collected: 10/30/22 2038    Lab Status: Final result Specimen: Stool from Per Rectum Updated: 10/30/22 2111     OCCULT BLD, FECAL IMMUNOLOGICAL Negative    Narrative:        Performed by Fecal Immunochemical Test     Comprehensive metabolic panel [[id number]]  (Abnormal) Collected: 10/30/22 2038    Lab Status: Final result Specimen: Blood from Arm, Right Updated: 10/30/22 2109     Sodium 127 mmol/L      Potassium 4 0 mmol/L      Chloride 93 mmol/L      CO2 27 mmol/L      ANION GAP 7 mmol/L      BUN 14 mg/dL      Creatinine 0 39 mg/dL      Glucose 87 mg/dL      Calcium 9 4 mg/dL      AST 16 U/L      ALT 18 U/L      Alkaline Phosphatase 85 U/L      Total Protein 6 7 g/dL      Albumin 3 6 g/dL      Total Bilirubin 0 42 mg/dL      eGFR 131 ml/min/1 73sq m     Narrative:      [place] guidelines for Chronic Kidney Disease (CKD):   •  Stage 1 with normal or high GFR (GFR > 90 mL/min/1 73 square meters)  •  Stage 2 Mild CKD (GFR = 60-89 mL/min/1 73 square meters)  •  Stage 3A Moderate CKD (GFR = 45-59 mL/min/1 73 square meters)  •  Stage 3B Moderate CKD (GFR = 30-44 mL/min/1 73 square meters)  •  Stage 4 Severe CKD (GFR = 15-29 mL/min/1 73 square meters)  •  Stage 5 End Stage CKD (GFR <15 mL/min/1 73 square meters)  Note: GFR calculation is accurate only with a steady state creatinine    Protime-INR [[id number]]  (Normal) Collected: 10/30/22 2038    Lab Status: Final result Specimen: Blood from Arm, Right Updated: 10/30/22 2059     Protime 12 7 seconds      INR 0 92    APTT [[id number]]  (Normal) Collected: 10/30/22 2038    Lab Status: Final result Specimen: Blood from Arm, Right Updated: 10/30/22 2059     PTT 28 seconds     CBC and differential [[id number]]  (Abnormal) Collected: 10/30/22 2038    Lab Status: Final result Specimen: Blood from Arm, Right Updated: 10/30/22 2048     WBC 8 22 Thousand/uL      RBC 2 67 Million/uL      Hemoglobin 8 3 g/dL      Hematocrit 25 1 %      MCV 94 fL      MCH 31 1 pg      MCHC 33 1 g/dL      RDW 17 0 %      MPV 8 6 fL      Platelets 570 Thousands/uL      nRBC 0 /100 WBCs      Neutrophils Relative 72 %      Immat GRANS % 1 %      Lymphocytes Relative 15 %      Monocytes Relative 9 %      Eosinophils Relative 2 %      Basophils Relative 1 %      Neutrophils Absolute 6 05 Thousands/µL      Immature Grans Absolute 0 07 Thousand/uL      Lymphocytes Absolute 1 19 Thousands/µL      Monocytes Absolute 0 70 Thousand/µL      Eosinophils Absolute 0 17 Thousand/µL      Basophils Absolute 0 04 Thousands/µL                  XR abdomen 1 view portable    (Results Pending)              Procedures  Procedures         ED Course       MDM  Number of Diagnoses or Management Options  Feeding tube dysfunction, initial encounter  Hyponatremia  Melanotic stools  Diagnosis management comments: Patient was seen 5 days ago for similar complaints, the patient G-tube will be evaluated for patency and he will be evaluated with blood work and a rectal exam for hematochezia  2120  The patient is found to have a 2 point hemoglobin drop since his last lab work a week ago, and a sodium of 127, end gross melena on rectal exam, the patient will be admitted for further management  The abdominal x-ray to evaluate for G-tube patency shows it is in the proper position and patent        Disposition  Final diagnoses:   Feeding tube dysfunction, initial encounter   Hyponatremia   Melanotic stools     Time reflects when diagnosis was documented in both MDM as applicable and the Disposition within this note     Time User Action Codes Description Comment    10/30/2022  9:20 PM Ken Echols [B89 [id number]] Feeding tube dysfunction, initial encounter     10/30/2022  9:21 PM Ken Echols [E87 1] Hyponatremia     10/30/2022  9:21 PM Ken Echols [K92 1] Melanotic stools       ED Disposition     ED Disposition   Admit    Condition   Stable    Date/Time   Sun Oct 30, 2022  9:20 PM    Comment   Case was discussed with JACINTO and the patient's admission status was agreed to be Admission Status: inpatient status to the service of Dr Darren Rudolph  Follow-up Information    None         Patient's Medications   Discharge Prescriptions    No medications on file       No discharge procedures on file      PDMP Review     None          ED Provider  Electronically Signed by           Danay Gillis MD  10/30/22 9024

## 2022-10-31 ENCOUNTER — APPOINTMENT (INPATIENT)
Dept: CT IMAGING | Facility: HOSPITAL | Age: 62
End: 2022-10-31

## 2022-10-31 ENCOUNTER — HOSPITAL ENCOUNTER (INPATIENT)
Facility: HOSPITAL | Age: 62
LOS: 4 days | Discharge: NURSING, REHAB OR LONG-TERM CARE | End: 2022-11-04
Attending: INTERNAL MEDICINE | Admitting: INTERNAL MEDICINE

## 2022-10-31 VITALS
TEMPERATURE: 97.6 F | DIASTOLIC BLOOD PRESSURE: 89 MMHG | HEIGHT: 71 IN | HEART RATE: 80 BPM | OXYGEN SATURATION: 100 % | RESPIRATION RATE: 18 BRPM | WEIGHT: 140 LBS | SYSTOLIC BLOOD PRESSURE: 154 MMHG | BODY MASS INDEX: 19.6 KG/M2

## 2022-10-31 DIAGNOSIS — K92.1 MELENA: Primary | ICD-10-CM

## 2022-10-31 DIAGNOSIS — F32.A DEPRESSION: ICD-10-CM

## 2022-10-31 DIAGNOSIS — L60.2 NAIL OVERGROWTH: ICD-10-CM

## 2022-10-31 DIAGNOSIS — I10 HYPERTENSION: ICD-10-CM

## 2022-10-31 DIAGNOSIS — J44.9 COPD (CHRONIC OBSTRUCTIVE PULMONARY DISEASE) (HCC): ICD-10-CM

## 2022-10-31 PROBLEM — D64.9 ANEMIA: Chronic | Status: ACTIVE | Noted: 2022-10-28

## 2022-10-31 PROBLEM — D64.9 ACUTE ON CHRONIC ANEMIA: Status: ACTIVE | Noted: 2022-10-31

## 2022-10-31 PROBLEM — E87.6 HYPOKALEMIA: Status: RESOLVED | Noted: 2022-10-15 | Resolved: 2022-10-31

## 2022-10-31 PROBLEM — Z86.73 HISTORY OF CVA (CEREBROVASCULAR ACCIDENT): Chronic | Status: ACTIVE | Noted: 2022-10-30

## 2022-10-31 PROBLEM — E44.0 MODERATE PROTEIN-CALORIE MALNUTRITION (HCC): Chronic | Status: ACTIVE | Noted: 2022-10-14

## 2022-10-31 PROBLEM — F41.9 ANXIETY: Chronic | Status: ACTIVE | Noted: 2022-10-28

## 2022-10-31 PROBLEM — R26.2 AMBULATORY DYSFUNCTION: Chronic | Status: ACTIVE | Noted: 2022-10-28

## 2022-10-31 PROBLEM — E87.1 HYPONATREMIA: Chronic | Status: ACTIVE | Noted: 2022-10-09

## 2022-10-31 PROBLEM — T17.908A ASPIRATION INTO RESPIRATORY TRACT: Status: RESOLVED | Noted: 2022-10-10 | Resolved: 2022-10-31

## 2022-10-31 LAB
ABO GROUP BLD BPU: NORMAL
ABO GROUP BLD BPU: NORMAL
ABO GROUP BLD: NORMAL
ALBUMIN SERPL BCP-MCNC: 3.4 G/DL (ref 3.5–5)
ALP SERPL-CCNC: 79 U/L (ref 34–104)
ALT SERPL W P-5'-P-CCNC: 15 U/L (ref 7–52)
ANION GAP SERPL CALCULATED.3IONS-SCNC: 8 MMOL/L (ref 4–13)
ANION GAP SERPL CALCULATED.3IONS-SCNC: 9 MMOL/L (ref 4–13)
AST SERPL W P-5'-P-CCNC: 18 U/L (ref 13–39)
BACTERIA UR QL AUTO: ABNORMAL /HPF
BACTERIA UR QL AUTO: ABNORMAL /HPF
BASOPHILS # BLD AUTO: 0.07 THOUSANDS/ÂΜL (ref 0–0.1)
BASOPHILS NFR BLD AUTO: 1 % (ref 0–1)
BILIRUB SERPL-MCNC: 0.83 MG/DL (ref 0.2–1)
BILIRUB UR QL STRIP: NEGATIVE
BILIRUB UR QL STRIP: NEGATIVE
BLD GP AB SCN SERPL QL: NEGATIVE
BPU ID: NORMAL
BPU ID: NORMAL
BUN SERPL-MCNC: 23 MG/DL (ref 5–25)
BUN SERPL-MCNC: 26 MG/DL (ref 5–25)
CALCIUM ALBUM COR SERPL-MCNC: 9.3 MG/DL (ref 8.3–10.1)
CALCIUM SERPL-MCNC: 8.8 MG/DL (ref 8.4–10.2)
CALCIUM SERPL-MCNC: 9.2 MG/DL (ref 8.4–10.2)
CHLORIDE SERPL-SCNC: 94 MMOL/L (ref 96–108)
CHLORIDE SERPL-SCNC: 97 MMOL/L (ref 96–108)
CLARITY UR: CLEAR
CLARITY UR: CLEAR
CO2 SERPL-SCNC: 21 MMOL/L (ref 21–32)
CO2 SERPL-SCNC: 25 MMOL/L (ref 21–32)
COLOR UR: ABNORMAL
COLOR UR: YELLOW
CREAT SERPL-MCNC: 0.42 MG/DL (ref 0.6–1.3)
CREAT SERPL-MCNC: 0.44 MG/DL (ref 0.6–1.3)
CROSSMATCH: NORMAL
CROSSMATCH: NORMAL
EOSINOPHIL # BLD AUTO: 0.12 THOUSAND/ÂΜL (ref 0–0.61)
EOSINOPHIL NFR BLD AUTO: 1 % (ref 0–6)
ERYTHROCYTE [DISTWIDTH] IN BLOOD BY AUTOMATED COUNT: 16.6 % (ref 11.6–15.1)
FERRITIN SERPL-MCNC: 341 NG/ML (ref 8–388)
FERRITIN SERPL-MCNC: 371 NG/ML (ref 8–388)
GFR SERPL CREATININE-BSD FRML MDRD: 125 ML/MIN/1.73SQ M
GFR SERPL CREATININE-BSD FRML MDRD: 127 ML/MIN/1.73SQ M
GLUCOSE SERPL-MCNC: 75 MG/DL (ref 65–140)
GLUCOSE SERPL-MCNC: 88 MG/DL (ref 65–140)
GLUCOSE UR STRIP-MCNC: NEGATIVE MG/DL
GLUCOSE UR STRIP-MCNC: NEGATIVE MG/DL
HCT VFR BLD AUTO: 35.6 % (ref 36.5–49.3)
HGB BLD-MCNC: 11.4 G/DL (ref 12–17)
HGB BLD-MCNC: 8.7 G/DL (ref 12–17)
HGB BLD-MCNC: 9.2 G/DL (ref 12–17)
HGB UR QL STRIP.AUTO: ABNORMAL
HGB UR QL STRIP.AUTO: NEGATIVE
IMM GRANULOCYTES # BLD AUTO: 0.16 THOUSAND/UL (ref 0–0.2)
IMM GRANULOCYTES NFR BLD AUTO: 1 % (ref 0–2)
INR PPP: 0.99 (ref 0.84–1.19)
IRON SATN MFR SERPL: 23 % (ref 20–50)
IRON SATN MFR SERPL: 26 % (ref 20–50)
IRON SERPL-MCNC: 105 UG/DL (ref 65–175)
IRON SERPL-MCNC: 75 UG/DL (ref 65–175)
KETONES UR STRIP-MCNC: NEGATIVE MG/DL
KETONES UR STRIP-MCNC: NEGATIVE MG/DL
LACTATE SERPL-SCNC: 0.9 MMOL/L (ref 0.5–2)
LACTATE SERPL-SCNC: 2.1 MMOL/L (ref 0.5–2)
LEUKOCYTE ESTERASE UR QL STRIP: ABNORMAL
LEUKOCYTE ESTERASE UR QL STRIP: NEGATIVE
LYMPHOCYTES # BLD AUTO: 1.4 THOUSANDS/ÂΜL (ref 0.6–4.47)
LYMPHOCYTES NFR BLD AUTO: 12 % (ref 14–44)
MAGNESIUM SERPL-MCNC: 1.8 MG/DL (ref 1.9–2.7)
MCH RBC QN AUTO: 30.3 PG (ref 26.8–34.3)
MCHC RBC AUTO-ENTMCNC: 32 G/DL (ref 31.4–37.4)
MCV RBC AUTO: 95 FL (ref 82–98)
MONOCYTES # BLD AUTO: 0.89 THOUSAND/ÂΜL (ref 0.17–1.22)
MONOCYTES NFR BLD AUTO: 8 % (ref 4–12)
MUCOUS THREADS UR QL AUTO: ABNORMAL
NEUTROPHILS # BLD AUTO: 9.09 THOUSANDS/ÂΜL (ref 1.85–7.62)
NEUTS SEG NFR BLD AUTO: 77 % (ref 43–75)
NITRITE UR QL STRIP: NEGATIVE
NITRITE UR QL STRIP: NEGATIVE
NON-SQ EPI CELLS URNS QL MICRO: ABNORMAL /HPF
NON-SQ EPI CELLS URNS QL MICRO: ABNORMAL /HPF
NRBC BLD AUTO-RTO: 0 /100 WBCS
OSMOLALITY UR/SERPL-RTO: 264 MMOL/KG (ref 282–298)
OSMOLALITY UR: 551 MMOL/KG
OSMOLALITY UR: 629 MMOL/KG
OTHER STN SPEC: ABNORMAL
PH UR STRIP.AUTO: 7 [PH]
PH UR STRIP.AUTO: 8 [PH]
PHOSPHATE SERPL-MCNC: 4.3 MG/DL (ref 2.7–4.5)
PLATELET # BLD AUTO: 323 THOUSANDS/UL (ref 149–390)
PMV BLD AUTO: 8.5 FL (ref 8.9–12.7)
POTASSIUM SERPL-SCNC: 4.1 MMOL/L (ref 3.5–5.3)
POTASSIUM SERPL-SCNC: 4.6 MMOL/L (ref 3.5–5.3)
PROCALCITONIN SERPL-MCNC: 0.11 NG/ML
PROCALCITONIN SERPL-MCNC: 0.14 NG/ML
PROT SERPL-MCNC: 6.2 G/DL (ref 6.4–8.4)
PROT UR STRIP-MCNC: NEGATIVE MG/DL
PROT UR STRIP-MCNC: NEGATIVE MG/DL
PROTHROMBIN TIME: 13.3 SECONDS (ref 11.6–14.5)
RBC # BLD AUTO: 3.76 MILLION/UL (ref 3.88–5.62)
RBC #/AREA URNS AUTO: ABNORMAL /HPF
RBC #/AREA URNS AUTO: ABNORMAL /HPF
RH BLD: POSITIVE
SODIUM 24H UR-SCNC: 76 MOL/L
SODIUM 24H UR-SCNC: 90 MOL/L
SODIUM SERPL-SCNC: 127 MMOL/L (ref 135–147)
SODIUM SERPL-SCNC: 127 MMOL/L (ref 135–147)
SP GR UR STRIP.AUTO: 1.01 (ref 1–1.03)
SP GR UR STRIP.AUTO: 1.02 (ref 1–1.03)
SPECIMEN EXPIRATION DATE: NORMAL
TIBC SERPL-MCNC: 323 UG/DL (ref 250–450)
TIBC SERPL-MCNC: 397 UG/DL (ref 250–450)
UNIT DISPENSE STATUS: NORMAL
UNIT DISPENSE STATUS: NORMAL
UNIT PRODUCT CODE: NORMAL
UNIT PRODUCT CODE: NORMAL
UNIT PRODUCT VOLUME: 350 ML
UNIT PRODUCT VOLUME: 350 ML
UNIT RH: NORMAL
UNIT RH: NORMAL
UROBILINOGEN UR QL STRIP.AUTO: 0.2 E.U./DL
UROBILINOGEN UR STRIP-ACNC: <2 MG/DL
WBC # BLD AUTO: 11.73 THOUSAND/UL (ref 4.31–10.16)
WBC #/AREA URNS AUTO: ABNORMAL /HPF
WBC #/AREA URNS AUTO: ABNORMAL /HPF

## 2022-10-31 PROCEDURE — 06PYX3Z REMOVAL OF INFUSION DEVICE FROM LOWER VEIN, EXTERNAL APPROACH: ICD-10-PCS | Performed by: INTERNAL MEDICINE

## 2022-10-31 RX ORDER — POLYETHYLENE GLYCOL 3350 17 G/17G
17 POWDER, FOR SOLUTION ORAL 2 TIMES DAILY
Status: DISCONTINUED | OUTPATIENT
Start: 2022-10-31 | End: 2022-10-31

## 2022-10-31 RX ORDER — ATORVASTATIN CALCIUM 40 MG/1
40 TABLET, FILM COATED ORAL
Status: DISCONTINUED | OUTPATIENT
Start: 2022-10-31 | End: 2022-11-04 | Stop reason: HOSPADM

## 2022-10-31 RX ORDER — POLYETHYLENE GLYCOL 3350 17 G/17G
17 POWDER, FOR SOLUTION ORAL 2 TIMES DAILY
Status: DISCONTINUED | OUTPATIENT
Start: 2022-11-01 | End: 2022-11-02

## 2022-10-31 RX ORDER — MAGNESIUM SULFATE 1 G/100ML
1 INJECTION INTRAVENOUS ONCE
Status: COMPLETED | OUTPATIENT
Start: 2022-10-31 | End: 2022-10-31

## 2022-10-31 RX ORDER — LANOLIN ALCOHOL/MO/W.PET/CERES
100 CREAM (GRAM) TOPICAL DAILY
Status: DISCONTINUED | OUTPATIENT
Start: 2022-10-31 | End: 2022-11-04 | Stop reason: HOSPADM

## 2022-10-31 RX ORDER — ALBUTEROL SULFATE 2.5 MG/3ML
2.5 SOLUTION RESPIRATORY (INHALATION) EVERY 6 HOURS PRN
Status: DISCONTINUED | OUTPATIENT
Start: 2022-10-31 | End: 2022-11-04 | Stop reason: HOSPADM

## 2022-10-31 RX ORDER — LORAZEPAM 2 MG/ML
0.5 INJECTION INTRAMUSCULAR EVERY 6 HOURS PRN
Status: CANCELLED | OUTPATIENT
Start: 2022-10-31

## 2022-10-31 RX ORDER — ONDANSETRON 2 MG/ML
4 INJECTION INTRAMUSCULAR; INTRAVENOUS EVERY 6 HOURS PRN
Status: DISCONTINUED | OUTPATIENT
Start: 2022-10-31 | End: 2022-11-04 | Stop reason: HOSPADM

## 2022-10-31 RX ORDER — METOPROLOL TARTRATE 5 MG/5ML
5 INJECTION INTRAVENOUS EVERY 6 HOURS PRN
Status: CANCELLED | OUTPATIENT
Start: 2022-10-31

## 2022-10-31 RX ORDER — MULTIVIT-MIN/FERROUS GLUCONATE 9 MG/15 ML
15 LIQUID (ML) ORAL DAILY
Status: DISCONTINUED | OUTPATIENT
Start: 2022-10-31 | End: 2022-11-04 | Stop reason: HOSPADM

## 2022-10-31 RX ORDER — POLYETHYLENE GLYCOL 3350 17 G/17G
119 POWDER, FOR SOLUTION ORAL ONCE
Status: COMPLETED | OUTPATIENT
Start: 2022-10-31 | End: 2022-10-31

## 2022-10-31 RX ORDER — AMLODIPINE BESYLATE 5 MG/1
5 TABLET ORAL DAILY
Status: DISCONTINUED | OUTPATIENT
Start: 2022-10-31 | End: 2022-11-04 | Stop reason: HOSPADM

## 2022-10-31 RX ORDER — SODIUM PHOSPHATE, DIBASIC AND SODIUM PHOSPHATE, MONOBASIC 7; 19 G/133ML; G/133ML
1 ENEMA RECTAL ONCE
Status: COMPLETED | OUTPATIENT
Start: 2022-10-31 | End: 2022-10-31

## 2022-10-31 RX ORDER — CEFTRIAXONE 1 G/50ML
1000 INJECTION, SOLUTION INTRAVENOUS DAILY
Status: DISCONTINUED | OUTPATIENT
Start: 2022-10-31 | End: 2022-10-31

## 2022-10-31 RX ORDER — ALBUTEROL SULFATE 2.5 MG/3ML
2.5 SOLUTION RESPIRATORY (INHALATION) EVERY 6 HOURS PRN
Status: CANCELLED | OUTPATIENT
Start: 2022-10-31

## 2022-10-31 RX ORDER — NICOTINE 21 MG/24HR
1 PATCH, TRANSDERMAL 24 HOURS TRANSDERMAL DAILY
Status: CANCELLED | OUTPATIENT
Start: 2022-10-31

## 2022-10-31 RX ORDER — FOLIC ACID 1 MG/1
1 TABLET ORAL DAILY
Status: DISCONTINUED | OUTPATIENT
Start: 2022-10-31 | End: 2022-11-04 | Stop reason: HOSPADM

## 2022-10-31 RX ADMIN — MAGNESIUM SULFATE HEPTAHYDRATE 1 G: 1 INJECTION, SOLUTION INTRAVENOUS at 07:32

## 2022-10-31 RX ADMIN — CEFTRIAXONE 1000 MG: 1 INJECTION, SOLUTION INTRAVENOUS at 09:01

## 2022-10-31 RX ADMIN — SODIUM CHLORIDE 8 MG/HR: 9 INJECTION, SOLUTION INTRAVENOUS at 01:11

## 2022-10-31 RX ADMIN — FOLIC ACID 1 MG: 1 TABLET ORAL at 09:00

## 2022-10-31 RX ADMIN — ATORVASTATIN CALCIUM 40 MG: 40 TABLET, FILM COATED ORAL at 16:51

## 2022-10-31 RX ADMIN — THIAMINE HCL TAB 100 MG 100 MG: 100 TAB at 09:00

## 2022-10-31 RX ADMIN — SODIUM PHOSPHATE 1 ENEMA: 7; 19 ENEMA RECTAL at 10:57

## 2022-10-31 RX ADMIN — POLYETHYLENE GLYCOL 3350 119 G: 17 POWDER, FOR SOLUTION ORAL at 10:57

## 2022-10-31 RX ADMIN — AMLODIPINE BESYLATE 5 MG: 5 TABLET ORAL at 09:00

## 2022-10-31 RX ADMIN — MULTIVITAMIN 15 ML: LIQUID ORAL at 09:01

## 2022-10-31 RX ADMIN — PANTOPRAZOLE SODIUM 40 MG: 40 INJECTION, POWDER, FOR SOLUTION INTRAVENOUS at 01:00

## 2022-10-31 RX ADMIN — SODIUM CHLORIDE 8 MG/HR: 900 INJECTION, SOLUTION INTRAVENOUS at 04:22

## 2022-10-31 RX ADMIN — PIPERACILLIN AND TAZOBACTAM 3.38 G: 3; .375 INJECTION, POWDER, FOR SOLUTION INTRAVENOUS at 01:18

## 2022-10-31 RX ADMIN — SODIUM CHLORIDE 8 MG/HR: 900 INJECTION, SOLUTION INTRAVENOUS at 14:14

## 2022-10-31 RX ADMIN — IOHEXOL 100 ML: 350 INJECTION, SOLUTION INTRAVENOUS at 00:29

## 2022-10-31 NOTE — ASSESSMENT & PLAN NOTE
Malnutrition Findings:         BMI Findings: Body mass index is 17 18 kg/m²       · Resume tube feeds when clear to start PO again

## 2022-10-31 NOTE — ASSESSMENT & PLAN NOTE
· Recent admission Na corrected prior to d/c  · SNF reported to be giving FWF w/ feeding and may be contributing to low Na  · Current 127  · Will hold and Free water and keep NPO and recheck BMP

## 2022-10-31 NOTE — DISCHARGE SUMMARY
Nya U  68   Discharge- 2005 [facility] 1960, 63 y o  male MRN: [id number]  Unit/Bed#: XIANG Encounter: [id number]  Primary Care Provider: No primary care provider on file  Date and time admitted to hospital: 10/30/2022  7:29 PM  * Melena  Assessment & Plan  Presents from SNF where staff noted patient to have melenic stools  No abdominal pain, N/V/D, hematemesis, hematochezia  Recently started on ASA/Plavix due to CVA  · In ER, patient hemodynamically stable with SBP in 140s with nl BUN/Cr  Once to floor, noted to have initial SBP 90s  Scant melena on brief  · Stat H&H, BMP and fluid bolus given  · Spoke with GI on call who agreed with my plan to transfer patient to facility with anesthesia backup  · Then notified of MS change, patient less responsive, not answering questions appropriately  BP obtained and noted with SBP in 60s  · Rapid response then called  · ED physician and RN responsed  Code crimson was called  Fluid bolus continued and patient was started on levophed at 5mcg/min  CT c/a/p ordered and obtained  Achieved hemodynamically stability with Levo   · Patient taken to ER where he received blood transfusion, Protonix gtt, and central line placement  · BP remained stable  Levophed was discontinued  Patient received in total 2 units of blood  Last BP prior to transfer: 154/89  · Updated GI on patient status  Advised to continue with same - Protonix gtt, blood transfusion as needed   · Case reviewed with PACS and patient accepted to SD1 at THE [facility]    Abnormal CT scan  · CT chest abdomen pelvis revealing airspace opacities and tree-in-bud opacities in the lung bases which may represent infection  · Patient noted with recent aspiration earlier this month while hospitalized a CVA and was noted to have bilateral lung airspace opacities at that time    Completed 7 days of IV Rocephin at that time  · Suspect from prior infection, radiographic resolution not yet expected  · No leukocytosis, stable on room air  · Blood cultures, lactate, procalcitonin obtained  · Monitor off further abx for now  Medical Problems             Resolved Problems  Date Reviewed: 10/31/2022   None               Discharging Physician / Practitioner: Dustin Lewis  PCP: No primary care provider on file  Admission Date:   Admission Orders (From admission, onward)     Ordered        10/30/22 2131  INPATIENT ADMISSION  Once                      Discharge Date: 10/31/22    Consultations During Hospital Stay:  · GI curbside     Procedures Performed:   · Central line placement by ER physician     Significant Findings / Test Results:   · Hgb 8 3>7 9  · CT a/c/p w/ wo:  "Cannot rule active GI bleed due to administration of oral contrast   Marked distention of the rectum with large amount of surrounding inflammatory changes with may represent stercoral colitis  Follow-up with GI as recommended  Thickening of the urinary bladder wall which may represent cystitis  Follow-up with urologist recommended  Airspace opacities and tree-in-bud opacities in the lung bases which may represent infection  Short-term follow-up with chest CT in 3 months is recommended to evaluate for resolution"    Incidental Findings:   · None    Test Results Pending at Discharge (will require follow up): · None     Outpatient Tests Requested:  · Pending course at [facility]    Complications: Worsening GI bleed    Reason for Admission: melena     Hospital Course:   Elena Ortega III is a 63 y o  male patient who originally presented to the hospital on 10/30/2022 due to melena and G-tube dysfunction at SNF  Patient underwent x-ray of abdomen in ER which confirmed proper placement of G-tube  Also in ER, patient found to have hemoglobin of 8 2 with hemodynamic stability and normal BUN/creatinine  Plan for admission for further monitoring and workup of suspected GI bleed    Patient was started on IV Protonix, type and screen and blood transfusion consent obtained  Once on floor, patient noted to have systolic BP in the 36T  On exam, scant melena was noted on patient's brief  Obtain stat H&H, BMP and IVF bolus given  Case was reviewed with GI on-call and discussed plan to transfer patient given lack of anesthesia backup at this campus, he agrees with plan  Shortly after, patient with mental status change and hypotension with systolic BP in the 46M  Rapid response was called and responded to by ED physician and RN  Subsequently, a Code crimson was called  Patient continued with fluid bolus and was started on Levophed at 5mcg/min  CT chest abdomen pelvis was ordered  He obtained hemodynamic stability with Levophed  Patient was then transferred to the ER for CT scan, continued blood transfusion, Protonix drip and placement of a central line  Levophed was able to be discontinued  Patient received in total 2 units of blood and remained hemodynamically stable  GI was updated with patient's case and advised to continue Protonix drip and transfuse as needed  Case was discussed with PACs and patient was accepted as a SD1 at [facility]  Update was given to nursing staff at [facility]  Attempted to call patient's sister with phone number in chart, appears to be wrong number  Reviewed plan with patient who is agreeable  Answered questions to the best my ability to the  patient's satisfaction  The patient, initially admitted to the hospital as inpatient, was discharged earlier than expected given the following: Worsening GI bleed  Please see above list of diagnoses and related plan for additional information  Condition at Discharge: fair    Discharge Day Visit / Exam:   * Please refer to separate progress note for these details *    Discussion with Family:  Updated nurse at patient's facility [facility]    Attempted to call sister with number in chart [phone], however appears to be incorrect number  Discharge instructions/Information to patient and family:   See after visit summary for information provided to patient and family  Provisions for Follow-Up Care:  See after visit summary for information related to follow-up care and any pertinent home health orders  Disposition:   [address] Transfer to [facility]    Planned Readmission: none     Discharge Statement:  I spent 25 minutes discharging the patient  This time was spent on the day of discharge  I had direct contact with the patient on the day of discharge  Greater than 50% of the total time was spent examining patient, answering all patient questions, arranging and discussing plan of care with patient as well as directly providing post-discharge instructions  Additional time then spent on discharge activities  Discharge Medications:  See after visit summary for reconciled discharge medications provided to patient and/or family        **Please Note: This note may have been constructed using a voice recognition system**

## 2022-10-31 NOTE — ASSESSMENT & PLAN NOTE
Na 127 on admission  · History of recent hyponatremia as low as 118 during hospitalization earlier this month etiology thought to be secondary to initial volume depletion and SIADH  · Currently w/ free water flushes 50ml q4 w/ feedings  · Prior nephrology recommendations to avoid free water flushes  Consideration of starting salt tabs   However Na improved to 138 prior to d/c on 10/21  · Hyponatremia workup sent  · Trial of gentle IV fluid hydration while tube feedings held   · Monitor BMP q 8  · Nephrology consult appreciated

## 2022-10-31 NOTE — H&P
[address] III 1960, 63 y o  male MRN: [id number]  Unit/Bed#: XIANG Encounter: [id number]  Primary Care Provider: No primary care provider on file  Date and time admitted to hospital: 10/30/2022  7:29 PM    * Melena  Assessment & Plan  Presents from SNF where staff noted patient to have melenic stools  No abdominal pain, N/V/D, hematemesis, hematochezia  Recently started on ASA/Plavix due to CVA  Does have hx of alcohol abuse, however no hx of cirrhosis/varices  · Patient hemodynamically stable with normal BUN/Cr  · Hgb 8 3  · Monitor H&H q 8  · Baseline Hgb appears 9-10  · Per recent nursing home visit, patient had labs obtained 10/26 not in system showing Hgb of 7 2  No s/s of bleeding at that time  · Hold Plavix/aspirin  · NPO/no G tube until seen by GI  · Start on Protonix IV q 12  · Check type and screen, iron panel  · Blood consent in chart   · GI consultation appreciated      Hyponatremia  Assessment & Plan  Na 127 on admission  · History of recent hyponatremia as low as 118 during hospitalization earlier this month etiology thought to be secondary to initial volume depletion and SIADH  · Currently w/ free water flushes 50ml q4 w/ feedings  · Prior nephrology recommendations to avoid free water flushes  Consideration of starting salt tabs  However Na improved to 138 prior to d/c on 10/21  · Hyponatremia workup sent  · Trial of gentle IV fluid hydration while tube feedings held   · Monitor BMP q 8  · Nephrology consult appreciated       Primary hypertension  Assessment & Plan  · Home amlodipine 5mg q d  and Lopressor 25mg b i d  held  · IV lopressor p r n  Gastrostomy tube dysfunction [facility])  Assessment & Plan  · S/p surgical G tube placement 10/18 2/2 to dysphagia w/ recent CVA   Was evaluated by GI/IR during that hospitalization who concluded PEG placement unsafe given patient anatomy  · Presents from SNF with complaints of dislodged G tube  · G tube appears in position and patent on imaging     History of CVA (cerebrovascular accident)  Assessment & Plan  · History of acute R CVA 10/09/2022 with residual left-sided weakness and dysphagia status post G-tube placement  · Managed on aspirin/Plavix         Anxiety  Assessment & Plan  · Patient noted with increased anxiety/restlessness at nursing facility  Noted to be pulling G tube, now 2nd time dislodged since placement 10/18  · Recently started on lorazepam 0 5mg q6 p r n  · Patient pleasantly confused, frequent re-orientation, fall precuations    VTE Pharmacologic Prophylaxis: VTE Score: 1 Contraindicated due to suspected GI bleed  Code Status: Level 1 - Full Code   Discussion with family: Patient declined call to   Anticipated Length of Stay: Patient will be admitted on an inpatient basis with an anticipated length of stay of greater than 2 midnights secondary to melena requiring further workup and monitoring of Hgb  Total Time for Visit, including Counseling / Coordination of Care: 45 minutes Greater than 50% of this total time spent on direct patient counseling and coordination of care  Chief Complaint: G tube dislodged, melena    History of Present Illness:  Lakshmi Maldonado III is a 63 y o  male with a PMH of CVA w/ residual L sided weakness and dysphagia s/p G tube placement, hyponatremia, alcohol abuse, HTN who presents with concerns of dislodged G-tube and melenic stools per SNF staff  Of note, patient was seen by nursing home provider on 10/28 was not noted to have any signs of GI bleeding then  Patient denies any acute complaints  He does not recall having melena nor can say when it begin  Denies hematochezia, hematemesis, abdominal pain,  dizziness/lightheadedness, chest pain, SOB  Has recently started taking aspirin and Plavix after stroke earlier this month  Patient has history of alcohol abuse but denies known history of cirrhosis/varices        Patient underwent x-ray of abdomen and noted to have no contrast extravasation with G-tube in place  Exam significant for melena noted in rectal vault  Patient with hemoglobin of 8 3 (baseline 9-10), VSS, normal BUN/creatinine, not tachycardic  Plan for admission for further workup of GI bleed  Start on IV Protonix, monitor H&H plan for GI consult  Review of Systems:  Review of Systems   Constitutional: Negative for chills, fatigue and fever  HENT: Negative for congestion  Respiratory: Negative for cough and shortness of breath  Cardiovascular: Negative for chest pain, palpitations and leg swelling  Gastrointestinal: Positive for blood in stool  Negative for abdominal distention, abdominal pain, anal bleeding, diarrhea, nausea and vomiting  Endocrine: Negative for polyuria  Genitourinary: Negative for difficulty urinating, dysuria, hematuria and urgency  Neurological: Negative for dizziness, facial asymmetry and weakness  Psychiatric/Behavioral: Negative for sleep disturbance  Past Medical and Surgical History:   Past Medical History:   Diagnosis Date   • Hypertension    • Renal disorder    • Stroke [facility])        Past Surgical History:   Procedure Laterality Date   • GASTROSTOMY TUBE PLACEMENT N/A 10/18/2022    Procedure: INSERTION GASTROSTOMY TUBE OPEN;  Surgeon: Elmo Yanez DO;  Location: [facility];  Service: General       Meds/Allergies:  Prior to Admission medications    Medication Sig Start Date End Date Taking?  Authorizing Provider   acetaminophen (TYLENOL) 325 mg tablet 3 tablets (975 mg total) by Per G Tube route every 8 (eight) hours 10/21/22   Librado Galaviz DO   albuterol (2 5 mg/3 mL) 0 083 % nebulizer solution Take 3 mL (2 5 mg total) by nebulization every 6 (six) hours as needed for wheezing or shortness of breath 10/21/22   Librado Galaviz DO   amLODIPine (NORVASC) 5 mg tablet 1 tablet (5 mg total) by Per G Tube route daily 10/21/22   Librado Galaviz DO   aspirin (ECOTRIN LOW STRENGTH) 81 mg EC tablet Take 1 tablet (81 mg total) by mouth daily 10/21/22   Gloria Galaviz DO   atorvastatin (LIPITOR) 40 mg tablet Take 1 tablet (40 mg total) by mouth daily with dinner 10/21/22   Gloria Galaviz DO   clopidogrel (PLAVIX) 75 mg tablet Take 1 tablet (75 mg total) by mouth daily Do not start before October 22, 2022  10/22/22   Gloria Galaviz DO   folic acid (FOLVITE) 1 mg tablet Take 1 mg by mouth daily Crush pill mixed Will water administered through PEG tube    Historical Provider, MD   LORazepam (ATIVAN) 0 5 mg tablet Take 0 5 mg by mouth every 6 (six) hours as needed [place]ess anxiety    Historical Provider, MD   metoprolol tartrate (LOPRESSOR) 25 mg tablet 1 tablet (25 mg total) by Per G Tube route every 12 (twelve) hours 10/21/22   Gloria Galaviz DO   Multiple Vitamins-Minerals (Multivitamin) LIQD Take 15 mL by mouth in the morning    Historical Provider, MD   polyethylene glycol (MIRALAX) 17 g packet Take 17 g by mouth 2 (two) times a day    Historical Provider, MD   thiamine 100 MG tablet Take 100 mg by mouth daily ETOH abuse    Historical Provider, MD     I have reveiwed home medications using records provided by [facility]  Allergies: No Known Allergies    Social History:  Marital Status: Single   Occupation:   Patient Pre-hospital Living Situation: [facility]  Patient Pre-hospital Level of Mobility: unable to be assessed at time of evaluation  Patient Pre-hospital Diet Restrictions:   Substance Use History:   Social History     Substance and Sexual Activity   Alcohol Use Not Currently    Comment: occasionally     Social History     Tobacco Use   Smoking Status Current Every Day Smoker   • Packs/day: 1 00   • Types: Cigarettes   Smokeless Tobacco Never Used     Social History     Substance and Sexual Activity   Drug Use Never       Family History:  History reviewed  No pertinent family history      Physical Exam:     Vitals:   Blood Pressure: 154/89 (10/31/22 0115)  Pulse: 80 (10/31/22 0115)  Temperature: 97 7 °F (36 5 °C) (10/30/22 2259)  Temp Source: Oral (10/30/22 2259)  Respirations: 18 (10/31/22 0115)  Height: 5' 11" (180 3 cm) (10/30/22 2047)  Weight - Scale: 63 5 kg (140 lb) (10/30/22 2047)  SpO2: 100 % (10/31/22 0115)    Physical Exam  Vitals and nursing note reviewed  Constitutional:       Comments: Frail   HENT:      Head: Normocephalic and atraumatic  Mouth/Throat:      Mouth: Mucous membranes are moist    Eyes:      Extraocular Movements: Extraocular movements intact  Pupils: Pupils are equal, round, and reactive to light  Cardiovascular:      Rate and Rhythm: Normal rate and regular rhythm  Pulses: Normal pulses  Heart sounds: Normal heart sounds  No murmur heard  Pulmonary:      Effort: Pulmonary effort is normal  No respiratory distress  Breath sounds: Normal breath sounds  No wheezing or rales  Abdominal:      General: Abdomen is flat  Bowel sounds are normal  There is no distension  Palpations: Abdomen is soft  Tenderness: There is no abdominal tenderness  There is no guarding  Comments: No abdominal tenderness, rebound, guarding  G tube in stoma  Scant blood noted on abdominal binder  Scant melena noted on brief    Musculoskeletal:      Right lower leg: No edema  Left lower leg: No edema  Skin:     General: Skin is warm and dry  Capillary Refill: Capillary refill takes less than 2 seconds  Neurological:      General: No focal deficit present  Mental Status: He is alert and oriented to person, place, and time        Comments: Baseline L sided weakness from prior CVA noted   Psychiatric:         Mood and Affect: Mood normal          Behavior: Behavior normal           Additional Data:   Lab Results:  Results from last 7 days   Lab Units 10/30/22  2330 10/30/22  2038   WBC Thousand/uL  --  8 22   HEMOGLOBIN g/dL 7 9* 8 3*   HEMATOCRIT % 24 2* 25 1*   PLATELETS Thousands/uL  -- 375   NEUTROS PCT %  --  72   LYMPHS PCT %  --  15   MONOS PCT %  --  9   EOS PCT %  --  2     Results from last 7 days   Lab Units 10/30/22  2330 10/30/22  2038   SODIUM mmol/L 127* 127*   POTASSIUM mmol/L 4 1 4 0   CHLORIDE mmol/L 94* 93*   CO2 mmol/L 25 27   BUN mg/dL 23 14   CREATININE mg/dL 0 44* 0 39*   ANION GAP mmol/L 8 7   CALCIUM mg/dL 9 2 9 4   ALBUMIN g/dL  --  3 6   TOTAL BILIRUBIN mg/dL  --  0 42   ALK PHOS U/L  --  85   ALT U/L  --  18   AST U/L  --  16   GLUCOSE RANDOM mg/dL 88 87     Results from last 7 days   Lab Units 10/30/22  2038   INR  0 92                   Imaging: Reviewed radiology reports from this admission including: xray(s)  CT abdomen pelvis w contrast   Final Result by Yudelka Machuca DO (10/31 0047)      Cannot rule out active gastroenterology bleed due to administration of oral contrast       Marked distention of the rectum with large amount of surrounding inflammatory changes which may represent stercoral colitis  Follow-up with gastroenterology is recommended  Thickening of the urinary bladder wall which may represent cystitis  Follow-up with urology is recommended  Airspace opacities and tree-in-bud opacities in the lung bases which may represent infection  Short-term follow-up chest CT scan in 3 months is recommended to evaluate for resolution  The study was marked in [facility] for immediate notification  Workstation performed: [id number]         XR abdomen 1 view portable    (Results Pending)       EKG and Other Studies Reviewed on Admission:   · EKG: No EKG obtained  ** Please Note: This note has been constructed using a voice recognition system   **

## 2022-10-31 NOTE — MALNUTRITION/BMI
This medical record reflects one or more clinical indicators suggestive of malnutrition and/or morbid obesity  Malnutrition Findings:   Adult Malnutrition type: Chronic illness  Adult Degree of Malnutrition: Malnutrition of mild degree  Malnutrition Characteristics: Muscle loss, Other (comment) (BMI < 18 5)              360 Statement: Mild protein calorie malnutrition r/t muscle loss present to claivicle and BMI < 18 5; receives tube feeds for sole source nutrition    BMI Findings:  Adult BMI Classifications: Underweight < 18 5        Body mass index is 17 18 kg/m²  See Nutrition note dated 10/31/2022 for additional details  Completed nutrition assessment is viewable in the nutrition documentation

## 2022-10-31 NOTE — ASSESSMENT & PLAN NOTE
· S/p surgical G tube placement 10/18 2/2 to dysphagia w/ recent CVA   Was evaluated by GI/IR during that hospitalization who concluded PEG placement unsafe given patient anatomy  · Presents from SNF with complaints of dislodged G tube  · G tube appears in position and patent on imaging

## 2022-10-31 NOTE — ASSESSMENT & PLAN NOTE
Patient suffers from chronic hyponatremia with value 127 on admission and holding stable at 127  Patient is asymptomatic        · Start 50mL NS flushes per G tube q6  · Continue NS 75 mL/hr  · Monitor on BMP

## 2022-10-31 NOTE — ASSESSMENT & PLAN NOTE
Presents from SNF where staff noted patient to have melenic stools  No abdominal pain, N/V/D, hematemesis, hematochezia  Recently started on ASA/Plavix due to CVA  Does have hx of alcohol abuse, however no hx of cirrhosis/varices  · Patient hemodynamically stable with normal BUN/Cr  · Hgb 8 3  · Monitor H&H q 8  · Baseline Hgb appears 9-10  · Per recent nursing home visit, patient had labs obtained 10/26 not in system showing Hgb of 7 2  No s/s of bleeding at that time    · Hold Plavix/aspirin  · NPO/no G tube until seen by GI  · Start on Protonix IV q 12  · Check type and screen, iron panel  · Blood consent in chart   · GI consultation appreciated

## 2022-10-31 NOTE — NUTRITION
10/31/22 1406   Recommendations/Interventions   Recommendations to Provider Once able to restart tube feeds, recommend Jevity 1 5 @ 55 ml/hr  Start at 10 ml/hr and advance by 10 ml q 4 hrs as tolerated to eventual goal rate  This will provide 1980 kcals, 84 g protein, and 1003 ml fluid from formula

## 2022-10-31 NOTE — H&P
Lucille Flores 45 III 1960, 63 y o  male MRN: [id number]  Unit/Bed#: ICU 12 Encounter: [id number]  Primary Care Provider: No primary care provider on file  Date and time admitted to hospital: 10/31/2022  2:04 AM    * Melena  Assessment & Plan  · Reported dark stool w/ BRB at [facility]- on arrival only had dark black/ greenish colored stool  · Had episodes of hypotension and a code crimson called- 2 units PRBC given at [facility]   · No visible signs of bleeding at present  · GI c/s placed  · Cont Protonix drip  · Check Hgb Q6H  · Keep NPO    Acute on chronic anemia  Assessment & Plan  · Last Hgb 9-10, 8 3 on arrival and dropped to 7 9  · Will trend Q6H  · Check Iron panel    Hyponatremia  Assessment & Plan  · Recent admission Na corrected prior to d/c  · SNF reported to be giving FWF w/ feeding and may be contributing to low Na  · Current 127  · Will hold and Free water and keep NPO and recheck BMP    History of CVA (cerebrovascular accident)  Assessment & Plan  · Left hemiplegia at baseline  · Supportive care    Primary hypertension  Assessment & Plan  · Resume home Amlodipine    Moderate protein-calorie malnutrition ([facility] [address] )  Assessment & Plan  Malnutrition Findings:         BMI Findings: Body mass index is 17 18 kg/m²  · Resume tube feeds when clear to start PO again      -------------------------------------------------------------------------------------------------------------  Chief Complaint: None, pt thinks he's in the hospital for a stoke    History of Present Illness   Ztia Rothman III is a 63 y o  male with pmhx stroke with left hemiplegia, HTN, dysphagia secondary to stroke, moderate protein calorie malnutrition, hyponatremia who presents to [facility] after reportedly pulling out his G-tube  Patient denied having any complaints post reported to have some melena at the nursing home  X-ray evaluation of G-tube was reportedly in place   While at [place] patient had episodes of hypotension, and a Code crimson was called at which time patient received 1 unit of PRBC and was started on Levophed  BP quickly responded and Levophed was stopped  A 2nd unit of PRBC was given and patient was transferred to [facility] for [facility] evaluation  Protonix drip running and patient still has no complaint and unsure why he is in the hospital     History obtained from chart review and the patient   -------------------------------------------------------------------------------------------------------------  Dispo: Admit to Stepdown Level 1    Code Status: Level 1 - Full Code  --------------------------------------------------------------------------------------------------------------  Review of Systems   Constitutional: Negative  HENT: Negative  Eyes: Negative  Respiratory: Negative  Cardiovascular: Negative  Gastrointestinal: Negative  Genitourinary: Negative  Musculoskeletal: Negative  Skin: Negative  Neurological: Negative  Psychiatric/Behavioral: Negative  All other systems reviewed and are negative  Physical Exam  Vitals and nursing note reviewed  Constitutional:       General: He is awake  He is not in acute distress  Appearance: Normal appearance  He is well-developed and underweight  HENT:      Head: Normocephalic and atraumatic  Mouth/Throat:      Dentition: Abnormal dentition  Dental caries present  Eyes:      Extraocular Movements: Extraocular movements intact  Pupils: Pupils are equal, round, and reactive to light  Cardiovascular:      Rate and Rhythm: Normal rate and regular rhythm  Pulmonary:      Effort: Pulmonary effort is normal       Breath sounds: Normal breath sounds  Abdominal:      General: Abdomen is flat  Bowel sounds are normal  There is no distension  Palpations: Abdomen is soft  Tenderness: There is no abdominal tenderness        Comments: Dark black/ green incontinence of stool Genitourinary:     Comments: Edenilson present for yellow urine  Musculoskeletal:         General: Normal range of motion  Right lower leg: No edema  Left lower leg: No edema  Skin:     General: Skin is warm and dry  Capillary Refill: Capillary refill takes less than 2 seconds  Neurological:      General: No focal deficit present  Mental Status: He is alert and oriented to person, place, and time  Mental status is at baseline  Psychiatric:         Mood and Affect: Mood normal          Behavior: Behavior normal  Behavior is cooperative  Thought Content: Thought content normal        --------------------------------------------------------------------------------------------------------------  Vitals:   Vitals:    10/31/22 0204   BP: 165/99   BP Location: Right arm   Pulse: 88   Resp: 20   Temp: (!) 96 5 °F (35 8 °C)   TempSrc: Oral   SpO2: 100%   Weight: 54 3 kg (119 lb 11 4 oz)   Height: 5' 10" (1 778 m)     Temp  Min: 96 5 °F (35 8 °C)  Max: 97 7 °F (36 5 °C)  IBW (Ideal Body Weight): 73 kg  Height: 5' 10" (177 8 cm)  Body mass index is 17 18 kg/m²      Laboratory and Diagnostics:  Results from last 7 days   Lab Units 10/30/22  2330 10/30/22  2038   WBC Thousand/uL  --  8 22   HEMOGLOBIN g/dL 7 9* 8 3*   HEMATOCRIT % 24 2* 25 1*   PLATELETS Thousands/uL  --  375   NEUTROS PCT %  --  72   MONOS PCT %  --  9     Results from last 7 days   Lab Units 10/31/22  0225 10/30/22  2330 10/30/22  2038   SODIUM mmol/L 127* 127* 127*   POTASSIUM mmol/L 4 6 4 1 4 0   CHLORIDE mmol/L 97 94* 93*   CO2 mmol/L 21 25 27   ANION GAP mmol/L 9 8 7   BUN mg/dL 26* 23 14   CREATININE mg/dL 0 42* 0 44* 0 39*   CALCIUM mg/dL 8 8 9 2 9 4   GLUCOSE RANDOM mg/dL 75 88 87   ALT U/L 15  --  18   AST U/L 18  --  16   ALK PHOS U/L 79  --  85   ALBUMIN g/dL 3 4*  --  3 6   TOTAL BILIRUBIN mg/dL 0 83  --  0 42          Results from last 7 days   Lab Units 10/31/22  0225 10/30/22  2038   INR  0 99 0 92   PTT seconds  -- 28          Results from last 7 days   Lab Units 10/31/22  0225 10/31/22  0124   LACTIC ACID mmol/L 2 1* 0 9     ABG:    VBG:    Results from last 7 days   Lab Units 10/30/22  2330   PROCALCITONIN ng/ml 0 14       Micro:        EKG: NSR  Imaging: I have personally reviewed pertinent films in PACS      Historical Information   Past Medical History:   Diagnosis Date   • Hypertension    • Renal disorder    • Stroke [facility])      Past Surgical History:   Procedure Laterality Date   • GASTROSTOMY TUBE PLACEMENT N/A 10/18/2022    Procedure: INSERTION GASTROSTOMY TUBE OPEN;  Surgeon: Naila Yanez DO;  Location: [facility];  Service: General     Social History   Social History     Substance and Sexual Activity   Alcohol Use Not Currently    Comment: occasionally     Social History     Substance and Sexual Activity   Drug Use Never     Social History     Tobacco Use   Smoking Status Current Every Day Smoker   • Packs/day: 1 00   • Types: Cigarettes   Smokeless Tobacco Never Used     Exercise History: Bed bound  Family History:   No family history on file    I have reviewed this patient's family history and commented on sigificant items within the HPI      Medications:  Current Facility-Administered Medications   Medication Dose Route Frequency   • albuterol inhalation solution 2 5 mg  2 5 mg Nebulization Q6H PRN   • amLODIPine (NORVASC) tablet 5 mg  5 mg Per G Tube Daily   • atorvastatin (LIPITOR) tablet 40 mg  40 mg Oral Daily With Dinner   • cefTRIAXone (ROCEPHIN) IVPB (premix in dextrose) 1,000 mg 50 mL  1,000 mg Intravenous Daily   • folic acid (FOLVITE) tablet 1 mg  1 mg Oral Daily   • [START ON 11/1/2022] metoprolol tartrate (LOPRESSOR) tablet 25 mg  25 mg Per G Tube Q12H [address]   • multivitamin with iron-minerals liquid 15 mL  15 mL Oral Daily   • ondansetron (ZOFRAN) injection 4 mg  4 mg Intravenous Q6H PRN   • pantoprazole (PROTONIX) 80 mg in sodium chloride 0 9 % 100 mL infusion  8 mg/hr Intravenous Continuous • thiamine tablet 100 mg  100 mg Oral Daily     Home medications:  Prior to Admission Medications   Prescriptions Last Dose Informant Patient Reported? Taking? LORazepam (ATIVAN) 0 5 mg tablet Not Taking at Unknown time  Yes No   Sig: Take 0 5 mg by mouth every 6 (six) hours as needed RestlessNess anxiety   Patient not taking: Reported on 10/31/2022   Multiple Vitamins-Minerals (Multivitamin) LIQD Not Taking at Unknown time  Yes No   Sig: Take 15 mL by mouth in the morning   Patient not taking: Reported on 10/31/2022   acetaminophen (TYLENOL) 325 mg tablet Not Taking at Unknown time  No No   Sig: 3 tablets (975 mg total) by Per G Tube route every 8 (eight) hours   Patient not taking: Reported on 10/31/2022   albuterol (2 5 mg/3 mL) 0 083 % nebulizer solution Not Taking at Unknown time  No No   Sig: Take 3 mL (2 5 mg total) by nebulization every 6 (six) hours as needed for wheezing or shortness of breath   Patient not taking: Reported on 10/31/2022   amLODIPine (NORVASC) 5 mg tablet Not Taking at Unknown time  No No   Si tablet (5 mg total) by Per G Tube route daily   Patient not taking: Reported on 10/31/2022   aspirin (ECOTRIN LOW STRENGTH) 81 mg EC tablet Not Taking at Unknown time  No No   Sig: Take 1 tablet (81 mg total) by mouth daily   Patient not taking: Reported on 10/31/2022   atorvastatin (LIPITOR) 40 mg tablet Not Taking at Unknown time  No No   Sig: Take 1 tablet (40 mg total) by mouth daily with dinner   Patient not taking: Reported on 10/31/2022   clopidogrel (PLAVIX) 75 mg tablet Not Taking at Unknown time  No No   Sig: Take 1 tablet (75 mg total) by mouth daily Do not start before 2022     Patient not taking: Reported on    folic acid (FOLVITE) 1 mg tablet Not Taking at Unknown time  Yes No   Sig: Take 1 mg by mouth daily Crush pill mixed Will water administered through PEG tube   Patient not taking: Reported on 10/31/2022   metoprolol tartrate (LOPRESSOR) 25 mg tablet Not Taking at Unknown time  No No   Si tablet (25 mg total) by Per G Tube route every 12 (twelve) hours   Patient not taking: Reported on 10/31/2022   polyethylene glycol (MIRALAX) 17 g packet Not Taking at Unknown time  Yes No   Sig: Take 17 g by mouth 2 (two) times a day   Patient not taking: Reported on 10/31/2022   thiamine 100 MG tablet Not Taking at Unknown time  Yes No   Sig: Take 100 mg by mouth daily ETOH abuse   Patient not taking: Reported on 10/31/2022      Facility-Administered Medications: None     Allergies:  No Known Allergies    ------------------------------------------------------------------------------------------------------------  Advance Directive and Living Will:      Power of :    POLST:    ------------------------------------------------------------------------------------------------------------  Anticipated Length of Stay is > 2 midnights    Care Time Delivered:   No Critical Care time spent       DANIEL Houston        Portions of the record may have been created with voice recognition software  Occasional wrong word or "sound a like" substitutions may have occurred due to the inherent limitations of voice recognition software    Read the chart carefully and recognize, using context, where substitutions have occurred

## 2022-10-31 NOTE — ASSESSMENT & PLAN NOTE
Patient's hemoglobin baseline approximately 9-10  With any [place] in ED, patient was found to have hemoglobin 8 4 and then upon recheck in setting of melena was 7 9  Patient is status post 2 packs of red blood cells  Recheck at [facility] ICU is 11 4  Iron panel negative       · Monitor H&H Q 8h  · Most recent hemoglobin 10 6  · See plan above   · GI following  · Recommendations above

## 2022-10-31 NOTE — ASSESSMENT & PLAN NOTE
· Reported dark stool w/ BRB at [facility]- on arrival only had dark black/ greenish colored stool  · Had episodes of hypotension and a code crimson called- 2 units PRBC given at [facility]   · No visible signs of bleeding at present  · GI c/s placed  · Cont Protonix drip  · Check Hgb Q6H  · Keep NPO

## 2022-10-31 NOTE — CONSULTS
Consultation -[facility]   Zanaemidgio Lieberman III 63 y o  male MRN: [id number]    Unit/Bed#: ICU 12 Encounter: [id number]      Physician Requesting Consult: Dr Jeaneth Churchill    Reason for Consult / Principal Problem: Melena      HPI: This is a 63 y o  male with pmhx stroke with left hemiplegia, HTN, dysphagia secondary to stroke, moderate protein calorie malnutrition, hyponatremia who presents to [facility] after reportedly pulling out his G-tube  Patient denied having any complaints but was reported to have some melena at the nursing home  X-ray evaluation of G-tube was reportedly in place  While at OS patient had episodes of hypotension, and a Code crimson was called at which time patient received 1 unit of PRBC and was started on Levophed  BP quickly responded and Levophed was stopped  A 2nd unit of PRBC was given and patient was transferred to [facility] for GI evaluation  Hemglobin was 11 4 at 2:00 a m  was previously 7 9 last evening  Baseline appears to be around 10  Patient had feeding tube replaced on 10/25 in ER  CT scan on admission had shown marked distention of the rectum with large amount of surrounding inflammatory change likely representing stercoral colitis  Also noted to have thickening of the urinary bladder and airspace opacities in the lungs  Pt was on ASA and Plavix  Nurse reports a dark green bowel movement overnight according to report  Apparently had some bright red blood per rectum on exam at OS  Just had open G-tube placement on 10/18 by surgery  Patient denies ever having GI workup in the past   Patient is oriented to person place and time and does states that he resides at a nursing home but then will go live with his father after discharge  Had CVA earlier this month and was residing in [facility]  Patient denies any abdominal pain  Nurse is not sure if bowel movement was liquid stool or formed stool    Patient does have some stool currently which appears to be a dark blackish green on bed sheets and abdominal binder    Blood pressure is stable currently in the 342P systolic,    Allergies: No Known Allergies    Medications:  Current Facility-Administered Medications:   •  albuterol inhalation solution 2 5 mg, 2 5 mg, Nebulization, Q6H PRN, NBA BorjasNP  •  amLODIPine (NORVASC) tablet 5 mg, 5 mg, Per G Tube, Daily, NBA BorjasNP  •  atorvastatin (LIPITOR) tablet 40 mg, 40 mg, Oral, Daily With DANIEL Mcnamara  •  cefTRIAXone (ROCEPHIN) IVPB (premix in dextrose) 1,000 mg 50 mL, 1,000 mg, Intravenous, Daily, DANIEL Borjas  •  folic acid (FOLVITE) tablet 1 mg, 1 mg, Oral, Daily, DANIEL Borjas  •  magnesium sulfate IVPB (premix) SOLN 1 g, 1 g, Intravenous, Once, DANIEL Joy, 1 g at 10/31/22 0732  •  [START ON 11/1/2022] metoprolol tartrate (LOPRESSOR) tablet 25 mg, 25 mg, Per G Tube, Q12H [address], DANIEL Borjas  •  multivitamin with iron-minerals liquid 15 mL, 15 mL, Oral, Daily, DANIEL Borjas  •  ondansetron [facility]) injection 4 mg, 4 mg, Intravenous, Q6H PRN, NBA BorjasNP  •  [DISCONTINUED] pantoprazole (PROTONIX) 80 mg in sodium chloride 0 9 % 100 mL IVPB, 80 mg, Intravenous, Once **AND** pantoprazole (PROTONIX) 80 mg in sodium chloride 0 9 % 100 mL infusion, 8 mg/hr, Intravenous, Continuous, DANIEL Borjas, Last Rate: 10 mL/hr at 10/31/22 0422, 8 mg/hr at 10/31/22 0422  •  thiamine tablet 100 mg, 100 mg, Oral, Daily, DANIEL Borjas    Past Medical history:  Past Medical History:   Diagnosis Date   • Hypertension    • Renal disorder    • Stroke [facility])        Past Surgical History:   Past Surgical History:   Procedure Laterality Date   • GASTROSTOMY TUBE PLACEMENT N/A 10/18/2022    Procedure: INSERTION GASTROSTOMY TUBE OPEN;  Surgeon: Richie Casarez DO;  Location: [facility];  Service: General       Family History: No family history on file  Social history:   Social History     Socioeconomic History   • Marital status: Single     Spouse name: Not on file   • Number of children: Not on file   • Years of education: Not on file   • Highest education level: Not on file   Occupational History   • Not on file   Tobacco Use   • Smoking status: Current Every Day Smoker     Packs/day: 1 00     Types: Cigarettes   • Smokeless tobacco: Never Used   Vaping Use   • Vaping Use: Never used   Substance and Sexual Activity   • Alcohol use: Not Currently     Comment: occasionally   • Drug use: Never   • Sexual activity: Not on file   Other Topics Concern   • Not on file   Social History Narrative    ** Merged History Encounter **          Social Determinants of Health     Financial Resource Strain: Not on file   Food Insecurity: No Food Insecurity   • Worried About Running Out of Food in the Last Year: Never true   • Ran Out of Food in the Last Year: Never true   Transportation Needs: No Transportation Needs   • Lack of Transportation (Medical): No   • Lack of Transportation (Non-Medical):  No   Physical Activity: Not on file   Stress: Not on file   Social Connections: Not on file   Intimate Partner Violence: Not on file   Housing Stability: Low Risk    • Unable to Pay for Housing in the Last Year: No   • Number of Places Lived in the Last Year: 1   • Unstable Housing in the Last Year: No       Review of Systems: All other systems were reviewed and were negative, otherwise please refer to HPI    Physical Exam: /99 (BP Location: Right arm)   Pulse 88   Temp 97 6 °F (36 4 °C) (Axillary)   Resp 20   Ht 5' 10" (1 778 m)   Wt 54 3 kg (119 lb 11 4 oz)   SpO2 100%   BMI 17 18 kg/m²     General Appearance:    Alert, cooperative, no distress, appears stated age   Head:    Normocephalic, without obvious abnormality, atraumatic   Eyes:    No scleral icterus           Mouth:  Mucosa moist   Neck:   Supple, symmetrical, trachea midline, no thyromegaly       Lungs:     Clear to auscultation bilaterally, respirations unlabored       Heart:    Regular rate and rhythm, S1 and S2 normal, no murmur, rub   or gallop     Abdomen:     Soft, non-tender, bowel sounds active all four quadrants,     no masses, no organomegaly   Genitalia:   deferred   Rectal:   deferred   Extremities:   Extremities normal,no cyanosis or edema       Skin:   Skin color, texture, turgor normal, no rashes or lesions       Neurologic:   Grossly intact, no focal deficit           Lab Results:   Recent Results (from the past 24 hour(s))   Occult Bloood,Fecal Immunochemical    Collection Time: 10/30/22  8:38 PM   Result Value Ref Range    OCCULT BLD, FECAL IMMUNOLOGICAL Negative Negative   CBC and differential    Collection Time: 10/30/22  8:38 PM   Result Value Ref Range    WBC 8 22 4 31 - 10 16 Thousand/uL    RBC 2 67 (L) 3 88 - 5 62 Million/uL    Hemoglobin 8 3 (L) 12 0 - 17 0 g/dL    Hematocrit 25 1 (L) 36 5 - 49 3 %    MCV 94 82 - 98 fL    MCH 31 1 26 8 - 34 3 pg    MCHC 33 1 31 4 - 37 4 g/dL    RDW 17 0 (H) 11 6 - 15 1 %    MPV 8 6 (L) 8 9 - 12 7 fL    Platelets 979 872 - 337 Thousands/uL    nRBC 0 /100 WBCs    Neutrophils Relative 72 43 - 75 %    Immat GRANS % 1 0 - 2 %    Lymphocytes Relative 15 14 - 44 %    Monocytes Relative 9 4 - 12 %    Eosinophils Relative 2 0 - 6 %    Basophils Relative 1 0 - 1 %    Neutrophils Absolute 6 05 1 85 - 7 62 Thousands/µL    Immature Grans Absolute 0 07 0 00 - 0 20 Thousand/uL    Lymphocytes Absolute 1 19 0 60 - 4 47 Thousands/µL    Monocytes Absolute 0 70 0 17 - 1 22 Thousand/µL    Eosinophils Absolute 0 17 0 00 - 0 61 Thousand/µL    Basophils Absolute 0 04 0 00 - 0 10 Thousands/µL   Comprehensive metabolic panel    Collection Time: 10/30/22  8:38 PM   Result Value Ref Range    Sodium 127 (L) 135 - 147 mmol/L    Potassium 4 0 3 5 - 5 3 mmol/L    Chloride 93 (L) 96 - 108 mmol/L    CO2 27 21 - 32 mmol/L    ANION GAP 7 4 - 13 mmol/L BUN 14 5 - 25 mg/dL    Creatinine 0 39 (L) 0 60 - 1 30 mg/dL    Glucose 87 65 - 140 mg/dL    Calcium 9 4 8 4 - 10 2 mg/dL    AST 16 13 - 39 U/L    ALT 18 7 - 52 U/L    Alkaline Phosphatase 85 34 - 104 U/L    Total Protein 6 7 6 4 - 8 4 g/dL    Albumin 3 6 3 5 - 5 0 g/dL    Total Bilirubin 0 42 0 20 - 1 00 mg/dL    eGFR 131 ml/min/1 73sq m   Protime-INR    Collection Time: 10/30/22  8:38 PM   Result Value Ref Range    Protime 12 7 11 6 - 14 5 seconds    INR 0 92 0 84 - 1 19   APTT    Collection Time: 10/30/22  8:38 PM   Result Value Ref Range    PTT 28 23 - 37 seconds   Uric acid    Collection Time: 10/30/22  8:38 PM   Result Value Ref Range    Uric Acid 2 6 (L) 3 5 - 8 5 mg/dL   Type and screen    Collection Time: 10/30/22 10:07 PM   Result Value Ref Range    ABO Grouping O     Rh Factor Positive     Antibody Screen Negative     Specimen Expiration Date [id number]    ABORh Recheck - Contact Blood Bank Prior to Collection    Collection Time: 10/30/22 11:30 PM   Result Value Ref Range    ABO Grouping O     Rh Factor Positive    Hemoglobin and hematocrit, blood    Collection Time: 10/30/22 11:30 PM   Result Value Ref Range    Hemoglobin 7 9 (L) 12 0 - 17 0 g/dL    Hematocrit 24 2 (L) 36 5 - 49 3 %   Basic metabolic panel    Collection Time: 10/30/22 11:30 PM   Result Value Ref Range    Sodium 127 (L) 135 - 147 mmol/L    Potassium 4 1 3 5 - 5 3 mmol/L    Chloride 94 (L) 96 - 108 mmol/L    CO2 25 21 - 32 mmol/L    ANION GAP 8 4 - 13 mmol/L    BUN 23 5 - 25 mg/dL    Creatinine 0 44 (L) 0 60 - 1 30 mg/dL    Glucose 88 65 - 140 mg/dL    Calcium 9 2 8 4 - 10 2 mg/dL    eGFR 125 ml/min/1 73sq m   Procalcitonin    Collection Time: 10/30/22 11:30 PM   Result Value Ref Range    Procalcitonin 0 14 <=0 25 ng/ml   Prepare Leukoreduced RBC    Collection Time: 10/30/22 11:42 PM   Result Value Ref Range    Unit Product Code K1868I13     Unit Number [id number]     Unit ABO O     Unit [facility] POS     Unit Dispense Status Presumed Trans Unit Product Volume 350 ml    Unit Product Code Z2701P90     Unit Number [id number]     Unit ABO O     Unit RH POS     Unit Dispense Status Presumed Trans     Unit Product Volume 350 ml    Crossmatch Compatible     Crossmatch Compatible    Urinalysis with microscopic    Collection Time: 10/30/22 11:51 PM   Result Value Ref Range    Color, UA Yellow Yellow    Clarity, UA Clear Clear    Specific [place], UA 1 010 1 001 - 1 030    pH, UA 8 0 5 0, 5 5, 6 0, 6 5, 7 0, 7 5, 8 0    Leukocytes, UA Negative Negative    Nitrite, UA Negative Negative    Protein, UA Negative Negative, Interference- unable to analyze mg/dl    Glucose, UA Negative Negative mg/dl    Ketones, UA Negative Negative mg/dl    Urobilinogen, UA 0 2 0 2, 1 0 E U /dl E U /dl    Bilirubin, UA Negative Negative    Occult Blood, UA Negative Negative    RBC, UA 0-5 None Seen, 0-1, 1-2, 2-4, 0-5 /hpf    WBC, UA 0-5 None Seen, 0-1, 1-2, 0-5, 2-4 /hpf    Epithelial Cells Occasional None Seen, Occasional /hpf    Bacteria, UA Occasional None Seen, Occasional /hpf    OTHER OBSERVATIONS Renal Epithelial Cells Present     MUCUS THREADS Occasional (A) None Seen   Lactic acid, plasma    Collection Time: 10/31/22  1:24 AM   Result Value Ref Range    LACTIC ACID 0 9 0 5 - 2 0 mmol/L   Blood culture    Collection Time: 10/31/22  2:24 AM    Specimen: Hand, Left; Blood   Result Value Ref Range    Blood Culture Received in Microbiology Lab  Culture in Progress      CBC and differential    Collection Time: 10/31/22  2:25 AM   Result Value Ref Range    WBC 11 73 (H) 4 31 - 10 16 Thousand/uL    RBC 3 76 (L) 3 88 - 5 62 Million/uL    Hemoglobin 11 4 (L) 12 0 - 17 0 g/dL    Hematocrit 35 6 (L) 36 5 - 49 3 %    MCV 95 82 - 98 fL    MCH 30 3 26 8 - 34 3 pg    MCHC 32 0 31 4 - 37 4 g/dL    RDW 16 6 (H) 11 6 - 15 1 %    MPV 8 5 (L) 8 9 - 12 7 fL    Platelets 300 407 - 850 Thousands/uL    nRBC 0 /100 WBCs    Neutrophils Relative 77 (H) 43 - 75 %    Immat GRANS % 1 0 - 2 % Lymphocytes Relative 12 (L) 14 - 44 %    Monocytes Relative 8 4 - 12 %    Eosinophils Relative 1 0 - 6 %    Basophils Relative 1 0 - 1 %    Neutrophils Absolute 9 09 (H) 1 85 - 7 62 Thousands/µL    Immature Grans Absolute 0 16 0 00 - 0 20 Thousand/uL    Lymphocytes Absolute 1 40 0 60 - 4 47 Thousands/µL    Monocytes Absolute 0 89 0 17 - 1 22 Thousand/µL    Eosinophils Absolute 0 12 0 00 - 0 61 Thousand/µL    Basophils Absolute 0 07 0 00 - 0 10 Thousands/µL   Comprehensive metabolic panel    Collection Time: 10/31/22  2:25 AM   Result Value Ref Range    Sodium 127 (L) 135 - 147 mmol/L    Potassium 4 6 3 5 - 5 3 mmol/L    Chloride 97 96 - 108 mmol/L    CO2 21 21 - 32 mmol/L    ANION GAP 9 4 - 13 mmol/L    BUN 26 (H) 5 - 25 mg/dL    Creatinine 0 42 (L) 0 60 - 1 30 mg/dL    Glucose 75 65 - 140 mg/dL    Calcium 8 8 8 4 - 10 2 mg/dL    Corrected Calcium 9 3 8 3 - 10 1 mg/dL    AST 18 13 - 39 U/L    ALT 15 7 - 52 U/L    Alkaline Phosphatase 79 34 - 104 U/L    Total Protein 6 2 (L) 6 4 - 8 4 g/dL    Albumin 3 4 (L) 3 5 - 5 0 g/dL    Total Bilirubin 0 83 0 20 - 1 00 mg/dL    eGFR 127 ml/min/1 73sq m   Protime-INR    Collection Time: 10/31/22  2:25 AM   Result Value Ref Range    Protime 13 3 11 6 - 14 5 seconds    INR 0 99 0 84 - 1 19   Lactic acid, plasma    Collection Time: 10/31/22  2:25 AM   Result Value Ref Range    LACTIC ACID 2 1 (HH) 0 5 - 2 0 mmol/L   Procalcitonin    Collection Time: 10/31/22  2:25 AM   Result Value Ref Range    Procalcitonin 0 11 <=0 25 ng/ml   Type and screen    Collection Time: 10/31/22  2:25 AM   Result Value Ref Range    ABO Grouping O     Rh Factor Positive     Antibody Screen Negative     Specimen Expiration Date [id number]    Iron Saturation %    Collection Time: 10/31/22  2:25 AM   Result Value Ref Range    Iron Saturation 26 20 - 50 %    TIBC 397 250 - 450 ug/dL    Iron 105 65 - 175 ug/dL   Ferritin    Collection Time: 10/31/22  2:25 AM   Result Value Ref Range    Ferritin 341 8 - 388 ng/mL Blood culture    Collection Time: 10/31/22  2:55 AM    Specimen: Arm, Right; Blood   Result Value Ref Range    Blood Culture Received in Microbiology Lab  Culture in Progress  Magnesium    Collection Time: 10/31/22  4:25 AM   Result Value Ref Range    Magnesium 1 8 (L) 1 9 - 2 7 mg/dL   Phosphorus    Collection Time: 10/31/22  4:25 AM   Result Value Ref Range    Phosphorus 4 3 2 7 - 4 5 mg/dL       Imaging Studies: CT abdomen pelvis wo contrast    Result Date: 10/18/2022  Narrative: CT ABDOMEN AND PELVIS WITHOUT IV CONTRAST INDICATION:   g tube placement tomorrow, evaluate anatomy  COMPARISON:  10/10/2022 renal ultrasound TECHNIQUE:  CT examination of the abdomen and pelvis was performed  Axial, sagittal, and coronal 2D reformatted images were created from the source data and submitted for interpretation  Radiation dose length product (DLP) for this visit:  314 mGy-cm   This examination, like all CT scans performed in the [facility], was performed utilizing techniques to minimize radiation dose exposure, including the use of iterative reconstruction and automated exposure control  IV Contrast:  Not administered  Enteric Contrast:  Enteric contrast was not administered  FINDINGS: ABDOMEN LOWER CHEST:  Tree in bud type nodular opacities in both lower lobes superimposed on dependent atelectasis versus mild consolidation  Small pericardial effusion  LIVER/BILIARY TREE:  Mild fatty deposition at the falciform ligament, a common finding  Otherwise within normal limits  GALLBLADDER:  Within normal limits  SPLEEN:  Within normal limits  PANCREAS:  Within normal limits  ADRENAL GLANDS:  Within normal limits  KIDNEYS/URETERS:  Within normal limits  STOMACH AND BOWEL:  Oral contrast in the colon, likely residual from barium swallow performed 10/11/2022  Rectum is distended with stool  Diverticulosis  No bowel wall thickening or fluid levels   Transverse colon is interposed between the abdominal wall in the body of the stomach  APPENDIX:  Normal appearance  ABDOMINOPELVIC CAVITY:  No abnormal air, fluid or enlarged lymph nodes  VESSELS:  Limited evaluation without intravenous contrast   No aortic aneurysm  Atherosclerosis  PELVIS: REPRODUCTIVE ORGANS:  Prostate poorly visualized  No acute findings  URINARY BLADDER:  Within normal limits  ABDOMINAL WALL/INGUINAL REGIONS:  Within normal limits  OSSEOUS STRUCTURES:  Degenerative changes  Mild grade 1 anterolisthesis at L5-S1  Bilateral L5 pars defects  Impression: Colon interposed between the abdominal wall and the stomach  No ascites  Bilateral lung base opacities most compatible with infection/inflammation  Correlate clinically for aspiration  Small pericardial effusion  No acute abdominopelvic findings  Other nonemergent findings above  Workstation performed: [id number]     XR chest portable    Result Date: 10/15/2022  Narrative: CHEST INDICATION:   for post Keofed insertion  COMPARISON:  CXR and CTA neck 10/9/2022  Subsequent chest radiograph 10/15/2022  EXAM PERFORMED/VIEWS:  XR CHEST PORTABLE FINDINGS:  Feeding tube in midesophagus  Cardiomediastinal silhouette appears unremarkable  Left base opacity, partially imaged on neck CT, compatible with bronchiectasis and bronchiolitis  No effusion or pneumothorax  Osseous structures appear within normal limits for patient age  Impression: Feeding tube in midesophagus, subsequently advanced into the stomach  Left base opacity partially imaged on neck CTA and due to bronchiectasis and bronchiolitis  Workstation performed: [id number]     XR chest portable    Result Date: 10/15/2022  Narrative: CHEST INDICATION:   to be checked during Keofed insertion to ensure correct placement  COMPARISON:  CXR 10/15/2022 and 10/19/2022, CTA neck 10/19/2022  EXAM PERFORMED/VIEWS:  XR CHEST PORTABLE FINDINGS:  Feeding tube in stomach  Cardiomediastinal silhouette appears unremarkable  Persistent left base opacity    No effusion or pneumothorax  Osseous structures appear within normal limits for patient age  Impression: Feeding tube in stomach Persistent left base opacity, partially shown on CTA neck, due to bronchiectasis and bronchiolitis  Workstation performed: [id number]     XR chest 1 view portable    Result Date: 10/10/2022  Narrative: CHEST INDICATION:   CVA  COMPARISON:  CTA neck 10/9/2022  EXAM PERFORMED/VIEWS:  XR CHEST PORTABLE FINDINGS: Cardiomediastinal silhouette appears unremarkable  Ill-defined opacity in the left upper lung  No effusion or pneumothorax  Osseous structures appear within normal limits for patient age  Impression: Ill-defined opacity in the left upper lung which corresponds with bronchiectasis and tree-in-bud nodularity on the CTA neck, which could be due to aspiration versus bronchiolitis  Workstation performed: [id number]     XR abdomen 1 view kub    Result Date: 10/25/2022  Narrative: ABDOMEN INDICATION:   confirm G-tube placement  COMPARISON:  CT 10/17/22 VIEWS:  AP supine FINDINGS: Gastrostomy tube tip within stomach, with contrast pooling in the fundus Left basal airspace opacities Nonspecific bowel gas pattern No acute osseous abnormality     Impression: 1  Gastrostomy tube tip in stomach 2  Left basal airspace opacities Workstation performed: [id number]     MRI brain wo contrast    Result Date: 10/9/2022  Narrative: MRI BRAIN WITHOUT CONTRAST INDICATION: Facial droop  COMPARISON:   10/19/2022  TECHNIQUE:  Sagittal T1, axial T2, axial FLAIR, axial T1, axial [place] and axial diffusion imaging  IMAGE QUALITY:  Diagnostic  FINDINGS: BRAIN PARENCHYMA: Restricted diffusion and T2/FLAIR hyperintensity in the right periventricular white matter, corona radiata and lentiform nuclei, corresponding to findings on the head CT  Punctate focus of restricted diffusion in the left thalamus consistent with an acute infarct  No acute intracranial hemorrhage or mass effect   Periventricular and subcortical T-2/FLAIR hyperintense foci consistent with is  White matter lesions also noted in the midbrain and bandar  VENTRICLES:  Enlargement of the ventricles and sulci consistent with chronic volume loss  No hydrocephalus or extra-axial collection  SELLA AND PITUITARY GLAND:  No sellar enlargement  ORBITS:  Intact globes and orbits  PARANASAL SINUSES:  Mucosal thickening throughout the paranasal sinuses, most prominent in the left maxillary sinus  VASCULATURE:  Posterior cerebral artery flow void is not well-visualized  CALVARIUM AND SKULL BASE:  Bilateral mastoid effusions  EXTRACRANIAL SOFT TISSUES:  No soft tissue mass  Impression: 1  Stable acute to early subacute infarct in the right periventricular white matter, corona radiata and lentiform nuclei  No hemorrhagic conversion or mass effect  2   Acute lacunar infarct in the left thalamus  Workstation performed: [id number]     FL barium swallow video w speech    Result Date: 10/11/2022  Narrative: A video barium swallow study was performed by the Department of Speech Pathology  Please refer to the report for the official interpretation  The images are stored for archival purposes only  Study images were not formally reviewed by the Radiology Department  CT stroke alert brain    Result Date: 10/9/2022  Narrative: CT BRAIN - STROKE ALERT PROTOCOL INDICATION:   Stroke Alert  COMPARISON:  None  TECHNIQUE:  CT examination of the brain was performed  In addition to axial images, coronal reformatted images were created and submitted for interpretation  Radiation dose length product (DLP) for this visit:  1022 mGy-cm   This examination, like all CT scans performed in the [facility], was performed utilizing techniques to minimize radiation dose exposure, including the use of iterative reconstruction and automated exposure control  IMAGE QUALITY:  Diagnostic   FINDINGS:  PARENCHYMA: Ill-defined area of hypoattenuation in the right periventricular white matter, corona radiata and lentiform nuclei  Punctate, chronic lacunar infarcts in the bilateral basal ganglia  Periventricular and subcortical hypoattenuating foci consistent with microangiopathic disease  No acute intracranial hemorrhage or mass effect  VENTRICLES AND EXTRA-AXIAL SPACES:  No hydrocephalus or extra axial collection  VISUALIZED ORBITS AND PARANASAL SINUSES:  Intact globes and orbits  Mucosal thickening in the left maxillary sinus and left anterior ethmoid air cells  CALVARIUM AND EXTRACRANIAL SOFT TISSUES:  Small bilateral mastoid effusions  No lytic or blastic lesion  Impression: Acute subcortical infarct in the right periventricular white matter, corona radiata and lentiform nuclei  No hemorrhagic conversion or mass effect  Findings were directly discussed with Zachary Marcum at 4:39 PM  Workstation performed: [id number]     VAS carotid complete study    Result Date: 10/10/2022  Narrative:  [facility] REPORT CLINICAL: Indications: Patient presents with to evaluate for carotid artery stenosis s/p recent CVA  Recent CT showed severe stenosis of the left ICA  Operative History: Patient denies any cardiovascular procedures Risk Factors The patient has history of smoking (current) 0 5 ppd  Clinical Right Pressure:  157/100 mm Hg, Left Pressure:  144/100 mm Hg  FINDINGS:  Right        Impression  PSV  EDV (cm/s)  Direction of Flow  Ratio  Dist  ICA                 65          28                      1 18  Mid  ICA                  76          28                      1 38  Prox   ICA    1 - 49%      81          28                      1 47  Dist CCA                  39           9                            Mid CCA                   55          11                      0 70  Prox CCA                  79          18                            Ext Carotid               61          11                      1 11  Prox Vert                 37          12  Antegrade Subclavian               120          13                             Left         Impression  PSV  EDV (cm/s)  Direction of Flow  Ratio  Dist  ICA                 42          10                      1 00  Mid  ICA                 152          37                      3 68  Prox  ICA    70%+        291          78                      7 04  Dist CCA                  48          22                            Mid CCA                   41          15                      0 57  Prox CCA                  72          13                            Ext Carotid               72          12                      1 75  Prox Vert                 44          13  Antegrade                 Subclavian                67          13                               CONCLUSION: Impression RIGHT: There is <50% stenosis noted in the internal carotid artery  Plaque is heterogenous and irregular  Vertebral artery flow is antegrade  There is no significant subclavian artery disease  LEFT: There is >70% stenosis noted in the internal carotid artery  Plaque is heterogenous and irregular  Vertebral artery flow is antegrade  There is no significant subclavian artery disease  Recommend repeat testing in 6 months as per protocol unless otherwise indicated  Internal carotid artery stenosis determination by consensus criteria from: Prashanth Estrada et al  Carotid Artery Stenosis: Gray-Scale and Doppler US Diagnosis - Society of Radiologists in [address] [place] [address] [facility], Radiology 2003; [phone]  SIGNATURE: Electronically Signed by: Suyapa Mitchell MD, RPVI on 2022-10-10 09:45:47 PM    US kidney and bladder    Result Date: 10/11/2022  Narrative: RENAL ULTRASOUND INDICATION:   hyponatremia and elevated creatinine in settinng of stroke  COMPARISON: None TECHNIQUE:   Ultrasound of the retroperitoneum was performed with a curvilinear transducer utilizing volumetric sweeps and still imaging techniques  FINDINGS: KIDNEYS: Symmetric and normal size  Right kidney:  11 0 x 4 7 x 4 5 cm  Volume 123 9 mL Left kidney:  10 6 x 3 9 x 3 7 cm  Volume 78 8 mL Right kidney Normal echogenicity and contour  No mass is identified  No hydronephrosis  No shadowing calculi  No perinephric fluid collections  Left kidney Normal echogenicity and contour  No mass is identified  No hydronephrosis  No shadowing calculi  No perinephric fluid collections  URETERS: Nonvisualized  BLADDER: Normally distended  No focal thickening or mass lesions  Bilateral ureteral jets detected  Impression: Normal  Workstation performed: [id number]     CT abdomen pelvis w contrast    Result Date: 10/31/2022  Narrative: CT ABDOMEN AND PELVIS WITH IV CONTRAST INDICATION:   GI bleed suspected GI bleed  COMPARISON:  October 17, 2022 TECHNIQUE:  CT examination of the abdomen and pelvis was performed  Axial, sagittal, and coronal 2D reformatted images were created from the source data and submitted for interpretation  Radiation dose length product (DLP) for this visit:  1094 6 mGy-cm   This examination, like all CT scans performed in the [facility], was performed utilizing techniques to minimize radiation dose exposure, including the use of iterative  reconstruction and automated exposure control  IV Contrast:  100 mL of iohexol (OMNIPAQUE) Enteric Contrast:  Enteric contrast was not administered  FINDINGS: ABDOMEN LOWER CHEST:  June but nodular opacities in the lung bases with additional scattered airspace opacities  LIVER/BILIARY TREE:  Liver is diffusely decreased in density consistent with fatty change  No CT evidence of suspicious hepatic mass  Normal hepatic contours  No biliary dilatation  GALLBLADDER:  No calcified gallstones  No pericholecystic inflammatory change  SPLEEN:  Unremarkable  PANCREAS:  Unremarkable  ADRENAL GLANDS:  Unremarkable  KIDNEYS/URETERS:  Unremarkable  No hydronephrosis   STOMACH AND BOWEL:  Markedly distended rectum with large amount of fecal material and surrounding inflammatory changes  Colonic diverticulosis without evidence of acute diverticulitis  Gastrostomy tube is seen with its tip in the stomach  APPENDIX:  No findings to suggest appendicitis  ABDOMINOPELVIC CAVITY:  No ascites  No pneumoperitoneum  No lymphadenopathy  VESSELS:  Atherosclerotic changes are present  No evidence of aneurysm  PELVIS REPRODUCTIVE ORGANS:  Unremarkable for patient's age  URINARY BLADDER:  Thickening of the urinary bladder wall is seen  Urinary bladder is collapsed around a Pyle catheter balloon  Air in the urinary bladder is seen  ABDOMINAL WALL/INGUINAL REGIONS:  Unremarkable  OSSEOUS STRUCTURES:  No acute fracture or destructive osseous lesion  Impression: Cannot rule out active gastroenterology bleed due to administration of oral contrast  Marked distention of the rectum with large amount of surrounding inflammatory changes which may represent stercoral colitis  Follow-up with gastroenterology is recommended  Thickening of the urinary bladder wall which may represent cystitis  Follow-up with urology is recommended  Airspace opacities and tree-in-bud opacities in the lung bases which may represent infection  Short-term follow-up chest CT scan in 3 months is recommended to evaluate for resolution  The study was marked in [facility] for immediate notification  Workstation performed: [id number]     CTA stroke alert (head/neck)    Result Date: 10/9/2022  Narrative: CTA NECK AND BRAIN WITH CONTRAST INDICATION: Stroke Alert COMPARISON:   None  TECHNIQUE:   Post contrast imaging was performed after administration of iodinated contrast through the neck and brain  Post contrast axial 0 625 mm images timed to opacify the arterial system  3D rendering was performed on an independent workstation  MIP reconstructions performed  Coronal reconstructions were performed of the noncontrast portion of the brain  Radiation dose length product (DLP) for this visit:     This examination, like all CT scans performed in the [facility], was performed utilizing techniques to minimize radiation dose exposure, including the use of iterative reconstruction  and automated exposure control  IV Contrast:  IMAGE QUALITY:   Diagnostic FINDINGS: CERVICAL VASCULATURE AORTIC ARCH AND GREAT VESSELS:  Three-vessel configuration of the aortic arch  No stenosis in the subclavian arteries  RIGHT VERTEBRAL ARTERY CERVICAL SEGMENT: Calcified plaque in the proximal cervical segment resulting in moderate (50-69%) stenosis  Remainder of the cervical segment is patent and normal in caliber  LEFT VERTEBRAL ARTERY CERVICAL SEGMENT:  Normal origin  The vessel is normal in caliber throughout the neck  RIGHT EXTRACRANIAL CAROTID SEGMENT:  Normal caliber common carotid artery  Calcified plaque in the proximal cervical segment of the internal carotid artery without hemodynamically significant stenosis  LEFT EXTRACRANIAL CAROTID SEGMENT:  Normal caliber common carotid artery  Calcified plaque the bifurcation and origin of the internal carotid artery without significant stenosis  Calcified plaque throughout the proximal cervical segment of the ICA  Long segment critical (greater than 90%) stenosis  Mid and distal cervical segments demonstrate decreased luminal diameter secondary to hypoperfusion  NASCET criteria was used to determine the degree of internal carotid artery diameter stenosis  INTRACRANIAL VASCULATURE INTERNAL CAROTID ARTERIES:  Calcified plaque in the cavernous segment of the left ICA results in moderate (50-69%) stenosis  Patent ophthalmic arteries  ANTERIOR CIRCULATION:  Hypoplastic left A1 segment  Anterior communicating artery identified  No stenosis in the anterior cerebral arteries  MIDDLE CEREBRAL ARTERY CIRCULATION:  M1 segment and middle cerebral artery branches demonstrate normal enhancement bilaterally  DISTAL VERTEBRAL ARTERIES:  Normal distal vertebral arteries  Posterior inferior cerebellar arteries are patent  BASILAR ARTERY:  Basilar artery is normal in caliber  Patent superior cerebellar arteries  POSTERIOR CEREBRAL ARTERIES: Atretic right posterior cerebral artery  Posterior communicating arteries not identified  VENOUS STRUCTURES:  Limited evaluation due to contrast bolus timing  NON VASCULAR ANATOMY BONY STRUCTURES:  No lytic or blastic lesion  SOFT TISSUES OF THE NECK:  No mass or lymphadenopathy  THORACIC INLET:  Small mediastinal lymph nodes, likely reactive  Scattered endobronchial opacities in the visualized upper lungs, most prominent in the left upper lobe  Left upper lobe bronchiectasis  Mild paraseptal emphysema  Impression: 1  Lung segment critical (greater than 90%) stenosis in the proximal left cervical ICA  2   Atretic right posterior cerebral artery  No evidence of acute thrombus or large vessel occlusion of the major vessels of the [place]  3   Evidence of aspiration or pneumonia in the partially visualized upper lungs, most prominent in the left upper lobe  Findings were directly discussed with Mary Collado at 4:39 PM  Workstation performed: [id number]     Echo complete w/ contrast if indicated    Result Date: 10/10/2022  Narrative: •  Left Ventricle: Left ventricular cavity size is normal  Wall thickness is moderately increased  There is moderate concentric hypertrophy  The left ventricular ejection fraction is 55%  Systolic function is normal  Wall motion is normal  Diastolic function is mildly abnormal, consistent with grade I (abnormal) relaxation  •  Right Ventricle: Right ventricular cavity size is normal  Systolic function is normal  •  Tricuspid Valve: There is mild regurgitation  •  Pericardium: There is a trivial pericardial effusion  The fluid exhibits a fibrinous appearance  •  Pulmonary Artery: The estimated pulmonary artery systolic pressure is 05 0 mmHg  The pulmonary artery systolic pressure is mildly increased  Assessment/Plan:   Melena  · Reported dark stool w/ BRB at [facility]- on arrival only had dark black/ greenish colored stool  · Had episodes of hypotension and a code crimson called- 2 units PRBC given at [facility], hemoglobin now is 11 4  · Cont Protonix drip  · Continue NPO, patient is only on PEG tube feedings due to dysphagia from recent CVA  · Patient with mildly elevated BUN, cannot exclude ulceration from PEG balloon  · Patient noted to have hyponatremia with sodium of 127  · Will need EGD for further evaluation once further stabilized and hyponatremia has improved  · Ordered gastric lavage via G-tube, spoke with nurse at bedside  · Stool was actually negative for occult blood on admission at [facility]  · Does have history of alcohol abuse but no findings on imaging consistent with cirrhosis with normal INR and platelet count    Stercoral colitis with fecal impaction   -patient also noted to have bright red blood per rectum on exam which is likely related to fecal impaction with stercoral colitis  -nurse reports patient is having bowel movements but unsure if he is having formed stool or liquid stool and will order Fleet enema x1 today due to marked distention of rectum noted on CT  -will need further bowel regimen, patient was on MiraLax twice daily via PEG and will continue the same after impaction resolves  -will need colonoscopy at time of EGD for further evaluation as above once further stabilized  -ordered half MiraLax prep today is long as G-tube lavage is negative for active bleed   -follow serial H&H    Thank you for the consultation  Case was discussed with Dr Luzmaria Gonzalez

## 2022-10-31 NOTE — ASSESSMENT & PLAN NOTE
Patient was hypotensive at [facility] requiring short time Levophed drip and was weaned prior to coming to [facility] ICU  He did not need pressors while in the ICU  His BP has been stable, amlodipine and metoprolol doses were continued in the ICU  BP today 133/65       · Monitor BP closely  · Continue amlodipine 5mg and metoprolol 25mg

## 2022-10-31 NOTE — PLAN OF CARE
Problem: MOBILITY - ADULT  Goal: Maintain or return to baseline ADL function  Description: INTERVENTIONS:  -  Assess patient's ability to carry out ADLs; assess patient's baseline for ADL function and identify physical deficits which impact ability to perform ADLs (bathing, care of mouth/teeth, toileting, grooming, dressing, etc )  - Assess/evaluate cause of self-care deficits   - Assess range of motion  - Assess patient's mobility; develop plan if impaired  - Assess patient's need for assistive devices and provide as appropriate  - Encourage maximum independence but intervene and supervise when necessary  - Involve family in performance of ADLs  - Assess for home care needs following discharge   - Consider OT consult to assist with ADL evaluation and planning for discharge  - Provide patient education as appropriate  Outcome: Progressing    Problem: Nutrition/Hydration-ADULT  Goal: Nutrient/Hydration intake appropriate for improving, restoring or maintaining nutritional needs  Description: Monitor and assess patient's nutrition/hydration status for malnutrition  Collaborate with interdisciplinary team and initiate plan and interventions as ordered  Monitor patient's weight and dietary intake as ordered or per policy  Utilize nutrition screening tool and intervene as necessary  Determine patient's food preferences and provide high-protein, high-caloric foods as appropriate       INTERVENTIONS:  - Monitor oral intake, urinary output, labs, and treatment plans  - Assess nutrition and hydration status and recommend course of action  - Evaluate amount of meals eaten  - Assist patient with eating if necessary   - Allow adequate time for meals  - Recommend/ encourage appropriate diets, oral nutritional supplements, and vitamin/mineral supplements  - Order, calculate, and assess calorie counts as needed  - Recommend, monitor, and adjust tube feedings and TPN/PPN based on assessed needs  - Assess need for intravenous fluids  - Provide specific nutrition/hydration education as appropriate  - Include patient/family/caregiver in decisions related to nutrition  Outcome: Progressing

## 2022-10-31 NOTE — ASSESSMENT & PLAN NOTE
· Patient noted with increased anxiety/restlessness at nursing facility  Noted to be pulling G tube, now 2nd time dislodged since placement 10/18    · Recently started on lorazepam 0 5mg q6 p r n  · Patient pleasantly confused, frequent re-orientation, fall precuations

## 2022-10-31 NOTE — ASSESSMENT & PLAN NOTE
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

## 2022-10-31 NOTE — ASSESSMENT & PLAN NOTE
Patient has history of CVA in October of 2022 with residual left hemiplegia and dysphagia  He is unable to use his left arm or leg completely  He was in SNF for recovery  At time of stroke, stenosis was found in proximal left cervical ICA greater than 90%MRI remarkable for acute lacunar infarct in left thalamus, and acute to early subacute infarct in right periventricular white matter and lentiform nuclei without mass effect or hemorrhage       · Hold aspirin and plavix in the setting of suspected GI bleed

## 2022-10-31 NOTE — PLAN OF CARE
Problem: Potential for Falls  Goal: Patient will remain free of falls  Description: INTERVENTIONS:  - Educate patient/family on patient safety including physical limitations  - Instruct patient to call for assistance with activity   - Consult OT/PT to assist with strengthening/mobility   - Keep Call bell within reach  - Keep bed low and locked with side rails adjusted as appropriate  - Keep care items and personal belongings within reach  - Initiate and maintain comfort rounds  - Make Fall Risk Sign visible to staff  - Offer Toileting every 2 Hours, in advance of need  - Initiate/Maintain bed alarm  - Apply yellow socks and bracelet for high fall risk patients  - Consider moving patient to room near nurses station  Outcome: Progressing     Problem: Prexisting or High Potential for Compromised Skin Integrity  Goal: Skin integrity is maintained or improved  Description: INTERVENTIONS:  - Identify patients at risk for skin breakdown  - Assess and monitor skin integrity  - Assess and monitor nutrition and hydration status  - Monitor labs   - Assess for incontinence   - Turn and reposition patient  - Assist with mobility/ambulation  - Relieve pressure over bony prominences  - Avoid friction and shearing  - Provide appropriate hygiene as needed including keeping skin clean and dry  - Evaluate need for skin moisturizer/barrier cream  - Collaborate with interdisciplinary team   - Patient/family teaching  - Consider wound care consult   Outcome: Progressing

## 2022-10-31 NOTE — NURSING NOTE
Patient arrived to floor at [id number] [phone]  Patient was alert and oriented x4, slightly lethargic  Patient was noted to be hypotensive 88/56  LR was started, H&H drawn  At 2325 patient had sudden change in mental status, became more lethargic and less responsive  Patient had hard time keeping eyes open and head up  Rapid response called at 200, MD and ED RNs arrived to floor, new orders written for levo, protonix (see emar for further details ) PRBCs prepared at [phone]  Patient was transferred back to ED at 00:00

## 2022-10-31 NOTE — QUICK NOTE
Patient seen and evaluated by Critical Care today and deemed to be appropriate for transfer to Stepdown Level 2  Spoke to Dr Kody Ford from [facility] to accept patient transfer  Patient did not move from ICU on the day of transfer  See progress note from 10/31/22 for the most up-to-date treatment therapy plans  Critical care can be contacted via [employer] with any questions or concerns

## 2022-10-31 NOTE — ASSESSMENT & PLAN NOTE
Malnutrition Findings:                                 BMI Findings: Body mass index is 17 18 kg/m²       · Nutrition consult when patient can tolerate G tube feeds   · On bowel prep via G tube for colonoscopy

## 2022-10-31 NOTE — EMTALA/ACUTE CARE TRANSFER
[address]  FLOOR MED SURG UNIT  [facility] [address]  Dept: [phone]      ACUTE CARE TRANSFER CONSENT    NAME [address] [facility]                                         1960                              MRN [id number]    I have been informed of my rights regarding examination, treatment, and transfer   by Dr Flex Rand*    Benefits:  higher level of care, GI backup    Risks:  delay of care, risks associated with transport      Transfer Request:  I acknowledge that my medical condition has been evaluated and explained to me by the treating physician or other qualified medical person and/or my attending physician who has recommended and offered to me further medical examination and treatment  I understand the Hospital's obligation with respect to the treatment and stabilization of my medical condition  I nevertheless request to be transferred  I release the Hospital, the doctor, and any other persons caring for me from all responsibility or liability for any injury or ill effects that may result from my transfer and agree to accept all responsibility for the consequences of my choice to transfer, rather than receive stabilizing treatment at the Hospital  I understand that because the transfer is my request, my insurance may not provide reimbursement for the services  The Hospital will assist and direct me and my family in how to make arrangements for transfer, but the hospital is not liable for any fees charged by the transport service  In spite of this understanding, I refuse to consent to further medical examination and treatment which has been offered to me, and request transfer to    I authorize the performance of emergency medical procedures and treatments upon me in both transit and upon arrival at the receiving facility    Additionally, I authorize the release of any and all medical records to the receiving facility and request they be transported with me, if possible  I authorize the performance of emergency medical procedures and treatments upon me in both transit and upon arrival at the receiving facility  Additionally, I authorize the release of any and all medical records to the receiving facility and request they be transported with me, if possible  I understand that the safest mode of transportation during a medical emergency is an ambulance and that the Hospital advocates the use of this mode of transport  Risks of traveling to the receiving facility by car, including absence of medical control, life sustaining equipment, such as oxygen, and medical personnel has been explained to me and I fully understand them  (LUIS FELIPE CORRECT BOX BELOW)  [  ]  I consent to the stated transfer and to be transported by ambulance/helicopter  [  ]  I consent to the stated transfer, but refuse transportation by ambulance and accept full responsibility for my transportation by car  I understand the risks of non-ambulance transfers and I exonerate the Hospital and its staff from any deterioration in my condition that results from this refusal     X___________________________________________    DATE  10/31/22  TIME________  Signature of patient or legally responsible individual signing on patient behalf           RELATIONSHIP TO PATIENT_________________________          Provider Certification    NAME [address] [place] [address] 1960                              MRN [id number]    A medical screening exam was performed on the above named patient    Based on the examination:    Condition Necessitating Transfer GI bleed    Patient Condition:  fair    Reason for Transfer:  active GI bleed     Transfer Requirements: Facility     · Space available and qualified personnel available for treatment as acknowledged by  PACS  · Agreed to accept transfer and to provide appropriate medical treatment as acknowledged by  Keely Quiroz          · Appropriate medical records of the examination and treatment of the patient are provided at the time of transfer   [facility], [address] _______  · Transfer will be performed by qualified personnel from    and appropriate transfer equipment as required, including the use of necessary and appropriate life support measures  Provider Certification: I have examined the patient and explained the following risks and benefits of being transferred/refusing transfer to the patient/family:         Based on these reasonable risks and benefits to the patient and/or the unborn child(vanesa), and based upon the information available at the time of the patient’s examination, I certify that the medical benefits reasonably to be expected from the provision of appropriate medical treatments at another medical facility outweigh the increasing risks, if any, to the individual’s medical condition, and in the case of labor to the unborn child, from effecting the transfer      X____________________________________________ DATE 10/31/22        TIME_______      ORIGINAL - SEND TO MEDICAL RECORDS   COPY - SEND WITH PATIENT DURING TRANSFER

## 2022-10-31 NOTE — ASSESSMENT & PLAN NOTE
Lab Results   Component Value Date    HGB 5 8 (LL) 11/01/2022     Presents from SNF where staff noted patient to have episodes of green stools with bright red blood  No abdominal pain, N/V/D, hematemesis, hematochezia  Recently started on ASA/Plavix due to CVA  Does have hx of alcohol abuse, however no hx of cirrhosis/varices  CT abdomen showed sterocoral colitis and thickened bladder wall  Could not rule out acute GI bleed  · Recheck fecal occult blood  · Hgb 8 8 this morning, down to 5 8 in the afternoon but upon stat repeat hemoglobin was 10 6     · Patient is asymptomatic   · Monitor H&H q 8  · Baseline Hgb appears 9-10  · Hold Plavix/aspirin  · NPO for EGD/colonoscopy   · Continue Protonix IV q 12  · Blood consent in chart   · GI consultation: bowel prep, gastric lavage, fleet enema yesterday

## 2022-10-31 NOTE — PROGRESS NOTES
[address], [address] [place] [address], [address] [place] [address]  [phone]    NAME: Jeana Armstrong III  AGE: 63 y o  SEX: male    Progress Note    Location: [facility]  POS: 31 (SNF)     Patient's care was coordinated with nursing facility staff  Recent vitals, labs, and updated medications were review on [facility] system in facility  Assessment/Plan:    Anxiety  Noted with anxiety and restlessness, h/o ETOH   Pulled peg tube and scratching buttocks and feces in nails   C/w lorazepam with relief   Pleasantly confused at baseline, staff re directing/ re orientation  Monitor     Anemia  Could be chronic in nature due to ETOH abuse   Recent hemoglobin 7 2, during recent hospitalization was noted to be 96 0  Added folic acid, liquid multivitamin  Consulted dietitian for evaluation and treatment, appreciate recommendations  Patient continues with tube feeds and tolerating  No N/V/D/C, NT/ND, + BS abd soft  Continue to monitor H&H  Next CBC 11/09/2022      Moderate protein-calorie malnutrition ([address] )  Malnutrition Findings:     abdominal nutrition types: Acute illness related to alcohol abuse  Noted to be weak and frail with generalized body muscle wasting on exam today  BMI was less than 18 5  Abdominal degree of malnutrition: Malnutrition of moderate degree   My nutrition gross wrist: Muscle loss, inadequate energy      On 10/18/2022- PEG tube placed  Maintain head of bed elevated  Tolerating tube feeds  Continue with aspiration precautions  Remains NPO   Dietitian following  Continue daily multivitamins and supplements  With known history of EtOH abuse, started on folic acid and thiamine   Continue to monitor weights, I&Os  Continue supportive care     Ambulatory dysfunction  Multifactorial  Recently had CVA, and history of EtOH abuse  Continue PT/OT   Continue 24-7 supportive care  Continues fall and safety precaution  Recommend patient to be near nursing station for safety  Nursing to onticnue hourly rounding  Optimize acute and chronic medical condition as outlined  PT/OT/, dietitian following   following for DC planning      Chief complaint / Reason for visit: STR F/U    History of Present Illness:  66-year old male seen and examined for STR f/u  Received patient lying in bed, resting  HOB elevated  Pleasantly confused at baseline  Poor historian  On exam appears with generalized weakness and frail  Patient restless and anxious at times  C/w PRN ativan with relief  S/p self pulled peg tube  Was sent to ER  and came back to facility after placement  Peg tube appears patent per nursing  Area is clean, dry with no erythema or drainage around the site  Now binder placed for safety  needs redirection from staff to not pulled Peg tube  Midline incision is clean dry and intact no obvious sign of infection  Remains NPO  Tolerating feeds  Denies N/V/D  BLE no edema present  Afebrile, VSS and non toxic  Nursing reports no /GI discomfort  Per nursing no acute concerns or issues at this time  Review of Systems:  Per history of present illness, all other systems reviewed and negative  Other than those noted in HPI    HISTORY:  Medical Hx: Reviewed, unchanged  Family Hx: Reviewed, unchanged  Soc Hx: Reviewed,  unchanged    ALLERGY: Reviewed, unchanged  No Known Allergies     PHYSICAL EXAM:  Vital Signs:  Blood pressure 136/68, pulse 74, respiration 18, temperature 97 1 O2 sats 98% room air  Weight:  120 8 lb    General:  Appears with generalized weakness, frail, deconditioned and cachetic   Head: Atraumatic  Normocephalic  Eye Exam: anicteric sclera, no discharge, PERRLA, No injection  Oral Exam: moist mucous membranes, no buccaloropharyngeal erythema, palatine tonsils WNL  Neck Exam: no anterior cervical lymphadenopathy noted, neck supple  Cardiovascular: regular rate, regular rhythm, no murmurs, rubs, or gallops  Pulmonary: no wheeze, no rhonchi, no rales   No chest tenderness  Abdominal: soft, non-tender, nondistended, bowel sounds audible x 4 quadrants  Left side PEG tube patent   : Non distended bladder  B/B incontinent  Extremities and skin: no edema noted, no rashes  Midline incision C/D/I no erythema/drainage no obvious sign of infection   Neurological: Pleasantly confused, restless, anxious, combative at times, moving all 4 extremities symmetrically    Laboratory results / Imaging reviewed: Hard copy/ies in medical chart:    CBC-BMP reviewed from 10/26/2022  Hemoglobin 7 2, hematocrit 21 1, WBC 7 2, platelets 338, blood glucose 109, BUN 40, creatinine 0 55, sodium 134, potassium 4 1, calcium 8 7, EGFR 114    Current Medications: All medications reviewed and updated in Nursing Home Chart reviewed from [facility]    Please note:  Voice-recognition software may have been used in the preparation of this document  Occasional wrong word or "sound-alike" substitutions may have occurred due to the inherent limitations of voice recognition software  Interpretation should be guided by context      Estephania Renae  10/28/2022

## 2022-11-01 PROBLEM — E44.1 MILD PROTEIN-CALORIE MALNUTRITION (HCC): Status: ACTIVE | Noted: 2022-11-01

## 2022-11-01 PROBLEM — E44.1 MILD PROTEIN-CALORIE MALNUTRITION (HCC): Status: RESOLVED | Noted: 2022-11-01 | Resolved: 2022-11-01

## 2022-11-01 LAB
ANION GAP SERPL CALCULATED.3IONS-SCNC: 10 MMOL/L (ref 4–13)
BUN SERPL-MCNC: 11 MG/DL (ref 5–25)
CA-I BLD-SCNC: 1.14 MMOL/L (ref 1.12–1.32)
CALCIUM SERPL-MCNC: 9 MG/DL (ref 8.4–10.2)
CHLORIDE SERPL-SCNC: 97 MMOL/L (ref 96–108)
CO2 SERPL-SCNC: 20 MMOL/L (ref 21–32)
CREAT SERPL-MCNC: 0.43 MG/DL (ref 0.6–1.3)
ERYTHROCYTE [DISTWIDTH] IN BLOOD BY AUTOMATED COUNT: 17.1 % (ref 11.6–15.1)
GFR SERPL CREATININE-BSD FRML MDRD: 126 ML/MIN/1.73SQ M
GLUCOSE SERPL-MCNC: 80 MG/DL (ref 65–140)
HCT VFR BLD AUTO: 26.7 % (ref 36.5–49.3)
HGB BLD-MCNC: 10.6 G/DL (ref 12–17)
HGB BLD-MCNC: 5.8 G/DL (ref 12–17)
HGB BLD-MCNC: 8.8 G/DL (ref 12–17)
HGB BLD-MCNC: 9.1 G/DL (ref 12–17)
HGB BLD-MCNC: 9.8 G/DL (ref 12–17)
MAGNESIUM SERPL-MCNC: 2 MG/DL (ref 1.9–2.7)
MCH RBC QN AUTO: 29.9 PG (ref 26.8–34.3)
MCHC RBC AUTO-ENTMCNC: 33 G/DL (ref 31.4–37.4)
MCV RBC AUTO: 91 FL (ref 82–98)
PHOSPHATE SERPL-MCNC: 4.4 MG/DL (ref 2.7–4.5)
PLATELET # BLD AUTO: 300 THOUSANDS/UL (ref 149–390)
PMV BLD AUTO: 8.3 FL (ref 8.9–12.7)
POTASSIUM SERPL-SCNC: 4.2 MMOL/L (ref 3.5–5.3)
RBC # BLD AUTO: 2.94 MILLION/UL (ref 3.88–5.62)
SODIUM SERPL-SCNC: 127 MMOL/L (ref 135–147)
WBC # BLD AUTO: 6.5 THOUSAND/UL (ref 4.31–10.16)

## 2022-11-01 PROCEDURE — 30233N1 TRANSFUSION OF NONAUTOLOGOUS RED BLOOD CELLS INTO PERIPHERAL VEIN, PERCUTANEOUS APPROACH: ICD-10-PCS | Performed by: INTERNAL MEDICINE

## 2022-11-01 RX ORDER — SODIUM CHLORIDE 9 MG/ML
75 INJECTION, SOLUTION INTRAVENOUS CONTINUOUS
Status: DISPENSED | OUTPATIENT
Start: 2022-11-01 | End: 2022-11-01

## 2022-11-01 RX ADMIN — AMLODIPINE BESYLATE 5 MG: 5 TABLET ORAL at 08:34

## 2022-11-01 RX ADMIN — SODIUM CHLORIDE 75 ML/HR: 0.9 INJECTION, SOLUTION INTRAVENOUS at 07:41

## 2022-11-01 RX ADMIN — ATORVASTATIN CALCIUM 40 MG: 40 TABLET, FILM COATED ORAL at 17:50

## 2022-11-01 RX ADMIN — FOLIC ACID 1 MG: 1 TABLET ORAL at 08:34

## 2022-11-01 RX ADMIN — SODIUM CHLORIDE 8 MG/HR: 900 INJECTION, SOLUTION INTRAVENOUS at 20:38

## 2022-11-01 RX ADMIN — THIAMINE HCL TAB 100 MG 100 MG: 100 TAB at 08:34

## 2022-11-01 RX ADMIN — SODIUM CHLORIDE 8 MG/HR: 900 INJECTION, SOLUTION INTRAVENOUS at 10:36

## 2022-11-01 RX ADMIN — METOPROLOL TARTRATE 25 MG: 25 TABLET, FILM COATED ORAL at 08:34

## 2022-11-01 RX ADMIN — METOPROLOL TARTRATE 25 MG: 25 TABLET, FILM COATED ORAL at 21:07

## 2022-11-01 RX ADMIN — MULTIVITAMIN 15 ML: LIQUID ORAL at 08:34

## 2022-11-01 RX ADMIN — POLYETHYLENE GLYCOL 3350 17 G: 17 POWDER, FOR SOLUTION ORAL at 17:50

## 2022-11-01 NOTE — PLAN OF CARE
Problem: PHYSICAL THERAPY ADULT  Goal: Performs mobility at highest level of function for planned discharge setting  See evaluation for individualized goals  Description: Treatment/Interventions: Functional transfer training, LE strengthening/ROM, Therapeutic exercise, Endurance training, Cognitive reorientation, Patient/family training, Equipment eval/education, Bed mobility  Equipment Recommended:  (trial hemiwalker (pt w/ no L grasp for B UE support))       See flowsheet documentation for full assessment, interventions and recommendations  11/1/2022 1711 by Rocio Calvillo PT  Note:    Problem List: Decreased strength, Decreased endurance, Decreased range of motion, Impaired balance, Decreased mobility, Decreased cognition, Impaired judgement, Decreased coordination  Assessment: Willem Worley III is a 63 y o  Male who [facility] 10/[facility]/2022 from [facility] w/ diagnosis of melena  Orders for PT eval and treat received  Pt presents w/ comorbidities of recent CVA (10/2022), ETOH abuse, PEG tube placement due to CVA  Pt was previously A x 2 for transfers at [facility]  Upon evaluation, pt presents w/ the following deficits: weakness, decreased ROM, altered sensation, impaired coordination, impaired tone, impaired balance and impaired insight/safety awareness  Upon eval, pt requires max A x 1  for bed mobility, mod A x 2 for STS transfers, max A x 2 for SPT to pt's R  Pt's clinical presentation is unstable/unpredictable due to need for assist w/ all phases of mobility when usually mobilizing independently, need for input for task focus and mobility technique and recent readmission (within <30 days)  Patient is at an increased risk of falls due to physical and cognitive deficits  Given the above findings, discharge recommendation is for Post-acute inpatient rehabilitation (return to rehab)   During this admission, pt would benefit from continued skilled inpatient PT in the acute care setting in order to address the abovementioned deficits to maximize function and mobility before DC from acute care  PT Discharge Recommendation: Post acute rehabilitation services    See flowsheet documentation for full assessment

## 2022-11-01 NOTE — UTILIZATION REVIEW
Initial Clinical Review    Admission: Date/Time/Statement:   Admission Orders (From admission, onward)     Ordered        10/31/22 0223  Inpatient Admission  Once                      Orders Placed This Encounter   Procedures   • Inpatient Admission     Standing Status:   Standing     Number of Occurrences:   1     Order Specific Question:   Level of Care     Answer:   Level 1 Stepdown [13]     Order Specific Question:   Estimated length of stay     Answer:   More than 2 Midnights     Order Specific Question:   Certification     Answer:   I certify that inpatient services are medically necessary for this patient for a duration of greater than two midnights  See H&P and MD Progress Notes for additional information about the patient's course of treatment  Arrival Information     Patient transfer from [facility] as higher level of care due to need GI, monitoring post code crimson, possible anesthesia services  chief complaint:  Hypotension due to  Melena  Initial Presentation: 63 y o  male from [facility] via ems admitted inpatient due to Melena/acute on chronic anemia/Hyponatremia  Initially presented to [facility] from SNF due to dislodged G tube and melena, on Plavix and asa  In ED hgb 8 2, started on Protonix  On transfer to floor, systolic BP to 58Y, IVF bolus given then  Rapid response for decreased mental status and BP to 60s, Code crimson and transfused 2 units,  Started on Levophed gtt,  Protonix gtt  Transferred to [facility] as no anesthesia services available at [facility] per GI for possible intervention  At [facility]:  Awake, confused  Left sided hemiparesis baseline  Peg  Wbc 11 73   H&H 11 4/35  6  Plan includes:  Hold Plavix     Check hgb every 8 hours  Continue Protonix infusion  Bowel prep and egd and colonoscopy 11/1/22  Hold tube feeds  Off pressors and resume home Norvasc and lopressor        10/31/22 per GI - Patient with hyponatremic and had hypotension at [facility] in setting of bright red blood per rectum and dark green BM  G tube was placed about couple weeks ago, and replaced 1 week ago when dislodged  Suspect rectal bleeding due to stercoral proctitis  Rule out peptic ulcer disease  Has fecal impaction  Gastric lavage via G tube showed no significant blood  Plan is monitor hgb, colonoscopy when hyponatremia improves  Miralax prep via G tube  Date: 11/1/22    Day 2:  Today with dark green bowel movement - liquid and large amount  Dyspnea on exertion  On exam:  Follows commands, left facial droop  Decrease sensation and motor function LUE and LLE  Lungs decreased breath sounds, some crackles  Telemetry sinus  Abdomen soft  Plan is continued Protonix drip, NPO  Continue bowel regimen  Will need peg and colonoscopy  IVF in progress       ED Triage Vitals   Temperature Pulse Respirations Blood Pressure SpO2   10/31/22 0204 10/31/22 0204 10/31/22 0204 10/31/22 0204 10/31/22 0204   (!) 96 5 °F (35 8 °C) 88 20 165/99 100 %      Temp Source Heart Rate Source Patient Position - Orthostatic VS BP Location FiO2 (%)   10/31/22 0204 10/31/22 0845 10/31/22 0204 10/31/22 0204 --   Oral Monitor Lying Right arm       Pain Score       10/31/22 0230       No Pain          Wt Readings from Last 1 Encounters:   11/01/22 58 8 kg (129 lb 10 1 oz)     Additional Vital Signs:   11/01/22 1117 97 7 °F (36 5 °C) 68 15 121/61 86 98 % None (Room air) Lying   11/01/22 0834 -- 67 -- 149/78 -- -- -- --   11/01/22 0700 97 2 °F (36 2 °C) Abnormal  74 23 Abnormal  133/65 90 98 % -- Lying   11/01/22 0324 99 1 °F (37 3 °C) 72 14 132/70 96 100 % None (Room air) Lying   10/31/22 2300 98 6 °F (37 °C) 67 20 150/84 -- 98 % None (Room air) Lying   10/31/22 1800 98 7 °F (37 1 °C) 72 16 165/86 119 -- -- Lying   10/31/22 1500 97 9 °F (36 6 °C) 70 13 155/86 115 -- -- --   10/31/22 1100 97 8 °F (36 6 °C) 86 16 158/86 113 98 % None (Room air)        Pertinent Labs/Diagnostic Test Results:   No orders to display     10/30/22 Xray abdomen - The G-tube appears to be in the gastric body  No obvious extravasation on this single view    10/31/22 ct abdomen Cannot rule out active gastroenterology bleed due to administration of oral contrast   Marked distention of the rectum with large amount of surrounding inflammatory changes which may represent stercoral colitis  Follow-up with gastroenterology is recommended  Thickening of the urinary bladder wall which may represent cystitis  Follow-up with urology is recommended  Airspace opacities and tree-in-bud opacities in the lung bases which may represent infection  Short-term follow-up chest CT scan in 3 months is recommended to evaluate for resolution      Results from last 7 days   Lab Units 11/01/22  0618 11/01/22  0040 10/31/22  1834 10/31/22  1228 10/31/22  0225 10/30/22  2330 10/30/22  2038   WBC Thousand/uL 6 50  --   --   --  11 73*  --  8 22   HEMOGLOBIN g/dL 8 8* 9 1* 8 7* 9 2* 11 4* 7 9* 8 3*   HEMATOCRIT % 26 7*  --   --   --  35 6* 24 2* 25 1*   PLATELETS Thousands/uL 300  --   --   --  323  --  375   NEUTROS ABS Thousands/µL  --   --   --   --  9 09*  --  6 05     Results from last 7 days   Lab Units 11/01/22  0618 10/31/22  0425 10/31/22  0225 10/30/22  2330 10/30/22  2038   SODIUM mmol/L 127*  --  127* 127* 127*   POTASSIUM mmol/L 4 2  --  4 6 4 1 4 0   CHLORIDE mmol/L 97  --  97 94* 93*   CO2 mmol/L 20*  --  21 25 27   ANION GAP mmol/L 10  --  9 8 7   BUN mg/dL 11  --  26* 23 14   CREATININE mg/dL 0 43*  --  0 42* 0 44* 0 39*   EGFR ml/min/1 73sq m 126  --  127 125 131   CALCIUM mg/dL 9 0  --  8 8 9 2 9 4   CALCIUM, IONIZED mmol/L 1 14  --   --   --   --    MAGNESIUM mg/dL 2 0 1 8*  --   --   --    PHOSPHORUS mg/dL 4 4 4 3  --   --   --      Results from last 7 days   Lab Units 10/31/22  0225 10/30/22  2038   AST U/L 18 16   ALT U/L 15 18   ALK PHOS U/L 79 85   TOTAL PROTEIN g/dL 6 2* 6 7   ALBUMIN g/dL 3 4* 3 6   TOTAL BILIRUBIN mg/dL 0 83 0 42     Results from last 7 days   Lab Units 11/01/22  0618 10/31/22  0225 10/30/22  2330 10/30/22  2038   GLUCOSE RANDOM mg/dL 80 75 88 87     Results from last 7 days   Lab Units 10/31/22  1228   OSMOLALITY, SERUM mmol/*     Results from last 7 days   Lab Units 10/31/22  0225 10/30/22  2038   PROTIME seconds 13 3 12 7   INR  0 99 0 92   PTT seconds  --  28     Results from last 7 days   Lab Units 10/31/22  0225 10/30/22  2330   PROCALCITONIN ng/ml 0 11 0 14     Results from last 7 days   Lab Units 10/31/22  0225 10/31/22  0124   LACTIC ACID mmol/L 2 1* 0 9     Results from last 7 days   Lab Units 10/31/22  0225 10/30/22  2038   FERRITIN ng/mL 341 371     Results from last 7 days   Lab Units 10/31/22  1228 10/31/22  0855 10/30/22  2351   OSMOLALITY, SERUM mmol/*  --   --    OSMO UR mmol/KG  --  629 551     Results from last 7 days   Lab Units 10/31/22  1919 10/31/22  0855 10/30/22  2351   CLARITY UA  Clear  --  Clear   COLOR UA  Light Yellow  --  Yellow   SPEC GRAV UA  1 020  --  1 010   PH UA  7 0  --  8 0   GLUCOSE UA mg/dl Negative  --  Negative   KETONES UA mg/dl Negative  --  Negative   BLOOD UA  Moderate*  --  Negative   PROTEIN UA mg/dl Negative  --  Negative   NITRITE UA  Negative  --  Negative   BILIRUBIN UA  Negative  --  Negative   UROBILINOGEN UA E U /dl  --   --  0 2   UROBILINOGEN UA (BE) mg/dl <2 0  --   --    LEUKOCYTES UA  Trace*  --  Negative   WBC UA /hpf 2-4*  --  0-5   RBC UA /hpf 1-2  --  0-5   BACTERIA UA /hpf None Seen  --  Occasional   EPITHELIAL CELLS WET PREP /hpf Occasional  --  Occasional   MUCUS THREADS   --   --  Occasional*   SODIUM UR   --  90 76     Results from last 7 days   Lab Units 10/31/22  0255 10/31/22  0224 10/31/22  0105   BLOOD CULTURE  No Growth at 24 hrs  No Growth at 24 hrs  Received in Microbiology Lab  Culture in Progress         Past Medical History:   Diagnosis Date   • Hypertension    • Renal disorder    • Stroke [facility])      Present on Admission:  • Melena  • Hyponatremia  • Primary hypertension  • Moderate protein-calorie malnutrition (HCC)  • Acute on chronic anemia      Admitting Diagnosis: Melena  Age/Sex: 63 y o  male  Admission Orders:  10/31/22 0223 inpatient   Scheduled Medications:  amLODIPine, 5 mg, Per G Tube, Daily  atorvastatin, 40 mg, Oral, Daily With Dinner  folic acid, 1 mg, Oral, Daily  metoprolol tartrate, 25 mg, Per G Tube, Q12H [address]  multivitamin with iron-minerals, 15 mL, Oral, Daily  polyethylene glycol, 17 g, Oral, BID  thiamine, 100 mg, Oral, Daily    cefTRIAXone (ROCEPHIN) IVPB (premix in dextrose) 1,000 mg 50 mL  Dose: 1,000 mg  Freq: Daily Route: IV  Indications of Use: PREVENTION OF SPONTANEOUS BACTERIAL PERITONITIS  Indications Comment: Cirrhosis  Last Dose: 1,000 mg (10/31/22 0901)  Start: 10/31/22 0900 End: 10/31/22 0921    magnesium sulfate IVPB (premix) SOLN 1 g  Dose: 1 g  Freq: Once Route: IV  Start: 10/31/22 0630 End: 10/31/22 0832    sodium phosphate-biphosphate (FLEET) enema 1 enema  Dose: 1 enema  Freq: Once Route: RE  Start: 10/31/22 0900 End: 10/31/22 1057    Continuous IV Infusions:  pantoprozole (PROTONIX) infusion (Continuous), 8 mg/hr, Intravenous, Continuous  sodium chloride, 75 mL/hr, Intravenous, Continuous      PRN Meds: not used   albuterol, 2 5 mg, Nebulization, Q6H PRN  ondansetron, 4 mg, Intravenous, Q6H PRN    Urinary catheter   Bilateral SCDs  NPO    IP CONSULT TO GASTROENTEROLOGY    Network Utilization Review Department  ATTENTION: Please call with any questions or concerns to [phone] and carefully listen to the prompts so that you are directed to the right person  All voicemails are confidential   Hermes Ayala all requests for admission clinical reviews, approved or denied determinations and any other requests to dedicated fax number below belonging to the campus where the patient is receiving treatment   List of dedicated fax numbers for the Facilities:  [facility] ADMISSION DENIALS (Administrative/Medical Necessity) [phone]   [address] (Maternity/NICU/Pediatrics) Lucille Amaya [address] Marisabel Herzog  [phone]   1306 [facility] 150 Medical [place] [address] [place] [address] Marlo [address] Myriam [facility] [address] [facility] [place] 134 815 [facility] [phone]

## 2022-11-01 NOTE — OCCUPATIONAL THERAPY NOTE
Occupational Therapy Evaluation     Patient Name: Robyn Limon III  Today's Date: 11/1/2022  Problem List  Principal Problem:    Melena  Active Problems:    Hyponatremia    Moderate protein-calorie malnutrition ([address] )    History of CVA (cerebrovascular accident)    Primary hypertension    Acute on chronic anemia    Past Medical History  Past Medical History:   Diagnosis Date    Hypertension     Renal disorder     Stroke [facility])      Past Surgical History  Past Surgical History:   Procedure Laterality Date    GASTROSTOMY TUBE PLACEMENT N/A 10/18/2022    Procedure: INSERTION GASTROSTOMY TUBE OPEN;  Surgeon: Lynda Yanez DO;  Location: [facility];  Service: General        11/01/22 3607   OT Last Visit   OT Visit Date 11/01/22  (Tuesday)   Note Type   Note type Evaluation   Pain Assessment   Pain Assessment Tool 0-10   Pain Score No Pain  (pt reports fatigue, generalized weakness)   Effect of Pain on Daily Activities limits activity tolerance and I w/ ADLs   Hospital Pain Intervention(s) Repositioned; Ambulation/increased activity; Emotional support   Restrictions/Precautions   Weight Bearing Precautions Per Order No   Other Precautions Cognitive; Chair Alarm; Bed Alarm;Multiple lines;Telemetry; Fall Risk   Home Living   Type of Home SNF  (Pt was at [facility] for STR )   Additional Comments Prior to STR and per previous OT eval, pt was renting a room and bathroom in a house  Pt was able to participate in ADLs on one level, but he had 18 steps (a full flight) up to his bedroom and bathroom  Prior Function   Level of [place] Independent with ADLs; Independent with functional mobility; Independent with [address] staff  (at [facility])   [address] Help From Other (Comment)  (facility staff)   IADLs Family/Friend/Other provides transportation; Family/Friend/Other provides meals; Family/Friend/Other provides medication management   Vocational Full time employment   Comments Pt was at [employer] for [employer]  Lifestyle   Autonomy Prior to stroke, pt reports I w/ ADLs, IADLs, and fxal mobility w/out AD  Reciprocal Relationships friends and family   Service to Others Prior to stroke, pt worked full time in plastics  Intrinsic Gratification Pt enjoys watching football and baseball on television  General   Additional Pertinent History Pt was admitted to [facility] on 10/09/2022 as a stroke alert  Pt was d/c to [facility] for post acute rehab on 10/24/2022  Family/Caregiver Present No   ADL   Where Assessed Edge of bed  (vs seated OOB in chair)   Eating Assistance Unable to assess  (tube feedings)   Grooming Assistance 2  Maximal Assistance  (to comb hair)   Grooming Deficit Setup; Increased time to complete;Brushing hair   UB Bathing Assistance Unable to assess   LB Bathing Assistance Unable to assess   UB Dressing Assistance 2  Maximal Assistance  (to don hospital down)   UB Dressing Deficit Setup;Verbal cueing; Increased time to complete; Thread LUE;Pull around back; Fasteners   LB Dressing Assistance 2  Maximal Assistance   LB Dressing Deficit Setup;Steadying;Verbal cueing;Supervision/safety; Increased time to complete; Thread RLE into pants; Thread LLE into pants;Pull up over hips; Fasteners   Bed Mobility   Supine to Sit 2  Maximal assistance   Additional items Assist x 1; Increased time required;Verbal cues;LE management   Sit to Supine Unable to assess   Additional Comments Pt OOB in chair at end of OT eval with needs met, call bell in reach, and chair alarm activated  Transfers   Sit to Stand 3  Moderate assistance   Additional items Assist x 2; Increased time required;Verbal cues   Stand to Sit 3  Moderate assistance   Additional items Assist x 2; Increased time required;Verbal cues   Stand pivot 2  Maximal assistance  (to pt's R)   Additional items Assist x 2; Increased time required;Verbal cues   Additional Comments Pt performed sit <>stand 2 X; 1 X from EOB and 1 X from recliner chair   Functional Mobility   Additional Comments NT; Will continue to assess when appropriate  Balance   Static Sitting Poor +  (noted LOB (posteriorly) x1 trial)   Static Standing Zero   Activity Tolerance   Activity Tolerance Patient limited by fatigue   Medical Staff Made Aware care coordination w/ PTPari, due to pt's regression from baseline, decreased activity tolerance, and required assist levels   Nurse Made Aware per RNHeide, appropriate to see pt   RUE Assessment   RUE Assessment WFL   RUE Strength   RUE Overall Strength Within Functional Limits - able to perform ADL tasks with strength   LUE Assessment   LUE Assessment X  (PROM WFL, limited end ROM shoulder)   LUE Overall PROM   L Shoulder Flexion limited end / terminal ROM   L Elbow Flexion WFL PROM w/ increased tone   L Elbow Extension WFL PROM w/ increased tone   LUE Strength   LUE Overall Strength Deficits   LUE Tone   LUE Tone Hypertonic   Hand Function   Gross Motor Coordination Functional   Fine Motor Coordination Impaired   Hand Function Comments functional use of R UE   Perception   Inattention/Neglect Cues to maintain midline in sitting;Cues to attend to left side of body  (limited awareness / attention to L UE upon sitting at EOB  Required + time and cues to use R UE to assist L UE during ADLs)   Cognition   Overall Cognitive Status Impaired   Arousal/Participation Alert; Cooperative;Lethargic   Attention Within functional limits   Orientation Level Oriented to person;Oriented to place;Oriented to time;Disoriented to situation   Memory Decreased short term memory;Decreased recall of recent events   Following Commands Follows one step commands with increased time or repetition   Comments Pt identified by full name and   Pt was agreeable and willing to participate in OT eval w/ encouragement  Pt had a flat affect, was soft spoken, and apeared letharic   Recommend ongoing eval of pt's cognition skills to assist in DC planning   Assessment   Limitation Decreased ADL status; Decreased UE ROM; Decreased UE strength;Decreased cognition;Decreased endurance;Decreased fine motor control;Decreased self-care trans;Decreased high-level ADLs; Non-func L UE   Assessment Pt is a 65yo male admitted to [facility] on 10/31/22 as a transfer from [facility] due to increased level of care and GI consult  Diagnosed w/ melena  Significant PMH impacting his occupational performance includes HTN, recent CVA w/ residual L hemiplegia, dysphagia s/p G tube placement, hyponatremia  Pt w/ active OT orders and activity orders  Rapid response called at [facility] and pt received 2 units of blood and central line placed  Personal and environmental factors impacting performance includes difficulty completing IADLs, limited insight into deficits, difficulty completing ADLs, assist required prior to admission and recent admission (s)  Pt admit from rehab at [facility] and required A x2 to complete SPT  At baseline prior to rehab and recent admissions, pt completed ADL w/ out assistance and worked full time  Upon eval, pt alert and oriented to person, place  Able to follow directions and communicate wants / needs w/ flat affect  Pt required max A x 1 to complete bed mobility supine to sit, mod R HHA x2 complete sit <> stand and max A x 2 SPT to his R  Pt required max A to complete UBD/LBD  Pt demonstrated L UE PROM limited end / terminal ROM w/ increased tone  Pt demontrated R UE AROM WFL to participate in ADLs  Pt presents w/ decreased activity tolerance, decreased endurance, decreased L UE/ LE AROM / strength, decreased sitting tolerance, decreased standing tolerance, decreased standing balance, generalized weakness /deconditioning, limited insight into deficits impacting his I w/ dressing, bathing, oral hygiene, functional mobility, functional transfers, activity engagement, clothing mgmt, feeding  Pt completing ADL below baseline level of I and would benefit from OT while in acute care to address deficits   Based on Barthel Index and AMPAC 6 clicks recommend post acute rehab when medically stable for discharge from acute care  Will continue to follow   Goals   Patient Goals Pt stated that he wants his tube feedings going because he is hungry  Plan   Treatment Interventions ADL retraining;Functional transfer training;UE strengthening/ROM; Endurance training;Cognitive reorientation;Patient/family training;Equipment evaluation/education; Neuromuscular reeducation; Fine motor coordination activities; Compensatory technique education;Continued evaluation; Energy conservation; Activityengagement   Goal Expiration Date 11/11/22   OT Treatment Day 0   OT Frequency 3-5x/wk   Recommendation   OT Discharge Recommendation Post acute rehabilitation services   [facility] Daily Activity Inpatient   Lower Body Dressing 2   Bathing 2   Toileting 2   Upper Body Dressing 2   Grooming 3   Eating 2   Daily Activity Raw Score 13   Daily Activity Standardized Score (Calc for Raw Score >=11) 32 03   [facility] Applied Cognition Inpatient   Following a Speech/Presentation 2   Understanding Ordinary Conversation 4   Taking Medications 3   Remembering Where Things Are Placed or Put Away 3   Remembering List of 4-5 Errands 2   Taking Care of Complicated Tasks 2   Applied Cognition Raw Score 16   Applied Cognition Standardized Score 35 03   Barthel Index   Feeding 5   Bathing 0   Grooming Score 0   Dressing Score 5   Bladder Score 10   Bowels Score 10   Toilet Use Score 5   Transfers (Bed/Chair) Score 5   Mobility (Level Surface) Score 0   Stairs Score 0   Barthel Index Score 40   Modified [place] Scale   Modified Juan C Scale 4       The patient's raw score on the AM-[facility] Daily Activity inpatient short form is 13, standardized score is 32 03, less than 39 4  Patients at this level are likely to benefit from discharge to post-acute rehabilitation services  Please refer to the recommendation of the Occupational Therapist for safe discharge planning      Pt goals to be met by 11/11/22 to max I w/ ADLs and improve engagement include:    -Pt will demonstrate good attention and participation in ongoing eval to assess functional cognitive skills to assist in [address]    -Pt will consistently follow 2 step directions during ADLs w/ good recall to max I and improve engagement    -Pt will complete bed mobility supine<> sit w/ min A to max I w/ ADLs and minimize burden of care    -Pt will complete functional transfers to bed, chair, and toilet using AD, DME as needed w/ mod A x1 to max I w/ ADLs    -Pt will demonstrated improved functional standing tolerance for at least 5 minutes w/ at least poor + balance to max I w/ ADLs    -Pt will complete UBD w/ min A to max I w/ ADLs    -Pt will demonstrate improved activity and sitting tolerance OOB in chair for all meals    -Pt will consistently demonstrated improved functional use of L UE during UBD and grooming using R UE to assist / stabilize as needed    [facility], OTR/L

## 2022-11-01 NOTE — PROGRESS NOTES
Progress Note - Elke Licea III 63 y o  male MRN: [id number]    Unit/Bed#: ICU 12 Encounter: [id number]        Assessment/Plan:  Melena  · Reported dark stool w/ BRB at [facility]- on arrival only had dark black/ greenish colored stool  · Had episodes of hypotension and a code crimson called- 2 units PRBC given at [facility], hemoglobin now is 8 8  · Cont Protonix drip  · Currently NPO, patient is only on PEG tube feedings due to dysphagia from recent CVA, can resume tube feeds at this time  · Patient with mildly elevated BUN yesterday which has since normalized, cannot exclude ulceration from PEG balloon   · Patient noted to have hyponatremia with sodium of 127 which remains on change  · Will need EGD for further evaluation once further stabilized and hyponatremia has improved  · Ordered gastric lavage via G-tube yesterday which revealed no significant blood  · Stool was actually negative for occult blood on admission at [facility]  · Does have history of alcohol abuse but no findings on imaging consistent with cirrhosis with normal INR and platelet count  · Open surgical PEG was placed 10/18     Stercoral colitis with fecal impaction   -patient also noted to have bright red blood per rectum on exam which is likely related to fecal impaction with stercoral colitis  -ordered Fleet enema x1 yesterday due to marked distention of rectum noted on CT  -continue bowel regimen, patient was on MiraLax twice daily via PEG   -will need colonoscopy at time of EGD for further evaluation as above once further stabilized  -ordered half MiraLax prep yesterday with results, nurse reports the liquid dark green stool again today  -follow serial H&H    Subjective:   Patient is lying in bed  Sodium is unchanged at 127  Patient sleeping and offers no pertinent complaints  Nurse reports that he did have a dark green bowel movement today which was large and liquid  Remains NPO      Objective:     Vitals: /78   Pulse 67   Temp Justin Talley ) 97 2 °F (36 2 °C) (Axillary)   Resp (!) 23   Ht 5' 10" (1 778 m)   Wt 58 8 kg (129 lb 10 1 oz)   SpO2 98%   BMI 18 60 kg/m²       Physical Exam:  Gen-alert no acute distress  Abd-positive bowel sounds nontender nondistended   no rebound rigidity guarding       Lab, Imaging and other studies:   Recent Results (from the past 72 hour(s))   Occult Bloood,Fecal Immunochemical    Collection Time: 10/30/22  8:38 PM   Result Value Ref Range    OCCULT BLD, FECAL IMMUNOLOGICAL Negative Negative   CBC and differential    Collection Time: 10/30/22  8:38 PM   Result Value Ref Range    WBC 8 22 4 31 - 10 16 Thousand/uL    RBC 2 67 (L) 3 88 - 5 62 Million/uL    Hemoglobin 8 3 (L) 12 0 - 17 0 g/dL    Hematocrit 25 1 (L) 36 5 - 49 3 %    MCV 94 82 - 98 fL    MCH 31 1 26 8 - 34 3 pg    MCHC 33 1 31 4 - 37 4 g/dL    RDW 17 0 (H) 11 6 - 15 1 %    MPV 8 6 (L) 8 9 - 12 7 fL    Platelets 019 221 - 711 Thousands/uL    nRBC 0 /100 WBCs    Neutrophils Relative 72 43 - 75 %    Immat GRANS % 1 0 - 2 %    Lymphocytes Relative 15 14 - 44 %    Monocytes Relative 9 4 - 12 %    Eosinophils Relative 2 0 - 6 %    Basophils Relative 1 0 - 1 %    Neutrophils Absolute 6 05 1 85 - 7 62 Thousands/µL    Immature Grans Absolute 0 07 0 00 - 0 20 Thousand/uL    Lymphocytes Absolute 1 19 0 60 - 4 47 Thousands/µL    Monocytes Absolute 0 70 0 17 - 1 22 Thousand/µL    Eosinophils Absolute 0 17 0 00 - 0 61 Thousand/µL    Basophils Absolute 0 04 0 00 - 0 10 Thousands/µL   Comprehensive metabolic panel    Collection Time: 10/30/22  8:38 PM   Result Value Ref Range    Sodium 127 (L) 135 - 147 mmol/L    Potassium 4 0 3 5 - 5 3 mmol/L    Chloride 93 (L) 96 - 108 mmol/L    CO2 27 21 - 32 mmol/L    ANION GAP 7 4 - 13 mmol/L    BUN 14 5 - 25 mg/dL    Creatinine 0 39 (L) 0 60 - 1 30 mg/dL    Glucose 87 65 - 140 mg/dL    Calcium 9 4 8 4 - 10 2 mg/dL    AST 16 13 - 39 U/L    ALT 18 7 - 52 U/L    Alkaline Phosphatase 85 34 - 104 U/L    Total Protein 6 7 6 4 - 8 4 g/dL Albumin 3 6 3 5 - 5 0 g/dL    Total Bilirubin 0 42 0 20 - 1 00 mg/dL    eGFR 131 ml/min/1 73sq m   Protime-INR    Collection Time: 10/30/22  8:38 PM   Result Value Ref Range    Protime 12 7 11 6 - 14 5 seconds    INR 0 92 0 84 - 1 19   APTT    Collection Time: 10/30/22  8:38 PM   Result Value Ref Range    PTT 28 23 - 37 seconds   Uric acid    Collection Time: 10/30/22  8:38 PM   Result Value Ref Range    Uric Acid 2 6 (L) 3 5 - 8 5 mg/dL   Iron Saturation %    Collection Time: 10/30/22  8:38 PM   Result Value Ref Range    Iron Saturation 23 20 - 50 %    TIBC 323 250 - 450 ug/dL    Iron 75 65 - 175 ug/dL   Ferritin    Collection Time: 10/30/22  8:38 PM   Result Value Ref Range    Ferritin 371 8 - 388 ng/mL   Type and screen    Collection Time: 10/30/22 10:07 PM   Result Value Ref Range    ABO Grouping O     Rh Factor Positive     Antibody Screen Negative     Specimen Expiration Date [id number]    ABORh Recheck - Contact Blood Bank Prior to Collection    Collection Time: 10/30/22 11:30 PM   Result Value Ref Range    ABO Grouping O     Rh Factor Positive    Hemoglobin and hematocrit, blood    Collection Time: 10/30/22 11:30 PM   Result Value Ref Range    Hemoglobin 7 9 (L) 12 0 - 17 0 g/dL    Hematocrit 24 2 (L) 36 5 - 49 3 %   Basic metabolic panel    Collection Time: 10/30/22 11:30 PM   Result Value Ref Range    Sodium 127 (L) 135 - 147 mmol/L    Potassium 4 1 3 5 - 5 3 mmol/L    Chloride 94 (L) 96 - 108 mmol/L    CO2 25 21 - 32 mmol/L    ANION GAP 8 4 - 13 mmol/L    BUN 23 5 - 25 mg/dL    Creatinine 0 44 (L) 0 60 - 1 30 mg/dL    Glucose 88 65 - 140 mg/dL    Calcium 9 2 8 4 - 10 2 mg/dL    eGFR 125 ml/min/1 73sq m   Procalcitonin    Collection Time: 10/30/22 11:30 PM   Result Value Ref Range    Procalcitonin 0 14 <=0 25 ng/ml   Prepare Leukoreduced RBC    Collection Time: 10/30/22 11:42 PM   Result Value Ref Range    Unit Product Code G8582A34     Unit Number [id number]     Unit ABO O     Unit [facility] POS     Unit Dispense Status Presumed Trans     Unit Product Volume 350 ml    Unit Product Code B3027H06     Unit Number [id number]     Unit ABO O     Unit RH POS     Unit Dispense Status Presumed Trans     Unit Product Volume 350 ml    Crossmatch Compatible     Crossmatch Compatible    Osmolality, urine    Collection Time: 10/30/22 11:51 PM   Result Value Ref Range    Osmolality, Ur 551 250 - 900 mmol/KG   Urinalysis with microscopic    Collection Time: 10/30/22 11:51 PM   Result Value Ref Range    Color, UA Yellow Yellow    Clarity, UA Clear Clear    Specific [place], UA 1 010 1 001 - 1 030    pH, UA 8 0 5 0, 5 5, 6 0, 6 5, 7 0, 7 5, 8 0    Leukocytes, UA Negative Negative    Nitrite, UA Negative Negative    Protein, UA Negative Negative, Interference- unable to analyze mg/dl    Glucose, UA Negative Negative mg/dl    Ketones, UA Negative Negative mg/dl    Urobilinogen, UA 0 2 0 2, 1 0 E U /dl E U /dl    Bilirubin, UA Negative Negative    Occult Blood, UA Negative Negative    RBC, UA 0-5 None Seen, 0-1, 1-2, 2-4, 0-5 /hpf    WBC, UA 0-5 None Seen, 0-1, 1-2, 0-5, 2-4 /hpf    Epithelial Cells Occasional None Seen, Occasional /hpf    Bacteria, UA Occasional None Seen, Occasional /hpf    OTHER OBSERVATIONS Renal Epithelial Cells Present     MUCUS THREADS Occasional (A) None Seen   Sodium, urine, random    Collection Time: 10/30/22 11:51 PM   Result Value Ref Range    Sodium, Ur 76    Blood culture    Collection Time: 10/31/22  1:05 AM    Specimen: Central Venous Line; Blood   Result Value Ref Range    Blood Culture Received in Microbiology Lab  Culture in Progress  Lactic acid, plasma    Collection Time: 10/31/22  1:24 AM   Result Value Ref Range    LACTIC ACID 0 9 0 5 - 2 0 mmol/L   Blood culture    Collection Time: 10/31/22  2:24 AM    Specimen: Hand, Left; Blood   Result Value Ref Range    Blood Culture No Growth at 24 hrs      CBC and differential    Collection Time: 10/31/22  2:25 AM   Result Value Ref Range    WBC 11 73 (H) 4 31 - 10 16 Thousand/uL    RBC 3 76 (L) 3 88 - 5 62 Million/uL    Hemoglobin 11 4 (L) 12 0 - 17 0 g/dL    Hematocrit 35 6 (L) 36 5 - 49 3 %    MCV 95 82 - 98 fL    MCH 30 3 26 8 - 34 3 pg    MCHC 32 0 31 4 - 37 4 g/dL    RDW 16 6 (H) 11 6 - 15 1 %    MPV 8 5 (L) 8 9 - 12 7 fL    Platelets 382 342 - 837 Thousands/uL    nRBC 0 /100 WBCs    Neutrophils Relative 77 (H) 43 - 75 %    Immat GRANS % 1 0 - 2 %    Lymphocytes Relative 12 (L) 14 - 44 %    Monocytes Relative 8 4 - 12 %    Eosinophils Relative 1 0 - 6 %    Basophils Relative 1 0 - 1 %    Neutrophils Absolute 9 09 (H) 1 85 - 7 62 Thousands/µL    Immature Grans Absolute 0 16 0 00 - 0 20 Thousand/uL    Lymphocytes Absolute 1 40 0 60 - 4 47 Thousands/µL    Monocytes Absolute 0 89 0 17 - 1 22 Thousand/µL    Eosinophils Absolute 0 12 0 00 - 0 61 Thousand/µL    Basophils Absolute 0 07 0 00 - 0 10 Thousands/µL   Comprehensive metabolic panel    Collection Time: 10/31/22  2:25 AM   Result Value Ref Range    Sodium 127 (L) 135 - 147 mmol/L    Potassium 4 6 3 5 - 5 3 mmol/L    Chloride 97 96 - 108 mmol/L    CO2 21 21 - 32 mmol/L    ANION GAP 9 4 - 13 mmol/L    BUN 26 (H) 5 - 25 mg/dL    Creatinine 0 42 (L) 0 60 - 1 30 mg/dL    Glucose 75 65 - 140 mg/dL    Calcium 8 8 8 4 - 10 2 mg/dL    Corrected Calcium 9 3 8 3 - 10 1 mg/dL    AST 18 13 - 39 U/L    ALT 15 7 - 52 U/L    Alkaline Phosphatase 79 34 - 104 U/L    Total Protein 6 2 (L) 6 4 - 8 4 g/dL    Albumin 3 4 (L) 3 5 - 5 0 g/dL    Total Bilirubin 0 83 0 20 - 1 00 mg/dL    eGFR 127 ml/min/1 73sq m   Protime-INR    Collection Time: 10/31/22  2:25 AM   Result Value Ref Range    Protime 13 3 11 6 - 14 5 seconds    INR 0 99 0 84 - 1 19   Lactic acid, plasma    Collection Time: 10/31/22  2:25 AM   Result Value Ref Range    LACTIC ACID 2 1 (HH) 0 5 - 2 0 mmol/L   Procalcitonin    Collection Time: 10/31/22  2:25 AM   Result Value Ref Range    Procalcitonin 0 11 <=0 25 ng/ml   Type and screen    Collection Time: 10/31/22  2:25 AM   Result Value Ref Range    ABO Grouping O     Rh Factor Positive     Antibody Screen Negative     Specimen Expiration Date [id number]    Iron Saturation %    Collection Time: 10/31/22  2:25 AM   Result Value Ref Range    Iron Saturation 26 20 - 50 %    TIBC 397 250 - 450 ug/dL    Iron 105 65 - 175 ug/dL   Ferritin    Collection Time: 10/31/22  2:25 AM   Result Value Ref Range    Ferritin 341 8 - 388 ng/mL   Blood culture    Collection Time: 10/31/22  2:55 AM    Specimen: Arm, Right; Blood   Result Value Ref Range    Blood Culture No Growth at 24 hrs  Magnesium    Collection Time: 10/31/22  4:25 AM   Result Value Ref Range    Magnesium 1 8 (L) 1 9 - 2 7 mg/dL   Phosphorus    Collection Time: 10/31/22  4:25 AM   Result Value Ref Range    Phosphorus 4 3 2 7 - 4 5 mg/dL   Osmolality, urine    Collection Time: 10/31/22  8:55 AM   Result Value Ref Range    Osmolality, Ur 629 250 - 900 mmol/KG   Sodium, urine, random    Collection Time: 10/31/22  8:55 AM   Result Value Ref Range    Sodium, Ur 90    Osmolality-"If this is regarding a toxic alcohol, STOP  Test is not routinely indicated   Please consult medical  on call for further guidance "    Collection Time: 10/31/22 12:28 PM   Result Value Ref Range    Osmolality Serum 264 (L) 282 - 298 mmol/KG   Serial Hemoglobin Q6hrs    Collection Time: 10/31/22 12:28 PM   Result Value Ref Range    Hemoglobin 9 2 (L) 12 0 - 17 0 g/dL   Serial Hemoglobin Q6hrs    Collection Time: 10/31/22  6:34 PM   Result Value Ref Range    Hemoglobin 8 7 (L) 12 0 - 17 0 g/dL   Urinalysis with microscopic    Collection Time: 10/31/22  7:19 PM   Result Value Ref Range    Color, UA Light Yellow     Clarity, UA Clear     Specific Gravity, UA 1 020 1 003 - 1 030    pH, UA 7 0 4 5, 5 0, 5 5, 6 0, 6 5, 7 0, 7 5, 8 0    Leukocytes, UA Trace (A) Negative    Nitrite, UA Negative Negative    Protein, UA Negative Negative mg/dl    Glucose, UA Negative Negative mg/dl    Ketones, UA Negative Negative mg/dl    Urobilinogen, UA <2 0 <2 0 mg/dl mg/dl    Bilirubin, UA Negative Negative    Occult Blood, UA Moderate (A) Negative    RBC, UA 1-2 None Seen, 1-2 /hpf    WBC, UA 2-4 (A) None Seen, 1-2 /hpf    Epithelial Cells Occasional None Seen, Occasional /hpf    Bacteria, UA None Seen None Seen, Occasional /hpf   Serial Hemoglobin Q6hrs    Collection Time: 11/01/22 12:40 AM   Result Value Ref Range    Hemoglobin 9 1 (L) 12 0 - 17 0 g/dL   Basic metabolic panel    Collection Time: 11/01/22  6:18 AM   Result Value Ref Range    Sodium 127 (L) 135 - 147 mmol/L    Potassium 4 2 3 5 - 5 3 mmol/L    Chloride 97 96 - 108 mmol/L    CO2 20 (L) 21 - 32 mmol/L    ANION GAP 10 4 - 13 mmol/L    BUN 11 5 - 25 mg/dL    Creatinine 0 43 (L) 0 60 - 1 30 mg/dL    Glucose 80 65 - 140 mg/dL    Calcium 9 0 8 4 - 10 2 mg/dL    eGFR 126 ml/min/1 73sq m   Calcium, ionized    Collection Time: 11/01/22  6:18 AM   Result Value Ref Range    Calcium, Ionized 1 14 1 12 - 1 32 mmol/L   CBC    Collection Time: 11/01/22  6:18 AM   Result Value Ref Range    WBC 6 50 4 31 - 10 16 Thousand/uL    RBC 2 94 (L) 3 88 - 5 62 Million/uL    Hemoglobin 8 8 (L) 12 0 - 17 0 g/dL    Hematocrit 26 7 (L) 36 5 - 49 3 %    MCV 91 82 - 98 fL    MCH 29 9 26 8 - 34 3 pg    MCHC 33 0 31 4 - 37 4 g/dL    RDW 17 1 (H) 11 6 - 15 1 %    Platelets 524 314 - 182 Thousands/uL    MPV 8 3 (L) 8 9 - 12 7 fL   Phosphorus    Collection Time: 11/01/22  6:18 AM   Result Value Ref Range    Phosphorus 4 4 2 7 - 4 5 mg/dL   Magnesium    Collection Time: 11/01/22  6:18 AM   Result Value Ref Range    Magnesium 2 0 1 9 - 2 7 mg/dL

## 2022-11-01 NOTE — PLAN OF CARE
Problem: OCCUPATIONAL THERAPY ADULT  Goal: Performs self-care activities at highest level of function for planned discharge setting  See evaluation for individualized goals  Description: Treatment Interventions: ADL retraining, Functional transfer training, UE strengthening/ROM, Endurance training, Cognitive reorientation, Patient/family training, Equipment evaluation/education, Neuromuscular reeducation, Fine motor coordination activities, Compensatory technique education, Continued evaluation, Energy conservation, Activityengagement          See flowsheet documentation for full assessment, interventions and recommendations  Note: Limitation: Decreased ADL status, Decreased UE ROM, Decreased UE strength, Decreased cognition, Decreased endurance, Decreased fine motor control, Decreased self-care trans, Decreased high-level ADLs, Non-func L UE     Assessment: Pt is a 67yo male [facility] 10[facility] due to increased level of care and GI consult  Diagnosed w/ melena  Significant PMH impacting his occupational performance includes HTN, recent CVA w/ residual L hemiplegia, dysphagia s/p G tube placement, hyponatremia  Pt w/ active OT orders and activity orders  Rapid response called at [facility] and pt received 2 units of blood and central line placed  Personal and environmental factors impacting performance includes difficulty completing IADLs, limited insight into deficits, difficulty completing ADLs, assist required prior to admission and recent admission (s)  Pt admit from rehab at [facility] and required A x2 to complete SPT  At baseline prior to rehab and recent admissions, pt completed ADL w/ out assistance and worked full time  Upon eval, pt alert and oriented to person, place  Able to follow directions and communicate wants / needs w/ flat affect  Pt required max A x 1 to complete bed mobility supine to sit, mod R HHA x2 complete sit <> stand and max A x 2 SPT to his R   Pt required max A to complete UBD/LBD  Pt demonstrated L UE PROM limited end / terminal ROM w/ increased tone  Pt demontrated R UE AROM WFL to participate in ADLs  Pt presents w/ decreased activity tolerance, decreased endurance, decreased L UE/ LE AROM / strength, decreased sitting tolerance, decreased standing tolerance, decreased standing balance, generalized weakness /deconditioning, limited insight into deficits impacting his I w/ dressing, bathing, oral hygiene, functional mobility, functional transfers, activity engagement, clothing mgmt, feeding  Pt completing ADL below baseline level of I and would benefit from OT while in acute care to address deficits  Based on Barthel Index and AMPAC 6 clicks recommend post acute rehab when medically stable for discharge from acute care   Will continue to follow     OT Discharge Recommendation: Post acute rehabilitation services

## 2022-11-01 NOTE — CASE MANAGEMENT
Case Management Assessment & Discharge Planning Note    Patient name Robyn Limon III  Location ICU 12/ICU 12 MRN [id number]  : 1960 Date 2022       Current Admission Date: 10/31/2022  Current Admission Diagnosis:Melena   Patient Active Problem List    Diagnosis Date Noted   • Mild protein-calorie malnutrition ([facility] [address] ) 2022   • Acute on chronic anemia 10/31/2022   • History of CVA (cerebrovascular accident) 10/30/2022   • Melena 10/30/2022   • Gastrostomy tube dysfunction ([address] ) 10/30/2022   • Primary hypertension 10/30/2022   • Anxiety 10/28/2022   • Ambulatory dysfunction 10/28/2022   • Anemia 10/28/2022   • Smoking 10/18/2022   • Moderate protein-calorie malnutrition ([facility] [address] ) 10/14/2022   • Alcohol use disorder, mild, abuse 10/10/2022   • Hyponatremia 10/09/2022   • Acute CVA (cerebrovascular accident) ([facility] [address] ) 10/09/2022   • More than 50 percent stenosis of left internal carotid artery 10/09/2022      LOS (days): 1  Geometric Mean LOS (GMLOS) (days):   Days to GMLOS:     OBJECTIVE:  PATIENT READMITTED TO HOSPITAL  Risk of Unplanned Readmission Score: 18 7     Current admission status: Inpatient    Preferred Pharmacy:   [facility] [address] [facility] [phone] [id number] [facility]  [address] [facility]  [address] [address]  Phone: [phone] Fax: [phone]    Primary Care Provider: No primary care provider on file  Primary Insurance: BLUE CROSS  Secondary Insurance:     ASSESSMENT:  Active Health Care Proxies    There are no active Health Care Proxies on file  Patient Information  Admitted from[de-identified] Facility [address])  Mental Status: Alert  During Assessment patient was accompanied by: Not accompanied during assessment  Assessment information provided by[de-identified] Patient, Sister Cynthia Burris)  Primary Caregiver:  Other (Comment)  Caregiver's Name[de-identified] [address] for STR  Caregiver's Relationship to Patient[de-identified] Other (Specify)  Support Systems: Self, [address] 48 of Residence: [address] [facility] [address] [facility]    Discharge planning discussed with[de-identified] pt and his sister, Elisa Alvarado of Choice: Yes  Comments - Freedom of Choice: Return to [place] for [address]    Contacts  Patient Contacts: Sister-Delmis  Relationship to Patient[de-identified] Family  Contact Method: Phone  Phone Number: [phone]  Reason/Outcome: Continuity of Care, Discharge Planning    Other Referral/Resources/Interventions Provided:  Interventions: Short Term Rehab  Referral Comments: Pt admitted from [place] where he was getting rehab  Pt and sister confirmed plan would be for pt to return to [place] to continue rehab once medically stable  Referral placed via [facility]      Treatment Team Recommendation: Short Term Rehab  Discharge Destination Plan[de-identified] Short Term Rehab

## 2022-11-01 NOTE — PHYSICAL THERAPY NOTE
PHYSICAL THERAPY EVALUATION NOTE    Patient Name: Zana Lieberman III  Today's Date: 2022    AGE:   63 y o   Mrn:   [id number]  ADMIT DX:  Nely    Past Medical History:   Diagnosis Date    Hypertension     Renal disorder     Stroke [facility])      Length Of Stay: 1  PHYSICAL THERAPY EVALUATION :   Patient's identity confirmed via 2 patient identifiers (full name and ) at start of session       22 1405   PT Last Visit   PT Visit Date 22   Note Type   Note type Evaluation   Pain Assessment   Pain Assessment Tool 0-10   Pain Score No Pain   Restrictions/Precautions   Weight Bearing Precautions Per Order No   Other Precautions Cognitive; Chair Alarm; Bed Alarm;Telemetry   Home Living   Type of Home SNF  (Critical access hospital for STR)   Additional Comments Pt admitted to [facility] where he's been since DC from [facility] on 10/21/2022 after dx of CVA  Prior to STR, pt was I, working, and lived in a [address] [place] [address] w/ 18 [facility] in a house he rents w/ roommates   Prior [employer] staff   IADLs Family/Friend/Other provides transportation  (does not drive at baseline)   Falls in the last 6 months 0   Vocational Full time employment   Comments Prior to CVA beginning of October, pt was I w/ no AD, I for all ADLs  Pt reports getting OOB w/ therapy at STR, but does not elaborate   General   Family/Caregiver Present No   Cognition   Overall Cognitive Status Impaired   Arousal/Participation Alert   Attention Attends with cues to redirect   Orientation Level Oriented to person;Oriented to place;Oriented to time;Disoriented to situation   Memory Decreased short term memory;Decreased recall of recent events   Following Commands Follows one step commands with increased time or repetition   Comments Pt agreeable and willing to participate in PT evaluation   Flat affect but more engaged, talkative than when this writer met patient for eval last admission s/p CVA   Subjective   Subjective "I am so hungry"   RUE Assessment   RUE Assessment WFL   LUE Tone   LUE Tone Hypertonic  (spasticity at L bicep)   RLE Assessment   RLE Assessment WFL   Strength RLE   RLE Overall Strength 4/5   Strength LLE   LLE Overall Strength 2/5   Tone LLE   LLE Tone WFL   Coordination   Movements are Fluid and Coordinated 0   Coordination and Movement Description Pt noted to have head turns and engage to stimuli on L side (improved from IE on 10/11/2022)  However pt w/ neglect of LUE in space when mobilizing   Sensation   (Pt c/o N+T in LUE)   Bed Mobility   Supine to Sit 2  Maximal assistance   Additional items Assist x 1; Increased time required;LE management   Sit to Supine Unable to assess   Additional Comments Pt seated OOB in recliner chair at end of eval   Transfers   Sit to Stand 3  Moderate assistance   Additional items Assist x 2; Increased time required;Verbal cues   Stand to Sit 3  Moderate assistance   Additional items Assist x 2; Increased time required;Verbal cues   Stand pivot 2  Maximal assistance  (to pt's R)   Additional items Assist x 2; Increased time required;Verbal cues   Additional Comments SPT w/  HHA, L knee block   Balance   Static Sitting   (Poor + -> Fair -)   Static Standing Zero  (Ax2)   Activity Tolerance   Activity Tolerance Patient limited by fatigue   Medical Staff [place] coordination w/ OT Angelica to RN Heide   Assessment   Problem List Decreased strength;Decreased endurance;Decreased range of motion; Impaired balance;Decreased mobility; Decreased cognition; Impaired judgement;Decreased coordination   Assessment Vijay Licea III is a 63 y o  Male who [facility] on 10/[facility]22 from [facility] w/ diagnosis of melena  Orders for PT eval and treat received  Pt presents w/ comorbidities of recent CVA (10/2022), ETOH abuse, PEG tube placement due to CVA  Pt was previously A x 2 for transfers at [facility]    Upon evaluation, pt presents w/ the following deficits: weakness, decreased ROM, altered sensation, impaired coordination, impaired tone, impaired balance and impaired insight/safety awareness  Upon eval, pt requires max A x 1  for bed mobility, mod A x 2 for STS transfers, max A x 2 for SPT to pt's R  Pt's clinical presentation is unstable/unpredictable due to need for assist w/ all phases of mobility when usually mobilizing independently, need for input for task focus and mobility technique and recent readmission (within <30 days)  Patient is at an increased risk of falls due to physical and cognitive deficits  Given the above findings, discharge recommendation is for Post-acute inpatient rehabilitation (return to rehab)  During this admission, pt would benefit from continued skilled inpatient PT in the acute care setting in order to address the abovementioned deficits to maximize function and mobility before DC from acute care  Goals   Patient Goals pt reports he just wants to have something to eat   STG Expiration Date 11/11/22   Short Term Goal #1 Patient will: Increase bilateral LE strength 1/2 grade to facilitate independent mobility, Perform all bed mobility tasks w/ minx1 to decrease fall risk factors, Perform all transfers w/ modx1 to improve independence, Increase all balance 1/2 grade to decrease risk for falls, Complete exercise program independently, Tolerate 3 hr OOB to faciliate upright tolerance and Tolerate seated at EOB at least 30 minutes w/ supervision to facilitate functional task performance, PT to see for gait when appropriate   PT Treatment Day 0   Plan   Treatment/Interventions Functional transfer training;LE strengthening/ROM; Therapeutic exercise; Endurance training;Cognitive reorientation;Patient/family training;Equipment eval/education; Bed mobility   PT Frequency 3-5x/wk   Recommendation   PT Discharge Recommendation Post acute rehabilitation services   Equipment Recommended   (trial hemiwalker (pt w/ no L grasp for B UE support))   AM-PAC Basic Mobility Inpatient   Turning in Bed Without Bedrails 2   Lying on Back to Sitting on Edge of Flat Bed 2   Moving Bed to Chair 1   Standing Up From Chair 1   Walk in Room 1   Climb 3-5 Stairs 1   Basic Mobility Inpatient Raw Score 8   Turning Head Towards Sound 3   Follow Simple Instructions 3   Low Function Basic Mobility Raw Score 14   Low Function Basic Mobility Standardized Score 22 01   Highest Level Of Mobility   [facility] Goal 3: Sit at edge of bed   -[facility] Achieved 4: Move to chair/commode   Barthel Index   Feeding 5   Bathing 0   Grooming Score 0   Dressing Score 5   Bladder Score 10   Bowels Score 10   Toilet Use Score 5   Transfers (Bed/Chair) Score 5   Mobility (Level Surface) Score 0   Stairs Score 0   Barthel Index Score 40   Additional Treatment Session   Start Time 1420   End Time 1428   Treatment Assessment Pt seated OOB in recliner chair, agreeable to participate in PT intervention  Pt is able to perform STS from recliner chair w mod A x 2  Pt benefits from VC to push off RUE from armrest and lean trunk forward  Pt is able to stand <30s before requesting to sit  Pt seated OOb in recliner chair at end of session w/ all needs me   Additional Treatment Day 1   End of Consult   Patient Position at End of Consult Bedside chair;Bed/Chair alarm activated; All needs within reach     The patient's AM-PAC Basic Mobility Inpatient Short Form Raw Score is 8, Standardized Score is    A standardized score less than 38 32 (raw score of 16) suggests the patient may benefit from discharge to post-acute rehabilitation services which may not coincide with above PT recommendations  However please refer to therapist recommendation for discharge planning given other factors that may influence destination      Pt would benefit from skilled inpatient PT during this admission in order to facilitate progress towards goals to maximize functional independence    Abdullahi Page, PT, DPT

## 2022-11-01 NOTE — PROGRESS NOTES
[facility]  Progress Note - 2003 [facility] 1960, 63 y o  male MRN: [id number]  Unit/Bed#: ICU 12 Encounter: [id number]  Primary Care Provider: No primary care provider on file  Date and time admitted to hospital: 10/31/2022  2:04 AM    * Melena  Assessment & Plan    Lab Results   Component Value Date    HGB 5 8 (LL) 11/01/2022     Presents from SNF where staff noted patient to have episodes of green stools with bright red blood  No abdominal pain, N/V/D, hematemesis, hematochezia  Recently started on ASA/Plavix due to CVA  Does have hx of alcohol abuse, however no hx of cirrhosis/varices  CT abdomen showed sterocoral colitis and thickened bladder wall  Could not rule out acute GI bleed  · Recheck fecal occult blood  · Hgb 8 8 this morning, down to 5 8 in the afternoon but upon stat repeat hemoglobin was 10 6  · Patient is asymptomatic   · Monitor H&H q 8  · Baseline Hgb appears 9-10  · Hold Plavix/aspirin  · NPO for EGD/colonoscopy   · Continue Protonix IV q 12  · Blood consent in chart   · GI consultation: bowel prep, gastric lavage, fleet enema yesterday      Hyponatremia  Assessment & Plan  Patient suffers from chronic hyponatremia with value 127 on admission and holding stable at 127  Patient is asymptomatic  · Start 50mL NS flushes per G tube q6  · Continue NS 75 mL/hr  · Monitor on BMP      Acute on chronic anemia  Assessment & Plan  Patient's hemoglobin baseline approximately 9-10  With any [place] in ED, patient was found to have hemoglobin 8 4 and then upon recheck in setting of melena was 7 9  Patient is status post 2 packs of red blood cells  Recheck at [facility] ICU is 11 4  Iron panel negative       · Monitor H&H Q 8h  · Most recent hemoglobin 10 6  · See plan above   · GI following  · Recommendations above     Primary hypertension  Assessment & Plan  Patient was hypotensive at [facility] requiring short time Levophed drip and was weaned prior to coming to [facility] ICU  He did not need pressors while in the ICU  His BP has been stable, amlodipine and metoprolol doses were continued in the ICU  BP today 133/65  · Monitor BP closely  · Continue amlodipine 5mg and metoprolol 25mg    History of CVA (cerebrovascular accident)  Assessment & Plan  Patient has history of CVA in October of 2022 with residual left hemiplegia and dysphagia  He is unable to use his left arm or leg completely  He was in SNF for recovery  At time of stroke, stenosis was found in proximal left cervical ICA greater than 90%MRI remarkable for acute lacunar infarct in left thalamus, and acute to early subacute infarct in right periventricular white matter and lentiform nuclei without mass effect or hemorrhage  · Hold aspirin and plavix in the setting of suspected GI bleed     Moderate protein-calorie malnutrition (HCC)  Assessment & Plan  Malnutrition Findings:                                 BMI Findings: Body mass index is 17 18 kg/m²  · Nutrition consult when patient can tolerate G tube feeds   · On bowel prep via G tube for colonoscopy        Mild protein-calorie malnutrition (HCC)-resolved as of 11/1/2022  Assessment & Plan  Malnutrition Findings:   Adult Malnutrition type: Chronic illness  Adult Degree of Malnutrition: Malnutrition of mild degree  Malnutrition Characteristics: Muscle loss, Other (comment) (BMI < 18 5)                  360 Statement: Mild protein calorie malnutrition r/t muscle loss present to claivicle and BMI < 18 5; receives tube feeds for sole source nutrition    BMI Findings:  Adult BMI Classifications: Underweight < 18 5        Body mass index is 18 6 kg/m²  · Consult nutrition when patient can have G tube feeds         VTE Pharmacologic Prophylaxis: VTE Score: 6 Moderate Risk (Score 3-4) - Pharmacological DVT Prophylaxis Contraindicated  Sequential Compression Devices Ordered      Patient Centered Rounds: I evaluated the patient without nursing staff present due to team did not evaluate this patient together   Discussions with Specialists or Other Care Team Provider: GI, critical care     Education and Discussions with Family / Patient: Updated  (sister) via phone  Current Length of Stay: 1 day(s)  Current Patient Status: Inpatient   Discharge Plan: Anticipate discharge in 24-48 hrs to rehab facility  Code Status: Level 1 - Full Code    Subjective:   Patient stated he slept well overnight  Denies bloody stools  No acute events overnight  Objective:     Vitals:   Temp (24hrs), Av 2 °F (36 8 °C), Min:97 2 °F (36 2 °C), Max:99 1 °F (37 3 °C)    Temp:  [97 2 °F (36 2 °C)-99 1 °F (37 3 °C)] 97 2 °F (36 2 °C)  HR:  [67-86] 67  Resp:  [13-23] 23  BP: (132-165)/(65-86) 149/78  SpO2:  [98 %-100 %] 98 %  Body mass index is 18 6 kg/m²  Input and Output Summary (last 24 hours): Intake/Output Summary (Last 24 hours) at 2022 0906  Last data filed at 2022 0700  Gross per 24 hour   Intake 238 67 ml   Output 1200 ml   Net -961 33 ml       Physical Exam:   Physical Exam  Vitals reviewed  Constitutional:       General: He is sleeping  He is not in acute distress  Appearance: He is not ill-appearing, toxic-appearing or diaphoretic  Comments: Patient was sleeping during interview and did not wake up appropriately  Was responsive to commands  HENT:      Head: Normocephalic and atraumatic  Right Ear: External ear normal       Left Ear: External ear normal       Nose: Nose normal    Eyes:      General: Lids are normal  Gaze aligned appropriately  Extraocular Movements: Extraocular movements intact  Cardiovascular:      Rate and Rhythm: Normal rate and regular rhythm  Pulses:           Radial pulses are 2+ on the right side and 2+ on the left side  Dorsalis pedis pulses are 2+ on the right side and 2+ on the left side        Heart sounds: Normal heart sounds, S1 normal and S2 normal  Pulmonary:      Effort: Pulmonary effort is normal  No tachypnea, bradypnea or accessory muscle usage  Breath sounds: Normal breath sounds  Abdominal:      General: Abdomen is flat  There is no distension  Palpations: Abdomen is soft  There is no fluid wave or mass  Tenderness: There is no abdominal tenderness  Comments: G tube and surgical incision  Both clean, dry and intact  Musculoskeletal:      Right lower leg: No edema  Left lower leg: No edema  Skin:     Comments: Bilateral forearm ecchymoses    Neurological:      Cranial Nerves: Cranial nerve deficit and dysarthria present  Sensory: Sensation is intact  Motor: Weakness (L sided 0/5 strenght  R sided 5/5 ) present  Psychiatric:         Attention and Perception: He is inattentive  Behavior: Behavior is cooperative  Additional Data:     Labs:  Results from last 7 days   Lab Units 11/01/22  0618 10/31/22  1228 10/31/22  0225   WBC Thousand/uL 6 50  --  11 73*   HEMOGLOBIN g/dL 8 8*   < > 11 4*   HEMATOCRIT % 26 7*  --  35 6*   PLATELETS Thousands/uL 300  --  323   NEUTROS PCT %  --   --  77*   LYMPHS PCT %  --   --  12*   MONOS PCT %  --   --  8   EOS PCT %  --   --  1    < > = values in this interval not displayed       Results from last 7 days   Lab Units 11/01/22  0618 10/31/22  0225   SODIUM mmol/L 127* 127*   POTASSIUM mmol/L 4 2 4 6   CHLORIDE mmol/L 97 97   CO2 mmol/L 20* 21   BUN mg/dL 11 26*   CREATININE mg/dL 0 43* 0 42*   ANION GAP mmol/L 10 9   CALCIUM mg/dL 9 0 8 8   ALBUMIN g/dL  --  3 4*   TOTAL BILIRUBIN mg/dL  --  0 83   ALK PHOS U/L  --  79   ALT U/L  --  15   AST U/L  --  18   GLUCOSE RANDOM mg/dL 80 75     Results from last 7 days   Lab Units 10/31/22  0225   INR  0 99             Results from last 7 days   Lab Units 10/31/22  0225 10/31/22  0124 10/30/22  2330   LACTIC ACID mmol/L 2 1* 0 9  --    PROCALCITONIN ng/ml 0 11  --  0 14       Lines/Drains:  Invasive Devices  Report Peripheral Intravenous Line  Duration           Peripheral IV 10/30/22 Right;Ventral (anterior) Arm 1 day          Drain  Duration           Gastrostomy/Enterostomy 18 Fr  LUQ 13 days                      Imaging: Reviewed radiology reports from this admission including: abdominal CT     Recent Cultures (last 7 days):   Results from last 7 days   Lab Units 10/31/22  0255 10/31/22  0224 10/31/22  0105   BLOOD CULTURE  No Growth at 24 hrs  No Growth at 24 hrs  Received in Microbiology Lab  Culture in Progress  Last 24 Hours Medication List:   Current Facility-Administered Medications   Medication Dose Route Frequency Provider Last Rate   • albuterol  2 5 mg Nebulization Q6H PRN Josette Goldmann, DO     • amLODIPine  5 mg Per G Tube Daily Josette Goldmann, DO     • atorvastatin  40 mg Oral Daily With Juan C Larson DO     • folic acid  1 mg Oral Daily Josette Goldmann, DO     • metoprolol tartrate  25 mg Per G Tube Q12H [facility] Josette Goldmann, DO     • multivitamin with iron-minerals  15 mL Oral Daily Josette Goldmann, DO     • ondansetron  4 mg Intravenous Q6H PRN Josette Goldmann, DO     • pantoprozole (PROTONIX) infusion (Continuous)  8 mg/hr Intravenous Continuous Josette Goldmann, DO 8 mg/hr (10/31/22 1414)   • polyethylene glycol  17 g Oral BID Josette Goldmann, DO     • sodium chloride  75 mL/hr Intravenous Continuous Lord Delmi MD 75 mL/hr (11/01/22 6741)   • thiamine  100 mg Oral Daily Josette Goldmann, DO          Today, Patient Was Seen By: Gaetano Tay, Dr Norah Gilbert     **Please Note: This note may have been constructed using a voice recognition system  **

## 2022-11-01 NOTE — UTILIZATION REVIEW
Initial Clinical Review    Admission: Date/Time/Statement:   Admission Orders (From admission, onward)     Ordered        10/30/22 2131  INPATIENT ADMISSION  Once                   Orders Placed This Encounter   Procedures   • INPATIENT ADMISSION     Standing Status:   Standing     Number of Occurrences:   1     Order Specific Question:   Level of Care     Answer:   Med Surg [16]     Order Specific Question:   Estimated length of stay     Answer:   More than 2 Midnights     Order Specific Question:   Certification     Answer:   I certify that inpatient services are medically necessary for this patient for a duration of greater than two midnights  See H&P and MD Progress Notes for additional information about the patient's course of treatment  ED Arrival Information     Expected   -    Arrival   10/30/2022 19:26    Acuity   Urgent            Means of arrival   Ambulance    Escorted by   [facility]    Service   Hospitalist    Admission type   Emergency            Arrival complaint   Melena + pulled out G-tube           Chief Complaint   Patient presents with   • Feeding Tube Problem     Pulled out G-tube, also with bloody stool per SNF     Initial Presentation:  64 y/o male with PMHx HTN, CVA with residual left-sided weakness - on Plavix + ASA; Dysphagia s/p G tube placement, Hyponatremia, alcohol abuse - presents via EMS to [facility] ED from SNF on 10/30/22 2nd dislodged G-tube and melenic stools  In ED - vs wnl  Exam: abdominal guarding with G tube in stoma; melena on briefs and in rectal vault  CT shows marked distention of the rectum with large amount of surrounding inflammatory changes which may represent stercoral colitis  Labs: H/H 8 3/ 25 (baseline Hmglb 9-10)  BS 87  Na+ 127  Cl+ 93    Admitted Inpatient 10/30/22 at 2131 2nd Melena r/o GI  Bleed -   IV Protonix, monitor H/H, GI Consult; also Hyponatremia 2nd  volume depletion and SIADH -  continue IVF, hold tube feedings, BMP q8hrs     10/31/22 AT 0109 AM:  In ED - hemodynamically stable with SBP in 140s with nl BUN/Cr  BUT on transfer to med surg unit - initial SBP drop to 90's with scant melena noted  ? Stat H&H, BMP and fluid bolus given  ? Per Int Med d/w GI  - plan to transfer patient to facility with anesthesia backup  ? · Then Mental Status change - became less responsive, not answering questions appropriately  BP obtained and noted with SBP in 60s  ? Rapid response  called  ? ED physician and RN responded  Fluid bolus continued and started on IV levophed at 5mcg/min  CT c/a/p ordered and obtained  Achieved hemodynamic stability with Levo   ? Taken to ED where he received blood transfusion, IV Protonix gtt, and central line placement  ? BP stabilized with IV  Levophed off, received in total 2 units of blood   Last BP prior to transfer: 154/89  · Continued same Tx  - Protonix gtt, blood transfusion as needed   · Case reviewed with PACS and patient accepted to [facility] 10/31/22 AT [address] [place] [address]    ED Triage Vitals   Temperature Pulse Respirations Blood Pressure SpO2   10/30/22 1928 10/30/22 1928 10/30/22 1928 10/30/22 1928 10/30/22 1928   97 5 °F (36 4 °C) 71 20 141/87 100 %      Temp Source Heart Rate Source Patient Position - Orthostatic VS BP Location FiO2 (%)   10/30/22 1928 10/30/22 1928 10/30/22 1928 10/30/22 1928 --   Oral Monitor Lying Left arm       Wt Readings from Last 1 Encounters:   11/01/22 58 8 kg (129 lb 10 1 oz)     Additional Vital Signs:   Date/Time Temp Pulse Resp BP MAP (mmHg) SpO2 O2 Device Patient Position - Orthostatic VS   10/31/22 0115 -- 80 18 154/89 104 100 % None (Room air) Lying   10/31/22 0050 97 6 °F (36 4 °C) 84 16 146/89 -- 100 % None (Room air) --   10/31/22 0037 97 6 °F (36 4 °C) 84 19 147/80 -- 100 % None (Room air) --   10/31/22 0025 97 6 °F (36 4 °C) 85 18 139/80 -- 99 % None (Room air) --   10/30/22 6417 -- -- -- 155/77  -- -- -- --   BP: levo at 5 at 10/30/22 2357   10/30/22 2344 -- 65 -- 70/50 Abnormal  -- -- -- --   10/30/22 2340 -- 63 -- 77/54 Abnormal  -- -- -- --   10/30/22 2334 -- 88 -- 66/44 Abnormal  -- -- -- --   10/30/22 2330 -- 88 -- 60/42 Abnormal  -- -- -- --   10/30/22 2325 -- 85 -- 77/54 Abnormal  -- -- -- --   10/30/22 2312 -- -- -- 97/64 -- -- -- Lying   10/30/22 2259 97 7 °F (36 5 °C) 96 18 87/51 Abnormal  56 Abnormal  100 % -- Sitting   10/30/22 2120 -- 93 18 117/75 -- 99 % None (Room air) Sitting   10/30/22 1928 97 5 °F (36 4 °C) 71 20 141/87 -- 100 % None (Room air) Lying      10/30 0701   10/31 0700   I V  (mL/kg) 1000 (15 7)   Total Intake(mL/kg) 1000 (15 7)   Net +1000     Pertinent Labs/Diagnostic Test Results:   CT abdomen pelvis w contrast   Cannot rule out active gastroenterology bleed due to administration of oral contrast       Marked distention of the rectum with large amount of surrounding inflammatory changes which may represent stercoral colitis  Follow-up with gastroenterology is recommended  Thickening of the urinary bladder wall which may represent cystitis  Follow-up with urology is recommended  Airspace opacities and tree-in-bud opacities in the lung bases which may represent infection  Short-term follow-up chest CT scan in 3 months is recommended to evaluate for resolution  XR abdomen 1 view portable   The G-tube appears to be in the gastric body    No obvious extravasation on this single view     Results from last 7 days   Lab Units 10/30/22  2330 10/30/22  2038   WBC Thousand/uL  --  8 22   HEMOGLOBIN g/dL 7 9* 8 3*   HEMATOCRIT % 24 2* 25 1*   PLATELETS Thousands/uL  --  375   NEUTROS ABS Thousands/µL  --  6 05     Results from last 7 days   Lab Units 10/30/22  2330 10/30/22  2038   SODIUM mmol/L 127* 127*   POTASSIUM mmol/L 4 1 4 0   CHLORIDE mmol/L 94* 93*   CO2 mmol/L 25 27   ANION GAP mmol/L 8 7   BUN mg/dL 23 14   CREATININE mg/dL 0 44* 0 39*   EGFR ml/min/1 73sq m 125 131   CALCIUM mg/dL 9 2 9 4 CALCIUM, IONIZED mmol/L  --   --    MAGNESIUM mg/dL  --   --    PHOSPHORUS mg/dL  --   --      Results from last 7 days   Lab Units 10/30/22  2038   AST U/L 16   ALT U/L 18   ALK PHOS U/L 85   TOTAL PROTEIN g/dL 6 7   ALBUMIN g/dL 3 6   TOTAL BILIRUBIN mg/dL 0 42     Results from last 7 days   Lab Units 10/30/22  2330 10/30/22  2038   GLUCOSE RANDOM mg/dL 88 87     Results from last 7 days   Lab Units 10/30/22  2038   PROTIME seconds 12 7   INR  0 92   PTT seconds 28     Results from last 7 days   Lab Units 10/30/22  2330   PROCALCITONIN ng/ml 0 14     Results from last 7 days   Lab Units 10/31/22  0124   LACTIC ACID mmol/L 0 9     Results from last 7 days   Lab Units 10/30/22  2038   FERRITIN ng/mL 371     Results from last 7 days   Lab Units 10/30/22  2351   OSMOLALITY, SERUM mmol/KG  --    OSMO UR mmol/     Results from last 7 days   Lab Units 10/30/22  2351   CLARITY UA  Clear   COLOR UA  Yellow   SPEC GRAV UA  1 010   PH UA  8 0   GLUCOSE UA mg/dl Negative   KETONES UA mg/dl Negative   BLOOD UA  Negative   PROTEIN UA mg/dl Negative   NITRITE UA  Negative   BILIRUBIN UA  Negative   UROBILINOGEN UA E U /dl 0 2   UROBILINOGEN UA (BE) mg/dl  --    LEUKOCYTES UA  Negative   WBC UA /hpf 0-5   RBC UA /hpf 0-5   BACTERIA UA /hpf Occasional   EPITHELIAL CELLS WET PREP /hpf Occasional   MUCUS THREADS  Occasional*   SODIUM UR  76     Results from last 7 days   Lab Units 10/31/22  0105   BLOOD CULTURE  Received in Microbiology Lab  Culture in Progress       ED Treatment:   Medication Administration from 10/30/2022 1926 to 10/30/2022 2246       Date/Time Order Dose Route Action     10/30/2022 2023 iohexol (OMNIPAQUE) 240 MG/ML solution 50 mL 50 mL Oral Given     Past Medical History:   Diagnosis Date   • Hypertension    • Renal disorder    • Stroke [facility])      Present on Admission:  • Hyponatremia  • Anxiety    Admitting Diagnosis: Melena [K92 1]  Hyponatremia [E87 1]  Melanotic stools [K92 1]  Feeding tube dysfunction [T85 598A]  Feeding tube dysfunction, initial encounter [T85 134Y]    Age/Sex: 63 y o  male    Admission Orders:  Telemetry  VS q1hr + per ICU  I+O q2hrs  Continuous Pulse Oximetery  NPO   I+O q shift  Consult GI    Scheduled Medications:  Medications 10/30 10/31   pantoprazole (PROTONIX) injection 40 mg  Dose: 40 mg  Freq: Once Route: IV  Start: 10/31/22 0000 End: 10/31/22 0102     0100        pantoprazole (PROTONIX) injection 40 mg  Dose: 40 mg  Freq: Every 12 hours scheduled Route: IV  Start: 10/30/22 2200 End: 10/31/22 0034    2322      0034-D/C'd      piperacillin-tazobactam (ZOSYN) IVPB 3 375 g  Dose: 3 375 g  Freq: Every 6 hours Route: IV  Start: 10/31/22 0115 End: 10/31/22 0204     0118     0204-D/C'd      Medications 10/30 10/31     Continuous IV Infusions:  IVF lactated ringers infusion    Ordered Dose: 100 mL/hr Route: Intravenous Frequency: Continuous @ 100 mL/hr   Scheduled Start Date/Time: 10/30/22 2330       IVF norepinephrine (LEVOPHED) 4 mg (STANDARD CONCENTRATION) IV in sodium chloride 0 9% 250 mL    Ordered Dose: 1-30 mcg/min Route: Intravenous Frequency: Titrated @ 3 8-112 5 mL/hr   Scheduled Start Date/Time: 10/30/22 2345 End Date/Time: 10/31/22 0204      PRN Meds:  Medications 10/30 10/31   albuterol inhalation solution 2 5 mg  Dose: 2 5 mg  Freq: Every 6 hours PRN Route: NEBULIZATION  PRN Reasons: wheezing,shortness of breath  Start: 10/30/22 2151 End: 10/31/22 0204    2158      0204-D/C'd      iohexol (OMNIPAQUE) 240 MG/ML solution 50 mL  Dose: 50 mL  Freq: Once in imaging Route: PO  PRN Reason: contrast  Start: 10/30/22 2022 End: 10/30/22 2023    2023 [C]         iohexol (OMNIPAQUE) 350 MG/ML injection (SINGLE-DOSE) 100 mL  Dose: 100 mL  Freq:  Once in imaging Route: IV  PRN Reason: contrast  Start: 10/31/22 0028 End: 10/31/22 0029     0029        LORazepam (ATIVAN) injection 0 5 mg  Dose: 0 5 mg  Freq: Every 6 hours PRN Route: IV  PRN Reasons: anxiety,agitation  Start: 10/30/22 2156 End: 10/31/22 0204   Admin Instructions:   High Alert Medication  LOOK ALIKE SOUND ALIKE MED     0204-D/C'd      metoprolol (LOPRESSOR) injection 5 mg  Dose: 5 mg  Freq: Every 6 hours PRN Route: IV  PRN Reason: high blood pressure  PRN Comment: SBP >160  Start: 10/30/22 2159 End: 10/31/22 0204   Admin Instructions:   Hold for heart rate less than 50 beats per minute  Administer over 1 minute  LOOK ALIKE SOUND ALIKE MED     0204-D/C'd      Medications 10/30 10/31     Network Utilization Review Department  ATTENTION: Please call with any questions or concerns to [phone] and carefully listen to the prompts so that you are directed to the right person  All voicemails are confidential   Cecelia Farias all requests for admission clinical reviews, approved or denied determinations and any other requests to dedicated fax number below belonging to the campus where the patient is receiving treatment   List of dedicated fax numbers for the Facilities:  [address] DENIALS (Administrative/Medical Necessity) [phone]   [address] (Maternity/NICU/Pediatrics) [phone]   918 Kiara Petit [phone]   [facility] 77 [phone]   [address] [address] Myriam Cope 28 [phone]   [address] Fletcher[facility]ly [facility] 134 815 [facility] [phone]

## 2022-11-01 NOTE — ASSESSMENT & PLAN NOTE
Multifactorial  Recently had CVA, and history of EtOH abuse  Continue PT/OT   Continue 24-7 supportive care  Continues fall and safety precaution  Recommend patient to be near nursing station for safety  Nursing to [facility] hourly rounding  Optimize acute and chronic medical condition as outlined  PT/OT/, dietitian following   following for DC planning

## 2022-11-01 NOTE — NURSING NOTE
Spoke with Sergio Blank  Patient still okay to transfer to [facility] despite low hemoglobin  Sent hemoglobin recheck

## 2022-11-01 NOTE — ASSESSMENT & PLAN NOTE
Could be chronic in nature due to ETOH abuse   Recent hemoglobin 7 2, during recent hospitalization was noted to be 62 8  Added folic acid, liquid multivitamin  Consulted dietitian for evaluation and treatment, appreciate recommendations  Patient continues with tube feeds and tolerating  No N/V/D/C, NT/ND, + BS abd soft  Continue to monitor H&H  Next CBC 11/09/2022

## 2022-11-01 NOTE — ASSESSMENT & PLAN NOTE
Noted with anxiety and restlessness, h/o ETOH   Pulled peg tube and scratching buttocks and feces in nails   C/w lorazepam with relief   Pleasantly confused at baseline, staff re directing/ re orientation  Monitor

## 2022-11-01 NOTE — ASSESSMENT & PLAN NOTE
Malnutrition Findings:     abdominal nutrition types: Acute illness related to alcohol abuse  Noted to be weak and frail with generalized body muscle wasting on exam today  BMI was less than 18 5  Abdominal degree of malnutrition: Malnutrition of moderate degree   My nutrition gross wrist: Muscle loss, inadequate energy      On 10/18/2022- PEG tube placed  Maintain head of bed elevated  Tolerating tube feeds  Continue with aspiration precautions  Remains NPO   Dietitian following  Continue daily multivitamins and supplements  With known history of EtOH abuse, started on folic acid and thiamine   Continue to monitor weights, I&Os  Continue supportive care

## 2022-11-01 NOTE — CASE MANAGEMENT
Case Management Progress Note    Patient name Alla Cross S /S -01 MRN [id number]  : 1960 Date 2022       LOS (days): 1  Geometric Mean LOS (GMLOS) (days):   Days to GMLOS:        OBJECTIVE:    Current admission status: Inpatient  Preferred Pharmacy:   [address] [place] [address], [address] [place] [address], [address]  [address] [place] [address] Avenue  Phone: [phone] Fax: [phone]    Primary Care Provider: No primary care provider on file  Primary Insurance: BLUE CROSS  Secondary Insurance:     PROGRESS NOTE:    Rodney Villalba able to accept back pending authorization  Will also require a COVID test prior to admission  Information for prior authorization:   Rodney Villalba NPI: [id number]  Accepting Physician: Dr Rosa Isela Patel NPI: [id number]    As per Rodney Villalba admission's team their DON is requesting that pt is evaluated by psych prior to d/c  Rodney Villalba reports that pt intentionally pulled out his PEG tube and had tried to several times before  DON feels his increased depression needs to be evaluated  CM sent message to [facility] resident, Dr Yisel Ross her of above

## 2022-11-01 NOTE — ASSESSMENT & PLAN NOTE
Malnutrition Findings:   Adult Malnutrition type: Chronic illness  Adult Degree of Malnutrition: Malnutrition of mild degree  Malnutrition Characteristics: Muscle loss, Other (comment) (BMI < 18 5)                  360 Statement: Mild protein calorie malnutrition r/t muscle loss present to claivicle and BMI < 18 5; receives tube feeds for sole source nutrition    BMI Findings:  Adult BMI Classifications: Underweight < 18 5        Body mass index is 18 6 kg/m²       · Consult nutrition when patient can have G tube feeds

## 2022-11-02 ENCOUNTER — ANESTHESIA (INPATIENT)
Dept: GASTROENTEROLOGY | Facility: HOSPITAL | Age: 62
End: 2022-11-02

## 2022-11-02 ENCOUNTER — ANESTHESIA EVENT (INPATIENT)
Dept: GASTROENTEROLOGY | Facility: HOSPITAL | Age: 62
End: 2022-11-02

## 2022-11-02 ENCOUNTER — APPOINTMENT (OUTPATIENT)
Dept: GASTROENTEROLOGY | Facility: HOSPITAL | Age: 62
End: 2022-11-02

## 2022-11-02 LAB
ANION GAP SERPL CALCULATED.3IONS-SCNC: 6 MMOL/L (ref 4–13)
ANION GAP SERPL CALCULATED.3IONS-SCNC: 7 MMOL/L (ref 4–13)
BUN SERPL-MCNC: 14 MG/DL (ref 5–25)
BUN SERPL-MCNC: 19 MG/DL (ref 5–25)
CALCIUM SERPL-MCNC: 8.4 MG/DL (ref 8.4–10.2)
CALCIUM SERPL-MCNC: 8.5 MG/DL (ref 8.4–10.2)
CHLORIDE SERPL-SCNC: 100 MMOL/L (ref 96–108)
CHLORIDE SERPL-SCNC: 100 MMOL/L (ref 96–108)
CO2 SERPL-SCNC: 22 MMOL/L (ref 21–32)
CO2 SERPL-SCNC: 22 MMOL/L (ref 21–32)
CREAT SERPL-MCNC: 0.37 MG/DL (ref 0.6–1.3)
CREAT SERPL-MCNC: 0.45 MG/DL (ref 0.6–1.3)
ERYTHROCYTE [DISTWIDTH] IN BLOOD BY AUTOMATED COUNT: 17 % (ref 11.6–15.1)
GFR SERPL CREATININE-BSD FRML MDRD: 123 ML/MIN/1.73SQ M
GFR SERPL CREATININE-BSD FRML MDRD: 134 ML/MIN/1.73SQ M
GLUCOSE SERPL-MCNC: 81 MG/DL (ref 65–140)
GLUCOSE SERPL-MCNC: 82 MG/DL (ref 65–140)
HCT VFR BLD AUTO: 23.7 % (ref 36.5–49.3)
HGB BLD-MCNC: 7.3 G/DL (ref 12–17)
HGB BLD-MCNC: 7.7 G/DL (ref 12–17)
HGB BLD-MCNC: 7.9 G/DL (ref 12–17)
HGB BLD-MCNC: 8 G/DL (ref 12–17)
HGB BLD-MCNC: 8.3 G/DL (ref 12–17)
MCH RBC QN AUTO: 30.9 PG (ref 26.8–34.3)
MCHC RBC AUTO-ENTMCNC: 33.3 G/DL (ref 31.4–37.4)
MCV RBC AUTO: 93 FL (ref 82–98)
PLATELET # BLD AUTO: 293 THOUSANDS/UL (ref 149–390)
PMV BLD AUTO: 8.5 FL (ref 8.9–12.7)
POTASSIUM SERPL-SCNC: 3.9 MMOL/L (ref 3.5–5.3)
POTASSIUM SERPL-SCNC: 4 MMOL/L (ref 3.5–5.3)
RBC # BLD AUTO: 2.56 MILLION/UL (ref 3.88–5.62)
SODIUM SERPL-SCNC: 128 MMOL/L (ref 135–147)
SODIUM SERPL-SCNC: 129 MMOL/L (ref 135–147)
WBC # BLD AUTO: 6.2 THOUSAND/UL (ref 4.31–10.16)

## 2022-11-02 PROCEDURE — 0DC48ZZ EXTIRPATION OF MATTER FROM ESOPHAGOGASTRIC JUNCTION, VIA NATURAL OR ARTIFICIAL OPENING ENDOSCOPIC: ICD-10-PCS | Performed by: INTERNAL MEDICINE

## 2022-11-02 PROCEDURE — 3E0G8GC INTRODUCTION OF OTHER THERAPEUTIC SUBSTANCE INTO UPPER GI, VIA NATURAL OR ARTIFICIAL OPENING ENDOSCOPIC: ICD-10-PCS | Performed by: INTERNAL MEDICINE

## 2022-11-02 PROCEDURE — 0W3P8ZZ CONTROL BLEEDING IN GASTROINTESTINAL TRACT, VIA NATURAL OR ARTIFICIAL OPENING ENDOSCOPIC: ICD-10-PCS | Performed by: INTERNAL MEDICINE

## 2022-11-02 RX ORDER — SODIUM CHLORIDE, SODIUM LACTATE, POTASSIUM CHLORIDE, CALCIUM CHLORIDE 600; 310; 30; 20 MG/100ML; MG/100ML; MG/100ML; MG/100ML
50 INJECTION, SOLUTION INTRAVENOUS CONTINUOUS
Status: CANCELLED | OUTPATIENT
Start: 2022-11-02

## 2022-11-02 RX ORDER — SODIUM CHLORIDE 9 MG/ML
75 INJECTION, SOLUTION INTRAVENOUS CONTINUOUS
Status: DISPENSED | OUTPATIENT
Start: 2022-11-02 | End: 2022-11-02

## 2022-11-02 RX ORDER — SODIUM PHOSPHATE, DIBASIC AND SODIUM PHOSPHATE, MONOBASIC 7; 19 G/133ML; G/133ML
1 ENEMA RECTAL ONCE
Status: DISCONTINUED | OUTPATIENT
Start: 2022-11-02 | End: 2022-11-02

## 2022-11-02 RX ORDER — EPINEPHRINE 0.1 MG/ML
SYRINGE (ML) INJECTION CODE/TRAUMA/SEDATION MEDICATION
Status: COMPLETED | OUTPATIENT
Start: 2022-11-02 | End: 2022-11-02

## 2022-11-02 RX ORDER — LIDOCAINE HYDROCHLORIDE 10 MG/ML
INJECTION, SOLUTION EPIDURAL; INFILTRATION; INTRACAUDAL; PERINEURAL AS NEEDED
Status: DISCONTINUED | OUTPATIENT
Start: 2022-11-02 | End: 2022-11-02

## 2022-11-02 RX ORDER — FENTANYL CITRATE 50 UG/ML
INJECTION, SOLUTION INTRAMUSCULAR; INTRAVENOUS AS NEEDED
Status: DISCONTINUED | OUTPATIENT
Start: 2022-11-02 | End: 2022-11-02

## 2022-11-02 RX ORDER — SODIUM CHLORIDE, SODIUM LACTATE, POTASSIUM CHLORIDE, CALCIUM CHLORIDE 600; 310; 30; 20 MG/100ML; MG/100ML; MG/100ML; MG/100ML
INJECTION, SOLUTION INTRAVENOUS CONTINUOUS PRN
Status: DISCONTINUED | OUTPATIENT
Start: 2022-11-02 | End: 2022-11-02

## 2022-11-02 RX ORDER — PROPOFOL 10 MG/ML
INJECTION, EMULSION INTRAVENOUS AS NEEDED
Status: DISCONTINUED | OUTPATIENT
Start: 2022-11-02 | End: 2022-11-02

## 2022-11-02 RX ORDER — ONDANSETRON 2 MG/ML
4 INJECTION INTRAMUSCULAR; INTRAVENOUS ONCE AS NEEDED
Status: CANCELLED | OUTPATIENT
Start: 2022-11-02

## 2022-11-02 RX ORDER — POLYETHYLENE GLYCOL 3350 17 G/17G
17 POWDER, FOR SOLUTION ORAL DAILY
Status: DISCONTINUED | OUTPATIENT
Start: 2022-11-02 | End: 2022-11-04 | Stop reason: HOSPADM

## 2022-11-02 RX ADMIN — SODIUM CHLORIDE 75 ML/HR: 0.9 INJECTION, SOLUTION INTRAVENOUS at 08:58

## 2022-11-02 RX ADMIN — METOPROLOL TARTRATE 25 MG: 25 TABLET, FILM COATED ORAL at 22:06

## 2022-11-02 RX ADMIN — LIDOCAINE HYDROCHLORIDE 50 MG: 10 INJECTION, SOLUTION EPIDURAL; INFILTRATION; INTRACAUDAL at 17:05

## 2022-11-02 RX ADMIN — EPINEPHRINE 1 MG: 0.1 INJECTION, SOLUTION ENDOTRACHEAL; INTRACARDIAC; INTRAVENOUS at 17:14

## 2022-11-02 RX ADMIN — AMLODIPINE BESYLATE 5 MG: 5 TABLET ORAL at 08:19

## 2022-11-02 RX ADMIN — FENTANYL CITRATE 50 MCG: 50 INJECTION, SOLUTION INTRAMUSCULAR; INTRAVENOUS at 17:09

## 2022-11-02 RX ADMIN — SODIUM CHLORIDE, SODIUM LACTATE, POTASSIUM CHLORIDE, AND CALCIUM CHLORIDE: .6; .31; .03; .02 INJECTION, SOLUTION INTRAVENOUS at 16:56

## 2022-11-02 RX ADMIN — PROPOFOL 20 MG: 10 INJECTION, EMULSION INTRAVENOUS at 17:15

## 2022-11-02 RX ADMIN — FENTANYL CITRATE 50 MCG: 50 INJECTION, SOLUTION INTRAMUSCULAR; INTRAVENOUS at 17:05

## 2022-11-02 RX ADMIN — PROPOFOL 20 MG: 10 INJECTION, EMULSION INTRAVENOUS at 17:17

## 2022-11-02 RX ADMIN — SODIUM CHLORIDE 8 MG/HR: 900 INJECTION, SOLUTION INTRAVENOUS at 07:18

## 2022-11-02 RX ADMIN — METOPROLOL TARTRATE 25 MG: 25 TABLET, FILM COATED ORAL at 08:19

## 2022-11-02 RX ADMIN — PROPOFOL 20 MG: 10 INJECTION, EMULSION INTRAVENOUS at 17:09

## 2022-11-02 RX ADMIN — POLYETHYLENE GLYCOL 3350 17 G: 17 POWDER, FOR SOLUTION ORAL at 08:24

## 2022-11-02 RX ADMIN — FOLIC ACID 1 MG: 1 TABLET ORAL at 08:19

## 2022-11-02 RX ADMIN — PROPOFOL 20 MG: 10 INJECTION, EMULSION INTRAVENOUS at 17:19

## 2022-11-02 RX ADMIN — ATORVASTATIN CALCIUM 40 MG: 40 TABLET, FILM COATED ORAL at 22:06

## 2022-11-02 RX ADMIN — PROPOFOL 50 MG: 10 INJECTION, EMULSION INTRAVENOUS at 17:07

## 2022-11-02 RX ADMIN — THIAMINE HCL TAB 100 MG 100 MG: 100 TAB at 08:24

## 2022-11-02 RX ADMIN — SODIUM CHLORIDE 8 MG/HR: 900 INJECTION, SOLUTION INTRAVENOUS at 20:32

## 2022-11-02 RX ADMIN — PROPOFOL 20 MG: 10 INJECTION, EMULSION INTRAVENOUS at 17:13

## 2022-11-02 RX ADMIN — PROPOFOL 20 MG: 10 INJECTION, EMULSION INTRAVENOUS at 17:11

## 2022-11-02 NOTE — ASSESSMENT & PLAN NOTE
Patient's hemoglobin baseline approximately 9-10  With any [place] in ED, patient was found to have hemoglobin 8 4 and then upon recheck in setting of melena was 7 9  Patient is status post 2 packs of red blood cells  Recheck at [facility] ICU is 11 4  Iron panel negative       · Monitor H&H Q 8h  · Most recent hemoglobin 8 0  · See plan above   · GI following  · Recommendations above

## 2022-11-02 NOTE — ASSESSMENT & PLAN NOTE
Lab Results   Component Value Date    HGB 7 9 (L) 11/02/2022     Presents from SNF where staff noted patient to have episodes of green stools with bright red blood  No abdominal pain, N/V/D, hematemesis, hematochezia  Recently started on ASA/Plavix due to CVA  Does have hx of alcohol abuse, however no hx of cirrhosis/varices  CT abdomen showed sterocoral colitis and thickened bladder wall  Could not rule out acute GI bleed  No significant blood in gastric lavage  Patient had 3 episodes of watery green/black stool yesterday  · Recheck fecal occult blood  · Hgb 7 9 this morning, 8 0 on stat recheck  Readings from yesterday were 5 8, 10 6, 9 8, 8 3     · Patient is asymptomatic   · Monitor H&H q8  · Baseline Hgb appears 9-10  · Hold Plavix/aspirin  · GI recommendations  · NPO for EGD today   · Colonoscopy when hyponatremia resolves   · Continue protonix IV q12   · Blood consent in chart

## 2022-11-02 NOTE — PROGRESS NOTES
[facility]  Progress Note - 2005 [facility] 1960, 63 y o  male MRN: [id number]  Unit/Bed#: S -01 Encounter: [id number]  Primary Care Provider: No primary care provider on file  Date and time admitted to hospital: 10/31/2022  2:04 AM    * Melena  Assessment & Plan    Lab Results   Component Value Date    HGB 7 9 (L) 11/02/2022     Presents from SNF where staff noted patient to have episodes of green stools with bright red blood  No abdominal pain, N/V/D, hematemesis, hematochezia  Recently started on ASA/Plavix due to CVA  Does have hx of alcohol abuse, however no hx of cirrhosis/varices  CT abdomen showed sterocoral colitis and thickened bladder wall  Could not rule out acute GI bleed  No significant blood in gastric lavage  Patient had 3 episodes of watery green/black stool yesterday  · Recheck fecal occult blood  · Hgb 7 9 this morning, 8 0 on stat recheck  Readings from yesterday were 5 8, 10 6, 9 8, 8 3  · Patient is asymptomatic   · Monitor H&H q8  · Baseline Hgb appears 9-10  · Hold Plavix/aspirin  · GI recommendations  · NPO for EGD today   · Colonoscopy when hyponatremia resolves   · Continue protonix IV q12   · Blood consent in chart      Hyponatremia  Assessment & Plan  Patient suffers from chronic hyponatremia with value 127 on admission and now up to 129  Patient is asymptomatic  · Continue 50mL NS flushes per G tube q6  · Continue NS 75 mL/hr  · Monitor on BMP      Acute on chronic anemia  Assessment & Plan  Patient's hemoglobin baseline approximately 9-10  With any [place] in ED, patient was found to have hemoglobin 8 4 and then upon recheck in setting of melena was 7 9  Patient is status post 2 packs of red blood cells  Recheck at [facility] ICU is 11 4  Iron panel negative       · Monitor H&H Q 8h  · Most recent hemoglobin 8 0  · See plan above   · GI following  · Recommendations above     Primary hypertension  Assessment & Plan  Patient was hypotensive at [facility] requiring short time Levophed drip and was weaned prior to coming to [facility] ICU  He did not need pressors while in the ICU  His BP has been stable, amlodipine and metoprolol doses were continued in the ICU  BP today 131/70  · Monitor BP closely  · Continue amlodipine 5mg and metoprolol 25mg    History of CVA (cerebrovascular accident)  Assessment & Plan  Patient has history of CVA in October of 2022 with residual left hemiplegia and dysphagia  He is unable to use his left arm or leg completely  He was in SNF for recovery  At time of stroke, stenosis was found in proximal left cervical ICA greater than 90%MRI remarkable for acute lacunar infarct in left thalamus, and acute to early subacute infarct in right periventricular white matter and lentiform nuclei without mass effect or hemorrhage  · Hold aspirin and plavix in the setting of suspected GI bleed     Moderate protein-calorie malnutrition (HCC)  Assessment & Plan  Malnutrition Findings:   Adult Malnutrition type: Chronic illness  Adult Degree of Malnutrition: Malnutrition of mild degree  Malnutrition Characteristics: Muscle loss, Other (comment) (BMI < 18 5)                  360 Statement: Mild protein calorie malnutrition r/t muscle loss present to claivicle and BMI < 18 5; receives tube feeds for sole source nutrition    BMI Findings:  Adult BMI Classifications: Underweight < 18 5        Body mass index is 16 48 kg/m²  · Nutrition is consulted for G tube feeds   · Currently NPO for EGD   · On miralax via G tube           VTE Pharmacologic Prophylaxis: VTE Score: 6 Moderate Risk (Score 3-4) - Pharmacological DVT Prophylaxis Contraindicated  Sequential Compression Devices Ordered  Patient Centered Rounds: nursing was present when I saw the patient   Discussions with Specialists or Other Care Team Provider: GI     Education and Discussions with Family / Patient: will call later      Current Length of Stay: 2 day(s)  Current Patient Status: Inpatient   Discharge Plan: Anticipate discharge in 24-48 hrs to rehab facility  Code Status: Level 1 - Full Code    Subjective:   Patient slept well overnight and does not have any concerns or questions  Patient is aware of the psychiatry consult and is agreeable  Patient denies any blood in the stool or pain with bowel movements  Per nursing patient had 3 episodes of black/green watery stool since yesterday  Objective:     Vitals:   Temp (24hrs), Av °F (36 7 °C), Min:97 7 °F (36 5 °C), Max:98 4 °F (36 9 °C)    Temp:  [97 7 °F (36 5 °C)-98 4 °F (36 9 °C)] 98 3 °F (36 8 °C)  HR:  [60-78] 78  Resp:  [15-18] 18  BP: (121-153)/(61-83) 133/83  SpO2:  [98 %-100 %] 100 %  Body mass index is 16 48 kg/m²  Input and Output Summary (last 24 hours): Intake/Output Summary (Last 24 hours) at 2022 0824  Last data filed at 2022 0454  Gross per 24 hour   Intake 1052 5 ml   Output 450 ml   Net 602 5 ml       Physical Exam:   Physical Exam  Vitals reviewed  Constitutional:       General: He is awake  He is not in acute distress  Appearance: He is not ill-appearing, toxic-appearing or diaphoretic  Comments: disheveled    HENT:      Head: Normocephalic and atraumatic  Right Ear: External ear normal       Left Ear: External ear normal       Nose: Nose normal    Eyes:      General: Lids are normal  Gaze aligned appropriately  No scleral icterus  Extraocular Movements: Extraocular movements intact  Conjunctiva/sclera: Conjunctivae normal    Cardiovascular:      Rate and Rhythm: Normal rate and regular rhythm  Heart sounds: Normal heart sounds, S1 normal and S2 normal  Heart sounds not distant  No murmur heard  Pulmonary:      Effort: Pulmonary effort is normal  No tachypnea, bradypnea or accessory muscle usage  Breath sounds: Examination of the right-middle field reveals rhonchi  Examination of the left-middle field reveals rhonchi  Examination of the right-lower field reveals rhonchi  Examination of the left-lower field reveals rhonchi  Rhonchi present  Musculoskeletal:      Right lower leg: No edema  Left lower leg: No edema  Skin:     Findings: Ecchymosis present  No petechiae or rash  Comments: Bleeding scabs on the right forearm  Bilateral forearm ecchymoses  Neurological:      Mental Status: He is alert  Cranial Nerves: Cranial nerve deficit (left lower face ) and dysarthria present  Motor: Weakness (L sided hemiplegia ) present  Psychiatric:         Attention and Perception: He is inattentive  Mood and Affect: Affect is blunt  Behavior: Behavior is slowed and withdrawn  Behavior is cooperative  Comments: Scant speech          Additional Data:     Labs:  Results from last 7 days   Lab Units 11/02/22 0446 10/31/22  1228 10/31/22  0225   WBC Thousand/uL 6 20   < > 11 73*   HEMOGLOBIN g/dL 7 9*   < > 11 4*   HEMATOCRIT % 23 7*   < > 35 6*   PLATELETS Thousands/uL 293   < > 323   NEUTROS PCT %  --   --  77*   LYMPHS PCT %  --   --  12*   MONOS PCT %  --   --  8   EOS PCT %  --   --  1    < > = values in this interval not displayed  Results from last 7 days   Lab Units 11/02/22 0446 11/01/22  0618 10/31/22  0225   SODIUM mmol/L 129*   < > 127*   POTASSIUM mmol/L 3 9   < > 4 6   CHLORIDE mmol/L 100   < > 97   CO2 mmol/L 22   < > 21   BUN mg/dL 14   < > 26*   CREATININE mg/dL 0 37*   < > 0 42*   ANION GAP mmol/L 7   < > 9   CALCIUM mg/dL 8 5   < > 8 8   ALBUMIN g/dL  --   --  3 4*   TOTAL BILIRUBIN mg/dL  --   --  0 83   ALK PHOS U/L  --   --  79   ALT U/L  --   --  15   AST U/L  --   --  18   GLUCOSE RANDOM mg/dL 81   < > 75    < > = values in this interval not displayed       Results from last 7 days   Lab Units 10/31/22  0225   INR  0 99             Results from last 7 days   Lab Units 10/31/22  0225 10/31/22  0124 10/30/22  2330   LACTIC ACID mmol/L 2 1* 0 9  --    PROCALCITONIN ng/ml 0 11  --  0 14       Lines/Drains:  Invasive Devices  Report    Peripheral Intravenous Line  Duration           Peripheral IV 10/30/22 Right;Ventral (anterior) Arm 2 days          Drain  Duration           Gastrostomy/Enterostomy 18 Fr  LUQ 14 days                      Imaging: Reviewed radiology reports from this admission including: CT abdomen pelvis w contrast, XR abdomen     Recent Cultures (last 7 days):   Results from last 7 days   Lab Units 10/31/22  0255 10/31/22  0224 10/31/22  0105   BLOOD CULTURE  No Growth at 48 hrs  No Growth at 48 hrs  No Growth at 24 hrs  Last 24 Hours Medication List:   Current Facility-Administered Medications   Medication Dose Route Frequency Provider Last Rate   • albuterol  2 5 mg Nebulization Q6H PRN Acacia Marmolejo, DO     • amLODIPine  5 mg Per G Tube Daily Acacia Marmolejo, DO     • atorvastatin  40 mg Oral Daily With Triangle [id number], DO     • folic acid  1 mg Oral Daily Acacia Marmolejo, DO     • metoprolol tartrate  25 mg Per G Tube Q12H [facility] Acacia Marmolejo DO     • multivitamin with iron-minerals  15 mL Oral Daily Acacia Marmolejo, DO     • ondansetron  4 mg Intravenous Q6H PRN Acacia Marmolejo DO     • pantoprozole (PROTONIX) infusion (Continuous)  8 mg/hr Intravenous Continuous Acacia Marmolejo, DO 8 mg/hr (11/02/22 6062)   • polyethylene glycol  17 g Oral BID Acacia Marmolejo, DO     • sodium chloride  75 mL/hr Intravenous Continuous Brian Lewis MD     • thiamine  100 mg Oral Daily Acacia Marmolejo DO          Today, Patient Was Seen By: Johnathan Soares, Dr Edgar Wang     **Please Note: This note may have been constructed using a voice recognition system  **

## 2022-11-02 NOTE — PLAN OF CARE
Problem: GASTROINTESTINAL - ADULT  Goal: Maintains or returns to baseline bowel function  Description: INTERVENTIONS:  - Assess bowel function  - Encourage oral fluids to ensure adequate hydration  - Administer IV fluids if ordered to ensure adequate hydration  - Administer ordered medications as needed  - Encourage mobilization and activity  - Consider nutritional services referral to assist patient with adequate nutrition and appropriate food choices  Outcome: Progressing     Problem: GASTROINTESTINAL - ADULT  Goal: Maintains adequate nutritional intake  Description: INTERVENTIONS:  - Monitor percentage of each meal consumed  - Identify factors contributing to decreased intake, treat as appropriate  - Assist with meals as needed  - Monitor I&O, weight, and lab values if indicated  - Obtain nutrition services referral as needed  Outcome: Progressing     Problem: DISCHARGE PLANNING  Goal: Discharge to home or other facility with appropriate resources  Description: INTERVENTIONS:  - Identify barriers to discharge w/patient and caregiver  - Arrange for needed discharge resources and transportation as appropriate  - Identify discharge learning needs (meds, wound care, etc )  - Arrange for interpretive services to assist at discharge as needed  - Refer to Case Management Department for coordinating discharge planning if the patient needs post-hospital services based on physician/advanced practitioner order or complex needs related to functional status, cognitive ability, or social support system  Outcome: Progressing

## 2022-11-02 NOTE — NURSING NOTE
At time of transport to floor and d/c from apu pt is awake alert oriented, VSS, in no distress  Report called to Irmaitichristiano 30 rn prior to transport with rn to floor

## 2022-11-02 NOTE — ASSESSMENT & PLAN NOTE
Patient suffers from chronic hyponatremia with value 127 on admission and now up to 129  Patient is asymptomatic        · Continue 50mL NS flushes per G tube q6  · Continue NS 75 mL/hr  · Monitor on BMP

## 2022-11-02 NOTE — ASSESSMENT & PLAN NOTE
Patient was hypotensive at [facility] requiring short time Levophed drip and was weaned prior to coming to [facility] ICU  He did not need pressors while in the ICU  His BP has been stable, amlodipine and metoprolol doses were continued in the ICU  BP today 131/70       · Monitor BP closely  · Continue amlodipine 5mg and metoprolol 25mg

## 2022-11-02 NOTE — ASSESSMENT & PLAN NOTE
Malnutrition Findings:   Adult Malnutrition type: Chronic illness  Adult Degree of Malnutrition: Malnutrition of mild degree  Malnutrition Characteristics: Muscle loss, Other (comment) (BMI < 18 5)                  360 Statement: Mild protein calorie malnutrition r/t muscle loss present to claivicle and BMI < 18 5; receives tube feeds for sole source nutrition    BMI Findings:  Adult BMI Classifications: Underweight < 18 5        Body mass index is 16 48 kg/m²       · Nutrition is consulted for G tube feeds   · Currently NPO for EGD   · On miralax via G tube

## 2022-11-02 NOTE — ANESTHESIA POSTPROCEDURE EVALUATION
Post-Op Assessment Note    CV Status:  Stable    Pain management: adequate     Mental Status:  Lethargic and sleepy   Hydration Status:  Stable   PONV Controlled:  None   Airway Patency:  Patent      Post Op Vitals Reviewed: Yes      Staff: CRNA         No complications documented      BP   105/63   Temp     Pulse 78   Resp 18   SpO2 100

## 2022-11-02 NOTE — CONSULTS
Consultation - [address] [facility] III 63 y o  male MRN: [id number]  Unit/Bed#: S -01 Encounter: [id number]      Chief Complaint: "I had a stroke"    History of Present Illness   Physician Requesting Consult: Rita Oseguera MD  Reason for Consult / Principal Problem: Nursing home wanted him assessed for depression    Romulo Steele III is a 63 y o  male with a PMHx of CVA w/ residual L sided weakness and dysarthria, hyponatremia, alcohol abuse, and HTN admitted after pulling out his G tube and also having bloody stools  Prior records were reviewed  Today, Howie Crawley is stating that he is in the hospital because "I had a stroke " He later stated that he was in the hospital because he "Has a concussion " When asked about pulling out his G tube, Howie Crawley stated that he did this because the tube was causing him abdominal pain  He was not aware of the bloody stools prior to presentation  Today he stated that the melena has been resolved and is no longer an issue  He is still  Scheduled for and EGD/Colonoscopy to be performed today  Today he is stating that his mood "sucks " Despite this he denied feeling depressed  He denies any feelings of worthlessness, guiltiness, or hopelessness  He denied anhedonia  He endorsed getting adequate sleep, estimating he gets 6-7 hours a night  He denied having low energy  He endorsed having good appetite and added that he was hungry during our interview  He stated that his concentration and memory are also unchanged and remain adequate  He denied SI/HI  He stated that he has never had the desire to end his life or harm himself  He did endorse feeling anxious about many things some of the time adding that he was currently anxious about eating  He denied any history of vivienne, paranoia, panic attacks, traumatic events, abuse, or eating disorders  He denied visual, auditory, and tactile hallucinations        Howie Crawley was oriented to person, place and time  He was unable to complete serial 7s or 3 word repeat  He was unable to explain the meanings of the phrases, "Don't  a book by its cover," and "The apple doesn't fall far from the tree " He stated the current president is "Asshole" and clarified that he meant [employer]  He stated that the presidents prior to Obama were [employer] and [employer] and Obama  At this time, Reji Wilson is denying any desire for medications to help with depression or anxiety  Collateral from his sister Delmis:  His sister confirmed that the plan for Reji Wilson is to finish rehab at a nursing home and then go live with his father  He lived alone prior to his stroke  She also confirmed that he has not been treated by a psychiatrist in the past and has never had self-harming behaviors  She endorsed that he is not a risk to himself or others  She stated the he has a strong support system outside of the hospital  She endorsed that 6 months ago he seemed depressed at a family cookout  She also stated that 6 months ago he went to race with a friend and disappeared  Police were notified that he was missing and he was found walking along the highway  He stated he had wondered off because [facility]        Psychiatric Review Of Systems:  Problems with sleep: no  Appetite changes: no  Weight changes: no  Low energy/anergy: no  Low interest/pleasure/anhedonia: no  Somatic symptoms: no  Anxiety/panic: no  Rebecca: no  Guilt/hopeless: no  Self injurious behavior/risky behavior: no    Historical Information   Prior psychiatric diagnoses: patient denies  Inpatient hospitalizations: patient denies  Suicide attempts: patient denies  Self-harm behaviors: patient denies  Outpatient treatment: patient denies  Psychiatric medication trial: Tried an antidepressant more than 20 years ago but could not remember the name of the medication    Substance Abuse History:  Social History     Tobacco Use   • Smoking status: Current Every Day Smoker Packs/day: 1 00     Types: Cigarettes   • Smokeless tobacco: Never Used   Vaping Use   • Vaping Use: Never used   Substance Use Topics   • Alcohol use: Not Currently     Comment: occasionally   • Drug use: Never      Patient reports drinking a 6-pack of beer 4-5x per week  He also endorsed occasionally using marijuana with the most recent use being 2 months ago  He also endorsed a history of using cocaine in the past but has not used cocaine in at least a decade  He denied any history of tobacco or vape use  I have assessed this patient for substance use within the past 12 months    Family Psychiatric History:   Patient denies any known family history of psychiatric illness, suicide attempt, or substance abuse    Social History  Marital history: Single  Children: no  Living arrangement: Currently lives in nursing home but at time of interview was claiming he would be living with his father  Functioning Relationships: Two sisters and his father live in the [facility] area and he sees them often  He did not disclose how close these relationships are  Education: high school diploma/GED  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    Other Pertinent History:  Service: [employer]    Traumatic History:   Abuse: none reported  Other Traumatic Events: none reported    Past Medical History:   Diagnosis Date   • Hypertension    • Renal disorder    • Stroke [facility])        Medical Review Of Systems:  Review of Systems - Negative except as noted in HPI    Meds/Allergies   all current active meds have been reviewed  No Known Allergies    Objective   Vital signs in last 24 hours:  Temp:  [97 7 °F (36 5 °C)-98 4 °F (36 9 °C)] 98 °F (36 7 °C)  HR:  [60-78] 64  Resp:  [15-18] 18  BP: (115-153)/(61-83) 115/69    Mental Status Exam:  Appearance:  drowsy, good eye contact, appears older than stated age, casually dressed, disheveled, poor dentition, thin and  male, dressed in hospital gown    Behavior:  calm, cooperative and guarded   Motor: Weakness in left sided extremities    Speech:  spontaneous, dysarthric and normal rate   Mood:  "Sucks"   Affect:  constricted and dysphoric   Thought Process:  illogical, normal rate of thoughts   Thought Content: no verbalized delusions or overt paranoia   Perceptual disturbances: no reported hallucinations and does not appear to be responding to internal stimuli at this time   Risk Potential: No active or passive suicidal or homicidal ideation was verbalized during interview   Cognition: oriented to self and situation, appears to be below average intelligence and cognition not formally tested   Insight:  Limited   Judgment: Limited     Laboratory results:  I have personally reviewed all pertinent laboratory/tests results  The following portions of the patient's history were reviewed and updated as appropriate: allergies, current medications, past family history, past medical history, past social history, past surgical history and problem list     Assessment/Plan   At this time Jam Ordaz does not meet the criteria for MDD  He does however appear to be minimizing answers and does outwardly appear depressed  He is denying any medications to help with his mood at this time  Diagnosis: Unspecified mood disorder    Recommended Treatment:   • The patient does not meet criteria for inpatient psychiatric hospitalization and will be discharged home per primary team    • Continue home medications  • Psychiatry will sign off  Please call or TigerText the on-call team with any questions or concerns  Diagnoses were discussed with the patient  Available treatment options were discussed with the patient  Prior records were reviewed in [address] [place] [address]    The assessment and plan was discussed with the primary team      Risks, benefits and possible side effects of Medications:   Risks, benefits, and possible side effects of medications were explained to the patient, who verbalizes understanding            Negra Fuentes  MS-IV

## 2022-11-02 NOTE — ANESTHESIA PREPROCEDURE EVALUATION
Procedure:  EGD      63year old presents with dark stool and bright red blood per rectum  2 units of blood given at OSH    Patient treated for PNA given findings on CXR    Relevant Problems   CARDIO   (+) Primary hypertension      HEMATOLOGY   (+) Acute on chronic anemia   (+) Anemia      NEURO/PSYCH   (+) Acute CVA (cerebrovascular accident) ([facility] [address] )   (+) Anxiety   (+) History of CVA (cerebrovascular accident)      PULMONARY   (+) Smoking   chronic hyponatremia (mumtaz of 120 seen in October)   CVA 10/09/22 - left hemiparesis and dysphagia s/p g-tube placement  Physical Exam    Airway    Mallampati score: II  TM Distance: >3 FB  Neck ROM: full     Dental       Cardiovascular      Pulmonary      Other Findings  +productive cough         Latest Reference Range & Units 11/02/22 10:26   Sodium 135 - 147 mmol/L 128 (L)   Potassium 3 5 - 5 3 mmol/L 4 0   Chloride 96 - 108 mmol/L 100   CO2 21 - 32 mmol/L 22   Anion Gap 4 - 13 mmol/L 6   BUN 5 - 25 mg/dL 19   Creatinine 0 60 - 1 30 mg/dL 0 45 (L)   Glucose, Random 65 - 140 mg/dL 82   Calcium 8 4 - 10 2 mg/dL 8 4   eGFR ml/min/1 73sq m 123            Latest Reference Range & Units 11/02/22 15:26   Hemoglobin 12 0 - 17 0 g/dL 7 7 (L)   HCT 36 5 - 49 3 % 23 7 (L)       EKG (10/2022)  Sinus tachycardia  Right atrial enlargement  Nonspecific ST abnormality  Abnormal ECG  No previous ECGs available    Anesthesia Plan  ASA Score- 3     Anesthesia Type-         Additional Monitors:   Airway Plan:     Comment: Npo after MN  Plan Factors-Exercise tolerance (METS): <4 METS  Chart reviewed  EKG reviewed  Existing labs reviewed  Patient summary reviewed  Patient is not a current smoker  Induction- intravenous  Postoperative Plan-     Informed Consent- Anesthetic plan and risks discussed with patient  I personally reviewed this patient with the CRNA  Discussed and agreed on the Anesthesia Plan with the CRNA  Delfino Stoner

## 2022-11-02 NOTE — PROGRESS NOTES
Progress Note - Roxanne Han III 63 y o  male MRN: [id number]    Unit/Bed#: S -01 Encounter: [id number]        Assessment/Plan:  Melena  · Reported dark stool w/ BRB at [facility]- on arrival only had dark black/ greenish colored stool  · Had episodes of hypotension and a code crimson called- 2 units PRBC given at [facility], hemoglobin now has trended down to 7 9 today  · Cont Protonix drip  · Currently NPO, patient is only on PEG tube feedings due to dysphagia from recent CVA, resumed tube feeds yesterday  · Patient with mildly elevated BUN yesterday which has since normalized, cannot exclude ulceration from PEG balloon   · Patient noted to have hyponatremia with sodium of 129 today  Will repeat BMP now to f/u Na  · Gastric lavage the other day was negative for bleed but now nurse flushed tube and noted to have bloody output in tubing  Nurse also reporting blackstools  Patient remains NPO and is on a Protonix drip  · Does have history of alcohol abuse but no findings on imaging consistent with cirrhosis with normal INR and platelet count  · Open surgical PEG was placed 10/18  · Will plan for EGD today, spoke with Anesthesia as the other day regarding the hyponatremia and they were willing to do sedation for procedure if urgent GI need     Stercoral colitis with fecal impaction   -patient also noted to have bright red blood per rectum on exam which is likely related to fecal impaction with stercoral colitis  -ordered Fleet enema x1 yesterday due to marked distention of rectum noted on CT  -continue bowel regimen, patient was on MiraLax twice daily via PEG   -will need colonoscopy at time of EGD for further evaluation as above once further stabilized  -ordered half MiraLax prep on admission with results, nurse reported the liquid dark green stool again yesterday, nurse reports black stool today  -follow serial H&H    Subjective:   Patient is lying in bed sleeping  Patient actually NPO    Offers no pertinent complaints  Nurse went to flush tube and then shortly thereafter noticed blood in tubing, also reported some black stool this morning  Hemoglobin was repeated and was 8  Was 7 9 this morning and there was a 5 8 hemoglobin but was thought to be erroneous as it was 10 6 upon repeat 1 hour later      Objective:     Vitals: /83   Pulse 78   Temp 98 3 °F (36 8 °C)   Resp 18   Ht 5' 10" (1 778 m)   Wt 52 1 kg (114 lb 13 8 oz)   SpO2 100%   BMI 16 48 kg/m²       Physical Exam:  Gen-alert no acute distress  Abd-positive bowel sounds, nondistended, nontender, some bleeding noted in PEG tubing, nothing around the tube       Lab, Imaging and other studies:   Recent Results (from the past 72 hour(s))   Occult Bloood,Fecal Immunochemical    Collection Time: 10/30/22  8:38 PM   Result Value Ref Range    OCCULT BLD, FECAL IMMUNOLOGICAL Negative Negative   CBC and differential    Collection Time: 10/30/22  8:38 PM   Result Value Ref Range    WBC 8 22 4 31 - 10 16 Thousand/uL    RBC 2 67 (L) 3 88 - 5 62 Million/uL    Hemoglobin 8 3 (L) 12 0 - 17 0 g/dL    Hematocrit 25 1 (L) 36 5 - 49 3 %    MCV 94 82 - 98 fL    MCH 31 1 26 8 - 34 3 pg    MCHC 33 1 31 4 - 37 4 g/dL    RDW 17 0 (H) 11 6 - 15 1 %    MPV 8 6 (L) 8 9 - 12 7 fL    Platelets 682 510 - 683 Thousands/uL    nRBC 0 /100 WBCs    Neutrophils Relative 72 43 - 75 %    Immat GRANS % 1 0 - 2 %    Lymphocytes Relative 15 14 - 44 %    Monocytes Relative 9 4 - 12 %    Eosinophils Relative 2 0 - 6 %    Basophils Relative 1 0 - 1 %    Neutrophils Absolute 6 05 1 85 - 7 62 Thousands/µL    Immature Grans Absolute 0 07 0 00 - 0 20 Thousand/uL    Lymphocytes Absolute 1 19 0 60 - 4 47 Thousands/µL    Monocytes Absolute 0 70 0 17 - 1 22 Thousand/µL    Eosinophils Absolute 0 17 0 00 - 0 61 Thousand/µL    Basophils Absolute 0 04 0 00 - 0 10 Thousands/µL   Comprehensive metabolic panel    Collection Time: 10/30/22  8:38 PM   Result Value Ref Range    Sodium 127 (L) 135 - 147 mmol/L    Potassium 4 0 3 5 - 5 3 mmol/L    Chloride 93 (L) 96 - 108 mmol/L    CO2 27 21 - 32 mmol/L    ANION GAP 7 4 - 13 mmol/L    BUN 14 5 - 25 mg/dL    Creatinine 0 39 (L) 0 60 - 1 30 mg/dL    Glucose 87 65 - 140 mg/dL    Calcium 9 4 8 4 - 10 2 mg/dL    AST 16 13 - 39 U/L    ALT 18 7 - 52 U/L    Alkaline Phosphatase 85 34 - 104 U/L    Total Protein 6 7 6 4 - 8 4 g/dL    Albumin 3 6 3 5 - 5 0 g/dL    Total Bilirubin 0 42 0 20 - 1 00 mg/dL    eGFR 131 ml/min/1 73sq m   Protime-INR    Collection Time: 10/30/22  8:38 PM   Result Value Ref Range    Protime 12 7 11 6 - 14 5 seconds    INR 0 92 0 84 - 1 19   APTT    Collection Time: 10/30/22  8:38 PM   Result Value Ref Range    PTT 28 23 - 37 seconds   Uric acid    Collection Time: 10/30/22  8:38 PM   Result Value Ref Range    Uric Acid 2 6 (L) 3 5 - 8 5 mg/dL   Iron Saturation %    Collection Time: 10/30/22  8:38 PM   Result Value Ref Range    Iron Saturation 23 20 - 50 %    TIBC 323 250 - 450 ug/dL    Iron 75 65 - 175 ug/dL   Ferritin    Collection Time: 10/30/22  8:38 PM   Result Value Ref Range    Ferritin 371 8 - 388 ng/mL   Type and screen    Collection Time: 10/30/22 10:07 PM   Result Value Ref Range    ABO Grouping O     Rh Factor Positive     Antibody Screen Negative     Specimen Expiration Date [id number]    ABORh Recheck - Contact Blood Bank Prior to Collection    Collection Time: 10/30/22 11:30 PM   Result Value Ref Range    ABO Grouping O     Rh Factor Positive    Hemoglobin and hematocrit, blood    Collection Time: 10/30/22 11:30 PM   Result Value Ref Range    Hemoglobin 7 9 (L) 12 0 - 17 0 g/dL    Hematocrit 24 2 (L) 36 5 - 49 3 %   Basic metabolic panel    Collection Time: 10/30/22 11:30 PM   Result Value Ref Range    Sodium 127 (L) 135 - 147 mmol/L    Potassium 4 1 3 5 - 5 3 mmol/L    Chloride 94 (L) 96 - 108 mmol/L    CO2 25 21 - 32 mmol/L    ANION GAP 8 4 - 13 mmol/L    BUN 23 5 - 25 mg/dL    Creatinine 0 44 (L) 0 60 - 1 30 mg/dL    Glucose 88 65 - 140 mg/dL    Calcium 9 2 8 4 - 10 2 mg/dL    eGFR 125 ml/min/1 73sq m   Procalcitonin    Collection Time: 10/30/22 11:30 PM   Result Value Ref Range    Procalcitonin 0 14 <=0 25 ng/ml   Prepare Leukoreduced RBC    Collection Time: 10/30/22 11:42 PM   Result Value Ref Range    Unit Product Code X8773B85     Unit Number [id number]     Unit ABO O     Unit [facility] POS     Unit Dispense Status Presumed Trans     Unit Product Volume 350 ml    Unit Product Code X9049K00     Unit Number [id number]     Unit ABO O     Unit RH POS     Unit Dispense Status Presumed Trans     Unit Product Volume 350 ml    Crossmatch Compatible     Crossmatch Compatible    Osmolality, urine    Collection Time: 10/30/22 11:51 PM   Result Value Ref Range    Osmolality, Ur 551 250 - 900 mmol/KG   Urinalysis with microscopic    Collection Time: 10/30/22 11:51 PM   Result Value Ref Range    Color, UA Yellow Yellow    Clarity, UA Clear Clear    Specific [place], UA 1 010 1 001 - 1 030    pH, UA 8 0 5 0, 5 5, 6 0, 6 5, 7 0, 7 5, 8 0    Leukocytes, UA Negative Negative    Nitrite, UA Negative Negative    Protein, UA Negative Negative, Interference- unable to analyze mg/dl    Glucose, UA Negative Negative mg/dl    Ketones, UA Negative Negative mg/dl    Urobilinogen, UA 0 2 0 2, 1 0 E U /dl E U /dl    Bilirubin, UA Negative Negative    Occult Blood, UA Negative Negative    RBC, UA 0-5 None Seen, 0-1, 1-2, 2-4, 0-5 /hpf    WBC, UA 0-5 None Seen, 0-1, 1-2, 0-5, 2-4 /hpf    Epithelial Cells Occasional None Seen, Occasional /hpf    Bacteria, UA Occasional None Seen, Occasional /hpf    OTHER OBSERVATIONS Renal Epithelial Cells Present     MUCUS THREADS Occasional (A) None Seen   Sodium, urine, random    Collection Time: 10/30/22 11:51 PM   Result Value Ref Range    Sodium, Ur 76    Blood culture    Collection Time: 10/31/22  1:05 AM    Specimen: Central Venous Line; Blood   Result Value Ref Range    Blood Culture No Growth at 24 hrs      Lactic acid, plasma Collection Time: 10/31/22  1:24 AM   Result Value Ref Range    LACTIC ACID 0 9 0 5 - 2 0 mmol/L   Blood culture    Collection Time: 10/31/22  2:24 AM    Specimen: Hand, Left; Blood   Result Value Ref Range    Blood Culture No Growth at 48 hrs      CBC and differential    Collection Time: 10/31/22  2:25 AM   Result Value Ref Range    WBC 11 73 (H) 4 31 - 10 16 Thousand/uL    RBC 3 76 (L) 3 88 - 5 62 Million/uL    Hemoglobin 11 4 (L) 12 0 - 17 0 g/dL    Hematocrit 35 6 (L) 36 5 - 49 3 %    MCV 95 82 - 98 fL    MCH 30 3 26 8 - 34 3 pg    MCHC 32 0 31 4 - 37 4 g/dL    RDW 16 6 (H) 11 6 - 15 1 %    MPV 8 5 (L) 8 9 - 12 7 fL    Platelets 103 155 - 768 Thousands/uL    nRBC 0 /100 WBCs    Neutrophils Relative 77 (H) 43 - 75 %    Immat GRANS % 1 0 - 2 %    Lymphocytes Relative 12 (L) 14 - 44 %    Monocytes Relative 8 4 - 12 %    Eosinophils Relative 1 0 - 6 %    Basophils Relative 1 0 - 1 %    Neutrophils Absolute 9 09 (H) 1 85 - 7 62 Thousands/µL    Immature Grans Absolute 0 16 0 00 - 0 20 Thousand/uL    Lymphocytes Absolute 1 40 0 60 - 4 47 Thousands/µL    Monocytes Absolute 0 89 0 17 - 1 22 Thousand/µL    Eosinophils Absolute 0 12 0 00 - 0 61 Thousand/µL    Basophils Absolute 0 07 0 00 - 0 10 Thousands/µL   Comprehensive metabolic panel    Collection Time: 10/31/22  2:25 AM   Result Value Ref Range    Sodium 127 (L) 135 - 147 mmol/L    Potassium 4 6 3 5 - 5 3 mmol/L    Chloride 97 96 - 108 mmol/L    CO2 21 21 - 32 mmol/L    ANION GAP 9 4 - 13 mmol/L    BUN 26 (H) 5 - 25 mg/dL    Creatinine 0 42 (L) 0 60 - 1 30 mg/dL    Glucose 75 65 - 140 mg/dL    Calcium 8 8 8 4 - 10 2 mg/dL    Corrected Calcium 9 3 8 3 - 10 1 mg/dL    AST 18 13 - 39 U/L    ALT 15 7 - 52 U/L    Alkaline Phosphatase 79 34 - 104 U/L    Total Protein 6 2 (L) 6 4 - 8 4 g/dL    Albumin 3 4 (L) 3 5 - 5 0 g/dL    Total Bilirubin 0 83 0 20 - 1 00 mg/dL    eGFR 127 ml/min/1 73sq m   Protime-INR    Collection Time: 10/31/22  2:25 AM   Result Value Ref Range Protime 13 3 11 6 - 14 5 seconds    INR 0 99 0 84 - 1 19   Lactic acid, plasma    Collection Time: 10/31/22  2:25 AM   Result Value Ref Range    LACTIC ACID 2 1 (HH) 0 5 - 2 0 mmol/L   Procalcitonin    Collection Time: 10/31/22  2:25 AM   Result Value Ref Range    Procalcitonin 0 11 <=0 25 ng/ml   Type and screen    Collection Time: 10/31/22  2:25 AM   Result Value Ref Range    ABO Grouping O     Rh Factor Positive     Antibody Screen Negative     Specimen Expiration Date [id number]    Iron Saturation %    Collection Time: 10/31/22  2:25 AM   Result Value Ref Range    Iron Saturation 26 20 - 50 %    TIBC 397 250 - 450 ug/dL    Iron 105 65 - 175 ug/dL   Ferritin    Collection Time: 10/31/22  2:25 AM   Result Value Ref Range    Ferritin 341 8 - 388 ng/mL   Blood culture    Collection Time: 10/31/22  2:55 AM    Specimen: Arm, Right; Blood   Result Value Ref Range    Blood Culture No Growth at 48 hrs  Magnesium    Collection Time: 10/31/22  4:25 AM   Result Value Ref Range    Magnesium 1 8 (L) 1 9 - 2 7 mg/dL   Phosphorus    Collection Time: 10/31/22  4:25 AM   Result Value Ref Range    Phosphorus 4 3 2 7 - 4 5 mg/dL   Osmolality, urine    Collection Time: 10/31/22  8:55 AM   Result Value Ref Range    Osmolality, Ur 629 250 - 900 mmol/KG   Sodium, urine, random    Collection Time: 10/31/22  8:55 AM   Result Value Ref Range    Sodium, Ur 90    Osmolality-"If this is regarding a toxic alcohol, STOP  Test is not routinely indicated   Please consult medical  on call for further guidance "    Collection Time: 10/31/22 12:28 PM   Result Value Ref Range    Osmolality Serum 264 (L) 282 - 298 mmol/KG   Serial Hemoglobin Q6hrs    Collection Time: 10/31/22 12:28 PM   Result Value Ref Range    Hemoglobin 9 2 (L) 12 0 - 17 0 g/dL   Serial Hemoglobin Q6hrs    Collection Time: 10/31/22  6:34 PM   Result Value Ref Range    Hemoglobin 8 7 (L) 12 0 - 17 0 g/dL   Urinalysis with microscopic    Collection Time: 10/31/22  7:19 PM   Result Value Ref Range    Color, UA Light Yellow     Clarity, UA Clear     Specific Gravity, UA 1 020 1 003 - 1 030    pH, UA 7 0 4 5, 5 0, 5 5, 6 0, 6 5, 7 0, 7 5, 8 0    Leukocytes, UA Trace (A) Negative    Nitrite, UA Negative Negative    Protein, UA Negative Negative mg/dl    Glucose, UA Negative Negative mg/dl    Ketones, UA Negative Negative mg/dl    Urobilinogen, UA <2 0 <2 0 mg/dl mg/dl    Bilirubin, UA Negative Negative    Occult Blood, UA Moderate (A) Negative    RBC, UA 1-2 None Seen, 1-2 /hpf    WBC, UA 2-4 (A) None Seen, 1-2 /hpf    Epithelial Cells Occasional None Seen, Occasional /hpf    Bacteria, UA None Seen None Seen, Occasional /hpf   Serial Hemoglobin Q6hrs    Collection Time: 11/01/22 12:40 AM   Result Value Ref Range    Hemoglobin 9 1 (L) 12 0 - 17 0 g/dL   Basic metabolic panel    Collection Time: 11/01/22  6:18 AM   Result Value Ref Range    Sodium 127 (L) 135 - 147 mmol/L    Potassium 4 2 3 5 - 5 3 mmol/L    Chloride 97 96 - 108 mmol/L    CO2 20 (L) 21 - 32 mmol/L    ANION GAP 10 4 - 13 mmol/L    BUN 11 5 - 25 mg/dL    Creatinine 0 43 (L) 0 60 - 1 30 mg/dL    Glucose 80 65 - 140 mg/dL    Calcium 9 0 8 4 - 10 2 mg/dL    eGFR 126 ml/min/1 73sq m   Calcium, ionized    Collection Time: 11/01/22  6:18 AM   Result Value Ref Range    Calcium, Ionized 1 14 1 12 - 1 32 mmol/L   CBC    Collection Time: 11/01/22  6:18 AM   Result Value Ref Range    WBC 6 50 4 31 - 10 16 Thousand/uL    RBC 2 94 (L) 3 88 - 5 62 Million/uL    Hemoglobin 8 8 (L) 12 0 - 17 0 g/dL    Hematocrit 26 7 (L) 36 5 - 49 3 %    MCV 91 82 - 98 fL    MCH 29 9 26 8 - 34 3 pg    MCHC 33 0 31 4 - 37 4 g/dL    RDW 17 1 (H) 11 6 - 15 1 %    Platelets 698 226 - 712 Thousands/uL    MPV 8 3 (L) 8 9 - 12 7 fL   Phosphorus    Collection Time: 11/01/22  6:18 AM   Result Value Ref Range    Phosphorus 4 4 2 7 - 4 5 mg/dL   Magnesium    Collection Time: 11/01/22  6:18 AM   Result Value Ref Range    Magnesium 2 0 1 9 - 2 7 mg/dL   Serial Hemoglobin Q6hrs    Collection Time: 11/01/22  1:32 PM   Result Value Ref Range    Hemoglobin 5 8 (LL) 12 0 - 17 0 g/dL   Prepare Leukoreduced RBC: 2 Units    Collection Time: 11/01/22  2:38 PM   Result Value Ref Range    Unit Product Code E8648Y91     Unit Number [id number]     Unit ABO O     Unit [facility] POS     Crossmatch Compatible     Unit Dispense Status Crossmatched     Unit Product Volume 350 ml    Unit Product Code M3129F84     Unit Number [id number]     Unit ABO O     Unit [facility] POS     Crossmatch Compatible     Unit Dispense Status Crossmatched     Unit Product Volume 350 ml   Hemoglobin    Collection Time: 11/01/22  2:38 PM   Result Value Ref Range    Hemoglobin 10 6 (L) 12 0 - 17 0 g/dL   Serial Hemoglobin Q6hrs    Collection Time: 11/01/22  6:48 PM   Result Value Ref Range    Hemoglobin 9 8 (L) 12 0 - 17 0 g/dL   Serial Hemoglobin Q6hrs    Collection Time: 11/02/22 12:29 AM   Result Value Ref Range    Hemoglobin 8 3 (L) 12 0 - 17 0 g/dL   Basic metabolic panel    Collection Time: 11/02/22  4:46 AM   Result Value Ref Range    Sodium 129 (L) 135 - 147 mmol/L    Potassium 3 9 3 5 - 5 3 mmol/L    Chloride 100 96 - 108 mmol/L    CO2 22 21 - 32 mmol/L    ANION GAP 7 4 - 13 mmol/L    BUN 14 5 - 25 mg/dL    Creatinine 0 37 (L) 0 60 - 1 30 mg/dL    Glucose 81 65 - 140 mg/dL    Calcium 8 5 8 4 - 10 2 mg/dL    eGFR 134 ml/min/1 73sq m   CBC and Platelet    Collection Time: 11/02/22  4:46 AM   Result Value Ref Range    WBC 6 20 4  31 - 10 16 Thousand/uL    RBC 2 56 (L) 3 88 - 5 62 Million/uL    Hemoglobin 7 9 (L) 12 0 - 17 0 g/dL    Hematocrit 23 7 (L) 36 5 - 49 3 %    MCV 93 82 - 98 fL    MCH 30 9 26 8 - 34 3 pg    MCHC 33 3 31 4 - 37 4 g/dL    RDW 17 0 (H) 11 6 - 15 1 %    Platelets 606 524 - 243 Thousands/uL    MPV 8 5 (L) 8 9 - 12 7 fL

## 2022-11-02 NOTE — CASE MANAGEMENT
Case Management Progress Note    Patient name Ruba Quick  Location S /S -01 MRN [id number]  : 1960 Date 2022       LOS (days): 2  Geometric Mean LOS (GMLOS) (days): 3 00  Days to GMLOS:0 5        OBJECTIVE:        Current admission status: Inpatient  Preferred Pharmacy:   [address] [place] [address], [address] [place] [address], [address]  [address] [address]  Phone: [phone] Fax: [phone]    Primary Care Provider: No primary care provider on file  Primary Insurance: BLUE CROSS  Secondary Insurance:     PROGRESS NOTE:  Call back made to Jamal from [employer] at [phone] ext  [phone] per her request  VM left with call back number

## 2022-11-02 NOTE — PLAN OF CARE
Problem: MOBILITY - ADULT  Goal: Maintain or return to baseline ADL function  Description: INTERVENTIONS:  -  Assess patient's ability to carry out ADLs; assess patient's baseline for ADL function and identify physical deficits which impact ability to perform ADLs (bathing, care of mouth/teeth, toileting, grooming, dressing, etc )  - Assess/evaluate cause of self-care deficits   - Assess range of motion  - Assess patient's mobility; develop plan if impaired  - Assess patient's need for assistive devices and provide as appropriate  - Encourage maximum independence but intervene and supervise when necessary  - Involve family in performance of ADLs  - Assess for home care needs following discharge   - Consider OT consult to assist with ADL evaluation and planning for discharge  - Provide patient education as appropriate  Outcome: Progressing  Goal: Maintains/Returns to pre admission functional level  Description: INTERVENTIONS:  - Perform BMAT or MOVE assessment daily    - Set and communicate daily mobility goal to care team and patient/family/caregiver  - Collaborate with rehabilitation services on mobility goals if consulted  - Perform Range of Motion 3 times a day  - Reposition patient every 2 hours    - Dangle patient 3 times a day  - Stand patient 3 times a day  - Ambulate patient 3 times a day  - Out of bed to chair 3 times a day   - Out of bed for meals 3 times a day  - Out of bed for toileting  - Record patient progress and toleration of activity level   Outcome: Progressing     Problem: Potential for Falls  Goal: Patient will remain free of falls  Description: INTERVENTIONS:  - Educate patient/family on patient safety including physical limitations  - Instruct patient to call for assistance with activity   - Consult OT/PT to assist with strengthening/mobility   - Keep Call bell within reach  - Keep bed low and locked with side rails adjusted as appropriate  - Keep care items and personal belongings within reach  - Initiate and maintain comfort rounds  - Make Fall Risk Sign visible to staff  - Offer Toileting every 2 Hours, in advance of need  - Initiate/Maintain alarm  - Obtain necessary fall risk management equipment  - Apply yellow socks and bracelet for high fall risk patients  - Consider moving patient to room near nurses station  Outcome: Progressing     Problem: Prexisting or High Potential for Compromised Skin Integrity  Goal: Skin integrity is maintained or improved  Description: INTERVENTIONS:  - Identify patients at risk for skin breakdown  - Assess and monitor skin integrity  - Assess and monitor nutrition and hydration status  - Monitor labs   - Assess for incontinence   - Turn and reposition patient  - Assist with mobility/ambulation  - Relieve pressure over bony prominences  - Avoid friction and shearing  - Provide appropriate hygiene as needed including keeping skin clean and dry  - Evaluate need for skin moisturizer/barrier cream  - Collaborate with interdisciplinary team   - Patient/family teaching  - Consider wound care consult   Outcome: Progressing     Problem: Nutrition/Hydration-ADULT  Goal: Nutrient/Hydration intake appropriate for improving, restoring or maintaining nutritional needs  Description: Monitor and assess patient's nutrition/hydration status for malnutrition  Collaborate with interdisciplinary team and initiate plan and interventions as ordered  Monitor patient's weight and dietary intake as ordered or per policy  Utilize nutrition screening tool and intervene as necessary  Determine patient's food preferences and provide high-protein, high-caloric foods as appropriate       INTERVENTIONS:  - Monitor oral intake, urinary output, labs, and treatment plans  - Assess nutrition and hydration status and recommend course of action  - Evaluate amount of meals eaten  - Assist patient with eating if necessary   - Allow adequate time for meals  - Recommend/ encourage appropriate diets, oral nutritional supplements, and vitamin/mineral supplements  - Order, calculate, and assess calorie counts as needed  - Recommend, monitor, and adjust tube feedings and TPN/PPN based on assessed needs  - Assess need for intravenous fluids  - Provide specific nutrition/hydration education as appropriate  - Include patient/family/caregiver in decisions related to nutrition  Outcome: Progressing

## 2022-11-03 PROBLEM — E44.1 MILD PROTEIN-CALORIE MALNUTRITION (HCC): Status: ACTIVE | Noted: 2022-11-03

## 2022-11-03 LAB
ANION GAP SERPL CALCULATED.3IONS-SCNC: 10 MMOL/L (ref 4–13)
BUN SERPL-MCNC: 14 MG/DL (ref 5–25)
CALCIUM SERPL-MCNC: 8.4 MG/DL (ref 8.4–10.2)
CHLORIDE SERPL-SCNC: 101 MMOL/L (ref 96–108)
CO2 SERPL-SCNC: 20 MMOL/L (ref 21–32)
CREAT SERPL-MCNC: 0.47 MG/DL (ref 0.6–1.3)
ERYTHROCYTE [DISTWIDTH] IN BLOOD BY AUTOMATED COUNT: 17.1 % (ref 11.6–15.1)
GFR SERPL CREATININE-BSD FRML MDRD: 121 ML/MIN/1.73SQ M
GLUCOSE SERPL-MCNC: 66 MG/DL (ref 65–140)
HCT VFR BLD AUTO: 20.1 % (ref 36.5–49.3)
HGB BLD-MCNC: 6.6 G/DL (ref 12–17)
HGB BLD-MCNC: 7.1 G/DL (ref 12–17)
HGB BLD-MCNC: 8.3 G/DL (ref 12–17)
HGB BLD-MCNC: 8.6 G/DL (ref 12–17)
MCH RBC QN AUTO: 31.1 PG (ref 26.8–34.3)
MCHC RBC AUTO-ENTMCNC: 32.8 G/DL (ref 31.4–37.4)
MCV RBC AUTO: 95 FL (ref 82–98)
PLATELET # BLD AUTO: 254 THOUSANDS/UL (ref 149–390)
PMV BLD AUTO: 8.4 FL (ref 8.9–12.7)
POTASSIUM SERPL-SCNC: 3.7 MMOL/L (ref 3.5–5.3)
RBC # BLD AUTO: 2.12 MILLION/UL (ref 3.88–5.62)
SODIUM SERPL-SCNC: 131 MMOL/L (ref 135–147)
WBC # BLD AUTO: 7.13 THOUSAND/UL (ref 4.31–10.16)

## 2022-11-03 RX ORDER — PANTOPRAZOLE SODIUM 40 MG/10ML
40 INJECTION, POWDER, LYOPHILIZED, FOR SOLUTION INTRAVENOUS EVERY 12 HOURS SCHEDULED
Status: DISCONTINUED | OUTPATIENT
Start: 2022-11-03 | End: 2022-11-04 | Stop reason: HOSPADM

## 2022-11-03 RX ADMIN — THIAMINE HCL TAB 100 MG 100 MG: 100 TAB at 08:24

## 2022-11-03 RX ADMIN — METOPROLOL TARTRATE 25 MG: 25 TABLET, FILM COATED ORAL at 08:24

## 2022-11-03 RX ADMIN — PANTOPRAZOLE SODIUM 40 MG: 40 INJECTION, POWDER, FOR SOLUTION INTRAVENOUS at 22:02

## 2022-11-03 RX ADMIN — AMLODIPINE BESYLATE 5 MG: 5 TABLET ORAL at 08:25

## 2022-11-03 RX ADMIN — FOLIC ACID 1 MG: 1 TABLET ORAL at 08:25

## 2022-11-03 RX ADMIN — ATORVASTATIN CALCIUM 40 MG: 40 TABLET, FILM COATED ORAL at 17:20

## 2022-11-03 RX ADMIN — MULTIVITAMIN 15 ML: LIQUID ORAL at 08:30

## 2022-11-03 RX ADMIN — METOPROLOL TARTRATE 25 MG: 25 TABLET, FILM COATED ORAL at 22:02

## 2022-11-03 RX ADMIN — SODIUM CHLORIDE 8 MG/HR: 9 INJECTION, SOLUTION INTRAVENOUS at 10:27

## 2022-11-03 NOTE — SPEECH THERAPY NOTE
Speech Language/Pathology    Speech/Language Pathology Progress Note    Patient Name: Fariba Salazar Date: 11/3/2022     Consult rec'd for swallow eval- clear liquids only  TT to GI PA that pt had VBS last hospitalization (Oct 2022), was started on dysphagia 1 puree w/ pudding thick liquids w/ head turn to L strategy;  however, pt did not appear to tolerate the diet clinically and a PEG tube was placed  Pt discharged to rehab, now re-admitted for gi bleed  Rec repeat VBS to assess swallowing function for possible improvement prior to starting po diet  VBS scheduled for fri 11/4 at 9:30          April Heaven, [place] CCC-SLP  Speech Pathologist  Available via  tiger text

## 2022-11-03 NOTE — CASE MANAGEMENT
[address] [address] 52 has received approved authorization from:   [facility]  Called in by Mirta Mejía P#  [phone] [address] [place] received for: [facility]  Facility: [facility] #: [id number]  Start of Care: 11/4  Next Review Date: 11/11  Care Coordinator: Demarco Joseph  P#:  [phone]  Submit next review to: [phone]   Care Manager notified: Daylin Harmon

## 2022-11-03 NOTE — CASE MANAGEMENT
Case Management Discharge Planning Note    Patient name Patience Code  Location S /S -01 MRN [id number]  : 1960 Date 11/3/2022       Current Admission Date: 10/31/2022  Current Admission Diagnosis:Melena   Patient Active Problem List    Diagnosis Date Noted   • Mild protein-calorie malnutrition ([facility] [address] ) 2022   • Acute on chronic anemia 10/31/2022   • History of CVA (cerebrovascular accident) 10/30/2022   • Melena 10/30/2022   • Gastrostomy tube dysfunction ([address] ) 10/30/2022   • Primary hypertension 10/30/2022   • Anxiety 10/28/2022   • Ambulatory dysfunction 10/28/2022   • Anemia 10/28/2022   • Smoking 10/18/2022   • Moderate protein-calorie malnutrition ([facility] [address] ) 10/14/2022   • Alcohol use disorder, mild, abuse 10/10/2022   • Hyponatremia 10/09/2022   • Acute CVA (cerebrovascular accident) ([facility] [address] ) 10/09/2022   • More than 50 percent stenosis of left internal carotid artery 10/09/2022      LOS (days): 3  Geometric Mean LOS (GMLOS) (days): 3 00  Days to GMLOS:-0 5     OBJECTIVE:  Risk of Unplanned Readmission Score: 19 09         Current admission status: Inpatient   Preferred Pharmacy:   [address] [place] [address]  Phone: [phone] Fax: [phone]    Primary Care Provider: No primary care provider on file      Primary Insurance: [address]:     DISCHARGE DETAILS:                                                                                                               [address] [place] [address] Number: [id number]

## 2022-11-03 NOTE — PROGRESS NOTES
[facility]  Progress Note - 2005 [facility] 1960, 63 y o  male MRN: [id number]  Unit/Bed#: S -01 Encounter: [id number]  Primary Care Provider: No primary care provider on file  Date and time admitted to hospital: 10/31/2022  2:04 AM    * Melena  Assessment & Plan    Lab Results   Component Value Date    HGB 6 6 (LL) 11/03/2022     Presents from SNF where staff noted patient to have episodes of green stools with bright red blood  No abdominal pain, N/V/D, hematemesis, hematochezia  Recently started on ASA/Plavix due to CVA  GI bleed possible due to coagulopathy in the setting of Plavix and Asprin  Does have hx of alcohol abuse, however no hx of cirrhosis/varices  CT abdomen showed sterocoral colitis and thickened bladder wall  Could not rule out acute GI bleed  No significant blood in gastric lavage  Patient had 3 episodes of watery green/black stool yesterday  UGIE: ulceration with active bleeding which appeared to be a Dieulafoy's lesion  Placed 3 hemoclips and also injected epinephrine to control the bleeding  Hb 6 6 this morning  Transfused 1 unit of pRBC  Plan  H and H q 6  Hold Plavix/aspirin  GI recommendations  · NPO   · Continue protonix IV q12   ·  MBS tomorrow see if diet can be advanced  Blood consent in chart  Transfuse if hemoglobin drops below 7      Acute on chronic anemia  Assessment & Plan  Patient's hemoglobin baseline approximately 9-10  Recieved 2 packs of red blood cells on admisson  · Monitor H&H Q 6h  · Most recent hemoglobin 6 6  Received another unit of pRBCs  · See plan above   · GI following  · Recommendations above     Primary hypertension  Assessment & Plan  Patient was hypotensive at [facility] requiring short time Levophed drip and was weaned prior to coming to [facility] ICU  He did not need pressors while in the ICU  His BP has been stable, amlodipine and metoprolol doses were continued in the ICU   BP today 131/90  · Monitor BP closely  · Continue amlodipine 5mg and metoprolol 25mg    Hyponatremia  Assessment & Plan  Patient suffers from chronic hyponatremia with value 127 on admission and now up to 131  Patient is asymptomatic  · Continue 50mL NS flushes per G tube q6  · Monitor on BMP      History of CVA (cerebrovascular accident)  Assessment & Plan  Patient has history of CVA in October of 2022 with residual left hemiplegia and dysphagia  He is unable to use his left arm or leg completely  He was in SNF for recovery  At time of stroke, stenosis was found in proximal left cervical ICA greater than 90%MRI remarkable for acute lacunar infarct in left thalamus, and acute to early subacute infarct in right periventricular white matter and lentiform nuclei without mass effect or hemorrhage  · Hold aspirin and plavix in the setting of GI bleed     Moderate protein-calorie malnutrition (HCC)  Assessment & Plan  Malnutrition Findings:   Adult Malnutrition type: Chronic illness  Adult Degree of Malnutrition: Malnutrition of mild degree  Malnutrition Characteristics: Muscle loss, Other (comment) (BMI < 18 5)                  360 Statement: Mild protein calorie malnutrition r/t muscle loss present to claivicle and BMI < 18 5; receives tube feeds for sole source nutrition    BMI Findings:  Adult BMI Classifications: Underweight < 18 5        Body mass index is 15 97 kg/m²       · Nutrition is consulted for G tube feeds   · Currently remain NPO  · On miralax via G tube       Mild protein-calorie malnutrition (HCC)-resolved as of 11/1/2022  Assessment & Plan  Malnutrition Findings:   Adult Malnutrition type: Chronic illness  Adult Degree of Malnutrition: Malnutrition of mild degree  Malnutrition Characteristics: Muscle loss, Other (comment) (BMI < 18 5)                  360 Statement: Mild protein calorie malnutrition r/t muscle loss present to claivicle and BMI < 18 5; receives tube feeds for sole source nutrition    BMI Findings:  Adult BMI Classifications: Underweight < 18 5        Body mass index is 15 97 kg/m²  · Consult nutrition when patient can have G tube feeds         VTE Pharmacologic Prophylaxis: VTE Score: 6 Moderate Risk (Score 3-4) - Pharmacological DVT Prophylaxis Contraindicated  Sequential Compression Devices Ordered  Patient Centered Rounds: nursing was present when I saw the patient   Discussions with Specialists or Other Care Team Provider: GI     Education and Discussions with Family / Patient: Called sister and updated  Current Length of Stay: 3 day(s)  Current Patient Status: Inpatient   Discharge Plan: Anticipate discharge in 24-48 hrs to rehab facility  Code Status: Level 1 - Full Code    Subjective:   Patient's hemoglobin is 6 6  Transfused 1U pRBCs  No active bleeding from G tube  Vitals stable  Objective:     Vitals:   Temp (24hrs), Av 2 °F (36 8 °C), Min:97 4 °F (36 3 °C), Max:98 7 °F (37 1 °C)    Temp:  [97 4 °F (36 3 °C)-98 7 °F (37 1 °C)] 98 6 °F (37 °C)  HR:  [61-80] 64  Resp:  [15-19] 16  BP: (106-138)/(61-90) 116/68  SpO2:  [97 %-100 %] 99 %  Body mass index is 15 97 kg/m²  Input and Output Summary (last 24 hours): Intake/Output Summary (Last 24 hours) at 11/3/2022 1501  Last data filed at 11/3/2022 1430  Gross per 24 hour   Intake 901 ml   Output 175 ml   Net 726 ml       Physical Exam:   Physical Exam  Vitals reviewed  Constitutional:       General: He is awake  He is not in acute distress  Appearance: He is not ill-appearing, toxic-appearing or diaphoretic  Comments: disheveled    HENT:      Head: Normocephalic and atraumatic  Right Ear: External ear normal       Left Ear: External ear normal       Nose: Nose normal    Eyes:      General: Lids are normal  Gaze aligned appropriately  No scleral icterus  Extraocular Movements: Extraocular movements intact        Conjunctiva/sclera: Conjunctivae normal    Cardiovascular:      Rate and Rhythm: Normal rate and regular rhythm  Heart sounds: Normal heart sounds, S1 normal and S2 normal  Heart sounds not distant  No murmur heard  Pulmonary:      Effort: Pulmonary effort is normal  No tachypnea, bradypnea or accessory muscle usage  Breath sounds: Examination of the right-middle field reveals rhonchi  Examination of the left-middle field reveals rhonchi  Examination of the right-lower field reveals rhonchi  Examination of the left-lower field reveals rhonchi  Rhonchi present  Musculoskeletal:      Right lower leg: No edema  Left lower leg: No edema  Skin:     Capillary Refill: Capillary refill takes less than 2 seconds  Findings: Ecchymosis present  No petechiae or rash  Comments: Bleeding scabs on the right forearm  Bilateral forearm ecchymoses  Neurological:      Mental Status: He is alert  Cranial Nerves: Cranial nerve deficit (left lower face ) and dysarthria present  Motor: Weakness (L sided hemiplegia ) present  Psychiatric:         Attention and Perception: He is inattentive  Mood and Affect: Affect is blunt  Behavior: Behavior is slowed and withdrawn  Behavior is cooperative  Comments: Scant speech          Additional Data:     Labs:  Results from last 7 days   Lab Units 11/03/22  1153 11/03/22  0500 10/31/22  1228 10/31/22  0225   WBC Thousand/uL  --  7 13   < > 11 73*   HEMOGLOBIN g/dL 8 3* 6 6*   < > 11 4*   HEMATOCRIT %  --  20 1*   < > 35 6*   PLATELETS Thousands/uL  --  254   < > 323   NEUTROS PCT %  --   --   --  77*   LYMPHS PCT %  --   --   --  12*   MONOS PCT %  --   --   --  8   EOS PCT %  --   --   --  1    < > = values in this interval not displayed       Results from last 7 days   Lab Units 11/03/22  0500 11/01/22  0618 10/31/22  0225   SODIUM mmol/L 131*   < > 127*   POTASSIUM mmol/L 3 7   < > 4 6   CHLORIDE mmol/L 101   < > 97   CO2 mmol/L 20*   < > 21   BUN mg/dL 14   < > 26*   CREATININE mg/dL 0 47*   < > 0 42*   ANION GAP mmol/L 10   < > 9   CALCIUM mg/dL 8 4   < > 8 8   ALBUMIN g/dL  --   --  3 4*   TOTAL BILIRUBIN mg/dL  --   --  0 83   ALK PHOS U/L  --   --  79   ALT U/L  --   --  15   AST U/L  --   --  18   GLUCOSE RANDOM mg/dL 66   < > 75    < > = values in this interval not displayed  Results from last 7 days   Lab Units 10/31/22  0225   INR  0 99             Results from last 7 days   Lab Units 10/31/22  0225 10/31/22  0124 10/30/22  2330   LACTIC ACID mmol/L 2 1* 0 9  --    PROCALCITONIN ng/ml 0 11  --  0 14       Lines/Drains:  Invasive Devices  Report    Peripheral Intravenous Line  Duration           Peripheral IV 11/02/22 Left;Ventral (anterior) Forearm 1 day          Drain  Duration           Gastrostomy/Enterostomy 18 Fr  LUQ 15 days                      Imaging: Reviewed radiology reports from this admission including: CT abdomen pelvis w contrast, XR abdomen     Recent Cultures (last 7 days):   Results from last 7 days   Lab Units 10/31/22  0255 10/31/22  0224 10/31/22  0105   BLOOD CULTURE  No Growth at 72 hrs  No Growth at 72 hrs  No Growth at 48 hrs         Last 24 Hours Medication List:   Current Facility-Administered Medications   Medication Dose Route Frequency Provider Last Rate   • albuterol  2 5 mg Nebulization Q6H PRN Vanesa Haque MD     • amLODIPine  5 mg Per G Tube Daily Vanesa Haque MD     • atorvastatin  40 mg Oral Daily With Jenna Sharpe MD     • folic acid  1 mg Oral Daily Vanesa Haque MD     • metoprolol tartrate  25 mg Per G Tube Q12H [facility] Vanesa Haque MD     • multivitamin with iron-minerals  15 mL Oral Daily Vanesa Haque MD     • ondansetron  4 mg Intravenous Q6H PRN Vanesa Haque MD     • pantoprazole  40 mg Intravenous Q12H [address], MD     • polyethylene glycol  17 g Oral Daily Vanesa Haque MD     • thiamine  100 mg Oral Daily Vanesa Haque MD          Today, Patient Was Seen By: Tiffanie Lr, Noemi Snowden, Dr Dasha Jimenez     **Please Note: This note may have been constructed using a voice recognition system  **

## 2022-11-03 NOTE — CASE MANAGEMENT
Support Center received request for authorization from Care Manager    Authorization request for: SNF  Facility Name: [facility]  NPI: [id number]  Facility MD:  Dr Marcelino Key   NPI:  [id number]  Authorization initiated by contacting: Cyndi Gudino Via: [employer]  Pending Reference #: [id number]   Clinicals submitted via: [employer]

## 2022-11-03 NOTE — ASSESSMENT & PLAN NOTE
Lab Results   Component Value Date    HGB 6 6 (LL) 11/03/2022     Presents from SNF where staff noted patient to have episodes of green stools with bright red blood  No abdominal pain, N/V/D, hematemesis, hematochezia  Recently started on ASA/Plavix due to CVA  GI bleed possible due to coagulopathy in the setting of Plavix and Asprin  Does have hx of alcohol abuse, however no hx of cirrhosis/varices  CT abdomen showed sterocoral colitis and thickened bladder wall  Could not rule out acute GI bleed  No significant blood in gastric lavage  Patient had 3 episodes of watery green/black stool yesterday  UGIE: ulceration with active bleeding which appeared to be a Dieulafoy's lesion  Placed 3 hemoclips and also injected epinephrine to control the bleeding  Hb 6 6 this morning(11/04/2022)  Transfused 1 unit of pRBC      Plan  Follow-up with GI  Follow-up with PCP

## 2022-11-03 NOTE — ASSESSMENT & PLAN NOTE
Patient has history of CVA in October of 2022 with residual left hemiplegia and dysphagia  He is unable to use his left arm or leg completely  He was in SNF for recovery  At time of stroke, stenosis was found in proximal left cervical ICA greater than 90%MRI remarkable for acute lacunar infarct in left thalamus, and acute to early subacute infarct in right periventricular white matter and lentiform nuclei without mass effect or hemorrhage  Plan  Resume aspirin, clopidogrel    Continue atorvastatin  Follow-up with PCP

## 2022-11-03 NOTE — PLAN OF CARE
Problem: PHYSICAL THERAPY ADULT  Goal: Performs mobility at highest level of function for planned discharge setting  See evaluation for individualized goals  Description: Treatment/Interventions: Functional transfer training, LE strengthening/ROM, Therapeutic exercise, Endurance training, Cognitive reorientation, Patient/family training, Equipment eval/education, Bed mobility  Equipment Recommended:  (trial hemiwalker (pt w/ no L grasp for B UE support))       See flowsheet documentation for full assessment, interventions and recommendations  Note: Prognosis: Fair  Problem List: Decreased strength, Decreased endurance, Impaired balance, Decreased mobility, Decreased coordination, Decreased cognition, Decreased safety awareness (Cachectic, limited participation)  Assessment: Patient was able to require less physical assistance for some bed level activities using compensatory movements including sit to supine  However, patient still requires substantial verbal instruction in how to assist left side with bed mobility movement  Patient declined edge of bed sitting tolerance as well as transfers out of bed to recliner  Skilled PT recommended to progress patient towards treatment goals  Would recommend focusing on patient's own goals to improve patient participation during mobility portions of session for goal-directed test specific care  Barriers to Discharge: Decreased caregiver support, Inaccessible home environment     PT Discharge Recommendation: Post acute rehabilitation services    See flowsheet documentation for full assessment

## 2022-11-03 NOTE — PHYSICAL THERAPY NOTE
PHYSICAL THERAPY TREATMENT NOTE  NAME:  Rachna Licea III  DATE: 11/03/22    Length Of Stay: 3  Performed at least 2 patient identifiers during session: Name and Birthday    TREATMENT:    11/03/22 1105   PT Last Visit   PT Visit Date 11/03/22   Note Type   Note Type Treatment   Pain Assessment   Pain Assessment Tool 0-10   Pain Score No Pain   General   Family/Caregiver Present No   Cognition   Overall Cognitive Status Impaired   Arousal/Participation Lethargic   Attention Attends with cues to redirect   Orientation Level Oriented to person   Memory Unable to assess   Following Commands Follows one step commands inconsistently   Subjective   Subjective Patient requires encouragement to participate for minimal activities during session  He states that he does not like the recliner chair and attributes it to "falling out of the chair" in the past   Patient is not consistent as to whether he fell out of the chair performing activities without assistance or with assistance  Bed Mobility   Rolling R 3  Moderate assistance  (Fluctuates between Min and mod assist over 2 trials)   Additional items Bedrails; Increased time required;Verbal cues  (Needs verbal instruction to assist "flaccid"  LUE)   Rolling L 5  Supervision   Additional items Bedrails; Increased time required;Verbal cues   Supine to Sit 3  Moderate assistance   Additional items Assist x 2; Increased time required;Verbal cues; Bedrails   Sit to Supine 4  Minimal assistance   Additional items Assist x 1; Increased time required;LE management;Verbal cues; Bedrails  (Patient motivated to lay back down, and was able to do so once RLE positioned to hook LLE to self assist back to bed)   Additional Comments Patient needs encouragement to assist for repositioning towards the head of the bed using RUE and BLE (BLE stabilized by writer)   Transfers   Sit to Stand Unable to assess  (Declined)   Ambulation/Elevation   Gait pattern   (Declined)   Balance   Static Sitting (Fluctuates between poor and fair minus; limited sitting tolerance for 1 minute)   Activity Tolerance   Activity Tolerance Patient limited by fatigue;Patient limited by pain   Medical Staff Kassi coordination with Noé Marie from OT   Exercises   Neuro re-ed facilitory JENNIFER movement with LLE both in supine and sidelying to promote left hip flexion and extension, knee extension--able to achieve some active motion in side-lying as well as some active motion during bilateral activities (associated movement)   Assessment   Prognosis Fair   Problem List Decreased strength;Decreased endurance; Impaired balance;Decreased mobility; Decreased coordination;Decreased cognition;Decreased safety awareness  (Cachectic, limited participation)   Assessment Patient was able to require less physical assistance for some bed level activities using compensatory movements including sit to supine  However, patient still requires substantial verbal instruction in how to assist left side with bed mobility movement  Patient declined edge of bed sitting tolerance as well as transfers out of bed to recliner  Skilled PT recommended to progress patient towards treatment goals  Would recommend focusing on patient's own goals to improve patient participation during mobility portions of session for goal-directed test specific care   Barriers to Discharge Decreased caregiver support; Inaccessible home environment   Goals   Patient Goals To just stay in bed and not sit in the recliner  Patient unable to provide mobility goal at this time   STG Expiration Date 11/11/22   Short Term Goal #1 Patient will:  Increase bilateral LE strength 1/2 grade to facilitate independent mobility, Perform all bed mobility tasks w/ minx1 to decrease fall risk factors, Perform all transfers w/ modx1 to improve independence, Increase all balance 1/2 grade to decrease risk for falls, Complete exercise program independently, Tolerate 3 hr OOB to faciliate upright tolerance and Tolerate seated at EOB at least 30 minutes w/ supervision to facilitate functional task performance, PT to see for gait when appropriate  Add wheelchair mobility to propel wheelchair for 50 ft with Min assist as alternative to ambulation   PT Treatment Day 1   Plan   Treatment/Interventions Functional transfer training;LE strengthening/ROM; Therapeutic exercise; Endurance training;Patient/family training;Cognitive reorientation;Equipment eval/education; Bed mobility;OT   Progress No functional improvements   PT Frequency 3-5x/wk   Recommendation   PT Discharge Recommendation Post acute rehabilitation services   Equipment Recommended (S)    (trial hemiwalker (pt w/ no L grasp for B UE support) and or wheelchair (1 arm drive))   AM-PAC Basic Mobility Inpatient   Turning in Bed Without Bedrails 2   Lying on Back to Sitting on Edge of Flat Bed 1   Moving Bed to Chair 1   Standing Up From Chair 1   Walk in Room 1   Climb 3-5 Stairs 1   Basic Mobility Inpatient Raw Score 7   Turning Head Towards Sound 3   Follow Simple Instructions 3   Low Function Basic Mobility Raw Score 13   Low Function Basic Mobility Standardized Score 20 14   Highest Level Of Mobility   JH-HLM Goal 2: Bed activities/Dependent transfer   -HL Achieved 3: Sit at edge of bed   Education   Education Provided Mobility training;Home exercise program   Patient Reinforcement needed; Explanation/teachback used   End of Consult   Patient Position at End of Consult All needs within reach;Bed/Chair alarm activated;Supine     The patient's AM-[facility] Basic Mobility Inpatient Short Form Raw Score is 7, Standardized Score is    A Raw Score of less than 16 suggests the patient may benefit from discharge to post-acute rehabilitation services, which DOES coincide with CURRENT above PT recommendations  However please refer to therapist recommendation for discharge planning given other factors that may influence destination       Adapted from [address] [place] [address] Fidel Shen  [facility] “6-Clicks” Basic Mobility and Daily Activity Scores With Discharge Destination  Physical Therapy, 2021;101:1-9   DOI: [email or web address]    Slava Ordaz DPT

## 2022-11-03 NOTE — PROGRESS NOTES
Progress Note - Joe Morataya III 63 y o  male MRN: [id number]    Unit/Bed#: S -01 Encounter: [id number]        Assessment/Plan:  Melena  · Reported dark stool w/ BRB at [facility]- on arrival only had dark black/ greenish colored stool, Had episodes of hypotension and a code crimson called at that time  · Currently NPO  · Nurse noted to have bloody output in PEG tube yesterday reporting black stools  Patient remains NPO and is on a Protonix drip  · Open surgical PEG was placed 10/18  · EGD yesterday showed fresh blood around the G-tube balloon was washed off and suctioned  Noted active bleeding underneath blood clot in the adjacent area (separate from the incision site)  Blood clot was removed with suctioning on to the scope and noted a tiny ulceration with active bleeding which appeared to be a Dieulafoy's lesion  Placed 3 hemoclips and also injected epinephrine to control the bleeding  2 cm hiatal hernia was seen with peptic stricture at the GE junction that was dilated with mucosal trauma from the scope   · Hgb currently down to 6 6 this am, s/p 2 units of PRBCs at [facility], now an additional unit of PRBCs ordered  · Nurse reports continue dark stool but may be old blood  · Spoke with speech and will order MBS tomorrow see if diet can be advanced  · Continue Protonix drip       Stercoral colitis with fecal impaction   -patient also noted to have bright red blood per rectum on exam which is likely related to fecal impaction with stercoral colitis  -continue bowel regimen, patient was on MiraLax twice daily via PEG   -will need colonoscopy in future       Subjective:   Patient is lying in bed  Reports that he moved his bowels today  He otherwise reports that he has no significant abdominal pain but complaining that he is hungry  Nurse reports that stool is dark in color but does appear to be old blood  Denies any nausea or vomiting      Objective:     Vitals: /90   Pulse 75   Temp 98 2 °F (36 8 °C)   Resp 16   Ht 5' 10" (1 778 m)   Wt 50 5 kg (111 lb 5 3 oz)   SpO2 98%   BMI 15 97 kg/m²       Physical Exam:  Gen-alert no acute distress  Abd-positive bowel sounds, nondistended, nontender, no rebound rigidity guarding       Lab, Imaging and other studies:   Recent Results (from the past 72 hour(s))   Osmolality, urine    Collection Time: 10/31/22  8:55 AM   Result Value Ref Range    Osmolality, Ur 629 250 - 900 mmol/KG   Sodium, urine, random    Collection Time: 10/31/22  8:55 AM   Result Value Ref Range    Sodium, Ur 90    Osmolality-"If this is regarding a toxic alcohol, STOP  Test is not routinely indicated   Please consult medical  on call for further guidance "    Collection Time: 10/31/22 12:28 PM   Result Value Ref Range    Osmolality Serum 264 (L) 282 - 298 mmol/KG   Serial Hemoglobin Q6hrs    Collection Time: 10/31/22 12:28 PM   Result Value Ref Range    Hemoglobin 9 2 (L) 12 0 - 17 0 g/dL   Serial Hemoglobin Q6hrs    Collection Time: 10/31/22  6:34 PM   Result Value Ref Range    Hemoglobin 8 7 (L) 12 0 - 17 0 g/dL   Urinalysis with microscopic    Collection Time: 10/31/22  7:19 PM   Result Value Ref Range    Color, UA Light Yellow     Clarity, UA Clear     Specific Gravity, UA 1 020 1 003 - 1 030    pH, UA 7 0 4 5, 5 0, 5 5, 6 0, 6 5, 7 0, 7 5, 8 0    Leukocytes, UA Trace (A) Negative    Nitrite, UA Negative Negative    Protein, UA Negative Negative mg/dl    Glucose, UA Negative Negative mg/dl    Ketones, UA Negative Negative mg/dl    Urobilinogen, UA <2 0 <2 0 mg/dl mg/dl    Bilirubin, UA Negative Negative    Occult Blood, UA Moderate (A) Negative    RBC, UA 1-2 None Seen, 1-2 /hpf    WBC, UA 2-4 (A) None Seen, 1-2 /hpf    Epithelial Cells Occasional None Seen, Occasional /hpf    Bacteria, UA None Seen None Seen, Occasional /hpf   Serial Hemoglobin Q6hrs    Collection Time: 11/01/22 12:40 AM   Result Value Ref Range    Hemoglobin 9 1 (L) 12 0 - 17 0 g/dL   Basic metabolic panel    Collection Time: 11/01/22  6:18 AM   Result Value Ref Range    Sodium 127 (L) 135 - 147 mmol/L    Potassium 4 2 3 5 - 5 3 mmol/L    Chloride 97 96 - 108 mmol/L    CO2 20 (L) 21 - 32 mmol/L    ANION GAP 10 4 - 13 mmol/L    BUN 11 5 - 25 mg/dL    Creatinine 0 43 (L) 0 60 - 1 30 mg/dL    Glucose 80 65 - 140 mg/dL    Calcium 9 0 8 4 - 10 2 mg/dL    eGFR 126 ml/min/1 73sq m   Calcium, ionized    Collection Time: 11/01/22  6:18 AM   Result Value Ref Range    Calcium, Ionized 1 14 1 12 - 1 32 mmol/L   CBC    Collection Time: 11/01/22  6:18 AM   Result Value Ref Range    WBC 6 50 4 31 - 10 16 Thousand/uL    RBC 2 94 (L) 3 88 - 5 62 Million/uL    Hemoglobin 8 8 (L) 12 0 - 17 0 g/dL    Hematocrit 26 7 (L) 36 5 - 49 3 %    MCV 91 82 - 98 fL    MCH 29 9 26 8 - 34 3 pg    MCHC 33 0 31 4 - 37 4 g/dL    RDW 17 1 (H) 11 6 - 15 1 %    Platelets 974 509 - 755 Thousands/uL    MPV 8 3 (L) 8 9 - 12 7 fL   Phosphorus    Collection Time: 11/01/22  6:18 AM   Result Value Ref Range    Phosphorus 4 4 2 7 - 4 5 mg/dL   Magnesium    Collection Time: 11/01/22  6:18 AM   Result Value Ref Range    Magnesium 2 0 1 9 - 2 7 mg/dL   Serial Hemoglobin Q6hrs    Collection Time: 11/01/22  1:32 PM   Result Value Ref Range    Hemoglobin 5 8 (LL) 12 0 - 17 0 g/dL   Hemoglobin    Collection Time: 11/01/22  2:38 PM   Result Value Ref Range    Hemoglobin 10 6 (L) 12 0 - 17 0 g/dL   Serial Hemoglobin Q6hrs    Collection Time: 11/01/22  6:48 PM   Result Value Ref Range    Hemoglobin 9 8 (L) 12 0 - 17 0 g/dL   Serial Hemoglobin Q6hrs    Collection Time: 11/02/22 12:29 AM   Result Value Ref Range    Hemoglobin 8 3 (L) 12 0 - 17 0 g/dL   Basic metabolic panel    Collection Time: 11/02/22  4:46 AM   Result Value Ref Range    Sodium 129 (L) 135 - 147 mmol/L    Potassium 3 9 3 5 - 5 3 mmol/L    Chloride 100 96 - 108 mmol/L    CO2 22 21 - 32 mmol/L    ANION GAP 7 4 - 13 mmol/L    BUN 14 5 - 25 mg/dL    Creatinine 0 37 (L) 0 60 - 1 30 mg/dL    Glucose 81 65 - 140 mg/dL    Calcium 8 5 8 4 - 10 2 mg/dL    eGFR 134 ml/min/1 73sq m   CBC and Platelet    Collection Time: 11/02/22  4:46 AM   Result Value Ref Range    WBC 6 20 4  31 - 10 16 Thousand/uL    RBC 2 56 (L) 3 88 - 5 62 Million/uL    Hemoglobin 7 9 (L) 12 0 - 17 0 g/dL    Hematocrit 23 7 (L) 36 5 - 49 3 %    MCV 93 82 - 98 fL    MCH 30 9 26 8 - 34 3 pg    MCHC 33 3 31 4 - 37 4 g/dL    RDW 17 0 (H) 11 6 - 15 1 %    Platelets 758 061 - 001 Thousands/uL    MPV 8 5 (L) 8 9 - 12 7 fL   Hemoglobin and hematocrit, blood    Collection Time: 11/02/22  9:20 AM   Result Value Ref Range    Hemoglobin 8 0 (L) 12 0 - 17 0 g/dL    Hematocrit 23 7 (L) 36 5 - 49 3 %   Basic metabolic panel    Collection Time: 11/02/22 10:26 AM   Result Value Ref Range    Sodium 128 (L) 135 - 147 mmol/L    Potassium 4 0 3 5 - 5 3 mmol/L    Chloride 100 96 - 108 mmol/L    CO2 22 21 - 32 mmol/L    ANION GAP 6 4 - 13 mmol/L    BUN 19 5 - 25 mg/dL    Creatinine 0 45 (L) 0 60 - 1 30 mg/dL    Glucose 82 65 - 140 mg/dL    Calcium 8 4 8 4 - 10 2 mg/dL    eGFR 123 ml/min/1 73sq m   Hemoglobin and hematocrit, blood    Collection Time: 11/02/22  3:26 PM   Result Value Ref Range    Hemoglobin 7 7 (L) 12 0 - 17 0 g/dL    Hematocrit 23 7 (L) 36 5 - 49 3 %   Serial Hemoglobin Q6hrs    Collection Time: 11/02/22  5:55 PM   Result Value Ref Range    Hemoglobin 7 3 (L) 12 0 - 17 0 g/dL   Serial Hemoglobin Q6hrs    Collection Time: 11/03/22 12:41 AM   Result Value Ref Range    Hemoglobin 7 1 (L) 12 0 - 17 0 g/dL   Basic metabolic panel    Collection Time: 11/03/22  5:00 AM   Result Value Ref Range    Sodium 131 (L) 135 - 147 mmol/L    Potassium 3 7 3 5 - 5 3 mmol/L    Chloride 101 96 - 108 mmol/L    CO2 20 (L) 21 - 32 mmol/L    ANION GAP 10 4 - 13 mmol/L    BUN 14 5 - 25 mg/dL    Creatinine 0 47 (L) 0 60 - 1 30 mg/dL    Glucose 66 65 - 140 mg/dL    Calcium 8 4 8 4 - 10 2 mg/dL    eGFR 121 ml/min/1 73sq m   CBC and Platelet    Collection Time: 11/03/22  5:00 AM   Result Value Ref Range    WBC 7 13 4 31 - 10 16 Thousand/uL    RBC 2 12 (L) 3 88 - 5 62 Million/uL    Hemoglobin 6 6 (LL) 12 0 - 17 0 g/dL    Hematocrit 20 1 (L) 36 5 - 49 3 %    MCV 95 82 - 98 fL    MCH 31 1 26 8 - 34 3 pg    MCHC 32 8 31 4 - 37 4 g/dL    RDW 17 1 (H) 11 6 - 15 1 %    Platelets 041 024 - 206 Thousands/uL    MPV 8 4 (L) 8 9 - 12 7 fL   Prepare Leukoreduced RBC: 2 Units    Collection Time: 11/03/22  8:04 AM   Result Value Ref Range    Unit Product Code G5560L00     Unit Number [id number]     Unit ABO O     Unit [facility] POS     Crossmatch Compatible     Unit Dispense Status Crossmatched     Unit Product Volume 350 ml    Unit Product Code Z6619S91     Unit Number [id number]     Unit ABO O     Unit [facility] POS     Crossmatch Compatible     Unit Dispense Status Issued     Unit Product Volume 350 ml

## 2022-11-03 NOTE — ASSESSMENT & PLAN NOTE
Patient suffers from chronic hyponatremia with value 127 on admission and now up to 131  Patient is asymptomatic  Plan  Follow-up with PCP

## 2022-11-03 NOTE — ASSESSMENT & PLAN NOTE
Patient's hemoglobin baseline approximately 9-10  Recieved 2 packs of red blood cells on admisson  Another 1 unit on 11/04/2022  Plan  Follow-up with GI  Follow-up with PCP  Continue oral iron tablets

## 2022-11-03 NOTE — OCCUPATIONAL THERAPY NOTE
OT TREATMENT    The patient's raw score on the AM-PAC Daily Activity inpatient short form is 13, standardized score is 32 03, less than 39 4  Patients at this level are likely to benefit from DC to post-acute rehabilitation services  Please refer to the recommendation of the Occupational Therapist for safe DC planning  11/03/22 1109   OT Last Visit   OT Visit Date 11/03/22   Note Type   Note Type Treatment   Pain Assessment   Pain Assessment Tool 0-10   Pain Score No Pain   Restrictions/Precautions   Weight Bearing Precautions Per Order No   Other Precautions Cognitive; Bed Alarm;Multiple lines; Fall Risk  (L Hemiplegia)   Lifestyle   Autonomy Prior to stroke, pt reports I w/ ADLs, IADLs, and fxal mobility w/out AD  Reciprocal Relationships friends and family   Service to Others Prior to stroke, pt worked full time in [email or web address]  Intrinsic Gratification Pt enjoys watching football and baseball on television  ADL   UB Dressing Assistance 2  Maximal Assistance   UB Dressing Deficit Setup;Steadying;Verbal cueing; Increased time to complete; Thread RUE; Thread LUE;Pull around back   UB Dressing Comments Despite encouragement pt declined participation in ADLs sitting EOB  Impulsively returned to supine in bed and agreeable to gown change in supine  MAX A to doff/don gown despite VC for one handed technique  Pt with limited active participation in task at this time  Pt declined an further participation in ADLs due to fatigue  Bed Mobility   Rolling R 3  Moderate assistance   Additional items Bedrails; Increased time required;Verbal cues  (fluctuates between min/mod A x 1 over multiple attempts; VC for attending to LUE)   Rolling L 5  Supervision   Additional items Bedrails; Increased time required;Verbal cues   Supine to Sit 3  Moderate assistance   Additional items Assist x 2;HOB elevated; Increased time required;Verbal cues;LE management  (MAX VC for technique)   Sit to Supine 4  Minimal assistance   Additional items Assist x 1; Increased time required;Verbal cues;LE management  (MAX VC to hook RLE under LLE to assist)   Additional Comments Pt able to sit EOB for approx 1 minute with fluctuating mod A x 1 vs supervision with unilateral support on bedrail  Pt easily fatigued and impulsively returned to supine and reporting "I'm too tired"  Declined further participation in [address]  Transfers   Sit to Stand Unable to assess   Stand to Sit Unable to assess   Additional Comments Pt declined participation in OOB mobility at this time due to fatigue  Will continue to assess  Functional Mobility   Additional Comments Unable to assess as pt with limited sitting tolerance this session   Therapeutic Exercise - ROM   UE-ROM Yes   ROM - Left Upper Extremities    L Shoulder PROM; Flexion   L Elbow PROM;Elbow flexion;Elbow extension   L Wrist PROM; Wrist flexion;Wrist extension   L Hand PROM; Thumb; Long finger; Index finger;Ring finger;Little finger   L Position Supine   LUE ROM Comment Pt continues to be flaccid LUE however no tone noted   Subjective   Subjective "I'm too tired" "I hate that chair"   Cognition   Overall Cognitive Status Impaired   Arousal/Participation Alert; Cooperative;Lethargic   Attention Attends with cues to redirect   Orientation Level Oriented to person;Disoriented to place; Disoriented to time;Disoriented to situation   Memory Decreased short term memory;Decreased recall of recent events   Following Commands Follows one step commands with increased time or repetition   Comments Pt agreeable to session however limited active participation  Flat affect, limited motivation to improve functional participation  Questionable depressive symptoms? Repetition of commands for participation   Additional Activities   Additional Activities Comments Extensive conversation with pt this session regarding goals for therapy  Pt unable to voice his personal goals for function at this time as he is too tired   Despite encouragement, pt declining participation in therapy at this time  Questionable loss of motivation due to lak of progress since recent CVA  Activity Tolerance   Activity Tolerance Patient limited by fatigue; Other (Comment)  (limited by cognition/motivation)   Medical Staff Made Aware Care coordination with PT Renaldo Chang, spoke to Melum 52 Patient participated in Skilled OT session this date with interventions consisting of ADL re training with the use of correct body mechnaics, Energy Conservation techniques, safety awareness and fall prevention techniques, one handed dressing technique,  therapeutic activities to: increase activity tolerance, neuromuscular re education to: facilitate volitional use of limbs, facilitate proximal cocontraction of limbs and core, normalize muscle tone, elicit righting and equilibrium reactions for improved postural control and alignment during transitional movements and increase dynamic sit/ stand balance during functional activity    Patient agreeable to OT treatment session, upon arrival patient was found supine in bed  In comparison to previous session, patient with improvements in bed mobility this session and managing personal comfort  Pt able to perform sup>sit with mod A x 2 however improved sit>sup transfer to min A x 1 with MAX VC for technique  Pt improving to supervision to roll R, however continues to require min/mod A x 1 to roll L and to manage LUE  Pt overall limited by motivation and fatigue this session  Patient continues to be functioning below baseline level, occupational performance remains limited secondary to factors listed above and increased risk for falls and injury  From OT standpoint, recommendation at time of d/c would be Post acute rehabilitation services     Patient to benefit from continued Occupational Therapy treatment while in the hospital to address deficits as defined above and maximize level of functional independence with ADLs and functional mobility  Plan   Treatment Interventions ADL retraining;Functional transfer training;UE strengthening/ROM; Endurance training;Cognitive reorientation;Patient/family training;Equipment evaluation/education; Neuromuscular reeducation; Compensatory technique education;Continued evaluation; Energy conservation; Activityengagement   Goal Expiration Date 11/11/22   OT Treatment Day 1   OT Frequency 3-5x/wk   Recommendation   OT Discharge Recommendation Post acute rehabilitation services   Additional Comments  This session, pt required and most appropriately benefited from skilled OT/PT co-treat due to extensive physical assistance of SKILLED therapists, significant regression from functional baseline and decreased activity tolerance  OT and PT goals were addressed separately as seen in documentation  AM-PAC Daily Activity Inpatient   Lower Body Dressing 2   Bathing 2   Toileting 2   Upper Body Dressing 2   Grooming 3   Eating 2   Daily Activity Raw Score 13   Daily Activity Standardized Score (Calc for Raw Score >=11) 32 03   AM-PAC Applied Cognition Inpatient   Following a Speech/Presentation 2   Understanding Ordinary Conversation 4   Taking Medications 3   Remembering Where Things Are Placed or Put Away 3   Remembering List of 4-5 Errands 2   Taking Care of Complicated Tasks 2   Applied Cognition Raw Score 16   Applied Cognition Standardized Score 35 03   Modified Juan C Scale   Modified [place] Scale 4   End of Consult   Patient Position at End of Consult Supine;Bed/Chair alarm activated; All needs within reach   Nurse Communication Nurse aware of consult   End of Consult Comments JOLANTA Taylor ok to see pt     GALI May/L

## 2022-11-03 NOTE — PLAN OF CARE
Problem: OCCUPATIONAL THERAPY ADULT  Goal: Performs self-care activities at highest level of function for planned discharge setting  See evaluation for individualized goals  Description: Treatment Interventions: ADL retraining, Functional transfer training, UE strengthening/ROM, Endurance training, Cognitive reorientation, Patient/family training, Equipment evaluation/education, Neuromuscular reeducation, Fine motor coordination activities, Compensatory technique education, Continued evaluation, Energy conservation, Activityengagement          See flowsheet documentation for full assessment, interventions and recommendations  11/3/2022 1206 by Fletcher Robert OT  Outcome: Progressing  Note: Limitation: Decreased ADL status, Decreased UE ROM, Decreased UE strength, Decreased cognition, Decreased endurance, Decreased fine motor control, Decreased self-care trans, Decreased high-level ADLs, Non-func L UE     Assessment: Patient participated in Skilled OT session this date with interventions consisting of ADL re training with the use of correct body mechnaics, Energy Conservation techniques, safety awareness and fall prevention techniques, one handed dressing technique,  therapeutic activities to: increase activity tolerance, neuromuscular re education to: facilitate volitional use of limbs, facilitate proximal cocontraction of limbs and core, normalize muscle tone, elicit righting and equilibrium reactions for improved postural control and alignment during transitional movements and increase dynamic sit/ stand balance during functional activity    Patient agreeable to OT treatment session, upon arrival patient was found supine in bed  In comparison to previous session, patient with improvements in bed mobility this session and managing personal comfort  Pt able to perform sup>sit with mod A x 2 however improved sit>sup transfer to min A x 1 with MAX VC for technique   Pt improving to supervision to roll R, however continues to require min/mod A x 1 to roll L and to manage LUE  Pt overall limited by motivation and fatigue this session  Patient continues to be functioning below baseline level, occupational performance remains limited secondary to factors listed above and increased risk for falls and injury  From OT standpoint, recommendation at time of d/c would be Post acute rehabilitation services  Patient to benefit from continued Occupational Therapy treatment while in the hospital to address deficits as defined above and maximize level of functional independence with ADLs and functional mobility       OT Discharge Recommendation: Post acute rehabilitation services     Jarrod Walsh OTR/L

## 2022-11-03 NOTE — PLAN OF CARE
Problem: MOBILITY - ADULT  Goal: Maintain or return to baseline ADL function  Description: INTERVENTIONS:  -  Assess patient's ability to carry out ADLs; assess patient's baseline for ADL function and identify physical deficits which impact ability to perform ADLs (bathing, care of mouth/teeth, toileting, grooming, dressing, etc )  - Assess/evaluate cause of self-care deficits   - Assess range of motion  - Assess patient's mobility; develop plan if impaired  - Assess patient's need for assistive devices and provide as appropriate  - Encourage maximum independence but intervene and supervise when necessary  - Involve family in performance of ADLs  - Assess for home care needs following discharge   - Consider OT consult to assist with ADL evaluation and planning for discharge  - Provide patient education as appropriate  Outcome: Progressing  Goal: Maintains/Returns to pre admission functional level  Description: INTERVENTIONS:  - Perform BMAT or MOVE assessment daily    - Set and communicate daily mobility goal to care team and patient/family/caregiver  - Collaborate with rehabilitation services on mobility goals if consulted  - Perform Range of Motion 3 times a day  - Reposition patient every 2 hours    - Dangle patient 3 times a day  - Stand patient 3 times a day  - Ambulate patient 3 times a day  - Out of bed to chair 3 times a day   - Out of bed for meals 3 times a day  - Out of bed for toileting  - Record patient progress and toleration of activity level   Outcome: Progressing     Problem: Potential for Falls  Goal: Patient will remain free of falls  Description: INTERVENTIONS:  - Educate patient/family on patient safety including physical limitations  - Instruct patient to call for assistance with activity   - Consult OT/PT to assist with strengthening/mobility   - Keep Call bell within reach  - Keep bed low and locked with side rails adjusted as appropriate  - Keep care items and personal belongings within reach  - Initiate and maintain comfort rounds  - Make Fall Risk Sign visible to staff  - Offer Toileting every 2 Hours, in advance of need  - Initiate/Maintain alarm  - Obtain necessary fall risk management equipment  - Apply yellow socks and bracelet for high fall risk patients  - Consider moving patient to room near nurses station  Outcome: Progressing     Problem: Prexisting or High Potential for Compromised Skin Integrity  Goal: Skin integrity is maintained or improved  Description: INTERVENTIONS:  - Identify patients at risk for skin breakdown  - Assess and monitor skin integrity  - Assess and monitor nutrition and hydration status  - Monitor labs   - Assess for incontinence   - Turn and reposition patient  - Assist with mobility/ambulation  - Relieve pressure over bony prominences  - Avoid friction and shearing  - Provide appropriate hygiene as needed including keeping skin clean and dry  - Evaluate need for skin moisturizer/barrier cream  - Collaborate with interdisciplinary team   - Patient/family teaching  - Consider wound care consult   Outcome: Progressing     Problem: Nutrition/Hydration-ADULT  Goal: Nutrient/Hydration intake appropriate for improving, restoring or maintaining nutritional needs  Description: Monitor and assess patient's nutrition/hydration status for malnutrition  Collaborate with interdisciplinary team and initiate plan and interventions as ordered  Monitor patient's weight and dietary intake as ordered or per policy  Utilize nutrition screening tool and intervene as necessary  Determine patient's food preferences and provide high-protein, high-caloric foods as appropriate       INTERVENTIONS:  - Monitor oral intake, urinary output, labs, and treatment plans  - Assess nutrition and hydration status and recommend course of action  - Evaluate amount of meals eaten  - Assist patient with eating if necessary   - Allow adequate time for meals  - Recommend/ encourage appropriate diets, oral nutritional supplements, and vitamin/mineral supplements  - Order, calculate, and assess calorie counts as needed  - Recommend, monitor, and adjust tube feedings and TPN/PPN based on assessed needs  - Assess need for intravenous fluids  - Provide specific nutrition/hydration education as appropriate  - Include patient/family/caregiver in decisions related to nutrition  Outcome: Progressing     Problem: GASTROINTESTINAL - ADULT  Goal: Maintains or returns to baseline bowel function  Description: INTERVENTIONS:  - Assess bowel function  - Encourage oral fluids to ensure adequate hydration  - Administer IV fluids if ordered to ensure adequate hydration  - Administer ordered medications as needed  - Encourage mobilization and activity  - Consider nutritional services referral to assist patient with adequate nutrition and appropriate food choices  Outcome: Progressing  Goal: Maintains adequate nutritional intake  Description: INTERVENTIONS:  - Monitor percentage of each meal consumed  - Identify factors contributing to decreased intake, treat as appropriate  - Assist with meals as needed  - Monitor I&O, weight, and lab values if indicated  - Obtain nutrition services referral as needed  Outcome: Progressing     Problem: GASTROINTESTINAL - ADULT  Goal: Maintains or returns to baseline bowel function  Description: INTERVENTIONS:  - Assess bowel function  - Encourage oral fluids to ensure adequate hydration  - Administer IV fluids if ordered to ensure adequate hydration  - Administer ordered medications as needed  - Encourage mobilization and activity  - Consider nutritional services referral to assist patient with adequate nutrition and appropriate food choices  Outcome: Progressing

## 2022-11-03 NOTE — ASSESSMENT & PLAN NOTE
Malnutrition Findings:   Adult Malnutrition type: Chronic illness  Adult Degree of Malnutrition: Malnutrition of mild degree  Malnutrition Characteristics: Muscle loss, Other (comment) (BMI < 18 5)                  360 Statement: Mild protein calorie malnutrition r/t muscle loss present to claivicle and BMI < 18 5; receives tube feeds for sole source nutrition    BMI Findings:  Adult BMI Classifications: Underweight < 18 5        Body mass index is 15 97 kg/m²

## 2022-11-03 NOTE — ASSESSMENT & PLAN NOTE
Patient was hypotensive at [facility] requiring short time Levophed drip and was weaned prior to coming to [facility] ICU  He did not need pressors while in the ICU  His BP has been stable, amlodipine and metoprolol doses were continued in the ICU  BP today 131/90       Plan  · Continue amlodipine 5mg and metoprolol 25mg  · Follow-up with PCP

## 2022-11-03 NOTE — CONSULTS
Consult - [address] [facility] MYRTLE 63 y o  male MRN: [id number]  Unit/Bed#: S -01 Encounter: [id number]    Assessment/Plan     Assessment:  Onychogryphosis    Plan:  Patient seen and examined at bedside  Now sharply divided with a forceps without incident to patient's comfort  Patient tolerated procedure well  No concern for any wound formation or infection  Podiatry will sign off    History of Present Illness     HPI:  Arabella Lai III is a 63 y o  male who presents with melena, concern for GI bleed  Podiatry consulted for onychogryphosis to bilateral feet  Patient denies any pedal pain at this time  Consults  Review of Systems   Constitutional: Negative  HENT: Negative  Eyes: Negative  Respiratory: Negative  Cardiovascular: Negative  Gastrointestinal: Negative  Musculoskeletal:  Negative   Skin:  Positive for onychocryptosis   Neurological:  Negative  Psych: negative      Historical Information   Past Medical History:   Diagnosis Date   • Hypertension    • Renal disorder    • Stroke [facility])      Past Surgical History:   Procedure Laterality Date   • GASTROSTOMY TUBE PLACEMENT N/A 10/18/2022    Procedure: INSERTION GASTROSTOMY TUBE OPEN;  Surgeon: Rowan Yanez DO;  Location: [facility];  Service: General     Social History   Social History     Substance and Sexual Activity   Alcohol Use Not Currently    Comment: occasionally     Social History     Substance and Sexual Activity   Drug Use Never     Social History     Tobacco Use   Smoking Status Current Every Day Smoker   • Packs/day: 1 00   • Types: Cigarettes   Smokeless Tobacco Never Used     Family History: No family history on file      Meds/Allergies   Medications Prior to Admission   Medication   • acetaminophen (TYLENOL) 325 mg tablet   • albuterol (2 5 mg/3 mL) 0 083 % nebulizer solution   • amLODIPine (NORVASC) 5 mg tablet   • aspirin (ECOTRIN LOW STRENGTH) 81 mg EC tablet   • atorvastatin (LIPITOR) 40 mg tablet   • clopidogrel (PLAVIX) 75 mg tablet   • folic acid (FOLVITE) 1 mg tablet   • LORazepam (ATIVAN) 0 5 mg tablet   • metoprolol tartrate (LOPRESSOR) 25 mg tablet   • Multiple Vitamins-Minerals (Multivitamin) LIQD   • polyethylene glycol (MIRALAX) 17 g packet   • thiamine 100 MG tablet     No Known Allergies    Objective   First Vitals:   Blood Pressure: 165/99 (10/31/22 0204)  Pulse: 88 (10/31/22 0204)  Temperature: (!) 96 5 °F (35 8 °C) (10/31/22 0204)  Temp Source: Oral (10/31/22 0204)  Respirations: 20 (10/31/22 0204)  Height: 5' 10" (177 8 cm) (10/31/22 0204)  Weight - Scale: 54 3 kg (119 lb 11 4 oz) (10/31/22 0204)  SpO2: 100 % (10/31/22 0204)    Current Vitals:   Blood Pressure: 116/68 (11/03/22 1020)  Pulse: 64 (11/03/22 1020)  Temperature: 98 6 °F (37 °C) (11/03/22 1020)  Temp Source: Oral (11/03/22 1020)  Respirations: 16 (11/03/22 1020)  Height: 5' 10" (177 8 cm) (10/31/22 0204)  Weight - Scale: 50 5 kg (111 lb 5 3 oz) (11/03/22 0600)  SpO2: 99 % (11/03/22 1020)        /68   Pulse 64   Temp 98 6 °F (37 °C) (Oral)   Resp 16   Ht 5' 10" (1 778 m)   Wt 50 5 kg (111 lb 5 3 oz)   SpO2 99%   BMI 15 97 kg/m²   Physical Exam:  General:    Alert, cooperative, no distress   Head:     Normocephalic, without obvious abnormality, atraumatic                   Lower extremity:   No open lesions noted to bilateral lower extremities  Nails are thickened and extremely elongated with no involvement of soft tissue  No tenderness with palpation to bilateral feet                                                     Lab Results:   Admission on 10/31/2022   Component Date Value   • Blood Culture 10/31/2022 No Growth at 72 hrs  • Blood Culture 10/31/2022 No Growth at 72 hrs      • WBC 10/31/2022 11 73 (A)   • RBC 10/31/2022 3 76 (A)   • Hemoglobin 10/31/2022 11 4 (A)   • Hematocrit 10/31/2022 35 6 (A)   • MCV 10/31/2022 95    • MCH 10/31/2022 30 3    • MCHC 10/31/2022 32 0    • RDW 10/31/2022 16 6 (A)   • MPV 10/31/2022 8 5 (A)   • Platelets 61/08/0392 323    • nRBC 10/31/2022 0    • Neutrophils Relative 10/31/2022 77 (A)   • Immat GRANS % 10/31/2022 1    • Lymphocytes Relative 10/31/2022 12 (A)   • Monocytes Relative 10/31/2022 8    • Eosinophils Relative 10/31/2022 1    • Basophils Relative 10/31/2022 1    • Neutrophils Absolute 10/31/2022 9 09 (A)   • Immature Grans Absolute 10/31/2022 0 16    • Lymphocytes Absolute 10/31/2022 1 40    • Monocytes Absolute 10/31/2022 0 89    • Eosinophils Absolute 10/31/2022 0 12    • Basophils Absolute 10/31/2022 0 07    • Sodium 10/31/2022 127 (A)   • Potassium 10/31/2022 4 6    • Chloride 10/31/2022 97    • CO2 10/31/2022 21    • ANION GAP 10/31/2022 9    • BUN 10/31/2022 26 (A)   • Creatinine 10/31/2022 0 42 (A)   • Glucose 10/31/2022 75    • Calcium 10/31/2022 8 8    • Corrected Calcium 10/31/2022 9 3    • AST 10/31/2022 18    • ALT 10/31/2022 15    • Alkaline Phosphatase 10/31/2022 79    • Total Protein 10/31/2022 6 2 (A)   • Albumin 10/31/2022 3 4 (A)   • Total Bilirubin 10/31/2022 0 83    • eGFR 10/31/2022 127    • Protime 10/31/2022 13 3    • INR 10/31/2022 0 99    • LACTIC ACID 10/31/2022 2 1 (A)   • Procalcitonin 10/31/2022 0 11    • ABO Grouping 10/31/2022 O    • Rh Factor 10/31/2022 Positive    • Antibody Screen 10/31/2022 Negative    • Specimen Expiration Date 10/31/2022 [id number]    • Iron Saturation 10/31/2022 26    • TIBC 10/31/2022 397    • Iron 10/31/2022 105    • Ferritin 10/31/2022 341    • Magnesium 10/31/2022 1 8 (A)   • Phosphorus 10/31/2022 4 3    • Osmolality Serum 10/31/2022 264 (A)   • Osmolality, Ur 10/31/2022 629    • Sodium, Ur 10/31/2022 90    • Hemoglobin 10/31/2022 9 2 (A)   • Color, UA 10/31/2022 Light Yellow    • Clarity, UA 10/31/2022 Clear    • Specific Gravity, UA 10/31/2022 1 020    • pH, UA 10/31/2022 7 0    • Leukocytes, UA 10/31/2022 Trace (A)   • Nitrite, UA 10/31/2022 Negative    • Protein, UA 10/31/2022 Negative    • Glucose, UA 10/31/2022 Negative    • Ketones, UA 10/31/2022 Negative    • Urobilinogen, UA 10/31/2022 <2 0    • Bilirubin, UA 10/31/2022 Negative    • Occult Blood, UA 10/31/2022 Moderate (A)   • RBC, UA 10/31/2022 1-2    • WBC, UA 10/31/2022 2-4 (A)   • Epithelial Cells 10/31/2022 Occasional    • Bacteria, UA 10/31/2022 None Seen    • Hemoglobin 10/31/2022 8 7 (A)   • Hemoglobin 11/01/2022 9 1 (A)   • Sodium 11/01/2022 127 (A)   • Potassium 11/01/2022 4 2    • Chloride 11/01/2022 97    • CO2 11/01/2022 20 (A)   • ANION GAP 11/01/2022 10    • BUN 11/01/2022 11    • Creatinine 11/01/2022 0 43 (A)   • Glucose 11/01/2022 80    • Calcium 11/01/2022 9 0    • eGFR 11/01/2022 126    • Calcium, Ionized 11/01/2022 1 14    • WBC 11/01/2022 6 50    • RBC 11/01/2022 2 94 (A)   • Hemoglobin 11/01/2022 8 8 (A)   • Hematocrit 11/01/2022 26 7 (A)   • MCV 11/01/2022 91    • MCH 11/01/2022 29 9    • MCHC 11/01/2022 33 0    • RDW 11/01/2022 17 1 (A)   • Platelets 98/45/8652 300    • MPV 11/01/2022 8 3 (A)   • Phosphorus 11/01/2022 4 4    • Magnesium 11/01/2022 2 0    • Hemoglobin 11/01/2022 5 8 (A)   • Unit Product Code 11/03/2022 [id number]    • Unit Number 11/03/2022 [id number]    • Unit ABO 11/03/2022 O    • Unit [facility] 11/03/2022 POS    • Crossmatch 11/03/2022 Compatible    • Unit Dispense Status 11/03/2022 Crossmatched    • Unit Product Volume 11/03/2022 350    • Unit Product Code 11/03/2022 [id number]    • Unit Number 11/03/2022 [id number]    • Unit ABO 11/03/2022 O    • Unit [facility] 11/03/2022 POS    • Crossmatch 11/03/2022 Compatible    • Unit Dispense Status 11/03/2022 Issued    • Unit Product Volume 11/03/2022 350    • Hemoglobin 11/01/2022 10 6 (A)   • Hemoglobin 11/01/2022 9 8 (A)   • Hemoglobin 11/02/2022 8 3 (A)   • Sodium 11/02/2022 129 (A)   • Potassium 11/02/2022 3 9    • Chloride 11/02/2022 100    • CO2 11/02/2022 22    • ANION GAP 11/02/2022 7    • BUN 11/02/2022 14    • Creatinine 11/02/2022 0 37 (A)   • Glucose 11/02/2022 81    • Calcium 11/02/2022 8 5    • eGFR 11/02/2022 134    • WBC 11/02/2022 6 20    • RBC 11/02/2022 2 56 (A)   • Hemoglobin 11/02/2022 7 9 (A)   • Hematocrit 11/02/2022 23 7 (A)   • MCV 11/02/2022 93    • MCH 11/02/2022 30 9    • MCHC 11/02/2022 33 3    • RDW 11/02/2022 17 0 (A)   • Platelets 63/74/2273 293    • MPV 11/02/2022 8 5 (A)   • Hemoglobin 11/02/2022 8 0 (A)   • Hematocrit 11/02/2022 23 7 (A)   • Sodium 11/02/2022 128 (A)   • Potassium 11/02/2022 4 0    • Chloride 11/02/2022 100    • CO2 11/02/2022 22    • ANION GAP 11/02/2022 6    • BUN 11/02/2022 19    • Creatinine 11/02/2022 0 45 (A)   • Glucose 11/02/2022 82    • Calcium 11/02/2022 8 4    • eGFR 11/02/2022 123    • Hemoglobin 11/02/2022 7 7 (A)   • Hematocrit 11/02/2022 23 7 (A)   • Hemoglobin 11/02/2022 7 3 (A)   • Hemoglobin 11/03/2022 7 1 (A)   • Sodium 11/03/2022 131 (A)   • Potassium 11/03/2022 3 7    • Chloride 11/03/2022 101    • CO2 11/03/2022 20 (A)   • ANION GAP 11/03/2022 10    • BUN 11/03/2022 14    • Creatinine 11/03/2022 0 47 (A)   • Glucose 11/03/2022 66    • Calcium 11/03/2022 8 4    • eGFR 11/03/2022 121    • WBC 11/03/2022 7 13    • RBC 11/03/2022 2 12 (A)   • Hemoglobin 11/03/2022 6 6 (A)   • Hematocrit 11/03/2022 20 1 (A)   • MCV 11/03/2022 95    • MCH 11/03/2022 31 1    • MCHC 11/03/2022 32 8    • RDW 11/03/2022 17 1 (A)   • Platelets 24/69/3005 254    • MPV 11/03/2022 8 4 (A)   • Hemoglobin 11/03/2022 8 3 (A)           Results from last 7 days   Lab Units 10/31/22  0255 10/31/22  0224 10/31/22  0105   BLOOD CULTURE  No Growth at 72 hrs  No Growth at 72 hrs  No Growth at 48 hrs  Imaging: I have personally reviewed pertinent films in PACS      Code Status: Level 1 - Full Code        Portions of the record may have been created with voice recognition software  Occasional wrong word or "sound a like" substitutions may have occurred due to the inherent limitations of voice recognition software   Read the chart carefully and recognize, using context, where substitutions have occurred

## 2022-11-04 ENCOUNTER — APPOINTMENT (INPATIENT)
Dept: RADIOLOGY | Facility: HOSPITAL | Age: 62
End: 2022-11-04

## 2022-11-04 VITALS
HEIGHT: 70 IN | SYSTOLIC BLOOD PRESSURE: 119 MMHG | RESPIRATION RATE: 14 BRPM | DIASTOLIC BLOOD PRESSURE: 74 MMHG | OXYGEN SATURATION: 100 % | WEIGHT: 111.33 LBS | HEART RATE: 77 BPM | TEMPERATURE: 97.9 F | BODY MASS INDEX: 15.94 KG/M2

## 2022-11-04 LAB
ABO GROUP BLD BPU: NORMAL
ABO GROUP BLD BPU: NORMAL
ABO GROUP BLD: NORMAL
ANION GAP SERPL CALCULATED.3IONS-SCNC: 7 MMOL/L (ref 4–13)
ATRIAL RATE: 107 BPM
BLD GP AB SCN SERPL QL: NEGATIVE
BPU ID: NORMAL
BPU ID: NORMAL
BUN SERPL-MCNC: 9 MG/DL (ref 5–25)
CALCIUM SERPL-MCNC: 8.7 MG/DL (ref 8.4–10.2)
CHLORIDE SERPL-SCNC: 101 MMOL/L (ref 96–108)
CO2 SERPL-SCNC: 24 MMOL/L (ref 21–32)
CREAT SERPL-MCNC: 0.42 MG/DL (ref 0.6–1.3)
CROSSMATCH: NORMAL
CROSSMATCH: NORMAL
ERYTHROCYTE [DISTWIDTH] IN BLOOD BY AUTOMATED COUNT: 17.2 % (ref 11.6–15.1)
FLUAV RNA RESP QL NAA+PROBE: NEGATIVE
FLUBV RNA RESP QL NAA+PROBE: NEGATIVE
GFR SERPL CREATININE-BSD FRML MDRD: 127 ML/MIN/1.73SQ M
GLUCOSE SERPL-MCNC: 95 MG/DL (ref 65–140)
HCT VFR BLD AUTO: 23.7 % (ref 36.5–49.3)
HGB BLD-MCNC: 7.9 G/DL (ref 12–17)
HGB BLD-MCNC: 8.3 G/DL (ref 12–17)
HGB BLD-MCNC: 8.8 G/DL (ref 12–17)
MCH RBC QN AUTO: 30.6 PG (ref 26.8–34.3)
MCHC RBC AUTO-ENTMCNC: 33.3 G/DL (ref 31.4–37.4)
MCV RBC AUTO: 92 FL (ref 82–98)
P AXIS: 83 DEGREES
PLATELET # BLD AUTO: 257 THOUSANDS/UL (ref 149–390)
PMV BLD AUTO: 8.5 FL (ref 8.9–12.7)
POTASSIUM SERPL-SCNC: 3.4 MMOL/L (ref 3.5–5.3)
PR INTERVAL: 122 MS
QRS AXIS: 78 DEGREES
QRSD INTERVAL: 94 MS
QT INTERVAL: 356 MS
QTC INTERVAL: 475 MS
RBC # BLD AUTO: 2.58 MILLION/UL (ref 3.88–5.62)
RH BLD: POSITIVE
RSV RNA RESP QL NAA+PROBE: NEGATIVE
SARS-COV-2 RNA RESP QL NAA+PROBE: NEGATIVE
SODIUM SERPL-SCNC: 132 MMOL/L (ref 135–147)
SPECIMEN EXPIRATION DATE: NORMAL
T WAVE AXIS: 71 DEGREES
UNIT DISPENSE STATUS: NORMAL
UNIT DISPENSE STATUS: NORMAL
UNIT PRODUCT CODE: NORMAL
UNIT PRODUCT CODE: NORMAL
UNIT PRODUCT VOLUME: 350 ML
UNIT PRODUCT VOLUME: 350 ML
UNIT RH: NORMAL
UNIT RH: NORMAL
VENTRICULAR RATE: 107 BPM
WBC # BLD AUTO: 6.16 THOUSAND/UL (ref 4.31–10.16)

## 2022-11-04 RX ORDER — AMLODIPINE BESYLATE 5 MG/1
5 TABLET ORAL DAILY
Qty: 90 TABLET | Refills: 0 | Status: SHIPPED | OUTPATIENT
Start: 2022-11-04

## 2022-11-04 RX ORDER — FERROUS SULFATE TAB EC 324 MG (65 MG FE EQUIVALENT) 324 (65 FE) MG
324 TABLET DELAYED RESPONSE ORAL
Qty: 60 TABLET | Refills: 0 | Status: SHIPPED | OUTPATIENT
Start: 2022-11-04

## 2022-11-04 RX ORDER — POTASSIUM CHLORIDE 20 MEQ/1
40 TABLET, EXTENDED RELEASE ORAL ONCE
Status: COMPLETED | OUTPATIENT
Start: 2022-11-04 | End: 2022-11-04

## 2022-11-04 RX ORDER — ALBUTEROL SULFATE 2.5 MG/3ML
2.5 SOLUTION RESPIRATORY (INHALATION) EVERY 6 HOURS PRN
Qty: 60 ML | Refills: 0 | Status: SHIPPED | OUTPATIENT
Start: 2022-11-04

## 2022-11-04 RX ADMIN — THIAMINE HCL TAB 100 MG 100 MG: 100 TAB at 08:55

## 2022-11-04 RX ADMIN — FOLIC ACID 1 MG: 1 TABLET ORAL at 08:55

## 2022-11-04 RX ADMIN — AMLODIPINE BESYLATE 5 MG: 5 TABLET ORAL at 08:55

## 2022-11-04 RX ADMIN — PANTOPRAZOLE SODIUM 40 MG: 40 INJECTION, POWDER, FOR SOLUTION INTRAVENOUS at 08:55

## 2022-11-04 RX ADMIN — MULTIVITAMIN 15 ML: LIQUID ORAL at 08:56

## 2022-11-04 RX ADMIN — POTASSIUM CHLORIDE 40 MEQ: 1500 TABLET, EXTENDED RELEASE ORAL at 08:55

## 2022-11-04 RX ADMIN — ATORVASTATIN CALCIUM 40 MG: 40 TABLET, FILM COATED ORAL at 16:42

## 2022-11-04 RX ADMIN — METOPROLOL TARTRATE 25 MG: 25 TABLET, FILM COATED ORAL at 08:55

## 2022-11-04 NOTE — DISCHARGE INSTRUCTIONS
Dear Madeleine Ball,     It was our pleasure to care for you here at [facility]  It is our hope that we were always able to exceed the expected standards for your care during your stay  You were hospitalized due to blood loss anemia  You were cared for on the S-4th floor by Lisa Manriquez under the service of Jaden Ho MD with the [facility] who covers for your primary care physician (PCP), No primary care provider on file  , while you were hospitalized  If you have any questions or concerns related to this hospitalization, you may contact us at [phone]  For follow up as well as any medication refills, we recommend that you follow up with your primary care physician  A registered nurse will reach out to you by phone within a few days after your discharge to answer any additional questions that you may have after going home  However, at this time we provide for you here, the most important instructions / recommendations at discharge:     Notable Medication Adjustments -   Start taking ferrous sulfate 324 mg 2 times per day  Testing Required after Discharge -   CBC in 1 week  Important follow up information -   Follow-up with the PCP in 1 week  Follow-up with GI in 1 week  Other Instructions -   None  Please review this entire after visit summary as additional general instructions including medication list, appointments, activity, diet, any pertinent wound care, and other additional recommendations from your care team that may be provided for you        Sincerely,     Lisa Manriquez MD

## 2022-11-04 NOTE — PROGRESS NOTES
Progress Note - Misti Saenz III 63 y o  male MRN: [id number]    Unit/Bed#: S -01 Encounter: [id number]        Assessment/Plan:  Melena  · Reported dark stool w/ BRB at [facility]- on arrival only had dark black/ greenish colored stool, Had episodes of hypotension and a code crimson called at that time  · Tube feeds were resumed  · EGD yesterday showed fresh blood around the G-tube balloon was washed off and suctioned   Noted active bleeding underneath blood clot in the adjacent area (separate from the incision site)   Blood clot was removed with suctioning on to the scope and noted a tiny ulceration with active bleeding which appeared to be a Dieulafoy's lesion   Placed 3 hemoclips and also injected epinephrine to control the bleeding  2 cm hiatal hernia was seen with peptic stricture at the GE junction that was dilated with mucosal trauma from the scope   · Hgb currently 7 9  · Spoke with speech and MBS was done today   · Was on Protonix drip, now transition to IV b i d   · Can resume aspirin and Plavix  · Patient can have pureed diet with nectar thick liquids         Stercoral colitis with fecal impaction   -patient also noted to have bright red blood per rectum on exam which is likely related to fecal impaction with stercoral colitis  -continue bowel regimen, patient was on MiraLax twice daily via PEG, can decrease if diarrheal symptoms occur   -will need colonoscopy in future  as outpatient      Subjective:   Patient is lying in bed  States that he moved his bowels  Stool appears to be a dark brown  Patient reports that he did drink some nectar thickened liquids  Denies any abdominal pain or nausea vomiting      Objective:     Vitals: /71 (BP Location: Right arm)   Pulse 70   Temp 97 9 °F (36 6 °C) (Oral)   Resp 16   Ht 5' 10" (1 778 m)   Wt 50 5 kg (111 lb 5 3 oz)   SpO2 100%   BMI 15 97 kg/m²       Physical Exam:  Gen-alert no acute distress  Abd-positive bowel sounds, nondistended, nontender, peg in place       Lab, Imaging and other studies:   Recent Results (from the past 72 hour(s))   Serial Hemoglobin Q6hrs    Collection Time: 11/01/22  1:32 PM   Result Value Ref Range    Hemoglobin 5 8 (LL) 12 0 - 17 0 g/dL   Hemoglobin    Collection Time: 11/01/22  2:38 PM   Result Value Ref Range    Hemoglobin 10 6 (L) 12 0 - 17 0 g/dL   Serial Hemoglobin Q6hrs    Collection Time: 11/01/22  6:48 PM   Result Value Ref Range    Hemoglobin 9 8 (L) 12 0 - 17 0 g/dL   Serial Hemoglobin Q6hrs    Collection Time: 11/02/22 12:29 AM   Result Value Ref Range    Hemoglobin 8 3 (L) 12 0 - 17 0 g/dL   Basic metabolic panel    Collection Time: 11/02/22  4:46 AM   Result Value Ref Range    Sodium 129 (L) 135 - 147 mmol/L    Potassium 3 9 3 5 - 5 3 mmol/L    Chloride 100 96 - 108 mmol/L    CO2 22 21 - 32 mmol/L    ANION GAP 7 4 - 13 mmol/L    BUN 14 5 - 25 mg/dL    Creatinine 0 37 (L) 0 60 - 1 30 mg/dL    Glucose 81 65 - 140 mg/dL    Calcium 8 5 8 4 - 10 2 mg/dL    eGFR 134 ml/min/1 73sq m   CBC and Platelet    Collection Time: 11/02/22  4:46 AM   Result Value Ref Range    WBC 6 20 4  31 - 10 16 Thousand/uL    RBC 2 56 (L) 3 88 - 5 62 Million/uL    Hemoglobin 7 9 (L) 12 0 - 17 0 g/dL    Hematocrit 23 7 (L) 36 5 - 49 3 %    MCV 93 82 - 98 fL    MCH 30 9 26 8 - 34 3 pg    MCHC 33 3 31 4 - 37 4 g/dL    RDW 17 0 (H) 11 6 - 15 1 %    Platelets 930 966 - 048 Thousands/uL    MPV 8 5 (L) 8 9 - 12 7 fL   Hemoglobin and hematocrit, blood    Collection Time: 11/02/22  9:20 AM   Result Value Ref Range    Hemoglobin 8 0 (L) 12 0 - 17 0 g/dL    Hematocrit 23 7 (L) 36 5 - 49 3 %   Basic metabolic panel    Collection Time: 11/02/22 10:26 AM   Result Value Ref Range    Sodium 128 (L) 135 - 147 mmol/L    Potassium 4 0 3 5 - 5 3 mmol/L    Chloride 100 96 - 108 mmol/L    CO2 22 21 - 32 mmol/L    ANION GAP 6 4 - 13 mmol/L    BUN 19 5 - 25 mg/dL    Creatinine 0 45 (L) 0 60 - 1 30 mg/dL    Glucose 82 65 - 140 mg/dL    Calcium 8 4 8 4 - 10 2 mg/dL    eGFR 123 ml/min/1 73sq m   Hemoglobin and hematocrit, blood    Collection Time: 11/02/22  3:26 PM   Result Value Ref Range    Hemoglobin 7 7 (L) 12 0 - 17 0 g/dL    Hematocrit 23 7 (L) 36 5 - 49 3 %   Serial Hemoglobin Q6hrs    Collection Time: 11/02/22  5:55 PM   Result Value Ref Range    Hemoglobin 7 3 (L) 12 0 - 17 0 g/dL   Serial Hemoglobin Q6hrs    Collection Time: 11/03/22 12:41 AM   Result Value Ref Range    Hemoglobin 7 1 (L) 12 0 - 17 0 g/dL   Basic metabolic panel    Collection Time: 11/03/22  5:00 AM   Result Value Ref Range    Sodium 131 (L) 135 - 147 mmol/L    Potassium 3 7 3 5 - 5 3 mmol/L    Chloride 101 96 - 108 mmol/L    CO2 20 (L) 21 - 32 mmol/L    ANION GAP 10 4 - 13 mmol/L    BUN 14 5 - 25 mg/dL    Creatinine 0 47 (L) 0 60 - 1 30 mg/dL    Glucose 66 65 - 140 mg/dL    Calcium 8 4 8 4 - 10 2 mg/dL    eGFR 121 ml/min/1 73sq m   CBC and Platelet    Collection Time: 11/03/22  5:00 AM   Result Value Ref Range    WBC 7 13 4 31 - 10 16 Thousand/uL    RBC 2 12 (L) 3 88 - 5 62 Million/uL    Hemoglobin 6 6 (LL) 12 0 - 17 0 g/dL    Hematocrit 20 1 (L) 36 5 - 49 3 %    MCV 95 82 - 98 fL    MCH 31 1 26 8 - 34 3 pg    MCHC 32 8 31 4 - 37 4 g/dL    RDW 17 1 (H) 11 6 - 15 1 %    Platelets 624 950 - 435 Thousands/uL    MPV 8 4 (L) 8 9 - 12 7 fL   Serial Hemoglobin Q6hrs    Collection Time: 11/03/22 11:53 AM   Result Value Ref Range    Hemoglobin 8 3 (L) 12 0 - 17 0 g/dL   Serial Hemoglobin Q6hrs    Collection Time: 11/03/22  9:51 PM   Result Value Ref Range    Hemoglobin 8 6 (L) 12 0 - 17 0 g/dL   Type and screen    Collection Time: 11/03/22  9:51 PM   Result Value Ref Range    ABO Grouping O     Rh Factor Positive     Antibody Screen Negative     Specimen Expiration Date 20221106    Serial Hemoglobin Q6hrs    Collection Time: 11/04/22  2:48 AM   Result Value Ref Range    Hemoglobin 8 3 (L) 12 0 - 17 0 g/dL   Basic metabolic panel    Collection Time: 11/04/22  4:48 AM   Result Value Ref Range    Sodium 132 (L) 135 - 147 mmol/L    Potassium 3 4 (L) 3 5 - 5 3 mmol/L    Chloride 101 96 - 108 mmol/L    CO2 24 21 - 32 mmol/L    ANION GAP 7 4 - 13 mmol/L    BUN 9 5 - 25 mg/dL    Creatinine 0 42 (L) 0 60 - 1 30 mg/dL    Glucose 95 65 - 140 mg/dL    Calcium 8 7 8 4 - 10 2 mg/dL    eGFR 127 ml/min/1 73sq m   CBC and Platelet    Collection Time: 11/04/22  4:48 AM   Result Value Ref Range    WBC 6 16 4 31 - 10 16 Thousand/uL    RBC 2 58 (L) 3 88 - 5 62 Million/uL    Hemoglobin 7 9 (L) 12 0 - 17 0 g/dL    Hematocrit 23 7 (L) 36 5 - 49 3 %    MCV 92 82 - 98 fL    MCH 30 6 26 8 - 34 3 pg    MCHC 33 3 31 4 - 37 4 g/dL    RDW 17 2 (H) 11 6 - 15 1 %    Platelets 535 275 - 640 Thousands/uL    MPV 8 5 (L) 8 9 - 12 7 fL   Prepare Leukoreduced RBC: 2 Units    Collection Time: 11/04/22  5:47 AM   Result Value Ref Range    Unit Product Code R4493U93     Unit Number [id number]     Unit ABO O     Unit RH POS     Crossmatch Compatible     Unit Dispense Status Return to [facility]     Unit Product Volume 350 ml    Unit Product Code R4502B20     Unit Number [id number]     Unit ABO O     Unit RH POS     Crossmatch Compatible     Unit Dispense Status Presumed Trans     Unit Product Volume 350 ml

## 2022-11-04 NOTE — CASE MANAGEMENT
Case Management Progress Note    Patient name Paulo LAMBERT /S -01 MRN [id number]  : 1960 Date 2022       LOS (days): 4  Geometric Mean LOS (GMLOS) (days): 3 60  Days to GMLOS:-0 9        OBJECTIVE:        Current admission status: Inpatient  Preferred Pharmacy:   [facility], [phone] [id number] [address]  Phone: [phone] Fax: [phone]    Primary Care Provider: No primary care provider on file  Primary Insurance: BLUE CROSS  Secondary Insurance:     PROGRESS NOTE:  Patient able to go to [facility] today, 5:00 PM transport confirmed- all appropriate parties notified

## 2022-11-04 NOTE — PROCEDURES
Speech Pathology Videofluoroscopic Swallow Study (VFSS/VBSS/MBSS)      Patient Name: Willem Worley III    Today's Date: 11/4/2022     Problem List  Principal Problem:    Melena  Active Problems:    Hyponatremia    Moderate protein-calorie malnutrition (HCC)    History of CVA (cerebrovascular accident)    Primary hypertension    Acute on chronic anemia    Mild protein-calorie malnutrition ([address] )      Past Medical History  Past Medical History:   Diagnosis Date   • Hypertension    • Renal disorder    • Stroke [facility])        Past Surgical History  Past Surgical History:   Procedure Laterality Date   • GASTROSTOMY TUBE PLACEMENT N/A 10/18/2022    Procedure: INSERTION GASTROSTOMY TUBE OPEN;  Surgeon: Glory Yanez DO;  Location: [facility];  Service: General         General Information;  Pt is a 63 y o  male with a PMH remarkable for recent R CVA resulting in significant dysphagia and necessitating PEG tube insertion (10/18/22)  PEG tube was just removed by patient  Repeat VFSS was recommended to assess oropharyngeal stage swallowing skills at this time  Pt was viewed in lateral position and assessed with thin liquid (tsp, cup & straw by individual and successive sips), nectar thick liquid by individual and successive cup sips, tsp of honey thick liquid,,puree/pudding, and a bite of soft moist food (moistened fruit/fig bar)  Oral stage:   moderate oral stage dysphagia  Mastication was incomplete/ineffective with limited soft food today  Bolus formation and transfer were incomplete with soft food (there was at least moderate or moderate to severe food residue present after the primary transfer and soft cookie eventually removed as pt unable to clear via lingual :"sweep" or ith attempt at liquid "washdown" with NT)     Bolus formation transfer and control were functional to minimally impaired as evidence by tongue pumping and premature transfer to the vallecula with puree    Premature spillage over the base the tongue to the piriform is a was evident with thin liquid but not with nectar or honey thick liquid  Please note that labial leakage was also present with both thin and nectar thick liquid  Pharyngeal stage:  mild pharyngeal dysphagia with the materials administered today    Velar elevation noted  Swallowing initiation was prompt to mildly delayed  Laryngeal rise and anterior hyoid excursion appeared adequate  AIrway entrance closure appeared adequate with puree and thick liquid but mildly compromised with thin liquid  Tongue base retraction appeared to be of adequate strength with puree  PES opening was adequate  Management of food/liquid/barium tablet follows: With thin liquid by tsp and individual cup sips, was premature spillage to the piriform is a but no penetration or aspiration before or during the swallow  With thin liquid by successive cup and straw sips, mild laryngeal penetration to the vocal cords observed episodically  Thankfully no bozena aspiration occurred despite challenge with right and left head turn and slight neck hyper extension during successive sips  With thick liquid, puree, and the minimal amount of soft food that reach the pharynx, there was no penetration, aspiration or significant pharyngeal residue  Strategies and Efficacy:   1  Breakdown of food was not improved by moistening soft food  2  Bolus formation and transfer with soft food was not improved after lingual sweep or with attempts of liquid wash down  Use of differing head positions (neck flexion, slight neck hyper extension, right and left head turn) did not impact swallow safety significantly today (did not maximized oral control common nor increase risk for aspiration or results and aspiration today)  Esophageal stage:  Brief view of esophagus was completed at the end of testing    No significant esophageal stasis noted    Assessment Summary:    Pt presented with at least moderate oral dysphagia but only mild pharyngeal dysphagia with the materials administered today     Note: Images are available for review in PACS as desired  NOMS (National Outcome Measurement System): Swallowing "Score"  LEVEL 4: Swallowing is safe, but usually requires moderate cues to use compensatory strategies, and/or the individual has moderate diet restrictions and/or still requires tube feeding and/or oral supplements  Recommendations:   Recommended Diet:  puree/level 1 diet and nectar thick liquids   Recommended Form of Medications: crushed with puree   Aspiration precautions and swallowing strategies: upright posture and quiet environment (tv off, limit talking, door closed, etc ), oral care after all meals  SLP Dysphagia therapy recommended: yes    PS   F/JARRETT completed 11:30-11:50  I texted results and recommendation to MD & requested diet order  I spoke c [facility]'s RN at [facility]  I also spoke with Rehab Staff (rep for SLP) at [facility] reviewed all results and recommendations  Dysphagia LTG  -Patient will demonstrate safe and effective oral intake (without overt s/s significant oral/pharyngeal dysphagia including s/s penetration or aspiration) for the highest appropriate diet level  Short Term Goals:  -Pt will tolerate Dysphagia 1/pureed diet and nectar thick thin liquid with no significant s/s oral or pharyngeal dysphagia across 1-3 diagnostic session/s    -Pt will tolerate 8 ozs samples of thin liquid with SLP x4 sessions no significant s/s oral or pharyngeal dysphagia    -Pt will complete oral motor exercises (see goals set by SLP a SNF))    -Pt will use trained strategies c min cues needed    -Pt will tolerate trials of moist cohesive mechanical soft foods with use of strategies withmin s/s oral  dysphagia across multiple diagnostic session/s  Wesley Snowden     [facility] [place] [id number]  [phone] [id number]

## 2022-11-04 NOTE — PLAN OF CARE
Problem: Potential for Falls  Goal: Patient will remain free of falls  Description: INTERVENTIONS:  - Educate patient/family on patient safety including physical limitations  - Instruct patient to call for assistance with activity   - Consult OT/PT to assist with strengthening/mobility   - Keep Call bell within reach  - Keep bed low and locked with side rails adjusted as appropriate  - Keep care items and personal belongings within reach  - Initiate and maintain comfort rounds  - Make Fall Risk Sign visible to staff  - Offer Toileting every  Hours, in advance of need  - Initiate/Maintain  alarm  - Obtain necessary fall risk management equipment:   - Apply yellow socks and bracelet for high fall risk patients  - Consider moving patient to room near nurses station  Outcome: Progressing     Problem: MOBILITY - ADULT  Goal: Maintain or return to baseline ADL function  Description: INTERVENTIONS:  -  Assess patient's ability to carry out ADLs; assess patient's baseline for ADL function and identify physical deficits which impact ability to perform ADLs (bathing, care of mouth/teeth, toileting, grooming, dressing, etc )  - Assess/evaluate cause of self-care deficits   - Assess range of motion  - Assess patient's mobility; develop plan if impaired  - Assess patient's need for assistive devices and provide as appropriate  - Encourage maximum independence but intervene and supervise when necessary  - Involve family in performance of ADLs  - Assess for home care needs following discharge   - Consider OT consult to assist with ADL evaluation and planning for discharge  - Provide patient education as appropriate  Outcome: Progressing     Problem: Prexisting or High Potential for Compromised Skin Integrity  Goal: Skin integrity is maintained or improved  Description: INTERVENTIONS:  - Identify patients at risk for skin breakdown  - Assess and monitor skin integrity  - Assess and monitor nutrition and hydration status  - Monitor labs   - Assess for incontinence   - Turn and reposition patient  - Assist with mobility/ambulation  - Relieve pressure over bony prominences  - Avoid friction and shearing  - Provide appropriate hygiene as needed including keeping skin clean and dry  - Evaluate need for skin moisturizer/barrier cream  - Collaborate with interdisciplinary team   - Patient/family teaching  - Consider wound care consult   Outcome: Progressing     Problem: Nutrition/Hydration-ADULT  Goal: Nutrient/Hydration intake appropriate for improving, restoring or maintaining nutritional needs  Description: Monitor and assess patient's nutrition/hydration status for malnutrition  Collaborate with interdisciplinary team and initiate plan and interventions as ordered  Monitor patient's weight and dietary intake as ordered or per policy  Utilize nutrition screening tool and intervene as necessary  Determine patient's food preferences and provide high-protein, high-caloric foods as appropriate       INTERVENTIONS:  - Monitor oral intake, urinary output, labs, and treatment plans  - Assess nutrition and hydration status and recommend course of action  - Evaluate amount of meals eaten  - Assist patient with eating if necessary   - Allow adequate time for meals  - Recommend/ encourage appropriate diets, oral nutritional supplements, and vitamin/mineral supplements  - Order, calculate, and assess calorie counts as needed  - Recommend, monitor, and adjust tube feedings and TPN/PPN based on assessed needs  - Assess need for intravenous fluids  - Provide specific nutrition/hydration education as appropriate  - Include patient/family/caregiver in decisions related to nutrition  Outcome: Progressing     Problem: GASTROINTESTINAL - ADULT  Goal: Maintains or returns to baseline bowel function  Description: INTERVENTIONS:  - Assess bowel function  - Encourage oral fluids to ensure adequate hydration  - Administer IV fluids if ordered to ensure adequate hydration  - Administer ordered medications as needed  - Encourage mobilization and activity  - Consider nutritional services referral to assist patient with adequate nutrition and appropriate food choices  Outcome: Progressing  Goal: Maintains adequate nutritional intake  Description: INTERVENTIONS:  - Monitor percentage of each meal consumed  - Identify factors contributing to decreased intake, treat as appropriate  - Assist with meals as needed  - Monitor I&O, weight, and lab values if indicated  - Obtain nutrition services referral as needed  Outcome: Progressing

## 2022-11-04 NOTE — PLAN OF CARE
Problem: MOBILITY - ADULT  Goal: Maintain or return to baseline ADL function  Description: INTERVENTIONS:  -  Assess patient's ability to carry out ADLs; assess patient's baseline for ADL function and identify physical deficits which impact ability to perform ADLs (bathing, care of mouth/teeth, toileting, grooming, dressing, etc )  - Assess/evaluate cause of self-care deficits   - Assess range of motion  - Assess patient's mobility; develop plan if impaired  - Assess patient's need for assistive devices and provide as appropriate  - Encourage maximum independence but intervene and supervise when necessary  - Involve family in performance of ADLs  - Assess for home care needs following discharge   - Consider OT consult to assist with ADL evaluation and planning for discharge  - Provide patient education as appropriate  Outcome: Progressing  Goal: Maintains/Returns to pre admission functional level  Description: INTERVENTIONS:  - Perform BMAT or MOVE assessment daily    - Set and communicate daily mobility goal to care team and patient/family/caregiver  - Collaborate with rehabilitation services on mobility goals if consulted  - Perform Range of Motion 4 times a day  - Reposition patient every 4 hours    - Dangle patient 4 times a day  - Stand patient 4 times a day  - Ambulate patient 4 times a day  - Out of bed to chair 3 times a day   - Out of bed for meals 3 times a day  - Out of bed for toileting  - Record patient progress and toleration of activity level   Outcome: Progressing     Problem: Potential for Falls  Goal: Patient will remain free of falls  Description: INTERVENTIONS:  - Educate patient/family on patient safety including physical limitations  - Instruct patient to call for assistance with activity   - Consult OT/PT to assist with strengthening/mobility   - Keep Call bell within reach  - Keep bed low and locked with side rails adjusted as appropriate  - Keep care items and personal belongings within reach  - Initiate and maintain comfort rounds  - Make Fall Risk Sign visible to staff  - Offer Toileting every 2 Hours, in advance of need  - Initiate/Maintain bed alarm  - Obtain necessary fall risk management equipment: alarms  - Apply yellow socks and bracelet for high fall risk patients  - Consider moving patient to room near nurses station  Outcome: Progressing     Problem: Prexisting or High Potential for Compromised Skin Integrity  Goal: Skin integrity is maintained or improved  Description: INTERVENTIONS:  - Identify patients at risk for skin breakdown  - Assess and monitor skin integrity  - Assess and monitor nutrition and hydration status  - Monitor labs   - Assess for incontinence   - Turn and reposition patient  - Assist with mobility/ambulation  - Relieve pressure over bony prominences  - Avoid friction and shearing  - Provide appropriate hygiene as needed including keeping skin clean and dry  - Evaluate need for skin moisturizer/barrier cream  - Collaborate with interdisciplinary team   - Patient/family teaching  - Consider wound care consult   Outcome: Progressing     Problem: Nutrition/Hydration-ADULT  Goal: Nutrient/Hydration intake appropriate for improving, restoring or maintaining nutritional needs  Description: Monitor and assess patient's nutrition/hydration status for malnutrition  Collaborate with interdisciplinary team and initiate plan and interventions as ordered  Monitor patient's weight and dietary intake as ordered or per policy  Utilize nutrition screening tool and intervene as necessary  Determine patient's food preferences and provide high-protein, high-caloric foods as appropriate       INTERVENTIONS:  - Monitor oral intake, urinary output, labs, and treatment plans  - Assess nutrition and hydration status and recommend course of action  - Evaluate amount of meals eaten  - Assist patient with eating if necessary   - Allow adequate time for meals  - Recommend/ encourage appropriate diets, oral nutritional supplements, and vitamin/mineral supplements  - Order, calculate, and assess calorie counts as needed  - Recommend, monitor, and adjust tube feedings and TPN/PPN based on assessed needs  - Assess need for intravenous fluids  - Provide specific nutrition/hydration education as appropriate  - Include patient/family/caregiver in decisions related to nutrition  Outcome: Progressing     Problem: GASTROINTESTINAL - ADULT  Goal: Maintains or returns to baseline bowel function  Description: INTERVENTIONS:  - Assess bowel function  - Encourage oral fluids to ensure adequate hydration  - Administer IV fluids if ordered to ensure adequate hydration  - Administer ordered medications as needed  - Encourage mobilization and activity  - Consider nutritional services referral to assist patient with adequate nutrition and appropriate food choices  Outcome: Progressing  Goal: Maintains adequate nutritional intake  Description: INTERVENTIONS:  - Monitor percentage of each meal consumed  - Identify factors contributing to decreased intake, treat as appropriate  - Assist with meals as needed  - Monitor I&O, weight, and lab values if indicated  - Obtain nutrition services referral as needed  Outcome: Progressing     Problem: HEMATOLOGIC - ADULT  Goal: Maintains hematologic stability  Description: INTERVENTIONS  - Assess for signs and symptoms of bleeding or hemorrhage  - Monitor labs  - Administer supportive blood products/factors as ordered and appropriate  Outcome: Progressing     Problem: DISCHARGE PLANNING  Goal: Discharge to home or other facility with appropriate resources  Description: INTERVENTIONS:  - Identify barriers to discharge w/patient and caregiver  - Arrange for needed discharge resources and transportation as appropriate  - Identify discharge learning needs (meds, wound care, etc )  - Arrange for interpretive services to assist at discharge as needed  - Refer to Case Management Department for coordinating discharge planning if the patient needs post-hospital services based on physician/advanced practitioner order or complex needs related to functional status, cognitive ability, or social support system  Outcome: Progressing

## 2022-11-04 NOTE — DISCHARGE SUMMARY
[facility]  Discharge- [facility] 1960, 63 y o  male MRN: [id number]  Unit/Bed#: S -01 Encounter: [id number]  Primary Care Provider: No primary care provider on file  Date and time admitted to hospital: 10/31/2022  2:04 AM    * Melena  Assessment & Plan    Lab Results   Component Value Date    HGB 6 6 (LL) 11/03/2022     Presents from SNF where staff noted patient to have episodes of green stools with bright red blood  No abdominal pain, N/V/D, hematemesis, hematochezia  Recently started on ASA/Plavix due to CVA  GI bleed possible due to coagulopathy in the setting of Plavix and Asprin  Does have hx of alcohol abuse, however no hx of cirrhosis/varices  CT abdomen showed sterocoral colitis and thickened bladder wall  Could not rule out acute GI bleed  No significant blood in gastric lavage  Patient had 3 episodes of watery green/black stool yesterday  UGIE: ulceration with active bleeding which appeared to be a Dieulafoy's lesion  Placed 3 hemoclips and also injected epinephrine to control the bleeding  Hb 6 6 this morning(11/04/2022)  Transfused 1 unit of pRBC  Plan  Follow-up with GI  Follow-up with PCP          Acute on chronic anemia  Assessment & Plan  Patient's hemoglobin baseline approximately 9-10  Recieved 2 packs of red blood cells on admisson  Another 1 unit on 11/04/2022  Plan  Follow-up with GI  Follow-up with PCP  Continue oral iron tablets  Primary hypertension  Assessment & Plan  Patient was hypotensive at [facility] requiring short time Levophed drip and was weaned prior to coming to [facility] ICU  He did not need pressors while in the ICU  His BP has been stable, amlodipine and metoprolol doses were continued in the ICU  BP today 131/90       Plan  · Continue amlodipine 5mg and metoprolol 25mg  · Follow-up with PCP    Hyponatremia  Assessment & Plan  Patient suffers from chronic hyponatremia with value 127 on admission and now up to 131  Patient is asymptomatic  Plan  Follow-up with PCP  Mild protein-calorie malnutrition ([address] )  Assessment & Plan  Malnutrition Findings:   Adult Malnutrition type: Chronic illness  Adult Degree of Malnutrition: Malnutrition of mild degree  Malnutrition Characteristics: Muscle loss, Other (comment) (BMI < 18 5)                  360 Statement: Mild protein calorie malnutrition r/t muscle loss present to claivicle and BMI < 18 5; receives tube feeds for sole source nutrition    BMI Findings:  Adult BMI Classifications: Underweight < 18 5        Body mass index is 15 97 kg/m²  History of CVA (cerebrovascular accident)  Assessment & Plan  Patient has history of CVA in October of 2022 with residual left hemiplegia and dysphagia  He is unable to use his left arm or leg completely  He was in SNF for recovery  At time of stroke, stenosis was found in proximal left cervical ICA greater than 90%MRI remarkable for acute lacunar infarct in left thalamus, and acute to early subacute infarct in right periventricular white matter and lentiform nuclei without mass effect or hemorrhage  Plan  Resume aspirin, clopidogrel  Continue atorvastatin  Follow-up with PCP    Moderate protein-calorie malnutrition (HCC)  Assessment & Plan  Malnutrition Findings:   Adult Malnutrition type: Chronic illness  Adult Degree of Malnutrition: Malnutrition of mild degree  Malnutrition Characteristics: Muscle loss, Other (comment) (BMI < 18 5)                  360 Statement: Mild protein calorie malnutrition r/t muscle loss present to claivicle and BMI < 18 5; receives tube feeds for sole source nutrition    BMI Findings:  Adult BMI Classifications: Underweight < 18 5        Body mass index is 15 97 kg/m²       Follow-up PCP      Mild protein-calorie malnutrition (HCC)-resolved as of 11/1/2022  Assessment & Plan  Malnutrition Findings:   Adult Malnutrition type: Chronic illness  Adult Degree of Malnutrition: Malnutrition of mild degree  Malnutrition Characteristics: Muscle loss, Other (comment) (BMI < 18 5)                  360 Statement: Mild protein calorie malnutrition r/t muscle loss present to claivicle and BMI < 18 5; receives tube feeds for sole source nutrition    BMI Findings:  Adult BMI Classifications: Underweight < 18 5        Body mass index is 15 97 kg/m²  Medical Problems             Resolved Problems  Date Reviewed: 11/4/2022          Resolved    Mild protein-calorie malnutrition ([address] ) 11/1/2022     Resolved by  Gage Reyes              Discharging Resident: Gabriela Green  Discharging Attending: Bluford Nyhan, MD  PCP: No primary care provider on file  Admission Date:   Admission Orders (From admission, onward)     Ordered        10/31/22 0223  Inpatient Admission  Once                      Discharge Date: 11/04/22    Consultations During Hospital Stay:  · GI  · Podiatry  · Behavioral health  Procedures Performed:   · Barium swallow video with speech  · EGD    Significant Findings / Test Results:   EGD  · Esophagus-2 cm and hernia was seen with peptic stricture at the GE junction that was dilated with mucosal trauma from the scope itself  · Stomach-fresh blood around the G-tube balloon was washed off and suctioned  Noted active bleeding underneath blood clot in the adjacent area (separate from the incision site)  Blood clot was removed with suctioning on to the scope and noted a tiny ulceration with active bleeding which appeared to be a Dieulafoy's lesion  Placed 3 hemoclips and also injected epinephrine to control the bleeding  CT abdomen pelvis w contrast  Marked distention of the rectum with large amount of surrounding inflammatory changes which may represent stercoral colitis  Follow-up with gastroenterology is recommended      Thickening of the urinary bladder wall which may represent cystitis    Follow-up with urology is recommended      Airspace opacities and tree-in-bud opacities in the lung bases which may represent infection  Short-term follow-up chest CT scan in 3 months is recommended to evaluate for resolution  Incidental Findings:   CT abdomen/pelvis  Airspace opacities and tree-in-bud opacities in the lung bases which may represent infection  Short-term follow-up chest CT scan in 3 months is recommended to evaluate for resolution  Test Results Pending at Discharge (will require follow up): · None     Outpatient Tests Requested:  · CBC in 1 week    Complications:  none    Reason for Admission:  Acute blood loss anemia  Hospital Course:   Nikole Hart III is a 63 y o  male patient who originally presented to the hospital on 10/31/2022 with the concerns of discharge G tube and melenic stools  Patient was initially presented to the [address] [place] [address] and prolonged episodes of hypotension, cold crimson was called patient received 2 U of pRBCS and started on levophed  Patient was transferred to [facility] for GI evaluation  GI consulted  Upper GI endoscopy showed a ulcer with active bleeding  Epinephrine injections given  Hemoglobin became stable follow-up in upper GI endoscopy  Patient also had barium swallow study to assess swallowing ability  Patient does not have any active bleeding on discharge  Patient also had hyponatremia which responded to IV fluids  Please see above list of diagnoses and related plan for additional information  Condition at Discharge: good    Discharge Day Visit / Exam:   Subjective:  Patient is lying comfortably on the bed  Denies pain  No active bleeding  Vitals stable     Vitals: Blood Pressure: 119/74 (11/04/22 1506)  Pulse: 77 (11/04/22 1506)  Temperature: 97 9 °F (36 6 °C) (11/04/22 1506)  Temp Source: Oral (11/04/22 0730)  Respirations: 14 (11/04/22 1506)  Height: 5' 10" (177 8 cm) (10/31/22 0204)  Weight - Scale: 50 5 kg (111 lb 5 3 oz) (11/03/22 0600)  SpO2: 100 % (11/04/22 1506)  Exam:   Physical Exam  Constitutional:       General: He is not in acute distress  Appearance: Normal appearance  He is not ill-appearing  HENT:      Head: Normocephalic  Mouth/Throat:      Mouth: Mucous membranes are moist       Pharynx: Oropharynx is clear  Eyes:      Conjunctiva/sclera: Conjunctivae normal    Cardiovascular:      Rate and Rhythm: Normal rate  Pulses: Normal pulses  Heart sounds: Normal heart sounds  Pulmonary:      Effort: Pulmonary effort is normal       Breath sounds: Normal breath sounds  Abdominal:      Palpations: Abdomen is soft  Skin:     General: Skin is warm and dry  Capillary Refill: Capillary refill takes less than 2 seconds  Neurological:      Mental Status: He is alert and oriented to person, place, and time  Psychiatric:         Mood and Affect: Mood normal          Behavior: Behavior normal       Discharge instructions/Information to patient and family:   See after visit summary for information provided to patient and family  Provisions for Follow-Up Care:  See after visit summary for information related to follow-up care and any pertinent home health orders  Disposition:   Acute Rehab at [facility]    Planned Readmission: no    Discharge Medications:  See after visit summary for reconciled discharge medications provided to patient and/or family        **Please Note: This note may have been constructed using a voice recognition system**

## 2022-11-05 LAB
BACTERIA BLD CULT: NORMAL

## 2022-11-07 ENCOUNTER — NURSING HOME VISIT (OUTPATIENT)
Dept: GERIATRICS | Facility: OTHER | Age: 62
End: 2022-11-07

## 2022-11-07 VITALS
OXYGEN SATURATION: 97 % | DIASTOLIC BLOOD PRESSURE: 70 MMHG | RESPIRATION RATE: 18 BRPM | SYSTOLIC BLOOD PRESSURE: 140 MMHG | WEIGHT: 115.2 LBS | BODY MASS INDEX: 16.53 KG/M2 | HEART RATE: 70 BPM | TEMPERATURE: 97 F

## 2022-11-07 DIAGNOSIS — K92.1 MELENA: Primary | ICD-10-CM

## 2022-11-07 NOTE — UTILIZATION REVIEW
NOTIFICATION OF ADMISSION DISCHARGE   This is a Notification of Discharge from [facility]  Please be advised that this patient has been discharge from our facility  Below you will find the admission and discharge date and time including the patient’s disposition  UTILIZATION REVIEW CONTACT:  Maria E Armas MA  Utilization   Network Utilization Review Department  Phone: [phone] x carefully listen to the prompts  All voicemails are confidential   Email: [email or web address]     ADMISSION INFORMATION  PRESENTATION DATE: 10/31/2022  2:04 AM  OBERVATION ADMISSION DATE:   INPATIENT ADMISSION DATE: 10/31/22  2:04 AM   DISCHARGE DATE: 11/4/2022  5:22 PM  DISPOSITION: Non [facility] SNF/TCU/SNU Non [facility] SNF/TCU/SNU      IMPORTANT INFORMATION:  Send all requests for admission clinical reviews, approved or denied determinations and any other requests to dedicated fax number below belonging to the campus where the patient is receiving treatment   List of dedicated fax numbers:  [address] DENIALS (Administrative/Medical Necessity) [phone]   [address] (Maternity/NICU/Pediatrics) [phone]   [facility] [phone]   [facility] [address] [phone]   [address] [phone]   [address] [facility] [phone]   [address] [place] [address] [phone]   [address] [place] [address] [phone]   [facility]  [phone]   [address] [facility] [phone]   [address] [place] [address] [address] [facility] [phone]

## 2022-11-07 NOTE — PROGRESS NOTES
[facility] [address], [address] [place] [address]    Nursing Home Admission    NAME: Nikole Hart III  AGE: 63 y o  SEX: male [id number]      Patient Location     [facility]    Patient’s care was coordinated with nursing facility staff  Recent vitals, labs and [facility]  Past Medical, surgical, social, medication and allergy history and patient’s previous records reviewed  Assessment/Plan:    Melena  Patient was recently hospitalized with GI bleed in the setting of aspirin and Plavix use for CVA  CT abdomen pelvis showed sterocoral colitis and thickened bladder wall  EGD revealed ulceration with active bleeding which  appeared to be related to Dieulafoy's lesion  Patient underwent placement of 3 hemoclips and injection of heparin to control bleeding  Hemoglobin dropped down to 6 6 on 11/04  He was transfused 1 unit of PRBC  Altogether received 3 units of PRBC transfusions during recent hospitalization  Follow-up repeat CBC      Acute on chronic anemia:  Baseline hemoglobin is between 9-10  Patient had a significant drop in hemoglobin to 6 6 recently due to GI bleed  Patient received 2 units of PRBC upon admission and another unit on 11/04/2022  Continue to monitor hemoglobin closely  Continue iron supplements  Follow-up repeat hemoglobin    Primary hypertension:  Patient was noted to be hypotensive upon recent admission  He was started on Levophed drip blood pressure subsequently improved  Antihypertensive medications were initially held later resumed  Blood pressure is currently stable on amlodipine and metoprolol  Mild protein calorie malnutrition:  As evident by generalized body muscle wasting and BMI of less than 18 5  Follow-up with dietitian  Patient was on tube feeds previously, recently discontinued  Has been tolerating modified diet with nectar thick liquids    Will continue to monitor    History of CVA:  Patient had CVA in October 22 with residual left hemiplegia and dysphagia  He was found to have proximal left cervical ICA stenosis greater than 90%  MRI was remarkable for acute lacunar infarct in left thalamus and acute to early subacute infarct in right periventricular white matter and lentiform nuclei without mass effect or hemorrhage  Aspirin and Plavix were held for a short time due to recent GI bleed later resumed  Continue statin      History of alcohol abuse:  Continue thiamine and folic acid  Hyperlipidemia:  Continue atorvastatin  Chief Complaint     GI bleed, recent CVA    HPI       Patient is a 63 y o  male with past medical history significant for recent CVA, hypertension anemia and hyperlipidemia  Patient was hospitalized on 10/31/2022 with a concern for bloody discharge through G-tube and melenic stools  He was noted to be hypotensive upon admission needing pressors  Patient was transferred to [facility]  He was transfused 2 units of PRBC  Patient was seen in consultation by GI service  Upper endoscopy showed an ulcer with active bleeding  Epinephrine injections were given  Hemoglobin subsequently dropped down to 6 6  Patient was transfused an additional unit of PRBC  Hospital stay was also significant for hyponatremia improved with IV fluids  Patient was later transferred to [facility] rehab where he is being seen for post hospital admission  At the time of my evaluation patient is doing okay  Comfortable in no distress      Past Medical History:   Diagnosis Date   • Hypertension    • Renal disorder    • Stroke [facility])        Past Surgical History:   Procedure Laterality Date   • GASTROSTOMY TUBE PLACEMENT N/A 10/18/2022    Procedure: INSERTION GASTROSTOMY TUBE OPEN;  Surgeon: Telma Yanez DO;  Location: [facility];  Service: General       Social History     Tobacco Use   Smoking Status Current Every Day Smoker   • Packs/day: 1 00   • Types: Cigarettes   Smokeless Tobacco Never Used       Family  History:  Noncontributory    No Known Allergies       Current Outpatient Medications:   •  albuterol (2 5 mg/3 mL) 0 083 % nebulizer solution, Take 3 mL (2 5 mg total) by nebulization every 6 (six) hours as needed for wheezing or shortness of breath, Disp: 60 mL, Rfl: 0  •  amLODIPine (NORVASC) 5 mg tablet, 1 tablet (5 mg total) by Per G Tube route daily, Disp: 90 tablet, Rfl: 0  •  aspirin (ECOTRIN LOW STRENGTH) 81 mg EC tablet, Take 1 tablet (81 mg total) by mouth daily (Patient not taking: Reported on 10/31/2022), Disp: 90 tablet, Rfl: 0  •  atorvastatin (LIPITOR) 40 mg tablet, Take 1 tablet (40 mg total) by mouth daily with dinner (Patient not taking: Reported on 10/31/2022), Disp: 90 tablet, Rfl: 0  •  clopidogrel (PLAVIX) 75 mg tablet, Take 1 tablet (75 mg total) by mouth daily Do not start before October 22, 2022   (Patient not taking: Reported on 10/31/2022), Disp: 20 tablet, Rfl: 0  •  ferrous sulfate 324 (65 Fe) mg, Take 1 tablet (324 mg total) by mouth 2 (two) times a day before meals, Disp: 60 tablet, Rfl: 0  •  folic acid (FOLVITE) 1 mg tablet, Take 1 mg by mouth daily Crush pill mixed Will water administered through PEG tube (Patient not taking: Reported on 10/31/2022), Disp: , Rfl:   •  LORazepam (ATIVAN) 0 5 mg tablet, Take 0 5 mg by mouth every 6 (six) hours as needed RestlessNess anxiety (Patient not taking: Reported on 10/31/2022), Disp: , Rfl:   •  metoprolol tartrate (LOPRESSOR) 25 mg tablet, 1 tablet (25 mg total) by Per G Tube route every 12 (twelve) hours (Patient not taking: Reported on 10/31/2022), Disp: 180 tablet, Rfl: 0  •  Multiple Vitamins-Minerals (Multivitamin) LIQD, Take 15 mL by mouth in the morning (Patient not taking: Reported on 10/31/2022), Disp: , Rfl:   •  polyethylene glycol (MIRALAX) 17 g packet, Take 17 g by mouth 2 (two) times a day (Patient not taking: Reported on 10/31/2022), Disp: , Rfl:   • thiamine 100 MG tablet, Take 100 mg by mouth daily ETOH abuse (Patient not taking: Reported on 10/31/2022), Disp: , Rfl:     Updated list was reviewed in [facility] facility  Vitals:    11/07/22 1331   BP: 140/70   Pulse: 70   Resp: 18   Temp: (!) 97 °F (36 1 °C)   SpO2: 97%       Vital signs were reviewed in [facility]    Review of Systems   Constitutional: Positive for fatigue  Negative for chills and fever  HENT: Negative for nosebleeds and rhinorrhea  Eyes: Negative for discharge and redness  Respiratory: Negative for cough, shortness of breath, wheezing and stridor  Cardiovascular: Negative for chest pain and leg swelling  Gastrointestinal: Negative for abdominal distention, abdominal pain, diarrhea and vomiting  Genitourinary: Negative for hematuria  Musculoskeletal: Positive for gait problem  Negative for arthralgias and back pain  Skin: Negative for pallor  Neurological: Positive for speech difficulty and weakness (left sided)  Negative for tremors, seizures and syncope  Psychiatric/Behavioral: Positive for confusion  Negative for agitation and behavioral problems  Physical Exam  HENT:      Head: Normocephalic and atraumatic  Eyes:      General: No scleral icterus  Right eye: No discharge  Left eye: No discharge  Cardiovascular:      Rate and Rhythm: Normal rate and regular rhythm  Pulmonary:      Breath sounds: No wheezing, rhonchi or rales  Abdominal:      General: There is no distension  Palpations: Abdomen is soft  Tenderness: There is no abdominal tenderness  There is no guarding  Comments: G tube in place   Musculoskeletal:      Cervical back: Neck supple  Right lower leg: No edema  Left lower leg: No edema  Comments: Muscle wasting of all extremities noted   Skin:     Coloration: Skin is not jaundiced  Neurological:      Motor: Weakness (left sided) present        Comments: Oriented in month and year           Diagnostic Data     Lab Results   Component Value Date    SODIUM 132 (L) 11/04/2022    K 3 4 (L) 11/04/2022     11/04/2022    CO2 24 11/04/2022    BUN 9 11/04/2022    CREATININE 0 42 (L) 11/04/2022    GLUC 95 11/04/2022    CALCIUM 8 7 11/04/2022     Lab Results   Component Value Date    WBC 6 16 11/04/2022    HGB 8 8 (L) 11/04/2022    HCT 23 7 (L) 11/04/2022    MCV 92 11/04/2022     11/04/2022         Recent labs and imaging studies were reviewed  EGD  · Esophagus-2 cm and hernia was seen with peptic stricture at the GE junction that was dilated with mucosal trauma from the scope itself  · Stomach-fresh blood around the G-tube balloon was washed off and suctioned   Noted active bleeding underneath blood clot in the adjacent area (separate from the incision site)   Blood clot was removed with suctioning on to the scope and noted a tiny ulceration with active bleeding which appeared to be a Dieulafoy's lesion   Placed 3 hemoclips and also injected epinephrine to control the bleeding      CT abdomen pelvis w contrast  Marked distention of the rectum with large amount of surrounding inflammatory changes which may represent stercoral colitis   Follow-up with gastroenterology is recommended      Thickening of the urinary bladder wall which may represent cystitis   Follow-up with urology is recommended      Airspace opacities and tree-in-bud opacities in the lung bases which may represent infection   Short-term follow-up chest CT scan in 3 months is recommended to evaluate for resolution         Incidental Findings:   CT abdomen/pelvis  Airspace opacities and tree-in-bud opacities in the lung bases which may represent infection   Short-term follow-up chest CT scan in 3 months is recommended to evaluate for resolution       Code Status:      Full code              This note was electronically signed by Dr Aleks Cordova

## 2022-11-07 NOTE — ASSESSMENT & PLAN NOTE
Patient was recently hospitalized with GI bleed in the setting of aspirin and Plavix use for CVA  CT abdomen pelvis showed sterocoral colitis and thickened bladder wall  EGD revealed ulceration with active bleeding which  appeared to be related to Dieulafoy's lesion  Patient underwent placement of 3 hemoclips and injection of heparin to control bleeding  Hemoglobin dropped down to 6 6 on 11/04  He was transfused 1 unit of PRBC    Altogether received 3 units of PRBC transfusions during recent hospitalization  Follow-up repeat CBC

## 2022-11-08 ENCOUNTER — TELEPHONE (OUTPATIENT)
Dept: NEUROLOGY | Facility: CLINIC | Age: 62
End: 2022-11-08

## 2022-11-08 NOTE — TELEPHONE ENCOUNTER
Post CVA Discharge Follow Up  Hospitalization: 10/9/22-10/21/22, 10/25/22 ED visit for dislodged gastrostomy tube,  10/30/22-10/31/22 ED visit for tube feeding dysfunction, 10/31/22-11/4/22 melena     According to chart, patient discharged to [facility]  Called facility  Requested to speak with the nurse   transferred the call  The line rang for a few minutes with no answer  Will call back at a later time

## 2022-11-09 ENCOUNTER — NURSING HOME VISIT (OUTPATIENT)
Dept: GERIATRICS | Facility: OTHER | Age: 62
End: 2022-11-09

## 2022-11-09 VITALS
SYSTOLIC BLOOD PRESSURE: 122 MMHG | RESPIRATION RATE: 18 BRPM | HEART RATE: 72 BPM | BODY MASS INDEX: 16.53 KG/M2 | WEIGHT: 115.2 LBS | TEMPERATURE: 97.6 F | DIASTOLIC BLOOD PRESSURE: 78 MMHG | OXYGEN SATURATION: 96 %

## 2022-11-09 DIAGNOSIS — R26.2 AMBULATORY DYSFUNCTION: Primary | Chronic | ICD-10-CM

## 2022-11-09 DIAGNOSIS — I10 PRIMARY HYPERTENSION: Chronic | ICD-10-CM

## 2022-11-09 DIAGNOSIS — Z86.73 HISTORY OF CVA (CEREBROVASCULAR ACCIDENT): Chronic | ICD-10-CM

## 2022-11-09 DIAGNOSIS — K94.23 GASTROSTOMY TUBE DYSFUNCTION (HCC): ICD-10-CM

## 2022-11-09 NOTE — PROGRESS NOTES
[facility]  [address]  [phone]  Dina Boor  Code 31 (STR)          NAME: Tito Cedeno III  AGE: 63 y o  SEX: male CODE STATUS: CPR    DATE OF ENCOUNTER: 11/9/2022    Assessment and Plan     1  Ambulatory dysfunction  Assessment & Plan:  · History of CVA residual left sided weakness  · Continue PT/OT for strength training  · Maintain fall/safety precautions  · assist with ADL's as needed  · SW following for d/c planning      2  Gastrostomy tube dysfunction [facility])  Assessment & Plan:  · Patient recently hospitalized for bleeding from G-Tube, melena  · Placement confirmed on imaging during hospitalization  · Dietician following for weight loss and tube feed recommendations  · Will start Jevity 1 5, and monitor for tolerance  · Continue regular pureed diet, nectar thick liquids, magic cups      3  History of CVA (cerebrovascular accident)  Assessment & Plan:  · Patient had recent CVA  · Residual left hemiplegia and dysphagia  · Continue to work with PT/OT for strength training  · Fall/safety precautions  · Continue plavix 75 mg daily  · Continue ASA 81 mg daily  · Continue Lipitor 40 mg daily      4  Primary hypertension  Assessment & Plan:  · History of HTN  · BP have been stable 122/78  · Continue metoprolol 25 mg q 12  · Monitor BP's         All medications and routine orders were reviewed and updated as needed  Chief Complaint     STR follow up visit  Patient's care was coordinated with nursing facility staff  Recent vitals, labs, and updated medications were review on [facility] in facility        Past Medical and Surgica History      Past Medical History:   Diagnosis Date   • Hypertension    • Renal disorder    • Stroke [facility])      Past Surgical History:   Procedure Laterality Date   • GASTROSTOMY TUBE PLACEMENT N/A 10/18/2022    Procedure: INSERTION GASTROSTOMY TUBE OPEN;  Surgeon: Willi Yanez DO;  [facility];  Service: General No Known Allergies       History of Present Illness     HPI  Derek Mason is a 63year old male, he is a STR patient of [facility] since 11/4/22  Past Medical Hx including but not limited to HTN, CVA, hyponatremia, Anemia, malnutrition  He was seen in collaboration with nursing for medical mgmt and STR follow up  Hospital Course  Patient was admitted to the hospital on 10/31/22 for c/o bloody drainage through G-tube and melena  He was found to be hypotensive requiring pressors, he was transfused 2 units PRBC's  Upper endoscopy showed an ulcer with active bleeding  Epinephrine injections were given  Hemoglobin then dropped down to 6 6  Patient was transfused an additional unit of PRBC  Patient was found to be became hyponatremic, received IV fluids, and has since resolved  Seen and examined at bedside today  Patient is a reliable historian and is AAOx4  Patient is sitting in his wheelchair at the side of his bed  He denies any pain and does not appear to be in any distress  Dietician is following for weight loss and tube feed recommendations  Will start Jevity 1 5 and monitor for tolerance  Denies CP/SOB/N/V/D  Denies lightheadedness, dizziness, headaches, vision changes  Patient states they are eating well and staying hydrated  Denies any bowel or bladder issues  Per review of SNF records, Patient is eating 3 meals per day, consuming  %  Last documented BM 11/8/22  No concerns from nursing at this time  The patient's allergies, past medical, surgical, social and family history were reviewed and unchanged  Review of Systems     Review of Systems   Constitutional: Positive for unexpected weight change  Negative for activity change and fever  HENT: Negative  Negative for congestion  Eyes: Negative  Respiratory: Negative  Negative for cough, shortness of breath and wheezing  Cardiovascular: Negative  Negative for chest pain and leg swelling  Gastrointestinal: Negative  Negative for abdominal distention, abdominal pain, constipation, diarrhea, nausea and vomiting  G-Tube   Endocrine: Negative  Genitourinary: Negative  Negative for difficulty urinating  Musculoskeletal: Positive for gait problem  Skin: Negative  Allergic/Immunologic: Negative  Neurological: Positive for facial asymmetry and weakness  Negative for dizziness, light-headedness and headaches  History of CVA, left sided weakness   Hematological: Negative  Psychiatric/Behavioral: Negative  Negative for dysphoric mood and sleep disturbance  Objective     Vitals:   Vitals:    11/09/22 1342   BP: 122/78   Pulse: 72   Resp: 18   Temp: 97 6 °F (36 4 °C)   SpO2: 96%         Physical Exam  Vitals and nursing note reviewed  Constitutional:       General: He is not in acute distress  Appearance: Normal appearance  He is not ill-appearing  HENT:      Head: Normocephalic and atraumatic  Nose: No congestion  Mouth/Throat:      Mouth: Mucous membranes are moist    Eyes:      Conjunctiva/sclera: Conjunctivae normal    Cardiovascular:      Rate and Rhythm: Normal rate and regular rhythm  Pulses: Normal pulses  Heart sounds: Normal heart sounds  Pulmonary:      Effort: Pulmonary effort is normal  No respiratory distress  Breath sounds: Normal breath sounds  No wheezing  Abdominal:      General: Bowel sounds are normal  There is no distension  Palpations: Abdomen is soft  Tenderness: There is no abdominal tenderness  Comments: G-Tube   Musculoskeletal:      Right lower leg: No edema  Left lower leg: No edema  Comments: Left sided weakness from prior CVA   Skin:     General: Skin is warm  Capillary Refill: Capillary refill takes more than 3 seconds  Neurological:      Mental Status: He is alert and oriented to person, place, and time     Psychiatric:         Mood and Affect: Mood normal          Behavior: Behavior normal  Pertinent Laboratory/Diagnostic Studies:   Reviewed in facility chart-stable      Current Medications   Medications reviewed and updated see facility [facility] for details  Current Outpatient Medications:   •  albuterol (2 5 mg/3 mL) 0 083 % nebulizer solution, Take 3 mL (2 5 mg total) by nebulization every 6 (six) hours as needed for wheezing or shortness of breath, Disp: 60 mL, Rfl: 0  •  amLODIPine (NORVASC) 5 mg tablet, 1 tablet (5 mg total) by Per G Tube route daily, Disp: 90 tablet, Rfl: 0  •  aspirin (ECOTRIN LOW STRENGTH) 81 mg EC tablet, Take 1 tablet (81 mg total) by mouth daily (Patient not taking: Reported on 10/31/2022), Disp: 90 tablet, Rfl: 0  •  atorvastatin (LIPITOR) 40 mg tablet, Take 1 tablet (40 mg total) by mouth daily with dinner (Patient not taking: Reported on 10/31/2022), Disp: 90 tablet, Rfl: 0  •  clopidogrel (PLAVIX) 75 mg tablet, Take 1 tablet (75 mg total) by mouth daily Do not start before October 22, 2022   (Patient not taking: Reported on 10/31/2022), Disp: 20 tablet, Rfl: 0  •  ferrous sulfate 324 (65 Fe) mg, Take 1 tablet (324 mg total) by mouth 2 (two) times a day before meals, Disp: 60 tablet, Rfl: 0  •  folic acid (FOLVITE) 1 mg tablet, Take 1 mg by mouth daily Crush pill mixed Will water administered through PEG tube (Patient not taking: Reported on 10/31/2022), Disp: , Rfl:   •  LORazepam (ATIVAN) 0 5 mg tablet, Take 0 5 mg by mouth every 6 (six) hours as needed RestlessNess anxiety (Patient not taking: Reported on 10/31/2022), Disp: , Rfl:   •  metoprolol tartrate (LOPRESSOR) 25 mg tablet, 1 tablet (25 mg total) by Per G Tube route every 12 (twelve) hours (Patient not taking: Reported on 10/31/2022), Disp: 180 tablet, Rfl: 0  •  Multiple Vitamins-Minerals (Multivitamin) LIQD, Take 15 mL by mouth in the morning (Patient not taking: Reported on 10/31/2022), Disp: , Rfl:   •  polyethylene glycol (MIRALAX) 17 g packet, Take 17 g by mouth 2 (two) times a day (Patient not taking: Reported on 10/31/2022), Disp: , Rfl:   •  thiamine 100 MG tablet, Take 100 mg by mouth daily ETOH abuse (Patient not taking: Reported on 10/31/2022), Disp: , Rfl:      Plan discussed with Dr Nohemi Cantu noted agreement with assessment and plan  Please note:  Voice-recognition software may have been used in the preparation of this document  Occasional wrong word or "sound-alike" substitutions may have occurred due to the inherent limitations of voice recognition software  Interpretation should be guided by veena Pyle  11/9/2022  2:06 PM

## 2022-11-09 NOTE — ASSESSMENT & PLAN NOTE
· Patient had recent CVA  · Residual left hemiplegia and dysphagia  · Continue to work with PT/OT for strength training  · Fall/safety precautions  · Continue plavix 75 mg daily  · Continue ASA 81 mg daily  · Continue Lipitor 40 mg daily

## 2022-11-09 NOTE — ASSESSMENT & PLAN NOTE
· History of CVA residual left sided weakness  · Continue PT/OT for strength training  · Maintain fall/safety precautions  · assist with ADL's as needed  · SW following for d/c planning

## 2022-11-09 NOTE — ASSESSMENT & PLAN NOTE
· Patient recently hospitalized for bleeding from G-Tube, melena  · Placement confirmed on imaging during hospitalization  · Dietician following for weight loss and tube feed recommendations  · Will start Jevity 1 5, and monitor for tolerance  · Continue regular pureed diet, nectar thick liquids, magic cups

## 2022-11-14 ENCOUNTER — NURSING HOME VISIT (OUTPATIENT)
Dept: GERIATRICS | Facility: OTHER | Age: 62
End: 2022-11-14

## 2022-11-14 ENCOUNTER — TELEPHONE (OUTPATIENT)
Dept: NEUROLOGY | Facility: CLINIC | Age: 62
End: 2022-11-14

## 2022-11-14 VITALS
WEIGHT: 115.2 LBS | SYSTOLIC BLOOD PRESSURE: 110 MMHG | OXYGEN SATURATION: 98 % | HEART RATE: 70 BPM | DIASTOLIC BLOOD PRESSURE: 68 MMHG | TEMPERATURE: 98.1 F | BODY MASS INDEX: 16.53 KG/M2 | RESPIRATION RATE: 18 BRPM

## 2022-11-14 DIAGNOSIS — I10 PRIMARY HYPERTENSION: Chronic | ICD-10-CM

## 2022-11-14 DIAGNOSIS — R26.2 AMBULATORY DYSFUNCTION: Chronic | ICD-10-CM

## 2022-11-14 DIAGNOSIS — D64.9 ANEMIA, UNSPECIFIED TYPE: Chronic | ICD-10-CM

## 2022-11-14 DIAGNOSIS — Z86.73 HISTORY OF CVA (CEREBROVASCULAR ACCIDENT): Chronic | ICD-10-CM

## 2022-11-14 DIAGNOSIS — E87.1 HYPONATREMIA: Primary | Chronic | ICD-10-CM

## 2022-11-14 NOTE — TELEPHONE ENCOUNTER
Caleb Estrada was seen at [facility]  Per the most up to date progress note from that encounter:    Sherlean Gowers III will need f/u in about 6 weeks with neurovascular attending/resident/AP  He will not need outpatient testing

## 2022-11-14 NOTE — TELEPHONE ENCOUNTER
Patient admitted in the hospital 10/9/22-10/21/22 for Acute CVA in [facility]  No instructions to schedule HFU  If you can please provide it  Thanks!

## 2022-11-14 NOTE — TELEPHONE ENCOUNTER
Post CVA Discharge Follow Up  Hospitalization: 10/9/22-10/21/22, 10/25/22 ED visit for dislodged gastrostomy tube,  10/30/22-10/31/22 ED visit for tube feeding dysfunction, 10/31/22-11/4/22 melena      According to chart, patient discharged to [facility]  Called facility, transferred to a line with no answer  Left a voice message requesting for a call back  Advised how the patient needs a HFU  Provided the office's phone number

## 2022-11-14 NOTE — ASSESSMENT & PLAN NOTE
· History of hyponatremia  · Na+ today 126  · Asymptomatic at this time  · Ordered sodium tabs 1 gram TID  · Fluid restriction 1200 ml  · Adjust TF flushes to 100 ml q 6 hours  · Recheck [facility] 11/17/22

## 2022-11-14 NOTE — PROGRESS NOTES
[facility]  [address] 81  [phone]  Godoy Randolphtos  Code 31 (STR)          NAME: Meaghan Stephen III  AGE: 63 y o  SEX: male CODE STATUS: CPR    DATE OF ENCOUNTER: 11/14/2022    Assessment and Plan     1  Hyponatremia  Assessment & Plan:  · History of hyponatremia  · Na+ today 126  · Asymptomatic at this time  · Ordered sodium tabs 1 gram TID  · Fluid restriction 1200 ml  · Adjust TF flushes to 100 ml q 6 hours  · Recheck [facility] 11/17/22      2  Ambulatory dysfunction  Assessment & Plan:  · History of CVA residual left sided weakness  · Continue PT/OT for strength training  · Maintain fall/safety precautions  · assist with ADL's as needed  · SW following for d/c planning      3  History of CVA (cerebrovascular accident)  Assessment & Plan:  · Patient had recent CVA  · Residual left hemiplegia and dysphagia  · Continue to work with PT/OT for strength training  · Fall/safety precautions  · Continue plavix 75 mg daily  · Continue ASA 81 mg daily  · Continue Lipitor 40 mg daily      4  Anemia, unspecified type  Assessment & Plan:  · History of anemia  · improved  · H/H 9 8/ 28 5   · No s/s of active bleeding  · Monitor CBC      5  Primary hypertension  Assessment & Plan:  · History of HTN  · BP have been stable 110/68  · Continue metoprolol 25 mg q 12  · Monitor BP's           All medications and routine orders were reviewed and updated as needed  Chief Complaint     STR follow up visit  Patient's care was coordinated with nursing facility staff  Recent vitals, labs, and updated medications were review on [facility] system in facility        Past Medical and Surgica History      Past Medical History:   Diagnosis Date   • Hypertension    • Renal disorder    • Stroke [facility])      Past Surgical History:   Procedure Laterality Date   • GASTROSTOMY TUBE PLACEMENT N/A 10/18/2022    Procedure: INSERTION GASTROSTOMY TUBE OPEN;  Surgeon: Yury Yanez DO;  [facility]: [facility]; Service: General     No Known Allergies       History of Present Illness     HPI  Loyda Butts is a 72-year-old male, he is a STR patient of [facility] since 11/4/22  Past Medical Hx including but not limited to HTN, CVA, hyponatremia, Anemia, malnutrition  He was seen in collaboration with nursing for medical management and STR follow up  Hospital Course  Patient was admitted to the hospital on 10/31/22 for c/o bloody drainage through G-tube and melena  He was found to be hypotensive requiring pressors, he was transfused 2 units PRBC's  Upper endoscopy showed an ulcer with active bleeding  Epinephrine injections were given  Hemoglobin then dropped down to 6 6  Patient was transfused an additional unit of PRBC  Patient was found to be became hyponatremic and received IV fluids  Patients sodium today was 126, will order sodium tabs, fluid restrictions, adjust TF flush, and repeat labs 11/17/22  Seen and examined at bedside today  Patient is a reliable historian and is AAOx4  Patient is lying in bed and he states he is feeling ok  He denies any pain and does not appear to be in any distress  Dietician is following for weight loss and tube feed recommendations  He has been tolerating his tube feedings  Denies CP/SOB/N/V/D  Denies lightheadedness, dizziness, headaches, vision changes  Patient states they are eating well and staying hydrated  Denies any bowel or bladder issues  Per review of SNF records, Patient is eating 3 meals per day, consuming  %  Last documented BM 11/13/22  No concerns from nursing at this time  The patient's allergies, past medical, surgical, social and family history were reviewed and unchanged  Review of Systems     Review of Systems   Constitutional: Negative  Negative for activity change, appetite change and fever  HENT: Negative  Negative for congestion  Eyes: Negative  Respiratory: Positive for cough  Negative for shortness of breath and wheezing  Moist productive cough   Cardiovascular: Negative  Negative for chest pain, palpitations and leg swelling  Gastrointestinal: Negative  Negative for abdominal distention, abdominal pain, constipation, diarrhea and vomiting  Endocrine: Negative  Genitourinary: Negative for difficulty urinating  Musculoskeletal: Negative  Residual left sided weakness, prior CVA   Skin: Negative  Allergic/Immunologic: Negative  Neurological: Negative  Negative for dizziness, weakness, light-headedness, numbness and headaches  Hematological: Negative  Psychiatric/Behavioral: Negative  Negative for confusion, dysphoric mood and sleep disturbance  Objective     Vitals:   Vitals:    11/14/22 1248   BP: 110/68   Pulse: 70   Resp: 18   Temp: 98 1 °F (36 7 °C)   SpO2: 98%         Physical Exam  Vitals and nursing note reviewed  Constitutional:       Appearance: Normal appearance  HENT:      Head: Normocephalic and atraumatic  Nose: Nose normal       Mouth/Throat:      Mouth: Mucous membranes are moist    Eyes:      Conjunctiva/sclera: Conjunctivae normal    Cardiovascular:      Rate and Rhythm: Normal rate and regular rhythm  Pulses: Normal pulses  Heart sounds: Normal heart sounds  Pulmonary:      Effort: Pulmonary effort is normal       Breath sounds: Normal breath sounds  Abdominal:      General: Bowel sounds are normal       Palpations: Abdomen is soft  Musculoskeletal:      Right lower leg: No edema  Left lower leg: No edema  Comments: Left sided weakness   Skin:     Capillary Refill: Capillary refill takes more than 3 seconds  Neurological:      Mental Status: He is alert and oriented to person, place, and time     Psychiatric:         Mood and Affect: Mood normal          Behavior: Behavior normal          Pertinent Laboratory/Diagnostic Studies:   Reviewed in facility chart-stable      Current Medications   Medications reviewed and updated see facility [facility] for details  Current Outpatient Medications:   •  albuterol (2 5 mg/3 mL) 0 083 % nebulizer solution, Take 3 mL (2 5 mg total) by nebulization every 6 (six) hours as needed for wheezing or shortness of breath, Disp: 60 mL, Rfl: 0  •  amLODIPine (NORVASC) 5 mg tablet, 1 tablet (5 mg total) by Per G Tube route daily, Disp: 90 tablet, Rfl: 0  •  aspirin (ECOTRIN LOW STRENGTH) 81 mg EC tablet, Take 1 tablet (81 mg total) by mouth daily (Patient not taking: Reported on 10/31/2022), Disp: 90 tablet, Rfl: 0  •  atorvastatin (LIPITOR) 40 mg tablet, Take 1 tablet (40 mg total) by mouth daily with dinner (Patient not taking: Reported on 10/31/2022), Disp: 90 tablet, Rfl: 0  •  clopidogrel (PLAVIX) 75 mg tablet, Take 1 tablet (75 mg total) by mouth daily Do not start before October 22, 2022   (Patient not taking: Reported on 10/31/2022), Disp: 20 tablet, Rfl: 0  •  ferrous sulfate 324 (65 Fe) mg, Take 1 tablet (324 mg total) by mouth 2 (two) times a day before meals, Disp: 60 tablet, Rfl: 0  •  folic acid (FOLVITE) 1 mg tablet, Take 1 mg by mouth daily Crush pill mixed Will water administered through PEG tube (Patient not taking: Reported on 10/31/2022), Disp: , Rfl:   •  LORazepam (ATIVAN) 0 5 mg tablet, Take 0 5 mg by mouth every 6 (six) hours as needed RestlessNess anxiety (Patient not taking: Reported on 10/31/2022), Disp: , Rfl:   •  metoprolol tartrate (LOPRESSOR) 25 mg tablet, 1 tablet (25 mg total) by Per G Tube route every 12 (twelve) hours (Patient not taking: Reported on 10/31/2022), Disp: 180 tablet, Rfl: 0  •  Multiple Vitamins-Minerals (Multivitamin) LIQD, Take 15 mL by mouth in the morning (Patient not taking: Reported on 10/31/2022), Disp: , Rfl:   •  polyethylene glycol (MIRALAX) 17 g packet, Take 17 g by mouth 2 (two) times a day (Patient not taking: Reported on 10/31/2022), Disp: , Rfl:   •  thiamine 100 MG tablet, Take 100 mg by mouth daily ETOH abuse (Patient not taking: Reported on 10/31/2022), Disp: , Rfl:      Plan discussed with Dr Stefania Huynh noted agreement with assessment and plan  Please note:  Voice-recognition software may have been used in the preparation of this document  Occasional wrong word or "sound-alike" substitutions may have occurred due to the inherent limitations of voice recognition software  Interpretation should be guided by context           [address], CRNP  11/14/2022  12:53 PM

## 2022-11-15 NOTE — TELEPHONE ENCOUNTER
Scheduled patient HFU on 1/26/23 with Wes Weiss at [address] [place] [address] in [facility]  Left a message with Myron Jean-Baptiste from [employer] that I scheduled patient  Placed patient on cancellation list for a sooner appointment

## 2022-11-16 ENCOUNTER — NURSING HOME VISIT (OUTPATIENT)
Dept: GERIATRICS | Facility: OTHER | Age: 62
End: 2022-11-16

## 2022-11-16 VITALS
BODY MASS INDEX: 16.53 KG/M2 | DIASTOLIC BLOOD PRESSURE: 80 MMHG | RESPIRATION RATE: 18 BRPM | HEART RATE: 78 BPM | OXYGEN SATURATION: 97 % | WEIGHT: 115.2 LBS | SYSTOLIC BLOOD PRESSURE: 130 MMHG | TEMPERATURE: 97.6 F

## 2022-11-16 DIAGNOSIS — E87.1 HYPONATREMIA: Primary | Chronic | ICD-10-CM

## 2022-11-16 DIAGNOSIS — I10 PRIMARY HYPERTENSION: Chronic | ICD-10-CM

## 2022-11-16 DIAGNOSIS — D64.9 ANEMIA, UNSPECIFIED TYPE: Chronic | ICD-10-CM

## 2022-11-16 DIAGNOSIS — K94.23 GASTROSTOMY TUBE DYSFUNCTION (HCC): ICD-10-CM

## 2022-11-16 DIAGNOSIS — Z86.73 HISTORY OF CVA (CEREBROVASCULAR ACCIDENT): Chronic | ICD-10-CM

## 2022-11-16 DIAGNOSIS — R26.2 AMBULATORY DYSFUNCTION: Chronic | ICD-10-CM

## 2022-11-16 NOTE — ASSESSMENT & PLAN NOTE
· History of hyponatremia  · Na+11/14/22 126  · Asymptomatic at this time  · Continue sodium tabs 1 gram TID  · Continue Fluid restriction 1200 ml  · TF flushes to 100 ml q 6 hours  · Recheck [facility] 11/17/22

## 2022-11-16 NOTE — ASSESSMENT & PLAN NOTE
· History of CVA residual left sided weakness  · Continue PT/OT for strength training  · Maintain fall/safety precautions  · assist with ADL's as needed  · OOB for meals  · SW following for d/c planning

## 2022-11-16 NOTE — ASSESSMENT & PLAN NOTE
· Patient recently hospitalized for bleeding from G-Tube, melena  · Placement confirmed on imaging during hospitalization  · Dietician following for weight loss and tube feed recommendations  · Continue weekly weights  · Jevity 1 5, patient tolerating well  · Continue regular pureed diet, nectar thick liquids, magic cups

## 2022-11-16 NOTE — PROGRESS NOTES
[facility]  [address]  [phone]  Roxanne Genao  Code 31 (STR)      NAME: Megan Hector III  AGE: 63 y o  SEX: male CODE STATUS: CPR    DATE OF ENCOUNTER: 11/16/2022    Assessment and Plan     1  Hyponatremia  Assessment & Plan:  · History of hyponatremia  · Na+11/14/22 126  · Asymptomatic at this time  · Continue sodium tabs 1 gram TID  · Continue Fluid restriction 1200 ml  · TF flushes to 100 ml q 6 hours  · Recheck BMP 11/17/22      2  Ambulatory dysfunction  Assessment & Plan:  · History of CVA residual left sided weakness  · Continue PT/OT for strength training  · Maintain fall/safety precautions  · assist with ADL's as needed  · OOB for meals  · SW following for d/c planning      3  Gastrostomy tube dysfunction [facility])  Assessment & Plan:  · Patient recently hospitalized for bleeding from G-Tube, melena  · Placement confirmed on imaging during hospitalization  · Dietician following for weight loss and tube feed recommendations  · Continue weekly weights  · Jevity 1 5, patient tolerating well  · Continue regular pureed diet, nectar thick liquids, magic cups      4  History of CVA (cerebrovascular accident)  Assessment & Plan:  · History of CVA  · Residual left hemiplegia and dysphagia  · Continue to work with PT/OT for strength training  · Maintain fall/safety precautions  · Assist with ADL's as needed  · Continue plavix 75 mg daily  · Continue ASA 81 mg daily  · Continue Lipitor 40 mg daily      5  Anemia, unspecified type  Assessment & Plan:  · History of anemia  · stable  · H/H 9 8/ 28 5   · No s/s of active bleeding  · Monitor CBC      6  Primary hypertension  Assessment & Plan:  · History of HTN  · BP have been stable,  130/80  · Continue metoprolol 25 mg q 12  · Monitor BP's         All medications and routine orders were reviewed and updated as needed  Chief Complaint     STR follow up visit  Patient's care was coordinated with nursing facility staff   Recent vitals, labs, and updated medications were review on [facility] system in facility  Past Medical and Surgical History      Past Medical History:   Diagnosis Date   • Hypertension    • Renal disorder    • Stroke [facility])      Past Surgical History:   Procedure Laterality Date   • GASTROSTOMY TUBE PLACEMENT N/A 10/18/2022    Procedure: INSERTION GASTROSTOMY TUBE OPEN;  Surgeon: Juanito Yanez DO;  Location: [facility];  Service: General     No Known Allergies       History of Present Illness     ROSSANA Magallanes is a 70-year-old male, he is a STR patient of [facility] since 11/4/22  Past Medical Hx including but not limited to HTN, CVA, hyponatremia, Anemia, malnutrition  He was seen in collaboration with nursing for medical management and STR follow up  Hospital Course  Patient was admitted to the hospital on 10/31/22 for c/o bloody drainage through G-tube and melena  He was found to be hypotensive requiring pressors, he was transfused 2 units PRBC's  Upper endoscopy showed an ulcer with active bleeding  Epinephrine injections were given  Hemoglobin then dropped down to 6 6  Patient was transfused an additional unit of PRBC  Patient was found to be became hyponatremic and received IV fluids  Patients’ sodium 11/14/22 was 126, sodium tabs TID, 1200 ml fluid restriction, TF flush 100 ml Q6, and repeat labs 11/17/22  Seen and examined at bedside today  Patient is a reliable historian and is AAOx4  Patient is lying in bed and he states he is feeling ok  He denies any pain and does not appear to be in any distress  Dietician is following for weight loss and tube feed recommendations  He has been tolerating his tube feedings  Denies CP/SOB/N/V/D  Denies lightheadedness, dizziness, headaches, vision changes  Patient states they are eating well and staying hydrated  Denies any bowel or bladder issues  Per review of SNF records, Patient is eating 3 meals per day, consuming  %   Last documented BM 11/16/22  No concerns from nursing at this time  The patient's allergies, past medical, surgical, social and family history were reviewed and unchanged  Review of Systems     Review of Systems   Constitutional: Positive for activity change  Negative for appetite change, chills and fever  History of CVA, left sided weakness   HENT: Negative  Negative for congestion  Eyes: Negative  Respiratory: Positive for cough  Negative for shortness of breath and wheezing  Cardiovascular: Negative  Negative for chest pain, palpitations and leg swelling  Gastrointestinal: Negative  Negative for abdominal distention, abdominal pain, constipation, diarrhea, nausea and vomiting  G-Tube   Endocrine: Negative  Genitourinary: Negative for difficulty urinating  Musculoskeletal: Positive for gait problem  Skin: Negative  Allergic/Immunologic: Negative  Neurological: Positive for weakness  Negative for dizziness, light-headedness and headaches  Hematological: Negative  Psychiatric/Behavioral: Negative  Negative for sleep disturbance  Objective     Vitals:   Vitals:    11/16/22 1153   BP: 130/80   Pulse: 78   Resp: 18   Temp: 97 6 °F (36 4 °C)   SpO2: 97%         Physical Exam  Vitals and nursing note reviewed  Constitutional:       General: He is not in acute distress  Appearance: Normal appearance  He is not ill-appearing  HENT:      Head: Normocephalic and atraumatic  Comments: Left facial droop from old CVA     Nose: Nose normal       Mouth/Throat:      Mouth: Mucous membranes are moist    Eyes:      Conjunctiva/sclera: Conjunctivae normal    Cardiovascular:      Rate and Rhythm: Normal rate and regular rhythm  Pulses: Normal pulses  Heart sounds: Normal heart sounds  Pulmonary:      Effort: No respiratory distress  Breath sounds: No wheezing  Abdominal:      General: There is no distension  Palpations: Abdomen is soft  Tenderness: There is no abdominal tenderness  Comments: G-Tube   Musculoskeletal:      Right lower leg: No edema  Left lower leg: No edema  Skin:     General: Skin is warm  Capillary Refill: Capillary refill takes more than 3 seconds  Neurological:      Mental Status: He is alert and oriented to person, place, and time  Psychiatric:         Mood and Affect: Mood normal          Behavior: Behavior normal          Pertinent Laboratory/Diagnostic Studies:   Reviewed in facility chart-stable      Current Medications   Medications reviewed and updated see facility [facility] for details  Current Outpatient Medications:   •  albuterol (2 5 mg/3 mL) 0 083 % nebulizer solution, Take 3 mL (2 5 mg total) by nebulization every 6 (six) hours as needed for wheezing or shortness of breath, Disp: 60 mL, Rfl: 0  •  amLODIPine (NORVASC) 5 mg tablet, 1 tablet (5 mg total) by Per G Tube route daily, Disp: 90 tablet, Rfl: 0  •  aspirin (ECOTRIN LOW STRENGTH) 81 mg EC tablet, Take 1 tablet (81 mg total) by mouth daily (Patient not taking: Reported on 10/31/2022), Disp: 90 tablet, Rfl: 0  •  atorvastatin (LIPITOR) 40 mg tablet, Take 1 tablet (40 mg total) by mouth daily with dinner (Patient not taking: Reported on 10/31/2022), Disp: 90 tablet, Rfl: 0  •  clopidogrel (PLAVIX) 75 mg tablet, Take 1 tablet (75 mg total) by mouth daily Do not start before October 22, 2022   (Patient not taking: Reported on 10/31/2022), Disp: 20 tablet, Rfl: 0  •  ferrous sulfate 324 (65 Fe) mg, Take 1 tablet (324 mg total) by mouth 2 (two) times a day before meals, Disp: 60 tablet, Rfl: 0  •  folic acid (FOLVITE) 1 mg tablet, Take 1 mg by mouth daily Crush pill mixed Will water administered through PEG tube (Patient not taking: Reported on 10/31/2022), Disp: , Rfl:   •  LORazepam (ATIVAN) 0 5 mg tablet, Take 0 5 mg by mouth every 6 (six) hours as needed RestlessNess anxiety (Patient not taking: Reported on 10/31/2022), Disp: , Rfl:   • metoprolol tartrate (LOPRESSOR) 25 mg tablet, 1 tablet (25 mg total) by Per G Tube route every 12 (twelve) hours (Patient not taking: Reported on 10/31/2022), Disp: 180 tablet, Rfl: 0  •  Multiple Vitamins-Minerals (Multivitamin) LIQD, Take 15 mL by mouth in the morning (Patient not taking: Reported on 10/31/2022), Disp: , Rfl:   •  polyethylene glycol (MIRALAX) 17 g packet, Take 17 g by mouth 2 (two) times a day (Patient not taking: Reported on 10/31/2022), Disp: , Rfl:   •  thiamine 100 MG tablet, Take 100 mg by mouth daily ETOH abuse (Patient not taking: Reported on 10/31/2022), Disp: , Rfl:      Plan discussed with Dr Tessa Oleary noted agreement with assessment and plan  Please note:  Voice-recognition software may have been used in the preparation of this document  Occasional wrong word or "sound-alike" substitutions may have occurred due to the inherent limitations of voice recognition software  Interpretation should be guided by context           [address], CRNP  11/16/2022  12:02 PM

## 2022-11-16 NOTE — TELEPHONE ENCOUNTER
Priscila green [facility] called back to schedule the patients hospital follow up  Advised the appointment was scheduled and told Karoline Osorio the appointment details  She verbalized understanding and confirmed the appointment works

## 2022-11-16 NOTE — ASSESSMENT & PLAN NOTE
· History of CVA  · Residual left hemiplegia and dysphagia  · Continue to work with PT/OT for strength training  · Maintain fall/safety precautions  · Assist with ADL's as needed  · Continue plavix 75 mg daily  · Continue ASA 81 mg daily  · Continue Lipitor 40 mg daily

## 2022-11-17 ENCOUNTER — NURSING HOME VISIT (OUTPATIENT)
Dept: GERIATRICS | Facility: OTHER | Age: 62
End: 2022-11-17

## 2022-11-17 VITALS
OXYGEN SATURATION: 98 % | DIASTOLIC BLOOD PRESSURE: 79 MMHG | RESPIRATION RATE: 18 BRPM | SYSTOLIC BLOOD PRESSURE: 130 MMHG | TEMPERATURE: 97.3 F | HEART RATE: 72 BPM

## 2022-11-17 DIAGNOSIS — R58 BLEEDING: Primary | ICD-10-CM

## 2022-11-17 DIAGNOSIS — Z86.73 HISTORY OF CVA (CEREBROVASCULAR ACCIDENT): Chronic | ICD-10-CM

## 2022-11-17 DIAGNOSIS — E87.1 HYPONATREMIA: Chronic | ICD-10-CM

## 2022-11-17 DIAGNOSIS — I10 PRIMARY HYPERTENSION: Chronic | ICD-10-CM

## 2022-11-17 DIAGNOSIS — R26.2 AMBULATORY DYSFUNCTION: Chronic | ICD-10-CM

## 2022-11-17 NOTE — PROGRESS NOTES
[facility]  [address]  [phone]  [place]  Code 31 (STR)        NAME: Flaco Rogers III  AGE: 63 y o  SEX: male CODE STATUS: CPR    DATE OF ENCOUNTER: 11/17/2022    Assessment and Plan     1  Bleeding  Assessment & Plan:  · Staff reports patients left hip area was bleeding  · Patient is on Plavix and asa for CVA  · Left hip examined: small amount of bleeding with clotting noted  · Area cleaned, appeared to have small scratch on left hip  · Patient reports he had been scratching the area prior  · Advised staff to apply dressing and monitor for continued bleeding      2  Hyponatremia  Assessment & Plan:  · History of hyponatremia  · Na+11/17/22 125  · Asymptomatic at this time  · increase sodium tabs to 2 grams TID  · Continue Fluid restriction 1200 ml  · TF flushes to 100 ml q 6 hours  · Recheck BMP 11/23/22      3  Primary hypertension  Assessment & Plan:  · History of HTN  · BP have been stable,  130/79  · Continue metoprolol 25 mg q 12  · Monitor BP's      4  History of CVA (cerebrovascular accident)  Assessment & Plan:  · History of CVA  · Residual left hemiplegia and dysphagia  · Continue to work with PT/OT for strength training  · Maintain fall/safety precautions  · Assist with ADL's as needed  · Continue plavix 75 mg daily  · Continue ASA 81 mg daily  · Continue Lipitor 40 mg daily      5  Ambulatory dysfunction  Assessment & Plan:  · History of CVA residual left sided weakness  · Continue PT/OT for strength training  · Maintain fall/safety precautions  · assist with ADL's as needed  · OOB for meals  ·  following for d/c planning         All medications and routine orders were reviewed and updated as needed  Chief Complaint     STR follow up visit  Patient's care was coordinated with nursing facility staff  Recent vitals, labs, and updated medications were review on [facility] in facility        Past Medical and Surgical History      Past Medical History:   Diagnosis Date   • Hypertension    • Renal disorder    • Stroke [facility])      Past Surgical History:   Procedure Laterality Date   • GASTROSTOMY TUBE PLACEMENT N/A 10/18/2022    Procedure: INSERTION GASTROSTOMY TUBE OPEN;  Surgeon: Esperanza Yanez DO;  [facility]: [facility];  Service: General     No Known Allergies       History of Present Illness     ROSSANA Cornelius Case is a 53-year-old male, he is a STR patient of [facility] since 11/4/22  Past Medical Hx including but not limited to HTN, CVA, hyponatremia, Anemia, malnutrition  He was seen in collaboration with nursing for medical management and STR follow up, acute visit for staff reporting left hip was bleeding  Hospital Course  Patient was admitted to the hospital on 10/31/22 for c/o bloody drainage through G-tube and melena  He was found to be hypotensive requiring pressors, he was transfused 2 units PRBC's  Upper endoscopy showed an ulcer with active bleeding  Epinephrine injections were given  Hemoglobin then dropped down to 6 6  Patient was transfused an additional unit of PRBC  Patient was found to be became hyponatremic and received IV fluids  Patients’ sodium 11/17/22 was 125, sodium tabs increased to 2 gmTID, continue 1200 ml fluid restriction, TF flush 100 ml Q6, and repeat labs 11/23/22  Seen and examined at bedside today for reports that patient’s left hip was bleeding  Patient is a reliable historian and is AAOx4  Patient is sitting in his wheelchair  Left hip examined, he does have small amount of bleeding and a clot  After cleaning the area, it was noted that he has a small scratch that had been seeping  Patient is on Plavix and ASA for CVA  Patient reports that he had scratched the area  Informed staff and recommended to apply dressing and monitor  He denies any pain and does not appear to be in any distress  Denies CP/SOB/N/V/D  Denies lightheadedness, dizziness, headaches, vision changes   Patient states they are eating well and staying hydrated  Denies any bowel or bladder issues  Per review of SNF records, Patient is eating 3 meals per day, consuming  %  Last documented BM 11/16/22  No concerns from nursing at this time  The patient's allergies, past medical, surgical, social and family history were reviewed and unchanged  Review of Systems     Review of Systems   Constitutional: Positive for activity change and appetite change  TF Jevity 1 5/regular diet   HENT: Negative  Negative for congestion  Eyes: Negative  Respiratory: Negative  Negative for cough, shortness of breath and wheezing  Cardiovascular: Negative  Negative for chest pain, palpitations and leg swelling  Gastrointestinal: Negative for abdominal distention, abdominal pain, constipation, diarrhea, nausea and vomiting  G-Tube   Endocrine: Negative  Genitourinary: Negative  Negative for difficulty urinating  Musculoskeletal: Positive for gait problem  Left hemiparesis from prior CVA   Skin: Positive for wound  Patient scratched left hip open and was bleeding, appeared to have stopped   Allergic/Immunologic: Negative  Neurological: Positive for weakness  Negative for dizziness, light-headedness and headaches  Hematological: Negative  Psychiatric/Behavioral: Negative  Negative for behavioral problems, confusion and sleep disturbance  Objective     Vitals:   Vitals:    11/17/22 1628   BP: 130/79   Pulse: 72   Resp: 18   Temp: (!) 97 3 °F (36 3 °C)   SpO2: 98%         Physical Exam  Constitutional:       General: He is not in acute distress  Appearance: Normal appearance  He is not ill-appearing  HENT:      Head: Normocephalic and atraumatic  Nose: No congestion  Eyes:      Conjunctiva/sclera: Conjunctivae normal    Cardiovascular:      Rate and Rhythm: Normal rate and regular rhythm  Pulses: Normal pulses  Heart sounds: Normal heart sounds     Pulmonary: Effort: Pulmonary effort is normal       Breath sounds: Normal breath sounds  Abdominal:      General: Bowel sounds are normal  There is no distension  Palpations: Abdomen is soft  Tenderness: There is no abdominal tenderness  Comments: G-Tube   Musculoskeletal:      Right lower leg: No edema  Left lower leg: No edema  Skin:     General: Skin is warm  Capillary Refill: Capillary refill takes more than 3 seconds  Comments: Left hip scratch   Neurological:      Mental Status: He is alert and oriented to person, place, and time  Psychiatric:         Mood and Affect: Mood normal          Behavior: Behavior normal          Pertinent Laboratory/Diagnostic Studies:   Reviewed in facility chart-stable      Current Medications   Medications reviewed and updated see facility [facility] for details  Current Outpatient Medications:   •  albuterol (2 5 mg/3 mL) 0 083 % nebulizer solution, Take 3 mL (2 5 mg total) by nebulization every 6 (six) hours as needed for wheezing or shortness of breath, Disp: 60 mL, Rfl: 0  •  amLODIPine (NORVASC) 5 mg tablet, 1 tablet (5 mg total) by Per G Tube route daily, Disp: 90 tablet, Rfl: 0  •  aspirin (ECOTRIN LOW STRENGTH) 81 mg EC tablet, Take 1 tablet (81 mg total) by mouth daily (Patient not taking: Reported on 10/31/2022), Disp: 90 tablet, Rfl: 0  •  atorvastatin (LIPITOR) 40 mg tablet, Take 1 tablet (40 mg total) by mouth daily with dinner (Patient not taking: Reported on 10/31/2022), Disp: 90 tablet, Rfl: 0  •  clopidogrel (PLAVIX) 75 mg tablet, Take 1 tablet (75 mg total) by mouth daily Do not start before October 22, 2022   (Patient not taking: Reported on 10/31/2022), Disp: 20 tablet, Rfl: 0  •  ferrous sulfate 324 (65 Fe) mg, Take 1 tablet (324 mg total) by mouth 2 (two) times a day before meals, Disp: 60 tablet, Rfl: 0  •  folic acid (FOLVITE) 1 mg tablet, Take 1 mg by mouth daily Crush pill mixed Will water administered through PEG tube (Patient not taking: Reported on 10/31/2022), Disp: , Rfl:   •  LORazepam (ATIVAN) 0 5 mg tablet, Take 0 5 mg by mouth every 6 (six) hours as needed RestlessNess anxiety (Patient not taking: Reported on 10/31/2022), Disp: , Rfl:   •  metoprolol tartrate (LOPRESSOR) 25 mg tablet, 1 tablet (25 mg total) by Per G Tube route every 12 (twelve) hours (Patient not taking: Reported on 10/31/2022), Disp: 180 tablet, Rfl: 0  •  Multiple Vitamins-Minerals (Multivitamin) LIQD, Take 15 mL by mouth in the morning (Patient not taking: Reported on 10/31/2022), Disp: , Rfl:   •  polyethylene glycol (MIRALAX) 17 g packet, Take 17 g by mouth 2 (two) times a day (Patient not taking: Reported on 10/31/2022), Disp: , Rfl:   •  thiamine 100 MG tablet, Take 100 mg by mouth daily ETOH abuse (Patient not taking: Reported on 10/31/2022), Disp: , Rfl:      Plan discussed with Dr Jeaneth Hunter noted agreement with assessment and plan  Please note:  Voice-recognition software may have been used in the preparation of this document  Occasional wrong word or "sound-alike" substitutions may have occurred due to the inherent limitations of voice recognition software  Interpretation should be guided by context           [address] [place] [address]  11/17/2022  4:41 PM

## 2022-11-17 NOTE — TELEPHONE ENCOUNTER
Post CVA Discharge Follow Up  Hospitalization: 10/9/22-10/21/22, 10/25/22 ED visit for dislodged gastrostomy tube,  10/30/22-10/31/22 ED visit for tube feeding dysfunction, 10/31/22-11/4/22 melena      According to chart, patient discharged to [facility]  Spoke with the patient's nurse, Gerhardt Caper  She reports the patient is doing okay overall  Since discharge, patient has not experienced any new or worsening stroke-like symptoms  Ambulation / ADLs:  Patient is currently bedbound  He continues to require assistance with ADLs and transfers  She reports how therapy is working with him  Patient requires his medications to be crushed and nectar thick liquids  He remains incontinent at this time  Medication Review:  I reviewed medications with them  She reports a few days there was some mild bleeding at the G tube site, however this has resolved  Denies any current issues  Requested for last BP  She reports how she does not have access to them at this time  Advised how the target BP is <130/80  Appointments:  Stroke hospital follow up appointment was previously scheduled  There is no estimated discharge date at this time

## 2022-11-17 NOTE — ASSESSMENT & PLAN NOTE
· Staff reports patients left hip area was bleeding  · Patient is on Plavix and asa for CVA  · Left hip examined: small amount of bleeding with clotting noted  · Area cleaned, appeared to have small scratch on left hip  · Patient reports he had been scratching the area prior  · Advised staff to apply dressing and monitor for continued bleeding

## 2022-11-17 NOTE — ASSESSMENT & PLAN NOTE
· History of hyponatremia  · Na+11/17/22 125  · Asymptomatic at this time  · increase sodium tabs to 2 grams TID  · Continue Fluid restriction 1200 ml  · TF flushes to 100 ml q 6 hours  · Recheck [facility] 11/23/22

## 2022-11-21 ENCOUNTER — NURSING HOME VISIT (OUTPATIENT)
Dept: GERIATRICS | Facility: OTHER | Age: 62
End: 2022-11-21

## 2022-11-21 VITALS
RESPIRATION RATE: 18 BRPM | TEMPERATURE: 97.9 F | BODY MASS INDEX: 16.5 KG/M2 | OXYGEN SATURATION: 96 % | HEART RATE: 80 BPM | WEIGHT: 115 LBS | SYSTOLIC BLOOD PRESSURE: 120 MMHG | DIASTOLIC BLOOD PRESSURE: 70 MMHG

## 2022-11-21 DIAGNOSIS — I10 PRIMARY HYPERTENSION: Chronic | ICD-10-CM

## 2022-11-21 DIAGNOSIS — R26.2 AMBULATORY DYSFUNCTION: Chronic | ICD-10-CM

## 2022-11-21 DIAGNOSIS — R58 BLEEDING: ICD-10-CM

## 2022-11-21 DIAGNOSIS — E87.1 HYPONATREMIA: Primary | Chronic | ICD-10-CM

## 2022-11-21 DIAGNOSIS — Z86.73 HISTORY OF CVA (CEREBROVASCULAR ACCIDENT): Chronic | ICD-10-CM

## 2022-11-21 NOTE — PROGRESS NOTES
[facility]  [address]  [phone]  Dina Boor  Code 31 (STR)          NAME: Tito Cedeno III  AGE: 63 y o  SEX: male CODE STATUS: CPR    DATE OF ENCOUNTER: 11/21/2022    Assessment and Plan     1  Hyponatremia  Assessment & Plan:  · History of hyponatremia  · Na+11/17/22 125  · Asymptomatic at this time  · Continue sodium tabs 2 grams TID  · Continue Fluid restriction 1200 ml  · TF flushes to 100 ml q 6 hours  · Recheck BMP 11/23/22      2  Ambulatory dysfunction  Assessment & Plan:  · History of CVA residual left sided weakness  · Continue PT/OT for strength training  · Maintain fall/safety precautions  · assist with ADL's as needed  · OOB for meals  · SW following for d/c planning      3  Primary hypertension  Assessment & Plan:  · History of HTN  · BP have been stable,  120/70  · Continue metoprolol 25 mg q 12  · Monitor BP's      4  History of CVA (cerebrovascular accident)  Assessment & Plan:  · History of CVA  · Residual left hemiplegia and dysphagia, G-Tube  · Continue to work with PT/OT for strength training  · Maintain fall/safety precautions  · Assist with ADL's as needed  · Continue plavix 75 mg daily  · Continue ASA 81 mg daily  · Continue Lipitor 40 mg daily      5  Bleeding  Assessment & Plan:  · Patients left elbow bleeding through dressing  · Made staff aware  · Patients frequently itches his skin open and bleeds  · He is on ASA and plavix for Hx of CVA  · Will order Benadryl 25 mg Q 6 PRN         All medications and routine orders were reviewed and updated as needed  Chief Complaint     STR follow up visit  Patient's care was coordinated with nursing facility staff  Recent vitals, labs, and updated medications were review on [facility] system in facility        Past Medical and Surgical History      Past Medical History:   Diagnosis Date   • Hypertension    • Renal disorder    • Stroke [facility])      Past Surgical History:   Procedure Laterality Date • GASTROSTOMY TUBE PLACEMENT N/A 10/18/2022    Procedure: INSERTION GASTROSTOMY TUBE OPEN;  Surgeon: Willi Yanez DO;  Location[facility];  Service: General     No Known Allergies       History of Present Illness     ROSSANA Lester is a 24-year-old male, he is a STR patient of [address] [place] [address] since 11/4/22  Past Medical Hx including but not limited to HTN, CVA, hyponatremia, Anemia, malnutrition  He was seen in collaboration with nursing for medical management and STR follow up, acute visit for staff reporting left hip was bleeding  Hospital Course  Patient was admitted to the hospital on 10/31/22 for c/o bloody drainage through G-tube and melena  He was found to be hypotensive requiring pressors, he was transfused 2 units PRBC's  Upper endoscopy showed an ulcer with active bleeding  Epinephrine injections were given  Hemoglobin then dropped down to 6 6  Patient was transfused an additional unit of PRBC  Patient was found to be became hyponatremic and received IV fluids  Patients’ sodium 11/17/22 was 125, sodium tabs increased to 2 gmTID, continue 1200 ml fluid restriction, TF flush 100 ml Q6, and repeat labs 11/23/22  Seen and examined at bedside today  Patient is a reliable historian and is AAOx4  Patient is sitting in his wheelchair  Left Elbow bleeding through dressing, alerted staff  Patient is on Plavix and ASA for CVA  Patient reports that he itches, often times opening the skin  Will order Benadryl prn  He denies any pain and does not appear to be in any distress  Denies CP/SOB/N/V/D  Denies lightheadedness, dizziness, headaches, vision changes  Patient states they are eating well and staying hydrated  Denies any bowel or bladder issues  Per review of SNF records, Patient is eating 3 meals per day, consuming  %  Last documented BM 11/20/22  No concerns from nursing at this time      The patient's allergies, past medical, surgical, social and family history were reviewed and unchanged  Review of Systems     Review of Systems   Constitutional: Positive for activity change  Negative for appetite change and fever  HENT: Negative  Negative for congestion  Eyes: Negative  Respiratory: Negative  Negative for cough, shortness of breath and wheezing  Cardiovascular: Negative  Negative for chest pain, palpitations and leg swelling  Gastrointestinal: Negative for abdominal distention, abdominal pain, constipation, diarrhea, nausea and vomiting  G-Tube   Endocrine: Negative  Genitourinary: Negative  Negative for difficulty urinating  Musculoskeletal: Positive for gait problem  Skin: Positive for wound  B/L elbows   Allergic/Immunologic: Negative  Neurological: Positive for weakness  Negative for dizziness, light-headedness and headaches  Hematological: Negative  Psychiatric/Behavioral: Negative  Negative for sleep disturbance  Objective     Vitals:   Vitals:    11/21/22 1031   BP: 120/70   Pulse: 80   Resp: 18   Temp: 97 9 °F (36 6 °C)   SpO2: 96%         Physical Exam  Constitutional:       General: He is not in acute distress  Appearance: Normal appearance  He is not ill-appearing  HENT:      Head: Normocephalic and atraumatic  Mouth/Throat:      Mouth: Mucous membranes are moist    Eyes:      Conjunctiva/sclera: Conjunctivae normal    Cardiovascular:      Rate and Rhythm: Normal rate and regular rhythm  Pulses: Normal pulses  Heart sounds: Normal heart sounds  Pulmonary:      Effort: Pulmonary effort is normal  No respiratory distress  Breath sounds: Normal breath sounds  No wheezing  Abdominal:      General: Bowel sounds are normal  There is no distension  Palpations: Abdomen is soft  Tenderness: There is no abdominal tenderness  Comments: G-Tube   Musculoskeletal:      Right lower leg: No edema  Left lower leg: No edema  Skin:     General: Skin is warm and dry        Capillary Refill: Capillary refill takes more than 3 seconds  Comments: Left elbow bleeding   Neurological:      Mental Status: He is alert and oriented to person, place, and time  Motor: Weakness present  Gait: Gait abnormal       Comments: Prior CVA, residual left sided weakness   Psychiatric:         Mood and Affect: Mood normal          Behavior: Behavior normal          Pertinent Laboratory/Diagnostic Studies:   Reviewed in facility chart-stable      Current Medications   Medications reviewed and updated see facility [facility] for details  Current Outpatient Medications:   •  albuterol (2 5 mg/3 mL) 0 083 % nebulizer solution, Take 3 mL (2 5 mg total) by nebulization every 6 (six) hours as needed for wheezing or shortness of breath, Disp: 60 mL, Rfl: 0  •  amLODIPine (NORVASC) 5 mg tablet, 1 tablet (5 mg total) by Per G Tube route daily, Disp: 90 tablet, Rfl: 0  •  aspirin (ECOTRIN LOW STRENGTH) 81 mg EC tablet, Take 1 tablet (81 mg total) by mouth daily (Patient not taking: Reported on 10/31/2022), Disp: 90 tablet, Rfl: 0  •  atorvastatin (LIPITOR) 40 mg tablet, Take 1 tablet (40 mg total) by mouth daily with dinner (Patient not taking: Reported on 10/31/2022), Disp: 90 tablet, Rfl: 0  •  clopidogrel (PLAVIX) 75 mg tablet, Take 1 tablet (75 mg total) by mouth daily Do not start before October 22, 2022   (Patient not taking: Reported on 10/31/2022), Disp: 20 tablet, Rfl: 0  •  ferrous sulfate 324 (65 Fe) mg, Take 1 tablet (324 mg total) by mouth 2 (two) times a day before meals, Disp: 60 tablet, Rfl: 0  •  folic acid (FOLVITE) 1 mg tablet, Take 1 mg by mouth daily Crush pill mixed Will water administered through PEG tube (Patient not taking: Reported on 10/31/2022), Disp: , Rfl:   •  LORazepam (ATIVAN) 0 5 mg tablet, Take 0 5 mg by mouth every 6 (six) hours as needed RestlessNess anxiety (Patient not taking: Reported on 10/31/2022), Disp: , Rfl:   •  metoprolol tartrate (LOPRESSOR) 25 mg tablet, 1 tablet (25 mg total) by Per G Tube route every 12 (twelve) hours (Patient not taking: Reported on 10/31/2022), Disp: 180 tablet, Rfl: 0  •  Multiple Vitamins-Minerals (Multivitamin) LIQD, Take 15 mL by mouth in the morning (Patient not taking: Reported on 10/31/2022), Disp: , Rfl:   •  polyethylene glycol (MIRALAX) 17 g packet, Take 17 g by mouth 2 (two) times a day (Patient not taking: Reported on 10/31/2022), Disp: , Rfl:   •  thiamine 100 MG tablet, Take 100 mg by mouth daily ETOH abuse (Patient not taking: Reported on 10/31/2022), Disp: , Rfl:      Plan discussed with Dr Jacinto Perry noted agreement with assessment and plan  Please note:  Voice-recognition software may have been used in the preparation of this document  Occasional wrong word or "sound-alike" substitutions may have occurred due to the inherent limitations of voice recognition software  Interpretation should be guided by context           [address] [place] [address]  11/21/2022  12:40 PM

## 2022-11-21 NOTE — ASSESSMENT & PLAN NOTE
· History of hyponatremia  · Na+11/17/22 125  · Asymptomatic at this time  · Continue sodium tabs 2 grams TID  · Continue Fluid restriction 1200 ml  · TF flushes to 100 ml q 6 hours  · Recheck [facility] 11/23/22

## 2022-11-21 NOTE — ASSESSMENT & PLAN NOTE
· Patients left elbow bleeding through dressing  · Made staff aware  · Patients frequently itches his skin open and bleeds  · He is on ASA and plavix for Hx of CVA  · Will order Benadryl 25 mg Q 6 PRN

## 2022-11-21 NOTE — ASSESSMENT & PLAN NOTE
· History of CVA  · Residual left hemiplegia and dysphagia, G-Tube  · Continue to work with PT/OT for strength training  · Maintain fall/safety precautions  · Assist with ADL's as needed  · Continue plavix 75 mg daily  · Continue ASA 81 mg daily  · Continue Lipitor 40 mg daily

## 2022-11-23 ENCOUNTER — NURSING HOME VISIT (OUTPATIENT)
Dept: GERIATRICS | Facility: OTHER | Age: 62
End: 2022-11-23

## 2022-11-23 VITALS
OXYGEN SATURATION: 97 % | TEMPERATURE: 97.4 F | BODY MASS INDEX: 16.5 KG/M2 | WEIGHT: 115 LBS | RESPIRATION RATE: 18 BRPM | HEART RATE: 68 BPM | DIASTOLIC BLOOD PRESSURE: 76 MMHG | SYSTOLIC BLOOD PRESSURE: 128 MMHG

## 2022-11-23 DIAGNOSIS — R26.2 AMBULATORY DYSFUNCTION: Chronic | ICD-10-CM

## 2022-11-23 DIAGNOSIS — Z86.73 HISTORY OF CVA (CEREBROVASCULAR ACCIDENT): Chronic | ICD-10-CM

## 2022-11-23 DIAGNOSIS — I10 PRIMARY HYPERTENSION: Chronic | ICD-10-CM

## 2022-11-23 DIAGNOSIS — E87.1 HYPONATREMIA: Primary | Chronic | ICD-10-CM

## 2022-11-23 NOTE — ASSESSMENT & PLAN NOTE
· History of hyponatremia  · Na+11/17/22 125  · Asymptomatic at this time  · Continue sodium tabs 2 grams TID  · Continue Fluid restriction 1200 ml  · TF flushes to 100 ml q 6 hours  · Recheck [facility] 11/23/22, pending

## 2022-11-23 NOTE — PROGRESS NOTES
[facility]  [address]  [phone]  [email or web address]  Code 31 (STR)        NAME: Yahaira Perez III  AGE: 63 y o  SEX: male CODE STATUS: CPR    DATE OF ENCOUNTER: 11/23/2022    Assessment and Plan     1  Hyponatremia  Assessment & Plan:  · History of hyponatremia  · Na+11/17/22 125  · Asymptomatic at this time  · Continue sodium tabs 2 grams TID  · Continue Fluid restriction 1200 ml  · TF flushes to 100 ml q 6 hours  · Recheck BMP 11/23/22, pending      2  History of CVA (cerebrovascular accident)  Assessment & Plan:  · History of CVA  · Residual left hemiplegia and dysphagia, G-Tube  · Continue to work with PT/OT for strength training  · Maintain fall/safety precautions  · Assist with ADL's as needed  · Continue plavix 75 mg daily  · Continue ASA 81 mg daily  · Continue Lipitor 40 mg daily      3  Ambulatory dysfunction  Assessment & Plan:  · History of CVA residual left sided weakness  · Continue PT/OT for strength training  · Maintain fall/safety precautions  · assist with ADL's as needed  · OOB for meals  · SW following for d/c planning      4  Primary hypertension  Assessment & Plan:  · History of HTN  · BP have been stable,  128/76  · Continue metoprolol 25 mg q 12  · Monitor BP's         All medications and routine orders were reviewed and updated as needed  Chief Complaint     STR follow up visit  Patient's care was coordinated with nursing facility staff  Recent vitals, labs, and updated medications were review on [facility] system in facility        Past Medical and Surgical History      Past Medical History:   Diagnosis Date   • Hypertension    • Renal disorder    • Stroke [facility])      Past Surgical History:   Procedure Laterality Date   • GASTROSTOMY TUBE PLACEMENT N/A 10/18/2022    Procedure: INSERTION GASTROSTOMY TUBE OPEN;  Surgeon: Raza Yanez DO;  [facility]: [facility];  Service: General     No Known Allergies       History of Present Illness HPI  Nataliya Neri is a 72-year-old male, he is a STR patient of [facility] since 11/4/22  Past Medical Hx including but not limited to HTN, CVA, hyponatremia, Anemia, malnutrition  He was seen in collaboration with nursing for medical management and STR follow up  Hospital Course  Patient was admitted to the hospital on 10/31/22 for c/o bloody drainage through G-tube and melena  He was found to be hypotensive requiring pressors, he was transfused 2 units PRBC's  Upper endoscopy showed an ulcer with active bleeding  Epinephrine injections were given  Hemoglobin then dropped down to 6 6  Patient was transfused an additional unit of PRBC  Patient was found to be became hyponatremic and received IV fluids  Rehab course  Seen and examined at bedside today  Patient is a reliable historian and is AAOx4  Patient is lying in bed  Patient has multiple areas on his skin that he has scratched open  Patient is on Plavix and ASA for CVA  He denies any pain and does not appear to be in any distress  He says he is sleeping well and participating in therapy  Denies CP/SOB/N/V/D  Denies lightheadedness, dizziness, headaches, vision changes  Patient states they are eating well and staying hydrated  Denies any bowel or bladder issues  Per review of SNF records, Patient is eating 3 meals per day, consuming  %  Last documented BM 11/20/22  No concerns from nursing at this time  The patient's allergies, past medical, surgical, social and family history were reviewed and unchanged  Review of Systems     Review of Systems   Constitutional: Positive for activity change  Residual left sided weakness from prior CVA   HENT: Negative  Negative for congestion  Eyes: Negative  Respiratory: Negative  Negative for cough, shortness of breath and wheezing  Cardiovascular: Negative  Negative for chest pain, palpitations and leg swelling     Gastrointestinal: Negative for abdominal distention, abdominal pain, constipation, diarrhea, nausea and vomiting  G-Tube   Endocrine: Negative  Genitourinary: Negative for difficulty urinating  Musculoskeletal: Positive for gait problem  Skin: Negative  Allergic/Immunologic: Negative  Neurological: Positive for weakness  Negative for dizziness, light-headedness and headaches  Hematological: Negative  Psychiatric/Behavioral: Negative  Negative for sleep disturbance  Objective     Vitals:   Vitals:    11/23/22 1023   BP: 128/76   Pulse: 68   Resp: 18   Temp: (!) 97 4 °F (36 3 °C)   SpO2: 97%         Physical Exam  Vitals and nursing note reviewed  Constitutional:       General: He is awake  He is not in acute distress  Appearance: He is not ill-appearing  HENT:      Head: Normocephalic and atraumatic  Cardiovascular:      Rate and Rhythm: Normal rate and regular rhythm  Pulses: Normal pulses  Heart sounds: Normal heart sounds  Pulmonary:      Effort: Pulmonary effort is normal  No respiratory distress  Breath sounds: Normal breath sounds  No wheezing  Abdominal:      General: Bowel sounds are normal  There is no distension  Palpations: Abdomen is soft  Tenderness: There is no guarding  Comments: G-Tube   Musculoskeletal:      Right lower leg: No edema  Left lower leg: No edema  Skin:     General: Skin is warm and dry  Capillary Refill: Capillary refill takes 2 to 3 seconds  Neurological:      Mental Status: He is alert and oriented to person, place, and time  Motor: Weakness present  Psychiatric:         Mood and Affect: Mood normal          Behavior: Behavior normal          Pertinent Laboratory/Diagnostic Studies:   Reviewed in facility chart-stable      Current Medications   Medications reviewed and updated see facility [facility] for details        Current Outpatient Medications:   •  albuterol (2 5 mg/3 mL) 0 083 % nebulizer solution, Take 3 mL (2 5 mg total) by nebulization every 6 (six) hours as needed for wheezing or shortness of breath, Disp: 60 mL, Rfl: 0  •  amLODIPine (NORVASC) 5 mg tablet, 1 tablet (5 mg total) by Per G Tube route daily, Disp: 90 tablet, Rfl: 0  •  aspirin (ECOTRIN LOW STRENGTH) 81 mg EC tablet, Take 1 tablet (81 mg total) by mouth daily (Patient not taking: Reported on 10/31/2022), Disp: 90 tablet, Rfl: 0  •  atorvastatin (LIPITOR) 40 mg tablet, Take 1 tablet (40 mg total) by mouth daily with dinner (Patient not taking: Reported on 10/31/2022), Disp: 90 tablet, Rfl: 0  •  clopidogrel (PLAVIX) 75 mg tablet, Take 1 tablet (75 mg total) by mouth daily Do not start before October 22, 2022  (Patient not taking: Reported on 10/31/2022), Disp: 20 tablet, Rfl: 0  •  ferrous sulfate 324 (65 Fe) mg, Take 1 tablet (324 mg total) by mouth 2 (two) times a day before meals, Disp: 60 tablet, Rfl: 0  •  folic acid (FOLVITE) 1 mg tablet, Take 1 mg by mouth daily Crush pill mixed Will water administered through PEG tube (Patient not taking: Reported on 10/31/2022), Disp: , Rfl:   •  LORazepam (ATIVAN) 0 5 mg tablet, Take 0 5 mg by mouth every 6 (six) hours as needed RestlessNess anxiety (Patient not taking: Reported on 10/31/2022), Disp: , Rfl:   •  metoprolol tartrate (LOPRESSOR) 25 mg tablet, 1 tablet (25 mg total) by Per G Tube route every 12 (twelve) hours (Patient not taking: Reported on 10/31/2022), Disp: 180 tablet, Rfl: 0  •  Multiple Vitamins-Minerals (Multivitamin) LIQD, Take 15 mL by mouth in the morning (Patient not taking: Reported on 10/31/2022), Disp: , Rfl:   •  polyethylene glycol (MIRALAX) 17 g packet, Take 17 g by mouth 2 (two) times a day (Patient not taking: Reported on 10/31/2022), Disp: , Rfl:   •  thiamine 100 MG tablet, Take 100 mg by mouth daily ETOH abuse (Patient not taking: Reported on 10/31/2022), Disp: , Rfl:      Plan discussed with Dr Teresa Steele noted agreement with assessment and plan      Please note:  Voice-recognition software may have been used in the preparation of this document  Occasional wrong word or "sound-alike" substitutions may have occurred due to the inherent limitations of voice recognition software  Interpretation should be guided by context           DANIEL Pena  11/23/2022  5:20 PM

## 2022-11-28 ENCOUNTER — NURSING HOME VISIT (OUTPATIENT)
Dept: GERIATRICS | Facility: OTHER | Age: 62
End: 2022-11-28

## 2022-11-28 VITALS
OXYGEN SATURATION: 98 % | SYSTOLIC BLOOD PRESSURE: 114 MMHG | TEMPERATURE: 97.7 F | RESPIRATION RATE: 16 BRPM | WEIGHT: 118 LBS | DIASTOLIC BLOOD PRESSURE: 68 MMHG | HEART RATE: 65 BPM | BODY MASS INDEX: 16.93 KG/M2

## 2022-11-28 DIAGNOSIS — I10 PRIMARY HYPERTENSION: Primary | Chronic | ICD-10-CM

## 2022-11-28 DIAGNOSIS — Z86.73 HISTORY OF CVA (CEREBROVASCULAR ACCIDENT): Chronic | ICD-10-CM

## 2022-11-28 DIAGNOSIS — R26.2 AMBULATORY DYSFUNCTION: Chronic | ICD-10-CM

## 2022-11-28 DIAGNOSIS — E87.1 HYPONATREMIA: Chronic | ICD-10-CM

## 2022-11-28 NOTE — ASSESSMENT & PLAN NOTE
· History of hyponatremia  · Na+11/28/22 125  · Asymptomatic at this time  · increase sodium tabs to 3 grams TID  · Continue Fluid restriction 1200 ml  · TF flushes to 100 ml q 6 hours  · Recheck [facility] 12/2/22

## 2022-11-28 NOTE — PROGRESS NOTES
[facility]  [address]  [phone]  [facility]  Code 31 (STR)        NAME: Adi Morris III  AGE: 63 y o  SEX: male CODE STATUS: CPR    DATE OF ENCOUNTER: 11/28/2022    Assessment and Plan     1  Primary hypertension  Assessment & Plan:  · History of HTN  · BP have been stable,  114/68  · Continue metoprolol 25 mg q 12  · Avoid hypotension  · Monitor BP's      2  Ambulatory dysfunction  Assessment & Plan:  · History of CVA residual left sided weakness  · Continue PT/OT for strength training  · Maintain fall/safety precautions  · assist with ADL's as needed  · OOB for meals  · SW following for d/c planning      3  Hyponatremia  Assessment & Plan:  · History of hyponatremia  · Na+11/28/22 125  · Asymptomatic at this time  · increase sodium tabs to 3 grams TID  · Continue Fluid restriction 1200 ml  · TF flushes to 100 ml q 6 hours  · Recheck [facility] 12/2/22      4  History of CVA (cerebrovascular accident)  Assessment & Plan:  · History of CVA  · Residual left hemiplegia and dysphagia, G-Tube  · Continue  PT/OT for strength training  · Maintain fall/safety precautions  · Assist with ADL's as needed  · Continue plavix 75 mg daily  · Continue ASA 81 mg daily  · Continue Lipitor 40 mg daily           All medications and routine orders were reviewed and updated as needed  Chief Complaint     STR follow up visit  Patient's care was coordinated with nursing facility staff  Recent vitals, labs, and updated medications were review on [facility] system in facility        Past Medical and Surgical History      Past Medical History:   Diagnosis Date   • Hypertension    • Renal disorder    • Stroke [facility])      Past Surgical History:   Procedure Laterality Date   • GASTROSTOMY TUBE PLACEMENT N/A 10/18/2022    Procedure: INSERTION GASTROSTOMY TUBE OPEN;  Surgeon: Cam Yanez DO;  Location[facility];  Service: General     No Known Allergies       History of Present Illness ROSSANA Alamo is a 61-year-old male, he is a STR patient of [facility] since 11/4/22  Past Medical Hx including but not limited to HTN, CVA, hyponatremia, Anemia, malnutrition  He was seen in collaboration with nursing for medical management and STR follow up  Hospital Course  Patient was admitted to the hospital on 10/31/22 for c/o bloody drainage through G-tube and melena  He was found to be hypotensive requiring pressors, he was transfused 2 units PRBC's  Upper endoscopy showed an ulcer with active bleeding  Epinephrine injections were given  Hemoglobin then dropped down to 6 6  Patient was transfused an additional unit of PRBC  Patient was found to be became hyponatremic and received IV fluids  Rehab course  Seen and examined at bedside today  Patient is a reliable historian and is AAOx4  Patient is lying in bed  He does not appear to be in any distress and denies any pain  He states he has been working with physical therapy  Multiple scratches on arms noted with some bleeding  Patient denies itchiness  No bleeding around G-tube  Patient’s sodium level chronically low, he is currently asymptomatic, today NA+ was 125, sample slightly hemolyzed  Will increase to 3 grams TID and repeat BMP 12/2/22  Denies CP/SOB/N/V/D  Denies lightheadedness, dizziness, headaches, vision changes  Patient states they are eating well and staying hydrated  Denies any bowel or bladder issues  Per review of SNF records, Patient is eating 3 meals per day, consuming  %  Last documented BM 11/27/22  No concerns from nursing at this time  The patient's allergies, past medical, surgical, social and family history were reviewed and unchanged  Review of Systems     Review of Systems   Constitutional: Positive for activity change  Residual left sided weakness for prior CVA   HENT: Negative  Negative for congestion  Eyes: Negative  Respiratory: Negative    Negative for cough, shortness of breath and wheezing  Cardiovascular: Negative  Gastrointestinal: Negative for abdominal distention, abdominal pain, constipation, diarrhea, nausea and vomiting  Endocrine: Negative  Genitourinary: Negative  Negative for difficulty urinating  Musculoskeletal: Positive for gait problem  Skin: Positive for wound  Multiple sites from scratching   Allergic/Immunologic: Negative  Neurological: Positive for weakness  Negative for dizziness, light-headedness and headaches  Residual left sided weakness from prior CVA   Hematological: Negative  Psychiatric/Behavioral: Negative  Negative for sleep disturbance  Objective     Vitals:   Vitals:    11/28/22 1249   BP: 114/68   Pulse: 65   Resp: 16   Temp: 97 7 °F (36 5 °C)   SpO2: 98%         Physical Exam  Constitutional:       General: He is not in acute distress  Appearance: Normal appearance  He is not ill-appearing  HENT:      Head: Normocephalic and atraumatic  Mouth/Throat:      Mouth: Mucous membranes are moist    Eyes:      Conjunctiva/sclera: Conjunctivae normal    Cardiovascular:      Rate and Rhythm: Normal rate and regular rhythm  Pulses: Normal pulses  Heart sounds: Normal heart sounds  Pulmonary:      Effort: Pulmonary effort is normal  No respiratory distress  Breath sounds: Normal breath sounds  No wheezing  Abdominal:      General: Bowel sounds are normal  There is no distension  Palpations: Abdomen is soft  Tenderness: There is no abdominal tenderness  Musculoskeletal:      Right lower leg: No edema  Left lower leg: No edema  Skin:     General: Skin is warm  Capillary Refill: Capillary refill takes more than 3 seconds  Comments: Multiple areas where patient scratched open skin   Neurological:      Mental Status: He is alert and oriented to person, place, and time  Motor: Weakness present        Gait: Gait abnormal    Psychiatric:         Mood and Affect: Mood normal  Behavior: Behavior normal          Pertinent Laboratory/Diagnostic Studies:   Reviewed in facility chart-stable      Current Medications   Medications reviewed and updated see facility [facility] for details  Current Outpatient Medications:   •  albuterol (2 5 mg/3 mL) 0 083 % nebulizer solution, Take 3 mL (2 5 mg total) by nebulization every 6 (six) hours as needed for wheezing or shortness of breath, Disp: 60 mL, Rfl: 0  •  amLODIPine (NORVASC) 5 mg tablet, 1 tablet (5 mg total) by Per G Tube route daily, Disp: 90 tablet, Rfl: 0  •  aspirin (ECOTRIN LOW STRENGTH) 81 mg EC tablet, Take 1 tablet (81 mg total) by mouth daily (Patient not taking: Reported on 10/31/2022), Disp: 90 tablet, Rfl: 0  •  atorvastatin (LIPITOR) 40 mg tablet, Take 1 tablet (40 mg total) by mouth daily with dinner (Patient not taking: Reported on 10/31/2022), Disp: 90 tablet, Rfl: 0  •  clopidogrel (PLAVIX) 75 mg tablet, Take 1 tablet (75 mg total) by mouth daily Do not start before October 22, 2022   (Patient not taking: Reported on 10/31/2022), Disp: 20 tablet, Rfl: 0  •  ferrous sulfate 324 (65 Fe) mg, Take 1 tablet (324 mg total) by mouth 2 (two) times a day before meals, Disp: 60 tablet, Rfl: 0  •  folic acid (FOLVITE) 1 mg tablet, Take 1 mg by mouth daily Crush pill mixed Will water administered through PEG tube (Patient not taking: Reported on 10/31/2022), Disp: , Rfl:   •  LORazepam (ATIVAN) 0 5 mg tablet, Take 0 5 mg by mouth every 6 (six) hours as needed RestlessNess anxiety (Patient not taking: Reported on 10/31/2022), Disp: , Rfl:   •  metoprolol tartrate (LOPRESSOR) 25 mg tablet, 1 tablet (25 mg total) by Per G Tube route every 12 (twelve) hours (Patient not taking: Reported on 10/31/2022), Disp: 180 tablet, Rfl: 0  •  Multiple Vitamins-Minerals (Multivitamin) LIQD, Take 15 mL by mouth in the morning (Patient not taking: Reported on 10/31/2022), Disp: , Rfl:   •  polyethylene glycol (MIRALAX) 17 g packet, Take 17 g by mouth 2 (two) times a day (Patient not taking: Reported on 10/31/2022), Disp: , Rfl:   •  thiamine 100 MG tablet, Take 100 mg by mouth daily ETOH abuse (Patient not taking: Reported on 10/31/2022), Disp: , Rfl:      Plan discussed with Dr Micheal Talavera noted agreement with assessment and plan  Please note:  Voice-recognition software may have been used in the preparation of this document  Occasional wrong word or "sound-alike" substitutions may have occurred due to the inherent limitations of voice recognition software  Interpretation should be guided by context           DANIEL Garcai  11/28/2022  1:06 PM

## 2022-11-28 NOTE — ASSESSMENT & PLAN NOTE
· History of CVA  · Residual left hemiplegia and dysphagia, G-Tube  · Continue  PT/OT for strength training  · Maintain fall/safety precautions  · Assist with ADL's as needed  · Continue plavix 75 mg daily  · Continue ASA 81 mg daily  · Continue Lipitor 40 mg daily

## 2022-11-28 NOTE — ASSESSMENT & PLAN NOTE
· History of HTN  · BP have been stable,  114/68  · Continue metoprolol 25 mg q 12  · Avoid hypotension  · Monitor BP's

## 2022-11-30 ENCOUNTER — NURSING HOME VISIT (OUTPATIENT)
Dept: GERIATRICS | Facility: OTHER | Age: 62
End: 2022-11-30

## 2022-11-30 VITALS
SYSTOLIC BLOOD PRESSURE: 112 MMHG | DIASTOLIC BLOOD PRESSURE: 64 MMHG | OXYGEN SATURATION: 97 % | RESPIRATION RATE: 16 BRPM | HEART RATE: 70 BPM | TEMPERATURE: 97.7 F

## 2022-11-30 DIAGNOSIS — I10 PRIMARY HYPERTENSION: Primary | Chronic | ICD-10-CM

## 2022-11-30 DIAGNOSIS — R26.2 AMBULATORY DYSFUNCTION: Chronic | ICD-10-CM

## 2022-11-30 DIAGNOSIS — E87.1 HYPONATREMIA: Chronic | ICD-10-CM

## 2022-11-30 DIAGNOSIS — Z86.73 HISTORY OF CVA (CEREBROVASCULAR ACCIDENT): Chronic | ICD-10-CM

## 2022-11-30 NOTE — PROGRESS NOTES
[address] [facility]  [address]  [phone]  [place]  Code 31 (STR)        NAME: Kenny Kate III  AGE: 63 y o  SEX: male CODE STATUS: CPR    DATE OF ENCOUNTER: 11/30/2022    Assessment and Plan     1  Primary hypertension  Assessment & Plan:  · History of HTN  · BP have been stable,  112/64  · Continue metoprolol 25 mg q 12  · Avoid hypotension  · Monitor BP's      2  Ambulatory dysfunction  Assessment & Plan:  · History of CVA residual left sided weakness  · Continue PT/OT for strength training  · Maintain fall/safety precautions  · assist with ADL's as needed  · OOB for meals  · SW following for d/c planning      3  Hyponatremia  Assessment & Plan:  · History of hyponatremia  · Na+11/28/22 125  · Asymptomatic at this time  · continue sodium tabs to 3 grams TID  · Continue Fluid restriction 1200 ml  · TF flushes to 50 ml q 4 hour  · Recheck [facility] 12/2/22      4  History of CVA (cerebrovascular accident)  Assessment & Plan:  · History of CVA  · Residual left hemiplegia and dysphagia, G-Tube  · PT/OT for strength training  · Maintain fall/safety precautions  · Assist with ADL's as needed  · Continue plavix 75 mg daily  · Continue ASA 81 mg daily  · Continue Lipitor 40 mg daily         All medications and routine orders were reviewed and updated as needed  Chief Complaint     STR follow up visit  Patient's care was coordinated with nursing facility staff  Recent vitals, labs, and updated medications were review on [facility] system in facility        Past Medical and Surgical History      Past Medical History:   Diagnosis Date   • Hypertension    • Renal disorder    • Stroke [facility])      Past Surgical History:   Procedure Laterality Date   • GASTROSTOMY TUBE PLACEMENT N/A 10/18/2022    Procedure: INSERTION GASTROSTOMY TUBE OPEN;  Surgeon: Galileo Yanez DO;  Location: [facility];  Service: General     No Known Allergies       History of Present Illness     ROSSANA Rajput Means Nick Montana is a 62-year-old male, he is a STR patient of [facility] since 11/4/22  Past Medical Hx including but not limited to HTN, CVA, hyponatremia, Anemia, malnutrition  He was seen in collaboration with nursing for medical management and STR follow up  Hospital Course  Patient was admitted to the hospital on 10/31/22 for c/o bloody drainage through G-tube and melena  He was found to be hypotensive requiring pressors, he was transfused 2 units PRBC's  Upper endoscopy showed an ulcer with active bleeding  Epinephrine injections were given  Hemoglobin then dropped down to 6 6  Patient was transfused an additional unit of PRBC  Patient was found to be became hyponatremic and received IV fluids  Rehab course  Seen and examined at bedside today  Patient is a reliable historian and is AAOx4  Patient is lying in bed  He does not appear to be in any distress and denies any pain  He says he is feeling okay and offer no complaints  Patient’s sodium level chronically low, he is currently asymptomatic, 11/28/22 NA+ was 125, sample slightly hemolyzed  Currently receiving sodium chloride 3 grams TID and repeat BMP 12/2/22  Denies CP/SOB/N/V/D  Denies lightheadedness, dizziness, headaches, vision changes  Patient states they are eating well and staying hydrated  Denies any bowel or bladder issues  Per review of SNF records, Patient is eating 3 meals per day, consuming  %  Last documented BM 11/30/22  No concerns from nursing at this time  The patient's allergies, past medical, surgical, social and family history were reviewed and unchanged  Review of Systems     Review of Systems   Constitutional: Positive for activity change  Negative for fever  Residual left sided weakness from prior CVA   HENT: Negative  Negative for congestion  Eyes: Negative  Respiratory: Negative  Negative for cough, shortness of breath and wheezing  Cardiovascular: Negative    Negative for chest pain, palpitations and leg swelling  Gastrointestinal: Negative  Negative for abdominal distention, abdominal pain, constipation, diarrhea, nausea and vomiting  Endocrine: Negative  Genitourinary: Negative  Negative for difficulty urinating and enuresis  Musculoskeletal: Positive for gait problem  Skin: Positive for wound  Multiple areas from patient scratching skin open   Allergic/Immunologic: Negative  Neurological: Positive for weakness  Negative for dizziness, light-headedness and headaches  Psychiatric/Behavioral: Negative  Negative for sleep disturbance  Objective     Vitals: There were no vitals filed for this visit  Physical Exam  Vitals and nursing note reviewed  Constitutional:       General: He is not in acute distress  Appearance: Normal appearance  He is not ill-appearing  HENT:      Head: Normocephalic and atraumatic  Nose: Nose normal       Mouth/Throat:      Mouth: Mucous membranes are moist    Eyes:      Conjunctiva/sclera: Conjunctivae normal    Cardiovascular:      Rate and Rhythm: Normal rate and regular rhythm  Pulses: Normal pulses  Heart sounds: Normal heart sounds  Pulmonary:      Effort: Pulmonary effort is normal  No respiratory distress  Breath sounds: Normal breath sounds  No wheezing  Abdominal:      General: Bowel sounds are normal  There is no distension  Palpations: Abdomen is soft  Tenderness: There is no abdominal tenderness  Musculoskeletal:      Right lower leg: No edema  Left lower leg: No edema  Skin:     General: Skin is warm and dry  Capillary Refill: Capillary refill takes more than 3 seconds  Comments: Multiple scabbed areas   Neurological:      Mental Status: He is alert and oriented to person, place, and time  Cranial Nerves: No cranial nerve deficit  Motor: No weakness     Psychiatric:         Mood and Affect: Mood normal          Behavior: Behavior normal          Pertinent Laboratory/Diagnostic Studies:   Reviewed in facility chart-stable      Current Medications   Medications reviewed and updated see facility [facility] for details  Current Outpatient Medications:   •  albuterol (2 5 mg/3 mL) 0 083 % nebulizer solution, Take 3 mL (2 5 mg total) by nebulization every 6 (six) hours as needed for wheezing or shortness of breath, Disp: 60 mL, Rfl: 0  •  amLODIPine (NORVASC) 5 mg tablet, 1 tablet (5 mg total) by Per G Tube route daily, Disp: 90 tablet, Rfl: 0  •  aspirin (ECOTRIN LOW STRENGTH) 81 mg EC tablet, Take 1 tablet (81 mg total) by mouth daily (Patient not taking: Reported on 10/31/2022), Disp: 90 tablet, Rfl: 0  •  atorvastatin (LIPITOR) 40 mg tablet, Take 1 tablet (40 mg total) by mouth daily with dinner (Patient not taking: Reported on 10/31/2022), Disp: 90 tablet, Rfl: 0  •  clopidogrel (PLAVIX) 75 mg tablet, Take 1 tablet (75 mg total) by mouth daily Do not start before October 22, 2022   (Patient not taking: Reported on 10/31/2022), Disp: 20 tablet, Rfl: 0  •  ferrous sulfate 324 (65 Fe) mg, Take 1 tablet (324 mg total) by mouth 2 (two) times a day before meals, Disp: 60 tablet, Rfl: 0  •  folic acid (FOLVITE) 1 mg tablet, Take 1 mg by mouth daily Crush pill mixed Will water administered through PEG tube (Patient not taking: Reported on 10/31/2022), Disp: , Rfl:   •  LORazepam (ATIVAN) 0 5 mg tablet, Take 0 5 mg by mouth every 6 (six) hours as needed RestlessNess anxiety (Patient not taking: Reported on 10/31/2022), Disp: , Rfl:   •  metoprolol tartrate (LOPRESSOR) 25 mg tablet, 1 tablet (25 mg total) by Per G Tube route every 12 (twelve) hours (Patient not taking: Reported on 10/31/2022), Disp: 180 tablet, Rfl: 0  •  Multiple Vitamins-Minerals (Multivitamin) LIQD, Take 15 mL by mouth in the morning (Patient not taking: Reported on 10/31/2022), Disp: , Rfl:   •  polyethylene glycol (MIRALAX) 17 g packet, Take 17 g by mouth 2 (two) times a day (Patient not taking: Reported on 10/31/2022), Disp: , Rfl:   •  thiamine 100 MG tablet, Take 100 mg by mouth daily ETOH abuse (Patient not taking: Reported on 10/31/2022), Disp: , Rfl:        Please note:  Voice-recognition software may have been used in the preparation of this document  Occasional wrong word or "sound-alike" substitutions may have occurred due to the inherent limitations of voice recognition software  Interpretation should be guided by context           DANIEL Pena  11/30/2022  11:39 AM

## 2022-11-30 NOTE — ASSESSMENT & PLAN NOTE
· History of HTN  · BP have been stable,  112/64  · Continue metoprolol 25 mg q 12  · Avoid hypotension  · Monitor BP's

## 2022-11-30 NOTE — ASSESSMENT & PLAN NOTE
· History of hyponatremia  · Na+11/28/22 125  · Asymptomatic at this time  · continue sodium tabs to 3 grams TID  · Continue Fluid restriction 1200 ml  · TF flushes to 50 ml q 4 hour  · Recheck [facility] 12/2/22

## 2022-12-05 ENCOUNTER — NURSING HOME VISIT (OUTPATIENT)
Dept: GERIATRICS | Facility: OTHER | Age: 62
End: 2022-12-05

## 2022-12-05 VITALS
TEMPERATURE: 97.7 F | RESPIRATION RATE: 18 BRPM | SYSTOLIC BLOOD PRESSURE: 136 MMHG | OXYGEN SATURATION: 97 % | DIASTOLIC BLOOD PRESSURE: 78 MMHG | HEART RATE: 72 BPM

## 2022-12-05 DIAGNOSIS — Z86.73 HISTORY OF CVA (CEREBROVASCULAR ACCIDENT): Chronic | ICD-10-CM

## 2022-12-05 DIAGNOSIS — E87.1 HYPONATREMIA: Chronic | ICD-10-CM

## 2022-12-05 DIAGNOSIS — I10 PRIMARY HYPERTENSION: Primary | Chronic | ICD-10-CM

## 2022-12-05 DIAGNOSIS — R26.2 AMBULATORY DYSFUNCTION: Chronic | ICD-10-CM

## 2022-12-05 NOTE — PROGRESS NOTES
[facility]  [address]  [phone]  [address]  Code 31 (STR)        NAME: Charo Charles III  AGE: 63 y o  SEX: male CODE STATUS: CPR    DATE OF ENCOUNTER: 12/5/2022    Assessment and Plan     1  Primary hypertension  Assessment & Plan:  · History of HTN  · BP have been stable,  136/78  · Continue metoprolol 25 mg q 12  · Avoid hypotension  · Continue to Monitor BP's      2  Ambulatory dysfunction  Assessment & Plan:  · History of CVA residual left sided weakness  · Continue PT/OT for strength training  · Maintain fall/safety precautions  · assist with ADL's as needed  · OOB for meals  · SW following for d/c planning      3  History of CVA (cerebrovascular accident)  Assessment & Plan:  · History of CVA  · Residual left hemiplegia and dysphagia, G-Tube  · Continue  PT/OT for strength training  · Maintain fall/safety precautions  · Assist with ADL's as needed  · Continue plavix 75 mg daily  · Continue ASA 81 mg daily  · Continue Lipitor 40 mg daily      4  Hyponatremia  Assessment & Plan:  · History of hyponatremia  · Sodium level 12/2 128  · Asymptomatic at this time  · continue sodium tabs to 3 grams TID  · Continue Fluid restriction 1200 ml  · TF flushes to 50 ml q 4 hour  · Recheck [facility] 12/12/22         All medications and routine orders were reviewed and updated as needed  Chief Complaint     STR follow up visit  Patient's care was coordinated with nursing facility staff  Recent vitals, labs, and updated medications were review on [facility] system in facility        Past Medical and Surgical History      Past Medical History:   Diagnosis Date   • Hypertension    • Renal disorder    • Stroke [facility])      Past Surgical History:   Procedure Laterality Date   • GASTROSTOMY TUBE PLACEMENT N/A 10/18/2022    Procedure: INSERTION GASTROSTOMY TUBE OPEN;  Surgeon: Silvano Yanez DO;  Location[facility];  Service: General     No Known Allergies       History of Present Illness     HPI    Shreya Lopez is a 61-year-old male, he is a STR patient of [facility] since 11/4/22  Past Medical Hx including but not limited to HTN, CVA, hyponatremia, Anemia, malnutrition  He was seen in collaboration with nursing for medical management and STR follow up  Hospital Course  Patient was admitted to the hospital on 10/31/22 for c/o bloody drainage through G-tube and melena  He was found to be hypotensive requiring pressors, he was transfused 2 units PRBC's  Upper endoscopy showed an ulcer with active bleeding  Epinephrine injections were given  Hemoglobin then dropped down to 6 6  Patient was transfused an additional unit of PRBC  Patient was found to be became hyponatremic and received IV fluids  Rehab course  Seen and examined at bedside today  Patient is a reliable historian and is AAOx4  Patient is lying in bed and does not appear to be in any distress  He denies any pain and says he is feeling ok today  Patient’s sodium level chronically low, he is currently asymptomatic, taking 3 grams TID, 12/2/22 NA+ was 128, repeat BMP 12/12/22  Denies CP/SOB/N/V/D  Denies lightheadedness, dizziness, headaches, vision changes  Patient states they are eating well and staying hydrated  Denies any bowel or bladder issues  Per review of SNF records, Patient is eating 3 meals per day, consuming  %  Last documented BM 12/5/22  No concerns from nursing at this time  The patient's allergies, past medical, surgical, social and family history were reviewed and unchanged  Review of Systems     Review of Systems   Constitutional: Positive for activity change  HENT: Negative  Negative for congestion  Eyes: Negative  Respiratory: Negative  Negative for cough, shortness of breath and wheezing  Cardiovascular: Negative  Negative for chest pain, palpitations and leg swelling     Gastrointestinal: Negative for abdominal distention, abdominal pain, constipation, diarrhea, nausea and vomiting  G-Tube   Endocrine: Negative  Genitourinary: Negative  Negative for difficulty urinating  Musculoskeletal: Positive for gait problem  Residual left sided weakness from prior CVA   Skin:        G-Tube   Allergic/Immunologic: Negative  Neurological: Positive for weakness  Negative for dizziness, light-headedness and headaches  Hematological: Negative  Psychiatric/Behavioral: Negative  Objective     Vitals:   Vitals:    12/05/22 1116   BP: 136/78   Pulse: 72   Resp: 18   Temp: 97 7 °F (36 5 °C)   SpO2: 97%         Physical Exam  Vitals and nursing note reviewed  Constitutional:       General: He is not in acute distress  Appearance: Normal appearance  He is not ill-appearing  HENT:      Head: Normocephalic and atraumatic  Mouth/Throat:      Mouth: Mucous membranes are moist    Eyes:      Conjunctiva/sclera: Conjunctivae normal    Cardiovascular:      Rate and Rhythm: Normal rate and regular rhythm  Pulses: Normal pulses  Heart sounds: Normal heart sounds  Pulmonary:      Effort: Pulmonary effort is normal  No respiratory distress  Breath sounds: Normal breath sounds  No wheezing  Abdominal:      General: Bowel sounds are normal  There is no distension  Palpations: Abdomen is soft  Tenderness: There is no abdominal tenderness  Musculoskeletal:      Right lower leg: No edema  Left lower leg: No edema  Skin:     Capillary Refill: Capillary refill takes more than 3 seconds  Neurological:      Mental Status: He is alert and oriented to person, place, and time  Motor: Weakness present        Gait: Gait abnormal       Comments: Residual left sided weakness from prior CVA   Psychiatric:         Mood and Affect: Mood normal          Behavior: Behavior normal          Pertinent Laboratory/Diagnostic Studies:   Reviewed in facility chart-stable      Current Medications   Medications reviewed and updated see facility [facility] for details  Current Outpatient Medications:   •  albuterol (2 5 mg/3 mL) 0 083 % nebulizer solution, Take 3 mL (2 5 mg total) by nebulization every 6 (six) hours as needed for wheezing or shortness of breath, Disp: 60 mL, Rfl: 0  •  amLODIPine (NORVASC) 5 mg tablet, 1 tablet (5 mg total) by Per G Tube route daily, Disp: 90 tablet, Rfl: 0  •  aspirin (ECOTRIN LOW STRENGTH) 81 mg EC tablet, Take 1 tablet (81 mg total) by mouth daily (Patient not taking: Reported on 10/31/2022), Disp: 90 tablet, Rfl: 0  •  atorvastatin (LIPITOR) 40 mg tablet, Take 1 tablet (40 mg total) by mouth daily with dinner (Patient not taking: Reported on 10/31/2022), Disp: 90 tablet, Rfl: 0  •  clopidogrel (PLAVIX) 75 mg tablet, Take 1 tablet (75 mg total) by mouth daily Do not start before October 22, 2022   (Patient not taking: Reported on 10/31/2022), Disp: 20 tablet, Rfl: 0  •  ferrous sulfate 324 (65 Fe) mg, Take 1 tablet (324 mg total) by mouth 2 (two) times a day before meals, Disp: 60 tablet, Rfl: 0  •  folic acid (FOLVITE) 1 mg tablet, Take 1 mg by mouth daily Crush pill mixed Will water administered through PEG tube (Patient not taking: Reported on 10/31/2022), Disp: , Rfl:   •  LORazepam (ATIVAN) 0 5 mg tablet, Take 0 5 mg by mouth every 6 (six) hours as needed RestlessNess anxiety (Patient not taking: Reported on 10/31/2022), Disp: , Rfl:   •  metoprolol tartrate (LOPRESSOR) 25 mg tablet, 1 tablet (25 mg total) by Per G Tube route every 12 (twelve) hours (Patient not taking: Reported on 10/31/2022), Disp: 180 tablet, Rfl: 0  •  Multiple Vitamins-Minerals (Multivitamin) LIQD, Take 15 mL by mouth in the morning (Patient not taking: Reported on 10/31/2022), Disp: , Rfl:   •  polyethylene glycol (MIRALAX) 17 g packet, Take 17 g by mouth 2 (two) times a day (Patient not taking: Reported on 10/31/2022), Disp: , Rfl:   •  thiamine 100 MG tablet, Take 100 mg by mouth daily ETOH abuse (Patient not taking: Reported on 10/31/2022), Disp: , Rfl:        Please note:  Voice-recognition software may have been used in the preparation of this document  Occasional wrong word or "sound-alike" substitutions may have occurred due to the inherent limitations of voice recognition software  Interpretation should be guided by context           [facility] DANIEL Pyle  12/5/2022  1:50 PM

## 2022-12-05 NOTE — ASSESSMENT & PLAN NOTE
· History of HTN  · BP have been stable,  136/78  · Continue metoprolol 25 mg q 12  · Avoid hypotension  · Continue to Monitor BP's

## 2022-12-05 NOTE — ASSESSMENT & PLAN NOTE
· History of hyponatremia  · Sodium level 12/2 128  · Asymptomatic at this time  · continue sodium tabs to 3 grams TID  · Continue Fluid restriction 1200 ml  · TF flushes to 50 ml q 4 hour  · Recheck [facility] 12/12/22

## 2022-12-13 ENCOUNTER — TELEPHONE (OUTPATIENT)
Dept: VASCULAR SURGERY | Facility: CLINIC | Age: 62
End: 2022-12-13

## 2022-12-13 ENCOUNTER — NURSING HOME VISIT (OUTPATIENT)
Dept: GERIATRICS | Facility: OTHER | Age: 62
End: 2022-12-13

## 2022-12-13 VITALS
HEART RATE: 68 BPM | RESPIRATION RATE: 18 BRPM | SYSTOLIC BLOOD PRESSURE: 122 MMHG | DIASTOLIC BLOOD PRESSURE: 71 MMHG | OXYGEN SATURATION: 97 % | TEMPERATURE: 97.5 F

## 2022-12-13 DIAGNOSIS — R26.2 AMBULATORY DYSFUNCTION: Chronic | ICD-10-CM

## 2022-12-13 DIAGNOSIS — I10 PRIMARY HYPERTENSION: Chronic | ICD-10-CM

## 2022-12-13 DIAGNOSIS — E87.1 HYPONATREMIA: Primary | Chronic | ICD-10-CM

## 2022-12-13 DIAGNOSIS — Z86.73 HISTORY OF CVA (CEREBROVASCULAR ACCIDENT): Chronic | ICD-10-CM

## 2022-12-13 NOTE — ASSESSMENT & PLAN NOTE
· History of HTN  · BP have been stable,  122/71  · Continue metoprolol 25 mg q 12  · Avoid hypotension  · Continue to Monitor BP's

## 2022-12-13 NOTE — ASSESSMENT & PLAN NOTE
· History of hyponatremia  · Sodium level 12/2 128  · Patient is asymptomatic  · continue sodium tabs to 3 grams TID  · Continue Fluid restriction 1200 ml  · TF flushes to 50 ml q 4 hour  · Recheck [facility] 12/27/22

## 2022-12-13 NOTE — PROGRESS NOTES
[facility]  [address]  [phone]  Beth Mcgee  Code 31 (STR)        NAME: Alyssa uMrry III  AGE: 63 y o  SEX: male CODE STATUS: CPR    DATE OF ENCOUNTER: 12/13/2022    Assessment and Plan     1  Hyponatremia  Assessment & Plan:  · History of hyponatremia  · Sodium level 12/2 128  · Patient is asymptomatic  · continue sodium tabs to 3 grams TID  · Continue Fluid restriction 1200 ml  · TF flushes to 50 ml q 4 hour  · Recheck BMP 12/27/22      2  Ambulatory dysfunction  Assessment & Plan:  · History of CVA residual left sided weakness   · Continue PT/OT for strength training as needed  · Maintain fall/safety precautions  · assist with ADL's as needed  · Encourage OOB for meals  · SW following for transition to LTC      3  Primary hypertension  Assessment & Plan:  · History of HTN  · BP have been stable,  122/71  · Continue metoprolol 25 mg q 12  · Avoid hypotension  · Continue to Monitor BP's      4  History of CVA (cerebrovascular accident)  Assessment & Plan:  · Known history of CVA  · Residual left hemiplegia and dysphagia, G-Tube  · Continue  PT/OT for strength training as recommended  · Maintain fall/safety precautions  · Continue to assist with ADL's as needed  · Continue plavix 75 mg daily  · Continue ASA 81 mg daily  · Continue Lipitor 40 mg daily         All medications and routine orders were reviewed and updated as needed  Chief Complaint     STR follow up visit  Patient's care was coordinated with nursing facility staff  Recent vitals, labs, and updated medications were review on [facility] system in facility        Past Medical and Surgical History      Past Medical History:   Diagnosis Date   • Hypertension    • Renal disorder    • Stroke [facility])      Past Surgical History:   Procedure Laterality Date   • GASTROSTOMY TUBE PLACEMENT N/A 10/18/2022    Procedure: INSERTION GASTROSTOMY TUBE OPEN;  Surgeon: Ford Yanez DO;  Location: [facility]; Service: General     No Known Allergies       History of Present Illness     HPI  Ria Francis is a 79-year-old male, he is a STR patient of [facility] since 11/4/22  Past Medical Hx including but not limited to HTN, CVA, hyponatremia, Anemia, malnutrition  He was seen in collaboration with nursing for medical management and STR follow up  Hospital Course  Patient was admitted to the hospital on 10/31/22 for c/o bloody drainage through G-tube and melena  He was found to be hypotensive requiring pressors, he was transfused 2 units PRBC's  Upper endoscopy showed an ulcer with active bleeding  Epinephrine injections were given  Hemoglobin then dropped down to 6 6  Patient was transfused an additional unit of PRBC  Patient was found to be became hyponatremic and received IV fluids  Rehab course  Seen and examined at bedside today  Patient is a reliable historian and is AAOx4  Patient is sitting in his wheelchair and does not appear to be in any distress  He denies any pain and states he is doing ok today  Patient’s sodium level chronically low, he is currently asymptomatic, taking sodium 3 grams TID, 12/12/22 NA+ was 129, repeat BMP 12/27/22  Denies CP/SOB/N/V/D  Denies lightheadedness, dizziness, headaches, vision changes  Patient states they are eating well and staying hydrated  Denies any bowel or bladder issues  Per review of SNF records, Patient is eating 3 meals per day, consuming  %  Last documented BM 12/12/22  No concerns from nursing at this time  The patient's allergies, past medical, surgical, social and family history were reviewed and unchanged  Review of Systems     Review of Systems   Constitutional: Positive for activity change  Negative for appetite change and fever  HENT: Negative  Negative for congestion  Eyes: Negative  Respiratory: Negative  Negative for cough, shortness of breath and wheezing  Cardiovascular: Negative    Negative for chest pain, palpitations and leg swelling  Gastrointestinal: Negative  Negative for abdominal distention, abdominal pain, constipation, diarrhea, nausea and vomiting  G-Tube   Endocrine: Negative  Genitourinary: Negative  Negative for difficulty urinating  Musculoskeletal: Positive for gait problem  History of CVA with residual weakness   Skin: Negative  Allergic/Immunologic: Negative  Neurological: Positive for weakness  Negative for dizziness, light-headedness and headaches  Hematological: Negative  Psychiatric/Behavioral: Negative  Negative for behavioral problems, confusion, dysphoric mood and sleep disturbance  Objective     Vitals:   Vitals:    12/13/22 1007   BP: 122/71   Pulse: 68   Resp: 18   Temp: 97 5 °F (36 4 °C)   SpO2: 97%         Physical Exam  Vitals and nursing note reviewed  Constitutional:       General: He is not in acute distress  Appearance: Normal appearance  He is not ill-appearing  HENT:      Head: Normocephalic and atraumatic  Nose: No congestion  Mouth/Throat:      Mouth: Mucous membranes are moist    Eyes:      Conjunctiva/sclera: Conjunctivae normal    Cardiovascular:      Rate and Rhythm: Normal rate and regular rhythm  Pulses: Normal pulses  Heart sounds: Normal heart sounds  Pulmonary:      Effort: Pulmonary effort is normal  No respiratory distress  Breath sounds: Normal breath sounds  No wheezing  Abdominal:      General: Bowel sounds are normal  There is no distension  Palpations: Abdomen is soft  Tenderness: There is no abdominal tenderness  Musculoskeletal:      Right lower leg: No edema  Left lower leg: No edema  Skin:     General: Skin is warm  Capillary Refill: Capillary refill takes more than 3 seconds  Neurological:      Mental Status: He is alert and oriented to person, place, and time  Motor: Weakness present        Gait: Gait abnormal    Psychiatric:         Mood and Affect: Mood normal  Behavior: Behavior normal          Pertinent Laboratory/Diagnostic Studies:   Reviewed in facility chart-stable      Current Medications   Medications reviewed and updated see facility [facility] for details  Current Outpatient Medications:   •  albuterol (2 5 mg/3 mL) 0 083 % nebulizer solution, Take 3 mL (2 5 mg total) by nebulization every 6 (six) hours as needed for wheezing or shortness of breath, Disp: 60 mL, Rfl: 0  •  amLODIPine (NORVASC) 5 mg tablet, 1 tablet (5 mg total) by Per G Tube route daily, Disp: 90 tablet, Rfl: 0  •  aspirin (ECOTRIN LOW STRENGTH) 81 mg EC tablet, Take 1 tablet (81 mg total) by mouth daily (Patient not taking: Reported on 10/31/2022), Disp: 90 tablet, Rfl: 0  •  atorvastatin (LIPITOR) 40 mg tablet, Take 1 tablet (40 mg total) by mouth daily with dinner (Patient not taking: Reported on 10/31/2022), Disp: 90 tablet, Rfl: 0  •  clopidogrel (PLAVIX) 75 mg tablet, Take 1 tablet (75 mg total) by mouth daily Do not start before October 22, 2022   (Patient not taking: Reported on 10/31/2022), Disp: 20 tablet, Rfl: 0  •  ferrous sulfate 324 (65 Fe) mg, Take 1 tablet (324 mg total) by mouth 2 (two) times a day before meals, Disp: 60 tablet, Rfl: 0  •  folic acid (FOLVITE) 1 mg tablet, Take 1 mg by mouth daily Crush pill mixed Will water administered through PEG tube (Patient not taking: Reported on 10/31/2022), Disp: , Rfl:   •  LORazepam (ATIVAN) 0 5 mg tablet, Take 0 5 mg by mouth every 6 (six) hours as needed RestlessNess anxiety (Patient not taking: Reported on 10/31/2022), Disp: , Rfl:   •  metoprolol tartrate (LOPRESSOR) 25 mg tablet, 1 tablet (25 mg total) by Per G Tube route every 12 (twelve) hours (Patient not taking: Reported on 10/31/2022), Disp: 180 tablet, Rfl: 0  •  Multiple Vitamins-Minerals (Multivitamin) LIQD, Take 15 mL by mouth in the morning (Patient not taking: Reported on 10/31/2022), Disp: , Rfl:   •  polyethylene glycol (MIRALAX) 17 g packet, Take 17 g by mouth 2 (two) times a day (Patient not taking: Reported on 10/31/2022), Disp: , Rfl:   •  thiamine 100 MG tablet, Take 100 mg by mouth daily ETOH abuse (Patient not taking: Reported on 10/31/2022), Disp: , Rfl:        Please note:  Voice-recognition software may have been used in the preparation of this document  Occasional wrong word or "sound-alike" substitutions may have occurred due to the inherent limitations of voice recognition software  Interpretation should be guided by context           [address], CRNP  12/13/2022  12:48 PM

## 2022-12-13 NOTE — ASSESSMENT & PLAN NOTE
· History of CVA residual left sided weakness   · Continue PT/OT for strength training as needed  · Maintain fall/safety precautions  · assist with ADL's as needed  · Encourage OOB for meals  · SW following for transition to LTC

## 2022-12-13 NOTE — ASSESSMENT & PLAN NOTE
· Known history of CVA  · Residual left hemiplegia and dysphagia, G-Tube  · Continue  PT/OT for strength training as recommended  · Maintain fall/safety precautions  · Continue to assist with ADL's as needed  · Continue plavix 75 mg daily  · Continue ASA 81 mg daily  · Continue Lipitor 40 mg daily

## 2022-12-23 ENCOUNTER — TELEPHONE (OUTPATIENT)
Dept: NEPHROLOGY | Facility: CLINIC | Age: 62
End: 2022-12-23

## 2022-12-23 ENCOUNTER — OFFICE VISIT (OUTPATIENT)
Dept: NEPHROLOGY | Facility: CLINIC | Age: 62
End: 2022-12-23

## 2022-12-23 VITALS
HEIGHT: 70 IN | SYSTOLIC BLOOD PRESSURE: 118 MMHG | BODY MASS INDEX: 16.61 KG/M2 | WEIGHT: 116 LBS | DIASTOLIC BLOOD PRESSURE: 76 MMHG

## 2022-12-23 DIAGNOSIS — N17.9 ACUTE RENAL FAILURE, UNSPECIFIED ACUTE RENAL FAILURE TYPE (HCC): ICD-10-CM

## 2022-12-23 DIAGNOSIS — E87.1 HYPONATREMIA: Primary | ICD-10-CM

## 2022-12-23 RX ORDER — SODIUM CHLORIDE 1000 MG
3 TABLET, SOLUBLE MISCELLANEOUS 3 TIMES DAILY
COMMUNITY

## 2022-12-23 RX ORDER — ACETAMINOPHEN 325 MG/1
650 TABLET ORAL EVERY 6 HOURS PRN
COMMUNITY

## 2022-12-23 RX ORDER — BISACODYL 10 MG
10 SUPPOSITORY, RECTAL RECTAL DAILY
COMMUNITY

## 2022-12-23 NOTE — PROGRESS NOTES
NEPHROLOGY OFFICE VISIT   Neeraj Licea III 63 y o  male MRN: [id number]  12/23/2022    Reason for Visit: Hyponatremia    ASSESSMENT and PLAN:  It was a pleasure evaluating your patient in the office today  Thank you for allowing our team to participate in the care of Mr Salma Osorio  Please do not hesitate to contact our team if further issues/questions shall arise in the interim       # Hyponatremia  >> Work-up from 1st hospital admission 10/2022  - Admission sodium 119, discharge sodium 138  - Serum osmolality 265 consistent with hypotonic hyponatremia  - Urine osmolality 369, urine sodium less than 10 consistent with hypovolemic hyponatremia  - Serum uric acid 8 0, a m  cortisol 41, TSH 4 36  - MRI brain with several areas of ischemic infarction    >> Work-up from 2nd hospital admission 10/2022  - Admission sodium 127, discharge sodium 132  - Urine osmolality [phone]  - Urine sodium   - CT abdomen with bladder wall thickening, no obvious evidence of malignancy    >> Working diagnosis: Most likely SIADH in setting of stroke +/- low solute intake    Plan  - Most recent serum sodium 129 12/12/2022  - Restrict free fluid intake to 1500 cc per 24 hours  - Currently on salt tablets 3 g 3 times daily  - Stop amlodipine and start torsemide 5 mg daily  - Add Ensure to meals twice a day  - Check BMP/magnesium in 2 weeks and fax results to [facility]    # Acute kidney injury  - Admission creatinine 1 90 10/09/2022  - Etiology was thought to be due to prerenal BIPIN in setting of volume depletion  - BIPIN resolved with IV fluid resuscitation    # Hypertension  - Blood pressure well controlled and at goal  - Current medications: Amlodipine 5 mg daily, metoprolol 25 mg every 12  - Changes: Stop amlodipine and switch to torsemide 5 mg daily as above    # Bladder wall thickening  - Establish follow-up with urology    HPI:  29-year-old male was seen in nephrology office today as follow-up for hyponatremia and BIPIN  He was admitted to [facility] in 10/2022 with CVA and was noted to have hyponatremia and BIPIN  His hyponatremia and BIPIN improved with IVF and was thought to be due to volume depletion  He had another hospital admission in 10/2022 for GI bleed due to Dieulafoy's lesion and had recurrence of hyponatremia  He is currently in a nursing home  He had dysphagia and had a PEG tube placed  He is currently having a modified diet and also using PEG tube for tube feeds  He tells me that he drinks a lot of water during the day  He denies dyspnea  He denies leg swelling  OBJECTIVE:  Current Weight: Weight - Scale: 52 6 kg (116 lb)  Vitals:    12/23/22 1002   BP: 118/76   BP Location: Right arm   Patient Position: Sitting   Cuff Size: Adult   Weight: 52 6 kg (116 lb)   Height: 5' 10" (1 778 m)    Body mass index is 16 64 kg/m²  REVIEW OF SYSTEMS:    Review of Systems   Constitutional: Negative for chills and fever  HENT: Negative for ear pain and sore throat  Eyes: Negative for pain and visual disturbance  Respiratory: Negative for cough and shortness of breath  Cardiovascular: Negative for chest pain and palpitations  Gastrointestinal: Negative for abdominal pain and vomiting  Genitourinary: Negative for dysuria and hematuria  Musculoskeletal: Negative for arthralgias and back pain  Skin: Negative for color change and rash  Neurological: Negative for seizures and syncope  All other systems reviewed and are negative  PHYSICAL EXAM:      Physical Exam  Constitutional:       Appearance: Normal appearance  HENT:      Head: Normocephalic and atraumatic  Mouth/Throat:      Mouth: Mucous membranes are moist       Pharynx: Oropharynx is clear  Cardiovascular:      Rate and Rhythm: Normal rate and regular rhythm  Pulmonary:      Effort: Pulmonary effort is normal       Breath sounds: Normal breath sounds     Abdominal:      General: Bowel sounds are normal       Palpations: Abdomen is soft  Comments: PEG tube present   Musculoskeletal:         General: Normal range of motion  Right lower leg: No edema  Left lower leg: No edema  Skin:     General: Skin is warm and dry  Neurological:      Mental Status: He is alert and oriented to person, place, and time  Mental status is at baseline  Motor: Weakness present  Comments: Left-sided hemiplegia   Psychiatric:         Mood and Affect: Mood normal          Behavior: Behavior normal          Thought Content:  Thought content normal          Judgment: Judgment normal          Medications:    Current Outpatient Medications:   •  acetaminophen (TYLENOL) 325 mg tablet, Take 650 mg by mouth every 6 (six) hours as needed for mild pain, Disp: , Rfl:   •  albuterol (2 5 mg/3 mL) 0 083 % nebulizer solution, Take 3 mL (2 5 mg total) by nebulization every 6 (six) hours as needed for wheezing or shortness of breath, Disp: 60 mL, Rfl: 0  •  amLODIPine (NORVASC) 5 mg tablet, 1 tablet (5 mg total) by Per G Tube route daily, Disp: 90 tablet, Rfl: 0  •  aspirin (ECOTRIN LOW STRENGTH) 81 mg EC tablet, Take 1 tablet (81 mg total) by mouth daily, Disp: 90 tablet, Rfl: 0  •  atorvastatin (LIPITOR) 40 mg tablet, Take 1 tablet (40 mg total) by mouth daily with dinner, Disp: 90 tablet, Rfl: 0  •  bisacodyl (Dulcolax) 10 mg suppository, Insert 10 mg into the rectum daily, Disp: , Rfl:   •  clopidogrel (PLAVIX) 75 mg tablet, Take 1 tablet (75 mg total) by mouth daily Do not start before October 22, 2022 , Disp: 20 tablet, Rfl: 0  •  ferrous sulfate 324 (65 Fe) mg, Take 1 tablet (324 mg total) by mouth 2 (two) times a day before meals, Disp: 60 tablet, Rfl: 0  •  folic acid (FOLVITE) 1 mg tablet, Take 1 mg by mouth daily Crush pill mixed Will water administered through PEG tube, Disp: , Rfl:   •  LORazepam (ATIVAN) 0 5 mg tablet, Take 0 5 mg by mouth every 6 (six) hours as needed RestlessNess anxiety, Disp: , Rfl:   •  metoprolol tartrate (LOPRESSOR) 25 mg tablet, 1 tablet (25 mg total) by Per G Tube route every 12 (twelve) hours, Disp: 180 tablet, Rfl: 0  •  Multiple Vitamins-Minerals (Multivitamin) LIQD, Take 15 mL by mouth in the morning, Disp: , Rfl:   •  polyethylene glycol (MIRALAX) 17 g packet, Take 17 g by mouth 2 (two) times a day, Disp: , Rfl:   •  sodium chloride 1 g tablet, Take 3 g by mouth 3 (three) times a day, Disp: , Rfl:   •  thiamine 100 MG tablet, Take 100 mg by mouth daily ETOH abuse, Disp: , Rfl:     Laboratory Results:        Invalid input(s): ALBUMIN    Results for orders placed or performed during the hospital encounter of 10/31/22   Blood culture    Specimen: Hand, Left; Blood   Result Value Ref Range    Blood Culture No Growth After 5 Days  Blood culture    Specimen: Arm, Right; Blood   Result Value Ref Range    Blood Culture No Growth After 5 Days      COVID/FLU/RSV    Specimen: Nose; Nares   Result Value Ref Range    SARS-CoV-2 Negative Negative    INFLUENZA A PCR Negative Negative    INFLUENZA B PCR Negative Negative    RSV PCR Negative Negative   CBC and differential   Result Value Ref Range    WBC 11 73 (H) 4 31 - 10 16 Thousand/uL    RBC 3 76 (L) 3 88 - 5 62 Million/uL    Hemoglobin 11 4 (L) 12 0 - 17 0 g/dL    Hematocrit 35 6 (L) 36 5 - 49 3 %    MCV 95 82 - 98 fL    MCH 30 3 26 8 - 34 3 pg    MCHC 32 0 31 4 - 37 4 g/dL    RDW 16 6 (H) 11 6 - 15 1 %    MPV 8 5 (L) 8 9 - 12 7 fL    Platelets 208 586 - 315 Thousands/uL    nRBC 0 /100 WBCs    Neutrophils Relative 77 (H) 43 - 75 %    Immat GRANS % 1 0 - 2 %    Lymphocytes Relative 12 (L) 14 - 44 %    Monocytes Relative 8 4 - 12 %    Eosinophils Relative 1 0 - 6 %    Basophils Relative 1 0 - 1 %    Neutrophils Absolute 9 09 (H) 1 85 - 7 62 Thousands/µL    Immature Grans Absolute 0 16 0 00 - 0 20 Thousand/uL    Lymphocytes Absolute 1 40 0 60 - 4 47 Thousands/µL    Monocytes Absolute 0 89 0 17 - 1 22 Thousand/µL    Eosinophils Absolute 0 12 0 00 - 0 61 Thousand/µL    Basophils Absolute 0 07 0 00 - 0 10 Thousands/µL   Comprehensive metabolic panel   Result Value Ref Range    Sodium 127 (L) 135 - 147 mmol/L    Potassium 4 6 3 5 - 5 3 mmol/L    Chloride 97 96 - 108 mmol/L    CO2 21 21 - 32 mmol/L    ANION GAP 9 4 - 13 mmol/L    BUN 26 (H) 5 - 25 mg/dL    Creatinine 0 42 (L) 0 60 - 1 30 mg/dL    Glucose 75 65 - 140 mg/dL    Calcium 8 8 8 4 - 10 2 mg/dL    Corrected Calcium 9 3 8 3 - 10 1 mg/dL    AST 18 13 - 39 U/L    ALT 15 7 - 52 U/L    Alkaline Phosphatase 79 34 - 104 U/L    Total Protein 6 2 (L) 6 4 - 8 4 g/dL    Albumin 3 4 (L) 3 5 - 5 0 g/dL    Total Bilirubin 0 83 0 20 - 1 00 mg/dL    eGFR 127 ml/min/1 73sq m   Protime-INR   Result Value Ref Range    Protime 13 3 11 6 - 14 5 seconds    INR 0 99 0 84 - 1 19   Lactic acid, plasma   Result Value Ref Range    LACTIC ACID 2 1 (HH) 0 5 - 2 0 mmol/L   Procalcitonin   Result Value Ref Range    Procalcitonin 0 11 <=0 25 ng/ml   Iron Saturation %   Result Value Ref Range    Iron Saturation 26 20 - 50 %    TIBC 397 250 - 450 ug/dL    Iron 105 65 - 175 ug/dL   Ferritin   Result Value Ref Range    Ferritin 341 8 - 388 ng/mL   Magnesium   Result Value Ref Range    Magnesium 1 8 (L) 1 9 - 2 7 mg/dL   Phosphorus   Result Value Ref Range    Phosphorus 4 3 2 7 - 4 5 mg/dL   Osmolality-"If this is regarding a toxic alcohol, STOP  Test is not routinely indicated   Please consult medical  on call for further guidance "   Result Value Ref Range    Osmolality Serum 264 (L) 282 - 298 mmol/KG   Osmolality, urine   Result Value Ref Range    Osmolality, Ur 629 250 - 900 mmol/KG   Sodium, urine, random   Result Value Ref Range    Sodium, Ur 90    Serial Hemoglobin Q6hrs   Result Value Ref Range    Hemoglobin 9 2 (L) 12 0 - 17 0 g/dL   Urinalysis with microscopic   Result Value Ref Range    Color, UA Light Yellow     Clarity, UA Clear     Specific Gravity, UA 1 020 1 003 - 1 030    pH, UA 7 0 4 5, 5 0, 5 5, 6 0, 6 5, 7  0, 7 5, 8 0    Leukocytes, UA Trace (A) Negative    Nitrite, UA Negative Negative    Protein, UA Negative Negative mg/dl    Glucose, UA Negative Negative mg/dl    Ketones, UA Negative Negative mg/dl    Urobilinogen, UA <2 0 <2 0 mg/dl mg/dl    Bilirubin, UA Negative Negative    Occult Blood, UA Moderate (A) Negative    RBC, UA 1-2 None Seen, 1-2 /hpf    WBC, UA 2-4 (A) None Seen, 1-2 /hpf    Epithelial Cells Occasional None Seen, Occasional /hpf    Bacteria, UA None Seen None Seen, Occasional /hpf   Serial Hemoglobin Q6hrs   Result Value Ref Range    Hemoglobin 8 7 (L) 12 0 - 17 0 g/dL   Serial Hemoglobin Q6hrs   Result Value Ref Range    Hemoglobin 9 1 (L) 12 0 - 17 0 g/dL   Basic metabolic panel   Result Value Ref Range    Sodium 127 (L) 135 - 147 mmol/L    Potassium 4 2 3 5 - 5 3 mmol/L    Chloride 97 96 - 108 mmol/L    CO2 20 (L) 21 - 32 mmol/L    ANION GAP 10 4 - 13 mmol/L    BUN 11 5 - 25 mg/dL    Creatinine 0 43 (L) 0 60 - 1 30 mg/dL    Glucose 80 65 - 140 mg/dL    Calcium 9 0 8 4 - 10 2 mg/dL    eGFR 126 ml/min/1 73sq m   Calcium, ionized   Result Value Ref Range    Calcium, Ionized 1 14 1 12 - 1 32 mmol/L   CBC   Result Value Ref Range    WBC 6 50 4 31 - 10 16 Thousand/uL    RBC 2 94 (L) 3 88 - 5 62 Million/uL    Hemoglobin 8 8 (L) 12 0 - 17 0 g/dL    Hematocrit 26 7 (L) 36 5 - 49 3 %    MCV 91 82 - 98 fL    MCH 29 9 26 8 - 34 3 pg    MCHC 33 0 31 4 - 37 4 g/dL    RDW 17 1 (H) 11 6 - 15 1 %    Platelets 070 338 - 761 Thousands/uL    MPV 8 3 (L) 8 9 - 12 7 fL   Phosphorus   Result Value Ref Range    Phosphorus 4 4 2 7 - 4 5 mg/dL   Magnesium   Result Value Ref Range    Magnesium 2 0 1 9 - 2 7 mg/dL   Serial Hemoglobin Q6hrs   Result Value Ref Range    Hemoglobin 5 8 (LL) 12 0 - 17 0 g/dL   Hemoglobin   Result Value Ref Range    Hemoglobin 10 6 (L) 12 0 - 17 0 g/dL   Serial Hemoglobin Q6hrs   Result Value Ref Range    Hemoglobin 9 8 (L) 12 0 - 17 0 g/dL   Serial Hemoglobin Q6hrs   Result Value Ref Range Hemoglobin 8 3 (L) 12 0 - 17 0 g/dL   Basic metabolic panel   Result Value Ref Range    Sodium 129 (L) 135 - 147 mmol/L    Potassium 3 9 3 5 - 5 3 mmol/L    Chloride 100 96 - 108 mmol/L    CO2 22 21 - 32 mmol/L    ANION GAP 7 4 - 13 mmol/L    BUN 14 5 - 25 mg/dL    Creatinine 0 37 (L) 0 60 - 1 30 mg/dL    Glucose 81 65 - 140 mg/dL    Calcium 8 5 8 4 - 10 2 mg/dL    eGFR 134 ml/min/1 73sq m   CBC and Platelet   Result Value Ref Range    WBC 6 20 4  31 - 10 16 Thousand/uL    RBC 2 56 (L) 3 88 - 5 62 Million/uL    Hemoglobin 7 9 (L) 12 0 - 17 0 g/dL    Hematocrit 23 7 (L) 36 5 - 49 3 %    MCV 93 82 - 98 fL    MCH 30 9 26 8 - 34 3 pg    MCHC 33 3 31 4 - 37 4 g/dL    RDW 17 0 (H) 11 6 - 15 1 %    Platelets 774 914 - 706 Thousands/uL    MPV 8 5 (L) 8 9 - 12 7 fL   Serial Hemoglobin Q6hrs   Result Value Ref Range    Hemoglobin 8 6 (L) 12 0 - 17 0 g/dL   Hemoglobin and hematocrit, blood   Result Value Ref Range    Hemoglobin 8 0 (L) 12 0 - 17 0 g/dL    Hematocrit 23 7 (L) 36 5 - 49 3 %   Basic metabolic panel   Result Value Ref Range    Sodium 128 (L) 135 - 147 mmol/L    Potassium 4 0 3 5 - 5 3 mmol/L    Chloride 100 96 - 108 mmol/L    CO2 22 21 - 32 mmol/L    ANION GAP 6 4 - 13 mmol/L    BUN 19 5 - 25 mg/dL    Creatinine 0 45 (L) 0 60 - 1 30 mg/dL    Glucose 82 65 - 140 mg/dL    Calcium 8 4 8 4 - 10 2 mg/dL    eGFR 123 ml/min/1 73sq m   Hemoglobin and hematocrit, blood   Result Value Ref Range    Hemoglobin 7 7 (L) 12 0 - 17 0 g/dL    Hematocrit 23 7 (L) 36 5 - 49 3 %   Serial Hemoglobin Q6hrs   Result Value Ref Range    Hemoglobin 7 3 (L) 12 0 - 17 0 g/dL   Serial Hemoglobin Q6hrs   Result Value Ref Range    Hemoglobin 7 1 (L) 12 0 - 17 0 g/dL   Basic metabolic panel   Result Value Ref Range    Sodium 131 (L) 135 - 147 mmol/L    Potassium 3 7 3 5 - 5 3 mmol/L    Chloride 101 96 - 108 mmol/L    CO2 20 (L) 21 - 32 mmol/L    ANION GAP 10 4 - 13 mmol/L    BUN 14 5 - 25 mg/dL    Creatinine 0 47 (L) 0 60 - 1 30 mg/dL    Glucose 66 65 - 140 mg/dL    Calcium 8 4 8 4 - 10 2 mg/dL    eGFR 121 ml/min/1 73sq m   CBC and Platelet   Result Value Ref Range    WBC 7 13 4 31 - 10 16 Thousand/uL    RBC 2 12 (L) 3 88 - 5 62 Million/uL    Hemoglobin 6 6 (LL) 12 0 - 17 0 g/dL    Hematocrit 20 1 (L) 36 5 - 49 3 %    MCV 95 82 - 98 fL    MCH 31 1 26 8 - 34 3 pg    MCHC 32 8 31 4 - 37 4 g/dL    RDW 17 1 (H) 11 6 - 15 1 %    Platelets 834 871 - 610 Thousands/uL    MPV 8 4 (L) 8 9 - 12 7 fL   Serial Hemoglobin Q6hrs   Result Value Ref Range    Hemoglobin 8 3 (L) 12 0 - 17 0 g/dL   Serial Hemoglobin Q6hrs   Result Value Ref Range    Hemoglobin 8 3 (L) 12 0 - 17 0 g/dL   Basic metabolic panel   Result Value Ref Range    Sodium 132 (L) 135 - 147 mmol/L    Potassium 3 4 (L) 3 5 - 5 3 mmol/L    Chloride 101 96 - 108 mmol/L    CO2 24 21 - 32 mmol/L    ANION GAP 7 4 - 13 mmol/L    BUN 9 5 - 25 mg/dL    Creatinine 0 42 (L) 0 60 - 1 30 mg/dL    Glucose 95 65 - 140 mg/dL    Calcium 8 7 8 4 - 10 2 mg/dL    eGFR 127 ml/min/1 73sq m   CBC and Platelet   Result Value Ref Range    WBC 6 16 4 31 - 10 16 Thousand/uL    RBC 2 58 (L) 3 88 - 5 62 Million/uL    Hemoglobin 7 9 (L) 12 0 - 17 0 g/dL    Hematocrit 23 7 (L) 36 5 - 49 3 %    MCV 92 82 - 98 fL    MCH 30 6 26 8 - 34 3 pg    MCHC 33 3 31 4 - 37 4 g/dL    RDW 17 2 (H) 11 6 - 15 1 %    Platelets 492 585 - 364 Thousands/uL    MPV 8 5 (L) 8 9 - 12 7 fL   Serial Hemoglobin Q6hrs   Result Value Ref Range    Hemoglobin 8 8 (L) 12 0 - 17 0 g/dL   Type and screen   Result Value Ref Range    ABO Grouping O     Rh Factor Positive     Antibody Screen Negative     Specimen Expiration Date 20221103    Prepare Leukoreduced RBC: 2 Units   Result Value Ref Range    Unit Product Code E4063P29     Unit Number [id number]     Unit ABO O     Unit RH POS     Crossmatch Compatible     Unit Dispense Status Return to [facility]     Unit Product Volume 350 ml    Unit Product Code K7130C58     Unit Number [id number]     Unit ABO O     Unit RH POS Crossmatch Compatible     Unit Dispense Status Presumed Trans     Unit Product Volume 350 ml   Type and screen   Result Value Ref Range    ABO Grouping O     Rh Factor Positive     Antibody Screen Negative     Specimen Expiration Date [id number]

## 2022-12-27 ENCOUNTER — TELEPHONE (OUTPATIENT)
Age: 62
End: 2022-12-27

## 2022-12-27 NOTE — TELEPHONE ENCOUNTER
Called sister Peng Russell to obtain currect insurance info   She states Brad Blackman has not worked since October and he has no insurance

## 2023-01-04 ENCOUNTER — TELEPHONE (OUTPATIENT)
Dept: GASTROENTEROLOGY | Facility: AMBULARY SURGERY CENTER | Age: 63
End: 2023-01-04

## 2023-01-13 ENCOUNTER — TELEPHONE (OUTPATIENT)
Dept: CARDIOLOGY CLINIC | Facility: CLINIC | Age: 63
End: 2023-01-13

## 2023-01-13 NOTE — TELEPHONE ENCOUNTER
I called [facility] at  and talked with Isidoro Yadav who stated to call back next week and talk with Remberto Tate, unit manager, to schedule procedure and obtain insurance information      Robert Yoon

## 2023-01-19 ENCOUNTER — NURSING HOME VISIT (OUTPATIENT)
Dept: GERIATRICS | Facility: OTHER | Age: 63
End: 2023-01-19

## 2023-01-19 VITALS
DIASTOLIC BLOOD PRESSURE: 96 MMHG | RESPIRATION RATE: 18 BRPM | HEART RATE: 71 BPM | OXYGEN SATURATION: 96 % | SYSTOLIC BLOOD PRESSURE: 141 MMHG | TEMPERATURE: 97.4 F

## 2023-01-19 DIAGNOSIS — Z86.73 HISTORY OF CVA (CEREBROVASCULAR ACCIDENT): Chronic | ICD-10-CM

## 2023-01-19 DIAGNOSIS — R10.84 GENERALIZED ABDOMINAL PAIN: Primary | ICD-10-CM

## 2023-01-19 DIAGNOSIS — E87.1 HYPONATREMIA: Chronic | ICD-10-CM

## 2023-01-19 DIAGNOSIS — I10 PRIMARY HYPERTENSION: Chronic | ICD-10-CM

## 2023-01-19 NOTE — ASSESSMENT & PLAN NOTE
· History of HTN  · BP have been stable,  141/96  · Continue metoprolol 25 mg q 12  · Continue torsemide 5 mg daily  · Avoid hypotension  · Continue to Monitor BP's

## 2023-01-19 NOTE — ASSESSMENT & PLAN NOTE
· History of hyponatremia  · Patient is asymptomatic  · Last sodium 1/16/23 was 130  · continue sodium tabs to 3 grams TID  · TF flushes to 50 ml q 4 hour  · Monitor BMP

## 2023-01-19 NOTE — PROGRESS NOTES
[facility]  [address] [address]  [phone]  Hannah Ranks  30 ([facility])   Acute Visit      NAME: Otto Iglesias III  AGE: 63 y o  SEX: male CODE STATUS: CPR    DATE OF ENCOUNTER: 1/19/2023    Assessment and Plan     1  Generalized abdominal pain  Assessment & Plan:  · SNF staff requesting patient be seen for abdominal pain  · Patient had refused his evening TF, stating he had pain  · It appears later he felt better and took his TF  · On exam he denies pain, stating he did not want the TF because his stoma site needed to be cleaned  · BS are + x 4  · Abdomen is soft, non-tender on palpation  · Last BM was 1/19/23  · Continue to monitor      2  Primary hypertension  Assessment & Plan:  · History of HTN  · BP have been stable,  141/96  · Continue metoprolol 25 mg q 12  · Continue torsemide 5 mg daily  · Avoid hypotension  · Continue to Monitor BP's      3  Hyponatremia  Assessment & Plan:  · History of hyponatremia  · Patient is asymptomatic  · Last sodium 1/16/23 was 130  · continue sodium tabs to 3 grams TID  · TF flushes to 50 ml q 4 hour  · Monitor BMP      4  History of CVA (cerebrovascular accident)  Assessment & Plan:  · Known history of CVA  · Residual left hemiplegia and dysphagia, G-Tube  · Continue  PT/OT for strength training as recommended  · Maintain fall/safety precautions  · Continue to assist with ADL's as needed  · Continue plavix 75 mg daily  · Continue ASA 81 mg daily  · Continue Lipitor 40 mg daily         All medications and routine orders were reviewed and updated as needed  Chief Complaint   Acute visit for abdominal pain  Patient's care was coordinated with nursing facility staff  Recent vitals, labs, and updated medications were review on [facility] system in facility        Past Medical and Surgical History      Past Medical History:   Diagnosis Date   • Hypertension    • Renal disorder    • Stroke [facility])      Past Surgical History:   Procedure Laterality Date   • GASTROSTOMY TUBE PLACEMENT N/A 10/18/2022    Procedure: INSERTION GASTROSTOMY TUBE OPEN;  Surgeon: Alyssa Yanez DO;  Location: [facility];  Service: General     No Known Allergies       History of Present Illness     ROSSANA Nuñez is a 65-year-old male, he is a LTC resident of [facility] since 11/4/22  Past Medical Hx including but not limited to HTN, CVA, hyponatremia, Anemia, malnutrition  He was seen in collaboration with nursing for medical management and acute visit for abdominal pain  Hospital Course  Patient was admitted to the hospital on 10/31/22 for c/o bloody drainage through G-tube and melena  He was found to be hypotensive requiring pressors, he was transfused 2 units PRBC's  Upper endoscopy showed an ulcer with active bleeding  Epinephrine injections were given  Hemoglobin then dropped down to 6 6  Patient was transfused an additional unit of PRBC  Patient was found to be became hyponatremic and received IV fluids  Patient completed STR and was transitioned to LTC  Patient was seen and examined today  SNF staff reported that patient had refused his evening tube feed and c/o abdominal pain  It appears he felt better later in the evening and agreed to his tube feeding  On exam patient is sitting in his chair at the side of the bed and does not appear to be in any distress  His abdomen is soft, nontender on palpation, he has positive bowel sounds in all 4 quadrants  He states he did not want his tube feed because the stoma site needed to be cleaned  He states he feels ok and has been tolerating his diet and supplemental tube feedings  Continue to monitor  Denies CP/SOB/N/V/D  Denies lightheadedness, dizziness, headaches, vision changes  Patient states they are eating well and staying hydrated  Denies any other bowel or bladder issues  Per review of SNF records, Patient is eating 3 meals per day, consuming 50-75 %  Last documented BM 1/19/23   No other concerns from nursing at this time  The patient's allergies, past medical, surgical, social and family history were reviewed and unchanged  Review of Systems     Review of Systems   Constitutional: Negative for activity change, appetite change and fever  HENT: Negative  Negative for congestion  Eyes: Negative  Respiratory: Negative  Negative for cough, shortness of breath and wheezing  Cardiovascular: Negative  Negative for chest pain, palpitations and leg swelling  Gastrointestinal: Negative for abdominal distention, abdominal pain, constipation, diarrhea, nausea and vomiting  Endocrine: Negative  Genitourinary: Negative  Negative for difficulty urinating  Musculoskeletal: Positive for gait problem  Skin: Negative  Allergic/Immunologic: Negative  Neurological: Negative for dizziness and headaches  Hematological: Negative  Psychiatric/Behavioral: Negative for sleep disturbance  Objective     Vitals:   Vitals:    01/19/23 1748   BP: 141/96   Pulse: 71   Resp: 18   Temp: (!) 97 4 °F (36 3 °C)   SpO2: 96%         Physical Exam  Vitals and nursing note reviewed  Constitutional:       General: He is not in acute distress  Appearance: Normal appearance  He is not ill-appearing  HENT:      Head: Normocephalic and atraumatic  Mouth/Throat:      Mouth: Mucous membranes are moist    Eyes:      Conjunctiva/sclera: Conjunctivae normal    Cardiovascular:      Rate and Rhythm: Normal rate and regular rhythm  Pulses: Normal pulses  Heart sounds: Normal heart sounds  Pulmonary:      Effort: Pulmonary effort is normal  No respiratory distress  Breath sounds: Normal breath sounds  No wheezing  Abdominal:      General: Bowel sounds are normal  There is no distension  Palpations: Abdomen is soft  Tenderness: There is no abdominal tenderness  Musculoskeletal:      Right lower leg: No edema  Left lower leg: No edema  Skin:     General: Skin is warm  Neurological:      Mental Status: He is alert and oriented to person, place, and time  Motor: Weakness present  Gait: Gait abnormal    Psychiatric:         Mood and Affect: Mood normal          Behavior: Behavior normal          Pertinent Laboratory/Diagnostic Studies:   Reviewed in facility chart-stable      Current Medications   Medications reviewed and updated see facility [facility] for details        Current Outpatient Medications:   •  acetaminophen (TYLENOL) 325 mg tablet, Take 650 mg by mouth every 6 (six) hours as needed for mild pain, Disp: , Rfl:   •  albuterol (2 5 mg/3 mL) 0 083 % nebulizer solution, Take 3 mL (2 5 mg total) by nebulization every 6 (six) hours as needed for wheezing or shortness of breath, Disp: 60 mL, Rfl: 0  •  amLODIPine (NORVASC) 5 mg tablet, 1 tablet (5 mg total) by Per G Tube route daily, Disp: 90 tablet, Rfl: 0  •  aspirin (ECOTRIN LOW STRENGTH) 81 mg EC tablet, Take 1 tablet (81 mg total) by mouth daily, Disp: 90 tablet, Rfl: 0  •  atorvastatin (LIPITOR) 40 mg tablet, Take 1 tablet (40 mg total) by mouth daily with dinner, Disp: 90 tablet, Rfl: 0  •  bisacodyl (Dulcolax) 10 mg suppository, Insert 10 mg into the rectum daily, Disp: , Rfl:   •  clopidogrel (PLAVIX) 75 mg tablet, Take 1 tablet (75 mg total) by mouth daily Do not start before October 22, 2022 , Disp: 20 tablet, Rfl: 0  •  ferrous sulfate 324 (65 Fe) mg, Take 1 tablet (324 mg total) by mouth 2 (two) times a day before meals, Disp: 60 tablet, Rfl: 0  •  folic acid (FOLVITE) 1 mg tablet, Take 1 mg by mouth daily Crush pill mixed Will water administered through PEG tube, Disp: , Rfl:   •  LORazepam (ATIVAN) 0 5 mg tablet, Take 0 5 mg by mouth every 6 (six) hours as needed RestlessNess anxiety, Disp: , Rfl:   •  metoprolol tartrate (LOPRESSOR) 25 mg tablet, 1 tablet (25 mg total) by Per G Tube route every 12 (twelve) hours, Disp: 180 tablet, Rfl: 0  •  Multiple Vitamins-Minerals (Multivitamin) LIQD, Take 15 mL by mouth in the morning, Disp: , Rfl:   •  polyethylene glycol (MIRALAX) 17 g packet, Take 17 g by mouth 2 (two) times a day, Disp: , Rfl:   •  sodium chloride 1 g tablet, Take 3 g by mouth 3 (three) times a day, Disp: , Rfl:   •  thiamine 100 MG tablet, Take 100 mg by mouth daily ETOH abuse, Disp: , Rfl:        Please note:  Voice-recognition software may have been used in the preparation of this document  Occasional wrong word or "sound-alike" substitutions may have occurred due to the inherent limitations of voice recognition software  Interpretation should be guided by context           [address]en  1/19/2023  5:58 PM

## 2023-01-19 NOTE — ASSESSMENT & PLAN NOTE
· SNF staff requesting patient be seen for abdominal pain  · Patient had refused his evening TF, stating he had pain  · It appears later he felt better and took his TF  · On exam he denies pain, stating he did not want the TF because his stoma site needed to be cleaned  · BS are + x 4  · Abdomen is soft, non-tender on palpation  · Last BM was 1/19/23  · Continue to monitor

## 2023-01-27 ENCOUNTER — NURSING HOME VISIT (OUTPATIENT)
Dept: GERIATRICS | Facility: OTHER | Age: 63
End: 2023-01-27

## 2023-01-27 VITALS
OXYGEN SATURATION: 97 % | WEIGHT: 119 LBS | RESPIRATION RATE: 18 BRPM | SYSTOLIC BLOOD PRESSURE: 123 MMHG | HEART RATE: 70 BPM | DIASTOLIC BLOOD PRESSURE: 62 MMHG | BODY MASS INDEX: 17.07 KG/M2 | TEMPERATURE: 98.4 F

## 2023-01-27 DIAGNOSIS — D64.9 ANEMIA, UNSPECIFIED TYPE: Primary | Chronic | ICD-10-CM

## 2023-01-27 RX ORDER — PANTOPRAZOLE SODIUM 40 MG/1
40 TABLET, DELAYED RELEASE ORAL DAILY
COMMUNITY

## 2023-01-27 NOTE — ASSESSMENT & PLAN NOTE
HGB level was 9 8 on 11/14  Patient previously received PRBC transfusion for hemoglobin of 6 8  Anemia was attributed to GI bleed  Continue iron supplements and multivitamin    Follow-up repeat HGB

## 2023-01-27 NOTE — PROGRESS NOTES
[facility]  [address] [place] [address], [address] [place] [address]  [id number]                             Progress note  NAME: Hawa Maciel III  AGE: 63 y o  SEX: male [id number]      Patient Location     [facility]    Patient’s care was coordinated with nursing facility staff  Recent vitals, labs and updated medications were reviewed on [facility] facility  Past Medical, surgical, social, medication and allergy history and patient’s previous records reviewed  Assessment/Plan:    Anemia  HGB level was 9 8 on 11/14  Patient previously received PRBC transfusion for hemoglobin of 6 8  Anemia was attributed to GI bleed  Continue iron supplements and multivitamin  Follow-up repeat HGB    History of GI bleed:  Pt was hospitalized in November with melena/GI bleed  EGD revealed ulceration with active bleeding  Patient underwent placement of 3 hemoclips and injection of epinephrine to control bleeding  Patient was additionally transfused PRBC for hemoglobin of 6 6  Most recent hemoglobin was stable  Per records patient was supposed to be on Protonix, currently not seen in medication list   We will start at 40 mg once daily and follow    Primary hypertension:   Blood pressure stable at 123/62  Continue Metoprolol 25 mg twice daily and Torsemide 5 mg daily  He was taken off of amlodipine last month by nephrology service    Mild protein calorie malnutrition:  Pt remains weak and frail  He has lost around 18 pounds since [place] and 11 pounds during the last month   Meal consumption is around 50 to 100%  Patient additionally remains on supplemental  Tube  feeds  Follow-up with dietitian  History of CVA:  Patient had CVA in October 22 with residual left hemiplegia and dysphagia  He was found to have proximal left cervical ICA stenosis greater than 90%    MRI was remarkable for acute lacunar infarct in left thalamus and acute to early subacute infarct in right periventricular white matter and lentiform nuclei without mass effect or hemorrhage  Aspirin and Plavix were held previously due to GI bleed later resumed  Continue atorvastatin, Plavix and aspirin    Hyponatremia:   Pt  has history of chronic hyponatremia  Recent sodium was stable at 130  Continue sodium chloride tablets 3 g 3 times/day  Patient was evaluated nephrology service on 12/23/2022  Follow-up notes were reviewed  Previous CT abdomen and pelvis was negative for malignancy  He was suspected to have SIADH in the setting of stroke    History of alcohol abuse:  Continue thiamine and folic acid  Hyperlipidemia:  Continue atorvastatin  Lab Results   Component Value Date    CHOLESTEROL 97 10/10/2022     Lab Results   Component Value Date    HDL 38 (L) 10/10/2022     Lab Results   Component Value Date    TRIG 96 10/10/2022     No results found for: Jef  Reason for visit      Follow up up anemia, hyponatremia GI bleed, history of CVA, hyperlipidemia, hypertension    HPI       Patient is being seen for a follow-up visit today  He is doing okay at present  Appears comfortable in no distress  Patient has lost around 11 pounds during the last months  He is being followed by dietitian  Meal consumption is around 50 to 100%  Patient remains on puréed diet with additional supplemental tube feeds  No fever chills dyspnea or chest congestion         Past Medical History:   Diagnosis Date   • Hypertension    • Renal disorder    • Stroke [facility])        Past Surgical History:   Procedure Laterality Date   • GASTROSTOMY TUBE PLACEMENT N/A 10/18/2022    Procedure: INSERTION GASTROSTOMY TUBE OPEN;  Surgeon: Jaxon Yanez DO;  Location: [facility];  Service: General       Social History     Tobacco Use   Smoking Status Every Day   • Packs/day: 1 00   • Types: Cigarettes   Smokeless Tobacco Never       Family  History:  Noncontributory    No Known Allergies       Current Outpatient Medications:   • pantoprazole (PROTONIX) 40 mg tablet, Take 40 mg by mouth daily, Disp: , Rfl:   •  acetaminophen (TYLENOL) 325 mg tablet, Take 650 mg by mouth every 6 (six) hours as needed for mild pain, Disp: , Rfl:   •  albuterol (2 5 mg/3 mL) 0 083 % nebulizer solution, Take 3 mL (2 5 mg total) by nebulization every 6 (six) hours as needed for wheezing or shortness of breath, Disp: 60 mL, Rfl: 0  •  amLODIPine (NORVASC) 5 mg tablet, 1 tablet (5 mg total) by Per G Tube route daily, Disp: 90 tablet, Rfl: 0  •  aspirin (ECOTRIN LOW STRENGTH) 81 mg EC tablet, Take 1 tablet (81 mg total) by mouth daily, Disp: 90 tablet, Rfl: 0  •  atorvastatin (LIPITOR) 40 mg tablet, Take 1 tablet (40 mg total) by mouth daily with dinner, Disp: 90 tablet, Rfl: 0  •  bisacodyl (Dulcolax) 10 mg suppository, Insert 10 mg into the rectum daily, Disp: , Rfl:   •  clopidogrel (PLAVIX) 75 mg tablet, Take 1 tablet (75 mg total) by mouth daily Do not start before October 22, 2022 , Disp: 20 tablet, Rfl: 0  •  ferrous sulfate 324 (65 Fe) mg, Take 1 tablet (324 mg total) by mouth 2 (two) times a day before meals, Disp: 60 tablet, Rfl: 0  •  folic acid (FOLVITE) 1 mg tablet, Take 1 mg by mouth daily Crush pill mixed Will water administered through PEG tube, Disp: , Rfl:   •  LORazepam (ATIVAN) 0 5 mg tablet, Take 0 5 mg by mouth every 6 (six) hours as needed RestlessNess anxiety, Disp: , Rfl:   •  metoprolol tartrate (LOPRESSOR) 25 mg tablet, 1 tablet (25 mg total) by Per G Tube route every 12 (twelve) hours, Disp: 180 tablet, Rfl: 0  •  Multiple Vitamins-Minerals (Multivitamin) LIQD, Take 15 mL by mouth in the morning, Disp: , Rfl:   •  polyethylene glycol (MIRALAX) 17 g packet, Take 17 g by mouth 2 (two) times a day, Disp: , Rfl:   •  sodium chloride 1 g tablet, Take 3 g by mouth 3 (three) times a day, Disp: , Rfl:   •  thiamine 100 MG tablet, Take 100 mg by mouth daily ETOH abuse, Disp: , Rfl:     Updated list was reviewed in [facility] system of facility  Vitals:    01/27/23 0948   BP: 123/62   Pulse: 70   Resp: 18   Temp: 98 4 °F (36 9 °C)   SpO2: 97%       Vital signs were reviewed in [facility]    Review of Systems   Constitutional: Positive for fatigue  Negative for chills and fever  HENT: Negative for nosebleeds and rhinorrhea  Eyes: Negative for discharge and redness  Respiratory: Negative for cough, shortness of breath, wheezing and stridor  Cardiovascular: Negative for chest pain and leg swelling  Gastrointestinal: Negative for abdominal distention, abdominal pain, diarrhea and vomiting  Genitourinary: Negative for hematuria  Musculoskeletal: Positive for gait problem  Negative for arthralgias and back pain  Skin: Negative for pallor  Neurological: Positive for speech difficulty and weakness (left sided)  Negative for tremors, seizures and syncope  Psychiatric/Behavioral: Positive for confusion  Negative for agitation and behavioral problems  Physical Exam  Constitutional:       General: He is not in acute distress  HENT:      Head: Normocephalic and atraumatic  Eyes:      General: No scleral icterus  Right eye: No discharge  Left eye: No discharge  Cardiovascular:      Rate and Rhythm: Normal rate and regular rhythm  Pulmonary:      Breath sounds: No wheezing, rhonchi or rales  Abdominal:      General: There is no distension  Palpations: Abdomen is soft  Tenderness: There is no abdominal tenderness  There is no guarding  Comments: G tube in place   Musculoskeletal:      Cervical back: Neck supple  Right lower leg: No edema  Left lower leg: No edema  Comments: Muscle wasting of all extremities noted   Skin:     Coloration: Skin is not jaundiced  Neurological:      Cranial Nerves: No cranial nerve deficit  Motor: Weakness (left sided) present        Comments: Oriented in month and year   Psychiatric:         Mood and Affect: Mood normal  Behavior: Behavior normal            Diagnostic Data     Labs done on 1/16/2023 revealed blood glucose of 112, BUN 10, creatinine 0 40, sodium 130, potassium 4 7      Lab Results   Component Value Date    SODIUM 132 (L) 11/04/2022    K 3 4 (L) 11/04/2022     11/04/2022    CO2 24 11/04/2022    BUN 9 11/04/2022    CREATININE 0 42 (L) 11/04/2022    GLUC 95 11/04/2022    CALCIUM 8 7 11/04/2022     Lab Results   Component Value Date    WBC 6 16 11/04/2022    HGB 8 8 (L) 11/04/2022    HCT 23 7 (L) 11/04/2022    MCV 92 11/04/2022     11/04/2022         Recent labs and imaging studies were reviewed  EGD  · Esophagus-2 cm and hernia was seen with peptic stricture at the GE junction that was dilated with mucosal trauma from the scope itself  · Stomach-fresh blood around the G-tube balloon was washed off and suctioned   Noted active bleeding underneath blood clot in the adjacent area (separate from the incision site)   Blood clot was removed with suctioning on to the scope and noted a tiny ulceration with active bleeding which appeared to be a Dieulafoy's lesion   Placed 3 hemoclips and also injected epinephrine to control the bleeding      CT abdomen pelvis w contrast  Marked distention of the rectum with large amount of surrounding inflammatory changes which may represent stercoral colitis   Follow-up with gastroenterology is recommended      Thickening of the urinary bladder wall which may represent cystitis   Follow-up with urology is recommended      Airspace opacities and tree-in-bud opacities in the lung bases which may represent infection   Short-term follow-up chest CT scan in 3 months is recommended to evaluate for resolution         Incidental Findings:   CT abdomen/pelvis  Airspace opacities and tree-in-bud opacities in the lung bases which may represent infection   Short-term follow-up chest CT scan in 3 months is recommended to evaluate for resolution       Code Status:      Full code              This note was electronically signed by Dr Jeff Allred

## 2023-01-31 ENCOUNTER — CONSULT (OUTPATIENT)
Dept: VASCULAR SURGERY | Facility: CLINIC | Age: 63
End: 2023-01-31

## 2023-01-31 ENCOUNTER — TELEPHONE (OUTPATIENT)
Dept: VASCULAR SURGERY | Facility: CLINIC | Age: 63
End: 2023-01-31

## 2023-01-31 VITALS
BODY MASS INDEX: 17.07 KG/M2 | RESPIRATION RATE: 18 BRPM | HEART RATE: 72 BPM | HEIGHT: 70 IN | SYSTOLIC BLOOD PRESSURE: 116 MMHG | DIASTOLIC BLOOD PRESSURE: 74 MMHG

## 2023-01-31 DIAGNOSIS — I65.22 CAROTID STENOSIS, ASYMPTOMATIC, LEFT: Primary | ICD-10-CM

## 2023-01-31 DIAGNOSIS — I65.22 MORE THAN 50 PERCENT STENOSIS OF LEFT INTERNAL CAROTID ARTERY: ICD-10-CM

## 2023-01-31 DIAGNOSIS — I63.9 ACUTE CVA (CEREBROVASCULAR ACCIDENT) (HCC): ICD-10-CM

## 2023-01-31 RX ORDER — CEFAZOLIN SODIUM 1 G/50ML
1000 SOLUTION INTRAVENOUS ONCE
OUTPATIENT
Start: 2023-01-31 | End: 2023-01-31

## 2023-01-31 RX ORDER — CHLORHEXIDINE GLUCONATE 0.12 MG/ML
15 RINSE ORAL ONCE
OUTPATIENT
Start: 2023-01-31 | End: 2023-01-31

## 2023-01-31 NOTE — PROGRESS NOTES
Assessment/Plan:    More than 50 percent stenosis of left internal carotid artery  Bilateral basal ganglia CVA 10/2022 with left sided residual hemiplegia  He has made minimal recovery since CVA  Has left 90% ICA stenosis, this lesion is not the etiology of his CVA    I did recommend CEA and after discussing all risks, benefits and alternative therapies with him he consented to proceed  Hold plavix 5 days prior       Diagnoses and all orders for this visit:    Carotid stenosis, asymptomatic, left  -     Case request operating room: Left CEA with neuromonitoring; Standing  -     Type and screen; Future  -     Basic metabolic panel; Future  -     CBC and Platelet; Future  -     Case request operating room: Left CEA with neuromonitoring    Acute CVA (cerebrovascular accident) ([facility] [address] )    More than 50 percent stenosis of left internal carotid artery    Other orders  -     Diet NPO; Sips with meds; Standing  -     Void on call to OR; Standing  -     Insert peripheral IV; Standing  -     Nursing Communication CHG bath, have staff wash entire body (neck down) per pre op bathing protocol  Routine, evening prior to, and day of surgery ; Standing  -     Nursing Communication Swab both nares with Povidone-Iodine solution, EXCLUDE if patient has shellfish/Iodine allergy, and replace with nasal alcohol swabstick  Routine, day of surgery, on call to OR ; Standing  -     chlorhexidine (PERIDEX) 0 12 % oral rinse 15 mL  -     Place sequential compression device; Standing  -     Basic metabolic panel; Standing  -     CBC; Standing  -     ceFAZolin (ANCEF) IVPB (premix in dextrose) 1,000 mg 50 mL          Subjective:      Patient ID: Ophelia Zurita is a 63 y o  male  Patient was in [facility] 10/09/22 to 10/21/22 for CVA  Pt had a CTA head and neck on 10/9/22  Pt is paralyzed on the Lt side of his body due to his CVA  Pt is on ASA 81 mg, Plavix, and Atorvastatin      HPI    The following portions of the patient's history were reviewed and updated as appropriate: allergies, current medications, past family history, past medical history, past social history, past surgical history and problem list   Admitted with CVA in 10/2022  Has left sided hemiplegia  Currently is rehab facility  Has bilateral basal ganglia CVA however only has permanent deficits on the left  Workup showed left ICA 90% and less than 50% on the right  Review of Systems   Constitutional: Negative  HENT: Negative  Eyes: Negative  Negative for visual disturbance  Respiratory: Negative  Cardiovascular: Negative  Gastrointestinal: Negative  Endocrine: Negative  Genitourinary: Negative  Musculoskeletal: Positive for gait problem  Skin: Negative  Allergic/Immunologic: Negative  Neurological: Positive for weakness and numbness  Negative for dizziness, facial asymmetry and speech difficulty  Hematological: Negative  Psychiatric/Behavioral: Negative  I have reviewed the ROS above and made changes as needed        Objective:      /74 (BP Location: Right arm, Patient Position: Sitting)   Pulse 72   Resp 18   Ht 5' 10" (1 778 m)   BMI 17 07 kg/m²          Physical Exam      General  Exam: alert, awake, oriented, no distress, consistent with stated age    Integumentary  Exam: warm, dry, no gross lesions, no bruises and normal color    Head and Neck  Exam: supple, no bruits, trachea midline, no JVD, no mass or palpable nodes    Eye  Exam: extraoccular movements intact, no scleral icterus, sclera clear, pupils equal round and reactive to light    ENMT  Exam: oral mucosa pink and moist    Chest and Lung  Exam: chest normal without deformity, bilaterally expansive, clear to auscultation    Cardiovascular  Exam: regular rate, regular rhythm, no murmurs, no rubs or gallops    Adbomen  Exam: soft, non-tender, non-distended, no pulsatile abdominal masses, no abdominal bruit    Peripheral Vascular  Exam: no clubbing of the digits of the upper extremity, no cyanosis, no edema, both feet are warm, radial pulses 2+ bilaterally, skin well perfused, without and no varicosities  No widened popliteal pulse noted bilaterally    Upper Extremity:  Palpation: Radial pulse- Bilateral 2+    Lower Extremity:  Palpation: Femoral pulse- Bilateral 2+         Popliteal pulse - Bilateral 2+        Dorsalis Pedis - Bilateral 2+        Posterior tibial - Bilateral 2+    Neurologic  Exam:alert, oriented x 3, cranial nerves II-XII grossly intact    LUE 0/5  LLE 0/5  RUE 5/5  RLE 5/5      Operative Scheduling Information:    Hospital:  [facility] or [facility]  Would prefer non-hybrid room    Physician:  [facility] [place]    Surgery: Left CEA with neuromonitoring    Urgency:  Standard    Level:  Level 4: Outpatients to be scheduled for screening procedures and elective surgery that can be delayed for longer than one month without reasonable expectation of detriment to patient      Case Length:  Normal    Post-op Bed:  Stepdown    OR Table:  Standard    Equipment Needs:  Rep: Neuromonitoring    Medication Instructions:  Aspirin:   Continue (do not hold)  Plavix:  Hold 5 days prior    Hydration:  No    Contrast Allergy:  no

## 2023-01-31 NOTE — TELEPHONE ENCOUNTER
REMINDER: Under Reason For Call, comments MUST be formatted as:   (Surgeon's Initials) / (Procedure)      Special Instructions / FYI:     Procedure: Left CEA with neuromonitoring    Level: 4 - Route clearance(s) to [facility] Surgery Coordinator Pool    Allergies: Patient has no known allergies  Instructions Given: NO Bowel Prep General Instructions     Dialysis: Patient is not on dialysis  Return Visit Required Prior to Procedure: No     Consent: I certify that patient has signed, printed, timed, and dated their surgery consent  I certify that the patient's LEGAL NAME and DATE OF BIRTH are written in the upper left corner on BOTH sides of the consent  I certify that BOTH sides of the completed surgery consent have been scanned into the patient's Epic chart by myself on 1/31/2023  Yes, I have LABELED the consent in Epic as Consent for Vascular Procedure  For Surgical Clearances     Levels   1-3   ROUTE this encounter to [facility] Clearance Pool (AND)   [facility] Surgery Coordinator Pool     Level   4   ROUTE this encounter to [facility] Surgery Coordinator Pool       HYDRATION CLEARANCES   ONLY ROUTE TO  [facility] Clearance Pool     Patient does not require any pre operative clearance  Yes, I have ROUTED this encounter to [facility] Surgery Coordinator and/or [facility] Clearance Pool

## 2023-01-31 NOTE — ASSESSMENT & PLAN NOTE
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

## 2023-02-06 ENCOUNTER — PREP FOR PROCEDURE (OUTPATIENT)
Dept: VASCULAR SURGERY | Facility: CLINIC | Age: 63
End: 2023-02-06

## 2023-02-06 NOTE — TELEPHONE ENCOUNTER
Verified patient's insurance   CONFIRMED - Patient's insurance is [facility]  Is patient requesting a call when authorization has been obtained? Patient did not request a call  Surgery Date: 3-14-23  Primary Surgeon: Sherri Gomez // Urbano Ponce (NPI: [id number])  Assisting Surgeon: Not Applicable (N/A)  Facility: [facility] (Tax: [id number] / NPI: [id number])  Inpatient / Outpatient: Inpatient  Level: 4    Clearance Received: No clearance ordered  Consent Received: Yes, scanned into Epic on 2-6-23  Medication Hold / Last Dose: No Hold on ASA HOLD Plavix 5 days prior last dose 3-8-23  VQI Spreadsheet: Yes  IR Notified: Not Applicable (N/A)  Rep  Notified: [email or web address]   Equipment Needs: Not Applicable (N/A)  Vas Lab Requested: Not Applicable (N/A)  Patient Contacted: 2-6-23    Diagnosis: I65 22  Procedure/ CPT Code(s): (CEA) Carotid Endarterectomy / Patch Angioplasty // CPT: 66549    For varicose vein related procedures:   Last LEVDR: Not Applicable (N/A)  CEAP Classification: Not Applicable (N/A)  VCSS: Not Applicable (N/A)    Post Operative Date/ Time: To Be Determined (TBD)     *Please review medication hold(s), PATs, and check H&P with patient  *  PATIENT WAS MAILED SURGERY/SHOWERING/DISCHARGE/COVID INSTRUCTIONS AFTER REVIEWING WITH THEM VIA PHONE CALL       Spoke to NH where patient resides to schedule surgery patient will have his blood work done prior to surgery [facility]

## 2023-02-22 ENCOUNTER — NURSING HOME VISIT (OUTPATIENT)
Dept: GERIATRICS | Facility: OTHER | Age: 63
End: 2023-02-22

## 2023-02-22 VITALS
SYSTOLIC BLOOD PRESSURE: 132 MMHG | OXYGEN SATURATION: 96 % | DIASTOLIC BLOOD PRESSURE: 66 MMHG | RESPIRATION RATE: 18 BRPM | HEART RATE: 61 BPM | TEMPERATURE: 97.7 F

## 2023-02-22 DIAGNOSIS — I10 PRIMARY HYPERTENSION: Primary | Chronic | ICD-10-CM

## 2023-02-22 DIAGNOSIS — E87.1 HYPONATREMIA: Chronic | ICD-10-CM

## 2023-02-22 DIAGNOSIS — R26.2 AMBULATORY DYSFUNCTION: Chronic | ICD-10-CM

## 2023-02-22 DIAGNOSIS — Z86.73 HISTORY OF CVA (CEREBROVASCULAR ACCIDENT): Chronic | ICD-10-CM

## 2023-02-23 NOTE — ASSESSMENT & PLAN NOTE
·  history of CVA  · Residual left hemiplegia and dysphagia, G-Tube  · Continue  PT/OT for strength training as recommended  · Maintain fall/safety precautions  · Continue to assist with ADL's as needed  · Continue plavix 75 mg daily  · Continue ASA 81 mg daily  · Continue Lipitor 40 mg daily

## 2023-02-23 NOTE — ASSESSMENT & PLAN NOTE
· History of hyponatremia  · Patient is asymptomatic  · Last sodium 2/14/23 was 136  · continue sodium tabs to 3 grams TID  · TF flushes  50 ml q 4 hour  · Monitor BMP

## 2023-02-23 NOTE — ASSESSMENT & PLAN NOTE
· History of HTN  · BP have been stable, 132/66  · Continue metoprolol 25 mg q 12  · Continue torsemide 5 mg daily  · Avoid hypotension  · Continue to Monitor BP's

## 2023-02-23 NOTE — PROGRESS NOTES
[facility]  [address]  [phone]  Magno Miles  Code   28 (LTC)        NAME: Tristen Molina III  AGE: 63 y o  SEX: male CODE STATUS: CPR    DATE OF ENCOUNTER: 2/22/2023    Assessment and Plan     1  Primary hypertension  Assessment & Plan:  · History of HTN  · BP have been stable, 132/66  · Continue metoprolol 25 mg q 12  · Continue torsemide 5 mg daily  · Avoid hypotension  · Continue to Monitor BP's      2  Hyponatremia  Assessment & Plan:  · History of hyponatremia  · Patient is asymptomatic  · Last sodium 2/14/23 was 136  · continue sodium tabs to 3 grams TID  · TF flushes  50 ml q 4 hour  · Monitor BMP      3  Ambulatory dysfunction  Assessment & Plan:  · History of CVA residual left sided weakness   · Continue PT/OT for strength training as needed  · Maintain fall/safety precautions  · assist with ADL's as needed  · Encourage OOB for meals  · 24/7 supportive care per LTC      4  History of CVA (cerebrovascular accident)  Assessment & Plan:  ·  history of CVA  · Residual left hemiplegia and dysphagia, G-Tube  · Continue  PT/OT for strength training as recommended  · Maintain fall/safety precautions  · Continue to assist with ADL's as needed  · Continue plavix 75 mg daily  · Continue ASA 81 mg daily  · Continue Lipitor 40 mg daily         All medications and routine orders were reviewed and updated as needed  Chief Complaint     LTC follow up visit   Patient's care was coordinated with nursing facility staff  Recent vitals, labs, and updated medications were review on [facility] system in facility        Past Medical and Surgical History      Past Medical History:   Diagnosis Date   • Hypertension    • Renal disorder    • Stroke [facility])      Past Surgical History:   Procedure Laterality Date   • GASTROSTOMY TUBE PLACEMENT N/A 10/18/2022    Procedure: INSERTION GASTROSTOMY TUBE OPEN;  Surgeon: Sandra Yanez DO;  Location: [facility];  Service: General     No Known Allergies       History of Present Illness     ROSSANA Magallanes is a 61-year-old male, he is a LTC patient of [facility] since 11/4/22  Past Medical Hx including but not limited to HTN, CVA, hyponatremia, Anemia, malnutrition  He was seen in collaboration with nursing for medical management and LTC follow up  Marielena Fisher was seen and examined at bedside today  Patient is a reliable historian and is AAOx3  Patient is sitting in his wheelchair at the side of his bed and does not appear to be in any distress  He denies any pain and states he is doing ok today  Patient continues with Jevity 1 5 nightly, supplements with meals  Weight is 113 7 lbs, he is followed by the dietician  Last sodium level 136, continue sodium tablets 3 gr TID, monitor BMP  Denies CP/SOB/N/V/D  Denies lightheadedness, dizziness, headaches, vision changes  Patient states they are eating well and staying hydrated  Denies any bowel or bladder issues  Per review of SNF records, Patient is eating 3 meals per day, consuming  %  Last documented BM 2/22/23  No concerns from nursing at this time  The patient's allergies, past medical, surgical, social and family history were reviewed and unchanged  Review of Systems     Review of Systems   Constitutional: Negative for activity change, appetite change and fever  HENT: Negative  Negative for congestion  Eyes: Negative  Respiratory: Negative  Negative for cough, shortness of breath and wheezing  Cardiovascular: Negative  Negative for chest pain, palpitations and leg swelling  Gastrointestinal: Negative for abdominal distention, abdominal pain, constipation, diarrhea, nausea and vomiting  Endocrine: Negative  Genitourinary: Negative  Negative for difficulty urinating  Musculoskeletal: Positive for gait problem  Skin: Negative  Allergic/Immunologic: Negative  Neurological: Negative for dizziness and headaches  Hematological: Negative  Psychiatric/Behavioral: Negative for sleep disturbance  Objective     Vitals:   Vitals:    02/22/23 1929   BP: 132/66   Pulse: 61   Resp: 18   Temp: 97 7 °F (36 5 °C)   SpO2: 96%         Physical Exam  Vitals and nursing note reviewed  Constitutional:       General: He is not in acute distress  Appearance: Normal appearance  He is not ill-appearing  HENT:      Head: Normocephalic and atraumatic  Mouth/Throat:      Mouth: Mucous membranes are moist    Eyes:      Conjunctiva/sclera: Conjunctivae normal    Cardiovascular:      Rate and Rhythm: Normal rate and regular rhythm  Pulses: Normal pulses  Heart sounds: Normal heart sounds  Pulmonary:      Effort: Pulmonary effort is normal  No respiratory distress  Breath sounds: Normal breath sounds  No wheezing  Abdominal:      General: Bowel sounds are normal  There is no distension  Palpations: Abdomen is soft  Tenderness: There is no abdominal tenderness  Musculoskeletal:      Right lower leg: No edema  Left lower leg: No edema  Skin:     General: Skin is warm  Neurological:      Mental Status: He is alert and oriented to person, place, and time  Motor: Weakness present  Gait: Gait abnormal    Psychiatric:         Mood and Affect: Mood normal          Behavior: Behavior normal          Pertinent Laboratory/Diagnostic Studies:   Reviewed in facility chart-stable      Current Medications   Medications reviewed and updated see facility [facility] for details        Current Outpatient Medications:   •  acetaminophen (TYLENOL) 325 mg tablet, Take 650 mg by mouth every 6 (six) hours as needed for mild pain, Disp: , Rfl:   •  albuterol (2 5 mg/3 mL) 0 083 % nebulizer solution, Take 3 mL (2 5 mg total) by nebulization every 6 (six) hours as needed for wheezing or shortness of breath, Disp: 60 mL, Rfl: 0  •  amLODIPine (NORVASC) 5 mg tablet, 1 tablet (5 mg total) by Per G Tube route daily, Disp: 90 tablet, Rfl: 0  • aspirin (ECOTRIN LOW STRENGTH) 81 mg EC tablet, Take 1 tablet (81 mg total) by mouth daily, Disp: 90 tablet, Rfl: 0  •  atorvastatin (LIPITOR) 40 mg tablet, Take 1 tablet (40 mg total) by mouth daily with dinner, Disp: 90 tablet, Rfl: 0  •  bisacodyl (Dulcolax) 10 mg suppository, Insert 10 mg into the rectum daily, Disp: , Rfl:   •  clopidogrel (PLAVIX) 75 mg tablet, Take 1 tablet (75 mg total) by mouth daily Do not start before October 22, 2022 , Disp: 20 tablet, Rfl: 0  •  ferrous sulfate 324 (65 Fe) mg, Take 1 tablet (324 mg total) by mouth 2 (two) times a day before meals, Disp: 60 tablet, Rfl: 0  •  folic acid (FOLVITE) 1 mg tablet, Take 1 mg by mouth daily Crush pill mixed Will water administered through PEG tube, Disp: , Rfl:   •  LORazepam (ATIVAN) 0 5 mg tablet, Take 0 5 mg by mouth every 6 (six) hours as needed RestlessNess anxiety, Disp: , Rfl:   •  metoprolol tartrate (LOPRESSOR) 25 mg tablet, 1 tablet (25 mg total) by Per G Tube route every 12 (twelve) hours, Disp: 180 tablet, Rfl: 0  •  Multiple Vitamins-Minerals (Multivitamin) LIQD, Take 15 mL by mouth in the morning, Disp: , Rfl:   •  pantoprazole (PROTONIX) 40 mg tablet, Take 40 mg by mouth daily, Disp: , Rfl:   •  polyethylene glycol (MIRALAX) 17 g packet, Take 17 g by mouth 2 (two) times a day, Disp: , Rfl:   •  sodium chloride 1 g tablet, Take 3 g by mouth 3 (three) times a day, Disp: , Rfl:   •  thiamine 100 MG tablet, Take 100 mg by mouth daily ETOH abuse, Disp: , Rfl:        Please note:  Voice-recognition software may have been used in the preparation of this document  Occasional wrong word or "sound-alike" substitutions may have occurred due to the inherent limitations of voice recognition software  Interpretation should be guided by context           [facility] DANIEL Pyle  2/22/2023  7:30 PM

## 2023-02-23 NOTE — ASSESSMENT & PLAN NOTE
· History of CVA residual left sided weakness   · Continue PT/OT for strength training as needed  · Maintain fall/safety precautions  · assist with ADL's as needed  · Encourage OOB for meals  · 24/7 supportive care per LTC

## 2023-03-10 RX ORDER — MULTIVITAMIN
1 TABLET ORAL DAILY
COMMUNITY

## 2023-03-10 RX ORDER — MULTIVITAMIN
1 CAPSULE ORAL DAILY
COMMUNITY

## 2023-03-10 NOTE — PRE-PROCEDURE INSTRUCTIONS
Pre-Surgery Instructions:   Medication Instructions   • acetaminophen (TYLENOL) 325 mg tablet Hold day of surgery  • albuterol (2 5 mg/3 mL) 0 083 % nebulizer solution Uses PRN- OK to take day of surgery   • aspirin (ECOTRIN LOW STRENGTH) 81 mg EC tablet Instructions provided by MD   • atorvastatin (LIPITOR) 40 mg tablet Take night before surgery   • bisacodyl (DULCOLAX) 10 mg suppository Hold day of surgery  • clopidogrel (PLAVIX) 75 mg tablet Stop taking 5 days prior to surgery  • ferrous sulfate 324 (65 Fe) mg Hold day of surgery  • folic acid (FOLVITE) 1 mg tablet Hold day of surgery  • LORazepam (ATIVAN) 0 5 mg tablet Uses PRN- OK to take day of surgery   • metoprolol tartrate (LOPRESSOR) 25 mg tablet Take day of surgery  • Multiple Vitamin (multivitamin) capsule Stop taking 7 days prior to surgery  • Multiple Vitamin (multivitamin) tablet    • pantoprazole (PROTONIX) 40 mg tablet Take day of surgery  • polyethylene glycol (MIRALAX) 17 g packet Hold day of surgery  • sodium chloride 1 g tablet Take day of surgery  • thiamine 100 MG tablet Stop taking 7 days prior to surgery  Pt should stop motrin, aleve, advil and ibuprofen one week prior to surgery  Surgery is scheduled at [address]  Enter through entrance B  Make a left at information desk and the first door on right is admitting  Pt should shower with hibiclens the night before and the day of the surgery  Pt should not wear any jewelry, lotion, powder, deodorant, contact lens on day of surgery  Last dose of plavix per surgeon is 3/18/23  Pt is to continue aspirin prior to surgery per surgeons instructions

## 2023-03-15 ENCOUNTER — ANESTHESIA EVENT (OUTPATIENT)
Dept: PERIOP | Facility: HOSPITAL | Age: 63
End: 2023-03-15

## 2023-03-23 NOTE — ANESTHESIA PREPROCEDURE EVALUATION
Procedure:  Left CEA with neuromonitoring (Left: Neck)    #HTN - on metoprolol 25mg BID, torsemide 5mg daily  #Chronic hyponatremia, managed with salt tabs TID  #Hx of CVA c/b residual left hemiplagia and dysphagia s/p G-tube  #Hx of melena requiring blood transfusions in the past    Hx of requiring US guidance for IVs  ETT Properties Placement Date: 10/18/22  -ST Placement Time: 0162  -ST Mask Ventilation: Ventilated by mask (1)  -ST Preoxygenated: Yes  -ST Technique: Direct laryngoscopy  -ST Type: Cuffed;Oral  -ST Tube Size: 8 mm  -ST Laryngoscope: Mac  -ST Blade Size: 3  -ST Location: Oral  -ST Grade View: 1  -ST Insertion: Atraumatic; No change in dentition  -ST Insertion attempts: 1  -ST Placement Verification: Auscultation;Cuff palpitation; End tidal CO2;Symmetrical chest wall movement  -ST Secured at (cm): 22  -ST Placed By: CRNA  -ST Removal Date: 10/18/22  -ST Removal Time: 1629  -ST         Past Medical History:   Diagnosis Date   • Hypertension    • Renal disorder    • Stroke ([facility] [address] )      Smoking Status: Every Day   Smokeless Tobacco Status: Never   Alcohol use: Not Currently   Comments: occasionally   Drug use: Never     TTE (10/2022):  Left Ventricle Left ventricular cavity size is normal  Wall thickness is moderately increased  There is moderate concentric hypertrophy  The left ventricular ejection fraction is 55%  Systolic function is normal   Wall motion is normal  Diastolic function is mildly abnormal, consistent with grade I (abnormal) relaxation  Right Ventricle Right ventricular cavity size is normal  Systolic function is normal  Wall thickness is normal    Left Atrium The atrium is normal in size  Right Atrium The atrium is normal in size  Aortic Valve The aortic valve is trileaflet  The leaflets are not thickened  The leaflets are not calcified  The leaflets exhibit normal mobility  There is no evidence of regurgitation  The aortic valve has no significant stenosis     Mitral Valve There is no evidence of regurgitation  There is no evidence of stenosis  The mitral valve has normal structure and normal function  Tricuspid Valve Tricuspid valve structure is normal  There is mild regurgitation  There is no evidence of stenosis  The estimated right ventricular systolic pressure is 73 83 mmHg  Pulmonic Valve Pulmonic valve structure is normal  There is no evidence of regurgitation  There is no evidence of stenosis  Ascending Aorta The aortic root is normal in size  IVC/SVC The right atrial pressure is estimated at 3 0 mmHg  The inferior vena cava is normal in size  Respirophasic changes were normal    Pericardium There is a trivial pericardial effusion  The fluid exhibits a fibrinous appearance  The pericardium is normal in appearance  Pulmonary Artery The estimated pulmonary artery systolic pressure is 37 0 mmHg  The pulmonary artery systolic pressure is mildly increased     EKG (10/2022):   Sinus tachycardia  Right atrial enlargement  Nonspecific ST abnormality  Abnormal ECG  No previous ECGs available  Confirmed by Tahmina Chappell ([id number]) on 10/10/2022 9:14:55 PM  Also confirmed by Tahmina Chappell ([id number]),  Janice Monae ([id number])  on 11/4/2022 2:09:42 PM    Lab Results   Component Value Date    WBC 6 16 11/04/2022    HGB 8 8 (L) 11/04/2022    HCT 23 7 (L) 11/04/2022    MCV 92 11/04/2022     11/04/2022       Chemistry        Component Value Date/Time    K 3 4 (L) 11/04/2022 0448     11/04/2022 0448    CO2 24 11/04/2022 0448    BUN 9 11/04/2022 0448    CREATININE 0 42 (L) 11/04/2022 0448        Component Value Date/Time    CALCIUM 8 7 11/04/2022 0448    ALKPHOS 79 10/31/2022 0225    AST 18 10/31/2022 0225    ALT 15 10/31/2022 0225              Physical Exam    Airway    Mallampati score: II  TM Distance: >3 FB  Neck ROM: full     Dental       Cardiovascular  Rhythm: regular, Rate: normal,     Pulmonary  Breath sounds clear to auscultation,     Other Findings  Intercisor Distance > 3cm          Anesthesia Plan  ASA Score- 3     Anesthesia Type- general with ASA Monitors  Additional Monitors: arterial line  Airway Plan: ETT  Comment: Discussed benefits/risks of general anesthesia including possibility of mouth/throat pain, injury to lips/teeth, nausea/vomiting, and surgical pain along with more rare complications such as stroke, MI, pneumonia, aspiration, and injury to blood vessels  Patient understands and wishes to proceed  All questions answered          Plan Factors-Exercise tolerance (METS): >4 METS  Chart reviewed  EKG reviewed  Existing labs reviewed  Induction- intravenous  Postoperative Plan- Plan for postoperative opioid use  Planned trial extubation    Informed Consent- Anesthetic plan and risks discussed with patient  I personally reviewed this patient with the CRNA  Discussed and agreed on the Anesthesia Plan with the CRNA  Tete Rodriguez

## 2023-03-24 ENCOUNTER — ANESTHESIA (OUTPATIENT)
Dept: PERIOP | Facility: HOSPITAL | Age: 63
End: 2023-03-24

## 2023-03-24 ENCOUNTER — HOSPITAL ENCOUNTER (INPATIENT)
Facility: HOSPITAL | Age: 63
LOS: 1 days | Discharge: NURSING, REHAB OR LONG-TERM CARE | End: 2023-03-25
Attending: SURGERY | Admitting: SURGERY

## 2023-03-24 DIAGNOSIS — E44.1 MILD PROTEIN-CALORIE MALNUTRITION (HCC): Primary | ICD-10-CM

## 2023-03-24 DIAGNOSIS — I65.22 CAROTID STENOSIS, ASYMPTOMATIC, LEFT: ICD-10-CM

## 2023-03-24 LAB
ABO GROUP BLD: NORMAL
ANION GAP SERPL CALCULATED.3IONS-SCNC: 3 MMOL/L (ref 4–13)
BLD GP AB SCN SERPL QL: NEGATIVE
BUN SERPL-MCNC: 17 MG/DL (ref 5–25)
CALCIUM SERPL-MCNC: 9.6 MG/DL (ref 8.3–10.1)
CHLORIDE SERPL-SCNC: 107 MMOL/L (ref 96–108)
CO2 SERPL-SCNC: 27 MMOL/L (ref 21–32)
CREAT SERPL-MCNC: 0.54 MG/DL (ref 0.6–1.3)
ERYTHROCYTE [DISTWIDTH] IN BLOOD BY AUTOMATED COUNT: 16.3 % (ref 11.6–15.1)
GFR SERPL CREATININE-BSD FRML MDRD: 114 ML/MIN/1.73SQ M
GLUCOSE P FAST SERPL-MCNC: 87 MG/DL (ref 65–99)
GLUCOSE SERPL-MCNC: 101 MG/DL (ref 65–140)
GLUCOSE SERPL-MCNC: 87 MG/DL (ref 65–140)
HCT VFR BLD AUTO: 35.3 % (ref 36.5–49.3)
HGB BLD-MCNC: 11.3 G/DL (ref 12–17)
MCH RBC QN AUTO: 27.3 PG (ref 26.8–34.3)
MCHC RBC AUTO-ENTMCNC: 32 G/DL (ref 31.4–37.4)
MCV RBC AUTO: 85 FL (ref 82–98)
PLATELET # BLD AUTO: 367 THOUSANDS/UL (ref 149–390)
PMV BLD AUTO: 8.3 FL (ref 8.9–12.7)
POTASSIUM SERPL-SCNC: 4.1 MMOL/L (ref 3.5–5.3)
RBC # BLD AUTO: 4.14 MILLION/UL (ref 3.88–5.62)
RH BLD: POSITIVE
SODIUM SERPL-SCNC: 137 MMOL/L (ref 135–147)
SPECIMEN EXPIRATION DATE: NORMAL
WBC # BLD AUTO: 5.66 THOUSAND/UL (ref 4.31–10.16)

## 2023-03-24 PROCEDURE — 03CL0ZZ EXTIRPATION OF MATTER FROM LEFT INTERNAL CAROTID ARTERY, OPEN APPROACH: ICD-10-PCS | Performed by: SURGERY

## 2023-03-24 PROCEDURE — 03UL0KZ SUPPLEMENT LEFT INTERNAL CAROTID ARTERY WITH NONAUTOLOGOUS TISSUE SUBSTITUTE, OPEN APPROACH: ICD-10-PCS | Performed by: SURGERY

## 2023-03-24 PROCEDURE — 07B20ZX EXCISION OF LEFT NECK LYMPHATIC, OPEN APPROACH, DIAGNOSTIC: ICD-10-PCS | Performed by: SURGERY

## 2023-03-24 DEVICE — XENOSURE BIOLOGIC PATCH, 1CM X 6CM
Type: IMPLANTABLE DEVICE | Site: CAROTID | Status: FUNCTIONAL
Brand: XENOSURE BIOLOGIC PATCH

## 2023-03-24 RX ORDER — HYDRALAZINE HYDROCHLORIDE 20 MG/ML
15 INJECTION INTRAMUSCULAR; INTRAVENOUS
Status: DISCONTINUED | OUTPATIENT
Start: 2023-03-24 | End: 2023-03-25 | Stop reason: HOSPADM

## 2023-03-24 RX ORDER — ALBUTEROL SULFATE 2.5 MG/3ML
2.5 SOLUTION RESPIRATORY (INHALATION) EVERY 6 HOURS PRN
Status: DISCONTINUED | OUTPATIENT
Start: 2023-03-24 | End: 2023-03-24

## 2023-03-24 RX ORDER — SODIUM CHLORIDE 9 MG/ML
INJECTION, SOLUTION INTRAVENOUS CONTINUOUS PRN
Status: DISCONTINUED | OUTPATIENT
Start: 2023-03-24 | End: 2023-03-24

## 2023-03-24 RX ORDER — ACETAMINOPHEN 325 MG/1
650 TABLET ORAL EVERY 6 HOURS PRN
Status: DISCONTINUED | OUTPATIENT
Start: 2023-03-24 | End: 2023-03-25 | Stop reason: HOSPADM

## 2023-03-24 RX ORDER — ASPIRIN 81 MG/1
81 TABLET ORAL DAILY
Status: DISCONTINUED | OUTPATIENT
Start: 2023-03-25 | End: 2023-03-25 | Stop reason: HOSPADM

## 2023-03-24 RX ORDER — DEXAMETHASONE SODIUM PHOSPHATE 10 MG/ML
INJECTION, SOLUTION INTRAMUSCULAR; INTRAVENOUS AS NEEDED
Status: DISCONTINUED | OUTPATIENT
Start: 2023-03-24 | End: 2023-03-24

## 2023-03-24 RX ORDER — LABETALOL HYDROCHLORIDE 5 MG/ML
5 INJECTION, SOLUTION INTRAVENOUS
Status: COMPLETED | OUTPATIENT
Start: 2023-03-24 | End: 2023-03-24

## 2023-03-24 RX ORDER — LIDOCAINE HYDROCHLORIDE 10 MG/ML
INJECTION, SOLUTION EPIDURAL; INFILTRATION; INTRACAUDAL; PERINEURAL AS NEEDED
Status: DISCONTINUED | OUTPATIENT
Start: 2023-03-24 | End: 2023-03-24

## 2023-03-24 RX ORDER — BACLOFEN 10 MG/1
5 TABLET ORAL DAILY
Status: DISCONTINUED | OUTPATIENT
Start: 2023-03-25 | End: 2023-03-25 | Stop reason: HOSPADM

## 2023-03-24 RX ORDER — MAGNESIUM HYDROXIDE 1200 MG/15ML
LIQUID ORAL AS NEEDED
Status: DISCONTINUED | OUTPATIENT
Start: 2023-03-24 | End: 2023-03-24 | Stop reason: HOSPADM

## 2023-03-24 RX ORDER — CHLORHEXIDINE GLUCONATE 0.12 MG/ML
15 RINSE ORAL ONCE
Status: COMPLETED | OUTPATIENT
Start: 2023-03-24 | End: 2023-03-24

## 2023-03-24 RX ORDER — SODIUM CHLORIDE 1000 MG
3 TABLET, SOLUBLE MISCELLANEOUS 3 TIMES DAILY
Status: DISCONTINUED | OUTPATIENT
Start: 2023-03-24 | End: 2023-03-25 | Stop reason: HOSPADM

## 2023-03-24 RX ORDER — SODIUM CHLORIDE, SODIUM LACTATE, POTASSIUM CHLORIDE, CALCIUM CHLORIDE 600; 310; 30; 20 MG/100ML; MG/100ML; MG/100ML; MG/100ML
INJECTION, SOLUTION INTRAVENOUS CONTINUOUS PRN
Status: DISCONTINUED | OUTPATIENT
Start: 2023-03-24 | End: 2023-03-24

## 2023-03-24 RX ORDER — EPHEDRINE SULFATE 50 MG/ML
INJECTION INTRAVENOUS AS NEEDED
Status: DISCONTINUED | OUTPATIENT
Start: 2023-03-24 | End: 2023-03-24

## 2023-03-24 RX ORDER — SODIUM CHLORIDE, SODIUM LACTATE, POTASSIUM CHLORIDE, CALCIUM CHLORIDE 600; 310; 30; 20 MG/100ML; MG/100ML; MG/100ML; MG/100ML
50 INJECTION, SOLUTION INTRAVENOUS CONTINUOUS
Status: DISCONTINUED | OUTPATIENT
Start: 2023-03-24 | End: 2023-03-25

## 2023-03-24 RX ORDER — ONDANSETRON 2 MG/ML
INJECTION INTRAMUSCULAR; INTRAVENOUS AS NEEDED
Status: DISCONTINUED | OUTPATIENT
Start: 2023-03-24 | End: 2023-03-24

## 2023-03-24 RX ORDER — HEPARIN SODIUM 5000 [USP'U]/ML
5000 INJECTION, SOLUTION INTRAVENOUS; SUBCUTANEOUS EVERY 8 HOURS SCHEDULED
Status: DISCONTINUED | OUTPATIENT
Start: 2023-03-24 | End: 2023-03-25 | Stop reason: HOSPADM

## 2023-03-24 RX ORDER — BACLOFEN 5 MG/1
5 TABLET ORAL DAILY
COMMUNITY

## 2023-03-24 RX ORDER — FENTANYL CITRATE/PF 50 MCG/ML
50 SYRINGE (ML) INJECTION
Status: DISCONTINUED | OUTPATIENT
Start: 2023-03-24 | End: 2023-03-24 | Stop reason: HOSPADM

## 2023-03-24 RX ORDER — FERROUS SULFATE 325(65) MG
325 TABLET ORAL 2 TIMES DAILY WITH MEALS
Status: DISCONTINUED | OUTPATIENT
Start: 2023-03-24 | End: 2023-03-25 | Stop reason: HOSPADM

## 2023-03-24 RX ORDER — PROTAMINE SULFATE 10 MG/ML
INJECTION, SOLUTION INTRAVENOUS AS NEEDED
Status: DISCONTINUED | OUTPATIENT
Start: 2023-03-24 | End: 2023-03-24

## 2023-03-24 RX ORDER — OXYCODONE HYDROCHLORIDE 5 MG/1
2.5 TABLET ORAL EVERY 4 HOURS PRN
Status: DISCONTINUED | OUTPATIENT
Start: 2023-03-24 | End: 2023-03-25 | Stop reason: HOSPADM

## 2023-03-24 RX ORDER — ATORVASTATIN CALCIUM 40 MG/1
40 TABLET, FILM COATED ORAL
Status: DISCONTINUED | OUTPATIENT
Start: 2023-03-24 | End: 2023-03-25 | Stop reason: HOSPADM

## 2023-03-24 RX ORDER — FENTANYL CITRATE 50 UG/ML
INJECTION, SOLUTION INTRAMUSCULAR; INTRAVENOUS AS NEEDED
Status: DISCONTINUED | OUTPATIENT
Start: 2023-03-24 | End: 2023-03-24

## 2023-03-24 RX ORDER — PANTOPRAZOLE SODIUM 40 MG/1
40 TABLET, DELAYED RELEASE ORAL
Status: DISCONTINUED | OUTPATIENT
Start: 2023-03-24 | End: 2023-03-25 | Stop reason: HOSPADM

## 2023-03-24 RX ORDER — CEFAZOLIN SODIUM 1 G/50ML
1000 SOLUTION INTRAVENOUS ONCE
Status: COMPLETED | OUTPATIENT
Start: 2023-03-24 | End: 2023-03-24

## 2023-03-24 RX ORDER — PROPOFOL 10 MG/ML
INJECTION, EMULSION INTRAVENOUS AS NEEDED
Status: DISCONTINUED | OUTPATIENT
Start: 2023-03-24 | End: 2023-03-24

## 2023-03-24 RX ORDER — ONDANSETRON 2 MG/ML
4 INJECTION INTRAMUSCULAR; INTRAVENOUS ONCE AS NEEDED
Status: DISCONTINUED | OUTPATIENT
Start: 2023-03-24 | End: 2023-03-24 | Stop reason: HOSPADM

## 2023-03-24 RX ORDER — OXYCODONE HYDROCHLORIDE 5 MG/1
5 TABLET ORAL EVERY 4 HOURS PRN
Status: DISCONTINUED | OUTPATIENT
Start: 2023-03-24 | End: 2023-03-25 | Stop reason: HOSPADM

## 2023-03-24 RX ORDER — ROCURONIUM BROMIDE 10 MG/ML
INJECTION, SOLUTION INTRAVENOUS AS NEEDED
Status: DISCONTINUED | OUTPATIENT
Start: 2023-03-24 | End: 2023-03-24

## 2023-03-24 RX ORDER — HEPARIN SODIUM 1000 [USP'U]/ML
INJECTION, SOLUTION INTRAVENOUS; SUBCUTANEOUS AS NEEDED
Status: DISCONTINUED | OUTPATIENT
Start: 2023-03-24 | End: 2023-03-24

## 2023-03-24 RX ORDER — CLOPIDOGREL BISULFATE 75 MG/1
75 TABLET ORAL DAILY
Status: DISCONTINUED | OUTPATIENT
Start: 2023-03-25 | End: 2023-03-25 | Stop reason: HOSPADM

## 2023-03-24 RX ORDER — FOLIC ACID 1 MG/1
1 TABLET ORAL DAILY
Status: DISCONTINUED | OUTPATIENT
Start: 2023-03-25 | End: 2023-03-25 | Stop reason: HOSPADM

## 2023-03-24 RX ORDER — GLYCOPYRROLATE 0.2 MG/ML
INJECTION INTRAMUSCULAR; INTRAVENOUS AS NEEDED
Status: DISCONTINUED | OUTPATIENT
Start: 2023-03-24 | End: 2023-03-24

## 2023-03-24 RX ORDER — TORSEMIDE 5 MG/1
5 TABLET ORAL DAILY
COMMUNITY

## 2023-03-24 RX ORDER — HYDROMORPHONE HCL IN WATER/PF 6 MG/30 ML
0.2 PATIENT CONTROLLED ANALGESIA SYRINGE INTRAVENOUS
Status: DISCONTINUED | OUTPATIENT
Start: 2023-03-24 | End: 2023-03-24 | Stop reason: HOSPADM

## 2023-03-24 RX ORDER — LANOLIN ALCOHOL/MO/W.PET/CERES
100 CREAM (GRAM) TOPICAL DAILY
Status: DISCONTINUED | OUTPATIENT
Start: 2023-03-24 | End: 2023-03-25 | Stop reason: HOSPADM

## 2023-03-24 RX ADMIN — LIDOCAINE HYDROCHLORIDE 50 MG: 10 INJECTION, SOLUTION EPIDURAL; INFILTRATION; INTRACAUDAL at 07:54

## 2023-03-24 RX ADMIN — ROCURONIUM BROMIDE 20 MG: 10 INJECTION INTRAVENOUS at 08:16

## 2023-03-24 RX ADMIN — SODIUM CHLORIDE, SODIUM LACTATE, POTASSIUM CHLORIDE, AND CALCIUM CHLORIDE 50 ML/HR: .6; .31; .03; .02 INJECTION, SOLUTION INTRAVENOUS at 11:42

## 2023-03-24 RX ADMIN — ONDANSETRON 4 MG: 2 INJECTION INTRAMUSCULAR; INTRAVENOUS at 09:44

## 2023-03-24 RX ADMIN — EPHEDRINE SULFATE 5 MG: 50 INJECTION INTRAVENOUS at 09:40

## 2023-03-24 RX ADMIN — LABETALOL HYDROCHLORIDE 5 MG: 5 INJECTION, SOLUTION INTRAVENOUS at 13:03

## 2023-03-24 RX ADMIN — HYDRALAZINE HYDROCHLORIDE 15 MG: 20 INJECTION, SOLUTION INTRAMUSCULAR; INTRAVENOUS at 15:19

## 2023-03-24 RX ADMIN — CEFAZOLIN SODIUM 1000 MG: 1 SOLUTION INTRAVENOUS at 07:56

## 2023-03-24 RX ADMIN — SODIUM CHLORIDE 3 G: 1 TABLET ORAL at 15:24

## 2023-03-24 RX ADMIN — REMIFENTANIL HYDROCHLORIDE 0.2 MCG/KG/MIN: 1 INJECTION, POWDER, LYOPHILIZED, FOR SOLUTION INTRAVENOUS at 08:08

## 2023-03-24 RX ADMIN — THIAMINE HCL TAB 100 MG 100 MG: 100 TAB at 12:46

## 2023-03-24 RX ADMIN — DEXAMETHASONE SODIUM PHOSPHATE 10 MG: 10 INJECTION, SOLUTION INTRAMUSCULAR; INTRAVENOUS at 07:58

## 2023-03-24 RX ADMIN — PHENYLEPHRINE HYDROCHLORIDE 40 MCG/MIN: 10 INJECTION INTRAVENOUS at 08:13

## 2023-03-24 RX ADMIN — PROPOFOL 150 MG: 10 INJECTION, EMULSION INTRAVENOUS at 07:54

## 2023-03-24 RX ADMIN — SODIUM CHLORIDE 3 G: 1 TABLET ORAL at 21:26

## 2023-03-24 RX ADMIN — LABETALOL HYDROCHLORIDE 5 MG: 5 INJECTION, SOLUTION INTRAVENOUS at 11:47

## 2023-03-24 RX ADMIN — EPHEDRINE SULFATE 5 MG: 50 INJECTION INTRAVENOUS at 08:13

## 2023-03-24 RX ADMIN — Medication 25 MG: at 21:26

## 2023-03-24 RX ADMIN — HEPARIN SODIUM: 1000 INJECTION INTRAVENOUS; SUBCUTANEOUS at 12:08

## 2023-03-24 RX ADMIN — SODIUM CHLORIDE 3 G: 1 TABLET ORAL at 12:45

## 2023-03-24 RX ADMIN — SUGAMMADEX 150 MG: 100 INJECTION, SOLUTION INTRAVENOUS at 09:51

## 2023-03-24 RX ADMIN — FERROUS SULFATE TAB 325 MG (65 MG ELEMENTAL FE) 325 MG: 325 (65 FE) TAB at 15:30

## 2023-03-24 RX ADMIN — ROCURONIUM BROMIDE 50 MG: 10 INJECTION INTRAVENOUS at 07:54

## 2023-03-24 RX ADMIN — SODIUM CHLORIDE, SODIUM LACTATE, POTASSIUM CHLORIDE, AND CALCIUM CHLORIDE: .6; .31; .03; .02 INJECTION, SOLUTION INTRAVENOUS at 07:48

## 2023-03-24 RX ADMIN — ROCURONIUM BROMIDE 10 MG: 10 INJECTION INTRAVENOUS at 09:05

## 2023-03-24 RX ADMIN — ATORVASTATIN CALCIUM 40 MG: 40 TABLET, FILM COATED ORAL at 15:30

## 2023-03-24 RX ADMIN — PROTAMINE SULFATE 20 MG: 10 INJECTION, SOLUTION INTRAVENOUS at 09:40

## 2023-03-24 RX ADMIN — HEPARIN SODIUM 5000 UNITS: 5000 INJECTION INTRAVENOUS; SUBCUTANEOUS at 21:26

## 2023-03-24 RX ADMIN — FENTANYL CITRATE 100 MCG: 50 INJECTION INTRAMUSCULAR; INTRAVENOUS at 07:54

## 2023-03-24 RX ADMIN — HEPARIN SODIUM 4500 UNITS: 1000 INJECTION INTRAVENOUS; SUBCUTANEOUS at 08:43

## 2023-03-24 RX ADMIN — GLYCOPYRROLATE 0.1 MG: 0.2 INJECTION INTRAMUSCULAR; INTRAVENOUS at 08:41

## 2023-03-24 RX ADMIN — SODIUM CHLORIDE: 0.9 INJECTION, SOLUTION INTRAVENOUS at 08:00

## 2023-03-24 RX ADMIN — GLYCOPYRROLATE 0.1 MG: 0.2 INJECTION INTRAMUSCULAR; INTRAVENOUS at 08:39

## 2023-03-24 RX ADMIN — CHLORHEXIDINE GLUCONATE 0.12% ORAL RINSE 15 ML: 1.2 LIQUID ORAL at 07:22

## 2023-03-24 NOTE — QUICK NOTE
Post Op Check Note - Surgery Resident  2003 Jo-Ann Bloom III 63 y o  male MRN: [id number]  Unit/Bed#: [facility] 510-01 Encounter: [id number]    ASSESSMENT:  2003 Jo-Ann Bloom III is a 63 y o  male who is status post left CEA with neuromonitoring  PLAN: Pain control overnight, HOB elevated > 30 degrees, a-line monitoring    Subjective: Patient states he has is comfortable  Physical Exam:  GEN: NAD  CV: RRR  Lung: Normal effort  Ab: Soft, NT/ND  Neuro: left sided deficits, reported unchanged from baseline  Incisions: c/d/i, +ecchymosis, no swelling      Blood pressure 136/79, pulse 94, temperature (!) 97 1 °F (36 2 °C), temperature source Axillary, resp  rate 18, height 5' 10" (1 778 m), weight 54 kg (119 lb), SpO2 96 %  ,Body mass index is 17 07 kg/m²        Intake/Output Summary (Last 24 hours) at 3/24/2023 1836  Last data filed at 3/24/2023 1733  Gross per 24 hour   Intake 1582 23 ml   Output 675 ml   Net 907 23 ml       Invasive Devices     Peripheral Intravenous Line  Duration           Peripheral IV 03/24/23 Right Arm <1 day    Peripheral IV 03/24/23 Right Hand <1 day          Arterial Line  Duration           Arterial Line 03/24/23 Right Radial <1 day          Drain  Duration           Gastrostomy/Enterostomy 18 Fr   days                VTE Pharmacologic Prophylaxis: Heparin

## 2023-03-24 NOTE — RESPIRATORY THERAPY NOTE
RT Protocol Note  Katie Licea III 63 y o  male MRN: [id number]  Unit/Bed#: [facility] 510-01 Encounter: [id number]    Assessment    Principal Problem:    More than 48 percent stenosis of left internal carotid artery      Home Pulmonary Medications:  none       Past Medical History:   Diagnosis Date    Hypertension     Renal disorder     Stroke [facility])      Social History     Socioeconomic History    Marital status: Single     Spouse name: Not on file    Number of children: Not on file    Years of education: Not on file    Highest education level: Not on file   Occupational History    Not on file   Tobacco Use    Smoking status: Every Day     Packs/day: 1 00     Types: Cigarettes    Smokeless tobacco: Never   Vaping Use    Vaping Use: Never used   Substance and Sexual Activity    Alcohol use: Not Currently     Comment: occasionally    Drug use: Never    Sexual activity: Not on file   Other Topics Concern    Not on file   Social History Narrative    ** Merged History Encounter **          Social Determinants of Health     Financial Resource Strain: Not on file   Food Insecurity: No Food Insecurity    Worried About Running Out of Food in the Last Year: Never true    [address] in the Last Year: Never true   Transportation Needs: No Transportation Needs    Lack of Transportation (Medical): No    Lack of Transportation (Non-Medical):  No   Physical Activity: Not on file   Stress: Not on file   Social Connections: Not on file   Intimate Partner Violence: Not on file   Housing Stability: Low Risk     Unable to Pay for Housing in the Last Year: No    Number of Places Lived in the Last Year: 1    Unstable Housing in the Last Year: No       Subjective         Objective    Physical Exam:   Assessment Type: (P) Assess only  General Appearance: (P) Awake, Alert  Respiratory Pattern: (P) Normal  Chest Assessment: (P) Chest expansion symmetrical  Bilateral Breath Sounds: (P) Diminished    Vitals:  Blood pressure 150/89, pulse 70, temperature (!) 97 4 °F (36 3 °C), resp  rate 14, height 5' 10" (1 778 m), weight 54 kg (119 lb), SpO2 (P) 96 %            Imaging and other studies:           Plan             Resp Comments: (P) pt found on ra, bs are diminished, pt states that they do not take any home resp meds, will  d/c resp protocol

## 2023-03-24 NOTE — ANESTHESIA PROCEDURE NOTES
Arterial Line Insertion  Performed by: Mauri Nunez CRNA  Authorized by: Gaby Pham MD   Consent: Verbal consent obtained  Written consent obtained  Risks and benefits: risks, benefits and alternatives were discussed  Consent given by: patient  Patient understanding: patient states understanding of the procedure being performed  Patient consent: the patient's understanding of the procedure matches consent given  Procedure consent: procedure consent matches procedure scheduled  Relevant documents: relevant documents present and verified  Test results: test results available and properly labeled  Site marked: the operative site was marked  Radiology Images: Radiology Images displayed and confirmed  If images not available, report reviewed  Required items: required blood products, implants, devices, and special equipment available  Patient identity confirmed: arm band  Time out: Immediately prior to procedure a "time out" was called to verify the correct patient, procedure, equipment, support staff and site/side marked as required  Preparation: Patient was prepped and draped in the usual sterile fashion  Indications: hemodynamic monitoring  Orientation:  Right  Location: radial artery  Sedation:  Patient sedated: GETA      Procedure Details:  Cristi's test normal: yes  Needle gauge: 20  Number of attempts: 1    Post-procedure:  Post-procedure: dressing applied  Waveform: good waveform  Post-procedure CNS: unchanged  Patient tolerance: Patient tolerated the procedure well with no immediate complications

## 2023-03-24 NOTE — DISCHARGE INSTR - OTHER ORDERS
Incisional care: Shower daily  Wash incision daily w/ soap and water  Pat dry thoroughly     Allow skin glue to slough

## 2023-03-24 NOTE — H&P
H&P Exam - Vascular Surgery   Sathish Licea III 63 y o  male MRN: [id number]  Unit/Bed#: [facility] [place] Encounter: [id number]    Assessment/Plan     Assessment:  Left ICA stenosis  Plan:  Left CEA     History of Present Illness   History, ROS and PFSH unchanged  HPI:  Amanda Vazquez is a 63 y o  male who presents with for left CEA  No changes since last visit, still has left sided hemiparesis with little improvement since last visit  Review of Systems    Historical Information   Past Medical History:   Diagnosis Date   • Hypertension    • Renal disorder    • Stroke [facility])      Past Surgical History:   Procedure Laterality Date   • GASTROSTOMY TUBE PLACEMENT N/A 10/18/2022    Procedure: INSERTION GASTROSTOMY TUBE OPEN;  Surgeon: Loan Yanez DO;  Location: [facility] OR;  Service: General     Social History   Social History     Substance and Sexual Activity   Alcohol Use Not Currently    Comment: occasionally     Social History     Substance and Sexual Activity   Drug Use Never     Social History     Tobacco Use   Smoking Status Every Day   • Packs/day: 1 00   • Types: Cigarettes   Smokeless Tobacco Never     E-Cigarette/Vaping   • E-Cigarette Use Never User      E-Cigarette/Vaping Substances     Family History: non-contributory    Meds/Allergies   all current active meds have been reviewed  No Known Allergies    Objective   Vitals: Blood pressure 158/78, pulse 55, temperature (!) 97 3 °F (36 3 °C), temperature source Temporal, resp  rate 14, SpO2 100 %  ,There is no height or weight on file to calculate BMI  No intake or output data in the 24 hours ending 03/24/23 0728  Invasive Devices     Drain  Duration           Gastrostomy/Enterostomy 18 Fr   days                Physical Exam   Alert and oriented  NAD  CTAB  RRR  Neuro: right side 5/5, left side 1/5 LUE and LLE    Lab Results: I have personally reviewed pertinent reports  Imaging: I have personally reviewed pertinent reports      EKG, Pathology, and Other Studies: I have personally reviewed pertinent reports        Code Status: Prior  Advance Directive and Living Will:      Power of :    POLST:      Counseling / Coordination of Care  None

## 2023-03-24 NOTE — OP NOTE
OPERATIVE REPORT  PATIENT NAME: Juanita Bautista III    :  1960  MRN: [id number]  Pt Location: [facility] HYBRID OR ROOM 02    SURGERY DATE: 3/24/2023    Surgeon(s) and Role:     * Sunitha Jaffe DO - Primary     * Adrien Daniels MD - Fellow    Preop Diagnosis:  90% left internal carotid artery stenosis    Post-Op Diagnosis Codes:  Same    Procedure(s):  Left - Left CEA with neuromonitoring  Bovine patch angioplasty  Excisional biopsy left cervical lymph node    Specimen(s):  ID Type Source Tests Collected by Time Destination   1 : left cervical Tissue Lymph Node TISSUE EXAM Sunitha Jaffe DO 3/24/2023 6174    2 : left carotid artery plaque  Tissue Soft Tissue, Other TISSUE EXAM Sunitha Jaffe DO 3/24/2023 0833        Estimated Blood Loss:   100 mL    Drains:  Gastrostomy/Enterostomy 18 Fr  LUQ (Active)   Number of days: 157       Anesthesia Type:   General endotracheal    Operative Indications:  Carotid stenosis, asymptomatic, left [I65 22]  Is a 45-year-old gentleman who had a disabling basal ganglia stroke that caused left-sided Thierry plegia back in October he was referred to me as an outpatient he had MRI evidence also of a acute left thalamic stroke but he did not have any right-sided body deficits history of was quite disabling I initially saw him back in January and gave him an extended period of time greater than 6 weeks for recovery he unfortunately did not make a good recovery with his left-sided hemiparesis but he also had a 90% left internal carotid artery stenosis and I therefore recommended endarterectomy for the left he did not have any significant disease in the right internal carotid after discussing all risks benefits and alternative therapies with him he consented to proceed    Operative Findings:  Patient had extensive plaque extending 4 cm into the internal carotid after endarterectomy there was a low resistive continuous antegrade type lower multiphasic Doppler waveform in the internal carotid the patient was extubated and noted to be at his neurologic baseline post procedure there were no changes on EEG or SSEP monitoring throughout the procedure    Complications:   None    Procedure and Technique:  After informed consent was taken appropriate party the patient was correct identified in the holding area is brought to the operating room placed in supine position appropriate lines monitors placed after satisfactory induction of general tracheal anesthesia the left neck was prepped draped in normal sterile fashion neuro monitoring was placed the left neck was prepped draped in normal sterile fashion a Jako timeout was performed and he received appropriate periprocedural antibiotics we began by making a longitudinal incision along the medial border of the sternocleidomastoid using a 15 blade dissection was carried through the subcutaneous tissue platysma down to the medial edge of the sternocleidomastoid which was retracted laterally dissection was carried onto the jugular vein where the common facial branches were divided between ties and clips and the internal jugular was retracted laterally as well the carotid sheath was entered the common carotid was circumferentially dissected out and controlled dissection was carried onto the external which was circumferentially dissected out and controlled as well as the internal this required fairly distal exposure past the diseased segment in the internal carotid the patient was given 4500 units of IV heparin after 3 minutes of time for the heparin to circulate the blood pressure was raised to a mean pressure greater than 100 I then clamped the internal carotid followed by the common and lastly the external there were no changes noted on neurologic monitoring at any point during the case and no shunt was placed and arteriotomy was made with an 11 blade starting in the distal common carotid and extended into the internal carotid into the healthy segment of the internal and endarterectomy was then performed using a freer elevator of the common and internal carotid with an excellent endpoint achieved all small pieces of loose medial debris were removed the distal endpoint was irrigated and noted to not be lifting and no tacking sutures were then placed a 1 x 6 bovine pericardial patch was brought onto the field it was sewn into place using a running 6-0 Prolene distally and a running 5-0 Prolene proximally prior to completing the suture line the vessels were antegrade and retrograde flushed in the endarterectomy site was copiously irrigated with heparinized saline the blood pressure was allowed to drift down below 500 systolic and the suture line was then completed the clamp was first removed from the external followed by the common and then after a few cardiac cycles it was finally removed from the internal the internal carotid distal to the patch was uncinated with a Doppler and noted to have a continuous antegrade low resistive type waveform the patient was given 20 mg of protamine for partial bursal the patch was noted to be hemostatic some Floseal was instilled the platysma was reapproximated using a running Vicryl and the skin was reapproximated using a 4-0 Monocryl in subcuticular fashion Dermabond was applied for dressing patient tolerated procedure well he was extubated uneventfully in the operating room noted to be neurologically at his baseline postop           I was present for the entire procedure    Patient Disposition:  PACU         SIGNATURE: Mark Stover DO  DATE: March 24, 2023  TIME: 10:08 AM    Vascular Quality Initiative - Carotid Endarterectomy     Urgency:  Elective    Anesthesia: General Type: Conventional    Side: left    Patch Type: Bovine Pericardial     If Prosthetic, Patch : Roderick    Shunt: No    Skin Prep: Chlorhexidine    Drain: no  Heparin: yes    Protamine: yes      Dextran: no     Re-explore artery after closure: no    Total Procedure time: 70 min    Monitoring:   EEG: yes   Stump Pressure: no   Other: no    Completion Study:   Doppler: yes   Duplex: no   Arteriogram: no    Concomitant Procedure:   Proximal Endovascular: no   Distal Endovascular: no   CABG: no   Other Arterial Op: no

## 2023-03-24 NOTE — DISCHARGE INSTR - AVS FIRST PAGE
DISCHARGE INSTRUCTIONS  CAROTID ENDARTERECTOMY    ACTIVITY:  Limit your physical activity to walking for the first week and then increase your activity as tolerated  Walking up steps and normal activities may be resumed as you feel ready  Avoid strenuous activity such as vigorous exercise  Avoid heavy lifting (do not lift more than 15 pounds) for the first four weeks after surgery  You should not drive a car for at least one week following discharge from the hospital and you are off all narcotic pain medication  You may ride in a car  If you have had a stroke or mini-stroke, please consult with your neurologist prior to driving  DO NOT START OR RESUME ANY PREVIOUSLY PLANNED THERAPY (PHYSICAL, CARDIAC, REHAB, ETC…) UNTIL YOU DISCUSS WITH YOUR SURGEON AND THEY FEEL IT IS SAFE TO ENGAGE IN THERAPY  DIET:  Resume your normal diet  Good nutrition is important for healing of your incision  You can expect to have a sore throat and trouble swallowing after surgery which should improve quickly  If you feel like you are choking, please call your doctor  RECURRENT SYMPTOMS: If you develop any new numbness, weakness, vision changes, drooping of the face, or difficulty with speech after discharge, CALL 911 or go to the nearest Emergency department immediately  INCISION SITE:  You may have surgical glue at your incision site  There are stitches present under the skin which will absorb on their own  The glue is used to cover the incision, assist in closure, and prevent contamination  This adhesive will darken and peel away on its own within one to two weeks  Do not pick at it  If you have a bandage, please remove this on the second day after surgery  You should shower daily  Wash incision daily with soap and water, but do not rub or scrub the incision; rinse thoroughly and pat dry  Do not bathe in a tub or swim for the first 4 weeks following surgery or if you have any open wounds    It is normal to have some bruising, swelling or discoloration around the incision  IF increasing redness, pain, bleeding or a bulge develops, call our office immediately  If you notice any active bleeding at the site, apply pressure to the site and call our office ([phone]) or [phone]  FOLLOW UP STUDIES: Doppler ultrasound studies are very important to your post-operative care  Your surgeon will arrange them at your first postoperative visit  The first study will be 3 months after surgery  FOLLOW UP APPOINTMENTS:  Making and keeping follow up appointments and ultrasound tests are important to your recovery  If you have difficulty making it to or keeping your follow up appointments, call the office  If you have increased pain, fever >101 5, increased drainage, redness or a bad smell at your surgery site, new coldness/numbness of your arm or leg, please call us immediately and GO directly to the ER  Appt w/ Dr Stanton Cho: 4/4/2023 at 10:30am, [facility] office    PLEASE [address]  [phone]  [phone]  [address] [place] [address] , [address], [place], [address]  [address], [place], 210 [facility]  [address], [facility], [address] [place] [address]  [address] [facility], One [facility], [facility], [address] [facility]  [address], [address]st Floor, Jennifer Wilkins 34  [facility] 19, [address] [facility], [address]  1307 [facility], 8614 [facility], [address]  One [facility], 532 [facility], One [facility],[address], Annie Saavedra 6  201 [facility], Leda Saenz, [address]  [address] [place] [address]

## 2023-03-24 NOTE — ANESTHESIA POSTPROCEDURE EVALUATION
Post-Op Assessment Note    CV Status:  Stable  Pain Score: 0    Pain management: adequate     Mental Status:  Alert and awake   Hydration Status:  Euvolemic   PONV Controlled:  Controlled   Airway Patency:  Patent      Post Op Vitals Reviewed: Yes      Staff: Anesthesiologist, CRNA   Comments: aox3, nonobstructing airway, VSS, baseline left sided deficits present as prior to procedure        There were no known notable events for this encounter      /85 (03/24/23 1011)    Temp 97 7 °F (36 5 °C) (03/24/23 1011)    Pulse 82 (03/24/23 1011)   Resp (!) 24 (03/24/23 1011)    SpO2 100 % (03/24/23 1011)

## 2023-03-25 VITALS
HEART RATE: 68 BPM | HEIGHT: 70 IN | RESPIRATION RATE: 16 BRPM | DIASTOLIC BLOOD PRESSURE: 79 MMHG | OXYGEN SATURATION: 97 % | WEIGHT: 119 LBS | SYSTOLIC BLOOD PRESSURE: 138 MMHG | BODY MASS INDEX: 17.04 KG/M2 | TEMPERATURE: 98 F

## 2023-03-25 LAB
ANION GAP SERPL CALCULATED.3IONS-SCNC: 4 MMOL/L (ref 4–13)
APTT PPP: 28 SECONDS (ref 23–37)
BUN SERPL-MCNC: 12 MG/DL (ref 5–25)
CALCIUM SERPL-MCNC: 9.3 MG/DL (ref 8.3–10.1)
CHLORIDE SERPL-SCNC: 111 MMOL/L (ref 96–108)
CO2 SERPL-SCNC: 25 MMOL/L (ref 21–32)
CREAT SERPL-MCNC: 0.44 MG/DL (ref 0.6–1.3)
ERYTHROCYTE [DISTWIDTH] IN BLOOD BY AUTOMATED COUNT: 16.7 % (ref 11.6–15.1)
FLUAV RNA RESP QL NAA+PROBE: NEGATIVE
FLUBV RNA RESP QL NAA+PROBE: NEGATIVE
GFR SERPL CREATININE-BSD FRML MDRD: 124 ML/MIN/1.73SQ M
GLUCOSE SERPL-MCNC: 91 MG/DL (ref 65–140)
HCT VFR BLD AUTO: 31.7 % (ref 36.5–49.3)
HGB BLD-MCNC: 10.2 G/DL (ref 12–17)
INR PPP: 0.98 (ref 0.84–1.19)
MCH RBC QN AUTO: 27.3 PG (ref 26.8–34.3)
MCHC RBC AUTO-ENTMCNC: 32.2 G/DL (ref 31.4–37.4)
MCV RBC AUTO: 85 FL (ref 82–98)
PLATELET # BLD AUTO: 314 THOUSANDS/UL (ref 149–390)
PMV BLD AUTO: 8.6 FL (ref 8.9–12.7)
POTASSIUM SERPL-SCNC: 3.7 MMOL/L (ref 3.5–5.3)
PROTHROMBIN TIME: 13.2 SECONDS (ref 11.6–14.5)
RBC # BLD AUTO: 3.73 MILLION/UL (ref 3.88–5.62)
RSV RNA RESP QL NAA+PROBE: NEGATIVE
SARS-COV-2 RNA RESP QL NAA+PROBE: NEGATIVE
SODIUM SERPL-SCNC: 140 MMOL/L (ref 135–147)
WBC # BLD AUTO: 12.96 THOUSAND/UL (ref 4.31–10.16)

## 2023-03-25 RX ADMIN — HEPARIN SODIUM 5000 UNITS: 5000 INJECTION INTRAVENOUS; SUBCUTANEOUS at 05:10

## 2023-03-25 RX ADMIN — Medication 25 MG: at 08:02

## 2023-03-25 RX ADMIN — BACLOFEN 5 MG: 10 TABLET ORAL at 08:02

## 2023-03-25 RX ADMIN — PANTOPRAZOLE SODIUM 40 MG: 40 TABLET, DELAYED RELEASE ORAL at 05:10

## 2023-03-25 RX ADMIN — FERROUS SULFATE TAB 325 MG (65 MG ELEMENTAL FE) 325 MG: 325 (65 FE) TAB at 08:02

## 2023-03-25 RX ADMIN — ASPIRIN 81 MG: 81 TABLET, COATED ORAL at 08:02

## 2023-03-25 RX ADMIN — SODIUM CHLORIDE 3 G: 1 TABLET ORAL at 08:02

## 2023-03-25 RX ADMIN — THIAMINE HCL TAB 100 MG 100 MG: 100 TAB at 08:02

## 2023-03-25 RX ADMIN — FOLIC ACID 1 MG: 1 TABLET ORAL at 08:02

## 2023-03-25 RX ADMIN — CLOPIDOGREL BISULFATE 75 MG: 75 TABLET ORAL at 08:02

## 2023-03-25 NOTE — UTILIZATION REVIEW
Initial Clinical Review    Elective  Inpatient  surgical procedure  Age/Sex: 63 y o  male     Surgery Date: 3/24/2023    Procedure: Left - Left CEA with neuromonitoring  Bovine patch angioplasty  Excisional biopsy left cervical lymph node    Anesthesia: General     Operative Findings: Patient had extensive plaque extending 4 cm into the internal carotid after endarterectomy there was a low resistive continuous antegrade type lower multiphasic Doppler waveform in the internal carotid the patient was extubated and noted to be at his neurologic baseline post procedure there were no changes on EEG or SSEP monitoring throughout the procedure       POD#1 Progress Note:  No actue events overnight  Denies headache, CP , SOB  Continue pain control, neurovascular checks   D/c A line, Gen diet, can tolerated pils/      Admission Orders: Date/Time/Statement:   Admission Orders (From admission, onward)     Ordered        03/24/23 1002  Inpatient Admission  Once                      Orders Placed This Encounter   Procedures   • Inpatient Admission     Standing Status:   Standing     Number of Occurrences:   1     Order Specific Question:   Level of Care     Answer:   Level 1 Stepdown [13]     Order Specific Question:   Estimated length of stay     Answer:   Inpatient Only Surgery     Vital Signs: /79 (BP Location: Right arm)   Pulse 68   Temp 98 °F (36 7 °C)   Resp 16   Ht 5' 10" (1 778 m)   Wt 54 kg (119 lb)   SpO2 97%   BMI 17 07 kg/m²   Date/Time Temp Pulse Resp BP MAP (mmHg) Arterial Line BP MAP SpO2 O2 Flow Rate (L/min) O2 Device Cardiac (WDL) Patient Position - Orthostatic VS   03/25/23 08:02:46 98 °F (36 7 °C) -- -- -- -- -- -- -- -- -- -- --   03/25/23 0415 -- 68 16 138/79 100 -- -- 97 % -- None (Room air) -- Lying   03/25/23 0410 -- -- -- -- -- -- -- 99 % -- None (Room air) -- --   03/25/23 0200 -- 74 16 127/76 98 -- -- 95 % -- None (Room air) -- Lying   03/25/23 0000 -- -- -- -- -- -- -- 97 % -- None (Room air) -- --   03/24/23 2237 98 1 °F (36 7 °C) 89 18 115/63 84 -- -- 97 % -- -- -- --   03/24/23 2126 -- 102 -- 132/72 -- -- -- -- -- -- -- --   03/24/23 1930 -- -- -- -- -- -- -- -- -- None (Room air) -- --   03/24/23 1715 -- 94 18 136/79 102 148/60 86 mmHg -- -- -- -- Lying   03/24/23 1615 -- 86 18 125/68 92 158/52 80 mmHg -- -- -- -- Lying   03/24/23 15:35:11 97 1 °F (36 2 °C) Abnormal  84 17 135/75 -- -- -- 96 % -- -- -- Lying   03/24/23 15:27:41 97 2 °F (36 2 °C) Abnormal  -- 18 -- -- -- -- 98 % -- -- -- --   03/24/23 1519 -- -- -- 180/86 Abnormal  -- -- -- -- -- -- -- --   03/24/23 1515 -- 68 18 144/84 108 190/78 112 mmHg -- -- -- -- Lying   03/24/23 1415 -- 82 17 131/79 99 158/78 106 mmHg -- -- -- -- Lying   03/24/23 1315 -- 74 18 122/68 90 164/72 100 mmHg -- -- -- -- Lying   03/24/23 1245 -- 70 18 148/88 112 172/74 106 mmHg -- -- -- -- Lying   03/24/23 1215 -- 70 18 143/80 106 170/70 102 mmHg -- -- -- -- Lying   03/24/23 1202 -- -- -- -- -- -- -- 96 % -- None (Room air) -- --   03/24/23 1200 -- 70 18 152/84 110 166/68 102 mmHg -- -- -- -- --   03/24/23 1145 -- 70 18 150/89 113 174/72 108 mmHg -- -- -- -- Lying   03/24/23 1130 96 1 °F (35 6 °C) Abnormal  68 18 154/83 112 168/74 110 mmHg -- -- -- -- Lying   03/24/23 1100 97 4 °F (36 3 °C) Abnormal  -- 14 137/81 97 156/70 -- 98 % -- None (Room air) -- --   03/24/23 1045 -- 70 21 137/81 97 148/58 90 mmHg 96 % -- None (Room air) -- --   03/24/23 1030 -- 74 14 142/81 100 152/60 92 mmHg 96 % -- None (Room air) -- --   03/24/23 1015 -- 82 22 158/86 109 160/74 106 mmHg 100 % -- None (Room air) -- --   03/24/23 1011 97 7 °F (36 5 °C) 82 24 Abnormal  153/85 119 163/55 109 mmHg 100 % 6 L/min Simple mask WDL --       Pertinent Labs/Diagnostic Test Results:   No orders to display     Results from last 7 days   Lab Units 03/25/23  1151   SARS-COV-2  Negative     Results from last 7 days   Lab Units 03/25/23  0508 03/24/23  0743   WBC Thousand/uL 12 96* 5 66   HEMOGLOBIN g/dL 10 2* 11 3*   HEMATOCRIT % 31 7* 35 3*   PLATELETS Thousands/uL 314 367         Results from last 7 days   Lab Units 03/25/23  0508 03/24/23  0743   SODIUM mmol/L 140 137   POTASSIUM mmol/L 3 7 4 1   CHLORIDE mmol/L 111* 107   CO2 mmol/L 25 27   ANION GAP mmol/L 4 3*   BUN mg/dL 12 17   CREATININE mg/dL 0 44* 0 54*   EGFR ml/min/1 73sq m 124 114   CALCIUM mg/dL 9 3 9 6         Results from last 7 days   Lab Units 03/24/23  1025   POC GLUCOSE mg/dl 101     Results from last 7 days   Lab Units 03/25/23  0508 03/24/23  0743   GLUCOSE RANDOM mg/dL 91 87       Results from last 7 days   Lab Units 03/25/23  0508   PROTIME seconds 13 2   INR  0 98   PTT seconds 28       Results from last 7 days   Lab Units 03/25/23  1151   INFLUENZA A PCR  Negative   INFLUENZA B PCR  Negative   RSV PCR  Negative       Diet:  Regular house     Mobility:  OOB with assistance    DVT Prophylaxis:  SVC to le's - Heparin sc q8    Medications/Pain Control:   Scheduled Medications:  aspirin, 81 mg, Oral, Daily  atorvastatin, 40 mg, Oral, Daily With Dinner  baclofen, 5 mg, Oral, Daily  clopidogrel, 75 mg, Oral, Daily  ferrous sulfate, 325 mg, Oral, BID With Meals  folic acid, 1 mg, Oral, Daily  heparin (porcine), 5,000 Units, Subcutaneous, Q8H GT  metoprolol tartrate, 25 mg, Oral, Q12H GT  pantoprazole, 40 mg, Oral, Early Morning  sodium chloride, 3 g, Oral, TID  thiamine, 100 mg, Oral, Daily  Ancef 1000mg iv once      Continuous IV Infusions:  niCARdipine, 1-15 mg/hr, Intravenous, Continuous PRN  phenylephine,  mcg/min, Intravenous, Continuous PRN  lactated ringers infusion  Rate: 50 mL/hr Dose: 50 mL/hr  Freq: Continuous Route: IV  Indications of Use: IV Hydration  Last Dose: 50 mL/hr (03/24/23 1142)  Start: 03/24/23 1030 End: 03/25/23 0618    PRN Meds:  acetaminophen, 650 mg, Oral, Q6H PRN  hydrALAZINE, 15 mg, Intravenous, Q15 Min PRN   And  niCARdipine, 1-15 mg/hr, Intravenous, Continuous PRN  oxyCODONE, 2 5 mg, Oral, Q4H PRN Or  oxyCODONE, 5 mg, Oral, Q4H PRN  phenylephine,  mcg/min, Intravenous, Continuous PRN        Network Utilization Review Department  ATTENTION: Please call with any questions or concerns to [phone] and carefully listen to the prompts so that you are directed to the right person  All voicemails are confidential   [facility] all requests for admission clinical reviews, approved or denied determinations and any other requests to dedicated fax number below belonging to the campus where the patient is receiving treatment   List of dedicated fax numbers for the Facilities:  [address] DENIALS (Administrative/Medical Necessity) [phone]   [address] (Maternity/NICU/Pediatrics) [phone]   [address] Kiara Petit [phone]   [facility] [address] [phone]   [address] [address] [facility] [phone]   [address] [facility] [address] Kong [facility] 134 815 [facility] [phone]

## 2023-03-25 NOTE — DISCHARGE SUMMARY
Discharge Summary - Roney Pickett III 63 y o  male MRN: [id number]    Unit/Bed#: [facility] 510-01 Encounter: [id number]    Admission Date:   Admission Orders (From admission, onward)     Ordered        03/24/23 1002  Inpatient Admission  Once                        Admitting Diagnosis: Carotid stenosis, asymptomatic, left [I65 22]    HPI: 63 y o  male who was in [facility] 10/09/22 to 10/21/22 for CVA  Pt had a CTA head and neck on 10/9/22  Pt is paralyzed on the Lt side of his body due to his CVA  Pt is on ASA 81 mg, Plavix, and Atorvastatin  Bilateral basal ganglia CVA 10/2022 with left sided residual hemiplegia  He has made minimal recovery since CVA  Has left 90% ICA stenosis, this lesion is not the etiology of his CVA    Procedures Performed: Right carotid endarterectomy    Summary of Hospital Course: Patient underwent the above stated procedure on 3/24 without any complications  Per postoperative carotid endarterectomy protocol, patient was admitted SD1 postoperatively with an arterial line, a arriola and tight blood pressure parameters  He had no acute events overnight and remained normotensive, not requiring drips for hyper/hypotension  POD1, his arterial line and arriola were discontinued  His neurologic exam remained at his baseline  He was tolerating a diet, not requiring IV pain medication, and was deemed stable for discharge back to his nursing facility today  He will resume his aspirin, statin and plavix  Significant Findings, Care, Treatment and Services Provided: As stated above     Complications: None     Discharge Diagnosis: Carotid stenosis, asymptomatic, left [I65 22]    Medical Problems     Resolved Problems  Date Reviewed: 2/22/2023   None         Condition at Discharge: good         Discharge instructions/Information to patient and family:   See after visit summary for information provided to patient and family        Provisions for Follow-Up Care:  See after visit summary for information related to follow-up care and any pertinent home health orders  PCP: Cami Mireles MD    Disposition: See After Visit Summary for discharge disposition information  Planned Readmission: No  Future Encounters      3/31/2023  1:30 PM (30 min) [facility]    FOLLOW UP PG    NEPH EAST (Med Spc)    DANIEL Barrera           4/4/2023 10:30 AM (30 min) [facility]    POST OP LONG PG    VAS CTR EAS ([facility])    Alyssia Silva DO           4/4/2023  3:00 PM Pend Preadm    BE Cath Lab, BE CATH LAB VIR     Case [id number] 4/4/2023  3:00 PM (60 min)  [facility]    Cardiac loop recorder implant    Demarcus Frias PA-C    BE CARDIAC CATH LAB           4/17/2023 11:30 AM (30 min) [facility]    DEVICE SITE CHECK PG    CARD [facility] ([facility])    DEVICE IN PERSON MIRNA                        Discharge Statement   I spent 20 minutes discharging the patient  This time was spent on the day of discharge  I had direct contact with the patient on the day of discharge  Additional documentation is required if more than 30 minutes were spent on discharge  Discharge Medications:  See after visit summary for reconciled discharge medications provided to patient and family

## 2023-03-25 NOTE — CASE MANAGEMENT
Case Management Discharge Planning Note    Patient name Tristen Molina III  Location [facility] 510/[facility] 465-56 MRN [id number]  : 1960 Date 3/25/2023       Current Admission Date: 3/24/2023  Current Admission Diagnosis:More than 50 percent stenosis of left internal carotid artery   Patient Active Problem List    Diagnosis Date Noted   • Generalized abdominal pain 2023   • Bleeding 2022   • Mild protein-calorie malnutrition ([address] ) 2022   • Acute on chronic anemia 10/31/2022   • History of CVA (cerebrovascular accident) 10/30/2022   • Melena 10/30/2022   • Gastrostomy tube dysfunction ([address] ) 10/30/2022   • Primary hypertension 10/30/2022   • Anxiety 10/28/2022   • Ambulatory dysfunction 10/28/2022   • Anemia 10/28/2022   • Smoking 10/18/2022   • Moderate protein-calorie malnutrition ([address] ) 10/14/2022   • Alcohol use disorder, mild, abuse 10/10/2022   • Hyponatremia 10/09/2022   • Acute CVA (cerebrovascular accident) ([address] ) 10/09/2022   • More than 50 percent stenosis of left internal carotid artery 10/09/2022      LOS (days): 1  Geometric Mean LOS (GMLOS) (days):   Days to GMLOS:     OBJECTIVE:  Risk of Unplanned Readmission Score: 22 07         Current admission status: Inpatient   Preferred Pharmacy:   [address] [place] [address] [facility] [address] [address] [address]  Phone: [phone] Fax: [phone] Arpan Manjarrez [facility] [place] [address] [facility] 94 [address] [facility] 38 210 [facility]  Phone: [phone] Fax: [phone]    Primary Care Provider: Zabrina Hoskins MD    Primary Insurance: [id number]  Secondary Insurance:     DISCHARGE DETAILS:    Discharge planning discussed with[de-identified] patient and pt's sister Renaldo Perazaine of Choice: Yes  Comments - Freedom of Choice: pt is a MA bedhold at [facility] contacted family/caregiver?: Yes (per pt's request, sister Amrit Ferreira)  Were Treatment Team discharge recommendations reviewed with patient/caregiver?: Yes (return to Facility)  Did patient/caregiver verbalize understanding of patient care needs?: N/A- going to facility  Were patient/caregiver advised of the risks associated with not following Treatment Team discharge recommendations?: Yes    Contacts  Patient Contacts: Christie Mahmood [phone]  Relationship to Patient[de-identified] Family  Contact Method: Phone  Phone Number: [phone]  Reason/Outcome: Discharge [address]         Is the patient interested in Miguel Walker at discharge?: No    DME Referral Provided  Referral made for DME?: No    Other Referral/Resources/Interventions Provided:  Interventions: SNF  Referral Comments: 0900 Met with pt regarding dc planning  per Vascular resident pt is clear for dc today back to his SNF  Pt resides at [facility]  Referral placed to them on AIDIN  Pt is agreeable to return and requests his sister Christie Mahmood [phone] be called with an update about his dc  caleld and spoke with Mariaelena Bansal and she requested update from Vascular, sent TT to vascular resident , Dr Gilma Gonsalez, who states she will call her  Per Leni Solano at [facility], pt can return today no time restrictions and must have a covid test  [facility] at [facility]  Made Vascular resident Dr Speedy Martin aware  and she will order it  [address] with nurse Tammi De and made him aware of pt dc today, expresses no concerns with pt dc and made aware will need covid test  Roundtrip referral made for BLS transport as pt is s/p stroke and left side flaccid and wheelchair bound qadonna edenft to get out of bed at the SNF  [address] [facility] will come BLS at 1330 today for transport, notified Vascular resident Dr Jordin Gonsalez, nurse tabby and pt and [facility] and [facility], joaquin [facility] time   All are in agreement with pt dc today at 1330 back to [facility]  phone/fax for nurse report is phone [phone], fax [phone]  Treatment Team Recommendation: Facility Return (Chelsey Funes)  Discharge Destination Plan[de-identified] Facility Return (Chelsey Funes)  Transport at Discharge : BLS Ambulance     Number/Name of Dispatcher: Noreen Sanders and Unit #):  [facility]  ETA of Transport (Date): 03/25/23  ETA of Transport (Time): [address]  Name, [facility] : Chelsey Torinvero  Receiving Facility/Agency Phone Number: [phone]  Facility/Agency Fax Number: [phone]

## 2023-03-25 NOTE — PROGRESS NOTES
Progress Note - Vascular Surgery   Jerson Dixonohnayan III 63 y o  male MRN: [id number]  Unit/Bed#: [facility] 510-01 Encounter: [id number]      Assessment:  62M with h/o disabling basal ganglia stroke with residual left sided hemiplegia with high grade stenosis of L ICA stenosis s/p L CEA 3/24/23    Plan:  Continue pain control  Continue neurovascular checks  Goal -160  Discontinue arterial line   Continue ASA, Plavix, Statin  Gen diet  - patient can tolerate pills  Case management to assist with arranging transportation back to  Niall Bloom MD  3/25/2023    Subjective:  No acute events overnight  Denies any headache, no chest pain SOB  Vitals:  /79 (BP Location: Right arm)   Pulse 68   Temp 98 1 °F (36 7 °C)   Resp 16   Ht 5' 10" (1 778 m)   Wt 54 kg (119 lb)   SpO2 97%   BMI 17 07 kg/m²     I/Os:  I/O last 3 completed shifts: In: 1582 2 [P O :118; I V :1414 2; IV Piggyback:50]  Out: 1250 [Urine:1150; Blood:100]  No intake/output data recorded  Lab Results and Cultures:   CBC with diff:   Lab Results   Component Value Date    WBC 12 96 (H) 03/25/2023    HGB 10 2 (L) 03/25/2023    HCT 31 7 (L) 03/25/2023    MCV 85 03/25/2023     03/25/2023    MCH 27 3 03/25/2023    MCHC 32 2 03/25/2023    RDW 16 7 (H) 03/25/2023    MPV 8 6 (L) 03/25/2023    NRBC 0 10/31/2022   ,   BMP/CMP:  Lab Results   Component Value Date    SODIUM 140 03/25/2023    K 3 7 03/25/2023     (H) 03/25/2023    CO2 25 03/25/2023    BUN 12 03/25/2023    CREATININE 0 44 (L) 03/25/2023    CALCIUM 9 3 03/25/2023    AST 18 10/31/2022    ALT 15 10/31/2022    ALKPHOS 79 10/31/2022    EGFR 124 03/25/2023   ,   Lipid Panel: No results found for: CHOL,   Coags:   Lab Results   Component Value Date    PTT 28 03/25/2023    INR 0 98 03/25/2023   ,     Blood Culture:   Lab Results   Component Value Date    BLOODCX No Growth After 5 Days   10/31/2022   ,   Urinalysis:   Lab Results   Component Value Date    COLORU Light Yellow 10/31/2022    CLARITYU Clear 10/31/2022    SPECGRAV 1 020 10/31/2022    PHUR 7 0 10/31/2022    LEUKOCYTESUR Trace (A) 10/31/2022    NITRITE Negative 10/31/2022    GLUCOSEU Negative 10/31/2022    KETONESU Negative 10/31/2022    BILIRUBINUR Negative 10/31/2022    BLOODU Moderate (A) 10/31/2022   ,   Urine Culture: No results found for: URINECX,   Wound Culure: No results found for: WOUNDCULT    Medications:  Current Facility-Administered Medications   Medication Dose Route Frequency   • acetaminophen (TYLENOL) tablet 650 mg  650 mg Oral Q6H PRN   • aspirin (ECOTRIN LOW STRENGTH) EC tablet 81 mg  81 mg Oral Daily   • atorvastatin (LIPITOR) tablet 40 mg  40 mg Oral Daily With Dinner   • baclofen tablet 5 mg  5 mg Oral Daily   • clopidogrel (PLAVIX) tablet 75 mg  75 mg Oral Daily   • ferrous sulfate tablet 325 mg  325 mg Oral BID With Meals   • folic acid (FOLVITE) tablet 1 mg  1 mg Oral Daily   • heparin (porcine) subcutaneous injection 5,000 Units  5,000 Units Subcutaneous Q8H [address]   • hydrALAZINE (APRESOLINE) injection 15 mg  15 mg Intravenous Q15 Min PRN    And   • niCARdipine (CARDENE) 25 mg (STANDARD CONCENTRATION) in sodium chloride 0 9% 250 mL  1-15 mg/hr Intravenous Continuous PRN   • metoprolol tartrate (LOPRESSOR) partial tablet 25 mg  25 mg Oral Q12H GT   • oxyCODONE (ROXICODONE) IR tablet 2 5 mg  2 5 mg Oral Q4H PRN    Or   • oxyCODONE (ROXICODONE) IR tablet 5 mg  5 mg Oral Q4H PRN   • pantoprazole (PROTONIX) EC tablet 40 mg  40 mg Oral Early Morning   • sodium chloride 0 9 % bolus 500 mL  500 mL Intravenous Once PRN    Followed by   • phenylephrine (NEPTALI-SYNEPHRINE) 50 mg (STANDARD CONCENTRATION) in sodium chloride 0 9% 250 mL   mcg/min Intravenous Continuous PRN   • sodium chloride tablet 3 g  3 g Oral TID   • thiamine tablet 100 mg  100 mg Oral Daily       Physical Exam:    General: No acute distress  CV: Normal rate  Respiratory: Non-labored breathing  Abdominal: Soft  Extremities: Warm  Neurologic: Alert    Wound/Incision:  Left neck incision with skin glue in place, mild swelling, no hematoma    Baseline left upper and lower extremity hemiplegia with contracture from previous stroke  Awake, alert, no tongue deviation, symmetric smile  Right upper and lower extremity strength 5/5

## 2023-03-25 NOTE — PLAN OF CARE
Problem: Prexisting or High Potential for Compromised Skin Integrity  Goal: Skin integrity is maintained or improved  Description: INTERVENTIONS:  - Identify patients at risk for skin breakdown  - Assess and monitor skin integrity  - Assess and monitor nutrition and hydration status  - Monitor labs   - Assess for incontinence   - Turn and reposition patient  - Assist with mobility/ambulation  - Relieve pressure over bony prominences  - Avoid friction and shearing  - Provide appropriate hygiene as needed including keeping skin clean and dry  - Evaluate need for skin moisturizer/barrier cream  - Collaborate with interdisciplinary team   - Patient/family teaching  - Consider wound care consult   3/24/2023 2250 by Dillan Birmingham  Outcome: Progressing  3/24/2023 2250 by Dillan Birmingham  Outcome: Progressing     Problem: MOBILITY - ADULT  Goal: Maintain or return to baseline ADL function  Description: INTERVENTIONS:  -  Assess patient's ability to carry out ADLs; assess patient's baseline for ADL function and identify physical deficits which impact ability to perform ADLs (bathing, care of mouth/teeth, toileting, grooming, dressing, etc )  - Assess/evaluate cause of self-care deficits   - Assess range of motion  - Assess patient's mobility; develop plan if impaired  - Assess patient's need for assistive devices and provide as appropriate  - Encourage maximum independence but intervene and supervise when necessary  - Involve family in performance of ADLs  - Assess for home care needs following discharge   - Consider OT consult to assist with ADL evaluation and planning for discharge  - Provide patient education as appropriate  3/24/2023 2250 by Dillan Birmingham  Outcome: Progressing  3/24/2023 2250 by Denisa Puentes  Outcome: Progressing  Goal: Maintains/Returns to pre admission functional level  Description: INTERVENTIONS:  - Perform BMAT or MOVE assessment daily    - Set and communicate daily mobility goal to care team and patient/family/caregiver  - Collaborate with rehabilitation services on mobility goals if consulted  - Perform Range of Motion times a day  - Reposition patient every  hours    - Dangle patient  times a day  - Stand patient  times a day  - Ambulate patient  times a day  - Out of bed to chair  times a day   - Out of bed for meals times a day  - Out of bed for toileting  - Record patient progress and toleration of activity level   3/24/2023 2250 by Guillermina Vega  Outcome: Progressing  3/24/2023 2250 by Guillermina Vega  Outcome: Progressing     Problem: PAIN - ADULT  Goal: Verbalizes/displays adequate comfort level or baseline comfort level  Description: Interventions:  - Encourage patient to monitor pain and request assistance  - Assess pain using appropriate pain scale  - Administer analgesics based on type and severity of pain and evaluate response  - Implement non-pharmacological measures as appropriate and evaluate response  - Consider cultural and social influences on pain and pain management  - Notify physician/advanced practitioner if interventions unsuccessful or patient reports new pain  Outcome: Progressing     Problem: SAFETY ADULT  Goal: Maintain or return to baseline ADL function  Description: INTERVENTIONS:  -  Assess patient's ability to carry out ADLs; assess patient's baseline for ADL function and identify physical deficits which impact ability to perform ADLs (bathing, care of mouth/teeth, toileting, grooming, dressing, etc )  - Assess/evaluate cause of self-care deficits   - Assess range of motion  - Assess patient's mobility; develop plan if impaired  - Assess patient's need for assistive devices and provide as appropriate  - Encourage maximum independence but intervene and supervise when necessary  - Involve family in performance of ADLs  - Assess for home care needs following discharge   - Consider OT consult to assist with ADL evaluation and planning for discharge  - Provide patient education as appropriate  3/24/2023 2250 by Justin Moctezuma  Outcome: Progressing  3/24/2023 2250 by Justin Moctezuma  Outcome: Progressing  Goal: Maintains/Returns to pre admission functional level  Description: INTERVENTIONS:  - Perform BMAT or MOVE assessment daily    - Set and communicate daily mobility goal to care team and patient/family/caregiver  - Collaborate with rehabilitation services on mobility goals if consulted  - Perform Range of Motion  times a day  - Reposition patient every  hours    - Dangle patient  times a day  - Stand patient  times a day  - Ambulate patient times a day  - Out of bed to chair  times a day   - Out of bed for meals times a day  - Out of bed for toileting  - Record patient progress and toleration of activity level   3/24/2023 2250 by Justin Moctezuma  Outcome: Progressing  3/24/2023 2250 by Justin Moctezuma  Outcome: Progressing  Goal: Patient will remain free of falls  Description: INTERVENTIONS:  - Educate patient/family on patient safety including physical limitations  - Instruct patient to call for assistance with activity   - Consult OT/PT to assist with strengthening/mobility   - Keep Call bell within reach  - Keep bed low and locked with side rails adjusted as appropriate  - Keep care items and personal belongings within reach  - Initiate and maintain comfort rounds  - Make Fall Risk Sign visible to staff  - Offer Toileting every  Hours, in advance of need  - Initiate/Maintain alarm  - Obtain necessary fall risk management equipment:   - Apply yellow socks and bracelet for high fall risk patients  - Consider moving patient to room near nurses station  Outcome: Progressing

## 2023-03-26 NOTE — ASSESSMENT & PLAN NOTE
Status post  left carotid endarterectomy on 3/24/2023  Patient tolerated the procedure well    Continue aspirin Plavix and statin

## 2023-03-26 NOTE — PROGRESS NOTES
[facility]  [address] [place] [address], [address] [place] [address]    Nursing Home Admission    NAME: Elsy Newton III  AGE: 63 y o  SEX: male [id number]       Patient Location     [facility]    Patient’s care was coordinated with nursing facility staff  Recent vitals, labs and updated medications were reviewed on [facility] system of facility  Past Medical, surgical, social, medication and allergy history and patient’s previous records reviewed  Assessment/Plan:    More than 50 percent stenosis of left internal carotid artery  Status post  left carotid endarterectomy on 3/24/2023  Patient tolerated the procedure well  Continue aspirin Plavix and statin    Hypertension:  Blood pressure stable on metoprolol 25 mg twice daily and torsemide 5 mg daily    GERD:  Stable on Protonix    History of CVA:  History of CVA in October/22 with left hemiplegia and dysphagia  Continue aspirin Plavix and statin  Patient recently underwent left carotid endarterectomy for left ICA stenosis    Hyperlipidemia:  Continue statin    History of alcohol abuse:  Continue thiamine and folic acid    History of GI bleed:  Patient was hospitalized several months ago with melena/GI bleed  EGD revealed ulceration with active bleeding  Patient underwent placement of 3 hemoclips and injection of epinephrine to control bleeding  He was additionally transfused for hemoglobin of 6 6  Most recent hemoglobin was stable  We will continue to monitor  Continue Protonix    Anemia:   History of GI bleed  HGB was stable at 10 2 on 10/25  Continue Iron and Folic acid  supplements  Lab Results   Component Value Date    HGB 10 2 (L) 03/25/2023     Dysphagia:  History of dysphagia post CVA however most recently patient has been tolerating diet well, additionally remains on supplemental tube feeds    Will consult dietitian to see if patient can be weaned off of tube feeds    Hyponatremia:  History of hyponatremia  Patient remains on sodium chloride tablets 3 times a day  Recent sodium was stable    Leukocytosis:  Noted on recent labs with WBC count around 12  Patient clinically appears to be nontoxic  Follow-up repeat CBC    Chief Complaint     Status post left carotid endarterectomy    HPI       Patient is a 63 y o  male with past medical history significant for CVA with left hemiplegia, GERD, anemia, hypertension, hyperlipidemia and carotid artery disease  Patient was hospitalized on 3/24/2023 for elective  carotid endarterectomy  Patient tolerated the procedure well  He was discharged back to SNF the following day  He is being seen for posthospital admission  At the time of my evaluation patient is doing okay  Denies any active complaints  No fever chills or dyspnea        Past Medical History:   Diagnosis Date   • Hypertension    • Renal disorder    • Stroke [facility])        Past Surgical History:   Procedure Laterality Date   • GASTROSTOMY TUBE PLACEMENT N/A 10/18/2022    Procedure: INSERTION GASTROSTOMY TUBE OPEN;  Surgeon: Luci Yanez DO;  Location: [facility];  Service: General   • MA TEAEC W/PATCH GRF CAROTID VERTB [place] Left 3/24/2023    Procedure: Left CEA with neuromonitoring;  Surgeon: Porter Snow DO;  Location: [facility];  Service: Vascular       Social History     Tobacco Use   Smoking Status Every Day   • Packs/day: 1 00   • Types: Cigarettes   Smokeless Tobacco Never       Family history:  NA    No Known Allergies       Current Outpatient Medications:   •  acetaminophen (TYLENOL) 325 mg tablet, Take 650 mg by mouth every 6 (six) hours as needed for mild pain, Disp: , Rfl:   •  albuterol (2 5 mg/3 mL) 0 083 % nebulizer solution, Take 3 mL (2 5 mg total) by nebulization every 6 (six) hours as needed for wheezing or shortness of breath, Disp: 60 mL, Rfl: 0  •  aspirin (ECOTRIN LOW STRENGTH) 81 mg EC tablet, Take 1 tablet (81 mg total) by mouth daily, Disp: 90 tablet, Rfl: 0  •  atorvastatin (LIPITOR) 40 mg tablet, Take 1 tablet (40 mg total) by mouth daily with dinner, Disp: 90 tablet, Rfl: 0  •  Baclofen 5 MG TABS, Take 5 mg by mouth daily, Disp: , Rfl:   •  bisacodyl (DULCOLAX) 10 mg suppository, Insert 10 mg into the rectum if needed, Disp: , Rfl:   •  clopidogrel (PLAVIX) 75 mg tablet, Take 1 tablet (75 mg total) by mouth daily Do not start before October 22, 2022 , Disp: 20 tablet, Rfl: 0  •  ferrous sulfate 324 (65 Fe) mg, Take 1 tablet (324 mg total) by mouth 2 (two) times a day before meals, Disp: 60 tablet, Rfl: 0  •  folic acid (FOLVITE) 1 mg tablet, Take 1 mg by mouth daily, Disp: , Rfl:   •  metoprolol tartrate (LOPRESSOR) 25 mg tablet, 1 tablet (25 mg total) by Per G Tube route every 12 (twelve) hours (Patient taking differently: Take 25 mg by mouth every 12 (twelve) hours), Disp: 180 tablet, Rfl: 0  •  Multiple Vitamin (multivitamin) capsule, Take 1 capsule by mouth daily, Disp: , Rfl:   •  Multiple Vitamin (multivitamin) tablet, Take 1 tablet by mouth daily, Disp: , Rfl:   •  pantoprazole (PROTONIX) 40 mg tablet, Take 40 mg by mouth daily, Disp: , Rfl:   •  polyethylene glycol (MIRALAX) 17 g packet, Take 17 g by mouth if needed, Disp: , Rfl:   •  sodium chloride 1 g tablet, Take 3 g by mouth 3 (three) times a day, Disp: , Rfl:   •  thiamine 100 MG tablet, Take 100 mg by mouth daily, Disp: , Rfl:   •  torsemide (DEMADEX) 5 MG tablet, Take 5 mg by mouth daily, Disp: , Rfl:     Updated list was reviewed in pointclick care system of facility  Vitals:    03/27/23 2226   BP: 118/68   Pulse: 77   Resp: 18   Temp: 97 9 °F (36 6 °C)   SpO2: 97%       Vital signs were reviewed in point click care    Review of Systems   Constitutional: Negative for chills and fever  HENT: Negative for nosebleeds and rhinorrhea  Eyes: Negative for discharge and redness  Respiratory: Negative for cough, chest tightness, shortness of breath, wheezing and stridor      Cardiovascular: Negative for chest pain and leg swelling  Gastrointestinal: Negative for abdominal distention, abdominal pain, diarrhea and vomiting  Genitourinary: Negative for dysuria, flank pain and hematuria  Musculoskeletal: Positive for gait problem  Negative for arthralgias and back pain  Skin: Negative for pallor  Neurological: Positive for weakness (Left sided)  Negative for tremors, seizures, syncope and headaches  Psychiatric/Behavioral: Negative for agitation, behavioral problems and confusion  Physical Exam  Constitutional:       General: He is not in acute distress  Appearance: He is well-developed  He is not diaphoretic  HENT:      Head: Normocephalic and atraumatic  Eyes:      General: No scleral icterus  Right eye: No discharge  Left eye: No discharge  Cardiovascular:      Rate and Rhythm: Normal rate and regular rhythm  Pulmonary:      Breath sounds: No stridor  No wheezing  Abdominal:      General: There is no distension  Palpations: Abdomen is soft  Tenderness: There is no abdominal tenderness  There is no guarding  Comments: Peg tube in place   Musculoskeletal:      Cervical back: Neck supple  Right lower leg: No edema  Left lower leg: No edema  Comments: IV line in RUE   Skin:     Coloration: Skin is not jaundiced or pale  Comments: Surgical incision over left side of the neck healing well   Neurological:      Mental Status: He is alert  Cranial Nerves: No cranial nerve deficit  Motor: Weakness (Left sided) present  Comments: Oriented in month and year   Psychiatric:         Behavior: Behavior normal            Diagnostic Data       Recent labs and imaging studies were reviewed    Lab Results   Component Value Date    WBC 12 96 (H) 03/25/2023    HGB 10 2 (L) 03/25/2023    HCT 31 7 (L) 03/25/2023    MCV 85 03/25/2023     03/25/2023        Lab Results   Component Value Date    SODIUM 140 03/25/2023    K 3 7 03/25/2023     (H) 03/25/2023    CO2 25 03/25/2023    BUN 12 03/25/2023    CREATININE 0 44 (L) 03/25/2023    GLUC 91 03/25/2023    CALCIUM 9 3 03/25/2023     Code Status:      Full code    Additional notes:      DC IV line  Consult dietitian regarding calorie needs    This note was electronically signed by Dr Eleazar Black

## 2023-03-27 ENCOUNTER — NURSING HOME VISIT (OUTPATIENT)
Dept: GERIATRICS | Facility: OTHER | Age: 63
End: 2023-03-27

## 2023-03-27 VITALS
DIASTOLIC BLOOD PRESSURE: 68 MMHG | BODY MASS INDEX: 21.84 KG/M2 | OXYGEN SATURATION: 97 % | TEMPERATURE: 97.9 F | RESPIRATION RATE: 18 BRPM | HEART RATE: 77 BPM | SYSTOLIC BLOOD PRESSURE: 118 MMHG | WEIGHT: 152.2 LBS

## 2023-03-27 DIAGNOSIS — I65.22 MORE THAN 50 PERCENT STENOSIS OF LEFT INTERNAL CAROTID ARTERY: Primary | ICD-10-CM

## 2023-03-27 NOTE — UTILIZATION REVIEW
NOTIFICATION OF ADMISSION DISCHARGE   This is a Notification of Discharge from [facility]  Please be advised that this patient has been discharge from our facility  Below you will find the admission and discharge date and time including the patient’s disposition  UTILIZATION REVIEW CONTACT:  Teofilo Ryan  Utilization   Network Utilization Review Department  Phone: [phone] x carefully listen to the prompts  All voicemails are confidential   Email: [email or web address]     ADMISSION INFORMATION  PRESENTATION DATE: 3/24/2023  7:15 AM  OBERVATION ADMISSION DATE:  INPATIENT ADMISSION DATE: 3/24/23 10:02 AM   DISCHARGE DATE: 3/25/2023  3:51 PM   DISPOSITION:Non [facility] SNF/TCU/SNU    IMPORTANT INFORMATION:  Send all requests for admission clinical reviews, approved or denied determinations and any other requests to dedicated fax number below belonging to the campus where the patient is receiving treatment   List of dedicated fax numbers:  [address] DENIALS (Administrative/Medical Necessity) [phone]   [address] (Maternity/NICU/Pediatrics) [phone]   [facility] [phone]   [place] [address] [phone]   [address] [phone]   220 [facility] [phone] 90 [facility] [phone]   [address] [place] [address] 119 [phone]   [facility]  [phone]   [address] [facility] [phone]   [address] [place] [address] [address] [facility] [phone]

## 2023-03-28 ENCOUNTER — TELEPHONE (OUTPATIENT)
Dept: VASCULAR SURGERY | Facility: CLINIC | Age: 63
End: 2023-03-28

## 2023-03-28 NOTE — TELEPHONE ENCOUNTER
Vascular Nurse Navigator Post Op Call    Procedure: Left - Left CEA with neuromonitoring  Bovine patch angioplasty  Excisional biopsy left cervical lymph node    Date of Procedure: 3/24/23    Surgeon:    Lang Brown DO - Primary     * Jerica Mcdowell MD - Fellow    Discharge Date: 3/25/23    Discharge Disposition:  Soniya Every in Vision?: No    Change in Speech?: No    Weakness?: No    Uncontrolled Pain?: No    Hoarseness?: No    Trouble Swallowing?: No    Incision Concerns?: No    Anticoagulation pt was discharged on post op?: Aspirin and Clopidogrel (Plavix)    Statin pt was discharged on post op?: Lipitor (atorvastatin)    Bleeding?: No    Fever/chills?: No      Reviewed discharge instructions and incision care with patient  NEXT OFFICE VISIT SCHEDULED:  4/4/23 at 10:30 am with Dr Anabell Amado at [facility] Available?: Yes - facility to arrange transportation      Any further questions/concerns? Spoke with Mary Koroma, nurse at [facility], in regards to above  She stated that patient is doing good since discharge  She stated that incision is without any signs of infections  She stated that patient does cough at times with eating  She denies any stroke like symptoms  Reviewed incision care with her - wash daily with soap and water  Reviewed discharge medications - Aspirin and Plavix  All questions answered  No concerns expressed at this time

## 2023-03-30 NOTE — PROGRESS NOTES
NEPHROLOGY OFFICE VISIT   Effie Licea III 63 y o  male MRN: [id number]  3/31/2023    Reason for Visit: Follow-up    INTERVAL HISTORY and SUBJECTIVE:    I had the pleasure of seeing Peter Paulson today in the renal clinic  He is a 82-year-old male who was initially seen by our service October 2022 for management of hyponatremia and BIPIN in the setting of volume depletion/alcohol abuse/acute CVA with left hemiparesis and dysphagia status post gastrostomy tube placement October 2022  No significant recovery  He was discharged back to nursing facility after recent hospitalization for carotid endarterectomy  He was last seen in the nephrology clinic 12/23/2022 by Dr Rani Flores  He has cardiac EP lab Loop recorder implant planned for 4/4/2023  ASSESSMENT AND PLAN:  Hypoosmolar hyponatremia:  · 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  Initial evaluation also was a component of volume depletion  History of alcohol abuse/low solute intake may also have been contributing  · Prior work-up: Serum osmolality 265, urine osmolality 369, urine sodium <10  uric acid 8, cortisol 41, TSH 4 36   · On sodium chloride tablets 3 g 3 times daily  Also on loop diuretic  · Since hospitalization last fall sodium level appears to have normalized  · Sodium level stable, 140  · Continue salt tablets, torsemide 5 mg daily  No changes  · Follow-up BMP in approximately 1 month and again before next appointment in approximately 3 months    Renal:  · BIPIN during hospitalization October 2022 due to volume depletion  · Peak creatinine 1 9 improving to baseline at discharge  · Following acute kidney injury October 2022 creatinine has decreased and is currently staying at baseline which is near 0 4-0 5  · Low creatinine likely related to poor muscle mass    Primary hypertension:  · Medications: Metoprolol tartrate 25 mg every 12 hours, torsemide 5 mg daily  · Volume status: Euvolemic  · Goal blood pressure less than 120/80    · Blood pressure at "goal   No changes  Symptomatic carotid stenosis:  · Left CEA 3/24/2023  Incision healing    Anemia:  · Recent hospitalization/carotid surgery  · Creatinine 10 3 at discharge  Other problems:  · CVA with left hemiparesis and dysphagia  Status post gastrostomy tube placement October 2022  Minimal recovery since initial insult  PATIENT INSTRUCTIONS:    Patient Instructions   You were seen today for continued management of chronic hyponatremia which is low sodium  We are also watching kidney function which is stable  Plan/recommendations:  • Avoid all NSAIDs such as Motrin Aleve ibuprofen and Advil  You can take Tylenol for control of mild pain  • Follow-up BMP in 1 month  • Follow-up appointment in approximately 3 months  Blood work prior to the appointment  • Goal sodium level greater than 130  Please call if sodium level drops  • No change in current medications  Continue salt tablets and torsemide  • Modest fluid restriction recommended: 1800 ml/day        Orders Placed This Encounter   Procedures   • Basic metabolic panel     Standing Status:   Future     Standing Expiration Date:   4/30/2023   • CBC     Standing Status:   Future     Standing Expiration Date:   9/1/2023   • Magnesium     Standing Status:   Future     Standing Expiration Date:   9/1/2023   • Basic metabolic panel     This is a patient instruction: Patient fasting for 8 hours or longer recommended  Standing Status:   Future     Standing Expiration Date:   9/1/2023       OBJECTIVE:  Current Weight:    Vitals:    03/31/23 1321 03/31/23 1343   BP:  118/64   BP Location:  Right arm   Patient Position:  Sitting   Cuff Size:  Standard   Pulse:  76   Height: 5' 10\" (1 778 m)     Body mass index is 21 84 kg/m²  REVIEW OF SYSTEMS:    Review of Systems   Constitutional: Positive for fatigue  Negative for activity change, chills and fever  HENT: Positive for trouble swallowing  Eyes: Negative for visual disturbance   " Respiratory: Negative for cough and shortness of breath  Cardiovascular: Negative for chest pain, palpitations and leg swelling  Gastrointestinal: Negative for abdominal pain, blood in stool, constipation, diarrhea, nausea and vomiting  Genitourinary: Negative for dysuria and hematuria  Musculoskeletal: Positive for gait problem  Negative for arthralgias and back pain  Skin: Negative for color change and rash  Neurological: Negative for dizziness, seizures, syncope, weakness and headaches  Hematological: Does not bruise/bleed easily  Psychiatric/Behavioral: The patient is not nervous/anxious  All other systems reviewed and are negative  PHYSICAL EXAM:      Physical Exam  Constitutional:       Appearance: He is well-developed  He is cachectic  He is ill-appearing  HENT:      Head: Normocephalic and atraumatic  Nose: No congestion  Mouth/Throat:      Pharynx: No oropharyngeal exudate  Eyes:      Pupils: Pupils are equal, round, and reactive to light  Neck:      Thyroid: No thyromegaly  Vascular: No carotid bruit or JVD  Trachea: No tracheal deviation  Cardiovascular:      Rate and Rhythm: Normal rate and regular rhythm  Heart sounds: No murmur heard  No friction rub  No gallop  Pulmonary:      Effort: Pulmonary effort is normal  No respiratory distress  Breath sounds: Normal breath sounds  No stridor  No wheezing, rhonchi or rales  Chest:      Chest wall: No tenderness  Abdominal:      General: Bowel sounds are normal  There is no distension  Palpations: Abdomen is soft  There is no mass  Tenderness: There is no abdominal tenderness  There is no guarding or rebound  Comments: Feeding tube   Musculoskeletal:         General: No swelling, tenderness or signs of injury  Normal range of motion  Cervical back: Normal range of motion and neck supple  No rigidity or tenderness  Right lower leg: No edema        Left lower leg: No edema    Skin:     General: Skin is warm and dry  Coloration: Skin is pale  Skin is not jaundiced  Findings: No erythema or rash  Neurological:      Mental Status: He is alert and oriented to person, place, and time     Psychiatric:         Mood and Affect: Mood normal          Behavior: Behavior normal          Medications:    Current Outpatient Medications:   •  acetaminophen (TYLENOL) 325 mg tablet, Take 650 mg by mouth every 6 (six) hours as needed for mild pain, Disp: , Rfl:   •  albuterol (2 5 mg/3 mL) 0 083 % nebulizer solution, Take 3 mL (2 5 mg total) by nebulization every 6 (six) hours as needed for wheezing or shortness of breath, Disp: 60 mL, Rfl: 0  •  aspirin (ECOTRIN LOW STRENGTH) 81 mg EC tablet, Take 1 tablet (81 mg total) by mouth daily, Disp: 90 tablet, Rfl: 0  •  atorvastatin (LIPITOR) 40 mg tablet, Take 1 tablet (40 mg total) by mouth daily with dinner, Disp: 90 tablet, Rfl: 0  •  Baclofen 5 MG TABS, Take 5 mg by mouth daily, Disp: , Rfl:   •  bisacodyl (DULCOLAX) 10 mg suppository, Insert 10 mg into the rectum if needed, Disp: , Rfl:   •  clopidogrel (PLAVIX) 75 mg tablet, Take 1 tablet (75 mg total) by mouth daily Do not start before October 22, 2022 , Disp: 20 tablet, Rfl: 0  •  ferrous sulfate 324 (65 Fe) mg, Take 1 tablet (324 mg total) by mouth 2 (two) times a day before meals, Disp: 60 tablet, Rfl: 0  •  folic acid (FOLVITE) 1 mg tablet, Take 1 mg by mouth daily, Disp: , Rfl:   •  metoprolol tartrate (LOPRESSOR) 25 mg tablet, 1 tablet (25 mg total) by Per G Tube route every 12 (twelve) hours (Patient taking differently: Take 25 mg by mouth every 12 (twelve) hours), Disp: 180 tablet, Rfl: 0  •  Multiple Vitamin (multivitamin) tablet, Take 1 tablet by mouth daily, Disp: , Rfl:   •  pantoprazole (PROTONIX) 40 mg tablet, Take 40 mg by mouth daily, Disp: , Rfl:   •  sodium chloride 1 g tablet, Take 3 g by mouth 3 (three) times a day, Disp: , Rfl:   •  thiamine 100 MG tablet, Take 100 mg by mouth daily, Disp: , Rfl:   •  torsemide (DEMADEX) 5 MG tablet, Take 5 mg by mouth daily, Disp: , Rfl:   •  Multiple Vitamin (multivitamin) capsule, Take 1 capsule by mouth daily (Patient not taking: Reported on 3/31/2023), Disp: , Rfl:   •  polyethylene glycol (MIRALAX) 17 g packet, Take 17 g by mouth if needed (Patient not taking: Reported on 3/31/2023), Disp: , Rfl:     Laboratory Results:  Results from last 7 days   Lab Units 03/25/23  0508   WBC Thousand/uL 12 96*   HEMOGLOBIN g/dL 10 2*   HEMATOCRIT % 31 7*   PLATELETS Thousands/uL 314   POTASSIUM mmol/L 3 7   CHLORIDE mmol/L 111*   CO2 mmol/L 25   BUN mg/dL 12   CREATININE mg/dL 0 44*   CALCIUM mg/dL 9 3       Results for orders placed or performed during the hospital encounter of 03/24/23   COVID/FLU/RSV    Specimen: Nose; Nares   Result Value Ref Range    SARS-CoV-2 Negative Negative    INFLUENZA A PCR Negative Negative    INFLUENZA B PCR Negative Negative    RSV PCR Negative Negative   Basic metabolic panel   Result Value Ref Range    Sodium 137 135 - 147 mmol/L    Potassium 4 1 3 5 - 5 3 mmol/L    Chloride 107 96 - 108 mmol/L    CO2 27 21 - 32 mmol/L    ANION GAP 3 (L) 4 - 13 mmol/L    BUN 17 5 - 25 mg/dL    Creatinine 0 54 (L) 0 60 - 1 30 mg/dL    Glucose 87 65 - 140 mg/dL    Glucose, Fasting 87 65 - 99 mg/dL    Calcium 9 6 8 3 - 10 1 mg/dL    eGFR 114 ml/min/1 73sq m   CBC   Result Value Ref Range    WBC 5 66 4 31 - 10 16 Thousand/uL    RBC 4 14 3 88 - 5 62 Million/uL    Hemoglobin 11 3 (L) 12 0 - 17 0 g/dL    Hematocrit 35 3 (L) 36 5 - 49 3 %    MCV 85 82 - 98 fL    MCH 27 3 26 8 - 34 3 pg    MCHC 32 0 31 4 - 37 4 g/dL    RDW 16 3 (H) 11 6 - 15 1 %    Platelets 642 427 - 082 Thousands/uL    MPV 8 3 (L) 8 9 - 12 7 fL   Basic Metabolic Panel   Result Value Ref Range    Sodium 140 135 - 147 mmol/L    Potassium 3 7 3 5 - 5 3 mmol/L    Chloride 111 (H) 96 - 108 mmol/L    CO2 25 21 - 32 mmol/L    ANION GAP 4 4 - 13 mmol/L    BUN 12 5 - 25 mg/dL Creatinine 0 44 (L) 0 60 - 1 30 mg/dL    Glucose 91 65 - 140 mg/dL    Calcium 9 3 8 3 - 10 1 mg/dL    eGFR 124 ml/min/1 73sq m   CBC and Platelet   Result Value Ref Range    WBC 12 96 (H) 4 31 - 10 16 Thousand/uL    RBC 3 73 (L) 3 88 - 5 62 Million/uL    Hemoglobin 10 2 (L) 12 0 - 17 0 g/dL    Hematocrit 31 7 (L) 36 5 - 49 3 %    MCV 85 82 - 98 fL    MCH 27 3 26 8 - 34 3 pg    MCHC 32 2 31 4 - 37 4 g/dL    RDW 16 7 (H) 11 6 - 15 1 %    Platelets 703 066 - 174 Thousands/uL    MPV 8 6 (L) 8 9 - 12 7 fL   APTT   Result Value Ref Range    PTT 28 23 - 37 seconds   Protime-INR   Result Value Ref Range    Protime 13 2 11 6 - 14 5 seconds    INR 0 98 0 84 - 1 19   Type and screen   Result Value Ref Range    ABO Grouping O     Rh Factor Positive     Antibody Screen Negative     Specimen Expiration Date [id number]    Tissue Exam   Result Value Ref Range    Case Report       Surgical Pathology Report                         Case: [id number]                                   Authorizing Provider:  Porter Sonw DO          Collected:           03/24/2023 0066              Ordering Location:     [address] [place] [address]      Received:            03/24/2023 [place] [address] Operating Room                                                      Pathologist:           Matthew Naik MD                                                                  Specimens:   A) - Lymph Node, left cervical                                                                      B) - Artery, left carotid artery plaque                                                    Final Diagnosis       A  Lymph Node, Left cervical, Excision:  - One reactive lymph node with paracortical hyperplasia  B  Artery, Left carotid artery plaque, Endarterectomy:  - Calcified plaque with hemosiderin laden macrophages and cholesterol clefts  Microscopic Description       Histologic sections show the yana architecture is within normal limits  "    Immunochemical stains and DAVID performed on block A1 with appropriate controls show the germinal centers are positive for CD20, CD10, bcl-6, and negative for bcl-2  CD5, CD3 and bcl-2 are positive for T lymphocytes in the interollicular areas  Bcl-1 highlights histocytes  CD23 highlights the follicular dendritic network of the follicles  Ki67 shows high proliferation rate in reactive germinal centers  CD30 highlights scattered immunoblasts   highlights scattered plasma cells with polyclonal Kappa and Lambda expression  CK-AE1/3 is negative  LILY is negative  Additional Information       All reported additional testing was performed with appropriately reactive controls  These tests were developed and their performance characteristics determined by [facility] or appropriate performing facility, though some tests may be performed on tissues which have not been validated for performance characteristics (such as staining performed on alcohol exposed cell blocks and decalcified tissues)  Results should be interpreted with caution and in the context of the patients’ clinical condition  These tests may not be cleared or approved by the [place]  Food and Drug Administration, though the FDA has determined that such clearance or approval is not necessary  These tests are used for clinical purposes and they should not be regarded as investigational or for research  This laboratory has been approved by CLIA 88, designated as a high-complexity laboratory and is qualified to perform these tests  Interpretation performed at [address] [address]      Gross Description       A  The specimen is received in formalin, labeled with the patient's name and hospital number, and is designated \" left cervical \"  The specimen consists of a tan rubbery tissue nodule that measures 2 1 x 1 5 x 0 6 cm  The specimen is bivalved and totally submitted in 2 cassettes  B   The specimen " "is received in formalin, labeled with the patient's name and hospital number, and is designated \" left carotid artery plaque  \"  The specimen consists of two tubular pieces of firm yellow tissue measuring 4 7 x 1 0 x 0 9 cm in aggregate  The specimen is serially sectioned revealing thick and brittle dark yellow areas  Representative sections are submitted in 1 cassette for decalcification  Note: The estimated total formalin fixation time based upon information provided by the submitting clinician and the standard processing schedule is over 72 hours   TStevens     Fingerstick Glucose (POCT)   Result Value Ref Range    POC Glucose 101 65 - 140 mg/dl             "

## 2023-03-31 ENCOUNTER — OFFICE VISIT (OUTPATIENT)
Dept: NEPHROLOGY | Facility: CLINIC | Age: 63
End: 2023-03-31

## 2023-03-31 VITALS
SYSTOLIC BLOOD PRESSURE: 118 MMHG | BODY MASS INDEX: 21.84 KG/M2 | HEIGHT: 70 IN | HEART RATE: 76 BPM | DIASTOLIC BLOOD PRESSURE: 64 MMHG

## 2023-03-31 DIAGNOSIS — I10 PRIMARY HYPERTENSION: Chronic | ICD-10-CM

## 2023-03-31 DIAGNOSIS — E87.1 HYPONATREMIA: Primary | Chronic | ICD-10-CM

## 2023-03-31 DIAGNOSIS — E44.0 MODERATE PROTEIN-CALORIE MALNUTRITION (HCC): Chronic | ICD-10-CM

## 2023-03-31 DIAGNOSIS — Z86.73 HISTORY OF CVA (CEREBROVASCULAR ACCIDENT): Chronic | ICD-10-CM

## 2023-03-31 DIAGNOSIS — D64.9 ANEMIA, UNSPECIFIED TYPE: Chronic | ICD-10-CM

## 2023-03-31 DIAGNOSIS — F41.9 ANXIETY: Chronic | ICD-10-CM

## 2023-03-31 DIAGNOSIS — K94.23 GASTROSTOMY TUBE DYSFUNCTION (HCC): ICD-10-CM

## 2023-03-31 NOTE — PATIENT INSTRUCTIONS
You were seen today for continued management of chronic hyponatremia which is low sodium  We are also watching kidney function which is stable  Plan/recommendations:  Avoid all NSAIDs such as Motrin Aleve ibuprofen and Advil  You can take Tylenol for control of mild pain  Follow-up BMP in 1 month  Follow-up appointment in approximately 3 months  Blood work prior to the appointment  Goal sodium level greater than 130  Please call if sodium level drops  No change in current medications    Continue salt tablets and torsemide  Modest fluid restriction recommended: 1800 ml/day

## 2023-04-01 ENCOUNTER — TELEPHONE (OUTPATIENT)
Dept: OTHER | Facility: OTHER | Age: 63
End: 2023-04-01

## 2023-04-01 ENCOUNTER — HOSPITAL ENCOUNTER (EMERGENCY)
Facility: HOSPITAL | Age: 63
Discharge: HOME | End: 2023-04-01
Attending: EMERGENCY MEDICINE

## 2023-04-01 VITALS
DIASTOLIC BLOOD PRESSURE: 85 MMHG | SYSTOLIC BLOOD PRESSURE: 125 MMHG | HEART RATE: 79 BPM | TEMPERATURE: 97.8 F | RESPIRATION RATE: 16 BRPM | OXYGEN SATURATION: 98 %

## 2023-04-01 DIAGNOSIS — R04.0 ACUTE ANTERIOR EPISTAXIS: Primary | ICD-10-CM

## 2023-04-01 RX ORDER — AMOXICILLIN AND CLAVULANATE POTASSIUM 875; 125 MG/1; MG/1
1 TABLET, FILM COATED ORAL ONCE
Status: COMPLETED | OUTPATIENT
Start: 2023-04-01 | End: 2023-04-01

## 2023-04-01 RX ORDER — OXYMETAZOLINE HYDROCHLORIDE 0.05 G/100ML
2 SPRAY NASAL ONCE
Status: COMPLETED | OUTPATIENT
Start: 2023-04-01 | End: 2023-04-01

## 2023-04-01 RX ORDER — TRANEXAMIC ACID 100 MG/ML
1000 INJECTION, SOLUTION INTRAVENOUS ONCE
Status: COMPLETED | OUTPATIENT
Start: 2023-04-01 | End: 2023-04-01

## 2023-04-01 RX ORDER — AMOXICILLIN AND CLAVULANATE POTASSIUM 875; 125 MG/1; MG/1
1 TABLET, FILM COATED ORAL EVERY 12 HOURS
Qty: 6 TABLET | Refills: 0 | Status: SHIPPED | OUTPATIENT
Start: 2023-04-01 | End: 2023-04-04

## 2023-04-01 RX ADMIN — TRANEXAMIC ACID 1000 MG: 1 INJECTION, SOLUTION INTRAVENOUS at 15:22

## 2023-04-01 RX ADMIN — AMOXICILLIN AND CLAVULANATE POTASSIUM 1 TABLET: 875; 125 TABLET, FILM COATED ORAL at 16:46

## 2023-04-01 RX ADMIN — OXYMETHAZOLINE HCL 2 SPRAY: 0.05 SPRAY NASAL at 14:12

## 2023-04-01 NOTE — ED PROVIDER NOTES
History  Chief Complaint   Patient presents with   • Nose Bleed     Pt presents with nose bleed starting this AM, +thinners     57-year-old male with a history of hypertension and stroke on aspirin and Plavix presents from nursing facility with nosebleed which began this morning  Patient denies history of nosebleeds  Otherwise feeling well  No trauma  Prior to Admission Medications   Prescriptions Last Dose Informant Patient Reported? Taking? Baclofen 5 MG TABS   Yes No   Sig: Take 5 mg by mouth daily   Multiple Vitamin (multivitamin) capsule   Yes No   Sig: Take 1 capsule by mouth daily   Patient not taking: Reported on 3/31/2023   Multiple Vitamin (multivitamin) tablet   Yes No   Sig: Take 1 tablet by mouth daily   acetaminophen (TYLENOL) 325 mg tablet   Yes No   Sig: Take 650 mg by mouth every 6 (six) hours as needed for mild pain   albuterol (2 5 mg/3 mL) 0 083 % nebulizer solution   No No   Sig: Take 3 mL (2 5 mg total) by nebulization every 6 (six) hours as needed for wheezing or shortness of breath   aspirin (ECOTRIN LOW STRENGTH) 81 mg EC tablet   No No   Sig: Take 1 tablet (81 mg total) by mouth daily   atorvastatin (LIPITOR) 40 mg tablet   No No   Sig: Take 1 tablet (40 mg total) by mouth daily with dinner   bisacodyl (DULCOLAX) 10 mg suppository   Yes No   Sig: Insert 10 mg into the rectum if needed   clopidogrel (PLAVIX) 75 mg tablet   No No   Sig: Take 1 tablet (75 mg total) by mouth daily Do not start before 2022     ferrous sulfate 324 (65 Fe) mg   No No   Sig: Take 1 tablet (324 mg total) by mouth 2 (two) times a day before meals   folic acid (FOLVITE) 1 mg tablet   Yes No   Sig: Take 1 mg by mouth daily   metoprolol tartrate (LOPRESSOR) 25 mg tablet   No No   Si tablet (25 mg total) by Per G Tube route every 12 (twelve) hours   Patient taking differently: Take 25 mg by mouth every 12 (twelve) hours   pantoprazole (PROTONIX) 40 mg tablet   Yes No   Sig: Take 40 mg by mouth daily   polyethylene glycol (MIRALAX) 17 g packet   Yes No   Sig: Take 17 g by mouth if needed   Patient not taking: Reported on 3/31/2023   sodium chloride 1 g tablet   Yes No   Sig: Take 3 g by mouth 3 (three) times a day   thiamine 100 MG tablet   Yes No   Sig: Take 100 mg by mouth daily   torsemide (DEMADEX) 5 MG tablet   Yes No   Sig: Take 5 mg by mouth daily      Facility-Administered Medications: None       Past Medical History:   Diagnosis Date   • Hypertension    • Renal disorder    • Stroke [facility])        Past Surgical History:   Procedure Laterality Date   • GASTROSTOMY TUBE PLACEMENT N/A 10/18/2022    Procedure: INSERTION GASTROSTOMY TUBE OPEN;  Surgeon: Sunday Yanez DO;  [facility];  Service: General   • OR TEAEC W/PATCH GRF CAROTID VERTB SUBCLAV NECK INC Left 3/24/2023    Procedure: Left CEA with neuromonitoring;  Surgeon: Angie Edgar DO;  Location: [facility];  Service: Vascular       History reviewed  No pertinent family history  I have reviewed and agree with the history as documented  E-Cigarette/Vaping   • E-Cigarette Use Never User      E-Cigarette/Vaping Substances     Social History     Tobacco Use   • Smoking status: Every Day     Packs/day: 1 00     Types: Cigarettes   • Smokeless tobacco: Never   Vaping Use   • Vaping Use: Never used   Substance Use Topics   • Alcohol use: Not Currently     Comment: occasionally   • Drug use: Never       Review of Systems   HENT: Positive for nosebleeds  Physical Exam  Physical Exam  Constitutional:       General: He is not in acute distress  Appearance: Normal appearance  HENT:      Head: Normocephalic  Nose:      Comments: Blood around right nare, not actively bleeding     Mouth/Throat:      Mouth: Mucous membranes are moist       Comments: Dark blood dripping down posterior oropharynx  Eyes:      Conjunctiva/sclera: Conjunctivae normal    Cardiovascular:      Rate and Rhythm: Normal rate and regular rhythm  Pulmonary:      Effort: Pulmonary effort is normal    Skin:     General: Skin is warm and dry  Neurological:      Mental Status: He is alert  Psychiatric:         Mood and Affect: Mood normal          Behavior: Behavior normal          Vital Signs  ED Triage Vitals [04/01/23 1350]   Temperature Pulse Respirations Blood Pressure SpO2   97 8 °F (36 6 °C) 79 16 125/85 98 %      Temp Source Heart Rate Source Patient Position - Orthostatic VS BP Location FiO2 (%)   Oral Monitor Lying Right arm --      Pain Score       --           Vitals:    04/01/23 1350   BP: 125/85   Pulse: 79   Patient Position - Orthostatic VS: Lying         Visual Acuity      ED Medications  Medications   oxymetazoline (AFRIN) 0 05 % nasal spray 2 spray (2 sprays Each Nare Given 4/1/23 1412)   tranexamic acid 100mg/mL (for epistaxis) 1,000 mg (1,000 mg Nasal Given 4/1/23 1522)   amoxicillin-clavulanate (AUGMENTIN) 875-125 mg per tablet 1 tablet (1 tablet Oral Given 4/1/23 1646)       Diagnostic Studies  Results Reviewed     None                 No orders to display              Procedures  Epistaxis management    Date/Time: 4/1/2023 2:24 PM  Performed by: Lucinda Chavez MD  Authorized by: Lucinda Chavez MD   Universal Protocol:  Consent: Verbal consent obtained  Consent given by: patient      Patient location:  ED  Procedure details:     Treatment site:  R anterior    Hemostasis method:  Rhino rocket and nasal balloon    Approach:  External    Treatment complexity:  Extensive    Treatment episode: initial    Post-procedure details:     Assessment:  Bleeding stopped    Patient tolerance of procedure: Tolerated well, no immediate complications  Comments:      Initial attempts at applying nasal pressure and applying nasal pressure after administration of intranasal Afrin did not adequately stop oropharyngeal bleeding  Of Rhino Rocket brand nasal tampon was then placed    He continued to have anterior and posterior oozing following initial packing placement  This was replaced with a rapid Rhino balloon which was soaked in TXA prior to insertion  5 mL of air were placed in the balloon  Following this hemostasis was achieved  ED Course     12year-old male on dual antiplatelet therapy presents with nosebleed from the right anterior nare  Initially attempted management by holding the nose and applying Afrin which was unsuccessful followed by nasal tampon followed by a rapid Rhino balloon after which hemostasis was achieved  Patient discharged back to nursing facility with instruction to follow-up at the ENT clinic within 48 to 72 hours for removal of nasal packing  Prescribed short course of Augmentin to prevent possible toxic shock syndrome from nasal packing  SBIRT 22yo+    Flowsheet Row Most Recent Value   SBIRT (23 yo +)    In order to provide better care to our patients, we are screening all of our patients for alcohol and drug use  Would it be okay to ask you these screening questions? Yes Filed at: 04/01/2023 1413   Initial Alcohol Screen: US AUDIT-C     1  How often do you have a drink containing alcohol? 0 Filed at: 04/01/2023 1413   2  How many drinks containing alcohol do you have on a typical day you are drinking? 0 Filed at: 04/01/2023 1413   3a  Male UNDER 65: How often do you have five or more drinks on one occasion? 0 Filed at: 04/01/2023 1413   3b  FEMALE Any Age, or MALE 65+: How often do you have 4 or more drinks on one occassion? 0 Filed at: 04/01/2023 1413   Audit-C Score 0 Filed at: 04/01/2023 1413   TAMEKA: How many times in the past year have you    Used an illegal drug or used a prescription medication for non-medical reasons?  Never Filed at: 04/01/2023 1413                    MDM    Disposition  Final diagnoses:   Acute anterior epistaxis     Time reflects when diagnosis was documented in both MDM as applicable and the Disposition within this note     Time User Action Codes Description Comment    4/1/2023  4:13 PM Josseline AstudilloSophy Add [R04 0] Acute anterior epistaxis       ED Disposition     ED Disposition   Discharge    Condition   Stable    Date/Time   Sat Apr 1, 2023  4:13 PM    Comment   Purnima Renee Anuja IRAHETA discharge to home/self care                 Follow-up Information     Follow up With Specialties Details Why Contact Info    Frederick Messina MD Otolaryngology Schedule an appointment as soon as possible for a visit in 2 days  [facility] [address]            Discharge Medication List as of 4/1/2023  4:37 PM      START taking these medications    Details   amoxicillin-clavulanate (AUGMENTIN) 875-125 mg per tablet Take 1 tablet by mouth every 12 (twelve) hours for 3 days, Starting Sat 4/1/2023, Until Tue 4/4/2023, Normal         CONTINUE these medications which have NOT CHANGED    Details   acetaminophen (TYLENOL) 325 mg tablet Take 650 mg by mouth every 6 (six) hours as needed for mild pain, Historical Med      albuterol (2 5 mg/3 mL) 0 083 % nebulizer solution Take 3 mL (2 5 mg total) by nebulization every 6 (six) hours as needed for wheezing or shortness of breath, Starting Fri 11/4/2022, Normal      aspirin (ECOTRIN LOW STRENGTH) 81 mg EC tablet Take 1 tablet (81 mg total) by mouth daily, Starting Fri 10/21/2022, No Print      atorvastatin (LIPITOR) 40 mg tablet Take 1 tablet (40 mg total) by mouth daily with dinner, Starting Fri 10/21/2022, No Print      Baclofen 5 MG TABS Take 5 mg by mouth daily, Historical Med      bisacodyl (DULCOLAX) 10 mg suppository Insert 10 mg into the rectum if needed, Historical Med      clopidogrel (PLAVIX) 75 mg tablet Take 1 tablet (75 mg total) by mouth daily Do not start before October 22, 2022 , Starting Sat 10/22/2022, No Print      ferrous sulfate 324 (65 Fe) mg Take 1 tablet (324 mg total) by mouth 2 (two) times a day before meals, Starting Fri 89/5/9684, Normal      folic acid (FOLVITE) 1 mg tablet Take 1 mg by mouth daily, Historical Med      metoprolol tartrate (LOPRESSOR) 25 mg tablet 1 tablet (25 mg total) by Per G Tube route every 12 (twelve) hours, Starting Fri 10/21/2022, No Print      Multiple Vitamin (multivitamin) capsule Take 1 capsule by mouth daily, Historical Med      Multiple Vitamin (multivitamin) tablet Take 1 tablet by mouth daily, Historical Med      pantoprazole (PROTONIX) 40 mg tablet Take 40 mg by mouth daily, Historical Med      polyethylene glycol (MIRALAX) 17 g packet Take 17 g by mouth if needed, Historical Med      sodium chloride 1 g tablet Take 3 g by mouth 3 (three) times a day, Historical Med      thiamine 100 MG tablet Take 100 mg by mouth daily, Historical Med      torsemide (DEMADEX) 5 MG tablet Take 5 mg by mouth daily, Historical Med             No discharge procedures on file      PDMP Review     None          ED Provider  Electronically Signed by           Jenny Chaney MD  04/01/23 2026

## 2023-04-01 NOTE — TELEPHONE ENCOUNTER
Patient is having a bloody nose with big clots coming out  Want to send patient to hospital      TC to on call provider

## 2023-04-01 NOTE — DISCHARGE INSTRUCTIONS
You should schedule an appointment with Dr Gege Blount at the ENT clinic in 2 to 3 days for removal of your nasal packing  This should occur either Monday or Tuesday  You should take the antibiotics until your nasal packing is removed

## 2023-04-01 NOTE — ED NOTES
Round trip utilized to coordinate transport to where pt resides  Awaiting transport information when it becomes available         Kacy Ford RN  04/01/23 [id number]

## 2023-04-03 ENCOUNTER — TELEPHONE (OUTPATIENT)
Dept: INTERNAL MEDICINE CLINIC | Facility: CLINIC | Age: 63
End: 2023-04-03

## 2023-04-03 ENCOUNTER — TELEPHONE (OUTPATIENT)
Dept: NEPHROLOGY | Facility: CLINIC | Age: 63
End: 2023-04-03

## 2023-04-03 ENCOUNTER — NURSING HOME VISIT (OUTPATIENT)
Dept: GERIATRICS | Facility: OTHER | Age: 63
End: 2023-04-03

## 2023-04-03 VITALS
OXYGEN SATURATION: 97 % | TEMPERATURE: 97.9 F | WEIGHT: 120 LBS | DIASTOLIC BLOOD PRESSURE: 89 MMHG | BODY MASS INDEX: 17.22 KG/M2 | SYSTOLIC BLOOD PRESSURE: 137 MMHG | HEART RATE: 84 BPM | RESPIRATION RATE: 18 BRPM

## 2023-04-03 DIAGNOSIS — R04.0 EPISTAXIS: Primary | ICD-10-CM

## 2023-04-03 NOTE — TELEPHONE ENCOUNTER
TYSON [address] her of patient's 7/13 appt and to call back to confirm that this is acceptable

## 2023-04-03 NOTE — TELEPHONE ENCOUNTER
Spoke to Peewee from [facility]  She called to schedule an appointment for patient with ENT  Patient was seen in ED on 4/1 for Acute anterior epistaxis, patient currently has an Rhino balloon in place  Patient was told to f/u with ENT within 48-72 hrs for removal of nasal packing  Next available appointment isn't until Dec  Patient did get an referral from PCP  Please advise  Please call Nicole(caretaker at [facility]) at [phone] with any questions

## 2023-04-03 NOTE — PROGRESS NOTES
[address]  [address] [facility]   [address] [facility] [address] [facility] [address]  49     Progress Note  Code [facility] 32    Patient Location     [address] [place] [address] rehab    Reason for visit     Epistaxis, recent ER visit    Patient’s care was coordinated with nursing facility staff  Recent vitals, labs and updated medications were reviewed on [employer] system of facility  2  Problem List Items Addressed This Visit        Other    Epistaxis - Primary     Patient was noted to have profuse bleeding from the nose over the weekend  Bleeding could not be controlled in the facility  Patient had been on aspirin and Plavix due to history of CVA  He was sent to ER where  he was initially treated with Afrin and Tanexamic acid followed by nasal tampon and rapid Rhino balloon placement  Patient currently remains on Augmentin for infection prophylaxis  Nasal packing remains in place  Staff will be advised to schedule ENT appointment ASAP         Relevant Orders    Ambulatory Referral to Otolaryngology       Carotid artery disease:  Status post left carotid endarterectomy on 3/24/2023  Patient remains on aspirin Plavix and statin    Hypertension:  Blood pressure stable on metoprolol 25 mg twice daily and torsemide 5 mg daily     GERD:  Stable on Protonix     History of CVA:  History of CVA in October/22 with left hemiplegia and dysphagia  Continue aspirin Plavix and statin  Patient recently underwent left carotid endarterectomy for left ICA stenosis  Patient is additionally scheduled for loop recorder soon      Hyperlipidemia:  Continue statin     History of alcohol abuse:  Continue thiamine and folic acid     History of GI bleed:  Patient was hospitalized several months ago with melena/GI bleed  EGD revealed ulceration with active bleeding  Patient underwent placement of 3 hemoclips and injection of epinephrine to control bleeding  He was additionally transfused for hemoglobin of 6 6  Continue PPI  Recent hemoglobin was stable    Anemia:   History of GI bleed  HGB was stable at 10 2 on 3/25  Continue Iron and Folic acid  supplements    Hyponatremia:  Na level was normal on 3/25  Patient remains on sodium chloride tablets 3 times a day  Recent nephrology follow up notes were reviewed  HPI       Patient is being seen for follow-up of her recent ER visit  He was sent to ER on 4/1/2023 due to profuse bleeding from his nose  Per records patient had visible clots in the towel  Bleeding was recurrent, could not be controlled at the facility  Patient had been on aspirin and Plavix for history of stroke , additionally underwent recent carotid endarterectomy  Vitals were stable in ER  Patient was initially treated with Afrin and  tranexamic acid  Patient later underwent nasal tampon followed by rapid Rhino balloon after which hemostasis was achieved  Augmentin was prescribed for 3 days to prevent possible toxic shock syndrome from nasal packing  ENT follow-up was recommended  At the time of my evaluation patient is doing okay  Denies any active complaints  Bleeding has stopped  Patient continues to have nasal packing/ balloon in right nostril  Review of Systems   Constitutional: Negative for chills and fever  HENT: Positive for nosebleeds (noted over the weekend)  Negative for facial swelling and sneezing  Respiratory: Negative for cough, shortness of breath, wheezing and stridor  Cardiovascular: Negative for leg swelling  Gastrointestinal: Negative for abdominal distention, diarrhea and vomiting  Genitourinary: Negative for hematuria  Musculoskeletal: Positive for gait problem  Neurological: Positive for weakness (left sided)         Past Medical History:   Diagnosis Date   • Hypertension    • Renal disorder    • Stroke [facility])        Past Surgical History:   Procedure Laterality Date   • GASTROSTOMY TUBE PLACEMENT N/A 10/18/2022    Procedure: INSERTION GASTROSTOMY TUBE OPEN; Surgeon: Marvin Yanez DO;  Location[facility];  Service: General   • OK TEAEC W/PATCH GRF CAROTID VERTB [place] Left 3/24/2023    Procedure: Left CEA with neuromonitoring;  Surgeon: Jordana Encarnacion DO;  [facility];  Service: Vascular       Social History     Tobacco Use   Smoking Status Every Day   • Packs/day: 1 00   • Types: Cigarettes   Smokeless Tobacco Never       No family history on file       No Known Allergies      Current Outpatient Medications:   •  acetaminophen (TYLENOL) 325 mg tablet, Take 650 mg by mouth every 6 (six) hours as needed for mild pain, Disp: , Rfl:   •  albuterol (2 5 mg/3 mL) 0 083 % nebulizer solution, Take 3 mL (2 5 mg total) by nebulization every 6 (six) hours as needed for wheezing or shortness of breath, Disp: 60 mL, Rfl: 0  •  amoxicillin-clavulanate (AUGMENTIN) 875-125 mg per tablet, Take 1 tablet by mouth every 12 (twelve) hours for 3 days, Disp: 6 tablet, Rfl: 0  •  aspirin (ECOTRIN LOW STRENGTH) 81 mg EC tablet, Take 1 tablet (81 mg total) by mouth daily, Disp: 90 tablet, Rfl: 0  •  atorvastatin (LIPITOR) 40 mg tablet, Take 1 tablet (40 mg total) by mouth daily with dinner, Disp: 90 tablet, Rfl: 0  •  Baclofen 5 MG TABS, Take 5 mg by mouth daily, Disp: , Rfl:   •  bisacodyl (DULCOLAX) 10 mg suppository, Insert 10 mg into the rectum if needed, Disp: , Rfl:   •  clopidogrel (PLAVIX) 75 mg tablet, Take 1 tablet (75 mg total) by mouth daily Do not start before October 22, 2022 , Disp: 20 tablet, Rfl: 0  •  ferrous sulfate 324 (65 Fe) mg, Take 1 tablet (324 mg total) by mouth 2 (two) times a day before meals, Disp: 60 tablet, Rfl: 0  •  folic acid (FOLVITE) 1 mg tablet, Take 1 mg by mouth daily, Disp: , Rfl:   •  metoprolol tartrate (LOPRESSOR) 25 mg tablet, 1 tablet (25 mg total) by Per G Tube route every 12 (twelve) hours (Patient taking differently: Take 25 mg by mouth every 12 (twelve) hours), Disp: 180 tablet, Rfl: 0  •  Multiple Vitamin (multivitamin) capsule, Take 1 capsule by mouth daily (Patient not taking: Reported on 3/31/2023), Disp: , Rfl:   •  Multiple Vitamin (multivitamin) tablet, Take 1 tablet by mouth daily, Disp: , Rfl:   •  pantoprazole (PROTONIX) 40 mg tablet, Take 40 mg by mouth daily, Disp: , Rfl:   •  polyethylene glycol (MIRALAX) 17 g packet, Take 17 g by mouth if needed (Patient not taking: Reported on 3/31/2023), Disp: , Rfl:   •  sodium chloride 1 g tablet, Take 3 g by mouth 3 (three) times a day, Disp: , Rfl:   •  thiamine 100 MG tablet, Take 100 mg by mouth daily, Disp: , Rfl:   •  torsemide (DEMADEX) 5 MG tablet, Take 5 mg by mouth daily, Disp: , Rfl:     Updated list was reviewed in [facility] system of facility  Vitals:    04/03/23 1233   BP: 137/89   Pulse: 84   Resp: 18   Temp: 97 9 °F (36 6 °C)   SpO2: 97%       Physical Exam  Constitutional:       General: He is not in acute distress  HENT:      Head: Normocephalic and atraumatic  Nose: No rhinorrhea  Comments: Nasal packing/balloon in place in right nostril  Eyes:      General: No scleral icterus  Cardiovascular:      Rate and Rhythm: Normal rate and regular rhythm  Abdominal:      General: There is no distension  Palpations: Abdomen is soft  Tenderness: There is no abdominal tenderness  There is no guarding  Skin:     Comments: Left neck incision from recent carotid endarterectomy healing well   Neurological:      Mental Status: He is alert  Cranial Nerves: No cranial nerve deficit  Motor: Weakness (chronic left sided from previous CVA) present  Diagnostic Data:    Recent labs were reviewed    Lab Results   Component Value Date    WBC 12 96 (H) 03/25/2023    HGB 10 2 (L) 03/25/2023    HCT 31 7 (L) 03/25/2023    MCV 85 03/25/2023     03/25/2023     Lab Results   Component Value Date    SODIUM 140 03/25/2023    K 3 7 03/25/2023     (H) 03/25/2023    CO2 25 03/25/2023    BUN 12 03/25/2023    CREATININE 0 44 (L) 03/25/2023    GLUC 91 03/25/2023    CALCIUM 9 3 03/25/2023       This note was electronically signed by Dr Ashwin Matamoros

## 2023-04-03 NOTE — ASSESSMENT & PLAN NOTE
Patient was noted to have profuse bleeding from the nose over the weekend  Bleeding could not be controlled in the facility  Patient had been on aspirin and Plavix due to history of CVA  He was sent to ER where  he was initially treated with Afrin and Tanexamic acid followed by nasal tampon and rapid Rhino balloon placement  Patient currently remains on Augmentin for infection prophylaxis  Nasal packing remains in place    Staff will be advised to schedule ENT appointment ASAP

## 2023-04-04 ENCOUNTER — OFFICE VISIT (OUTPATIENT)
Dept: VASCULAR SURGERY | Facility: CLINIC | Age: 63
End: 2023-04-04

## 2023-04-04 VITALS — SYSTOLIC BLOOD PRESSURE: 140 MMHG | DIASTOLIC BLOOD PRESSURE: 90 MMHG | OXYGEN SATURATION: 98 % | HEART RATE: 77 BPM

## 2023-04-04 DIAGNOSIS — I65.22 MORE THAN 50 PERCENT STENOSIS OF LEFT INTERNAL CAROTID ARTERY: ICD-10-CM

## 2023-04-04 DIAGNOSIS — Z86.73 HISTORY OF CVA (CEREBROVASCULAR ACCIDENT): Primary | Chronic | ICD-10-CM

## 2023-04-04 NOTE — ASSESSMENT & PLAN NOTE
Bilateral basal ganglia CVA 10/2022  Left sided hemiplegia  Left ICA 90%  S/p left CEA 3/2023    At his baseline today  Continue asa/statin  Ok to discontinue plavix from Vascular standpoint  Duplex 3 months   OV 3 month per [facility]

## 2023-04-04 NOTE — PATIENT INSTRUCTIONS
Doing well from surgery [facility]ie  Will need ultrasound of carotid in 3 months  From Vascular perspective only aspirin is needed at this point for antiplatelet or anticoagulation  If ok with neurology plavix can be discontinued

## 2023-04-04 NOTE — PROGRESS NOTES
Assessment/Plan:    More than 50 percent stenosis of left internal carotid artery  Bilateral basal ganglia CVA 10/2022  Left sided hemiplegia  Left ICA 90%  S/p left CEA 3/2023    At his baseline today  Continue asa/statin  Ok to discontinue plavix from Vascular standpoint  Duplex 3 months   OV 3 month per [facility]       Diagnoses and all orders for this visit:    History of CVA (cerebrovascular accident)  -     VAS carotid complete study; Future    More than 50 percent stenosis of left internal carotid artery          Subjective:      Patient ID: Clive Beltrán is a 63 y o  male  Patient is s/p L CEA done 3/14/23 by Dr Elvsi Baez  Pt denies TIA /CVA signs and symptoms  Incision site looks clean and dry, w/ slight swelling and redness  Pt denies trouble swallowing, trouble speaking, sore throat, fever, chills, or pain  Pt is taking ASA 81 mg, Atorvastatin, and Plavix  Pt states he has quit smoking  Denies any new neurologic symptoms  Had a nose bleed from the plavix  HPI    The following portions of the patient's history were reviewed and updated as appropriate: allergies, current medications, past family history, past medical history, past social history, past surgical history and problem list     Review of Systems   Eyes: Negative for visual disturbance  Neurological: Negative for dizziness, facial asymmetry, speech difficulty, weakness, light-headedness and headaches  I have reviewed the ROS above and made changes as needed        Objective:      /90 (BP Location: Left arm, Patient Position: Sitting, Cuff Size: Standard)   Pulse 77   SpO2 98%          Physical Exam      General  Exam: alert, awake, oriented, no distress, consistent with stated age    Integumentary  Exam: warm, dry, no gross lesions, no bruises and normal color    Head and Neck  Exam: supple, no bruits, trachea midline, no JVD, no mass or palpable nodes    Left neck incision c/d/i    Eye  Exam: extraoccular movements intact, no scleral icterus, sclera clear, pupils equal round and reactive to light    ENMT  Exam: oral mucosa pink and moist    Chest and Lung  Exam: chest normal without deformity, bilaterally expansive, clear to auscultation    Cardiovascular  Exam: regular rate, regular rhythm, no murmurs, no rubs or gallops    Adbomen  Exam: soft, non-tender, non-distended, no pulsatile abdominal masses, no abdominal bruit    Peripheral Vascular  Exam: no clubbing of the digits of the upper extremity, no cyanosis, no edema, both feet are warm, radial pulses 2+ bilaterally, skin well perfused, without and no varicosities      No widened popliteal pulse noted bilaterally    Upper Extremity:  Palpation: Radial pulse- Bilateral 2+    Lower Extremity:  Palpation: Femoral pulse- Bilateral 2+           Neurologic  Exam: oriented x 3, cranial nerves II-XII grossly intact    RUE 5/5  RLE: 5/5  LUE 0/5  LLE 0/5

## 2023-04-07 ENCOUNTER — CONSULT (OUTPATIENT)
Dept: MULTI SPECIALTY CLINIC | Facility: CLINIC | Age: 63
End: 2023-04-07

## 2023-04-07 VITALS — HEART RATE: 64 BPM | DIASTOLIC BLOOD PRESSURE: 79 MMHG | SYSTOLIC BLOOD PRESSURE: 131 MMHG | TEMPERATURE: 98.4 F

## 2023-04-07 DIAGNOSIS — R04.0 EPISTAXIS: Primary | ICD-10-CM

## 2023-04-07 DIAGNOSIS — J34.89 PURULENT RHINORRHEA: ICD-10-CM

## 2023-04-07 RX ORDER — AMOXICILLIN AND CLAVULANATE POTASSIUM 875; 125 MG/1; MG/1
1 TABLET, FILM COATED ORAL 2 TIMES DAILY
Qty: 14 TABLET | Refills: 0 | Status: SHIPPED | OUTPATIENT
Start: 2023-04-07 | End: 2023-04-14

## 2023-04-07 NOTE — PROGRESS NOTES
Consultation - Otolaryngology - Head and Neck Surgery  Facial Plastic and Reconstructive Surgery  Tatianna Licea III 63 y o  male MRN: [id number]  Encounter: [id number]        Assessment/Plan:  1  Epistaxis  Ambulatory Referral to Otolaryngology    amoxicillin-clavulanate (AUGMENTIN) 875-125 mg per tablet      2  Purulent rhinorrhea  amoxicillin-clavulanate (AUGMENTIN) 875-125 mg per tablet        Epistaxis: Recurrent epistaxis:   We discussed the nature of ongoing epistaxis  For any repeat bleeding spray oxymetazoline onto tissue, cotton ball, or nasal tampon and place into the side where bleeding is coming from  Pinch and hold the nostrils completely closed for 10 minutes without releasing pressure  If bleeding continues repeat 1 time  If bleeding persists after second attempt please contact [facility] on call or present to nearest emergency department  Will place patient on 7 day course of Augmentin due to purulent rhinorrhea and nasal packing in place > 3 days  History of Present Illness   Physician Requesting Consult: Madie Orellana MD   Reason for Consult / Principal Problem: Left anterior epistaxis   HPI: Lay Ascencio III is a 63y o  year old male who presents with right anterior epistaxis  Rhino rocket was placed in ED on 4/1  Patient currently on aspirin and Plavix  No bleeding surrounding nasal packing  Review of systems:  10 Point ROS was performed and negative except as above or otherwise noted in the medical record      Historical Information   Past Medical History:   Diagnosis Date   • Hypertension    • Renal disorder    • Stroke [facility])      Past Surgical History:   Procedure Laterality Date   • GASTROSTOMY TUBE PLACEMENT N/A 10/18/2022    Procedure: INSERTION GASTROSTOMY TUBE OPEN;  Surgeon: Shira Yanez DO;  [facility];  Service: General   • MI TEAEC W/PATCH GRF CAROTID VERTB [place] Left 3/24/2023    Procedure: Left CEA with neuromonitoring;  Surgeon: Brian Peres DO Rose;  Location: [facility];  Service: Vascular     Social History   Social History     Substance and Sexual Activity   Alcohol Use Not Currently    Comment: occasionally     Social History     Substance and Sexual Activity   Drug Use Never     Social History     Tobacco Use   Smoking Status Every Day   • Packs/day: 1 00   • Types: Cigarettes   Smokeless Tobacco Never     Family History: No family history on file  Current Outpatient Medications on File Prior to Visit   Medication Sig   • acetaminophen (TYLENOL) 325 mg tablet Take 650 mg by mouth every 6 (six) hours as needed for mild pain   • albuterol (2 5 mg/3 mL) 0 083 % nebulizer solution Take 3 mL (2 5 mg total) by nebulization every 6 (six) hours as needed for wheezing or shortness of breath   • aspirin (ECOTRIN LOW STRENGTH) 81 mg EC tablet Take 1 tablet (81 mg total) by mouth daily   • atorvastatin (LIPITOR) 40 mg tablet Take 1 tablet (40 mg total) by mouth daily with dinner   • Baclofen 5 MG TABS Take 5 mg by mouth daily   • bisacodyl (DULCOLAX) 10 mg suppository Insert 10 mg into the rectum if needed   • clopidogrel (PLAVIX) 75 mg tablet Take 1 tablet (75 mg total) by mouth daily Do not start before October 22, 2022     • ferrous sulfate 324 (65 Fe) mg Take 1 tablet (324 mg total) by mouth 2 (two) times a day before meals   • folic acid (FOLVITE) 1 mg tablet Take 1 mg by mouth daily   • metoprolol tartrate (LOPRESSOR) 25 mg tablet 1 tablet (25 mg total) by Per G Tube route every 12 (twelve) hours (Patient taking differently: Take 25 mg by mouth every 12 (twelve) hours)   • Multiple Vitamin (multivitamin) capsule Take 1 capsule by mouth daily   • Multiple Vitamin (multivitamin) tablet Take 1 tablet by mouth daily   • pantoprazole (PROTONIX) 40 mg tablet Take 40 mg by mouth daily   • polyethylene glycol (MIRALAX) 17 g packet Take 17 g by mouth if needed   • sodium chloride 1 g tablet Take 3 g by mouth 3 (three) times a day   • thiamine 100 MG tablet Take 100 mg by mouth daily   • torsemide (DEMADEX) 5 MG tablet Take 5 mg by mouth daily     No current facility-administered medications on file prior to visit  No Known Allergies    Vitals:    04/07/23 1502   BP: 131/79   Pulse: 64   Temp: 98 4 °F (36 9 °C)       Physical Exam   Constitutional: Oriented to person, place, and time  Well-developed and well-nourished, no apparent distress, non-toxic appearance  Cooperative, able to hear and answer questions without difficulty  Voice: Normal voice quality  Head: Normocephalic, atraumatic  No scars, masses or lesions  Face: Symmetric, no edema, no sinus tenderness  Eyes: Vision grossly intact, extra-ocular movement intact  Ears: External ears normal  Tympanic membranes intact with intact normal landmarks  No post-auricular erythema or tenderness  Nose: Septum intact, nares clear  Mucosa moist, turbinates well appearing  No crusting, polyps or discharge evident  Rhino rocket present in right nare  Bilateral purulent rhinorrhea present with removal of packing  Previous site of bleeding present on anterior right anterior nasal septum  No active bleeding  Oral cavity: Dentition intact  Mucosa moist, lips without lesions or masses  Tongue mobile, floor of mouth soft and flat  Hard palate intact  No masses or lesions  Oropharynx: Uvula is midline, soft palate intact without lesion or mass  Oropharyngeal inlet without obstruction  Tonsils unremarkable  Posterior pharyngeal wall clear  No masses or lesions  Salivary glands:  Parotid glands and submandibular glands symmetric, no enlargement or tenderness  Neck: Normal laryngeal elevation with swallow  Trachea midline  No masses or lesions  No palpable adenopathy  Thyroid: Without tenderness or palpable nodules  Pulmonary/Chest: Normal effort and rate  No respiratory distress  No stertor or stridor  Musculoskeletal: Normal range of motion  Neurological: Cranial nerves 2-12 intact    Skin: Skin is warm and dry  Psychiatric: Normal mood and affect  Imaging Studies: I have personally reviewed pertinent reports  Lab Results: I have personally reviewed pertinent lab results        Records reviewed: ED note 4/1/23

## 2023-05-03 ENCOUNTER — HOSPITAL ENCOUNTER (OUTPATIENT)
Facility: HOSPITAL | Age: 63
Setting detail: OUTPATIENT SURGERY
Discharge: HOME | End: 2023-05-03
Attending: INTERNAL MEDICINE | Admitting: INTERNAL MEDICINE

## 2023-05-03 VITALS
RESPIRATION RATE: 18 BRPM | HEIGHT: 70 IN | WEIGHT: 126 LBS | HEART RATE: 56 BPM | BODY MASS INDEX: 18.04 KG/M2 | OXYGEN SATURATION: 98 % | TEMPERATURE: 97.7 F | DIASTOLIC BLOOD PRESSURE: 70 MMHG | SYSTOLIC BLOOD PRESSURE: 124 MMHG

## 2023-05-03 DIAGNOSIS — I63.9 CEREBROVASCULAR ACCIDENT (CVA), UNSPECIFIED MECHANISM (HCC): ICD-10-CM

## 2023-05-03 DIAGNOSIS — I10 HYPERTENSION: ICD-10-CM

## 2023-05-03 DEVICE — LOOP RECORDER REVEAL LINQ II SYS DEVICE ONLY: Type: IMPLANTABLE DEVICE | Site: CHEST  WALL | Status: FUNCTIONAL

## 2023-05-03 RX ORDER — LIDOCAINE HYDROCHLORIDE 10 MG/ML
INJECTION, SOLUTION EPIDURAL; INFILTRATION; INTRACAUDAL; PERINEURAL CODE/TRAUMA/SEDATION MEDICATION
Status: DISCONTINUED | OUTPATIENT
Start: 2023-05-03 | End: 2023-05-03 | Stop reason: HOSPADM

## 2023-05-03 NOTE — H&P
Patient presents today for loop implantation secondary to CVA  Post op appointment and follow up scheduled  Discharge instructions reviewed with the patient in detail

## 2023-05-03 NOTE — DISCHARGE INSTR - AVS FIRST PAGE
Do not use lotions/powders/creams on incision  Remove outer bandage 48 hours after procedure - if present, leave suture in place and they will be removed at 2 week follow up appointment  Please keep incision dry for the first first week - either keeping incision out of direct shower water or placing over the counter waterproof bandages over top when showering  Please call the office [phone] if you notice redness, swelling, bleeding, or drainage from incision or if you develop fevers  Cardiac Loop Recorder Insertion      WHAT YOU SHOULD KNOW:    A cardiac loop recorder is a device used to diagnose heart rhythm problems, such as a fast or irregular heartbeat  It is implanted in your left chest, just under the skin  The device records a pattern of your heart's rhythm, called an EKG  Your device records automatic EKGs, depending on how your caregiver programs it  You may also receive a handheld controller  You press a button on the controller when you have symptoms, such as dizziness, lightheadedness, or palpitations  The device will record an EKG at that moment  The recording can help your caregiver see if your symptoms may be caused by heart rhythm problems  Your caregiver will remove the device after it has collected enough data  You may need the device for up to 3 years  The procedure to remove the device is similar to the procedure used to implant it  AFTER YOU LEAVE:    Follow up with your cardiologist as directed: You will need to return in 1 to 2 weeks  Your cardiologist will check your incision  He may also program your device settings again  He will retrieve data from the device every 1 to 3 months with a monitor held over your skin  You may be able to transmit data from your device from home as well  You will do this by calling a number provided by your cardiologist, or as they have instructed you  Ask for information about this process   Write down your questions so you remember to ask them during your visits  Wound care: Keep loop recorder incision dry for one week  Do not use lotions/powders/creams on incision  Remove outer bandage 48 hours after procedure - leave underlying steri-strips in place, they will either fall off on their own or will be removed at 2 week follow up appointment  Please call the office if you notice redness, swelling, bleeding, or drainage from incision or if you develop fevers  After that first week, carefully wash your incision with soap and water  Keep the area clean and dry until it heals  Return to activity: If you received anesthesia, you will not be able to drive for 24 hours  Otherwise, most people can return to normal activities soon after the procedure  Your cardiologist may want to know if your work involves electrical current or high-voltage equipment  Ask about other electrical items that could interfere with your cardiac loop recorder  Contact your cardiologist if:   You have a fever or chills  Your wound is red, swollen, or draining pus  You have questions or concerns about your condition or care  Seek care immediately or call 911 if: You feel weak, dizzy, or faint  You lose consciousness  © 2014 [employer] is for End User's use only and may not be sold, redistributed or otherwise used for commercial purposes  All illustrations and images included in CareNotes® are the copyrighted property of [employer]  or Tawanda Camarillo  The above information is an  only  It is not intended as medical advice for individual conditions or treatments  Talk to your doctor, nurse or pharmacist before following any medical regimen to see if it is safe and effective for you

## 2023-05-10 ENCOUNTER — NURSING HOME VISIT (OUTPATIENT)
Dept: WOUND CARE | Facility: HOSPITAL | Age: 63
End: 2023-05-10

## 2023-05-10 ENCOUNTER — NURSING HOME VISIT (OUTPATIENT)
Dept: GERIATRICS | Facility: OTHER | Age: 63
End: 2023-05-10

## 2023-05-10 VITALS
HEART RATE: 69 BPM | DIASTOLIC BLOOD PRESSURE: 67 MMHG | OXYGEN SATURATION: 97 % | RESPIRATION RATE: 18 BRPM | TEMPERATURE: 97.5 F | SYSTOLIC BLOOD PRESSURE: 116 MMHG

## 2023-05-10 DIAGNOSIS — I10 PRIMARY HYPERTENSION: Primary | Chronic | ICD-10-CM

## 2023-05-10 DIAGNOSIS — T14.8XXA EXCORIATION: ICD-10-CM

## 2023-05-10 DIAGNOSIS — E87.1 HYPONATREMIA: Chronic | ICD-10-CM

## 2023-05-10 DIAGNOSIS — Z86.73 HISTORY OF CVA (CEREBROVASCULAR ACCIDENT): Chronic | ICD-10-CM

## 2023-05-10 DIAGNOSIS — R26.2 AMBULATORY DYSFUNCTION: Primary | ICD-10-CM

## 2023-05-10 DIAGNOSIS — R26.2 AMBULATORY DYSFUNCTION: Chronic | ICD-10-CM

## 2023-05-10 NOTE — PROGRESS NOTES
[facility]  [address]  [phone]  Brady Tafoya  Code   28 (LTC)        NAME: Madelaine Valladares III  AGE: 63 y o  SEX: male CODE STATUS: CPR    DATE OF ENCOUNTER: 5/10/2023    Assessment and Plan     1  Primary hypertension  Assessment & Plan:  · BP have been stable, 116/67  · Continue metoprolol 25 mg q 12  · Continue torsemide 5 mg daily  · Avoid hypotension  · Continue to Monitor BP's      2  Hyponatremia  Assessment & Plan:  · History of hyponatremia  · Patient is asymptomatic  · Last sodium 5/1/23 135  · Continue sodium 1 gr TID  · Repeat CBC/BMP 7/1/23      3  Ambulatory dysfunction  Assessment & Plan:  · History of CVA residual left sided weakness   · Continue PT/OT for strength training as needed  · Maintain fall/safety precautions  · assist with ADL's as needed  · Encourage OOB for meals  · 24/7 supportive care per LTC      4  History of CVA (cerebrovascular accident)  Assessment & Plan:  · Residual left hemiplegia and dysphagia, G-Tube  · Continue  PT/OT for strength training as recommended  · Maintain fall/safety precautions  · Continue to assist with ADL's as needed  · Continue plavix 75 mg daily  · Continue ASA 81 mg daily  · Continue Lipitor 40 mg daily         All medications and routine orders were reviewed and updated as needed  Chief Complaint     LTC follow up visit   Patient's care was coordinated with nursing facility staff  Recent vitals, labs, and updated medications were review on [facility] system in facility  Past Medical and Surgical History      Past Medical History:   Diagnosis Date   • Hypertension    • Renal disorder    • Stroke [facility])      Past Surgical History:   Procedure Laterality Date   • CARDIAC ELECTROPHYSIOLOGY PROCEDURE N/A 5/3/2023    Procedure: Cardiac loop recorder implant;  Surgeon: Juice Wellington MD;  [facility];   Service: Cardiology   • GASTROSTOMY TUBE PLACEMENT N/A 10/18/2022    Procedure: Giorgio Ayala GASTROSTOMY TUBE OPEN;  Surgeon: Tg Yanez DO;  [facility];  Service: General   • [place] TEAEC W/PATCH GRF CAROTID VERTB [place] Left 3/24/2023    Procedure: Left CEA with neuromonitoring;  Surgeon: David Salcedo DO;  [facility];  Service: Vascular     No Known Allergies       History of Present Illness     HPI  Sanju Montes is a 70-year-old male, he is a LTC patient of [address] [place] [address] since 11/4/22  Past Medical Hx including but not limited to HTN, CVA, hyponatremia, Anemia, malnutrition  He was seen in collaboration with nursing for medical management and LTC follow up  Carri Holder was seen and examined at bedside today  On exam the patient is sitting in his chair at the side of the bed  He is AAOx3, and does not appear to be in any distress  He denies any pain and states he is feeling ok today  Patient continues with Jevity 1 5 nightly, supplements with meals  Weight is 126 2 lbs, he is followed by the dietician  Last sodium level 135, continue sodium tablets 1 gr TID, repeat BMP/CBC 7/1/23  Denies CP/SOB/N/V/D  Denies lightheadedness, dizziness, headaches, vision changes  Patient states they are eating well and staying hydrated  Denies any bowel or bladder issues  Per review of SNF records, Patient is eating 3 meals per day, consuming  %  Last documented BM 5/10/23  No concerns from nursing at this time  The patient's allergies, past medical, surgical, social and family history were reviewed and unchanged  Review of Systems     Review of Systems   Constitutional: Negative for activity change, appetite change and fever  HENT: Negative  Negative for congestion and trouble swallowing  Eyes: Negative  Respiratory: Negative  Negative for cough, shortness of breath and wheezing  Cardiovascular: Negative  Negative for chest pain, palpitations and leg swelling     Gastrointestinal: Negative for abdominal distention, abdominal pain, constipation, diarrhea, nausea and vomiting  Endocrine: Negative  Genitourinary: Negative  Negative for difficulty urinating and dysuria  Musculoskeletal: Positive for gait problem  Skin: Negative  Allergic/Immunologic: Negative  Neurological: Negative for dizziness and headaches  Hematological: Negative  Psychiatric/Behavioral: Negative for sleep disturbance  Objective     Vitals:   Vitals:    05/10/23 1240   BP: 116/67   Pulse: 69   Resp: 18   Temp: 97 5 °F (36 4 °C)   SpO2: 97%         Physical Exam  Vitals and nursing note reviewed  Constitutional:       General: He is not in acute distress  Appearance: Normal appearance  He is not ill-appearing  HENT:      Head: Normocephalic and atraumatic  Nose: Nose normal  No congestion  Mouth/Throat:      Mouth: Mucous membranes are moist    Eyes:      Conjunctiva/sclera: Conjunctivae normal    Cardiovascular:      Rate and Rhythm: Normal rate and regular rhythm  Pulses: Normal pulses  Heart sounds: Normal heart sounds  Pulmonary:      Effort: Pulmonary effort is normal  No respiratory distress  Breath sounds: Normal breath sounds  No wheezing  Abdominal:      General: Bowel sounds are normal  There is no distension  Palpations: Abdomen is soft  Tenderness: There is no abdominal tenderness  Musculoskeletal:      Right lower leg: No edema  Left lower leg: No edema  Skin:     General: Skin is warm  Neurological:      Mental Status: He is alert and oriented to person, place, and time  Gait: Gait abnormal    Psychiatric:         Mood and Affect: Mood normal          Behavior: Behavior normal          Pertinent Laboratory/Diagnostic Studies:   Reviewed in facility chart-stable      Current Medications   Medications reviewed and updated see facility [facility] for details        Current Outpatient Medications:   •  acetaminophen (TYLENOL) 325 mg tablet, Take 650 mg by mouth every 6 (six) hours as needed for mild pain, "Disp: , Rfl:   •  albuterol (2 5 mg/3 mL) 0 083 % nebulizer solution, Take 3 mL (2 5 mg total) by nebulization every 6 (six) hours as needed for wheezing or shortness of breath, Disp: 60 mL, Rfl: 0  •  aspirin (ECOTRIN LOW STRENGTH) 81 mg EC tablet, Take 1 tablet (81 mg total) by mouth daily, Disp: 90 tablet, Rfl: 0  •  atorvastatin (LIPITOR) 40 mg tablet, Take 1 tablet (40 mg total) by mouth daily with dinner, Disp: 90 tablet, Rfl: 0  •  Baclofen 5 MG TABS, Take 5 mg by mouth daily, Disp: , Rfl:   •  bisacodyl (DULCOLAX) 10 mg suppository, Insert 10 mg into the rectum if needed, Disp: , Rfl:   •  clopidogrel (PLAVIX) 75 mg tablet, Take 1 tablet (75 mg total) by mouth daily Do not start before October 22, 2022 , Disp: 20 tablet, Rfl: 0  •  ferrous sulfate 324 (65 Fe) mg, Take 1 tablet (324 mg total) by mouth 2 (two) times a day before meals, Disp: 60 tablet, Rfl: 0  •  folic acid (FOLVITE) 1 mg tablet, Take 1 mg by mouth daily, Disp: , Rfl:   •  metoprolol tartrate (LOPRESSOR) 25 mg tablet, 1 tablet (25 mg total) by Per G Tube route every 12 (twelve) hours (Patient taking differently: Take 25 mg by mouth every 12 (twelve) hours), Disp: 180 tablet, Rfl: 0  •  Multiple Vitamin (multivitamin) tablet, Take 1 tablet by mouth daily, Disp: , Rfl:   •  pantoprazole (PROTONIX) 40 mg tablet, Take 40 mg by mouth daily, Disp: , Rfl:   •  polyethylene glycol (MIRALAX) 17 g packet, Take 17 g by mouth if needed, Disp: , Rfl:   •  sodium chloride 1 g tablet, Take 1 g by mouth 3 (three) times a day, Disp: , Rfl:   •  thiamine 100 MG tablet, Take 100 mg by mouth daily, Disp: , Rfl:   •  torsemide (DEMADEX) 5 MG tablet, Take 5 mg by mouth daily, Disp: , Rfl:        Please note:  Voice-recognition software may have been used in the preparation of this document  Occasional wrong word or \"sound-alike\" substitutions may have occurred due to the inherent limitations of voice recognition software    Interpretation should be guided by " context           [facility] DANIEL Pyle  5/10/2023  1:10 PM

## 2023-05-10 NOTE — PROGRESS NOTES
[address] MANAGEMENT   AND [facility]PERBARIC [facility]ENTER       Patient ID: Robert Her is a 63 y o  male Date of Birth 1960     Location of Service: [address] [place] [address]    Performed wound round with: Wound team     Chief Complaint : Left buttock    Wound Instructions:  Wound:  Buttocks  Discontinue previous wound order  Cleanse the skin with soap and water, pat dry  Apply hydraguard to skin  Frequency : twice a day and prn for soiling  Offload all wounds  Turn and reposition frequently, Increase protein intake  Monitor for any sign of infection or worsening, inform PCP or patient's primary physician in your facility  Allergies  Patient has no known allergies  Assessment & Plan:  1  Ambulatory dysfunction  Assessment & Plan:  On STR      2  Excoriation  Assessment & Plan: The wound on the left buttock is healed  I ordered zinc oxide to manage moisture and prevent pressure injuries  Continue to offload  Sign off  Facility staff will continue to provide treatment and monitor the wound  Can reconsult wound nurse practitioner if wound failed to heal or worsened  Subjective: May 10, 2023  New consult for wound on the left buttock  Patient was referred by Senior care team   Patient currently admitted at [facility] for short-term rehab  As per report, patient was admitted with excoriation on the left buttock  Current treatment is zinc oxide  Patient is continent of both bowel and bladder  Patient can independently change position when in bed  Review of Systems   Constitutional: Negative  Respiratory: Negative  Skin: Positive for wound  Objective:    Physical Exam  Constitutional:       Appearance: Normal appearance  Cardiovascular:      Rate and Rhythm: Normal rate  Pulmonary:      Effort: Pulmonary effort is normal    Genitourinary:     Comments: continent  Musculoskeletal:      Comments: LROM   Skin:     Findings: No lesion        Comments: Wound on the left buttock is healed   Neurological:      Mental Status: He is alert and oriented to person, place, and time  Psychiatric:         Mood and Affect: Mood normal               Procedures           Patient's care was coordinated with nursing facility staff  Recent vitals, labs and updated medications were reviewed on EMR or chart system of facility  Past Medical, surgical, social, medication and allergy history and patient's previous records were reviewed and updated as appropriate: Most up-to date information is available in the facility EMR where the patient is currently admitted  Patient Active Problem List   Diagnosis   • Hyponatremia   • Acute CVA (cerebrovascular accident) ([facility] )   • More than 50 percent stenosis of left internal carotid artery   • Alcohol use disorder, mild, abuse   • Moderate protein-calorie malnutrition (HCC)   • Smoking   • Anxiety   • Ambulatory dysfunction   • Anemia   • History of CVA (cerebrovascular accident)   • Melena   • Gastrostomy tube dysfunction (HCC)   • Primary hypertension   • Acute on chronic anemia   • Mild protein-calorie malnutrition (HCC)   • Epistaxis   • Generalized abdominal pain   • Excoriation     Past Medical History:   Diagnosis Date   • Hypertension    • Renal disorder    • Stroke [facility])      Past Surgical History:   Procedure Laterality Date   • CARDIAC ELECTROPHYSIOLOGY PROCEDURE N/A 5/3/2023    Procedure: Cardiac loop recorder implant;  Surgeon: Reggie Valdovinos MD;  [facility] CARD[facility];   Service: Cardiology   • GASTROSTOMY TUBE PLACEMENT N/A 10/18/2022    Procedure: INSERTION GASTROSTOMY TUBE OPEN;  Surgeon: Lola Yanez DO;  Location: [facility] Main OR;  Service: General   • [place] TEAEC W/PATCH GRF CAROTID VERTB [place] Left 3/24/2023    Procedure: Left CEA with neuromonitoring;  Surgeon: Gregory Ormond, DO;  Location: [facility] MAIN OR;  Service: Vascular     Social History     Socioeconomic History   • Marital status: Single Spouse name: None   • Number of children: None   • Years of education: None   • Highest education level: None   Occupational History   • None   Tobacco Use   • Smoking status: Every Day     Packs/day: 1 00     Types: Cigarettes   • Smokeless tobacco: Never   Vaping Use   • Vaping Use: Never used   Substance and Sexual Activity   • Alcohol use: Not Currently     Comment: occasionally   • Drug use: Never   • Sexual activity: None   Other Topics Concern   • None   Social History Narrative    ** Merged History Encounter **          Social Determinants of Health     Financial Resource Strain: Not on file   Food Insecurity: No Food Insecurity   • Worried About Running Out of Food in the Last Year: Never true   • Ran Out of Food in the Last Year: Never true   Transportation Needs: No Transportation Needs   • Lack of Transportation (Medical): No   • Lack of Transportation (Non-Medical):  No   Physical Activity: Not on file   Stress: Not on file   Social Connections: Not on file   Intimate Partner Violence: Not on file   Housing Stability: Low Risk    • Unable to Pay for Housing in the Last Year: No   • Number of Places Lived in the Last Year: 1   • Unstable Housing in the Last Year: No        Current Outpatient Medications:   •  acetaminophen (TYLENOL) 325 mg tablet, Take 650 mg by mouth every 6 (six) hours as needed for mild pain, Disp: , Rfl:   •  albuterol (2 5 mg/3 mL) 0 083 % nebulizer solution, Take 3 mL (2 5 mg total) by nebulization every 6 (six) hours as needed for wheezing or shortness of breath, Disp: 60 mL, Rfl: 0  •  aspirin (ECOTRIN LOW STRENGTH) 81 mg EC tablet, Take 1 tablet (81 mg total) by mouth daily, Disp: 90 tablet, Rfl: 0  •  atorvastatin (LIPITOR) 40 mg tablet, Take 1 tablet (40 mg total) by mouth daily with dinner, Disp: 90 tablet, Rfl: 0  •  Baclofen 5 MG TABS, Take 5 mg by mouth daily, Disp: , Rfl:   •  bisacodyl (DULCOLAX) 10 mg suppository, Insert 10 mg into the rectum if needed, Disp: , Rfl:   • "clopidogrel (PLAVIX) 75 mg tablet, Take 1 tablet (75 mg total) by mouth daily Do not start before October 22, 2022 , Disp: 20 tablet, Rfl: 0  •  ferrous sulfate 324 (65 Fe) mg, Take 1 tablet (324 mg total) by mouth 2 (two) times a day before meals, Disp: 60 tablet, Rfl: 0  •  folic acid (FOLVITE) 1 mg tablet, Take 1 mg by mouth daily, Disp: , Rfl:   •  metoprolol tartrate (LOPRESSOR) 25 mg tablet, 1 tablet (25 mg total) by Per G Tube route every 12 (twelve) hours (Patient taking differently: Take 25 mg by mouth every 12 (twelve) hours), Disp: 180 tablet, Rfl: 0  •  Multiple Vitamin (multivitamin) tablet, Take 1 tablet by mouth daily, Disp: , Rfl:   •  pantoprazole (PROTONIX) 40 mg tablet, Take 40 mg by mouth daily, Disp: , Rfl:   •  polyethylene glycol (MIRALAX) 17 g packet, Take 17 g by mouth if needed, Disp: , Rfl:   •  sodium chloride 1 g tablet, Take 1 g by mouth 3 (three) times a day, Disp: , Rfl:   •  thiamine 100 MG tablet, Take 100 mg by mouth daily, Disp: , Rfl:   •  torsemide (DEMADEX) 5 MG tablet, Take 5 mg by mouth daily, Disp: , Rfl:   History reviewed  No pertinent family history  Coordination of Care: Wound team aware of the treatment plan  Facility nurse will provide wound treatment and monitor the wound for any changes  Patient / Staff education : Patient / Staff was given education on sign of infection and pressure ulcer prevention  Patient/ Staff verbalized understanding     Follow up :  Sign off    Voice-recognition software may have been used in the preparation of this document  Occasional wrong word or \"sound-alike\" substitutions may have occurred due to the inherent limitations of voice recognition software  Interpretation should be guided by context        DANIEL Francisco  "

## 2023-05-10 NOTE — ASSESSMENT & PLAN NOTE
· Residual left hemiplegia and dysphagia, G-Tube  · Continue  PT/OT for strength training as recommended  · Maintain fall/safety precautions  · Continue to assist with ADL's as needed  · Continue plavix 75 mg daily  · Continue ASA 81 mg daily  · Continue Lipitor 40 mg daily

## 2023-05-10 NOTE — ASSESSMENT & PLAN NOTE
· BP have been stable, 116/67  · Continue metoprolol 25 mg q 12  · Continue torsemide 5 mg daily  · Avoid hypotension  · Continue to Monitor BP's

## 2023-05-10 NOTE — ASSESSMENT & PLAN NOTE
· History of hyponatremia  · Patient is asymptomatic  · Last sodium 5/1/23 135  · Continue sodium 1 gr TID  · Repeat CBC/BMP 7/1/23

## 2023-05-10 NOTE — ASSESSMENT & PLAN NOTE
The wound on the left buttock is healed  I ordered zinc oxide to manage moisture and prevent pressure injuries  Continue to offload  Sign off  Facility staff will continue to provide treatment and monitor the wound  Can reconsult wound nurse practitioner if wound failed to heal or worsened

## 2023-05-15 ENCOUNTER — IN-CLINIC DEVICE VISIT (OUTPATIENT)
Dept: CARDIOLOGY CLINIC | Facility: CLINIC | Age: 63
End: 2023-05-15

## 2023-05-15 DIAGNOSIS — Z95.818 PRESENCE OF OTHER CARDIAC IMPLANTS AND GRAFTS: Primary | ICD-10-CM

## 2023-05-15 NOTE — PROGRESS NOTES
"Results for orders placed or performed in visit on 05/15/23   Cardiac EP device report    Narrative    DEVICE INTERROGATED IN THE [facility] OFFICE: WOUND CHECK: INCISION CLEAN AND DRY WITH EDGES APPROXIMATED; SUTURES REMOVED; WOUND CARE AND RESTRICTIONS REVIEWED WITH PATIENT  BATTERY VOLTAGE \"OK\"  PRESENTING ECG SHOWING NSR, 67 BPM; R WAVES MEASURED 0 65MV; NO PATIENT OR DEVICE ACTIVATED EPISODES  PVC 4%; NO PROGRAMMING CHANGES MADE TO DEVICE PARAMETERS  NORMAL DEVICE FUNCTION    ES       "

## 2023-05-30 ENCOUNTER — NURSING HOME VISIT (OUTPATIENT)
Dept: GERIATRICS | Facility: OTHER | Age: 63
End: 2023-05-30

## 2023-05-30 VITALS
SYSTOLIC BLOOD PRESSURE: 132 MMHG | OXYGEN SATURATION: 94 % | RESPIRATION RATE: 22 BRPM | WEIGHT: 130 LBS | HEART RATE: 103 BPM | TEMPERATURE: 100 F | BODY MASS INDEX: 18.65 KG/M2 | DIASTOLIC BLOOD PRESSURE: 85 MMHG

## 2023-05-30 DIAGNOSIS — R05.1 ACUTE COUGH: Primary | ICD-10-CM

## 2023-05-30 DIAGNOSIS — I10 PRIMARY HYPERTENSION: Chronic | ICD-10-CM

## 2023-05-30 DIAGNOSIS — E87.1 HYPONATREMIA: Chronic | ICD-10-CM

## 2023-05-30 DIAGNOSIS — R26.2 AMBULATORY DYSFUNCTION: Chronic | ICD-10-CM

## 2023-05-30 DIAGNOSIS — Z86.73 HISTORY OF CVA (CEREBROVASCULAR ACCIDENT): Chronic | ICD-10-CM

## 2023-05-30 NOTE — PROGRESS NOTES
[facility]  [address]  [phone]  Hanh Case  Code   32 (LTC)  Acute visit      NAME: Melina Candelaria III  AGE: 63 y o  SEX: male CODE STATUS: CPR    DATE OF ENCOUNTER: 5/31/2023    Assessment and Plan     1  Acute cough  Assessment & Plan:  · Staff request the patient be evaluated for cough  · Patient is noted to have moist productive cough, rhonchi, ex  wheezing, RR of 22, 94% on RA  · will treat for possible pneumonia  · will order chest x-ray, rosalee-tussin 10 mils every 6 as needed, albuterol nebs every 6 as needed, prednisone 20 mg twice daily x5 days, Augmentin 875 mg twice daily x5 days, Tylenol for fevers  · CBC/BMP 5/31/2023  · Vital signs every shift x3 days  · Continue to monitor      2  Primary hypertension  Assessment & Plan:  · BP have been stable, 132/85  · Continue metoprolol 25 mg q 12  · Continue torsemide 5 mg daily  · Avoid hypotension  · Continue to Monitor BP's      3  Ambulatory dysfunction  Assessment & Plan:  · History of CVA residual left sided weakness   · Continue PT/OT for strength training as needed  · Maintain fall/safety precautions  · assist with ADL's as needed  · Encourage OOB for meals  · 24/7 supportive care per LT      4  History of CVA (cerebrovascular accident)  Assessment & Plan:  · Residual left hemiplegia and dysphagia, G-Tube  · Continue  PT/OT for strength training as recommended  · Maintain fall/safety precautions  · Continue to assist with ADL's as needed  · Continue plavix 75 mg daily  · Continue ASA 81 mg daily  · Continue Lipitor 40 mg daily      5  Hyponatremia  Assessment & Plan:  · History of hyponatremia  · Patient is asymptomatic  · Last sodium 5/1/23 135  · Continue sodium 1 gr TID  · Repeat CBC/BMP 5/31/23         All medications and routine orders were reviewed and updated as needed  Chief Complaint     LTC acute visit   Patient's care was coordinated with nursing facility staff   Recent vitals, labs, and updated medications were review on [facility] system in facility  Past Medical and Surgical History      Past Medical History:   Diagnosis Date   • Hypertension    • Renal disorder    • Stroke [facility])      Past Surgical History:   Procedure Laterality Date   • CARDIAC ELECTROPHYSIOLOGY PROCEDURE N/A 5/3/2023    Procedure: Cardiac loop recorder implant;  Surgeon: Dahiana Castano MD;  [facility]: [facility] CARDIAC CATH LAB; Service: Cardiology   • GASTROSTOMY TUBE PLACEMENT N/A 10/18/2022    Procedure: INSERTION GASTROSTOMY TUBE OPEN;  Surgeon: Charmaine Yanez DO;  Location: [facility] Main OR;  Service: General   • [place] TEAEC W/PATCH GRF CAROTID VERTB [place] Left 3/24/2023    Procedure: Left CEA with neuromonitoring;  Surgeon: Anastasiia Limon DO;  Location: [facility] MAIN OR;  Service: Vascular     No Known Allergies       History of Present Illness     ROSSANA Jimenez is a 61-year-old male, he is a LTC patient of [facility] since 11/4/22  Past Medical Hx including but not limited to HTN, CVA, hyponatremia, Anemia, malnutrition  He was seen in collaboration with nursing for medical management and acute visit for cough  Sen Soles was seen and examined at bedside today  On exam the patient is sitting in his chair at the side of the bed and is AAOx3  Staff requested patient be evaluated for cold symptoms, fever of 100 0  Patient is noted to have moist productive cough, lung sounds coarse, RR 22, 94% on RA  Will order maria r-tussin, prednisone, albuterol nebs, CXR, CBC/BMP, Augmentin 875 mg BID x 5 days, VS Q shift x 3 days  Continue to monitor  Denies CP/SOB/N/V/D  Denies lightheadedness, dizziness, headaches, vision changes  Patient states they are eating well and staying hydrated  Denies any bowel or bladder issues  Per review of SNF records, Patient is eating 3 meals per day, consuming  %  Last documented BM 5/30/23  No concerns from nursing at this time    The patient's allergies, past medical, surgical, social and family history were reviewed and unchanged  Review of Systems     Review of Systems   Constitutional: Positive for fever  Negative for activity change and appetite change  HENT: Negative  Negative for congestion and trouble swallowing  Eyes: Negative  Respiratory: Positive for cough, shortness of breath and wheezing  Cardiovascular: Negative  Negative for chest pain, palpitations and leg swelling  Gastrointestinal: Negative for abdominal distention, abdominal pain, constipation, diarrhea, nausea and vomiting  Endocrine: Negative  Genitourinary: Negative  Negative for difficulty urinating and dysuria  Musculoskeletal: Positive for gait problem  Skin: Negative  Allergic/Immunologic: Negative  Neurological: Negative for dizziness and headaches  Hematological: Negative  Psychiatric/Behavioral: Negative for sleep disturbance  Objective     Vitals:   Vitals:    05/30/23 1727   BP: 132/85   Pulse: 103   Resp: 22   Temp: 100 °F (37 8 °C)   SpO2: 94%         Physical Exam  Vitals and nursing note reviewed  Constitutional:       General: He is not in acute distress  Appearance: Normal appearance  He is not ill-appearing  HENT:      Head: Normocephalic and atraumatic  Nose: Nose normal  No congestion  Mouth/Throat:      Mouth: Mucous membranes are moist    Eyes:      Conjunctiva/sclera: Conjunctivae normal    Cardiovascular:      Rate and Rhythm: Normal rate and regular rhythm  Pulses: Normal pulses  Heart sounds: Normal heart sounds  Pulmonary:      Effort: No respiratory distress  Breath sounds: Wheezing and rhonchi present  Abdominal:      General: Bowel sounds are normal  There is no distension  Palpations: Abdomen is soft  Tenderness: There is no abdominal tenderness  Musculoskeletal:      Right lower leg: No edema  Left lower leg: No edema  Skin:     General: Skin is warm     Neurological:      Mental Status: He is alert and oriented to person, place, and time  Gait: Gait abnormal    Psychiatric:         Mood and Affect: Mood normal          Behavior: Behavior normal          Pertinent Laboratory/Diagnostic Studies:   Reviewed in facility chart-stable      Current Medications   Medications reviewed and updated see facility [facility] for details        Current Outpatient Medications:   •  acetaminophen (TYLENOL) 325 mg tablet, Take 650 mg by mouth every 6 (six) hours as needed for mild pain, Disp: , Rfl:   •  albuterol (2 5 mg/3 mL) 0 083 % nebulizer solution, Take 3 mL (2 5 mg total) by nebulization every 6 (six) hours as needed for wheezing or shortness of breath, Disp: 60 mL, Rfl: 0  •  aspirin (ECOTRIN LOW STRENGTH) 81 mg EC tablet, Take 1 tablet (81 mg total) by mouth daily, Disp: 90 tablet, Rfl: 0  •  atorvastatin (LIPITOR) 40 mg tablet, Take 1 tablet (40 mg total) by mouth daily with dinner, Disp: 90 tablet, Rfl: 0  •  Baclofen 5 MG TABS, Take 5 mg by mouth daily, Disp: , Rfl:   •  bisacodyl (DULCOLAX) 10 mg suppository, Insert 10 mg into the rectum if needed, Disp: , Rfl:   •  clopidogrel (PLAVIX) 75 mg tablet, Take 1 tablet (75 mg total) by mouth daily Do not start before October 22, 2022 , Disp: 20 tablet, Rfl: 0  •  ferrous sulfate 324 (65 Fe) mg, Take 1 tablet (324 mg total) by mouth 2 (two) times a day before meals, Disp: 60 tablet, Rfl: 0  •  folic acid (FOLVITE) 1 mg tablet, Take 1 mg by mouth daily, Disp: , Rfl:   •  metoprolol tartrate (LOPRESSOR) 25 mg tablet, 1 tablet (25 mg total) by Per G Tube route every 12 (twelve) hours (Patient taking differently: Take 25 mg by mouth every 12 (twelve) hours), Disp: 180 tablet, Rfl: 0  •  Multiple Vitamin (multivitamin) tablet, Take 1 tablet by mouth daily, Disp: , Rfl:   •  pantoprazole (PROTONIX) 40 mg tablet, Take 40 mg by mouth daily, Disp: , Rfl:   •  polyethylene glycol (MIRALAX) 17 g packet, Take 17 g by mouth if needed, Disp: , Rfl:   •  sodium chloride 1 g tablet, "Take 1 g by mouth 3 (three) times a day, Disp: , Rfl:   •  thiamine 100 MG tablet, Take 100 mg by mouth daily, Disp: , Rfl:   •  torsemide (DEMADEX) 5 MG tablet, Take 5 mg by mouth daily, Disp: , Rfl:        Please note:  Voice-recognition software may have been used in the preparation of this document  Occasional wrong word or \"sound-alike\" substitutions may have occurred due to the inherent limitations of voice recognition software  Interpretation should be guided by context           DANIEL Pena  5/31/2023  11:35 AM    "

## 2023-05-30 NOTE — ASSESSMENT & PLAN NOTE
· Staff request the patient be evaluated for cough  · Patient is noted to have moist productive cough, rhonchi, ex  wheezing, RR of 22, 94% on RA  · will treat for possible pneumonia  · will order chest x-ray, rosalee-tussin 10 mils every 6 as needed, albuterol nebs every 6 as needed, prednisone 20 mg twice daily x5 days, Augmentin 875 mg twice daily x5 days, Tylenol for fevers  · CBC/BMP 5/31/2023  · Vital signs every shift x3 days  · Continue to monitor

## 2023-05-30 NOTE — ASSESSMENT & PLAN NOTE
· BP have been stable, 132/85  · Continue metoprolol 25 mg q 12  · Continue torsemide 5 mg daily  · Avoid hypotension  · Continue to Monitor BP's

## 2023-05-30 NOTE — ASSESSMENT & PLAN NOTE
· History of hyponatremia  · Patient is asymptomatic  · Last sodium 5/1/23 135  · Continue sodium 1 gr TID  · Repeat CBC/BMP 5/31/23

## 2023-05-31 ENCOUNTER — NURSING HOME VISIT (OUTPATIENT)
Dept: GERIATRICS | Facility: OTHER | Age: 63
End: 2023-05-31

## 2023-05-31 VITALS
SYSTOLIC BLOOD PRESSURE: 128 MMHG | DIASTOLIC BLOOD PRESSURE: 80 MMHG | OXYGEN SATURATION: 94 % | RESPIRATION RATE: 18 BRPM | TEMPERATURE: 97.8 F | HEART RATE: 98 BPM

## 2023-05-31 DIAGNOSIS — Z86.73 HISTORY OF CVA (CEREBROVASCULAR ACCIDENT): Chronic | ICD-10-CM

## 2023-05-31 DIAGNOSIS — I10 PRIMARY HYPERTENSION: Chronic | ICD-10-CM

## 2023-05-31 DIAGNOSIS — J18.9 PNEUMONIA OF LEFT LOWER LOBE DUE TO INFECTIOUS ORGANISM: ICD-10-CM

## 2023-05-31 DIAGNOSIS — E87.1 HYPONATREMIA: Chronic | ICD-10-CM

## 2023-05-31 NOTE — ASSESSMENT & PLAN NOTE
· BP have been stable, 128/80  · Continue metoprolol 25 mg q 12  · Continue torsemide 5 mg daily  · Avoid hypotension  · Continue to Monitor BP's

## 2023-05-31 NOTE — ASSESSMENT & PLAN NOTE
· History of hyponatremia  · Patient is asymptomatic  · Last sodium 5/31/23 135  · Continue sodium 1 gr TID  · Monitor BMP

## 2023-05-31 NOTE — ASSESSMENT & PLAN NOTE
· Patient noted to have moist productive cough, rhonchi, ex  wheezing, RR of 20, 95% on RA, afebrile 97 8  · CXR showed concern for possible Left LL infiltrate  · WBC 11 1  · Continue Augmentin, albuterol, tylenol, maria r-tussin, prednisone  · Continue to monitor

## 2023-05-31 NOTE — PROGRESS NOTES
[facility]  [address]  [phone]  Erna Minder  Code   32 (LTC)  Acute visit      NAME: Ag Bynum III  AGE: 63 y o  SEX: male CODE STATUS: CPR    DATE OF ENCOUNTER: 5/31/2023    Assessment and Plan     1  Pneumonia of left lower lobe due to infectious organism  Assessment & Plan:  · Patient noted to have moist productive cough, rhonchi, ex  wheezing, RR of 20, 95% on RA, afebrile 97 8  · CXR showed concern for possible Left LL infiltrate  · WBC 11 1  · Continue Augmentin, albuterol, tylenol, maria r-tussin, prednisone  · Continue to monitor      2  History of CVA (cerebrovascular accident)  Assessment & Plan:  · Residual left hemiplegia and dysphagia, G-Tube  · Continue  PT/OT for strength training as recommended  · Maintain fall/safety precautions  · Continue to assist with ADL's as needed  · Continue plavix 75 mg daily  · Continue ASA 81 mg daily  · Continue Lipitor 40 mg daily      3  Hyponatremia  Assessment & Plan:  · History of hyponatremia  · Patient is asymptomatic  · Last sodium 5/31/23 135  · Continue sodium 1 gr TID  · Monitor BMP      4  Primary hypertension  Assessment & Plan:  · BP have been stable, 128/80  · Continue metoprolol 25 mg q 12  · Continue torsemide 5 mg daily  · Avoid hypotension  · Continue to Monitor BP's         All medications and routine orders were reviewed and updated as needed  Chief Complaint     LTC acute visit   Patient's care was coordinated with nursing facility staff  Recent vitals, labs, and updated medications were review on [facility] system in facility  Past Medical and Surgical History      Past Medical History:   Diagnosis Date   • Hypertension    • Renal disorder    • Stroke [facility])      Past Surgical History:   Procedure Laterality Date   • CARDIAC ELECTROPHYSIOLOGY PROCEDURE N/A 5/3/2023    Procedure: Cardiac loop recorder implant;  Surgeon: Emile Acosta MD;  Location: [facility] CARDIAC CATH LAB;   Service: Cardiology • GASTROSTOMY TUBE PLACEMENT N/A 10/18/2022    Procedure: INSERTION GASTROSTOMY TUBE OPEN;  Surgeon: Norberto Yanez DO;  Location: [facility] Main OR;  Service: General   • [place] TEAEC W/PATCH GRF CAROTID VERTB [place] Left 3/24/2023    Procedure: Left CEA with neuromonitoring;  Surgeon: Gabrielle Fuller DO;  Location: [facility] MAIN OR;  Service: Vascular     No Known Allergies       History of Present Illness     ROSSANA Snowden is a 24-year-old male, [facility]  Past Medical Hx including but not limited to HTN, CVA, hyponatremia, Anemia, malnutrition  He was seen in collaboration with nursing for medical management and acute visit for cough  [employer] was seen and examined at bedside today  On exam the patient is sitting in his chair at the side of the bed and is AAOx3  CXR showed possible LLL infiltrate  Patient continues with moist productive cough, afebrile 97 8, O2 95% on RA  Patient was started on Augmentin yesterday  Appears to be doing better today  Continue to monitor  Denies CP/SOB/N/V/D  Denies lightheadedness, dizziness, headaches, vision changes  Patient states they are eating well and staying hydrated  Denies any bowel or bladder issues  Per review of SNF records, Patient is eating 3 meals per day, consuming  %  Last documented BM 5/31/23  No concerns from nursing at this time  The patient's allergies, past medical, surgical, social and family history were reviewed and unchanged  Review of Systems     Review of Systems   Constitutional: Positive for fever  Negative for activity change and appetite change  HENT: Negative  Negative for congestion and trouble swallowing  Eyes: Negative  Respiratory: Positive for cough, shortness of breath and wheezing  Cardiovascular: Negative  Negative for chest pain, palpitations and leg swelling     Gastrointestinal: Negative for abdominal distention, abdominal pain, constipation, diarrhea, nausea and vomiting  Endocrine: Negative  Genitourinary: Negative  Negative for difficulty urinating and dysuria  Musculoskeletal: Positive for gait problem  Skin: Negative  Allergic/Immunologic: Negative  Neurological: Negative for dizziness and headaches  Hematological: Negative  Psychiatric/Behavioral: Negative for sleep disturbance  Objective     Vitals:   Vitals:    05/31/23 1557   BP: 128/80   Pulse: 98   Resp: 18   Temp: 97 8 °F (36 6 °C)   SpO2: 94%         Physical Exam  Vitals and nursing note reviewed  Constitutional:       General: He is not in acute distress  Appearance: Normal appearance  He is not ill-appearing  HENT:      Head: Normocephalic and atraumatic  Nose: Nose normal  No congestion  Mouth/Throat:      Mouth: Mucous membranes are moist    Eyes:      Conjunctiva/sclera: Conjunctivae normal    Cardiovascular:      Rate and Rhythm: Normal rate and regular rhythm  Pulses: Normal pulses  Heart sounds: Normal heart sounds  Pulmonary:      Effort: No respiratory distress  Breath sounds: Wheezing and rhonchi present  Abdominal:      General: Bowel sounds are normal  There is no distension  Palpations: Abdomen is soft  Tenderness: There is no abdominal tenderness  Musculoskeletal:      Right lower leg: No edema  Left lower leg: No edema  Skin:     General: Skin is warm  Neurological:      Mental Status: He is alert and oriented to person, place, and time  Gait: Gait abnormal    Psychiatric:         Mood and Affect: Mood normal          Behavior: Behavior normal          Pertinent Laboratory/Diagnostic Studies:   Reviewed in facility chart-stable      Current Medications   Medications reviewed and updated see facility [facility] for details        Current Outpatient Medications:   •  acetaminophen (TYLENOL) 325 mg tablet, Take 650 mg by mouth every 6 (six) hours as needed for mild pain, Disp: , Rfl:   •  albuterol (2 5 mg/3 mL) "0 083 % nebulizer solution, Take 3 mL (2 5 mg total) by nebulization every 6 (six) hours as needed for wheezing or shortness of breath, Disp: 60 mL, Rfl: 0  •  aspirin (ECOTRIN LOW STRENGTH) 81 mg EC tablet, Take 1 tablet (81 mg total) by mouth daily, Disp: 90 tablet, Rfl: 0  •  atorvastatin (LIPITOR) 40 mg tablet, Take 1 tablet (40 mg total) by mouth daily with dinner, Disp: 90 tablet, Rfl: 0  •  Baclofen 5 MG TABS, Take 5 mg by mouth daily, Disp: , Rfl:   •  bisacodyl (DULCOLAX) 10 mg suppository, Insert 10 mg into the rectum if needed, Disp: , Rfl:   •  clopidogrel (PLAVIX) 75 mg tablet, Take 1 tablet (75 mg total) by mouth daily Do not start before October 22, 2022 , Disp: 20 tablet, Rfl: 0  •  ferrous sulfate 324 (65 Fe) mg, Take 1 tablet (324 mg total) by mouth 2 (two) times a day before meals, Disp: 60 tablet, Rfl: 0  •  folic acid (FOLVITE) 1 mg tablet, Take 1 mg by mouth daily, Disp: , Rfl:   •  metoprolol tartrate (LOPRESSOR) 25 mg tablet, 1 tablet (25 mg total) by Per G Tube route every 12 (twelve) hours (Patient taking differently: Take 25 mg by mouth every 12 (twelve) hours), Disp: 180 tablet, Rfl: 0  •  Multiple Vitamin (multivitamin) tablet, Take 1 tablet by mouth daily, Disp: , Rfl:   •  pantoprazole (PROTONIX) 40 mg tablet, Take 40 mg by mouth daily, Disp: , Rfl:   •  polyethylene glycol (MIRALAX) 17 g packet, Take 17 g by mouth if needed, Disp: , Rfl:   •  sodium chloride 1 g tablet, Take 1 g by mouth 3 (three) times a day, Disp: , Rfl:   •  thiamine 100 MG tablet, Take 100 mg by mouth daily, Disp: , Rfl:   •  torsemide (DEMADEX) 5 MG tablet, Take 5 mg by mouth daily, Disp: , Rfl:        Please note:  Voice-recognition software may have been used in the preparation of this document  Occasional wrong word or \"sound-alike\" substitutions may have occurred due to the inherent limitations of voice recognition software  Interpretation should be guided by context           [facility], " DANIEL  5/31/2023  4:08 PM

## 2023-06-01 ENCOUNTER — NURSING HOME VISIT (OUTPATIENT)
Dept: GERIATRICS | Facility: OTHER | Age: 63
End: 2023-06-01

## 2023-06-01 VITALS
DIASTOLIC BLOOD PRESSURE: 74 MMHG | BODY MASS INDEX: 18.65 KG/M2 | RESPIRATION RATE: 18 BRPM | HEART RATE: 69 BPM | TEMPERATURE: 97.8 F | SYSTOLIC BLOOD PRESSURE: 140 MMHG | OXYGEN SATURATION: 97 % | WEIGHT: 130 LBS

## 2023-06-01 DIAGNOSIS — J18.9 PNEUMONIA OF LEFT LOWER LOBE DUE TO INFECTIOUS ORGANISM: Primary | ICD-10-CM

## 2023-06-01 NOTE — PROGRESS NOTES
"  [address]  [address] [facility] [address]   [address] [facility] [address] [place], [place], [address]  51     Progress Note  Code [facility] 32    Patient Location     [facility] rehab    Reason for visit     Follow-up pneumonia, history of CVA, hyperlipidemia, hypertension, anemia    Patient’s care was coordinated with nursing facility staff  Recent vitals, labs and updated medications were reviewed on [employer] system of facility  Problem List Items Addressed This Visit        Respiratory    Pneumonia due to infectious organism - Primary     Pt was recently noted to have moist productive cough  Chest x-ray revealed possible left lower lobe infiltrate  He was started on Augmentin for suspected pneumonia  Clinically  Improved  tolerating antibiotic well  SPO2 remained stable at 97% on room air  WBC count was mildly elevated at 11 1  We will follow            Hypertension:   Blood pressure stable on metoprolol 25 mg 12 hours and torsemide 5 mg daily    Hyponatremia:   Patient remains on sodium chloride tablets 1 g 3 times daily  Sodium level was normal at 135 yesterday  Continue to monitor    History of CVA:   History of CVA with left-sided weakness and dysphagia status post PEG tube placement  Continue risk factor control  Patient wendie on aspirin Plavix and statin    Hyperlipidemia:  Continue atorvastatin  Check lipid profile with next blood draw  Lab Results   Component Value Date    CHOLESTEROL 97 10/10/2022     Lab Results   Component Value Date    HDL 38 (L) 10/10/2022     Lab Results   Component Value Date    TRIG 96 10/10/2022     No results found for: \"NONHDLC\"     Dysphagia:   Patient underwent PEG tube placement for CVA  He is tolerating regular diet as well  Tube feed is being given for several hours during the night  Follow-up with dietitian  No recent weight loss    HPI       Patient is being seen for a follow-up visit today  He is doing okay at present    Patient was noted to have " moist productive cough with wheezing during the last few days  Chest x-ray showed possible left lower lobe infiltrate  He was started on Augmentin for suspected pneumonia  Tolerating antibiotics well  Remains afebrile  Appetite is fair  Review of Systems   Constitutional: Positive for unexpected weight change (wt gain of 11 lbs keke jan/23)  Negative for chills and fever  Respiratory: Positive for cough  Negative for shortness of breath, wheezing and stridor  Gastrointestinal: Negative for abdominal distention, abdominal pain and vomiting  Genitourinary: Negative for flank pain and hematuria  Musculoskeletal: Positive for gait problem  Psychiatric/Behavioral: Negative for agitation and behavioral problems  Past Medical History:   Diagnosis Date   • Hypertension    • Renal disorder    • Stroke [facility])        Past Surgical History:   Procedure Laterality Date   • CARDIAC ELECTROPHYSIOLOGY PROCEDURE N/A 5/3/2023    Procedure: Cardiac loop recorder implant;  Surgeon: Chato Cornelius MD;  [facility]: [facility] CARDIAC CATH LAB; Service: Cardiology   • GASTROSTOMY TUBE PLACEMENT N/A 10/18/2022    Procedure: INSERTION GASTROSTOMY TUBE OPEN;  Surgeon: Bahman Yanez DO;  Location: [facility] Main OR;  Service: General   • [place] TEAEC W/PATCH GRF CAROTID VERTB [place] Left 3/24/2023    Procedure: Left CEA with neuromonitoring;  Surgeon: Myron Anderson DO;  Location: [facility] MAIN OR;  Service: Vascular       Social History     Tobacco Use   Smoking Status Every Day   • Packs/day: 1 00   • Types: Cigarettes   Smokeless Tobacco Never       No family history on file       No Known Allergies      Current Outpatient Medications:   •  acetaminophen (TYLENOL) 325 mg tablet, Take 650 mg by mouth every 6 (six) hours as needed for mild pain, Disp: , Rfl:   •  albuterol (2 5 mg/3 mL) 0 083 % nebulizer solution, Take 3 mL (2 5 mg total) by nebulization every 6 (six) hours as needed for wheezing or shortness of breath, Disp: 60 mL, Rfl: 0  •  aspirin (ECOTRIN LOW STRENGTH) 81 mg EC tablet, Take 1 tablet (81 mg total) by mouth daily, Disp: 90 tablet, Rfl: 0  •  atorvastatin (LIPITOR) 40 mg tablet, Take 1 tablet (40 mg total) by mouth daily with dinner, Disp: 90 tablet, Rfl: 0  •  Baclofen 5 MG TABS, Take 5 mg by mouth daily, Disp: , Rfl:   •  bisacodyl (DULCOLAX) 10 mg suppository, Insert 10 mg into the rectum if needed, Disp: , Rfl:   •  clopidogrel (PLAVIX) 75 mg tablet, Take 1 tablet (75 mg total) by mouth daily Do not start before October 22, 2022 , Disp: 20 tablet, Rfl: 0  •  ferrous sulfate 324 (65 Fe) mg, Take 1 tablet (324 mg total) by mouth 2 (two) times a day before meals, Disp: 60 tablet, Rfl: 0  •  folic acid (FOLVITE) 1 mg tablet, Take 1 mg by mouth daily, Disp: , Rfl:   •  metoprolol tartrate (LOPRESSOR) 25 mg tablet, 1 tablet (25 mg total) by Per G Tube route every 12 (twelve) hours (Patient taking differently: Take 25 mg by mouth every 12 (twelve) hours), Disp: 180 tablet, Rfl: 0  •  Multiple Vitamin (multivitamin) tablet, Take 1 tablet by mouth daily, Disp: , Rfl:   •  pantoprazole (PROTONIX) 40 mg tablet, Take 40 mg by mouth daily, Disp: , Rfl:   •  polyethylene glycol (MIRALAX) 17 g packet, Take 17 g by mouth if needed, Disp: , Rfl:   •  sodium chloride 1 g tablet, Take 1 g by mouth 3 (three) times a day, Disp: , Rfl:   •  thiamine 100 MG tablet, Take 100 mg by mouth daily, Disp: , Rfl:   •  torsemide (DEMADEX) 5 MG tablet, Take 5 mg by mouth daily, Disp: , Rfl:     Updated list was reviewed in [facility] system of facility  Vitals:    06/01/23 1227   BP: 140/74   Pulse: 69   Resp: 18   Temp: 97 8 °F (36 6 °C)   SpO2: 97%       Physical Exam  Constitutional:       General: He is not in acute distress  HENT:      Head: Normocephalic and atraumatic  Eyes:      General: No scleral icterus  Cardiovascular:      Rate and Rhythm: Normal rate and regular rhythm  Pulmonary:      Breath sounds:  No wheezing, rhonchi "or rales  Abdominal:      General: There is no distension  Palpations: Abdomen is soft  Tenderness: There is no abdominal tenderness  There is no guarding  Comments: Peg tube in place   Musculoskeletal:      Cervical back: Neck supple  Right lower leg: No edema  Left lower leg: No edema  Skin:     Coloration: Skin is not jaundiced  Neurological:      Cranial Nerves: No cranial nerve deficit  Motor: Weakness (Chronic weakness involving the left side, more pronounced in left upper extremity) present  Diagnostic Data:    Recent labs were reviewed  Labs from 5/31 reviewed  WBC 11 1, HGB 10,       BUN 9, Creatinine   5, K 4 1      Portions of the record may have been created with voice recognition software  Occasional wrong word or \"sound a like\" substitutions may have occurred due to the inherent limitations of voice recognition software  Read the chart carefully and recognize, using context, where substitutions have occurred      This note was electronically signed by Dr Will Leach   "

## 2023-06-01 NOTE — ASSESSMENT & PLAN NOTE
Pt was recently noted to have moist productive cough  Chest x-ray revealed possible left lower lobe infiltrate  He was started on Augmentin for suspected pneumonia  Clinically  Improved  tolerating antibiotic well  SPO2 remained stable at 97% on room air  WBC count was mildly elevated at 11 1    We will follow

## 2023-06-28 ENCOUNTER — NURSING HOME VISIT (OUTPATIENT)
Dept: WOUND CARE | Facility: HOSPITAL | Age: 63
End: 2023-06-28
Payer: COMMERCIAL

## 2023-06-28 ENCOUNTER — NURSING HOME VISIT (OUTPATIENT)
Dept: GERIATRICS | Facility: OTHER | Age: 63
End: 2023-06-28

## 2023-06-28 VITALS
OXYGEN SATURATION: 98 % | BODY MASS INDEX: 18.94 KG/M2 | WEIGHT: 132 LBS | TEMPERATURE: 98 F | SYSTOLIC BLOOD PRESSURE: 129 MMHG | RESPIRATION RATE: 18 BRPM | DIASTOLIC BLOOD PRESSURE: 70 MMHG | HEART RATE: 70 BPM

## 2023-06-28 DIAGNOSIS — E87.1 HYPONATREMIA: Chronic | ICD-10-CM

## 2023-06-28 DIAGNOSIS — I10 PRIMARY HYPERTENSION: Chronic | ICD-10-CM

## 2023-06-28 DIAGNOSIS — Z93.1 PEG (PERCUTANEOUS ENDOSCOPIC GASTROSTOMY) STATUS (HCC): Primary | ICD-10-CM

## 2023-06-28 DIAGNOSIS — K94.23 GASTROSTOMY TUBE DYSFUNCTION (HCC): Primary | ICD-10-CM

## 2023-06-28 DIAGNOSIS — R26.2 AMBULATORY DYSFUNCTION: Chronic | ICD-10-CM

## 2023-06-28 DIAGNOSIS — Z86.73 HISTORY OF CVA (CEREBROVASCULAR ACCIDENT): Chronic | ICD-10-CM

## 2023-06-28 PROCEDURE — 99308 SBSQ NF CARE LOW MDM 20: CPT | Performed by: NURSE PRACTITIONER

## 2023-06-28 NOTE — ASSESSMENT & PLAN NOTE
· BP have been stable, 129/70  · Continue metoprolol 25 mg q 12  · Continue torsemide 5 mg daily  · Avoid hypotension  · Continue to Monitor BP's

## 2023-06-28 NOTE — ASSESSMENT & PLAN NOTE
· Residual left hemiplegia, dysphagia  · Continue  PT/OT for strength training as recommended  · Maintain fall/safety precautions  · Continue to assist with ADL's as needed  · Continue plavix 75 mg daily  · Continue ASA 81 mg daily  · Continue Lipitor 40 mg daily

## 2023-06-28 NOTE — PROGRESS NOTES
[facility]  [address]  [phone]  [place]  Code   28 (LTC)      NAME: Zee Mtz III  AGE: 63 y o  SEX: male CODE STATUS: CPR    DATE OF ENCOUNTER: 6/28/2023    Assessment and Plan     1  Gastrostomy tube dysfunction [facility])  Assessment & Plan:  · SNF staff reports G-tube dislodged  · Patient no longer receives tube feeds, will leave tube out   · Tolerating p o  diet  · Patient has had steady weight gain, currently 132 pounds  · Followed by dietitian  · Site appears stable, no S/S of infection, no bleeding  · Continue local wound care, wash with soap and water daily, cover with dressing daily  · Monitor for signs of infection      2  Primary hypertension  Assessment & Plan:  · BP have been stable, 129/70  · Continue metoprolol 25 mg q 12  · Continue torsemide 5 mg daily  · Avoid hypotension  · Continue to Monitor BP's      3  History of CVA (cerebrovascular accident)  Assessment & Plan:  · Residual left hemiplegia, dysphagia  · Continue  PT/OT for strength training as recommended  · Maintain fall/safety precautions  · Continue to assist with ADL's as needed  · Continue plavix 75 mg daily  · Continue ASA 81 mg daily  · Continue Lipitor 40 mg daily      4  Ambulatory dysfunction  Assessment & Plan:  · History of CVA residual left sided weakness   · Continue PT/OT for strength training as needed  · Maintain fall/safety precautions  · assist with ADL's as needed  · Encourage OOB for meals  · 24/7 supportive care per LT      5  Hyponatremia  Assessment & Plan:  · Patient is asymptomatic  · Last sodium 5/31/23 135  · Continue sodium 1 gr TID  · Monitor BMP         All medications and routine orders were reviewed and updated as needed  Chief Complaint     LT follow up  Patient's care was coordinated with nursing facility staff  Recent vitals, labs, and updated medications were review on [facility] system in facility        Past Medical and Surgical History Past Medical History:   Diagnosis Date   • Hypertension    • Renal disorder    • Stroke [facility])      Past Surgical History:   Procedure Laterality Date   • CARDIAC ELECTROPHYSIOLOGY PROCEDURE N/A 5/3/2023    Procedure: Cardiac loop recorder implant;  Surgeon: Theodore Lemus MD;  Location: [facility] CARDIAC CATH LAB; Service: Cardiology   • GASTROSTOMY TUBE PLACEMENT N/A 10/18/2022    Procedure: INSERTION GASTROSTOMY TUBE OPEN;  Surgeon: To Yanez, ;  Location: [facility] Main OR;  Service: General   • [place] TEAEC W/PATCH GRF CAROTID VERTB [place] Left 3/24/2023    Procedure: Left CEA with neuromonitoring;  Surgeon: Ron July, DO;  Location: [facility] MAIN OR;  Service: Vascular     No Known Allergies       History of Present Illness     ROSSANA Nunez is a 43-year-old male, he is a LTC patient of [facility] since 11/4/[facility]  Past Medical Hx including but not limited to HTN, CVA, hyponatremia, Anemia, malnutrition  He was seen in collaboration with nursing for medical management and LTC follow up  David Allison was seen and examined at bedside today  On exam the patient is sitting in his chair at the side of the bed and is AAOx3, he does not appear to be in distress  SNF staff reports G-tube fell out 2 nights ago, site appears stable, no bleeding, no S/S of infection  Patient was no longer receiving tube feeds, will leave tube out  Continue local wound care, cover with dressing daily  He denies CP/SOB/N/V/D  Denies lightheadedness, dizziness, headaches, vision changes  Patient states they are eating well and staying hydrated  Denies any bowel or bladder issues  Per review of SNF records, Patient is eating 3 meals per day, consuming  %  Last documented BM 6/28/2023  No concerns from nursing at this time  The patient's allergies, past medical, surgical, social and family history were reviewed and unchanged      Review of Systems     Review of Systems   Constitutional: Negative for activity change, appetite change and fever  HENT: Negative  Negative for congestion and trouble swallowing  Eyes: Negative  Respiratory: Negative for cough, shortness of breath and wheezing  Cardiovascular: Negative  Negative for chest pain, palpitations and leg swelling  Gastrointestinal: Negative for abdominal distention, abdominal pain, constipation, diarrhea, nausea and vomiting  Endocrine: Negative  Genitourinary: Negative  Negative for difficulty urinating and dysuria  Musculoskeletal: Positive for gait problem  Skin: Positive for wound  G-tube dislodged, continue local wound care   Allergic/Immunologic: Negative  Neurological: Negative for dizziness and headaches  Hematological: Negative  Psychiatric/Behavioral: Negative for sleep disturbance  Objective     Vitals:   Vitals:    06/28/23 1718   BP: 129/70   Pulse: 70   Resp: 18   Temp: 98 °F (36 7 °C)   SpO2: 98%         Physical Exam  Vitals and nursing note reviewed  Constitutional:       General: He is not in acute distress  Appearance: Normal appearance  He is not ill-appearing  HENT:      Head: Normocephalic and atraumatic  Nose: Nose normal  No congestion  Mouth/Throat:      Mouth: Mucous membranes are moist    Eyes:      Conjunctiva/sclera: Conjunctivae normal    Cardiovascular:      Rate and Rhythm: Normal rate and regular rhythm  Pulses: Normal pulses  Heart sounds: Normal heart sounds  Pulmonary:      Effort: Pulmonary effort is normal  No respiratory distress  Breath sounds: Normal breath sounds  No wheezing  Abdominal:      General: Bowel sounds are normal  There is no distension  Palpations: Abdomen is soft  Tenderness: There is no abdominal tenderness  Musculoskeletal:      Right lower leg: No edema  Left lower leg: No edema  Skin:     General: Skin is warm  Neurological:      Mental Status: He is alert and oriented to person, place, and time        Gait: Gait abnormal    Psychiatric:         Mood and Affect: Mood normal          Behavior: Behavior normal          Pertinent Laboratory/Diagnostic Studies:   Reviewed in facility chart-stable      Current Medications   Medications reviewed and updated see facility [facility] for details        Current Outpatient Medications:   •  acetaminophen (TYLENOL) 325 mg tablet, Take 650 mg by mouth every 6 (six) hours as needed for mild pain, Disp: , Rfl:   •  albuterol (2 5 mg/3 mL) 0 083 % nebulizer solution, Take 3 mL (2 5 mg total) by nebulization every 6 (six) hours as needed for wheezing or shortness of breath, Disp: 60 mL, Rfl: 0  •  aspirin (ECOTRIN LOW STRENGTH) 81 mg EC tablet, Take 1 tablet (81 mg total) by mouth daily, Disp: 90 tablet, Rfl: 0  •  atorvastatin (LIPITOR) 40 mg tablet, Take 1 tablet (40 mg total) by mouth daily with dinner, Disp: 90 tablet, Rfl: 0  •  Baclofen 5 MG TABS, Take 5 mg by mouth daily, Disp: , Rfl:   •  bisacodyl (DULCOLAX) 10 mg suppository, Insert 10 mg into the rectum if needed, Disp: , Rfl:   •  clopidogrel (PLAVIX) 75 mg tablet, Take 1 tablet (75 mg total) by mouth daily Do not start before October 22, 2022 , Disp: 20 tablet, Rfl: 0  •  ferrous sulfate 324 (65 Fe) mg, Take 1 tablet (324 mg total) by mouth 2 (two) times a day before meals, Disp: 60 tablet, Rfl: 0  •  folic acid (FOLVITE) 1 mg tablet, Take 1 mg by mouth daily, Disp: , Rfl:   •  metoprolol tartrate (LOPRESSOR) 25 mg tablet, 1 tablet (25 mg total) by Per G Tube route every 12 (twelve) hours (Patient taking differently: Take 25 mg by mouth every 12 (twelve) hours), Disp: 180 tablet, Rfl: 0  •  Multiple Vitamin (multivitamin) tablet, Take 1 tablet by mouth daily, Disp: , Rfl:   •  pantoprazole (PROTONIX) 40 mg tablet, Take 40 mg by mouth daily, Disp: , Rfl:   •  polyethylene glycol (MIRALAX) 17 g packet, Take 17 g by mouth if needed, Disp: , Rfl:   •  sodium chloride 1 g tablet, Take 1 g by mouth 3 (three) times a day, Disp: , Rfl:   • "thiamine 100 MG tablet, Take 100 mg by mouth daily, Disp: , Rfl:   •  torsemide (DEMADEX) 5 MG tablet, Take 5 mg by mouth daily, Disp: , Rfl:        Please note:  Voice-recognition software may have been used in the preparation of this document  Occasional wrong word or \"sound-alike\" substitutions may have occurred due to the inherent limitations of voice recognition software  Interpretation should be guided by context           [address]  6/28/2023  5:26 PM    "

## 2023-06-28 NOTE — ASSESSMENT & PLAN NOTE
· SNF staff reports G-tube dislodged  · Patient no longer receives tube feeds, will leave tube out   · Tolerating p o  diet  · Patient has had steady weight gain, currently 132 pounds  · Followed by dietitian  · Site appears stable, no S/S of infection, no bleeding  · Continue local wound care, wash with soap and water daily, cover with dressing daily  · Monitor for signs of infection

## 2023-06-29 PROBLEM — Z93.1 PEG (PERCUTANEOUS ENDOSCOPIC GASTROSTOMY) STATUS (HCC): Status: ACTIVE | Noted: 2023-06-29

## 2023-06-29 NOTE — PROGRESS NOTES
[address] [facility]NAG[facility]   [facility]       Patient ID: Cynthia Ramirez is a 63 y o  male Date of Birth 1960     Location of Service: [address] [place] [address]    Performed wound round with: Wound team     Chief Complaint : PEG site/ Stoma    Wound Instructions:  Wound:  PEG site / stoma  Discontinue previous wound order  Cleanse the stoma with soap and water, pat dry  Apply calcium alginate and bordered foam to stoma for 7 days  Frequency : daily and prn for soiling  Offload all wounds  Turn and reposition frequently, Increase protein intake  Monitor for any sign of infection or worsening, inform PCP or patient's primary physician in your facility  Allergies  Patient has no known allergies  Assessment & Plan:  1  PEG (percutaneous endoscopic gastrostomy) status ([facility] [address] )  Assessment & Plan:  Peg stoma  - no obvious sign of infection, with hypergranulation  - ordered calcium alginate and bordered foam  - Sign off  Facility staff will continue to provide treatment and monitor the wound  Can reconsult wound nurse practitioner if wound failed to heal or worsened  Subjective:   June 28, 2023  New consult for PEG site  As per report, PEG tube was accidentally dislodged on June 26, 2023  Received patient in chair, denies pain  Review of Systems   Constitutional: Negative  Respiratory: Negative  Skin: Negative for wound  Objective:    Physical Exam  Constitutional:       Appearance: Normal appearance  Cardiovascular:      Rate and Rhythm: Normal rate  Pulmonary:      Effort: Pulmonary effort is normal    Genitourinary:     Comments: continent  Musculoskeletal:      Comments: LROM   Skin:     Findings: No lesion  Comments: Peg stoma - red, no bleeding, no obvious sign of infection   Neurological:      Mental Status: He is alert and oriented to person, place, and time     Psychiatric:         Mood and Affect: Mood normal  Procedures           Patient's care was coordinated with nursing facility staff  Recent vitals, labs and updated medications were reviewed on EMR or chart system of facility  Past Medical, surgical, social, medication and allergy history and patient's previous records were reviewed and updated as appropriate: Most up-to date information is available in the facility EMR where the patient is currently admitted  Patient Active Problem List   Diagnosis   • Hyponatremia   • Acute CVA (cerebrovascular accident) ([facility] [address] )   • More than 50 percent stenosis of left internal carotid artery   • Alcohol use disorder, mild, abuse   • Moderate protein-calorie malnutrition (HCC)   • Smoking   • Anxiety   • Ambulatory dysfunction   • Anemia   • History of CVA (cerebrovascular accident)   • Melena   • Gastrostomy tube dysfunction (HCC)   • Primary hypertension   • Acute on chronic anemia   • Mild protein-calorie malnutrition (HCC)   • Epistaxis   • Generalized abdominal pain   • Excoriation   • Acute cough   • Pneumonia due to infectious organism   • PEG (percutaneous endoscopic gastrostomy) status ([facility] )     Past Medical History:   Diagnosis Date   • Hypertension    • Renal disorder    • Stroke [facility])      Past Surgical History:   Procedure Laterality Date   • CARDIAC ELECTROPHYSIOLOGY PROCEDURE N/A 5/3/2023    Procedure: Cardiac loop recorder implant;  Surgeon: Akash Leon MD;  [facility]: [facility] CARD[facility]AC CATH LAB;   Service: Cardiology   • GASTROSTOMY TUBE PLACEMENT N/A 10/18/2022    Procedure: INSERTION GASTROSTOMY TUBE OPEN;  Surgeon: Juan Yanez DO;  Location: [facility] Main OR;  Service: General   • DC TEAEC W/PATCH GRF CAROTID VERTB [place] Left 3/24/2023    Procedure: Left CEA with neuromonitoring;  Surgeon: Jalen Meza DO;  Location: [facility] MAIN OR;  Service: Vascular     Social History     Socioeconomic History   • Marital status: Single     Spouse name: None   • Number of children: None   • Years of education: None • Highest education level: None   Occupational History   • None   Tobacco Use   • Smoking status: Every Day     Packs/day: 1 00     Types: Cigarettes   • Smokeless tobacco: Never   Vaping Use   • Vaping Use: Never used   Substance and Sexual Activity   • Alcohol use: Not Currently     Comment: occasionally   • Drug use: Never   • Sexual activity: None   Other Topics Concern   • None   Social History Narrative    ** Merged History Encounter **          Social Determinants of Health     Financial Resource Strain: Not on file   Food Insecurity: No Food Insecurity (10/10/2022)    Hunger Vital Sign    • Worried About Running Out of Food in the Last Year: Never true    • Ran Out of Food in the Last Year: Never true   Transportation Needs: No Transportation Needs (10/10/2022)    PRAPARE - Transportation    • Lack of Transportation (Medical): No    • Lack of Transportation (Non-Medical):  No   Physical Activity: Not on file   Stress: Not on file   Social Connections: Not on file   Intimate Partner Violence: Not on file   Housing Stability: Low Risk  (10/10/2022)    Housing Stability Vital Sign    • Unable to Pay for Housing in the Last Year: No    • Number of Places Lived in the Last Year: 1    • Unstable Housing in the Last Year: No        Current Outpatient Medications:   •  acetaminophen (TYLENOL) 325 mg tablet, Take 650 mg by mouth every 6 (six) hours as needed for mild pain, Disp: , Rfl:   •  albuterol (2 5 mg/3 mL) 0 083 % nebulizer solution, Take 3 mL (2 5 mg total) by nebulization every 6 (six) hours as needed for wheezing or shortness of breath, Disp: 60 mL, Rfl: 0  •  aspirin (ECOTRIN LOW STRENGTH) 81 mg EC tablet, Take 1 tablet (81 mg total) by mouth daily, Disp: 90 tablet, Rfl: 0  •  atorvastatin (LIPITOR) 40 mg tablet, Take 1 tablet (40 mg total) by mouth daily with dinner, Disp: 90 tablet, Rfl: 0  •  Baclofen 5 MG TABS, Take 5 mg by mouth daily, Disp: , Rfl:   •  bisacodyl (DULCOLAX) 10 mg suppository, Insert "10 mg into the rectum if needed, Disp: , Rfl:   •  clopidogrel (PLAVIX) 75 mg tablet, Take 1 tablet (75 mg total) by mouth daily Do not start before October 22, 2022 , Disp: 20 tablet, Rfl: 0  •  ferrous sulfate 324 (65 Fe) mg, Take 1 tablet (324 mg total) by mouth 2 (two) times a day before meals, Disp: 60 tablet, Rfl: 0  •  folic acid (FOLVITE) 1 mg tablet, Take 1 mg by mouth daily, Disp: , Rfl:   •  metoprolol tartrate (LOPRESSOR) 25 mg tablet, 1 tablet (25 mg total) by Per G Tube route every 12 (twelve) hours (Patient taking differently: Take 25 mg by mouth every 12 (twelve) hours), Disp: 180 tablet, Rfl: 0  •  Multiple Vitamin (multivitamin) tablet, Take 1 tablet by mouth daily, Disp: , Rfl:   •  pantoprazole (PROTONIX) 40 mg tablet, Take 40 mg by mouth daily, Disp: , Rfl:   •  polyethylene glycol (MIRALAX) 17 g packet, Take 17 g by mouth if needed, Disp: , Rfl:   •  sodium chloride 1 g tablet, Take 1 g by mouth 3 (three) times a day, Disp: , Rfl:   •  thiamine 100 MG tablet, Take 100 mg by mouth daily, Disp: , Rfl:   •  torsemide (DEMADEX) 5 MG tablet, Take 5 mg by mouth daily, Disp: , Rfl:   History reviewed  No pertinent family history  Coordination of Care: Wound team aware of the treatment plan  Facility nurse will provide wound treatment and monitor the wound for any changes  Patient / Staff education : Patient / Staff was given education on sign of infection and pressure ulcer prevention  Patient/ Staff verbalized understanding     Follow up :  Sign off    Voice-recognition software may have been used in the preparation of this document  Occasional wrong word or \"sound-alike\" substitutions may have occurred due to the inherent limitations of voice recognition software  Interpretation should be guided by context        DANIEL Chowdary  "

## 2023-06-29 NOTE — ASSESSMENT & PLAN NOTE
Peg stoma  - no obvious sign of infection, with hypergranulation  - ordered calcium alginate and bordered foam  - Sign off  Facility staff will continue to provide treatment and monitor the wound  Can reconsult wound nurse practitioner if wound failed to heal or worsened

## 2023-07-05 ENCOUNTER — HOSPITAL ENCOUNTER (OUTPATIENT)
Dept: NON INVASIVE DIAGNOSTICS | Facility: CLINIC | Age: 63
Discharge: HOME | End: 2023-07-05
Payer: COMMERCIAL

## 2023-07-05 DIAGNOSIS — Z86.73 HISTORY OF CVA (CEREBROVASCULAR ACCIDENT): Chronic | ICD-10-CM

## 2023-07-05 PROCEDURE — 93880 EXTRACRANIAL BILAT STUDY: CPT

## 2023-07-05 PROCEDURE — 93880 EXTRACRANIAL BILAT STUDY: CPT | Performed by: SURGERY

## 2023-07-06 ENCOUNTER — TRANSCRIBE ORDERS (OUTPATIENT)
Dept: VASCULAR SURGERY | Facility: CLINIC | Age: 63
End: 2023-07-06

## 2023-07-06 DIAGNOSIS — I65.22 CAROTID STENOSIS, ASYMPTOMATIC, LEFT: Primary | ICD-10-CM

## 2023-07-12 NOTE — PROGRESS NOTES
NEPHROLOGY OFFICE VISIT   Annie Licea III 63 y.o. male MRN: [id number]  7/13/2023    Reason for Visit: Hyponatremia    ASSESSMENT and PLAN:  It was a pleasure evaluating your patient in the office today. Thank you for allowing our team to participate in the care of Mr. Tamir Garcia. Please do not hesitate to contact our team if further issues/questions shall arise in the interim.     Recommendations  · Liberalize fluid intake to 2000 cc per 24 hours  · Decrease sodium chloride tablets to 1 g twice daily  · Check BMP in 1 month and if serum sodium remains above 135, decrease salt tablets to 1 g daily and eventually stop if serum sodium remains above 135 follow-up BMP in 2 months  · Continue torsemide 5 mg daily  · Start Ensure with meals to increase solute intake  · Referral to urology for evaluation of bladder wall thickening    # History of Hyponatremia  >> Work-up from 1st hospital admission 10/2022  - Admission sodium 119, discharge sodium 138  - Serum osmolality 265 consistent with hypotonic hyponatremia  - Urine osmolality 369, urine sodium less than 10 consistent with hypovolemic hyponatremia  - Serum uric acid 8.0, a.m. cortisol 41, TSH 4.36  - MRI brain with several areas of ischemic infarction     >> Work-up from 2nd hospital admission 10/2022  - Admission sodium 127, discharge sodium 132  - Urine osmolality 552-630  - Urine sodium   - CT abdomen with bladder wall thickening, no obvious evidence of malignancy     >> Working diagnosis: Most likely SIADH in setting of stroke, he can also have a component of low solute intake     Plan  - Most recent serum sodium 141 07/2023  - Liberalize fluid intake to 2000 cc per 24 hours  - Decrease sodium chloride tablet to 1 g twice daily  - Continue torsemide 5 mg daily  - Add Ensure with meals  - Check BMP in 1 month and if serum sodium remains above 135, decrease salt tablets to 1 g daily and eventually stop if serum sodium remains above 135 follow-up BMP in 2 months    # Hypertension  - Blood pressure well controlled and at goal  - Current medications: Metoprolol 25 mg twice daily, torsemide 5 mg daily  - Changes: No changes to antihypertensive regimen at this time     # Bladder wall thickening  - Establish follow-up with urology     HPI:  Since last visit: Most recent serum sodium up at 141. Patient denies dyspnea. Denies leg swelling. Denies lightheadedness or dizziness. 82-year-old male was seen in nephrology office today as follow-up for hyponatremia and BIPIN. He was admitted to [address] [place] [address] in 10/2022 with CVA and was noted to have hyponatremia and BIPIN. His hyponatremia and BIPIN improved with IVF and was thought to be due to volume depletion. He had another hospital admission in 10/2022 for GI bleed due to Dieulafoy's lesion and had recurrence of hyponatremia. He is currently in a nursing home. PATIENT INSTRUCTIONS:    Patient Instructions   Recommendations  · Liberalize fluid intake to 2000 cc per 24 hours  · Decrease sodium chloride tablets to 1 g twice daily  · Check BMP in 1 month and if serum sodium remains above 135, decrease salt tablets to 1 g daily and eventually stop if serum sodium remains above 135 another month  · Continue torsemide 5 mg daily  · Start Ensure with meals to increase solute intake  · Referral to urology for evaluation of bladder wall thickening        OBJECTIVE:  Current Weight: Weight - Scale: 59 kg (130 lb)  Vitals:    07/13/23 1219   BP: 129/71   BP Location: Right arm   Patient Position: Sitting   Cuff Size: Adult   Pulse: (!) 52   Weight: 59 kg (130 lb)   Height: 5' 10" (1.778 m)    Body mass index is 18.65 kg/m². REVIEW OF SYSTEMS:    Review of Systems   Constitutional: Negative for chills and fever. HENT: Negative for ear pain and sore throat. Eyes: Negative for pain and visual disturbance. Respiratory: Negative for cough and shortness of breath.     Cardiovascular: Negative for chest pain and palpitations. Gastrointestinal: Negative for abdominal pain and vomiting. Genitourinary: Negative for dysuria and hematuria. Musculoskeletal: Negative for arthralgias and back pain. Skin: Negative for color change and rash. Neurological: Negative for seizures and syncope. All other systems reviewed and are negative. Past Medical History:   Diagnosis Date   • Hypertension    • Renal disorder    • Stroke [facility])        Past Surgical History:   Procedure Laterality Date   • CARDIAC ELECTROPHYSIOLOGY PROCEDURE N/A 5/3/2023    Procedure: Cardiac loop recorder implant;  Surgeon: Nereida Sifuentes MD;  [facility]: [facility] CARDIAC CATH LAB; Service: Cardiology   • GASTROSTOMY TUBE PLACEMENT N/A 10/18/2022    Procedure: INSERTION GASTROSTOMY TUBE OPEN;  Surgeon: Ashli Yanez DO;  Location: [facility] Main OR;  Service: General   • LA TEAEC W/PATCH GRF CAROTID VERTB [employer]CLAV [employer]ECK [employer] Left 3/24/2023    Procedure: Left CEA with neuromonitoring;  Surgeon: Jordan Ornelas DO;  Location: [facility] MAIN OR;  Service: Vascular       No family history on file. Social History     Substance and Sexual Activity   Alcohol Use Not Currently    Comment: occasionally     Social History     Substance and Sexual Activity   Drug Use Never     Social History     Tobacco Use   Smoking Status Every Day   • Packs/day: 1.00   • Types: Cigarettes   Smokeless Tobacco Never       PHYSICAL EXAM:      Physical Exam  Constitutional:       Appearance: Normal appearance. HENT:      Head: Normocephalic and atraumatic. Mouth/Throat:      Mouth: Mucous membranes are moist.      Pharynx: Oropharynx is clear. Cardiovascular:      Rate and Rhythm: Normal rate and regular rhythm. Pulses: Normal pulses. Heart sounds: Normal heart sounds. Pulmonary:      Effort: Pulmonary effort is normal.      Breath sounds: Normal breath sounds. Abdominal:      General: Bowel sounds are normal.      Palpations: Abdomen is soft. Musculoskeletal:         General: Normal range of motion. Right lower leg: No edema. Left lower leg: No edema. Skin:     General: Skin is warm and dry. Neurological:      General: No focal deficit present. Mental Status: He is alert and oriented to person, place, and time. Mental status is at baseline. Psychiatric:         Mood and Affect: Mood normal.         Behavior: Behavior normal.         Thought Content:  Thought content normal.         Judgment: Judgment normal.       Medications:    Current Outpatient Medications:   •  acetaminophen (TYLENOL) 325 mg tablet, Take 650 mg by mouth every 6 (six) hours as needed for mild pain, Disp: , Rfl:   •  albuterol (2.5 mg/3 mL) 0.083 % nebulizer solution, Take 3 mL (2.5 mg total) by nebulization every 6 (six) hours as needed for wheezing or shortness of breath, Disp: 60 mL, Rfl: 0  •  aspirin (ECOTRIN LOW STRENGTH) 81 mg EC tablet, Take 1 tablet (81 mg total) by mouth daily, Disp: 90 tablet, Rfl: 0  •  atorvastatin (LIPITOR) 40 mg tablet, Take 1 tablet (40 mg total) by mouth daily with dinner, Disp: 90 tablet, Rfl: 0  •  Baclofen 5 MG TABS, Take 5 mg by mouth daily, Disp: , Rfl:   •  bisacodyl (DULCOLAX) 10 mg suppository, Insert 10 mg into the rectum if needed, Disp: , Rfl:   •  clopidogrel (PLAVIX) 75 mg tablet, Take 1 tablet (75 mg total) by mouth daily Do not start before October 22, 2022., Disp: 20 tablet, Rfl: 0  •  ferrous sulfate 324 (65 Fe) mg, Take 1 tablet (324 mg total) by mouth 2 (two) times a day before meals, Disp: 60 tablet, Rfl: 0  •  folic acid (FOLVITE) 1 mg tablet, Take 1 mg by mouth daily, Disp: , Rfl:   •  metoprolol tartrate (LOPRESSOR) 25 mg tablet, 1 tablet (25 mg total) by Per G Tube route every 12 (twelve) hours (Patient taking differently: Take 25 mg by mouth every 12 (twelve) hours), Disp: 180 tablet, Rfl: 0  •  Multiple Vitamin (multivitamin) tablet, Take 1 tablet by mouth daily, Disp: , Rfl:   •  pantoprazole (PROTONIX) 40 mg tablet, Take 40 mg by mouth daily, Disp: , Rfl:   •  polyethylene glycol (MIRALAX) 17 g packet, Take 17 g by mouth if needed, Disp: , Rfl:   •  sodium chloride 1 g tablet, Take 1 g by mouth 3 (three) times a day, Disp: , Rfl:   •  thiamine 100 MG tablet, Take 100 mg by mouth daily, Disp: , Rfl:   •  torsemide (DEMADEX) 5 MG tablet, Take 5 mg by mouth daily, Disp: , Rfl:     Laboratory Results:        Invalid input(s): "ALBUMIN"    Results for orders placed or performed during the hospital encounter of 03/24/23   COVID/FLU/RSV    Specimen: Nose; Nares   Result Value Ref Range    SARS-CoV-2 Negative Negative    INFLUENZA A PCR Negative Negative    INFLUENZA B PCR Negative Negative    RSV PCR Negative Negative   Basic metabolic panel   Result Value Ref Range    Sodium 137 135 - 147 mmol/L    Potassium 4.1 3.5 - 5.3 mmol/L    Chloride 107 96 - 108 mmol/L    CO2 27 21 - 32 mmol/L    ANION GAP 3 (L) 4 - 13 mmol/L    BUN 17 5 - 25 mg/dL    Creatinine 0.54 (L) 0.60 - 1.30 mg/dL    Glucose 87 65 - 140 mg/dL    Glucose, Fasting 87 65 - 99 mg/dL    Calcium 9.6 8.3 - 10.1 mg/dL    eGFR 114 ml/min/1.73sq m   CBC   Result Value Ref Range    WBC 5.66 4.31 - 10.16 Thousand/uL    RBC 4.14 3.88 - 5.62 Million/uL    Hemoglobin 11.3 (L) 12.0 - 17.0 g/dL    Hematocrit 35.3 (L) 36.5 - 49.3 %    MCV 85 82 - 98 fL    MCH 27.3 26.8 - 34.3 pg    MCHC 32.0 31.4 - 37.4 g/dL    RDW 16.3 (H) 11.6 - 15.1 %    Platelets 797 841 - 433 Thousands/uL    MPV 8.3 (L) 8.9 - 12.7 fL   Basic Metabolic Panel   Result Value Ref Range    Sodium 140 135 - 147 mmol/L    Potassium 3.7 3.5 - 5.3 mmol/L    Chloride 111 (H) 96 - 108 mmol/L    CO2 25 21 - 32 mmol/L    ANION GAP 4 4 - 13 mmol/L    BUN 12 5 - 25 mg/dL    Creatinine 0.44 (L) 0.60 - 1.30 mg/dL    Glucose 91 65 - 140 mg/dL    Calcium 9.3 8.3 - 10.1 mg/dL    eGFR 124 ml/min/1.73sq m   CBC and Platelet   Result Value Ref Range    WBC 12.96 (H) 4.31 - 10.16 Thousand/uL    RBC 3.73 (L) 3.88 - 5.62 Million/uL    Hemoglobin 10.2 (L) 12.0 - 17.0 g/dL    Hematocrit 31.7 (L) 36.5 - 49.3 %    MCV 85 82 - 98 fL    MCH 27.3 26.8 - 34.3 pg    MCHC 32.2 31.4 - 37.4 g/dL    RDW 16.7 (H) 11.6 - 15.1 %    Platelets 883 744 - 509 Thousands/uL    MPV 8.6 (L) 8.9 - 12.7 fL   APTT   Result Value Ref Range    PTT 28 23 - 37 seconds   Protime-INR   Result Value Ref Range    Protime 13.2 11.6 - 14.5 seconds    INR 0.98 0.84 - 1.19   Type and screen   Result Value Ref Range    ABO Grouping O     Rh Factor Positive     Antibody Screen Negative     Specimen Expiration Date [id number]    Tissue Exam   Result Value Ref Range    Case Report       Surgical Pathology Report                         Case: [id number]                                   Authorizing Provider:  Keven Box DO          Collected:           03/24/2023 [id number]              Ordering Location:     [facility]      Received:            03/24/2023 [address] [place] [address] Operating Room                                                      Pathologist:           Lorenza Casper MD                                                                  Specimens:   A) - Lymph Node, left cervical                                                                      B) - Artery, left carotid artery plaque                                                    Final Diagnosis       A. Lymph Node, Left cervical, Excision:  - One reactive lymph node with paracortical hyperplasia. B. Artery, Left carotid artery plaque, Endarterectomy:  - Calcified plaque with hemosiderin laden macrophages and cholesterol clefts. Microscopic Description       Histologic sections show the yana architecture is within normal limits. Immunochemical stains and DAVID performed on block A1 with appropriate controls show the germinal centers are positive for CD20, CD10, bcl-6, and negative for bcl-2.  CD5, CD3 and bcl-2 are positive for T lymphocytes in the interollicular areas. Bcl-1 highlights histocytes. CD23 highlights the follicular dendritic network of the follicles. Ki67 shows high proliferation rate in reactive germinal centers. CD30 highlights scattered immunoblasts.  highlights scattered plasma cells with polyclonal Kappa and Lambda expression. CK-AE1/3 is negative. LILY is negative. Additional Information       All reported additional testing was performed with appropriately reactive controls. These tests were developed and their performance characteristics determined by [facility] or appropriate performing facility, though some tests may be performed on tissues which have not been validated for performance characteristics (such as staining performed on alcohol exposed cell blocks and decalcified tissues). Results should be interpreted with caution and in the context of the patients’ clinical condition. These tests may not be cleared or approved by the U.S. Food and Drug Administration, though the FDA has determined that such clearance or approval is not necessary. These tests are used for clinical purposes and they should not be regarded as investigational or for research. This laboratory has been approved by [facility], designated as a high-complexity laboratory and is qualified to perform these tests. Interpretation performed at [facility], [address] [place] [address] [address] [address]      Gross Description       A. The specimen is received in formalin, labeled with the patient's name and hospital number, and is designated " left cervical."  The specimen consists of a tan rubbery tissue nodule that measures 2.1 x 1.5 x 0.6 cm. The specimen is bivalved and totally submitted in 2 cassettes. B. The specimen is received in formalin, labeled with the patient's name and hospital number, and is designated " left carotid artery plaque."  The specimen consists of two tubular pieces of firm yellow tissue measuring 4.7 x 1.0 x 0.9 cm in aggregate.   The specimen is serially sectioned revealing thick and brittle dark yellow areas. Representative sections are submitted in 1 cassette for decalcification. Note: The estimated total formalin fixation time based upon information provided by the submitting clinician and the standard processing schedule is over 72 hours.  TStevens     Fingerstick Glucose (POCT)   Result Value Ref Range    POC Glucose 101 65 - 140 mg/dl

## 2023-07-13 ENCOUNTER — OFFICE VISIT (OUTPATIENT)
Dept: NEPHROLOGY | Facility: CLINIC | Age: 63
End: 2023-07-13
Payer: COMMERCIAL

## 2023-07-13 VITALS
SYSTOLIC BLOOD PRESSURE: 129 MMHG | BODY MASS INDEX: 18.61 KG/M2 | HEART RATE: 52 BPM | HEIGHT: 70 IN | DIASTOLIC BLOOD PRESSURE: 71 MMHG | WEIGHT: 130 LBS

## 2023-07-13 DIAGNOSIS — I10 PRIMARY HYPERTENSION: ICD-10-CM

## 2023-07-13 DIAGNOSIS — E87.1 HYPONATREMIA: Primary | ICD-10-CM

## 2023-07-13 DIAGNOSIS — N32.89 BLADDER WALL THICKENING: ICD-10-CM

## 2023-07-13 PROCEDURE — 99213 OFFICE O/P EST LOW 20 MIN: CPT | Performed by: INTERNAL MEDICINE

## 2023-07-13 NOTE — PATIENT INSTRUCTIONS
Recommendations  Liberalize fluid intake to 2000 cc per 24 hours  Decrease sodium chloride tablets to 1 g twice daily  Check BMP in 1 month and if serum sodium remains above 135, decrease salt tablets to 1 g daily and eventually stop if serum sodium remains above 135 another month  Continue torsemide 5 mg daily  Start Ensure with meals to increase solute intake  Referral to urology for evaluation of bladder wall thickening

## 2023-07-14 DIAGNOSIS — E87.1 HYPONATREMIA: Primary | Chronic | ICD-10-CM

## 2023-07-26 ENCOUNTER — NURSING HOME VISIT (OUTPATIENT)
Dept: GERIATRICS | Facility: OTHER | Age: 63
End: 2023-07-26
Payer: COMMERCIAL

## 2023-07-26 VITALS
DIASTOLIC BLOOD PRESSURE: 72 MMHG | SYSTOLIC BLOOD PRESSURE: 122 MMHG | TEMPERATURE: 97.5 F | WEIGHT: 128.1 LBS | RESPIRATION RATE: 18 BRPM | BODY MASS INDEX: 18.38 KG/M2 | OXYGEN SATURATION: 98 % | HEART RATE: 64 BPM

## 2023-07-26 DIAGNOSIS — Z86.73 HISTORY OF CVA (CEREBROVASCULAR ACCIDENT): Primary | Chronic | ICD-10-CM

## 2023-07-26 PROCEDURE — 99309 SBSQ NF CARE MODERATE MDM 30: CPT | Performed by: INTERNAL MEDICINE

## 2023-07-27 NOTE — PROGRESS NOTES
Travis [address] Blayne Albright [address] [facility], [address], [address] [facility]    Progress Note  Code [facility] 32    Patient Location     [facility] rehab    Reason for visit     Follow-up  history of CVA, hyperlipidemia, pneumonia, hypertension, anemia    Patient’s care was coordinated with nursing facility staff. Recent vitals, labs and updated medications were reviewed on [facility] system of facility. Problem List Items Addressed This Visit    None      Hypertension:   Blood pressure stable on metoprolol 25 mg twice daily and torsemide 5 mg daily    Hyponatremia:  Na level was normal at 141 on 7/3/23  Patient was evaluated by nephrology service on 7/13/2023. Follow-up notes were reviewed. Sodium chloride tablets were reduced to 1 g twice daily along with liberalization of fluid intake to 2000 cc per 24 hours. Repeat BMP in 1 month. If sodium level remains persistently above 135 well wean off of salt tablets    History of CVA:   History of CVA with left-sided weakness. Continue risk factor control. Patient wendie on aspirin Plavix and statin    Hyperlipidemia:  Continue atorvastatin. Lab Results   Component Value Date    CHOLESTEROL 97 10/10/2022     Lab Results   Component Value Date    HDL 38 (L) 10/10/2022     Lab Results   Component Value Date    TRIG 96 10/10/2022     No results found for: "[address]"     Dysphagia:   Patient initially underwent PEG tube placement for dysphagia following CVA. PEG tube was dislodged several weeks ago. Patient's p.o. intake had improved significantly. PEG tube was left out. He is currently tolerating regular diet well. Patient additionally had weight gain of around 10 pounds since 7/23. We will continue to monitor    HPI       Patient is being seen for a follow-up visit . He is doing okay at present. Denies any active complaints. No fever chills or chest congestion. Patient's PEG tube was dislodged several weeks ago.   He was no longer receiving tube feeds. Tube was therefore left out. Patient is tolerating regular diet. Patient was evaluated by nephrology service recently for follow-up of hyponatremia. Notes reviewed. He is noted to be pleasantly confused. Remains weak on the left side    Review of Systems   Constitutional: Positive for unexpected weight change (wt gain of around 10 lbs keke jan/23). Negative for chills and fever. Respiratory: Negative for shortness of breath, wheezing and stridor. Gastrointestinal: Negative for abdominal distention, abdominal pain and vomiting. Genitourinary: Negative for flank pain and hematuria. Musculoskeletal: Positive for gait problem. Neurological: Positive for weakness (Chronic left-sided). Psychiatric/Behavioral: Negative for agitation and behavioral problems. Past Medical History:   Diagnosis Date   • Hypertension    • Renal disorder    • Stroke [facility])        Past Surgical History:   Procedure Laterality Date   • CARDIAC ELECTROPHYSIOLOGY PROCEDURE N/A 5/3/2023    Procedure: Cardiac loop recorder implant;  Surgeon: Jesus Alberto Perez MD;  [facility]: [facility] CARDIAC CATH LAB; Service: Cardiology   • GASTROSTOMY TUBE PLACEMENT N/A 10/18/2022    Procedure: INSERTION GASTROSTOMY TUBE OPEN;  Surgeon: Jordi Yanez DO;  Location: [facility] Main OR;  Service: General   • [place] TEAEC W/PATCH GRF CAROTID VERTB [address] Left 3/24/2023    Procedure: Left CEA with neuromonitoring;  Surgeon: Shyla Thompson DO;  Location: [facility] MAIN OR;  Service: Vascular       Social History     Tobacco Use   Smoking Status Every Day   • Packs/day: 1.00   • Types: Cigarettes   Smokeless Tobacco Never       History reviewed. No pertinent family history.      No Known Allergies      Current Outpatient Medications:   •  acetaminophen (TYLENOL) 325 mg tablet, Take 650 mg by mouth every 6 (six) hours as needed for mild pain, Disp: , Rfl:   •  albuterol (2.5 mg/3 mL) 0.083 % nebulizer solution, Take 3 mL (2.5 mg total) by nebulization every 6 (six) hours as needed for wheezing or shortness of breath, Disp: 60 mL, Rfl: 0  •  aspirin (ECOTRIN LOW STRENGTH) 81 mg EC tablet, Take 1 tablet (81 mg total) by mouth daily, Disp: 90 tablet, Rfl: 0  •  atorvastatin (LIPITOR) 40 mg tablet, Take 1 tablet (40 mg total) by mouth daily with dinner, Disp: 90 tablet, Rfl: 0  •  Baclofen 5 MG TABS, Take 5 mg by mouth daily, Disp: , Rfl:   •  bisacodyl (DULCOLAX) 10 mg suppository, Insert 10 mg into the rectum if needed, Disp: , Rfl:   •  clopidogrel (PLAVIX) 75 mg tablet, Take 1 tablet (75 mg total) by mouth daily Do not start before October 22, 2022., Disp: 20 tablet, Rfl: 0  •  ferrous sulfate 324 (65 Fe) mg, Take 1 tablet (324 mg total) by mouth 2 (two) times a day before meals, Disp: 60 tablet, Rfl: 0  •  folic acid (FOLVITE) 1 mg tablet, Take 1 mg by mouth daily, Disp: , Rfl:   •  metoprolol tartrate (LOPRESSOR) 25 mg tablet, 1 tablet (25 mg total) by Per G Tube route every 12 (twelve) hours (Patient taking differently: Take 25 mg by mouth every 12 (twelve) hours), Disp: 180 tablet, Rfl: 0  •  Multiple Vitamin (multivitamin) tablet, Take 1 tablet by mouth daily, Disp: , Rfl:   •  pantoprazole (PROTONIX) 40 mg tablet, Take 40 mg by mouth daily, Disp: , Rfl:   •  polyethylene glycol (MIRALAX) 17 g packet, Take 17 g by mouth if needed, Disp: , Rfl:   •  sodium chloride 1 g tablet, Take 1 g by mouth 2 (two) times a day, Disp: , Rfl:   •  thiamine 100 MG tablet, Take 100 mg by mouth daily, Disp: , Rfl:   •  torsemide (DEMADEX) 5 MG tablet, Take 5 mg by mouth daily, Disp: , Rfl:     Updated list was reviewed in [facility] system of facility. Vitals:    07/26/23 1655   BP: 122/72   Pulse: 64   Resp: 18   Temp: 97.5 °F (36.4 °C)   SpO2: 98%       Physical Exam  HENT:      Head: Normocephalic and atraumatic. Cardiovascular:      Rate and Rhythm: Normal rate and regular rhythm. Pulmonary:      Breath sounds:  No wheezing, rhonchi or rales.   Abdominal:      General: There is no distension. Palpations: Abdomen is soft. Tenderness: There is no abdominal tenderness. Comments: Peg tube in place   Musculoskeletal:      Cervical back: Neck supple. Right lower leg: No edema. Left lower leg: No edema. Skin:     Coloration: Skin is not jaundiced. Neurological:      Cranial Nerves: No cranial nerve deficit. Motor: Weakness (Chronic weakness involving the left side, more pronounced in left upper extremity) present. Diagnostic Data:    Labs done on 7/3/2023 revealed magnesium of 1.8, WBC count 6.6, hemoglobin 11.4, platelet count 364  Creatinine 0.7, BUN 8, potassium 4.2, sodium 141      Portions of the record may have been created with voice recognition software. Occasional wrong word or "sound a like" substitutions may have occurred due to the inherent limitations of voice recognition software. Read the chart carefully and recognize, using context, where substitutions have occurred.     This note was electronically signed by Dr. Fabián Prado

## 2023-07-29 PROBLEM — R05.1 ACUTE COUGH: Status: RESOLVED | Noted: 2023-05-30 | Resolved: 2023-07-29

## 2023-07-30 PROBLEM — J18.9 PNEUMONIA DUE TO INFECTIOUS ORGANISM: Status: RESOLVED | Noted: 2023-05-31 | Resolved: 2023-07-30

## 2023-08-01 ENCOUNTER — TELEPHONE (OUTPATIENT)
Dept: NEPHROLOGY | Facility: CLINIC | Age: 63
End: 2023-08-01

## 2023-08-01 NOTE — TELEPHONE ENCOUNTER
TYSON Navarrete at [facility] to schedule January f/u with Dr. Alegre Mantle

## 2023-08-17 ENCOUNTER — REMOTE DEVICE CLINIC VISIT (OUTPATIENT)
Dept: CARDIOLOGY CLINIC | Facility: CLINIC | Age: 63
End: 2023-08-17
Payer: COMMERCIAL

## 2023-08-17 DIAGNOSIS — Z95.818 PRESENCE OF OTHER CARDIAC IMPLANTS AND GRAFTS: Primary | ICD-10-CM

## 2023-08-17 PROCEDURE — G2066 INTER DEVC REMOTE 30D: HCPCS | Performed by: INTERNAL MEDICINE

## 2023-08-17 PROCEDURE — 93298 REM INTERROG DEV EVAL SCRMS: CPT | Performed by: INTERNAL MEDICINE

## 2023-08-17 NOTE — PROGRESS NOTES
OSEAS GCU03 CARDIAC LOOP MONITOR - ACTIVE SYSTEM IS MRI CONDITIONAL   CARELINK TRANSMISSION: LOOP RECORDER. PRESENTING RHYTHM SB @ 42 BPM. BATTERY STATUS "OK." NO PATIENT OR DEVICE ACTIVATED EPISODES. NORMAL DEVICE FUNCTION.  DL

## 2023-08-31 ENCOUNTER — NURSING HOME VISIT (OUTPATIENT)
Dept: GERIATRICS | Facility: OTHER | Age: 63
End: 2023-08-31
Payer: COMMERCIAL

## 2023-08-31 DIAGNOSIS — Z86.73 HISTORY OF CVA (CEREBROVASCULAR ACCIDENT): Chronic | ICD-10-CM

## 2023-08-31 DIAGNOSIS — I10 PRIMARY HYPERTENSION: Primary | Chronic | ICD-10-CM

## 2023-08-31 DIAGNOSIS — R26.2 AMBULATORY DYSFUNCTION: Chronic | ICD-10-CM

## 2023-08-31 DIAGNOSIS — D64.9 ANEMIA, UNSPECIFIED TYPE: Chronic | ICD-10-CM

## 2023-08-31 PROCEDURE — 99309 SBSQ NF CARE MODERATE MDM 30: CPT | Performed by: NURSE PRACTITIONER

## 2023-09-01 NOTE — PROGRESS NOTES
[address], [address] [place] [address] [address]  [phone]    NAME: Wesly Alegria III  AGE: 63 y.o. SEX: male    Progress Note    Location: [facility]   POS: 28 (LT)    Patient's care was coordinated with nursing facility staff. Recent vitals, labs, and updated medications were review on [facility] system in facility. Assessment/Plan:    Primary hypertension  BP have been stable, 129/70  Continue metoprolol 25 mg q 12  Continue torsemide 5 mg daily  Avoid hypotension  Continue to Monitor BP's    Ambulatory dysfunction  History of CVA residual left sided weakness   Continue PT/OT for strength training as needed  Maintain fall/safety precautions  assist with ADL's as needed  Encourage OOB for meals  24/7 supportive care per LTC    History of CVA (cerebrovascular accident)  Residual left hemiplegia, dysphagia  Continue  PT/OT for strength training as recommended  Maintain fall/safety precautions  Continue to assist with ADL's as needed  Continue plavix 75 mg daily  Continue ASA 81 mg daily  Continue Lipitor 40 mg daily    Anemia  Stable  C/w iron supplements and MVI  Monitor levels and trend     Chief complaint / Reason for visit: LT Monthly Visit     History of Present Illness:  Patient is a 66-year old male seen and examined for LT Monthly Visit. Received pt sitting in w/c, resting. At the time of my evaluation denies pain or any other complaints. Appears comfortable and is in no acute distress. Reports tolerating diet with good appetite. No edema. Free of anginal symptoms. Denies lightheadedness, dizziness, headache, fever, chills, /GI discomfort. No other concerns or issues at this time. Review of Systems:  Per history of present illness, all other systems reviewed and negative.   Other than those noted in HPI    HISTORY:  Medical Hx: Reviewed, unchanged  Family Hx: Reviewed, unchanged  Soc Hx: Reviewed,  unchanged    ALLERGY: Reviewed, unchanged  No Known Allergies     PHYSICAL EXAM:  Vital Signs: Blood pressure 133/64, pulse 65, respiration 18, temperature 97.6, O2 sats 97% on room air  Weight: 133.2 pounds    General: NAD, weak, frail   Head: Atraumatic. Normocephalic. Eye Exam: anicteric sclera, no discharge, PERRLA, No injection  Oral Exam: moist mucous membranes, no buccaloropharyngeal erythema, palatine tonsils WNL. Neck Exam: no anterior cervical lymphadenopathy noted, neck supple  Cardiovascular: regular rate, regular rhythm, no murmurs, rubs, or gallops  Pulmonary: no wheeze, no rhonchi, no rales. No chest tenderness  Abdominal: soft, non-tender, nondistended, bowel sounds audible x 4 quadrants  : Non distended bladder. B/B incontinent   Extremities and skin: no edema noted, no rashes. Neurological: alert, cooperative and responsive, Oriented to self, L hemiplegia     Laboratory results / Imaging reviewed: Hard copy/ies in medical chart: Reviewed from [facility]    8-  Creatinine 0.6, BUN 12, sodium 139, potassium 4.1, calcium 9.4, GFR >60    Current Medications: All medications reviewed and updated in Nursing Home Chart    Please note:  Voice-recognition software may have been used in the preparation of this document. Occasional wrong word or "sound-alike" substitutions may have occurred due to the inherent limitations of voice recognition software. Interpretation should be guided by context.     DANIEL Rodrigues  8/31/2023

## 2023-09-06 NOTE — ASSESSMENT & PLAN NOTE
BP have been stable, 129/70  Continue metoprolol 25 mg q 12  Continue torsemide 5 mg daily  Avoid hypotension  Continue to Monitor BP's

## 2023-09-07 NOTE — ASSESSMENT & PLAN NOTE
Residual left hemiplegia, dysphagia  Continue  PT/OT for strength training as recommended  Maintain fall/safety precautions  Continue to assist with ADL's as needed  Continue plavix 75 mg daily  Continue ASA 81 mg daily  Continue Lipitor 40 mg daily

## 2023-09-07 NOTE — ASSESSMENT & PLAN NOTE
History of CVA residual left sided weakness   Continue PT/OT for strength training as needed  Maintain fall/safety precautions  assist with ADL's as needed  Encourage OOB for meals  24/7 supportive care per LTC

## 2023-09-29 ENCOUNTER — NURSING HOME VISIT (OUTPATIENT)
Dept: GERIATRICS | Facility: OTHER | Age: 63
End: 2023-09-29
Payer: COMMERCIAL

## 2023-09-29 VITALS
RESPIRATION RATE: 18 BRPM | BODY MASS INDEX: 19.11 KG/M2 | HEART RATE: 56 BPM | SYSTOLIC BLOOD PRESSURE: 116 MMHG | WEIGHT: 133.2 LBS | DIASTOLIC BLOOD PRESSURE: 60 MMHG

## 2023-09-29 DIAGNOSIS — I10 PRIMARY HYPERTENSION: Primary | Chronic | ICD-10-CM

## 2023-09-29 DIAGNOSIS — I63.9 ACUTE CVA (CEREBROVASCULAR ACCIDENT) (HCC): ICD-10-CM

## 2023-09-29 DIAGNOSIS — E87.1 HYPONATREMIA: Chronic | ICD-10-CM

## 2023-09-29 PROCEDURE — 99308 SBSQ NF CARE LOW MDM 20: CPT | Performed by: INTERNAL MEDICINE

## 2023-09-29 NOTE — PROGRESS NOTES
[facility]  Jarrod Rivas MD [facility]  Progress Note    NAME: Nayeli Nowak III  AGE: 63 y.o. SEX: male [phone]    DATE OF ENCOUNTER: 9/29/2023    Assessment and Plan     Problem List Items Addressed This Visit        Cardiovascular and Mediastinum    Primary hypertension - Primary (Chronic)     Blood pressure is well controlled at present. Acute CVA (cerebrovascular accident) [facility])     Patient with history of left-sided deficit            Other    Hyponatremia (Chronic)     History of hyponatremia on salt replacement and fluid restriction. All medications and routine orders were reviewed and updated as needed. Plan discussed with: Patient    Chief Complaint     No chief complaint on file. History of Present Illness     Patient is a 77-year-old male with previous history of CVA and left-sided deficit. Patient currently taking aspirin 81 mg when he had a clopidogrel bi sulfate tablet 75 mg orally daily. Initially the patient had issues with swallowing and had a PEG tube however after this beginning/speech therapy did evaluate the patient and he was able to swallow on his own. Patient's other comorbidities include a history of hyperlipidemia, pneumonia, hypertension and anemia. Patient also has history of hyponatremia and has been on salt supplementation fluid restriction. His lipid management. He is fairly well controlled. Cholesterol of 97. HDL 38 and triglycerides of 96. HISTORY:  Past Surgical History:   Procedure Laterality Date   • CARDIAC ELECTROPHYSIOLOGY PROCEDURE N/A 5/3/2023    Procedure: Cardiac loop recorder implant;  Surgeon: Tom Alexandre MD;  Location: [facility] CARDIAC CATH LAB;   Service: Cardiology   • GASTROSTOMY TUBE PLACEMENT N/A 10/18/2022    Procedure: INSERTION GASTROSTOMY TUBE OPEN;  Surgeon: Rebecca Yanez DO;  Location: [facility] Main OR;  Service: General   • [place] TEAEC W/PATCH GRF CAROTID VERTB [employer] Left 3/24/2023    Procedure: Left CEA with neuromonitoring;  Surgeon: Mayito Guerrero DO;  Location: [facility] MAIN OR;  Service: Vascular      Past Medical History:   Diagnosis Date   • Hypertension    • Renal disorder    • Stroke [facility])      No family history on file. Social History     Socioeconomic History   • Marital status: Single     Spouse name: None   • Number of children: None   • Years of education: None   • Highest education level: None   Occupational History   • None   Tobacco Use   • Smoking status: Every Day     Packs/day: 1.00     Types: Cigarettes   • Smokeless tobacco: Never   Vaping Use   • Vaping Use: Never used   Substance and Sexual Activity   • Alcohol use: Not Currently     Comment: occasionally   • Drug use: Never   • Sexual activity: None   Other Topics Concern   • None   Social History Narrative    ** Merged History Encounter **          Social Determinants of Health     Financial Resource Strain: Not on file   Food Insecurity: No Food Insecurity (10/10/2022)    Hunger Vital Sign    • Worried About Running Out of Food in the Last Year: Never true    • Ran Out of Food in the Last Year: Never true   Transportation Needs: No Transportation Needs (10/10/2022)    PRAPARE - Transportation    • Lack of Transportation (Medical): No    • Lack of Transportation (Non-Medical): No   Physical Activity: Not on file   Stress: Not on file   Social Connections: Not on file   Intimate Partner Violence: Not on file   Housing Stability: Low Risk  (10/10/2022)    Housing Stability Vital Sign    • Unable to Pay for Housing in the Last Year: No    • Number of Places Lived in the Last Year: 1    • Unstable Housing in the Last Year: No       Allergies:  No Known Allergies    Review of Systems     Review of Systems   HENT: Negative. Eyes: Negative. Respiratory: Negative. Cardiovascular: Negative. Endocrine: Negative. Genitourinary: Negative. Musculoskeletal: Negative. Skin: Negative. Allergic/Immunologic: Negative. Hematological: Negative.         PHQ-2/9 Depression Screening           Medications and orders       Current Outpatient Medications:   •  acetaminophen (TYLENOL) 325 mg tablet, Take 650 mg by mouth every 6 (six) hours as needed for mild pain, Disp: , Rfl:   •  albuterol (2.5 mg/3 mL) 0.083 % nebulizer solution, Take 3 mL (2.5 mg total) by nebulization every 6 (six) hours as needed for wheezing or shortness of breath, Disp: 60 mL, Rfl: 0  •  aspirin (ECOTRIN LOW STRENGTH) 81 mg EC tablet, Take 1 tablet (81 mg total) by mouth daily, Disp: 90 tablet, Rfl: 0  •  atorvastatin (LIPITOR) 40 mg tablet, Take 1 tablet (40 mg total) by mouth daily with dinner, Disp: 90 tablet, Rfl: 0  •  Baclofen 5 MG TABS, Take 5 mg by mouth daily, Disp: , Rfl:   •  bisacodyl (DULCOLAX) 10 mg suppository, Insert 10 mg into the rectum if needed, Disp: , Rfl:   •  clopidogrel (PLAVIX) 75 mg tablet, Take 1 tablet (75 mg total) by mouth daily Do not start before October 22, 2022., Disp: 20 tablet, Rfl: 0  •  ferrous sulfate 324 (65 Fe) mg, Take 1 tablet (324 mg total) by mouth 2 (two) times a day before meals, Disp: 60 tablet, Rfl: 0  •  folic acid (FOLVITE) 1 mg tablet, Take 1 mg by mouth daily, Disp: , Rfl:   •  metoprolol tartrate (LOPRESSOR) 25 mg tablet, 1 tablet (25 mg total) by Per G Tube route every 12 (twelve) hours (Patient taking differently: Take 25 mg by mouth every 12 (twelve) hours), Disp: 180 tablet, Rfl: 0  •  Multiple Vitamin (multivitamin) tablet, Take 1 tablet by mouth daily, Disp: , Rfl:   •  pantoprazole (PROTONIX) 40 mg tablet, Take 40 mg by mouth daily, Disp: , Rfl:   •  polyethylene glycol (MIRALAX) 17 g packet, Take 17 g by mouth if needed, Disp: , Rfl:   •  sodium chloride 1 g tablet, Take 1 g by mouth 2 (two) times a day, Disp: , Rfl:   •  thiamine 100 MG tablet, Take 100 mg by mouth daily, Disp: , Rfl:   •  torsemide (DEMADEX) 5 MG tablet, Take 5 mg by mouth daily, Disp: , Rfl: Objective     Vitals:   Vitals:    09/29/23 1632   BP: 116/60   Pulse: 56   Resp: 18   Weight: 60.4 kg (133 lb 3.2 oz)       Physical Exam  HENT:      Head: Atraumatic. Right Ear: Ear canal and external ear normal.      Left Ear: Ear canal and external ear normal.      Nose: Nose normal.      Mouth/Throat:      Mouth: Mucous membranes are dry. Eyes:      Conjunctiva/sclera: Conjunctivae normal.      Pupils: Pupils are equal, round, and reactive to light. Cardiovascular:      Rate and Rhythm: Normal rate and regular rhythm. Pulses: Normal pulses. Heart sounds: Normal heart sounds. Pulmonary:      Effort: Pulmonary effort is normal.      Breath sounds: Normal breath sounds. Abdominal:      Palpations: Abdomen is soft. Musculoskeletal:      Cervical back: Neck supple. Comments: Patient with left-sided weakness   Skin:     General: Skin is dry. Neurological:      Mental Status: He is oriented to person, place, and time. Psychiatric:         Mood and Affect: Mood normal.         Behavior: Behavior normal.         Pertinent Laboratory/Diagnostic Studies: The following labs/studies were reviewed please see facility chart for details.

## 2023-10-31 ENCOUNTER — NURSING HOME VISIT (OUTPATIENT)
Dept: GERIATRICS | Facility: OTHER | Age: 63
End: 2023-10-31
Payer: COMMERCIAL

## 2023-10-31 VITALS — HEART RATE: 65 BPM | TEMPERATURE: 97.5 F | SYSTOLIC BLOOD PRESSURE: 122 MMHG | DIASTOLIC BLOOD PRESSURE: 74 MMHG

## 2023-10-31 DIAGNOSIS — I10 PRIMARY HYPERTENSION: Primary | Chronic | ICD-10-CM

## 2023-10-31 DIAGNOSIS — Z86.73 HISTORY OF CVA (CEREBROVASCULAR ACCIDENT): Chronic | ICD-10-CM

## 2023-10-31 DIAGNOSIS — E87.1 HYPONATREMIA: Chronic | ICD-10-CM

## 2023-10-31 DIAGNOSIS — R26.2 AMBULATORY DYSFUNCTION: Chronic | ICD-10-CM

## 2023-10-31 PROCEDURE — 99309 SBSQ NF CARE MODERATE MDM 30: CPT

## 2023-10-31 NOTE — ASSESSMENT & PLAN NOTE
Blood pressure acceptable  Continue torsemide 5 mg daily  Continue metoprolol 25 mg every 12 hours  Monitor blood pressure

## 2023-10-31 NOTE — PROGRESS NOTES
[address]  [phone]  FACILITY: [facility]  Code 32 (LTC)        NAME: Poly Mendoza III  AGE: 63 y.o. SEX: male CODE STATUS: CPR    DATE OF ENCOUNTER: 10/31/2023    Assessment and Plan     1. Primary hypertension  Assessment & Plan:  Blood pressure acceptable  Continue torsemide 5 mg daily  Continue metoprolol 25 mg every 12 hours  Monitor blood pressure      2. Ambulatory dysfunction  Assessment & Plan:  History of CVA with left-sided hemiplegia  Maintain fall and safety precautions  Continue ADL assistance  Continues to require 24/7 care and support at long-term care facility      3. History of CVA (cerebrovascular accident)  Assessment & Plan: With left hemiplegia  Continue restorative care  Continue supportive care and ADL assistance  Continue plavix, ASA and statin         4. Hyponatremia  Assessment & Plan:  Last BMP 9/23 Na 139   Continue salt tabs           All medications and routine orders were reviewed and updated as needed. Chief Complaint     LT follow up visit       Past Medical and Surgica History      Past Medical History:   Diagnosis Date    Hypertension     Renal disorder     Stroke [facility])      Past Surgical History:   Procedure Laterality Date    CARDIAC ELECTROPHYSIOLOGY PROCEDURE N/A 5/3/2023    Procedure: Cardiac loop recorder implant;  Surgeon: Familia Boyd MD;  [facility]; Service: Cardiology    GASTROSTOMY TUBE PLACEMENT N/A 10/18/2022    Procedure: INSERTION GASTROSTOMY TUBE OPEN;  Surgeon: Ross Yanez DO;  Location: [facility] Main [facility];  Service: General    AR TEAEC W/PATCH GRF CAROTID VERTB [address] Left 3/24/2023    Procedure: Left CEA with neuromonitoring;  Surgeon: Maryam Castañeda DO;  Location: [facility] MAIN OR;  Service: Vascular     No Known Allergies       History of Present Illness     Patient being seen for routine long term care follow up. Without acute distress. Denies pain, SOB.  No acute concerns. The patient's allergies, past medical, surgical, social and family history were reviewed and unchanged. Review of Systems     Review of Systems   Neurological:  Positive for weakness. All other systems reviewed and are negative. Objective     Vitals:   Vitals:    10/31/23 1242   BP: 122/74   Pulse: 65   Temp: 97.5 °F (36.4 °C)         Physical Exam  Vitals reviewed. Constitutional:       General: He is not in acute distress. Appearance: Normal appearance. He is not ill-appearing, toxic-appearing or diaphoretic. HENT:      Head: Normocephalic and atraumatic. Eyes:      Conjunctiva/sclera: Conjunctivae normal.   Cardiovascular:      Rate and Rhythm: Normal rate and regular rhythm. Pulses: Normal pulses. Heart sounds: Normal heart sounds. No murmur heard. Pulmonary:      Effort: Pulmonary effort is normal. No respiratory distress. Breath sounds: Normal breath sounds. Abdominal:      General: Bowel sounds are normal. There is no distension. Palpations: Abdomen is soft. Tenderness: There is no abdominal tenderness. Musculoskeletal:      Right lower leg: No edema. Left lower leg: No edema. Skin:     General: Skin is warm and dry. Neurological:      General: No focal deficit present. Mental Status: He is alert and oriented to person, place, and time. Motor: Weakness present. Gait: Gait abnormal.   Psychiatric:         Mood and Affect: Mood normal.         Behavior: Behavior normal.         Thought Content: Thought content normal.         Pertinent Laboratory/Diagnostic Studies:   Reviewed in facility chart-stable      Current Medications   Medications reviewed and updated see facility [facility] for details.       Current Outpatient Medications:     acetaminophen (TYLENOL) 325 mg tablet, Take 650 mg by mouth every 6 (six) hours as needed for mild pain, Disp: , Rfl:     albuterol (2.5 mg/3 mL) 0.083 % nebulizer solution, Take 3 mL (2.5 mg total) by nebulization every 6 (six) hours as needed for wheezing or shortness of breath, Disp: 60 mL, Rfl: 0    aspirin (ECOTRIN LOW STRENGTH) 81 mg EC tablet, Take 1 tablet (81 mg total) by mouth daily, Disp: 90 tablet, Rfl: 0    atorvastatin (LIPITOR) 40 mg tablet, Take 1 tablet (40 mg total) by mouth daily with dinner, Disp: 90 tablet, Rfl: 0    Baclofen 5 MG TABS, Take 5 mg by mouth daily, Disp: , Rfl:     bisacodyl (DULCOLAX) 10 mg suppository, Insert 10 mg into the rectum if needed, Disp: , Rfl:     clopidogrel (PLAVIX) 75 mg tablet, Take 1 tablet (75 mg total) by mouth daily Do not start before October 22, 2022., Disp: 20 tablet, Rfl: 0    ferrous sulfate 324 (65 Fe) mg, Take 1 tablet (324 mg total) by mouth 2 (two) times a day before meals, Disp: 60 tablet, Rfl: 0    folic acid (FOLVITE) 1 mg tablet, Take 1 mg by mouth daily, Disp: , Rfl:     metoprolol tartrate (LOPRESSOR) 25 mg tablet, 1 tablet (25 mg total) by Per G Tube route every 12 (twelve) hours (Patient taking differently: Take 25 mg by mouth every 12 (twelve) hours), Disp: 180 tablet, Rfl: 0    Multiple Vitamin (multivitamin) tablet, Take 1 tablet by mouth daily, Disp: , Rfl:     pantoprazole (PROTONIX) 40 mg tablet, Take 40 mg by mouth daily, Disp: , Rfl:     polyethylene glycol (MIRALAX) 17 g packet, Take 17 g by mouth if needed, Disp: , Rfl:     sodium chloride 1 g tablet, Take 1 g by mouth 2 (two) times a day, Disp: , Rfl:     thiamine 100 MG tablet, Take 100 mg by mouth daily, Disp: , Rfl:     torsemide (DEMADEX) 5 MG tablet, Take 5 mg by mouth daily, Disp: , Rfl:        Please note:  Voice-recognition software may have been used in the preparation of this document. Occasional wrong word or "sound-alike" substitutions may have occurred due to the inherent limitations of voice recognition software. Interpretation should be guided by context.          DANIEL Blank  10/31/2023  12:50 PM

## 2023-10-31 NOTE — ASSESSMENT & PLAN NOTE
With left hemiplegia  Continue restorative care  Continue supportive care and ADL assistance  Continue plavix, ASA and statin

## 2023-10-31 NOTE — ASSESSMENT & PLAN NOTE
History of CVA with left-sided hemiplegia  Maintain fall and safety precautions  Continue ADL assistance  Continues to require 24/7 care and support at long-term care facility

## 2023-11-16 ENCOUNTER — REMOTE DEVICE CLINIC VISIT (OUTPATIENT)
Dept: CARDIOLOGY CLINIC | Facility: CLINIC | Age: 63
End: 2023-11-16
Payer: COMMERCIAL

## 2023-11-16 DIAGNOSIS — Z95.818 PRESENCE OF OTHER CARDIAC IMPLANTS AND GRAFTS: Primary | ICD-10-CM

## 2023-11-16 PROCEDURE — 93298 REM INTERROG DEV EVAL SCRMS: CPT | Performed by: INTERNAL MEDICINE

## 2023-11-16 PROCEDURE — G2066 INTER DEVC REMOTE 30D: HCPCS | Performed by: INTERNAL MEDICINE

## 2023-11-16 NOTE — PROGRESS NOTES
OSEAS [id number] CARDIAC LOOP MONITOR - ACTIVE SYSTEM IS MRI CONDITIONAL   CARELINK TRANSMISSION: LOOP RECORDER. PRESENTING RHYTHM NSR @ 66 BPM. BATTERY STATUS "OK." NO PATIENT OR DEVICE ACTIVATED EPISODES. NORMAL DEVICE FUNCTION.  DL

## 2023-11-29 ENCOUNTER — NURSING HOME VISIT (OUTPATIENT)
Dept: GERIATRICS | Facility: OTHER | Age: 63
End: 2023-11-29
Payer: COMMERCIAL

## 2023-11-29 VITALS
SYSTOLIC BLOOD PRESSURE: 106 MMHG | OXYGEN SATURATION: 97 % | BODY MASS INDEX: 19.69 KG/M2 | WEIGHT: 137.2 LBS | RESPIRATION RATE: 18 BRPM | HEART RATE: 54 BPM | DIASTOLIC BLOOD PRESSURE: 59 MMHG

## 2023-11-29 DIAGNOSIS — I10 PRIMARY HYPERTENSION: Primary | Chronic | ICD-10-CM

## 2023-11-29 DIAGNOSIS — N32.89 BLADDER WALL THICKENING: ICD-10-CM

## 2023-11-29 PROCEDURE — 99309 SBSQ NF CARE MODERATE MDM 30: CPT | Performed by: INTERNAL MEDICINE

## 2023-11-29 NOTE — ASSESSMENT & PLAN NOTE
BP stable  was noted to be low at 106/59 this am . Other readings have been stable. Pt remains on Metoprolol 25 mg bid and Torsemide 5 mg daily. Clinically euvoluemic.  DC Torsemide

## 2023-11-29 NOTE — PROGRESS NOTES
[address] Blayne Albright [address] [place], [place], [address] [facility]    Progress Note  Code [facility] 32    Patient Location     [facility] rehab    Reason for visit     Follow-up CVA, hypertension, HLD, Anemia    Patient’s care was coordinated with nursing facility staff. Recent vitals, labs and updated medications were reviewed on [email or web address] system of facility. Problem List Items Addressed This Visit          Cardiovascular and Mediastinum    Primary hypertension - Primary (Chronic)     BP stable  was noted to be low at 106/59 this am . Other readings have been stable. Pt remains on Metoprolol 25 mg bid and Torsemide 5 mg daily. Clinically euvoluemic. DC Torsemide            HLD:  Continue Atorvatatin. Lab Results   Component Value Date    CHOLESTEROL 97 10/10/2022     Lab Results   Component Value Date    HDL 38 (L) 10/10/2022     Lab Results   Component Value Date    TRIG 96 10/10/2022     No results found for: "[address]"     Check lipid profile in Jan/23      Hyponatremia:  History of Hyponatremia. Work-up from 1st hospital admission 10/2022  - Admission sodium 119, discharge sodium 138  - Serum osmolality 265 consistent with hypotonic hyponatremia  - Urine osmolality 369, urine sodium less than 10 consistent with hypovolemic hyponatremia  - Serum uric acid 8.0, a.m. cortisol 41, TSH 4.36  - MRI brain with several areas of ischemic infarction     >> Work-up from 2nd hospital admission 10/2022  - Admission sodium 127, discharge sodium 132  - Urine osmolality 552-630  - Urine sodium   - CT abdomen with bladder wall thickening, no obvious evidence of malignancy      Na level was normal at 139 in sept. Will discontinue Nacl tablets and repeat BMP in a few weeks    History of CVA:  With LUE weakness. Continue ASA, Plavix and Statin    GERD:  Stable on PPI    Anemia:   HGB was stable at 11.4 on 7/3/2023. Continue iron supplements.   Will discontinue thiamine and folic acid as patient additionally remains on multivitamin. Repeat CBC in January/24      History of GI bleed :  Patient was hospitalized in October/22 with GI bleed due to Dieulafoy's lesion. Hemoglobin was stable in July/23. Continue Protonix. Repeat CBC in January/24      Bladder wall thickening:  Patient was noted to have urinary bladder wall thickening on CAT scan done on 10/31/2022. There was a question of this being secondary to cystitis however follow-up with urology service was recommended. Will consult urology. Patient currently denies any urinary complaints  HPI         Patient is being seen for a follow-up visit today. He is doing okay at present. Denies any active complaints including chest pain dyspnea GI or  complaints. Patient has been tolerating diet well. PEG tube was removed few months ago. Patient uses wheelchair to ambulate. Patient is awake alert able to make his needs known. No behavioral issues. He is incontinent of bladder at times    Review of Systems   Constitutional:  Negative for chills and fever. Respiratory:  Negative for cough, shortness of breath, wheezing and stridor. Cardiovascular:  Negative for chest pain and leg swelling. Gastrointestinal:  Negative for abdominal distention, abdominal pain and vomiting. Genitourinary:  Negative for flank pain and hematuria. Musculoskeletal:  Positive for gait problem. Skin:  Negative for pallor. Neurological:  Positive for weakness (left sided). Psychiatric/Behavioral:  Negative for agitation and behavioral problems. Past Medical History:   Diagnosis Date    Hypertension     Renal disorder     Stroke [facility])        Past Surgical History:   Procedure Laterality Date    CARDIAC ELECTROPHYSIOLOGY PROCEDURE N/A 5/3/2023    Procedure: Cardiac loop recorder implant;  Surgeon: Juanjo Reyes MD;  [facility]: [facility] CARDIAC CATH [facility];   Service: Cardiology    GASTROSTOMY TUBE PLACEMENT N/A 10/18/2022    Procedure: Baldemar Gradner GASTROSTOMY TUBE OPEN;  Surgeon: Arin Yanez DO;  Location[facility];  Service: General    RI TEAEC W/PATCH GRF CAROTID VERTB [address] Left 3/24/2023    Procedure: Left CEA with neuromonitoring;  Surgeon: Sam Fairbanks DO;  Location: [facility] MAIN OR;  Service: Vascular       Social History     Tobacco Use   Smoking Status Every Day    Packs/day: 1.00    Types: Cigarettes   Smokeless Tobacco Never       No family history on file.      No Known Allergies      Current Outpatient Medications:     acetaminophen (TYLENOL) 325 mg tablet, Take 650 mg by mouth every 6 (six) hours as needed for mild pain, Disp: , Rfl:     albuterol (2.5 mg/3 mL) 0.083 % nebulizer solution, Take 3 mL (2.5 mg total) by nebulization every 6 (six) hours as needed for wheezing or shortness of breath, Disp: 60 mL, Rfl: 0    aspirin (ECOTRIN LOW STRENGTH) 81 mg EC tablet, Take 1 tablet (81 mg total) by mouth daily, Disp: 90 tablet, Rfl: 0    atorvastatin (LIPITOR) 40 mg tablet, Take 1 tablet (40 mg total) by mouth daily with dinner, Disp: 90 tablet, Rfl: 0    Baclofen 5 MG TABS, Take 5 mg by mouth daily, Disp: , Rfl:     bisacodyl (DULCOLAX) 10 mg suppository, Insert 10 mg into the rectum if needed, Disp: , Rfl:     clopidogrel (PLAVIX) 75 mg tablet, Take 1 tablet (75 mg total) by mouth daily Do not start before October 22, 2022., Disp: 20 tablet, Rfl: 0    ferrous sulfate 324 (65 Fe) mg, Take 1 tablet (324 mg total) by mouth 2 (two) times a day before meals, Disp: 60 tablet, Rfl: 0    metoprolol tartrate (LOPRESSOR) 25 mg tablet, 1 tablet (25 mg total) by Per G Tube route every 12 (twelve) hours (Patient taking differently: Take 25 mg by mouth every 12 (twelve) hours), Disp: 180 tablet, Rfl: 0    Multiple Vitamin (multivitamin) tablet, Take 1 tablet by mouth daily, Disp: , Rfl:     pantoprazole (PROTONIX) 40 mg tablet, Take 40 mg by mouth daily, Disp: , Rfl:     polyethylene glycol (MIRALAX) 17 g packet, Take 17 g by mouth if needed, Disp: , Rfl:     Updated list was reviewed in [facility] of facility. Vitals:    11/29/23 1542   BP: 106/59   Pulse: (!) 54   Resp: 18   SpO2: 97%       Physical Exam  Constitutional:       General: He is not in acute distress. HENT:      Head: Normocephalic and atraumatic. Eyes:      General: No scleral icterus. Cardiovascular:      Rate and Rhythm: Normal rate and regular rhythm. Pulmonary:      Breath sounds: No wheezing, rhonchi or rales. Abdominal:      General: There is no distension. Palpations: Abdomen is soft. Tenderness: There is no abdominal tenderness. There is no guarding. Musculoskeletal:      Cervical back: Neck supple. Right lower leg: No edema. Left lower leg: No edema. Skin:     Coloration: Skin is not jaundiced. Neurological:      Cranial Nerves: No cranial nerve deficit. Motor: Weakness (Chronic left-sided weakness from previous CVA, more pronounced in left upper extremity) present. Psychiatric:         Mood and Affect: Mood normal.         Behavior: Behavior normal.         Diagnostic Data:  Labs from September/23 in July/23 were reviewed        Portions of the record may have been created with voice recognition software. Occasional wrong word or "sound a like" substitutions may have occurred due to the inherent limitations of voice recognition software. Read the chart carefully and recognize, using context, where substitutions have occurred.     This note was electronically signed by Dr. Parth Perez

## 2023-12-29 ENCOUNTER — NURSING HOME VISIT (OUTPATIENT)
Dept: GERIATRICS | Facility: OTHER | Age: 63
End: 2023-12-29

## 2023-12-29 DIAGNOSIS — I10 PRIMARY HYPERTENSION: Primary | Chronic | ICD-10-CM

## 2023-12-29 DIAGNOSIS — D64.9 ANEMIA, UNSPECIFIED TYPE: Chronic | ICD-10-CM

## 2023-12-29 DIAGNOSIS — K21.9 GASTROESOPHAGEAL REFLUX DISEASE, UNSPECIFIED WHETHER ESOPHAGITIS PRESENT: ICD-10-CM

## 2023-12-29 DIAGNOSIS — R26.2 AMBULATORY DYSFUNCTION: Chronic | ICD-10-CM

## 2023-12-29 DIAGNOSIS — I63.9 ACUTE CVA (CEREBROVASCULAR ACCIDENT) (HCC): ICD-10-CM

## 2023-12-29 NOTE — PROGRESS NOTES
[facility]  [address], [address], [place] [address]  [phone]    NAME: Ascencion Licea III  AGE: 63 y.o. SEX: male    Progress Note    Location: [facility]   POS: 32 ([facility])    Patient's care was coordinated with nursing facility staff. Recent vitals, labs, and updated medications were review on [facility] system in facility.      Assessment/Plan:    Primary hypertension  BP acceptable   Continue metoprolol 25 mg q 12  Continue to Monitor BP's    Ambulatory dysfunction  History of CVA residual left sided weakness   Continue PT/OT for strength training as needed  Maintain fall/safety precautions  assist with ADL's as needed    Anemia  Stable  C/w iron supplements and MVI  Monitor levels and trend     GERD (gastroesophageal reflux disease)  No acute issues  Continue Protonix    Acute CVA (cerebrovascular accident) (HCC)  With residual left sided weakness  Continue risk control   Continue 24-7 supportive care   Continue PT/OT   Fall and safety precaution   Continue to monitor    Chief complaint / Reason for visit: LTC Monthly Visit     History of Present Illness:  Patient is a 63-year old male seen and examined for LT Monthly Visit. Received pt sitting in w/c, resting. At the time of my evaluation denies pain or any other complaints. Appears comfortable and is in no acute distress. Reports tolerating diet with good appetite. No edema. Free of anginal symptoms. Denies lightheadedness, dizziness, headache, fever, chills, /GI discomfort.  No other concerns or issues at this time.    Review of Systems:  Per history of present illness, all other systems reviewed and negative.  Other than those noted in HPI    HISTORY:  Medical Hx: Reviewed, unchanged  Family Hx: Reviewed, unchanged  Soc Hx: Reviewed,  unchanged    ALLERGY: Reviewed, unchanged  No Known Allergies     Visit Vitals  /74   Pulse 70   Temp (!) 97.2 °F (36.2 °C)   Resp 18   Wt 66.9 kg (147 lb 8 oz)   SpO2 98% Comment: collette  "  BMI 21.16 kg/m²   Smoking Status Every Day   BSA 1.83 m²      General: NAD, appears weak, groomed   Head: Atraumatic. Normocephalic.  Eye Exam: anicteric sclera, no discharge, PERRLA, No injection  Oral Exam: moist mucous membranes, no buccaloropharyngeal erythema, palatine tonsils WNL.  Neck Exam: no anterior cervical lymphadenopathy noted, neck supple  Cardiovascular: regular rate, regular rhythm, no murmurs, rubs, or gallops  Pulmonary: no wheeze, no rhonchi, no rales. No chest tenderness  Abdominal: soft, non-tender, nondistended, bowel sounds audible x 4 quadrants  : Non distended bladder. B/B incontinent   Extremities and skin: no edema noted, no rashes.   Neurological: alert, cooperative and responsive, Oriented to self, L hemiplegia     Laboratory results / Imaging reviewed: Hard copy/ies in medical chart: Reviewed from [facility]    11/30/23  Cr 0.7, BUN 12, sodium 141, potassium 4.3, calcium 9.7, GFR >60    Current Medications:  All medications reviewed and updated in Nursing Home Chart    Please note:  Voice-recognition software may have been used in the preparation of this document. Occasional wrong word or \"sound-alike\" substitutions may have occurred due to the inherent limitations of voice recognition software. Interpretation should be guided by context.    DANIEL Dill  12/29/2023  "

## 2024-01-01 VITALS
WEIGHT: 147.5 LBS | RESPIRATION RATE: 18 BRPM | DIASTOLIC BLOOD PRESSURE: 74 MMHG | TEMPERATURE: 97.2 F | OXYGEN SATURATION: 98 % | BODY MASS INDEX: 21.16 KG/M2 | SYSTOLIC BLOOD PRESSURE: 122 MMHG | HEART RATE: 70 BPM

## 2024-01-01 PROBLEM — K21.9 GERD (GASTROESOPHAGEAL REFLUX DISEASE): Status: ACTIVE | Noted: 2024-01-01

## 2024-01-02 NOTE — ASSESSMENT & PLAN NOTE
With residual left sided weakness  Continue risk control   Continue 24-7 supportive care   Continue PT/OT   Fall and safety precaution   Continue to monitor

## 2024-01-02 NOTE — ASSESSMENT & PLAN NOTE
History of CVA residual left sided weakness   Continue PT/OT for strength training as needed  Maintain fall/safety precautions  assist with ADL's as needed

## 2024-01-17 ENCOUNTER — TELEPHONE (OUTPATIENT)
Dept: NEPHROLOGY | Facility: CLINIC | Age: 64
End: 2024-01-17

## 2024-01-17 NOTE — TELEPHONE ENCOUNTER
Left voicemail for the patient reminding them to complete labwork prior to 1/23 appointment with Dr. Vergara. Advised patient to call back with any questions, concerns, or if they need the orders sent to an outside lab.

## 2024-01-18 LAB
ALBUMIN/GLOB SERPL: 2 {RATIO}
ARTICHOKE IGE QN: 54.2
BASOPHILS NFR BLD AUTO: 1 % (ref 0–1)
BUN/CREAT SERPL: 18.3
CHOLEST SERPL-MCNC: 111 MG/DL
CHOLEST/HDLC SERPL: 2.6 {RATIO}
EOSINOPHIL NFR BLD AUTO: 9 % (ref 0–6)
EXT GLUCOSE BLD: 80
EXTERNAL ALBUMIN: 4
EXTERNAL ALK PHOS: 103
EXTERNAL ALT: 22
EXTERNAL AST: 20
EXTERNAL BICARBONATE: 26
EXTERNAL BUN: 11
EXTERNAL CALCIUM: 9.5
EXTERNAL CHLORIDE: 102
EXTERNAL CREATININE: 0.6
EXTERNAL EGFR: >60
EXTERNAL HEMATOCRIT: 38 %
EXTERNAL HEMOGLOBIN: 12.3 G/DL
EXTERNAL MCV: 87
EXTERNAL PLATELET COUNT: 256 K/ÂΜL
EXTERNAL POTASSIUM: 4.3
EXTERNAL RBC: 4.4
EXTERNAL RDW: 14.9
EXTERNAL SODIUM: 136
EXTERNAL T.BILIRUBIN: <0.3
EXTERNAL TOTAL PROTEIN: 6
EXTERNAL VITAMIN D,25: 13
EXTERNAL WBC: 6.4
GLOBULIN UR ELPH-MCNC: 2 MG/DL
HDLC SERPL-MCNC: 43 MG/DL
IMM GRANULOCYTES NFR BLD AUTO: 0 % (ref 0–2)
LYMPHOCYTES # BLD AUTO: 1.4 THOUSANDS/ÂΜL (ref 0.6–4.47)
LYMPHOCYTES NFR BLD AUTO: 22 % (ref 14–44)
MCH RBC QN AUTO: 28 PG (ref 26.8–34.3)
MCHC RBC AUTO-ENTMCNC: 32 G/DL (ref 31.4–37.4)
MONOCYTES NFR BLD AUTO: 11 % (ref 4–12)
NEUTROPHILS # BLD MANUAL: 3.7 THOUSAND/UL (ref 1.85–7.62)
NEUTS SEG NFR BLD AUTO: 57 % (ref 43–75)
NONHDLC SERPL-MCNC: 68 MG/DL
OSMOLALITY UR/SERPL-RTO: 280 MMOL/KG (ref 282–298)
PMV BLD AUTO: 9.3 FL (ref 8.9–12.7)
TRIGL SERPL-MCNC: 69 MG/DL
TSH SERPL DL<=0.005 MIU/L-ACNC: 3.98 M[IU]/L

## 2024-01-23 ENCOUNTER — OFFICE VISIT (OUTPATIENT)
Dept: NEPHROLOGY | Facility: CLINIC | Age: 64
End: 2024-01-23
Payer: COMMERCIAL

## 2024-01-23 VITALS — SYSTOLIC BLOOD PRESSURE: 116 MMHG | DIASTOLIC BLOOD PRESSURE: 72 MMHG | HEART RATE: 66 BPM

## 2024-01-23 DIAGNOSIS — I10 PRIMARY HYPERTENSION: Primary | ICD-10-CM

## 2024-01-23 DIAGNOSIS — Z86.39 HISTORY OF SIADH: ICD-10-CM

## 2024-01-23 DIAGNOSIS — N32.89 BLADDER WALL THICKENING: ICD-10-CM

## 2024-01-23 DIAGNOSIS — E55.9 VITAMIN D DEFICIENCY: ICD-10-CM

## 2024-01-23 PROCEDURE — 99213 OFFICE O/P EST LOW 20 MIN: CPT | Performed by: INTERNAL MEDICINE

## 2024-01-23 RX ORDER — FOLIC ACID 1 MG/1
1 TABLET ORAL DAILY
COMMUNITY
End: 2024-01-24

## 2024-01-23 RX ORDER — TORSEMIDE 5 MG/1
5 TABLET ORAL DAILY
COMMUNITY
End: 2024-01-24

## 2024-01-23 RX ORDER — LANOLIN ALCOHOL/MO/W.PET/CERES
100 CREAM (GRAM) TOPICAL DAILY
COMMUNITY
End: 2024-01-24

## 2024-01-23 RX ORDER — SODIUM CHLORIDE 1 G/1
1 TABLET ORAL 3 TIMES DAILY
COMMUNITY
End: 2024-01-24

## 2024-01-23 NOTE — PROGRESS NOTES
NEPHROLOGY OFFICE VISIT   Ascencion Licea III 63 y.o. male MRN: [id number]  1/23/2024    Reason for Visit: Hyponatremia    ASSESSMENT and PLAN:  It was a pleasure evaluating your patient in the office today. Thank you for allowing our team to participate in the care of Mr. Ascencion Licea III. Please do not hesitate to contact our team if further issues/questions shall arise in the interim.    Recommendations for nursing home  Referral to urology for evaluation of urinary bladder wall thickening  Continue to monitor BMP every 2 months with results faxed to nephrology office  Follow-up in nephrology office in 1 year    # History of Hyponatremia  >> Work-up from 1st hospital admission 10/2022  - Admission sodium 119, discharge sodium 138  - Serum osmolality 265 consistent with hypotonic hyponatremia  - Urine osmolality 369, urine sodium less than 10 consistent with hypovolemic hyponatremia  - Serum uric acid 8.0, a.m. cortisol 41, TSH 4.36  - MRI brain with several areas of ischemic infarction     >> Work-up from 2nd hospital admission 10/2022  - Admission sodium 127, discharge sodium 132  - Urine osmolality [id number]  - Urine sodium   - CT abdomen with bladder wall thickening, no obvious evidence of malignancy     >> Working diagnosis: Most likely SIADH in setting of stroke, he can also have a component of low solute intake     Plan  - Most recent serum sodium 136 01/2024  - Okay to continue fluid intake at 2000 cc per 24 hours  - Continue sodium chloride tablets at 1 g 3 times daily  - Continue torsemide 5 mg daily  - Add Ensure with meals  - BMP every 2 months with results faxed to nephrology office    Addendum 01/24:  Discussed with Dr. Aranda.  Patient is currently off sodium chloride tablets and torsemide and serum sodium remained stable.  Continue to observe off sodium chloride tablets and torsemide.    # Hypertension  - Blood pressure well controlled and at goal  - Current medications: Metoprolol 25 mg  twice daily, torsemide 5 mg daily  - Changes: No changes to antihypertensive regimen at this time     # Bladder wall thickening  - Thickening of urinary bladder wall was seen on CT abdomen 10/2022  - Establish follow-up with urology    # Vitamin D deficiency  - Most recent 25-hydroxy vitamin D level low at 13  - Agree with initiation of ergocalciferol 50,000 units weekly  - Ongoing management per nursing home     HPI:  Since last visit: Patient currently asymptomatic.  Denies dyspnea.  Denies leg swelling.  Denies any trouble with urination.    63-year-old male was seen in nephrology office today as follow-up for hyponatremia and BIPIN.  He was admitted to [facility] in 10/2022 with CVA and was noted to have hyponatremia and BIPIN.  His hyponatremia and BIPIN improved with IVF and was thought to be due to volume depletion.  He had another hospital admission in 10/2022 for GI bleed due to Dieulafoy's lesion and had recurrence of hyponatremia.  He is currently in a nursing home.      PATIENT INSTRUCTIONS:    Patient Instructions   Recommendations for nursing home  Referral to urology for evaluation of urinary bladder wall thickening  Serum sodium level currently within normal limits on sodium chloride 1 g 3 times daily and torsemide 5 mg daily  Continue current dose of sodium chloride tablets and torsemide  Continue to monitor BMP every 2 months with results faxed to nephrology office  Follow-up in nephrology office in 1 year    OBJECTIVE:  Current Weight:    Vitals:    01/23/24 1206   BP: 116/72   BP Location: Right arm   Patient Position: Sitting   Cuff Size: Standard   Pulse: 66      There is no height or weight on file to calculate BMI.      REVIEW OF SYSTEMS:    Review of Systems   Constitutional:  Negative for chills and fever.   HENT:  Negative for ear pain and sore throat.    Eyes:  Negative for pain and visual disturbance.   Respiratory:  Negative for cough and shortness of breath.     Cardiovascular:  Negative for chest pain and palpitations.   Gastrointestinal:  Negative for abdominal pain and vomiting.   Genitourinary:  Negative for dysuria and hematuria.   Musculoskeletal:  Negative for arthralgias and back pain.   Skin:  Negative for color change and rash.   Neurological:  Negative for seizures and syncope.   All other systems reviewed and are negative.      Past Medical History:   Diagnosis Date    Hypertension     Renal disorder     Stroke (HCC)        Past Surgical History:   Procedure Laterality Date    CARDIAC ELECTROPHYSIOLOGY PROCEDURE N/A 5/3/2023    Procedure: Cardiac loop recorder implant;  Surgeon: Rickey Miranda MD;  [facility];  Service: Cardiology    GASTROSTOMY TUBE PLACEMENT N/A 10/18/2022    Procedure: INSERTION GASTROSTOMY TUBE OPEN;  Surgeon: Chris Yanez DO;  [facility];  Service: General    WI TEAEC W/PATCH GRF CAROTID VERTB SUBCLAV NECK INC Left 3/24/2023    Procedure: Left CEA with neuromonitoring;  Surgeon: Joe Rose DO;  Location: [facility];  Service: Vascular       No family history on file.     Social History     Substance and Sexual Activity   Alcohol Use Not Currently    Comment: occasionally     Social History     Substance and Sexual Activity   Drug Use Never     Social History     Tobacco Use   Smoking Status Every Day    Current packs/day: 1.00    Types: Cigarettes   Smokeless Tobacco Never       PHYSICAL EXAM:      Physical Exam  Constitutional:       Appearance: Normal appearance.   HENT:      Head: Normocephalic and atraumatic.      Mouth/Throat:      Mouth: Mucous membranes are moist.      Pharynx: Oropharynx is clear.   Cardiovascular:      Rate and Rhythm: Normal rate and regular rhythm.      Pulses: Normal pulses.      Heart sounds: Normal heart sounds.   Pulmonary:      Effort: Pulmonary effort is normal.      Breath sounds: Normal breath sounds.   Abdominal:      General: Bowel sounds are normal.       Palpations: Abdomen is soft.   Musculoskeletal:         General: Normal range of motion.      Right lower leg: No edema.      Left lower leg: No edema.   Skin:     General: Skin is warm and dry.   Neurological:      General: No focal deficit present.      Mental Status: He is alert and oriented to person, place, and time. Mental status is at baseline.   Psychiatric:         Mood and Affect: Mood normal.         Behavior: Behavior normal.         Thought Content: Thought content normal.         Judgment: Judgment normal.       Medications:    Current Outpatient Medications:     acetaminophen (TYLENOL) 325 mg tablet, Take 650 mg by mouth every 6 (six) hours as needed for mild pain, Disp: , Rfl:     albuterol (2.5 mg/3 mL) 0.083 % nebulizer solution, Take 3 mL (2.5 mg total) by nebulization every 6 (six) hours as needed for wheezing or shortness of breath, Disp: 60 mL, Rfl: 0    aspirin (ECOTRIN LOW STRENGTH) 81 mg EC tablet, Take 1 tablet (81 mg total) by mouth daily, Disp: 90 tablet, Rfl: 0    atorvastatin (LIPITOR) 40 mg tablet, Take 1 tablet (40 mg total) by mouth daily with dinner, Disp: 90 tablet, Rfl: 0    Baclofen 5 MG TABS, Take 5 mg by mouth daily, Disp: , Rfl:     bisacodyl (DULCOLAX) 10 mg suppository, Insert 10 mg into the rectum if needed, Disp: , Rfl:     clopidogrel (PLAVIX) 75 mg tablet, Take 1 tablet (75 mg total) by mouth daily Do not start before October 22, 2022., Disp: 20 tablet, Rfl: 0    ferrous sulfate 324 (65 Fe) mg, Take 1 tablet (324 mg total) by mouth 2 (two) times a day before meals, Disp: 60 tablet, Rfl: 0    folic acid (FOLVITE) 1 mg tablet, Take 1 mg by mouth daily, Disp: , Rfl:     metoprolol tartrate (LOPRESSOR) 25 mg tablet, 1 tablet (25 mg total) by Per G Tube route every 12 (twelve) hours (Patient taking differently: Take 25 mg by mouth every 12 (twelve) hours), Disp: 180 tablet, Rfl: 0    Multiple Vitamin (multivitamin) tablet, Take 1 tablet by mouth daily, Disp: , Rfl:      "pantoprazole (PROTONIX) 40 mg tablet, Take 40 mg by mouth daily, Disp: , Rfl:     polyethylene glycol (MIRALAX) 17 g packet, Take 17 g by mouth if needed, Disp: , Rfl:     sodium chloride 1 g tablet, Take 1 g by mouth 3 (three) times a day, Disp: , Rfl:     thiamine 100 MG tablet, Take 100 mg by mouth daily, Disp: , Rfl:     torsemide (DEMADEX) 5 MG tablet, Take 5 mg by mouth daily, Disp: , Rfl:     Laboratory Results:        Invalid input(s): \"ALBUMIN\"    Results for orders placed or performed in visit on 01/17/24   Vitamin D 25 hydroxy   Result Value Ref Range    EXTERNAL VITAMIN D,25 13    CBC (Includes Diff/Plt) (Refl)   Result Value Ref Range    WBC 6.4     External RBC 4.4     External Hemoglobin 12.3 g/dL    Hematocrit 38 %    MCV 87     MCH 28.0 26.8 - 34.3 pg    MCHC 32.0 31.4 - 37.4 g/dL    External RDW 14.9     External Platelets 256 K/µL    MPV 9.3 8.9 - 12.7 fL    Lymphocytes Absolute 1.40 0.60 - 4.47 Thousands/µL    Absolute Neutrophils 3.70 1.85 - 7.62 Thousand/uL    Immat GRANS % 0 0 - 2 %    Neutrophils Relative 57 43 - 75 %    Lymphocytes Relative 22 14 - 44 %    Monocytes Relative 11 4 - 12 %    Eosinophils Relative 9 (A) 0 - 6 %    Basophils Relative 1 0 - 1 %   Lipid panel   Result Value Ref Range    CHOLESTEROL 111     HDL 43.0     EXTERNAL LDL 54.2     Non-HDL-Chol (CHOL-HDL) 68 mg/dl    Chol HDLC Ratio 2.6     TRIGLYCERIDES 69    Comprehensive metabolic panel   Result Value Ref Range    SODIUM 136     POTASSIUM 4.3     CHLORIDE 102     BICARB 26     BUN/Creatinine Ratio 18.3     BUN 11     CREATININE 0.6     Glucose 80     EXTERNAL CALCIUM 9.5     TOTAL PROTEIN 6.0     ALBUMIN 4.0     AST 20     ALT 22     ALK PHOS 103     EXTERNAL TOT. BILIRUBIN <0.3     EXTERNAL EGFR >60     Osmolality Serum 280 (A) 282 - 298 mmol/KG    Globulin 2.0     Albumin/Globulin Ratio 2.0    TSH Stimulation   Result Value Ref Range    TSH 3.98          "

## 2024-01-24 ENCOUNTER — NURSING HOME VISIT (OUTPATIENT)
Dept: GERIATRICS | Facility: OTHER | Age: 64
End: 2024-01-24
Payer: COMMERCIAL

## 2024-01-24 ENCOUNTER — TELEPHONE (OUTPATIENT)
Dept: NEPHROLOGY | Facility: CLINIC | Age: 64
End: 2024-01-24

## 2024-01-24 VITALS
BODY MASS INDEX: 21.16 KG/M2 | WEIGHT: 147.5 LBS | SYSTOLIC BLOOD PRESSURE: 126 MMHG | RESPIRATION RATE: 18 BRPM | OXYGEN SATURATION: 97 % | DIASTOLIC BLOOD PRESSURE: 68 MMHG | TEMPERATURE: 98 F | HEART RATE: 69 BPM

## 2024-01-24 DIAGNOSIS — E78.49 OTHER HYPERLIPIDEMIA: Primary | ICD-10-CM

## 2024-01-24 DIAGNOSIS — E87.1 HYPONATREMIA: Primary | Chronic | ICD-10-CM

## 2024-01-24 PROCEDURE — 99309 SBSQ NF CARE MODERATE MDM 30: CPT | Performed by: INTERNAL MEDICINE

## 2024-01-24 NOTE — ASSESSMENT & PLAN NOTE
Lipid profile was well-controlled on 1/3/2024 with total cholesterol of 111, LDL 54, triglycerides 69, HDL 43.  Patient remains on atorvastatin 40 mg daily

## 2024-01-24 NOTE — TELEPHONE ENCOUNTER
----- Message from Jomar Vergara MD sent at 1/24/2024 11:51 AM EST -----  Regarding: FW: Patient needs outpatient urology consultation  Patient was seen in the office yesterday.  Medication list that was sent from the facility had sodium chloride tablets and torsemide on the list.  Per patient's physician at the facility patient has been off sodium chloride tablets and torsemide.  Can you please let the facility know to disregard recommendations from yesterday and to continue to observe off sodium chloride tablets and torsemide with BMP every 2 weeks.  Thanks.  ----- Message -----  From: Ena Aranda MD  Sent: 1/24/2024  11:40 AM EST  To: Jomar Vergara MD  Subject: RE: Patient needs outpatient urology consult#    Thanks for reaching out to me. I will follow up with the staff regarding scheduling a urologist appointment. I also wanted to let you know I took him off  of Nacl tablets and Torsemide a couple of months ago. I will update his epic medication list. He seems to be doing well without them. Will continue BMP checks q 2 months.  Regards,   Dr. Aranda      ----- Message -----  From: Jomar Vergara MD  Sent: 1/23/2024   4:10 PM EST  To: Ena Aranda MD  Subject: Patient needs outpatient urology consultation    Brenda Aranda, reaching out to you regarding Ascencion.  I am seeing him for hyponatremia.  His serum sodium levels are currently stable on current dose of sodium chloride tablets and torsemide.  I have recommended continuation of both at same doses with BMP every 2 months with results faxed to our office.  However, I have noticed on his previous imaging there was mention of urinary bladder wall thickening and had recommended him seeing a urologist for further evaluation.  I see that you have put a consult in but this was never scheduled for the patient.  Just wanted to follow-up with you if your facility can facilitate this outpatient consultation with urology.  Let me know if you have any  questions.  Thank you.

## 2024-01-24 NOTE — PROGRESS NOTES
[facility]  [address]   [address]  [place], [address] [phone]    Progress Note  Code [facility] 32    Patient Location     [facility]    Reason for visit     Follow-up CVA, hyponatremia,hypertension, HLD, Anemia      Problem List Items Addressed This Visit          Other    Other hyperlipidemia - Primary     Lipid profile was well-controlled on 1/3/2024 with total cholesterol of 111, LDL 54, triglycerides 69, HDL 43.  Patient remains on atorvastatin 40 mg daily            Vit D deficiency:  Vitamin D level was low at 13 on 1/3/2024.  Patient was started on vitamin D supplements 50,000 units once a week.       Hyponatremia:  History of Hyponatremia.  Patient was previously on sodium chloride tablets and torsemide, both were discontinued earlier.  Repeat sodium level on 1/13 was stable at 136.  Will continue to monitor every 2 months.  Patient was evaluated by nephrology service yesterday.  Care coordinated with the team      History of CVA:  Patient has chronic left upper extremity weakness from previous CVA.  Continue risk factor control.  Patient remains on ASA, Plavix and Statin    GERD:  Stable on PPI    Anemia:  Hemoglobin was stable at 12.3 on 1/3/2025.  DC iron supplements      History of GI bleed :  Patient was hospitalized in October/22 with GI bleed due to Dieulafoy's lesion.  Recent hemoglobin was stable.  Continue Protonix    Bladder wall thickening:  Patient was noted to have urinary bladder wall thickening on CAT scan done on 10/31/2022.  There was a question of this being secondary to cystitis however follow-up with urology service was recommended.  Discussed with the staff.  Advised to schedule urology appointment.  Referral was placed      HPI         Patient is being seen for a follow-up visit today.  He is doing okay at present.  Denies any active complaints including chest pain dyspnea GI or  complaints.  Patient has been tolerating diet well.   PEG tube was removed few months ago.  Patient uses wheelchair to ambulate.  Patient is awake alert able to make his needs known.  No behavioral issues.  He is incontinent of bladder at times    Review of Systems   Constitutional:  Negative for chills and fever.   Respiratory:  Negative for cough, shortness of breath, wheezing and stridor.    Cardiovascular:  Negative for chest pain and leg swelling.   Gastrointestinal:  Negative for abdominal distention, abdominal pain and vomiting.   Genitourinary:  Negative for flank pain and hematuria.   Musculoskeletal:  Positive for gait problem.   Skin:  Negative for pallor.   Neurological:  Positive for weakness (left sided).   Psychiatric/Behavioral:  Negative for agitation and behavioral problems.        Past Medical History:   Diagnosis Date    Hypertension     Renal disorder     Stroke (HCC)        Past Surgical History:   Procedure Laterality Date    CARDIAC ELECTROPHYSIOLOGY PROCEDURE N/A 5/3/2023    Procedure: Cardiac loop recorder implant;  Surgeon: Rickey Mrianda MD;  [facility];  Service: Cardiology    GASTROSTOMY TUBE PLACEMENT N/A 10/18/2022    Procedure: INSERTION GASTROSTOMY TUBE OPEN;  Surgeon: Chris Yanez DO;  [facility];  Service: General    WA TEAEC W/PATCH GRF CAROTID VERTB SUBCLAV NECK INC Left 3/24/2023    Procedure: Left CEA with neuromonitoring;  Surgeon: Joe Rose DO;  [facility];  Service: Vascular       Social History     Tobacco Use   Smoking Status Every Day    Current packs/day: 1.00    Types: Cigarettes   Smokeless Tobacco Never       No family history on file.     No Known Allergies      Current Outpatient Medications:     acetaminophen (TYLENOL) 325 mg tablet, Take 650 mg by mouth every 6 (six) hours as needed for mild pain, Disp: , Rfl:     albuterol (2.5 mg/3 mL) 0.083 % nebulizer solution, Take 3 mL (2.5 mg total) by nebulization every 6 (six) hours as needed for wheezing or shortness of  breath, Disp: 60 mL, Rfl: 0    aspirin (ECOTRIN LOW STRENGTH) 81 mg EC tablet, Take 1 tablet (81 mg total) by mouth daily, Disp: 90 tablet, Rfl: 0    atorvastatin (LIPITOR) 40 mg tablet, Take 1 tablet (40 mg total) by mouth daily with dinner, Disp: 90 tablet, Rfl: 0    Baclofen 5 MG TABS, Take 5 mg by mouth daily, Disp: , Rfl:     bisacodyl (DULCOLAX) 10 mg suppository, Insert 10 mg into the rectum if needed, Disp: , Rfl:     clopidogrel (PLAVIX) 75 mg tablet, Take 1 tablet (75 mg total) by mouth daily Do not start before October 22, 2022., Disp: 20 tablet, Rfl: 0    metoprolol tartrate (LOPRESSOR) 25 mg tablet, 1 tablet (25 mg total) by Per G Tube route every 12 (twelve) hours (Patient taking differently: Take 25 mg by mouth every 12 (twelve) hours), Disp: 180 tablet, Rfl: 0    Multiple Vitamin (multivitamin) tablet, Take 1 tablet by mouth daily, Disp: , Rfl:     pantoprazole (PROTONIX) 40 mg tablet, Take 40 mg by mouth daily, Disp: , Rfl:     polyethylene glycol (MIRALAX) 17 g packet, Take 17 g by mouth if needed, Disp: , Rfl:     [facility]     Vitals:    01/24/24 1332   BP: 126/68   Pulse: 69   Resp: 18   Temp: 98 °F (36.7 °C)   SpO2: 97%         Physical Exam  Constitutional:       General: He is not in acute distress.  HENT:      Head: Normocephalic and atraumatic.   Eyes:      General: No scleral icterus.  Cardiovascular:      Rate and Rhythm: Normal rate and regular rhythm.   Pulmonary:      Breath sounds: No wheezing, rhonchi or rales.   Abdominal:      General: There is no distension.      Palpations: Abdomen is soft.      Tenderness: There is no abdominal tenderness. There is no guarding.   Musculoskeletal:      Cervical back: Neck supple.      Right lower leg: No edema.      Left lower leg: No edema.   Skin:     Coloration: Skin is not jaundiced.   Neurological:      Cranial Nerves: No cranial nerve deficit.      Motor: Weakness (Chronic weakness  "involving left upper extremity) present.      Comments: Oriented in month and year   Psychiatric:         Mood and Affect: Mood normal.         Behavior: Behavior normal.         Diagnostic Data:    Labs from 1/3/2024 were reviewed.  TSH was normal at 3.98, WBC count 6.4  Hemoglobin 12.3, platelet count 256  Creatinine 0.6, calcium 9.5, BUN 11,    Sodium 136, potassium 4.3, vitamin D 13, total cholesterol 111, HDL 43, triglycerides 69, LDL 54    Portions of the record may have been created with voice recognition software.  Occasional wrong word or \"sound a like\" substitutions may have occurred due to the inherent limitations of voice recognition software.  Read the chart carefully and recognize, using context, where substitutions have occurred.    This note was electronically signed by Dr. Ena Aranda   "

## 2024-01-24 NOTE — TELEPHONE ENCOUNTER
Called and spoke to the patient's nurse at [facility]. She expressed understanding and requests the new orders be faxed to her at [phone]. Labs ordered and faxed

## 2024-02-15 ENCOUNTER — REMOTE DEVICE CLINIC VISIT (OUTPATIENT)
Dept: CARDIOLOGY CLINIC | Facility: CLINIC | Age: 64
End: 2024-02-15
Payer: COMMERCIAL

## 2024-02-15 DIAGNOSIS — Z95.818 PRESENCE OF CARDIAC DEVICE: Primary | ICD-10-CM

## 2024-02-15 PROCEDURE — 93298 REM INTERROG DEV EVAL SCRMS: CPT | Performed by: INTERNAL MEDICINE

## 2024-02-15 NOTE — PROGRESS NOTES
"Results for orders placed or performed in visit on 02/15/24   Cardiac EP device report    Narrative    MDT [id number] CARDIAC LOOP MONITOR - ACTIVE SYSTEM IS MRI CONDITIONAL  CARELINK TRANSMISSION: BATTERY STATUS \"OK.\" NO PATIENT OR DEVICE ACTIVATED EPISODES.---BROOKS        "

## 2024-02-28 ENCOUNTER — NURSING HOME VISIT (OUTPATIENT)
Dept: GERIATRICS | Facility: OTHER | Age: 64
End: 2024-02-28
Payer: COMMERCIAL

## 2024-02-28 VITALS
WEIGHT: 157.8 LBS | SYSTOLIC BLOOD PRESSURE: 128 MMHG | HEART RATE: 70 BPM | BODY MASS INDEX: 22.64 KG/M2 | RESPIRATION RATE: 18 BRPM | TEMPERATURE: 99.1 F | DIASTOLIC BLOOD PRESSURE: 72 MMHG | OXYGEN SATURATION: 97 %

## 2024-02-28 DIAGNOSIS — I10 PRIMARY HYPERTENSION: Chronic | ICD-10-CM

## 2024-02-28 DIAGNOSIS — K21.9 GASTROESOPHAGEAL REFLUX DISEASE, UNSPECIFIED WHETHER ESOPHAGITIS PRESENT: ICD-10-CM

## 2024-02-28 DIAGNOSIS — D64.9 ANEMIA, UNSPECIFIED TYPE: Chronic | ICD-10-CM

## 2024-02-28 DIAGNOSIS — E55.9 VITAMIN D DEFICIENCY: ICD-10-CM

## 2024-02-28 DIAGNOSIS — E78.49 OTHER HYPERLIPIDEMIA: Primary | ICD-10-CM

## 2024-02-28 DIAGNOSIS — I63.9 ACUTE CVA (CEREBROVASCULAR ACCIDENT) (HCC): ICD-10-CM

## 2024-02-28 DIAGNOSIS — I10 HYPERTENSION: ICD-10-CM

## 2024-02-28 PROCEDURE — 99309 SBSQ NF CARE MODERATE MDM 30: CPT | Performed by: NURSE PRACTITIONER

## 2024-02-28 RX ORDER — ERGOCALCIFEROL 1.25 MG/1
50000 CAPSULE ORAL WEEKLY
COMMUNITY

## 2024-02-29 ENCOUNTER — TELEPHONE (OUTPATIENT)
Dept: UROLOGY | Facility: CLINIC | Age: 64
End: 2024-02-29

## 2024-02-29 NOTE — TELEPHONE ENCOUNTER
CALLED NURSING FACILITY TO R/S APPT FOR 3/4/24 BUT SHE WAS IN A MTG AND WILL CALL BACK TO R/S. DR PEPPER'S SCHEDULE CHANGED FOR 3/4/24 THAT'S WHY APPT NEEDED TO BE R/S. WHEN THEY CALL BACK PT CAN BE PUT WITH A PA PER CLINICAL. [facility] HAS SOME OPENINGS ON WEDNESDAYS IN [place].

## 2024-02-29 NOTE — ASSESSMENT & PLAN NOTE
Lipid profile on 1/3/24- cholesterol of 111, LDL 54, Triglycerides 69, HDL 43  Decrease Atorvastatin to 20 mg from 40 mg daily   Monitor yearly

## 2024-02-29 NOTE — ASSESSMENT & PLAN NOTE
Brittny D levels at 13 on 1/3/24  Continue ergocalciferol 95862 units weekly on Fridays   Start calcium carbonate 600 mg daily   Will monitor levels at least twice a year

## 2024-02-29 NOTE — ASSESSMENT & PLAN NOTE
With residual left sided weakness  Continue risk factor control   Continue 24-7 supportive care   Continue restorative services   Fall and safety precaution

## 2024-02-29 NOTE — PROGRESS NOTES
[facility]  [address]   [address]  [place], [address] [phone]    Progress Note  Code [facility] 32    Patient Location     [facility]    Reason for visit     Follow up monthly visit     Patient’s care was coordinated with nursing facility staff. Recent vitals, labs and updated medications were reviewed on [employer] system of facility.     Problem List Items Addressed This Visit       Acute CVA (cerebrovascular accident) (HCC)     With residual left sided weakness  Continue risk factor control   Continue 24-7 supportive care   Continue restorative services   Fall and safety precaution            Anemia (Chronic)     Stable at levels periodically   For now dc MVI          Primary hypertension (Chronic)     BP acceptable   Continue metoprolol 25 mg q 12  Continue to Monitor BP's         Relevant Medications    metoprolol tartrate (LOPRESSOR) 25 mg tablet    GERD (gastroesophageal reflux disease)     No acute issues  Will dc Protonix will monitor for any acute changes in condition         Other hyperlipidemia - Primary     Lipid profile on 1/3/24- cholesterol of 111, LDL 54, Triglycerides 69, HDL 43  Decrease Atorvastatin to 20 mg from 40 mg daily   Monitor yearly          Vitamin D deficiency     Brittny D levels at 13 on 1/3/24  Continue ergocalciferol 37532 units weekly on Fridays   Start calcium carbonate 600 mg daily   Will monitor levels at least twice a year           Other Visit Diagnoses       Hypertension        Relevant Medications    metoprolol tartrate (LOPRESSOR) 25 mg tablet          HPI     Patient is a 63 yr old male resident of [facility] seen today for his acute and chronic medical conditions. At the time of my evaluation pt reports feeling okay. Appears non toxic and comfortable lying in bed. Noted a productive cough which pt says is intermittent in nature. Denies sob, fever, chills, HA, lightheadedness, dizziness, CP, palpitations, N/V/C/D. Reports  eats well with no difficulties. Sleeps all night with no difficulties. No edema noted. W/C bound. Using urinal +yellow urine. Requires assistance with ADLs/IADLs. Participates in facility's activities. Per pt no other concerns or issues at this time.     Review of Systems   Constitutional:  Negative for chills, fatigue and fever.   HENT:  Negative for congestion, nosebleeds, rhinorrhea and trouble swallowing.    Eyes:  Negative for visual disturbance.   Respiratory:  Positive for cough (intermittent productive at times). Negative for shortness of breath and wheezing.    Cardiovascular: Negative.    Gastrointestinal: Negative.    Musculoskeletal:  Positive for gait problem.   Skin:  Negative for color change.   Neurological:  Negative for dizziness, tremors, weakness, light-headedness and headaches.   Psychiatric/Behavioral:  Negative for behavioral problems and sleep disturbance. The patient is not nervous/anxious.        Past Medical History:   Diagnosis Date    Hypertension     Renal disorder     Stroke (HCC)        Past Surgical History:   Procedure Laterality Date    CARDIAC ELECTROPHYSIOLOGY PROCEDURE N/A 5/3/2023    Procedure: Cardiac loop recorder implant;  Surgeon: Rickey Miranda MD;  [facility];  Service: Cardiology    GASTROSTOMY TUBE PLACEMENT N/A 10/18/2022    Procedure: INSERTION GASTROSTOMY TUBE OPEN;  Surgeon: Chris Yanez DO;  Location: [facility];  Service: General    [place] TEAEC W/PATCH GRF CAROTID VERTB SUBCLAV NECK INC Left 3/24/2023    Procedure: Left CEA with neuromonitoring;  Surgeon: Joe Rose DO;  Location: [facility] MA[facility];  Service: Vascular       Social History     Tobacco Use   Smoking Status Every Day    Current packs/day: 1.00    Types: Cigarettes   Smokeless Tobacco Never       History reviewed. No pertinent family history.     No Known Allergies    Updated list was reviewed in [facility] of facility.     Vitals:    02/28/24 2218   BP: 128/72   Pulse:  70   Resp: 18   Temp: 99.1 °F (37.3 °C)   SpO2: 97%     Visit Vitals  /72   Pulse 70   Temp 99.1 °F (37.3 °C)   Resp 18   Wt 71.6 kg (157 lb 12.8 oz)   SpO2 97%   BMI 22.64 kg/m²   Smoking Status Every Day   BSA 1.89 m²      Physical Exam  Vitals and nursing note reviewed.   Constitutional:       General: He is not in acute distress.     Appearance: Normal appearance. He is not ill-appearing.   HENT:      Head: Normocephalic and atraumatic.      Nose: Nose normal.      Mouth/Throat:      Mouth: Mucous membranes are moist.   Eyes:      General:         Right eye: No discharge.         Left eye: No discharge.   Cardiovascular:      Rate and Rhythm: Normal rate and regular rhythm.      Pulses: Normal pulses.      Heart sounds: Normal heart sounds.   Pulmonary:      Effort: Pulmonary effort is normal. No respiratory distress.      Breath sounds: Normal breath sounds. No wheezing or rales.   Abdominal:      General: Bowel sounds are normal. There is no distension.      Palpations: Abdomen is soft.      Tenderness: There is no abdominal tenderness. There is no guarding or rebound.   Genitourinary:     Comments: Voiding with no difficulties   Musculoskeletal:      Cervical back: Normal range of motion and neck supple. No rigidity or tenderness.      Right lower leg: No edema.      Left lower leg: No edema.   Skin:     General: Skin is warm.      Coloration: Skin is not jaundiced.      Findings: No bruising, erythema, lesion or rash.   Neurological:      General: No focal deficit present.      Mental Status: He is alert. Mental status is at baseline.      Cranial Nerves: No cranial nerve deficit.      Motor: No weakness.      Gait: Gait abnormal.      Comments: Alert to self place and year    Psychiatric:         Mood and Affect: Mood normal.     Medications reviewed     Recent labs were reviewed from [facility]     Additional Notes:     This note was electronically signed by DANIEL Nix

## 2024-03-27 ENCOUNTER — NURSING HOME VISIT (OUTPATIENT)
Dept: GERIATRICS | Facility: OTHER | Age: 64
End: 2024-03-27
Payer: COMMERCIAL

## 2024-03-27 DIAGNOSIS — E78.49 OTHER HYPERLIPIDEMIA: Primary | ICD-10-CM

## 2024-03-27 PROCEDURE — 99309 SBSQ NF CARE MODERATE MDM 30: CPT | Performed by: INTERNAL MEDICINE

## 2024-03-30 VITALS
HEART RATE: 63 BPM | RESPIRATION RATE: 18 BRPM | OXYGEN SATURATION: 97 % | DIASTOLIC BLOOD PRESSURE: 68 MMHG | TEMPERATURE: 98.6 F | BODY MASS INDEX: 22.53 KG/M2 | WEIGHT: 157 LBS | SYSTOLIC BLOOD PRESSURE: 124 MMHG

## 2024-03-31 NOTE — ASSESSMENT & PLAN NOTE
Continue atorvastatin 40 mg daily  Lipid profile was well-controlled on 1/3/2024 with total cholesterol of 111, LDL 54, triglycerides 69, HDL 43

## 2024-03-31 NOTE — PROGRESS NOTES
[facility]  [address]   [address]  [place], [address] [phone]    Progress Note  Code [facility] 32    Patient Location     [facility]    Reason for visit     Follow-up CVA, hyponatremia,hypertension, HLD, Anemia      Problem List Items Addressed This Visit          Other    Other hyperlipidemia - Primary       Continue atorvastatin 40 mg daily  Lipid profile was well-controlled on 1/3/2024 with total cholesterol of 111, LDL 54, triglycerides 69, HDL 43              Vit D deficiency:  Continue D supplements 50,000 units once a week.Vitamin D level was low at 13 on 1/3/2024.       Hyponatremia:  Sodium remains stable on sodium chloride tablets.  Last sodium level was 139 on 1/29/2024.  Will repeat next month      History of CVA:  History of CVA with left upper extremity weakness. Continue  ASA, Plavix and Statin    GERD:  Stable on PPI    Anemia:  Hemoglobin was stable at 12.3 on 1/3/2025.  Iron supplements were discontinued on last encounter      History of GI bleed :  Patient was hospitalized in October/22 with GI bleed due to Dieulafoy's lesion.  Hemoglobin was stable at 12.3 on 1/3/2024.  Continue Protonix    Bladder wall thickening:  Patient was noted to have urinary bladder wall thickening on CAT scan done on 10/31/2022.  There was a question of this being secondary to cystitis however follow-up with urology service was recommended.  Referral was placed earlier.  Awaiting urology evaluation    HPI         Patient is being seen for a follow-up visit.  He is doing okay at present.  Denies any active complaints.  Patient is awake alert able to make his needs known.  Compliant with care and  meds.  No recent falls.  No behavioral issues.  Patient ambulates with a wheelchair.  P.o. intake is about 50 to 100%.  Pt had  a significant weight gain of  about 24 pounds during the last 6 months.    Review of Systems   Constitutional:  Negative for chills and fever.    Respiratory:  Negative for cough, shortness of breath, wheezing and stridor.    Cardiovascular:  Negative for chest pain and leg swelling.   Gastrointestinal:  Negative for abdominal distention, abdominal pain and vomiting.   Genitourinary:  Negative for flank pain and hematuria.   Musculoskeletal:  Positive for gait problem.   Skin:  Negative for pallor.   Neurological:  Positive for weakness (Left upper extremity).   Psychiatric/Behavioral:  Negative for agitation and behavioral problems.        Past Medical History:   Diagnosis Date    Hypertension     Renal disorder     Stroke (HCC)        Past Surgical History:   Procedure Laterality Date    CARDIAC ELECTROPHYSIOLOGY PROCEDURE N/A 5/3/2023    Procedure: Cardiac loop recorder implant;  Surgeon: Rickey Miranda MD;  [facility];  Service: Cardiology    GASTROSTOMY TUBE PLACEMENT N/A 10/18/2022    Procedure: INSERTION GASTROSTOMY TUBE OPEN;  Surgeon: Chris Yanez DO;  Location: [facility];  Service: General    [place] TEAEC W/PATCH GRF CAROTID VERTB SUBCLAV NECK INC Left 3/24/2023    Procedure: Left CEA with neuromonitoring;  Surgeon: Joe Rose DO;  Location: [facility];  Service: Vascular       Social History     Tobacco Use   Smoking Status Every Day    Current packs/day: 1.00    Types: Cigarettes   Smokeless Tobacco Never       No family history on file.     No Known Allergies      Current Outpatient Medications:     acetaminophen (TYLENOL) 325 mg tablet, Take 650 mg by mouth every 6 (six) hours as needed for mild pain, Disp: , Rfl:     albuterol (2.5 mg/3 mL) 0.083 % nebulizer solution, Take 3 mL (2.5 mg total) by nebulization every 6 (six) hours as needed for wheezing or shortness of breath, Disp: 60 mL, Rfl: 0    aspirin (ECOTRIN LOW STRENGTH) 81 mg EC tablet, Take 1 tablet (81 mg total) by mouth daily, Disp: 90 tablet, Rfl: 0    atorvastatin (LIPITOR) 40 mg tablet, Take 1 tablet (40 mg total) by mouth daily with dinner, Disp: 90  tablet, Rfl: 0    Baclofen 5 MG TABS, Take 5 mg by mouth daily, Disp: , Rfl:     bisacodyl (DULCOLAX) 10 mg suppository, Insert 10 mg into the rectum if needed, Disp: , Rfl:     clopidogrel (PLAVIX) 75 mg tablet, Take 1 tablet (75 mg total) by mouth daily Do not start before October 22, 2022., Disp: 20 tablet, Rfl: 0    ergocalciferol (ERGOCALCIFEROL) 1.25 MG (03553 UT) capsule, Take 50,000 Units by mouth once a week On Fridays, Disp: , Rfl:     metoprolol tartrate (LOPRESSOR) 25 mg tablet, Take 25 mg by mouth every 12 (twelve) hours Hold for SBP <110 or HR <60, Disp: , Rfl:     pantoprazole (PROTONIX) 40 mg tablet, Take 40 mg by mouth daily, Disp: , Rfl:     polyethylene glycol (MIRALAX) 17 g packet, Take 17 g by mouth if needed, Disp: , Rfl:     Updated list was reviewed in [facility] system of facility.     Vitals:    03/30/24 2228   BP: 124/68   Pulse: 63   Resp: 18   Temp: 98.6 °F (37 °C)   SpO2: 97%         Physical Exam  Constitutional:       General: He is not in acute distress.  HENT:      Head: Normocephalic and atraumatic.   Cardiovascular:      Rate and Rhythm: Normal rate and regular rhythm.   Pulmonary:      Breath sounds: No wheezing, rhonchi or rales.   Abdominal:      General: There is no distension.      Palpations: Abdomen is soft.      Tenderness: There is no abdominal tenderness. There is no guarding.   Musculoskeletal:      Cervical back: Neck supple.      Right lower leg: No edema.      Left lower leg: No edema.   Skin:     Coloration: Skin is not jaundiced.   Neurological:      Cranial Nerves: No cranial nerve deficit.      Motor: Weakness (Chronic weakness involving left upper extremity) present.      Comments: Oriented in month and year   Psychiatric:         Mood and Affect: Mood normal.         Behavior: Behavior normal.         Diagnostic Data:    Labs from 1/3/2024 were reviewed.  TSH was normal at 3.98, WBC count 6.4  Hemoglobin 12.3, platelet count 256  Creatinine 0.6, calcium  "9.5, BUN 11,    Sodium 136, potassium 4.3, vitamin D 13, total cholesterol 111, HDL 43, triglycerides 69, LDL 54    Portions of the record may have been created with voice recognition software.  Occasional wrong word or \"sound a like\" substitutions may have occurred due to the inherent limitations of voice recognition software.  Read the chart carefully and recognize, using context, where substitutions have occurred.    This note was electronically signed by Dr. Ena Aranda   "

## 2024-04-24 PROBLEM — N32.89 BLADDER WALL THICKENING: Status: ACTIVE | Noted: 2024-04-24

## 2024-05-02 ENCOUNTER — TELEPHONE (OUTPATIENT)
Dept: VASCULAR SURGERY | Facility: CLINIC | Age: 64
End: 2024-05-02

## 2024-05-02 NOTE — TELEPHONE ENCOUNTER
Attempted to contact patient to schedule appointment(s) listed below.  Requested patient call [phone] option 3 to schedule appointment(s).    Patient's appointment(s) are due on or after 7/6/2024 .    Dopplers  [] Abdominal Aorta Iliac (AOIL)  [x] Carotid (CV) Due 7/6/2024 Needs to be scheduled.    [] Celiac and/or Mesenteric  [] Endovascular Aortic Repair (EVAR)   [] Hemodialysis Access (HD)   [] Lower Limb Arterial (GUILHERME)  [] Lower Limb Venous (LEV)  [] Lower Limb Venous Duplex with Reflux (LEVDR)  [] Renal Artery  [] Upper Limb Arterial (UEA)    [] Upper Limb Venous (UEV)              [] CRISTI and Waveform analysis     Advanced Imaging   [] CTA head/neck    [] CTA abdomen    [] CTA abdomen & pelvis    [] CT abdomen with/ without contrast  [] CT abdomen with contrast  [] CT abdomen without contrast    [] CT abdomen & pelvis with/ without contrast  [] CT abdomen & pelvis with contrast  [] CT abdomen & pelvis without contrast    Office Visit   [] New patient, patient last seen over 3 years ago  [x] Risk factor modification (RFM) after cv duplex in July 2024.    [] Follow up   [] Lost to follow up (LTFU)

## 2024-05-31 ENCOUNTER — REMOTE DEVICE CLINIC VISIT (OUTPATIENT)
Dept: CARDIOLOGY CLINIC | Facility: CLINIC | Age: 64
End: 2024-05-31
Payer: COMMERCIAL

## 2024-05-31 DIAGNOSIS — I63.9 ACUTE CVA (CEREBROVASCULAR ACCIDENT) (HCC): Primary | ICD-10-CM

## 2024-05-31 PROCEDURE — 93298 REM INTERROG DEV EVAL SCRMS: CPT | Performed by: INTERNAL MEDICINE

## 2024-05-31 NOTE — PROGRESS NOTES
"MDT LNQ22 CARDIAC LOOP MONITOR - ACTIVE SYSTEM IS MRI CONDITIONAL   CARELINK TRANSMISSION: LOOP RECORDER. PRESENTING RHYTHM SB @ 54 BPM. BATTERY STATUS \"OK.\" NO PATIENT OR DEVICE ACTIVATED EPISODES. NORMAL DEVICE FUNCTION. DL   "

## 2024-06-28 NOTE — ASSESSMENT & PLAN NOTE
· History of CVA  · Residual left hemiplegia and dysphagia, G-Tube  · PT/OT for strength training  · Maintain fall/safety precautions  · Assist with ADL's as needed  · Continue plavix 75 mg daily  · Continue ASA 81 mg daily  · Continue Lipitor 40 mg daily   Temp  98.1 °F (36.7 °C)    SpO2 98 % 98 % 98 %   Weight - Scale 94 lb 9.6 oz 98 lb 12.8 oz 99 lb   Height 4' 11\" 4' 11\" 4' 11\"   Body Mass Index 19.11 kg/m2 19.96 kg/m2 20 kg/m2       Otalgia   There is pain in both ears. This is a new problem. The current episode started in the past 7 days. There has been no fever. Associated symptoms include coughing. Pertinent negatives include no abdominal pain, diarrhea, ear discharge, headaches, hearing loss, neck pain, rhinorrhea, sore throat or vomiting.   Sinus Problem  This is a new problem. The current episode started in the past 7 days. The problem is unchanged. There has been no fever. The pain is moderate. Associated symptoms include coughing, ear pain and sinus pressure. Pertinent negatives include no chills, headaches, hoarse voice, neck pain, sneezing or sore throat.       Review of Systems   Constitutional:  Negative for chills.   HENT:  Positive for ear pain and sinus pressure. Negative for ear discharge, hearing loss, hoarse voice, rhinorrhea, sneezing and sore throat.    Respiratory:  Positive for cough.    Gastrointestinal:  Negative for abdominal pain, diarrhea and vomiting.   Musculoskeletal:  Negative for neck pain.   Neurological:  Negative for headaches.          Objective   Physical Exam  Vitals and nursing note reviewed.   Constitutional:       General: She is not in acute distress.     Appearance: Normal appearance.   HENT:      Head: Normocephalic and atraumatic.        Right Ear: Tympanic membrane normal.      Left Ear: Tympanic membrane normal.      Nose: Nose normal.   Eyes:      Extraocular Movements: Extraocular movements intact.      Conjunctiva/sclera: Conjunctivae normal.      Pupils: Pupils are equal, round, and reactive to light.   Neck:      Vascular: No carotid bruit.   Cardiovascular:      Rate and Rhythm: Normal rate and regular rhythm.      Pulses: Normal pulses.      Heart sounds: No murmur heard.  Pulmonary:      Effort: Pulmonary

## 2024-07-30 ENCOUNTER — NURSING HOME VISIT (OUTPATIENT)
Dept: GERIATRICS | Facility: OTHER | Age: 64
End: 2024-07-30

## 2024-07-30 ENCOUNTER — TELEPHONE (OUTPATIENT)
Age: 64
End: 2024-07-30

## 2024-07-30 DIAGNOSIS — E44.1 MILD PROTEIN-CALORIE MALNUTRITION (HCC): ICD-10-CM

## 2024-07-30 DIAGNOSIS — I65.22 MORE THAN 50 PERCENT STENOSIS OF LEFT INTERNAL CAROTID ARTERY: ICD-10-CM

## 2024-07-30 DIAGNOSIS — Z86.73 HISTORY OF CVA (CEREBROVASCULAR ACCIDENT): Chronic | ICD-10-CM

## 2024-07-30 DIAGNOSIS — K94.23 GASTROSTOMY TUBE DYSFUNCTION (HCC): ICD-10-CM

## 2024-07-30 DIAGNOSIS — R26.2 AMBULATORY DYSFUNCTION: Chronic | ICD-10-CM

## 2024-07-30 DIAGNOSIS — D64.9 ANEMIA, UNSPECIFIED TYPE: Chronic | ICD-10-CM

## 2024-07-30 DIAGNOSIS — F41.9 ANXIETY: Chronic | ICD-10-CM

## 2024-07-30 DIAGNOSIS — E55.9 VITAMIN D DEFICIENCY: ICD-10-CM

## 2024-07-30 DIAGNOSIS — E78.49 OTHER HYPERLIPIDEMIA: ICD-10-CM

## 2024-07-30 DIAGNOSIS — I10 PRIMARY HYPERTENSION: Chronic | ICD-10-CM

## 2024-07-30 DIAGNOSIS — I63.9 ACUTE CVA (CEREBROVASCULAR ACCIDENT) (HCC): Primary | ICD-10-CM

## 2024-07-30 DIAGNOSIS — E87.1 HYPONATREMIA: Chronic | ICD-10-CM

## 2024-07-30 DIAGNOSIS — K21.9 GASTROESOPHAGEAL REFLUX DISEASE, UNSPECIFIED WHETHER ESOPHAGITIS PRESENT: ICD-10-CM

## 2024-07-30 PROBLEM — K92.1 MELENA: Status: RESOLVED | Noted: 2022-10-30 | Resolved: 2024-07-30

## 2024-07-30 RX ORDER — BUSPIRONE HYDROCHLORIDE 5 MG/1
5 TABLET ORAL 2 TIMES DAILY
COMMUNITY
End: 2024-07-30 | Stop reason: SDUPTHER

## 2024-07-30 RX ORDER — ATORVASTATIN CALCIUM 20 MG/1
20 TABLET, FILM COATED ORAL
Start: 2024-07-30 | End: 2024-07-30 | Stop reason: SDUPTHER

## 2024-07-30 RX ORDER — BUSPIRONE HYDROCHLORIDE 5 MG/1
5 TABLET ORAL 2 TIMES DAILY
Qty: 28 TABLET | Refills: 0 | Status: SHIPPED | OUTPATIENT
Start: 2024-07-30

## 2024-07-30 RX ORDER — PHENOL 1.4 %
600 AEROSOL, SPRAY (ML) MUCOUS MEMBRANE DAILY
COMMUNITY
End: 2024-07-30 | Stop reason: SDUPTHER

## 2024-07-30 RX ORDER — MIRTAZAPINE 15 MG/1
15 TABLET, FILM COATED ORAL
Qty: 14 TABLET | Refills: 0 | Status: SHIPPED | OUTPATIENT
Start: 2024-07-30

## 2024-07-30 RX ORDER — ATORVASTATIN CALCIUM 20 MG/1
20 TABLET, FILM COATED ORAL
Qty: 14 TABLET | Refills: 0 | Status: SHIPPED | OUTPATIENT
Start: 2024-07-30

## 2024-07-30 RX ORDER — BACLOFEN 5 MG/1
5 TABLET ORAL DAILY
Qty: 14 TABLET | Refills: 0 | Status: SHIPPED | OUTPATIENT
Start: 2024-07-30

## 2024-07-30 RX ORDER — ERGOCALCIFEROL 1.25 MG/1
50000 CAPSULE ORAL WEEKLY
Qty: 2 CAPSULE | Refills: 0 | Status: SHIPPED | OUTPATIENT
Start: 2024-07-30

## 2024-07-30 RX ORDER — PHENOL 1.4 %
600 AEROSOL, SPRAY (ML) MUCOUS MEMBRANE DAILY
Qty: 14 TABLET | Refills: 0 | Status: SHIPPED | OUTPATIENT
Start: 2024-07-30

## 2024-07-30 RX ORDER — CLOPIDOGREL BISULFATE 75 MG/1
75 TABLET ORAL DAILY
Qty: 14 TABLET | Refills: 0 | Status: SHIPPED | OUTPATIENT
Start: 2024-07-30

## 2024-07-30 RX ORDER — MIRTAZAPINE 15 MG/1
15 TABLET, FILM COATED ORAL
COMMUNITY
End: 2024-07-30 | Stop reason: SDUPTHER

## 2024-07-30 NOTE — ASSESSMENT & PLAN NOTE
Left CEA 3/24/23  Continue aspirin, plavix, and atorvastatin  Follow up imaging of carotid arteries and vascular appointment

## 2024-07-30 NOTE — ASSESSMENT & PLAN NOTE
History of alcohol abuse  Pleasant on exam  No acute changes in mood  Continue buspar 5 mg BID and mirtazapine 15 mg daily HS  Frequent reorientation and redirection

## 2024-07-30 NOTE — ASSESSMENT & PLAN NOTE
Brittny D levels at 13 on 1/3/24  Continue ergocalciferol 33794 units weekly on Fridays   Start calcium carbonate 600 mg daily   Will monitor levels at least twice a year

## 2024-07-30 NOTE — ASSESSMENT & PLAN NOTE
History of CVA residual left sided weakness   Continue PT/OT  Maintain fall/safety precautions  Assist with ADLs and IADLs

## 2024-07-30 NOTE — ASSESSMENT & PLAN NOTE
Continue to monitor intermittent hemoglobins  No signs and symptoms of bleeding  Encourage patient to follow up with GI, history of melena due to dieulafoy's lesion  Patient remains stable at this time

## 2024-07-30 NOTE — ASSESSMENT & PLAN NOTE
PEG tube was dislodged and no longer needed, so kept out  Tolerating p.o. diet  Patient has had steady weight gain, most recent wt 163.5 lbs  Monitor for signs of infection at prior peg tube site  No abdominal pain on exam

## 2024-07-30 NOTE — ASSESSMENT & PLAN NOTE
With residual left sided weakness  Continue risk factor control   Continue 24-7 supportive care   Continue restorative services   Fall and safety precautions

## 2024-07-30 NOTE — PROGRESS NOTES
[facility]  [address] [address]  [phone]  DISCHARGE SUMMARY  Facility: [facility]  POS: 32: NF- Long Term    NAME: Ascencion Licea III  AGE: 63 y.o. SEX: male  DATE OF ADMISSION: 11/4/22 DATE OF DISCHARGE: 7/31/24 DISCHARGE DISPOSITION: Home with family    Reason for admission: Patient was admitted from [facility] for rehabilitation after hospitalization for left-sided upper and lower extremity weakness associated with facial droop and dysarthria. CT brain revealed acute subcortical infarct in the right periventricular white matter corona radiata and lentiform nuclei.  CTA showed subsegmental, critical greater than 90% stenosis in the proximal left cervical ICA.  MRI showed acute left thalamic stroke and stable acute/early subacute periventricular stroke.  Neurology started patient on aspirin, Plavix, and a statin.  Ultimately needed PEG tube placement for dysphagia.  Patient developed aspiration pneumonia requiring a course of antibiotics.  During hospitalization, patient also hyponatremic with an BIPIN.  Nephrology service recommended IV hydration and renal function and electrolytes improved.  Patient was then transferred to [facility] for rehab post-hospitalization.    Admission Diagnoses:   1. Acute CVA (cerebrovascular accident) (HCC)  Assessment & Plan:  With residual left sided weakness  Continue risk factor control   Continue 24-7 supportive care   Continue restorative services   Fall and safety precautions  Orders:  -     aspirin (ECOTRIN LOW STRENGTH) 81 mg EC tablet; Take 1 tablet (81 mg total) by mouth daily  -     atorvastatin (LIPITOR) 20 mg tablet; Take 1 tablet (20 mg total) by mouth daily with dinner  -     clopidogrel (PLAVIX) 75 mg tablet; Take 1 tablet (75 mg total) by mouth daily  2. Vitamin D deficiency  Assessment & Plan:  Brittny D levels at 13 on 1/3/24  Continue ergocalciferol 50244 units weekly on Fridays   Start calcium carbonate  600 mg daily   Will monitor levels at least twice a year  Orders:  -     calcium carbonate (OS-ILIANA) 600 MG tablet; Take 1 tablet (600 mg total) by mouth daily  -     ergocalciferol (ERGOCALCIFEROL) 1.25 MG (54478 UT) capsule; Take 1 capsule (50,000 Units total) by mouth once a week On Fridays  3. Anxiety  Assessment & Plan:  History of alcohol abuse  Pleasant on exam  No acute changes in mood  Continue buspar 5 mg BID and mirtazapine 15 mg daily HS  Frequent reorientation and redirection  Orders:  -     busPIRone (BUSPAR) 5 mg tablet; Take 1 tablet (5 mg total) by mouth 2 (two) times a day  -     mirtazapine (REMERON) 15 mg tablet; Take 1 tablet (15 mg total) by mouth daily at bedtime  4. Primary hypertension  Assessment & Plan:  BP acceptable, continue to monitor  Continue metoprolol 25 mg q 12  Orders:  -     metoprolol tartrate (LOPRESSOR) 25 mg tablet; Take 1 tablet (25 mg total) by mouth every 12 (twelve) hours Hold for SBP <110 or HR <60  5. History of CVA (cerebrovascular accident)  Assessment & Plan:  With left hemiplegia  Continue restorative care  Continue supportive care and ADL assistance  Continue plavix, ASA and statin     Orders:  -     Baclofen 5 MG TABS; Take 5 mg by mouth daily  6. More than 50 percent stenosis of left internal carotid artery  Assessment & Plan:  Left CEA 3/24/23  Continue aspirin, plavix, and atorvastatin  Follow up imaging of carotid arteries and vascular appointment  7. Gastroesophageal reflux disease, unspecified whether esophagitis present  Assessment & Plan:  No acute issues, no medications at this time  8. Anemia, unspecified type  Assessment & Plan:  Continue to monitor intermittent hemoglobins  No signs and symptoms of bleeding  Encourage patient to follow up with GI, history of melena due to dieulafoy's lesion  Patient remains stable at this time  9. Ambulatory dysfunction  Assessment & Plan:  History of CVA residual left sided weakness   Continue PT/OT  Maintain  "fall/safety precautions  Assist with ADLs and IADLs  10. Gastrostomy tube dysfunction (HCC)  Assessment & Plan:  PEG tube was dislodged and no longer needed, so kept out  Tolerating p.o. diet  Patient has had steady weight gain, most recent wt 163.5 lbs  Monitor for signs of infection at prior peg tube site  No abdominal pain on exam  11. Hyponatremia  Assessment & Plan:  No longer requiring salt tabs, last NA was 139 on 1/29/24  12. Mild protein-calorie malnutrition (HCC)  Assessment & Plan:  Chronic illness  Encourage protein shakes  Tolerating PO diet, good appetite  13. Other hyperlipidemia  Assessment & Plan:  Continue atorvastatin 40 mg daily  Lipid profile was well-controlled on 1/3/2024    Course of stay: Patient was admitted to [facility] for rehabilitation post-hospitalization. There were a few significant events during his stay. Peg tube became dislodged twice during stay and needed to be replaced. The patient developed melena leading to a hospitalization. While hospitalized, active bleeding was noted to be from a Dieulafoy's lesion. This was clipped and injected with epi to control the bleed. Next, patient seen by cardiology and loop recorder was placed. The patient was later readmitted with epistaxis where nasal packing was inserted and removed by EMT. These were all of the significant events during stay at [facility]. The patient participated in PT/OT. Appropriate DME use of wheelchair.    Labs and testing performed during stay: All labs and imaging reviewed in EMR and PCC.  1/29/24:  Bun 13  Crt 0.6  Na 139  K 4.6  Gfr >60    1/3/24:  Hbg 12.3  Hct 38  Wbc 6.4  Plt 256  TSH 3.98    HDL 43.0  Trig 69  LDL 54.2  Non-HDL 68  Chol 111  Vit D 25-hydroxy 13    Discharge Medications: See discharge medication list which was reviewed and signed.    Status at time of discharge: Stable    Today's Visit: 7/30/24 1100    Subjective: \"I'm going home with my sister.\"    Vitals: Wt. 163.5 lbs, /76, Temp " 97.8 F, RR 18, O2 96%    Exam: Physical Exam  Vitals and nursing note reviewed.   Constitutional:       General: He is awake. He is not in acute distress.     Appearance: Normal appearance. He is not ill-appearing, toxic-appearing or diaphoretic.   HENT:      Head: Normocephalic and atraumatic.   Cardiovascular:      Rate and Rhythm: Normal rate and regular rhythm.      Pulses: Normal pulses.      Heart sounds: Normal heart sounds. No murmur heard.  Pulmonary:      Effort: Pulmonary effort is normal. No respiratory distress.      Breath sounds: Normal breath sounds.   Abdominal:      General: Bowel sounds are normal. There is no distension.      Palpations: Abdomen is soft.      Tenderness: There is no abdominal tenderness.   Musculoskeletal:      Right lower leg: No edema.      Left lower leg: No edema.   Skin:     General: Skin is warm and dry.   Neurological:      General: No focal deficit present.      Mental Status: He is alert and oriented to person, place, and time.      Motor: Weakness present.      Gait: Gait abnormal.   Psychiatric:         Attention and Perception: Attention normal.         Mood and Affect: Mood normal.         Behavior: Behavior normal. Behavior is cooperative.         Thought Content: Thought content normal.       Discussion with patient/family and further instructions:  -Fall precautions  -Aspiration precautions  -Bleeding precautions  -Monitor for signs/symptoms of infection  -Medication list was reviewed and signed  -DME form was completed    Follow-up Recommendations: Please follow-up with your primary care physician within 7-10 days of discharge to review medication changes and current status. Discussed with the patient and his sister, Delmis, about the importance of following up with a PCP to continue prescribing medications. Delmis stated that she will probably need to establish care with a new PCP.    Problem List Follow-up Recommendations:  Make appointments to see the  following:  -Urology  -Nephrology  -Vascular    I have spent 55 minutes with Patient and family today in which greater than 50% of this time was spent in counseling/coordination of care regarding Diagnostic results, Risks and benefits of tx options, Instructions for management, Patient and family education, Importance of tx compliance, Risk factor reductions, Counseling / Coordination of care, Documenting in the medical record, Reviewing / ordering tests, medicine, procedures  , Obtaining or reviewing history  , and Communicating with other healthcare professionals .    DANIEL Alba  Geriatric Medicine  7/30/24 1100

## 2024-08-05 ENCOUNTER — OFFICE VISIT (OUTPATIENT)
Dept: FAMILY MEDICINE CLINIC | Facility: CLINIC | Age: 64
End: 2024-08-05
Payer: COMMERCIAL

## 2024-08-05 VITALS
BODY MASS INDEX: 19.32 KG/M2 | OXYGEN SATURATION: 93 % | DIASTOLIC BLOOD PRESSURE: 64 MMHG | HEART RATE: 77 BPM | WEIGHT: 138 LBS | SYSTOLIC BLOOD PRESSURE: 101 MMHG | TEMPERATURE: 98.2 F | HEIGHT: 71 IN

## 2024-08-05 DIAGNOSIS — E55.9 VITAMIN D DEFICIENCY: ICD-10-CM

## 2024-08-05 DIAGNOSIS — E87.1 HYPONATREMIA: Chronic | ICD-10-CM

## 2024-08-05 DIAGNOSIS — R06.2 WHEEZING: ICD-10-CM

## 2024-08-05 DIAGNOSIS — I65.22 MORE THAN 50 PERCENT STENOSIS OF LEFT INTERNAL CAROTID ARTERY: ICD-10-CM

## 2024-08-05 DIAGNOSIS — N32.89 BLADDER WALL THICKENING: ICD-10-CM

## 2024-08-05 DIAGNOSIS — I10 PRIMARY HYPERTENSION: Chronic | ICD-10-CM

## 2024-08-05 DIAGNOSIS — F41.9 ANXIETY: Chronic | ICD-10-CM

## 2024-08-05 DIAGNOSIS — R05.8 PRODUCTIVE COUGH: ICD-10-CM

## 2024-08-05 DIAGNOSIS — R26.2 AMBULATORY DYSFUNCTION: Chronic | ICD-10-CM

## 2024-08-05 DIAGNOSIS — I63.9 ACUTE CVA (CEREBROVASCULAR ACCIDENT) (HCC): Primary | ICD-10-CM

## 2024-08-05 PROCEDURE — 99204 OFFICE O/P NEW MOD 45 MIN: CPT

## 2024-08-05 PROCEDURE — 99214 OFFICE O/P EST MOD 30 MIN: CPT

## 2024-08-05 RX ORDER — GUAIFENESIN/DEXTROMETHORPHAN 100-10MG/5
5 SYRUP ORAL 3 TIMES DAILY PRN
Qty: 473 ML | Refills: 1 | Status: SHIPPED | OUTPATIENT
Start: 2024-08-05

## 2024-08-05 RX ORDER — BACLOFEN 5 MG/1
5 TABLET ORAL DAILY
Qty: 30 TABLET | Refills: 3 | Status: SHIPPED | OUTPATIENT
Start: 2024-08-05 | End: 2024-09-04

## 2024-08-05 RX ORDER — ATORVASTATIN CALCIUM 20 MG/1
20 TABLET, FILM COATED ORAL
Qty: 30 TABLET | Refills: 3 | Status: SHIPPED | OUTPATIENT
Start: 2024-08-05

## 2024-08-05 RX ORDER — MIRTAZAPINE 15 MG/1
15 TABLET, FILM COATED ORAL
Qty: 30 TABLET | Refills: 3 | Status: SHIPPED | OUTPATIENT
Start: 2024-08-05

## 2024-08-05 RX ORDER — BUSPIRONE HYDROCHLORIDE 5 MG/1
5 TABLET ORAL 2 TIMES DAILY
Qty: 60 TABLET | Refills: 3 | Status: SHIPPED | OUTPATIENT
Start: 2024-08-05

## 2024-08-05 RX ORDER — ALBUTEROL SULFATE 90 UG/1
2 AEROSOL, METERED RESPIRATORY (INHALATION) EVERY 6 HOURS PRN
Qty: 18 G | Refills: 5 | Status: SHIPPED | OUTPATIENT
Start: 2024-08-05

## 2024-08-05 RX ORDER — ERGOCALCIFEROL 1.25 MG/1
50000 CAPSULE ORAL WEEKLY
Qty: 4 CAPSULE | Refills: 3 | Status: SHIPPED | OUTPATIENT
Start: 2024-08-05

## 2024-08-05 RX ORDER — PHENOL 1.4 %
600 AEROSOL, SPRAY (ML) MUCOUS MEMBRANE DAILY
Qty: 30 TABLET | Refills: 3 | Status: SHIPPED | OUTPATIENT
Start: 2024-08-05

## 2024-08-05 RX ORDER — AMOXICILLIN AND CLAVULANATE POTASSIUM 875; 125 MG/1; MG/1
1 TABLET, FILM COATED ORAL EVERY 12 HOURS SCHEDULED
Qty: 14 TABLET | Refills: 0 | Status: SHIPPED | OUTPATIENT
Start: 2024-08-05 | End: 2024-08-12

## 2024-08-05 NOTE — ASSESSMENT & PLAN NOTE
Per patient chronic productive cough with scattered wheezing, denies fever or SOB. On exam expiratory wheezing in upper and lower posterior lobes. Will check CXR, start robitussin, zpak and albuterol inhaler as prescribed.

## 2024-08-05 NOTE — ASSESSMENT & PLAN NOTE
BP stable on metoprolol 25 mg q 12 hrs, home BP monitor ordered and pt and sister advised must monitor BP prior to medication administration. Pt has skilled home nursing services through [facility], counseled on low Na diet.

## 2024-08-05 NOTE — PROGRESS NOTES
Ambulatory Visit  Name: Ascencion Licea III      : 1960      MRN: [id number]  Encounter Provider: DANILE Ji  Encounter Date: 2024   Encounter department: [facility] MEDICINE    Assessment & Plan   1. Acute CVA (cerebrovascular accident) (HCC)  Assessment & Plan:  With left hemiplegia s/p inpatient rehab 2022- 24. Continue plavix 75 mg, ASA 81 mg and atorvastatin 20 mg qd.   Orders:  -     Baclofen 5 MG TABS; Take 5 mg by mouth daily  -     atorvastatin (LIPITOR) 20 mg tablet; Take 1 tablet (20 mg total) by mouth daily with dinner  -     aspirin (ECOTRIN LOW STRENGTH) 81 mg EC tablet; Take 1 tablet (81 mg total) by mouth daily  2. Primary hypertension  Assessment & Plan:  BP stable on metoprolol 25 mg q 12 hrs, home BP monitor ordered and pt and sister advised must monitor BP prior to medication administration. Pt has skilled home nursing services through [facility], counseled on low Na diet.  Orders:  -     metoprolol tartrate (LOPRESSOR) 25 mg tablet; Take 1 tablet (25 mg total) by mouth every 12 (twelve) hours Hold for SBP <110 or HR <60  -     Blood Pressure Monitoring (Blood Pressure Monitor Automat) MAIK; Use if needed (Hypertension)  3. Anxiety  Assessment & Plan:  Stable continue buspirone 5 mg bid and mirtazapine 15 mg at hs.  Orders:  -     busPIRone (BUSPAR) 5 mg tablet; Take 1 tablet (5 mg total) by mouth 2 (two) times a day  -     mirtazapine (REMERON) 15 mg tablet; Take 1 tablet (15 mg total) by mouth daily at bedtime  4. Vitamin D deficiency  Assessment & Plan:  Continue Vit D 50,000 units weekly and calcium carbonate 600 mg qd. Check Vit D level  Orders:  -     ergocalciferol (ERGOCALCIFEROL) 1.25 MG (30491 UT) capsule; Take 1 capsule (50,000 Units total) by mouth once a week On   -     calcium carbonate (OS-ILIANA) 600 MG tablet; Take 1 tablet (600 mg total) by mouth daily  -     Vitamin D 25 hydroxy; Future  5. More than 50  percent stenosis of left internal carotid artery  Assessment & Plan:  L CEA 03/24/23 continue aspirin, Plavix, and atorvastatin and vascular surgery follow-up.  Orders:  -     Ambulatory Referral to Vascular Surgery; Future  6. Hyponatremia  Assessment & Plan:  Stable last BMP Na 136 in 01/2024. Repeat BMP.   7. Bladder wall thickening  Assessment & Plan:  Seen on CT 2022, f/u with urology referral, pt denies any urinary  symptoms.   Orders:  -     Ambulatory Referral to Urology; Future  8. Productive cough  Assessment & Plan:  Per patient chronic productive cough with scattered wheezing, denies fever or SOB. On exam expiratory wheezing in upper and lower posterior lobes. Will check CXR, start robitussin, zpak and albuterol inhaler as prescribed.  Orders:  -     dextromethorphan-guaiFENesin (ROBITUSSIN DM)  mg/5 mL syrup; Take 5 mL by mouth 3 (three) times a day as needed for cough  -     albuterol (Ventolin HFA) 90 mcg/act inhaler; Inhale 2 puffs every 6 (six) hours as needed for wheezing or shortness of breath  -     XR chest pa & lateral; Future; Expected date: 08/05/2024  -     amoxicillin-clavulanate (AUGMENTIN) 875-125 mg per tablet; Take 1 tablet by mouth every 12 (twelve) hours for 7 days  9. Wheezing  -     albuterol (Ventolin HFA) 90 mcg/act inhaler; Inhale 2 puffs every 6 (six) hours as needed for wheezing or shortness of breath  -     XR chest pa & lateral; Future; Expected date: 08/05/2024  10. Ambulatory dysfunction  Assessment & Plan:  Hx of CVA with L side weakness, wheel chair dependent s/p inpatient rehab and SNF stay 11/2022 -07/2024. Pt will be receiving home PT/OT with [facility].      Depression Screening and Follow-up Plan: Patient's depression screening was positive with a PHQ-2 score of 5. Their PHQ-9 score was 7.       History of Present Illness     Pt and sister Delmis presents to establish care pt is s/p CVA with L side weakness in 2022 has been residing at [facility]  facility with recent discharge 07/31/24. Needs mediation refills     CVA -  aspirin (ECOTRIN LOW STRENGTH) 81 mg EC tablet; Take 1 tablet (81 mg total) by mouth daily  -     atorvastatin (LIPITOR) 20 mg tablet; Take 1 tablet (20 mg total) by mouth daily with dinner  -     clopidogrel (PLAVIX) 75 mg tablet; Take 1 tablet (75 mg total) by mouth daily    HTN - Takes metoprolol tartrate 25 mg bid    Anxiety - Takes Remeron 15 mg at hs and buspirone 5 mg bid    Vit D deficiency - weekly  vit D 50, 000 units and calcium carbonate 600 mg qd.    Pt reports productive cough for several months on exam has scattered wheezing denies fever or chest pain        Review of Systems   Constitutional:  Negative for activity change, appetite change, chills, diaphoresis, fatigue, fever and unexpected weight change.   HENT:  Negative for congestion, dental problem, drooling, ear discharge, ear pain, facial swelling, hearing loss, mouth sores, nosebleeds, postnasal drip, rhinorrhea, sinus pressure, sinus pain, sneezing, sore throat, tinnitus, trouble swallowing and voice change.    Eyes:  Negative for photophobia, pain, discharge, redness, itching and visual disturbance.   Respiratory:  Positive for cough and wheezing. Negative for apnea, choking, chest tightness, shortness of breath and stridor.    Cardiovascular:  Positive for chest pain and palpitations. Negative for leg swelling.   Gastrointestinal:  Negative for abdominal distention, abdominal pain, anal bleeding, blood in stool, constipation, diarrhea, nausea, rectal pain and vomiting.   Endocrine: Negative for cold intolerance, heat intolerance, polydipsia, polyphagia and polyuria.   Genitourinary:  Negative for decreased urine volume, difficulty urinating, dysuria, flank pain, frequency, hematuria, penile discharge, scrotal swelling, testicular pain and urgency.   Musculoskeletal:  Positive for gait problem (wheel chair bound). Negative for arthralgias, back pain, joint swelling,  myalgias, neck pain and neck stiffness.   Skin:  Negative for color change, pallor, rash and wound.   Allergic/Immunologic: Negative for environmental allergies, food allergies and immunocompromised state.   Neurological:  Positive for weakness (L side). Negative for dizziness, tremors, seizures, syncope, facial asymmetry, speech difficulty, light-headedness, numbness and headaches.   Hematological:  Negative for adenopathy. Does not bruise/bleed easily.   Psychiatric/Behavioral:  Negative for agitation, behavioral problems, confusion, decreased concentration, dysphoric mood, hallucinations, self-injury, sleep disturbance and suicidal ideas. The patient is not nervous/anxious and is not hyperactive.    All other systems reviewed and are negative.    Current Outpatient Medications on File Prior to Visit   Medication Sig Dispense Refill    acetaminophen (TYLENOL) 325 mg tablet Take 650 mg by mouth every 6 (six) hours as needed for mild pain      bisacodyl (DULCOLAX) 10 mg suppository Insert 10 mg into the rectum if needed      clopidogrel (PLAVIX) 75 mg tablet Take 1 tablet (75 mg total) by mouth daily 14 tablet 0    polyethylene glycol (MIRALAX) 17 g packet Take 17 g by mouth if needed      [DISCONTINUED] aspirin (ECOTRIN LOW STRENGTH) 81 mg EC tablet Take 1 tablet (81 mg total) by mouth daily 14 tablet 0    [DISCONTINUED] atorvastatin (LIPITOR) 20 mg tablet Take 1 tablet (20 mg total) by mouth daily with dinner 14 tablet 0    [DISCONTINUED] Baclofen 5 MG TABS Take 5 mg by mouth daily 14 tablet 0    [DISCONTINUED] busPIRone (BUSPAR) 5 mg tablet Take 1 tablet (5 mg total) by mouth 2 (two) times a day 28 tablet 0    [DISCONTINUED] calcium carbonate (OS-ILIANA) 600 MG tablet Take 1 tablet (600 mg total) by mouth daily 14 tablet 0    [DISCONTINUED] ergocalciferol (ERGOCALCIFEROL) 1.25 MG (33835 UT) capsule Take 1 capsule (50,000 Units total) by mouth once a week On Fridays 2 capsule 0    [DISCONTINUED] metoprolol tartrate  "(LOPRESSOR) 25 mg tablet Take 1 tablet (25 mg total) by mouth every 12 (twelve) hours Hold for SBP <110 or HR <60 28 tablet 0    [DISCONTINUED] mirtazapine (REMERON) 15 mg tablet Take 1 tablet (15 mg total) by mouth daily at bedtime 14 tablet 0     No current facility-administered medications on file prior to visit.      Social History     Tobacco Use    Smoking status: Former     Types: Cigarettes    Smokeless tobacco: Never   Vaping Use    Vaping status: Never Used   Substance and Sexual Activity    Alcohol use: Not Currently     Comment: occasionally    Drug use: Never    Sexual activity: Not on file     Objective     /64 (BP Location: Right arm, Patient Position: Sitting, Cuff Size: Adult)   Pulse 77   Temp 98.2 °F (36.8 °C) (Tympanic)   Ht 5' 11\" (1.803 m)   Wt 62.6 kg (138 lb)   SpO2 93%   BMI 19.25 kg/m²     Physical Exam  Vitals and nursing note reviewed.   Constitutional:       General: He is not in acute distress.     Appearance: Normal appearance. He is not ill-appearing, toxic-appearing or diaphoretic.   HENT:      Head: Normocephalic and atraumatic.      Right Ear: Tympanic membrane, ear canal and external ear normal. There is no impacted cerumen.      Left Ear: Tympanic membrane, ear canal and external ear normal. There is no impacted cerumen.      Nose: Nose normal. No congestion or rhinorrhea.      Mouth/Throat:      Mouth: Mucous membranes are moist.      Pharynx: Oropharynx is clear. No oropharyngeal exudate or posterior oropharyngeal erythema.   Eyes:      General: No scleral icterus.        Right eye: No discharge.         Left eye: No discharge.      Extraocular Movements: Extraocular movements intact.      Conjunctiva/sclera: Conjunctivae normal.      Pupils: Pupils are equal, round, and reactive to light.   Neck:      Vascular: No carotid bruit.   Cardiovascular:      Rate and Rhythm: Normal rate and regular rhythm.      Pulses: Normal pulses.      Heart sounds: Normal heart " sounds. No murmur heard.     No friction rub. No gallop.   Pulmonary:      Effort: Pulmonary effort is normal. No respiratory distress.      Breath sounds: Normal breath sounds. No stridor. No wheezing, rhonchi or rales.   Chest:      Chest wall: No tenderness.   Abdominal:      General: Bowel sounds are normal. There is no distension.      Palpations: Abdomen is soft. There is no mass.      Tenderness: There is no abdominal tenderness. There is no right CVA tenderness, left CVA tenderness, guarding or rebound.      Hernia: No hernia is present.   Musculoskeletal:         General: No swelling, tenderness, deformity or signs of injury. Normal range of motion.      Cervical back: Normal range of motion and neck supple. No rigidity or tenderness.      Right lower leg: No edema.      Left lower leg: No edema.   Lymphadenopathy:      Cervical: No cervical adenopathy.   Skin:     General: Skin is warm.      Capillary Refill: Capillary refill takes less than 2 seconds.      Coloration: Skin is not jaundiced.      Findings: No bruising, erythema, lesion or rash.   Neurological:      General: No focal deficit present.      Mental Status: He is alert and oriented to person, place, and time.      Cranial Nerves: No cranial nerve deficit.      Sensory: No sensory deficit.      Motor: Weakness (L side weakness) present.      Coordination: Coordination normal.      Gait: Gait abnormal (wheel chair bound).      Deep Tendon Reflexes: Reflexes normal.   Psychiatric:         Attention and Perception: Attention and perception normal. He is attentive. He does not perceive auditory or visual hallucinations.         Mood and Affect: Mood and affect normal.         Speech: Speech is slurred.         Behavior: Behavior normal. Behavior is not agitated or combative. Behavior is cooperative.         Thought Content: Thought content normal. Thought content is not paranoid or delusional. Thought content does not include homicidal or suicidal  ideation. Thought content does not include homicidal or suicidal plan.         Cognition and Memory: Memory is impaired.         Judgment: Judgment normal. Judgment is not impulsive.       Administrative Statements

## 2024-08-05 NOTE — ASSESSMENT & PLAN NOTE
With left hemiplegia s/p inpatient rehab November 2022- 7/31/24. Continue plavix 75 mg, ASA 81 mg and atorvastatin 20 mg qd.

## 2024-08-05 NOTE — ASSESSMENT & PLAN NOTE
Hx of CVA with L side weakness, wheel chair dependent s/p inpatient rehab and SNF stay 11/2022 -07/2024. Pt will be receiving home PT/OT with [facility].

## 2024-08-06 ENCOUNTER — TELEPHONE (OUTPATIENT)
Age: 64
End: 2024-08-06

## 2024-08-06 DIAGNOSIS — I63.9 ACUTE CVA (CEREBROVASCULAR ACCIDENT) (HCC): Primary | ICD-10-CM

## 2024-08-06 NOTE — TELEPHONE ENCOUNTER
[facility] health nurse calling to get an order for a left ankle brace.  Left side numbness due to a stroke.    Please put order in MYCHART and patient can get from there.    Any questions, please call Kirit at [phone]    Thank You

## 2024-08-09 ENCOUNTER — TELEPHONE (OUTPATIENT)
Age: 64
End: 2024-08-09

## 2024-08-09 DIAGNOSIS — R05.8 PRODUCTIVE COUGH: Primary | ICD-10-CM

## 2024-08-09 DIAGNOSIS — J30.9 ALLERGIC RHINITIS, UNSPECIFIED SEASONALITY, UNSPECIFIED TRIGGER: ICD-10-CM

## 2024-08-09 RX ORDER — GUAIFENESIN 600 MG/1
600 TABLET, EXTENDED RELEASE ORAL EVERY 12 HOURS SCHEDULED
Qty: 60 TABLET | Refills: 1 | Status: SHIPPED | OUTPATIENT
Start: 2024-08-09

## 2024-08-09 RX ORDER — CETIRIZINE HYDROCHLORIDE 10 MG/1
10 TABLET ORAL
Qty: 30 TABLET | Refills: 3 | Status: SHIPPED | OUTPATIENT
Start: 2024-08-09

## 2024-08-09 NOTE — TELEPHONE ENCOUNTER
April nurse from [facility] called stating she is at the home with patient now and he is currently having an asthma attack. States she gave him his rescue inhaler and patient is doing well. Did not feel the need to be transferred to triage.    States patient has a lot of phlegm and mucous and is requesting the following medications to be prescribed for him:  Mucinex 600 mg BID  Cetrizine (Zyrtec) to be given at night.    Please advise

## 2024-08-19 ENCOUNTER — TELEPHONE (OUTPATIENT)
Age: 64
End: 2024-08-19

## 2024-08-19 DIAGNOSIS — I63.9 ACUTE CVA (CEREBROVASCULAR ACCIDENT) (HCC): Primary | ICD-10-CM

## 2024-08-26 ENCOUNTER — TELEPHONE (OUTPATIENT)
Dept: NEPHROLOGY | Facility: CLINIC | Age: 64
End: 2024-08-26

## 2024-08-26 NOTE — TELEPHONE ENCOUNTER
Lm for pt to schedule jan f/u with Dr. Vergara. Can be seen in December if needed due to availability

## 2024-08-30 ENCOUNTER — TELEPHONE (OUTPATIENT)
Age: 64
End: 2024-08-30

## 2024-08-30 NOTE — TELEPHONE ENCOUNTER
Michelle from [facility] called in requesting an order for brake extensions and a one arm drive attachment.

## 2024-09-04 PROBLEM — R05.8 PRODUCTIVE COUGH: Status: RESOLVED | Noted: 2023-05-30 | Resolved: 2024-09-04

## 2024-09-06 ENCOUNTER — TELEPHONE (OUTPATIENT)
Age: 64
End: 2024-09-06

## 2024-09-06 NOTE — TELEPHONE ENCOUNTER
Sujata from [facility] called in to follow up on status of home care physician certification forms that were faxed on 8/30. Advised Sujata we had not received them yet, confirmed fax number [phone].    NFA needed at this time.

## 2024-09-06 NOTE — TELEPHONE ENCOUNTER
Therapist Michelle, [phone] calling asking if we can fax orders over to [employer].    - Break extension for Wheelchair  - one arm Drive for wheelchair right  - Resting hand splint (left)     Any questions, please call Michelle.  Thank you

## 2024-09-09 ENCOUNTER — TELEPHONE (OUTPATIENT)
Age: 64
End: 2024-09-09

## 2024-09-09 NOTE — TELEPHONE ENCOUNTER
Maureen from [facility] called in to check if the office received orders. [facility] will refax orders.

## 2024-09-10 NOTE — TELEPHONE ENCOUNTER
Pt's occupational therapist, Michelle, called to check on the status of the equipment she had requested for pt.  Please call her to advise.

## 2024-09-13 ENCOUNTER — TELEPHONE (OUTPATIENT)
Age: 64
End: 2024-09-13

## 2024-09-13 NOTE — TELEPHONE ENCOUNTER
Maureen with [facility] called to verify if order # [id number] was received, confirmed fax number which was incorrect, gave fax # and will be resending

## 2024-09-20 NOTE — TELEPHONE ENCOUNTER
Lilli is calling to see if fax was received which I do not see scanned in.  They will be refaxing today.  Please check solarity.  Thank you

## 2024-09-29 PROBLEM — R13.19 OTHER DYSPHAGIA: Status: ACTIVE | Noted: 2024-09-29

## 2024-09-30 ENCOUNTER — OFFICE VISIT (OUTPATIENT)
Dept: FAMILY MEDICINE CLINIC | Facility: CLINIC | Age: 64
End: 2024-09-30
Payer: COMMERCIAL

## 2024-09-30 VITALS
BODY MASS INDEX: 19.25 KG/M2 | SYSTOLIC BLOOD PRESSURE: 118 MMHG | OXYGEN SATURATION: 96 % | HEART RATE: 71 BPM | DIASTOLIC BLOOD PRESSURE: 80 MMHG | HEIGHT: 71 IN

## 2024-09-30 DIAGNOSIS — I63.9 ACUTE CVA (CEREBROVASCULAR ACCIDENT) (HCC): ICD-10-CM

## 2024-09-30 DIAGNOSIS — Z12.11 COLON CANCER SCREENING: ICD-10-CM

## 2024-09-30 DIAGNOSIS — Z12.2 SCREENING FOR LUNG CANCER: ICD-10-CM

## 2024-09-30 DIAGNOSIS — Z86.73 HISTORY OF CVA (CEREBROVASCULAR ACCIDENT): Chronic | ICD-10-CM

## 2024-09-30 DIAGNOSIS — Z23 NEED FOR INFLUENZA VACCINATION: ICD-10-CM

## 2024-09-30 DIAGNOSIS — Z11.59 NEED FOR HEPATITIS C SCREENING TEST: ICD-10-CM

## 2024-09-30 DIAGNOSIS — I10 PRIMARY HYPERTENSION: Chronic | ICD-10-CM

## 2024-09-30 DIAGNOSIS — Z11.4 ENCOUNTER FOR SCREENING FOR HIV: ICD-10-CM

## 2024-09-30 DIAGNOSIS — Z12.5 PROSTATE CANCER SCREENING: ICD-10-CM

## 2024-09-30 DIAGNOSIS — Z93.1 PEG (PERCUTANEOUS ENDOSCOPIC GASTROSTOMY) STATUS (HCC): ICD-10-CM

## 2024-09-30 DIAGNOSIS — R26.2 AMBULATORY DYSFUNCTION: Chronic | ICD-10-CM

## 2024-09-30 DIAGNOSIS — I65.22 MORE THAN 50 PERCENT STENOSIS OF LEFT INTERNAL CAROTID ARTERY: ICD-10-CM

## 2024-09-30 DIAGNOSIS — K21.9 GASTROESOPHAGEAL REFLUX DISEASE, UNSPECIFIED WHETHER ESOPHAGITIS PRESENT: ICD-10-CM

## 2024-09-30 DIAGNOSIS — R13.19 OTHER DYSPHAGIA: ICD-10-CM

## 2024-09-30 DIAGNOSIS — F41.9 ANXIETY: Chronic | ICD-10-CM

## 2024-09-30 DIAGNOSIS — Z00.00 ANNUAL PHYSICAL EXAM: Primary | ICD-10-CM

## 2024-09-30 DIAGNOSIS — E78.49 OTHER HYPERLIPIDEMIA: ICD-10-CM

## 2024-09-30 PROCEDURE — 99214 OFFICE O/P EST MOD 30 MIN: CPT | Performed by: INTERNAL MEDICINE

## 2024-09-30 PROCEDURE — 90673 RIV3 VACCINE NO PRESERV IM: CPT | Performed by: INTERNAL MEDICINE

## 2024-09-30 PROCEDURE — 90471 IMMUNIZATION ADMIN: CPT | Performed by: INTERNAL MEDICINE

## 2024-09-30 PROCEDURE — 99396 PREV VISIT EST AGE 40-64: CPT | Performed by: INTERNAL MEDICINE

## 2024-09-30 NOTE — ASSESSMENT & PLAN NOTE
BP well controlled     Orders:    CBC and differential; Future    Comprehensive metabolic panel; Future    Lipid panel; Future    TSH, 3rd generation; Future

## 2024-09-30 NOTE — PROGRESS NOTES
Adult Annual Physical  Name: Ascencion Licea III      : 1960      MRN: [id number]  Encounter Provider: Sue Jiménez MD  Encounter Date: 2024   Encounter department: [facility] MEDICINE    Assessment & Plan  Annual physical exam         Acute CVA (cerebrovascular accident) (HCC)    Orders:    EXTERNAL Referral to Home Health; Future    Primary hypertension  BP well controlled     Orders:    CBC and differential; Future    Comprehensive metabolic panel; Future    Lipid panel; Future    TSH, 3rd generation; Future    More than 50 percent stenosis of left internal carotid artery         Gastroesophageal reflux disease, unspecified whether esophagitis present         Anxiety         Ambulatory dysfunction         PEG (percutaneous endoscopic gastrostomy) status (HCC)         History of CVA (cerebrovascular accident)  On ASA and Plavix, wheelchair bound-affected left arm and leg         Other hyperlipidemia         Need for hepatitis C screening test    Orders:    Hepatitis C antibody; Future    Prostate cancer screening    Orders:    PSA, total and free; Future    Encounter for screening for HIV    Orders:    HIV 1/2 AG/AB w Reflex [facility] for 2 yr old and above; Future    Colon cancer screening         Screening for lung cancer  I discussed with him that he is a candidate for lung cancer CT screening.     The following Shared Decision-Making points were covered:  Benefits of screening were discussed, including the rates of reduction in death from lung cancer and other causes.  Harms of screening were reviewed, including false positive tests, radiation exposure levels, risks of invasive procedures, risks of complications of screening, and risk of overdiagnosis.  I counseled on the importance of adherence to annual lung cancer LDCT screening, impact of co-morbidities, and ability or willingness to undergo diagnosis and treatment.  I counseled on the importance of maintaining  abstinence as a former smoker or was counseled on the importance of smoking cessation if a current smoker    Review of Eligibility Criteria: He meets all of the criteria for Lung Cancer Screening.   He is 63 y.o.   He has 20 pack year tobacco history and is a current smoker or has quit within the past 15 years  He presents no signs or symptoms of lung cancer    After discussion, the patient decided to elect lung cancer screening.         Need for influenza vaccination    Orders:    influenza vaccine, recombinant, PF, 0.5 mL IM (Flublok)    Other dysphagia  No longer has G tube but is swallowing by mouth         Immunizations and preventive care screenings were discussed with patient today. Appropriate education was printed on patient's after visit summary.    Discussed risks and benefits of prostate cancer screening. We discussed the controversial history of PSA screening for prostate cancer in the [place] as well as the risk of over detection and over treatment of prostate cancer by way of PSA screening.  The patient understands that PSA blood testing is an imperfect way to screen for prostate cancer and that elevated PSA levels in the blood may also be caused by infection, inflammation, prostatic trauma or manipulation, urological procedures, or by benign prostatic enlargement.    The role of the digital rectal examination in prostate cancer screening was also discussed and I discussed with him that there is large interobserver variability in the findings of digital rectal examination.    Counseling:  Alcohol/drug use: discussed moderation in alcohol intake, the recommendations for healthy alcohol use, and avoidance of illicit drug use.  Dental Health: discussed importance of regular tooth brushing, flossing, and dental visits.  Exercise: the importance of regular exercise/physical activity was discussed. Recommend exercise 3-5 times per week for at least 30 minutes.       Depression Screening and Follow-up  Plan: Patient was screened for depression during today's encounter. They screened negative with a PHQ-2 score of 0.        History of Present Illness     Adult Annual Physical  Review of Systems   Constitutional: Negative.    HENT: Negative.     Respiratory: Negative.     Cardiovascular: Negative.    Gastrointestinal: Negative.    Genitourinary: Negative.    Neurological:  Positive for weakness.     Current Outpatient Medications on File Prior to Visit   Medication Sig Dispense Refill    acetaminophen (TYLENOL) 325 mg tablet Take 650 mg by mouth every 6 (six) hours as needed for mild pain      albuterol (Ventolin HFA) 90 mcg/act inhaler Inhale 2 puffs every 6 (six) hours as needed for wheezing or shortness of breath 18 g 5    aspirin (ECOTRIN LOW STRENGTH) 81 mg EC tablet Take 1 tablet (81 mg total) by mouth daily 30 tablet 3    atorvastatin (LIPITOR) 20 mg tablet Take 1 tablet (20 mg total) by mouth daily with dinner 30 tablet 3    bisacodyl (DULCOLAX) 10 mg suppository Insert 10 mg into the rectum if needed      Blood Pressure Monitoring (Blood Pressure Monitor Automat) MAIK Use if needed (Hypertension) 1 each 0    busPIRone (BUSPAR) 5 mg tablet Take 1 tablet (5 mg total) by mouth 2 (two) times a day 60 tablet 3    calcium carbonate (OS-ILIANA) 600 MG tablet Take 1 tablet (600 mg total) by mouth daily 30 tablet 3    cetirizine (ZyrTEC) 10 mg tablet Take 1 tablet (10 mg total) by mouth daily at bedtime 30 tablet 3    clopidogrel (PLAVIX) 75 mg tablet Take 1 tablet (75 mg total) by mouth daily 90 tablet 3    dextromethorphan-guaiFENesin (ROBITUSSIN DM)  mg/5 mL syrup Take 5 mL by mouth 3 (three) times a day as needed for cough 473 mL 1    ergocalciferol (ERGOCALCIFEROL) 1.25 MG (67174 UT) capsule Take 1 capsule (50,000 Units total) by mouth once a week On Fridays 4 capsule 3    guaiFENesin (MUCINEX) 600 mg 12 hr tablet Take 1 tablet (600 mg total) by mouth every 12 (twelve) hours 60 tablet 1    metoprolol  "tartrate (LOPRESSOR) 25 mg tablet Take 1 tablet (25 mg total) by mouth every 12 (twelve) hours Hold for SBP <110 or HR <60 60 tablet 3    mirtazapine (REMERON) 15 mg tablet Take 1 tablet (15 mg total) by mouth daily at bedtime 30 tablet 3    polyethylene glycol (MIRALAX) 17 g packet Take 17 g by mouth if needed       No current facility-administered medications on file prior to visit.      Social History     Tobacco Use    Smoking status: Former     Types: Cigarettes    Smokeless tobacco: Never   Vaping Use    Vaping status: Never Used   Substance and Sexual Activity    Alcohol use: Not Currently     Comment: occasionally    Drug use: Never    Sexual activity: Not on file       Objective     /80 (BP Location: Left arm, Patient Position: Sitting, Cuff Size: Standard)   Pulse 71   Ht 5' 11\" (1.803 m)   SpO2 96%   BMI 19.25 kg/m²     Physical Exam  Constitutional:       Comments: wheelchair   HENT:      Head: Normocephalic and atraumatic.      Right Ear: External ear normal.      Left Ear: External ear normal.      Nose: Nose normal.      Mouth/Throat:      Mouth: Mucous membranes are moist.   Eyes:      Extraocular Movements: Extraocular movements intact.   Cardiovascular:      Rate and Rhythm: Normal rate and regular rhythm.   Pulmonary:      Effort: Pulmonary effort is normal.      Breath sounds: Normal breath sounds.   Abdominal:      General: Abdomen is flat.      Palpations: Abdomen is soft.   Musculoskeletal:      Cervical back: Normal range of motion and neck supple.   Skin:     General: Skin is warm.   Neurological:      General: No focal deficit present.      Mental Status: He is alert.      Motor: Weakness present.      Gait: Gait abnormal.      Comments: Left arm a nd left leg   Psychiatric:         Mood and Affect: Mood normal.         Thought Content: Thought content normal.         "

## 2024-10-03 ENCOUNTER — TELEPHONE (OUTPATIENT)
Age: 64
End: 2024-10-03

## 2024-10-03 NOTE — TELEPHONE ENCOUNTER
April from [facility] called to day the pts goals have been met and he was d/c from their service on 10/2/24.

## 2024-10-03 NOTE — TELEPHONE ENCOUNTER
Lucia from [facility] called and is asking if new order for PT only can be faxed over to them for the pt.     Fax: [phone]

## 2024-10-10 ENCOUNTER — TELEPHONE (OUTPATIENT)
Age: 64
End: 2024-10-10

## 2024-10-10 DIAGNOSIS — G81.90 HEMIPARESIS, UNSPECIFIED HEMIPARESIS ETIOLOGY, UNSPECIFIED LATERALITY (HCC): ICD-10-CM

## 2024-10-10 DIAGNOSIS — I63.9 CEREBROVASCULAR ACCIDENT (CVA), UNSPECIFIED MECHANISM (HCC): Primary | ICD-10-CM

## 2024-10-10 NOTE — TELEPHONE ENCOUNTER
[facility] called looking for order number [id number] to be signed by NI SANCHEZ.  Once signed please fax to forward to .

## 2024-10-11 NOTE — TELEPHONE ENCOUNTER
JIMENA from [facility] called to verify if the orders were signed. She was informed that the provider is not from this clinic and that the patient is a patient of Dr. Jiménez. They will be sending the orders again to verify if Dr. Jiménez can sign them. If you have any questions, you can contact JIMENA at [phone].

## 2024-10-16 DIAGNOSIS — F41.9 ANXIETY: Chronic | ICD-10-CM

## 2024-10-16 RX ORDER — MIRTAZAPINE 15 MG/1
15 TABLET, FILM COATED ORAL
Qty: 90 TABLET | Refills: 1 | Status: SHIPPED | OUTPATIENT
Start: 2024-10-16

## 2024-10-16 RX ORDER — BUSPIRONE HYDROCHLORIDE 5 MG/1
5 TABLET ORAL 2 TIMES DAILY
Qty: 180 TABLET | Refills: 1 | Status: SHIPPED | OUTPATIENT
Start: 2024-10-16

## 2024-10-17 ENCOUNTER — TELEPHONE (OUTPATIENT)
Age: 64
End: 2024-10-17

## 2024-10-17 NOTE — TELEPHONE ENCOUNTER
Maureen called from [facility] and stated they never received the signed fax that was sent on 10/15.Please refax to [phone]

## 2024-10-17 NOTE — TELEPHONE ENCOUNTER
Called pt to schedule:     LTFU - l/s 5/2/24 FEY. >50% stenosis of L internal carotid artery. REF Ike.     Pt is overdue for CV testing and will need a new order prior to scheduling CV + OV. Please place new order and call the pt's sister for scheduling.    CB# [phone]

## 2024-10-22 ENCOUNTER — TRANSCRIBE ORDERS (OUTPATIENT)
Dept: VASCULAR SURGERY | Facility: CLINIC | Age: 64
End: 2024-10-22

## 2024-10-22 DIAGNOSIS — I65.22 MORE THAN 50 PERCENT STENOSIS OF LEFT INTERNAL CAROTID ARTERY: Primary | ICD-10-CM

## 2024-10-22 DIAGNOSIS — I63.9 ACUTE CVA (CEREBROVASCULAR ACCIDENT) (HCC): ICD-10-CM

## 2024-10-23 ENCOUNTER — TELEPHONE (OUTPATIENT)
Age: 64
End: 2024-10-23

## 2024-10-23 RX ORDER — ATORVASTATIN CALCIUM 20 MG/1
20 TABLET, FILM COATED ORAL
Qty: 90 TABLET | Refills: 1 | Status: SHIPPED | OUTPATIENT
Start: 2024-10-23

## 2024-10-23 NOTE — TELEPHONE ENCOUNTER
Marjan from [facility] will fax over home health orders for PCP Dr. Jiménez to sign, date and fax back to [phone]   Alternative fax number :[phone].

## 2024-10-31 ENCOUNTER — TELEPHONE (OUTPATIENT)
Age: 64
End: 2024-10-31

## 2024-10-31 ENCOUNTER — HOSPITAL ENCOUNTER (OUTPATIENT)
Dept: VASCULAR ULTRASOUND | Facility: HOSPITAL | Age: 64
Discharge: HOME | End: 2024-10-31
Payer: COMMERCIAL

## 2024-10-31 DIAGNOSIS — I65.22 MORE THAN 50 PERCENT STENOSIS OF LEFT INTERNAL CAROTID ARTERY: ICD-10-CM

## 2024-10-31 PROCEDURE — 93880 EXTRACRANIAL BILAT STUDY: CPT | Performed by: SURGERY

## 2024-10-31 PROCEDURE — 93880 EXTRACRANIAL BILAT STUDY: CPT

## 2024-10-31 NOTE — TELEPHONE ENCOUNTER
[facility] calling for orders that were not received.  Orders are sign and scanned into media tab on 10/24/24.  Please refax this two orders to  [phone]    Please note once faxed.

## 2024-11-02 DIAGNOSIS — E55.9 VITAMIN D DEFICIENCY: ICD-10-CM

## 2024-11-03 RX ORDER — ERGOCALCIFEROL 1.25 MG/1
CAPSULE, LIQUID FILLED ORAL
Qty: 12 CAPSULE | Refills: 1 | Status: SHIPPED | OUTPATIENT
Start: 2024-11-03

## 2024-11-05 ENCOUNTER — TELEPHONE (OUTPATIENT)
Age: 64
End: 2024-11-05

## 2024-11-05 NOTE — TELEPHONE ENCOUNTER
Rep calling to say that it is recommended that since patient has a diagnosis for heart failure that he be placed on a SGLT2 med like Jardiance or farxiga. Call back is not required unless you have questions.  Yulissa Morgan

## 2024-11-08 ENCOUNTER — TELEPHONE (OUTPATIENT)
Age: 64
End: 2024-11-08

## 2024-11-08 NOTE — TELEPHONE ENCOUNTER
KALPESH from [facility] called because they faxed over home health care orders on 11/1/24 and they want to know if they were received and if the provider will sign. I do not see the orders were received in the media tab so she said she will be re-faxing. Please advise. Thank you.

## 2024-11-19 ENCOUNTER — TELEPHONE (OUTPATIENT)
Age: 64
End: 2024-11-19

## 2024-11-19 NOTE — TELEPHONE ENCOUNTER
Occupational Therapist from [facility] called to see if the office had received their request for patient's Resting Hand Splint for the left hand. She mentioned they requested it back in September and never heard anything back. She also mentioned this order would have to be sent through [place].

## 2024-11-22 ENCOUNTER — TELEPHONE (OUTPATIENT)
Age: 64
End: 2024-11-22

## 2024-11-22 NOTE — TELEPHONE ENCOUNTER
Vinita from [facility] called requesting we watch for an order she is faxing . Doctor should sign and date the fax when received and fax back.

## 2024-12-10 ENCOUNTER — OFFICE VISIT (OUTPATIENT)
Dept: VASCULAR SURGERY | Facility: CLINIC | Age: 64
End: 2024-12-10
Payer: COMMERCIAL

## 2024-12-10 VITALS
BODY MASS INDEX: 19.25 KG/M2 | HEART RATE: 79 BPM | DIASTOLIC BLOOD PRESSURE: 84 MMHG | HEIGHT: 71 IN | OXYGEN SATURATION: 98 % | SYSTOLIC BLOOD PRESSURE: 122 MMHG | RESPIRATION RATE: 18 BRPM

## 2024-12-10 DIAGNOSIS — I65.22 MORE THAN 50 PERCENT STENOSIS OF LEFT INTERNAL CAROTID ARTERY: ICD-10-CM

## 2024-12-10 DIAGNOSIS — Z86.73 HISTORY OF CVA (CEREBROVASCULAR ACCIDENT): ICD-10-CM

## 2024-12-10 DIAGNOSIS — I65.22 CAROTID STENOSIS, ASYMPTOMATIC, LEFT: Primary | ICD-10-CM

## 2024-12-10 DIAGNOSIS — I10 PRIMARY HYPERTENSION: Chronic | ICD-10-CM

## 2024-12-10 DIAGNOSIS — E78.49 OTHER HYPERLIPIDEMIA: ICD-10-CM

## 2024-12-10 PROCEDURE — 99213 OFFICE O/P EST LOW 20 MIN: CPT | Performed by: NURSE PRACTITIONER

## 2024-12-10 NOTE — ASSESSMENT & PLAN NOTE
History of basal ganglia CVA with residual Left hemiplegia  Orders:    VAS carotid complete study; Future

## 2024-12-10 NOTE — ASSESSMENT & PLAN NOTE
63-year-old male former smoker with HTN, HLD, h/o bilateral basal ganglia CVA 10/2022 with residual left-sided hemiplegia, high grade L ICA stenosis s/p L CEA 3/24/23 (Rose) presents to review imaging and [facility] visit.    -Presents accompanied by his sister.  Presents without complaints.  -Denies TIA or strokelike symptoms  -Neuroexam at baseline, left-sided hemiplegia  -Patient and family unsure of medications he is taking, patient's other sister fills medication dispenser.    Carotid duplex reviewed 10/31/2024  RIGHT: <50% ICA stenosis  LEFT: Widely patent ICA and endarterectomy site    Plan:  -Continue q. yearly carotid duplex imaging due November 2025  -Continue aspirin  -Continue statin  -Return to office after imaging    Orders:    VAS carotid complete study; Future

## 2024-12-10 NOTE — PROGRESS NOTES
Name: Ascencion Licea III      : 1960      MRN: [id number]  Encounter Provider: DANIEL Buenrostro  Encounter Date: 12/10/2024   [facility] [place]  :  Assessment & Plan  Carotid stenosis, asymptomatic, left  63-year-old male former smoker with HTN, HLD, h/o bilateral basal ganglia CVA 10/2022 with residual left-sided hemiplegia, high grade L ICA stenosis s/p L CEA 3/24/23 (Rose) presents to review imaging and [facility] visit.    -Presents accompanied by his sister.  Presents without complaints.  -Denies TIA or strokelike symptoms  -Neuroexam at baseline, left-sided hemiplegia  -Patient and family unsure of medications he is taking, patient's other sister fills medication dispenser.    Carotid duplex reviewed 10/31/2024  RIGHT: <50% ICA stenosis  LEFT: Widely patent ICA and endarterectomy site    Plan:  -Continue q. yearly carotid duplex imaging due 2025  -Continue aspirin  -Continue statin  -Return to office after imaging    Orders:    VAS carotid complete study; Future    History of CVA (cerebrovascular accident)  History of basal ganglia CVA with residual Left hemiplegia  Orders:    VAS carotid complete study; Future    Primary hypertension  -BP well controlled  -continue current medical regimen  -management per PCP           Other hyperlipidemia  -stable  -continue statin therapy  -management per PCP               History of Present Illness   Cc:   Pt here for f/u CV 10/31/24. Pt denies any new or worsening TIA or stroke symptoms.  HPI  Ascencion Licea III is a 63 y.o. male who presents to review imaging and [facility] visit.    Patient presents without complaints.  Accompanied by his sister today.  Patient with left-sided hemiplegia.  Receives PT weekly    Patient and sister unsure of daily medications as his other sister is responsible for dispensing medicine.  He believes he is taking aspirin and statin as prescribed    No concerns at this time  Imaging  reviewed  Continue q. yearly surveillance      History obtained from: patient and patient sister    Review of Systems   Constitutional: Negative.    HENT: Negative.     Eyes: Negative.    Respiratory: Negative.     Cardiovascular: Negative.    Gastrointestinal: Negative.    Genitourinary: Negative.    Musculoskeletal: Negative.    Skin: Negative.    Allergic/Immunologic: Negative.    Neurological: Negative.    Hematological: Negative.    Psychiatric/Behavioral: Negative.     I have reviewed and made appropriate changes to the review of systems input by the medical assistant.    Medical History Reviewed by provider this encounter:  Tobacco  Allergies  Meds  Problems  Med Hx  Surg Hx  Fam Hx     .  Past Medical History   Past Medical History:   Diagnosis Date    Hypertension     Renal disorder     Stroke (HCC)      Past Surgical History:   Procedure Laterality Date    CARDIAC ELECTROPHYSIOLOGY PROCEDURE N/A 5/3/2023    Procedure: Cardiac loop recorder implant;  Surgeon: Rickey Miranda MD;  [facility]: [facility];  Service: Cardiology    GASTROSTOMY TUBE PLACEMENT N/A 10/18/2022    Procedure: INSERTION GASTROSTOMY TUBE OPEN;  Surgeon: Chris Yanez DO;  Location: [facility];  Service: General    UT TEAEC W/PATCH GRF CAROTID VERTB SUBCLAV NECK INC Left 3/24/2023    Procedure: Left CEA with neuromonitoring;  Surgeon: Joe Rose DO;  Location: [facility];  Service: Vascular     History reviewed. No pertinent family history.   reports that he has quit smoking. His smoking use included cigarettes. He has never used smokeless tobacco. He reports that he does not currently use alcohol. He reports that he does not use drugs.  Current Outpatient Medications on File Prior to Visit   Medication Sig Dispense Refill    acetaminophen (TYLENOL) 325 mg tablet Take 650 mg by mouth every 6 (six) hours as needed for mild pain      albuterol (Ventolin HFA) 90 mcg/act inhaler Inhale 2 puffs every 6 (six) hours as  needed for wheezing or shortness of breath 18 g 5    aspirin (ECOTRIN LOW STRENGTH) 81 mg EC tablet Take 1 tablet (81 mg total) by mouth daily 30 tablet 3    atorvastatin (LIPITOR) 20 mg tablet TAKE 1 TABLET BY MOUTH EVERY DAY WITH DINNER 90 tablet 1    bisacodyl (DULCOLAX) 10 mg suppository Insert 10 mg into the rectum if needed      Blood Pressure Monitoring (Blood Pressure Monitor Automat) MAIK Use if needed (Hypertension) 1 each 0    busPIRone (BUSPAR) 5 mg tablet TAKE 1 TABLET BY MOUTH TWICE A  tablet 1    calcium carbonate (OS-ILIANA) 600 MG tablet Take 1 tablet (600 mg total) by mouth daily 30 tablet 3    cetirizine (ZyrTEC) 10 mg tablet Take 1 tablet (10 mg total) by mouth daily at bedtime 30 tablet 3    clopidogrel (PLAVIX) 75 mg tablet Take 1 tablet (75 mg total) by mouth daily 90 tablet 3    dextromethorphan-guaiFENesin (ROBITUSSIN DM)  mg/5 mL syrup Take 5 mL by mouth 3 (three) times a day as needed for cough 473 mL 1    ergocalciferol (VITAMIN D2) 50,000 units TAKE 1 CAPSULE (50,000 UNITS TOTAL) BY MOUTH ONCE A WEEK ON FRIDAYS 12 capsule 1    guaiFENesin (MUCINEX) 600 mg 12 hr tablet Take 1 tablet (600 mg total) by mouth every 12 (twelve) hours 60 tablet 1    metoprolol tartrate (LOPRESSOR) 25 mg tablet Take 1 tablet (25 mg total) by mouth every 12 (twelve) hours Hold for SBP <110 or HR <60 60 tablet 3    mirtazapine (REMERON) 15 mg tablet TAKE 1 TABLET BY MOUTH DAILY AT BEDTIME 90 tablet 1    polyethylene glycol (MIRALAX) 17 g packet Take 17 g by mouth if needed       No current facility-administered medications on file prior to visit.   No Known Allergies   Current Outpatient Medications on File Prior to Visit   Medication Sig Dispense Refill    acetaminophen (TYLENOL) 325 mg tablet Take 650 mg by mouth every 6 (six) hours as needed for mild pain      albuterol (Ventolin HFA) 90 mcg/act inhaler Inhale 2 puffs every 6 (six) hours as needed for wheezing or shortness of breath 18 g 5    aspirin  "(ECOTRIN LOW STRENGTH) 81 mg EC tablet Take 1 tablet (81 mg total) by mouth daily 30 tablet 3    atorvastatin (LIPITOR) 20 mg tablet TAKE 1 TABLET BY MOUTH EVERY DAY WITH DINNER 90 tablet 1    bisacodyl (DULCOLAX) 10 mg suppository Insert 10 mg into the rectum if needed      Blood Pressure Monitoring (Blood Pressure Monitor Automat) MAIK Use if needed (Hypertension) 1 each 0    busPIRone (BUSPAR) 5 mg tablet TAKE 1 TABLET BY MOUTH TWICE A  tablet 1    calcium carbonate (OS-ILIANA) 600 MG tablet Take 1 tablet (600 mg total) by mouth daily 30 tablet 3    cetirizine (ZyrTEC) 10 mg tablet Take 1 tablet (10 mg total) by mouth daily at bedtime 30 tablet 3    clopidogrel (PLAVIX) 75 mg tablet Take 1 tablet (75 mg total) by mouth daily 90 tablet 3    dextromethorphan-guaiFENesin (ROBITUSSIN DM)  mg/5 mL syrup Take 5 mL by mouth 3 (three) times a day as needed for cough 473 mL 1    ergocalciferol (VITAMIN D2) 50,000 units TAKE 1 CAPSULE (50,000 UNITS TOTAL) BY MOUTH ONCE A WEEK ON FRIDAYS 12 capsule 1    guaiFENesin (MUCINEX) 600 mg 12 hr tablet Take 1 tablet (600 mg total) by mouth every 12 (twelve) hours 60 tablet 1    metoprolol tartrate (LOPRESSOR) 25 mg tablet Take 1 tablet (25 mg total) by mouth every 12 (twelve) hours Hold for SBP <110 or HR <60 60 tablet 3    mirtazapine (REMERON) 15 mg tablet TAKE 1 TABLET BY MOUTH DAILY AT BEDTIME 90 tablet 1    polyethylene glycol (MIRALAX) 17 g packet Take 17 g by mouth if needed       No current facility-administered medications on file prior to visit.      Social History     Tobacco Use    Smoking status: Former     Types: Cigarettes    Smokeless tobacco: Never   Vaping Use    Vaping status: Never Used   Substance and Sexual Activity    Alcohol use: Not Currently     Comment: occasionally    Drug use: Never    Sexual activity: Not on file        Objective   /84 (BP Location: Right arm, Patient Position: Sitting)   Pulse 79   Resp 18   Ht 5' 11\" (1.803 m)   " SpO2 98%   BMI 19.25 kg/m²      Physical Exam  Vitals reviewed.   Constitutional:       General: He is not in acute distress.     Appearance: He is not ill-appearing.   Cardiovascular:      Rate and Rhythm: Normal rate.      Pulses: Normal pulses.   Pulmonary:      Effort: Pulmonary effort is normal. No respiratory distress.   Musculoskeletal:      Cervical back: Normal range of motion.      Right lower leg: No edema.      Left lower leg: No edema.      Comments: Wheelchair, left-sided hemiplegia   Skin:     General: Skin is warm.   Neurological:      Mental Status: He is alert and oriented to person, place, and time. Mental status is at baseline.      Sensory: Sensory deficit present.      Motor: Weakness present.   Psychiatric:         Behavior: Behavior normal.         Administrative Statements   I have spent a total time of 20 minutes in caring for this patient on the day of the visit/encounter including Diagnostic results, Instructions for management, Importance of tx compliance, Impressions, Documenting in the medical record, Reviewing / ordering tests, medicine, procedures  , and Obtaining or reviewing history  .

## 2024-12-10 NOTE — PATIENT INSTRUCTIONS
-Continue q. yearly carotid duplex imaging  Due November 2025    -Continue daily aspirin 81 mg (baby aspirin)  -Continue daily statin    -Plavix no longer indicated from a vascular standpoint.

## 2025-01-07 ENCOUNTER — TELEPHONE (OUTPATIENT)
Age: 65
End: 2025-01-07

## 2025-01-07 DIAGNOSIS — G81.90 HEMIPARESIS, UNSPECIFIED HEMIPARESIS ETIOLOGY, UNSPECIFIED LATERALITY (HCC): Primary | ICD-10-CM

## 2025-01-07 NOTE — TELEPHONE ENCOUNTER
Patient's sister called stating patient was told he needs to get a referral for neurology so that he can get a botox shot in his leg. Please call sister back once order is placed/

## 2025-01-23 ENCOUNTER — TELEPHONE (OUTPATIENT)
Dept: NEPHROLOGY | Facility: CLINIC | Age: 65
End: 2025-01-23

## 2025-01-23 DIAGNOSIS — E87.1 HYPONATREMIA: Primary | Chronic | ICD-10-CM

## 2025-01-23 NOTE — TELEPHONE ENCOUNTER
Spoke with patient's sister, Delmis, reminding them to go for lab work before his appt on 1/30.  She expressed understanding and thanked us for the call.

## 2025-01-27 ENCOUNTER — APPOINTMENT (OUTPATIENT)
Dept: LAB | Facility: CLINIC | Age: 65
End: 2025-01-27
Payer: COMMERCIAL

## 2025-01-27 DIAGNOSIS — E87.1 HYPONATREMIA: Chronic | ICD-10-CM

## 2025-01-27 LAB
ANION GAP SERPL CALCULATED.3IONS-SCNC: 10 MMOL/L (ref 4–13)
BUN SERPL-MCNC: 13 MG/DL (ref 5–25)
CALCIUM SERPL-MCNC: 9.8 MG/DL (ref 8.4–10.2)
CHLORIDE SERPL-SCNC: 104 MMOL/L (ref 96–108)
CO2 SERPL-SCNC: 25 MMOL/L (ref 21–32)
CREAT SERPL-MCNC: 0.65 MG/DL (ref 0.6–1.3)
GFR SERPL CREATININE-BSD FRML MDRD: 104 ML/MIN/1.73SQ M
GLUCOSE SERPL-MCNC: 84 MG/DL (ref 65–140)
POTASSIUM SERPL-SCNC: 3.8 MMOL/L (ref 3.5–5.3)
SODIUM SERPL-SCNC: 139 MMOL/L (ref 135–147)

## 2025-01-27 PROCEDURE — 36415 COLL VENOUS BLD VENIPUNCTURE: CPT

## 2025-01-27 PROCEDURE — 80048 BASIC METABOLIC PNL TOTAL CA: CPT

## 2025-01-30 ENCOUNTER — OFFICE VISIT (OUTPATIENT)
Dept: NEPHROLOGY | Facility: CLINIC | Age: 65
End: 2025-01-30
Payer: COMMERCIAL

## 2025-01-30 VITALS
OXYGEN SATURATION: 99 % | HEIGHT: 71 IN | HEART RATE: 69 BPM | BODY MASS INDEX: 19.25 KG/M2 | SYSTOLIC BLOOD PRESSURE: 126 MMHG | DIASTOLIC BLOOD PRESSURE: 82 MMHG

## 2025-01-30 DIAGNOSIS — Z86.39 HISTORY OF SIADH: Primary | ICD-10-CM

## 2025-01-30 DIAGNOSIS — I10 PRIMARY HYPERTENSION: ICD-10-CM

## 2025-01-30 DIAGNOSIS — N32.89 BLADDER WALL THICKENING: ICD-10-CM

## 2025-01-30 PROCEDURE — 99213 OFFICE O/P EST LOW 20 MIN: CPT | Performed by: INTERNAL MEDICINE

## 2025-01-30 NOTE — PATIENT INSTRUCTIONS
Your sodium levels have been stable for a while.  You can continue to follow-up with your primary care doctor.  Repeat BMP blood test in 6 months.  Given appearance of thickening of bladder wall, recommend seeing urologist, referral has been provided.  Thank you

## 2025-01-30 NOTE — ASSESSMENT & PLAN NOTE
- Blood pressure well controlled and at goal  - Current medications: Metoprolol 25 mg twice daily  - Changes: No changes to antihypertensive regimen at this time

## 2025-01-30 NOTE — PROGRESS NOTES
Name: Ascencion Licea III      : 1960      MRN: [id number]  Encounter Provider: Jomar Vergara MD  Encounter Date: 2025   Encounter department: [facility]  :  Assessment & Plan  History of SIADH  >> Work-up from 1st hospital admission 10/2022  - Admission sodium 119, discharge sodium 138  - Serum osmolality 265 consistent with hypotonic hyponatremia  - Urine osmolality 369, urine sodium less than 10 consistent with hypovolemic hyponatremia  - Serum uric acid 8.0, a.m. cortisol 41, TSH 4.36  - MRI brain with several areas of ischemic infarction     >> Work-up from 2nd hospital admission 10/2022  - Admission sodium 127, discharge sodium 132  - Urine osmolality 552-630  - Urine sodium   - CT abdomen with bladder wall thickening, no obvious evidence of malignancy     >> Working diagnosis: He most likely developed SIADH in setting of stroke, now on mirtazapine, he can also have a component of low solute intake     Plan  - Most recent serum sodium 139 2025  - Current Rx: Off sodium chloride tablet and torsemide  - Changes: Continue to stay off sodium chloride tablets and torsemide  - Okay to drink up to 64 ounces of fluid on a daily basis  - Repeat BMP in 6 months  - Follow-up as needed  Primary hypertension  - Blood pressure well controlled and at goal  - Current medications: Metoprolol 25 mg twice daily  - Changes: No changes to antihypertensive regimen at this time  Bladder wall thickening  - Thickening of urinary bladder wall was seen on CT abdomen 10/2022  - Establish follow-up with urology      History of Present Illness   HPI  Ascencion Licea III is a 64 y.o. male     Since last visit: He has now been discharged from nursing home.  He denies dyspnea.  He denies leg swelling.  He denies any trouble with urination.    64-year-old male was seen in nephrology office today as follow-up for hyponatremia and BIPIN.  He was admitted to [facility]  [place] in 10/2022 with CVA and was noted to have hyponatremia and BIPIN.  His hyponatremia and BIPIN improved with IVF and was thought to be due to volume depletion.  He had another hospital admission in 10/2022 for GI bleed due to Dieulafoy's lesion and had recurrence of hyponatremia.     History obtained from: patient    Review of Systems   Constitutional:  Negative for chills and fever.   HENT:  Negative for ear pain and sore throat.    Eyes:  Negative for pain and visual disturbance.   Respiratory:  Negative for cough and shortness of breath.    Cardiovascular:  Negative for chest pain and palpitations.   Gastrointestinal:  Negative for abdominal pain and vomiting.   Genitourinary:  Negative for dysuria and hematuria.   Musculoskeletal:  Negative for arthralgias and back pain.   Skin:  Negative for color change and rash.   Neurological:  Negative for seizures and syncope.   All other systems reviewed and are negative.    Past Medical History   Past Medical History:   Diagnosis Date    Hypertension     Renal disorder     Stroke (HCC)      Past Surgical History:   Procedure Laterality Date    CARDIAC ELECTROPHYSIOLOGY PROCEDURE N/A 5/3/2023    Procedure: Cardiac loop recorder implant;  Surgeon: Rickey Miranda MD;  Location: [facility];  Service: Cardiology    GASTROSTOMY TUBE PLACEMENT N/A 10/18/2022    Procedure: INSERTION GASTROSTOMY TUBE OPEN;  Surgeon: Chris Yanez DO;  Location: [facility];  Service: General    WV TEAEC W/PATCH GRF CAROTID VERTB SUBCLAV NECK INC Left 3/24/2023    Procedure: Left CEA with neuromonitoring;  Surgeon: Joe Rose DO;  Location: [facility];  Service: Vascular     No family history on file.   reports that he has quit smoking. His smoking use included cigarettes. He has never used smokeless tobacco. He reports that he does not currently use alcohol. He reports that he does not use drugs.  Current Outpatient Medications on File Prior to Visit   Medication Sig Dispense  "Refill    acetaminophen (TYLENOL) 325 mg tablet Take 650 mg by mouth every 6 (six) hours as needed for mild pain      albuterol (Ventolin HFA) 90 mcg/act inhaler Inhale 2 puffs every 6 (six) hours as needed for wheezing or shortness of breath 18 g 5    aspirin (ECOTRIN LOW STRENGTH) 81 mg EC tablet Take 1 tablet (81 mg total) by mouth daily 30 tablet 3    atorvastatin (LIPITOR) 20 mg tablet TAKE 1 TABLET BY MOUTH EVERY DAY WITH DINNER 90 tablet 1    bisacodyl (DULCOLAX) 10 mg suppository Insert 10 mg into the rectum if needed      Blood Pressure Monitoring (Blood Pressure Monitor Automat) MAIK Use if needed (Hypertension) 1 each 0    busPIRone (BUSPAR) 5 mg tablet TAKE 1 TABLET BY MOUTH TWICE A  tablet 1    calcium carbonate (OS-ILIANA) 600 MG tablet Take 1 tablet (600 mg total) by mouth daily 30 tablet 3    cetirizine (ZyrTEC) 10 mg tablet Take 1 tablet (10 mg total) by mouth daily at bedtime 30 tablet 3    clopidogrel (PLAVIX) 75 mg tablet Take 1 tablet (75 mg total) by mouth daily 90 tablet 3    dextromethorphan-guaiFENesin (ROBITUSSIN DM)  mg/5 mL syrup Take 5 mL by mouth 3 (three) times a day as needed for cough 473 mL 1    ergocalciferol (VITAMIN D2) 50,000 units TAKE 1 CAPSULE (50,000 UNITS TOTAL) BY MOUTH ONCE A WEEK ON FRIDAYS 12 capsule 1    guaiFENesin (MUCINEX) 600 mg 12 hr tablet Take 1 tablet (600 mg total) by mouth every 12 (twelve) hours 60 tablet 1    metoprolol tartrate (LOPRESSOR) 25 mg tablet Take 1 tablet (25 mg total) by mouth every 12 (twelve) hours Hold for SBP <110 or HR <60 60 tablet 3    mirtazapine (REMERON) 15 mg tablet TAKE 1 TABLET BY MOUTH DAILY AT BEDTIME 90 tablet 1    polyethylene glycol (MIRALAX) 17 g packet Take 17 g by mouth if needed       No current facility-administered medications on file prior to visit.   No Known Allergies      Objective   /82 (BP Location: Right arm, Patient Position: Sitting, Cuff Size: Standard)   Pulse 69   Ht 5' 11\" (1.803 m)   SpO2 " 99%   BMI 19.25 kg/m²      Physical Exam  Vitals and nursing note reviewed.   Constitutional:       General: He is not in acute distress.     Appearance: He is well-developed.   HENT:      Head: Normocephalic and atraumatic.   Eyes:      Conjunctiva/sclera: Conjunctivae normal.   Cardiovascular:      Rate and Rhythm: Normal rate and regular rhythm.      Pulses: Normal pulses.      Heart sounds: Normal heart sounds. No murmur heard.  Pulmonary:      Effort: Pulmonary effort is normal. No respiratory distress.      Breath sounds: Normal breath sounds.   Abdominal:      Palpations: Abdomen is soft.      Tenderness: There is no abdominal tenderness.   Musculoskeletal:         General: No swelling.      Cervical back: Neck supple.      Right lower leg: No edema.      Left lower leg: No edema.   Skin:     General: Skin is warm and dry.      Capillary Refill: Capillary refill takes less than 2 seconds.   Neurological:      Mental Status: He is alert.   Psychiatric:         Mood and Affect: Mood normal.

## 2025-01-30 NOTE — ASSESSMENT & PLAN NOTE
- Thickening of urinary bladder wall was seen on CT abdomen 10/2022  - Establish follow-up with urology

## 2025-04-03 PROBLEM — Z72.0 TOBACCO USE: Status: ACTIVE | Noted: 2022-10-18

## 2025-06-18 ENCOUNTER — TELEPHONE (OUTPATIENT)
Age: 65
End: 2025-06-18

## 2025-06-18 NOTE — TELEPHONE ENCOUNTER
Outbound call to Patient to schedule New Patient appointment from referral.  Spoke with patients sister Delmis, she will discuss with Patient and will call back to advise if he will need to schedule an appointment.

## (undated) DEVICE — SURGIFOAM 8.5 X 12.5

## (undated) DEVICE — MONITORING SPINAL IMPULSE CASE FEE

## (undated) DEVICE — SUT PROLENE 6-0 BV-1/BV-1 24 IN 8305H

## (undated) DEVICE — PETRI DISH STERILE

## (undated) DEVICE — VIAL DECANTER

## (undated) DEVICE — SUT ETHILON 3-0 FS-1 18 IN 663G

## (undated) DEVICE — PLUMEPEN PRO 10FT

## (undated) DEVICE — NEEDLE HYPO 22G X 1-1/2 IN

## (undated) DEVICE — CHLORAPREP HI-LITE 26ML ORANGE

## (undated) DEVICE — SUT MONOCRYL 4-0 PS-2 18 IN Y496G

## (undated) DEVICE — UMBILICAL TAPE: Brand: DEROYAL

## (undated) DEVICE — PROXIMATE SKIN STAPLERS (35 WIDE) CONTAINS 35 STAINLESS STEEL STAPLES (FIXED HEAD): Brand: PROXIMATE

## (undated) DEVICE — SYRINGE CATH TIP 50ML

## (undated) DEVICE — CATHETER PLUG WITH CAP: Brand: DOVER

## (undated) DEVICE — SYRINGE 20ML LL

## (undated) DEVICE — SYRINGE 3ML LL

## (undated) DEVICE — 3M™ IOBAN™ 2 ANTIMICROBIAL INCISE DRAPE 6640EZ: Brand: IOBAN™ 2

## (undated) DEVICE — BETHLEHEM UNIVERSAL MINOR GEN: Brand: CARDINAL HEALTH

## (undated) DEVICE — DECANTER: Brand: UNBRANDED

## (undated) DEVICE — ADHESIVE SKIN HIGH VISCOSITY EXOFIN 1ML

## (undated) DEVICE — PACK UNIVERSAL DRAPES SUB-Q ICD

## (undated) DEVICE — DRAPE EQUIPMENT RF WAND

## (undated) DEVICE — SUT VICRYL 3-0 SH 27 IN J416H

## (undated) DEVICE — PAD GROUNDING ADULT

## (undated) DEVICE — TUBING SUCTION 5MM X 12 FT

## (undated) DEVICE — GAUZE SPONGES,16 PLY: Brand: CURITY

## (undated) DEVICE — POOLE SUCTION HANDLE: Brand: CARDINAL HEALTH

## (undated) DEVICE — NEEDLE 27 G X 1 1/4

## (undated) DEVICE — HEMOSTATIC MATRIX SURGIFLO 8ML W/THROMBIN

## (undated) DEVICE — NEEDLE SPINAL 22G X 3.5IN  QUINCKE

## (undated) DEVICE — GLOVE SRG BIOGEL ECLIPSE 7.5

## (undated) DEVICE — HEMOCLIP CARTRIDGE MED

## (undated) DEVICE — DRESSING EXUDERM SATIN HYDROCOLLOID 4 X 4 IN

## (undated) DEVICE — SUT PDS II 1 CTX 36 IN Z371T

## (undated) DEVICE — GLOVE INDICATOR PI UNDERGLOVE SZ 8 BLUE

## (undated) DEVICE — SUT SILK 3-0 SH 30 IN K832H

## (undated) DEVICE — CAROTID ARTERY SHUNT KIT,RADIOPAQUE LINE, STRAIGHT: Brand: ARGYLE

## (undated) DEVICE — SUPPLY FEE STD

## (undated) DEVICE — RED RUBBER URETHRAL CATHETER: Brand: DOVER

## (undated) DEVICE — SUT SILK 3-0 18 IN A184H

## (undated) DEVICE — SUT PROLENE 5-0 C-1/C-1 36 IN 8321H

## (undated) DEVICE — HEMOCLIP CARTRIDGE SM

## (undated) DEVICE — TOWEL SURG XR DETECT GREEN STRL RFD

## (undated) DEVICE — ELECTRODE BLADE MOD E-Z CLEAN 2.5IN 6.4CM -0012M

## (undated) DEVICE — DRAPE SURGIKIT SADDLE BAG

## (undated) DEVICE — SUT SILK 2-0 18 IN A185H

## (undated) DEVICE — BETHL CAROTID ENDARTERECTOMY: Brand: CARDINAL HEALTH

## (undated) DEVICE — GLOVE SRG BIOGEL ORTHOPEDIC 8

## (undated) DEVICE — INTENDED FOR TISSUE SEPARATION, AND OTHER PROCEDURES THAT REQUIRE A SHARP SURGICAL BLADE TO PUNCTURE OR CUT.: Brand: BARD-PARKER SAFETY BLADES SIZE 15, STERILE

## (undated) DEVICE — SUT VICRYL 0 CT-1 27 IN J260H